# Patient Record
Sex: FEMALE | Race: BLACK OR AFRICAN AMERICAN | NOT HISPANIC OR LATINO | Employment: OTHER | URBAN - METROPOLITAN AREA
[De-identification: names, ages, dates, MRNs, and addresses within clinical notes are randomized per-mention and may not be internally consistent; named-entity substitution may affect disease eponyms.]

---

## 2017-02-14 LAB — POCT GLUCOSE: 254 MG/DL (ref 70–110)

## 2017-02-14 PROCEDURE — 99283 EMERGENCY DEPT VISIT LOW MDM: CPT | Mod: 25

## 2017-02-14 PROCEDURE — 96361 HYDRATE IV INFUSION ADD-ON: CPT

## 2017-02-14 PROCEDURE — 96374 THER/PROPH/DIAG INJ IV PUSH: CPT

## 2017-02-14 PROCEDURE — 82962 GLUCOSE BLOOD TEST: CPT

## 2017-02-15 ENCOUNTER — HOSPITAL ENCOUNTER (EMERGENCY)
Facility: OTHER | Age: 52
Discharge: HOME | End: 2017-02-15
Attending: EMERGENCY MEDICINE
Payer: MEDICARE

## 2017-02-15 VITALS
HEART RATE: 76 BPM | OXYGEN SATURATION: 96 % | BODY MASS INDEX: 24.16 KG/M2 | HEIGHT: 65 IN | TEMPERATURE: 100 F | SYSTOLIC BLOOD PRESSURE: 189 MMHG | RESPIRATION RATE: 18 BRPM | WEIGHT: 145 LBS | DIASTOLIC BLOOD PRESSURE: 76 MMHG

## 2017-02-15 DIAGNOSIS — B34.9 ACUTE VIRAL SYNDROME: ICD-10-CM

## 2017-02-15 DIAGNOSIS — N18.9 CHRONIC KIDNEY DISEASE, UNSPECIFIED STAGE: Primary | ICD-10-CM

## 2017-02-15 DIAGNOSIS — R19.7 NAUSEA VOMITING AND DIARRHEA: ICD-10-CM

## 2017-02-15 DIAGNOSIS — Z86.39 HISTORY OF DIABETES MELLITUS: ICD-10-CM

## 2017-02-15 DIAGNOSIS — R11.2 NAUSEA VOMITING AND DIARRHEA: ICD-10-CM

## 2017-02-15 LAB
ALBUMIN SERPL BCP-MCNC: 2.5 G/DL
ALP SERPL-CCNC: 168 U/L
ALT SERPL W/O P-5'-P-CCNC: 10 U/L
AMORPH CRY URNS QL MICRO: ABNORMAL
ANION GAP SERPL CALC-SCNC: 16 MMOL/L
AST SERPL-CCNC: 26 U/L
B-OH-BUTYR BLD STRIP-SCNC: 0.8 MMOL/L
BACTERIA #/AREA URNS HPF: ABNORMAL /HPF
BASOPHILS # BLD AUTO: 0.01 K/UL
BASOPHILS NFR BLD: 0.1 %
BILIRUB SERPL-MCNC: 0.2 MG/DL
BILIRUB UR QL STRIP: NEGATIVE
BUN SERPL-MCNC: 63 MG/DL
CALCIUM SERPL-MCNC: 9 MG/DL
CHLORIDE SERPL-SCNC: 108 MMOL/L
CLARITY UR: ABNORMAL
CO2 SERPL-SCNC: 16 MMOL/L
COLOR UR: YELLOW
CREAT SERPL-MCNC: 3.9 MG/DL
DIFFERENTIAL METHOD: ABNORMAL
EOSINOPHIL # BLD AUTO: 0 K/UL
EOSINOPHIL NFR BLD: 0.1 %
ERYTHROCYTE [DISTWIDTH] IN BLOOD BY AUTOMATED COUNT: 14.9 %
EST. GFR  (AFRICAN AMERICAN): 15 ML/MIN/1.73 M^2
EST. GFR  (NON AFRICAN AMERICAN): 13 ML/MIN/1.73 M^2
GLUCOSE SERPL-MCNC: 140 MG/DL
GLUCOSE UR QL STRIP: ABNORMAL
GRAN CASTS #/AREA URNS LPF: 22 /LPF
HCT VFR BLD AUTO: 29.3 %
HGB BLD-MCNC: 9.2 G/DL
HGB UR QL STRIP: ABNORMAL
HYALINE CASTS #/AREA URNS LPF: 0 /LPF
KETONES UR QL STRIP: NEGATIVE
LEUKOCYTE ESTERASE UR QL STRIP: NEGATIVE
LIPASE SERPL-CCNC: 8 U/L
LYMPHOCYTES # BLD AUTO: 0.7 K/UL
LYMPHOCYTES NFR BLD: 10.1 %
MCH RBC QN AUTO: 28.8 PG
MCHC RBC AUTO-ENTMCNC: 31.4 %
MCV RBC AUTO: 92 FL
MICROSCOPIC COMMENT: ABNORMAL
MONOCYTES # BLD AUTO: 1 K/UL
MONOCYTES NFR BLD: 13.9 %
NEUTROPHILS # BLD AUTO: 5.5 K/UL
NEUTROPHILS NFR BLD: 75.5 %
NITRITE UR QL STRIP: NEGATIVE
PH UR STRIP: 6 [PH] (ref 5–8)
PLATELET # BLD AUTO: 255 K/UL
PMV BLD AUTO: 9.9 FL
POTASSIUM SERPL-SCNC: 5.4 MMOL/L
PROT SERPL-MCNC: 6.3 G/DL
PROT UR QL STRIP: ABNORMAL
RBC # BLD AUTO: 3.2 M/UL
RBC #/AREA URNS HPF: 5 /HPF (ref 0–4)
SODIUM SERPL-SCNC: 140 MMOL/L
SP GR UR STRIP: 1.02 (ref 1–1.03)
URN SPEC COLLECT METH UR: ABNORMAL
UROBILINOGEN UR STRIP-ACNC: NEGATIVE EU/DL
WBC # BLD AUTO: 7.25 K/UL
WBC #/AREA URNS HPF: 4 /HPF (ref 0–5)
YEAST URNS QL MICRO: ABNORMAL

## 2017-02-15 PROCEDURE — 63600175 PHARM REV CODE 636 W HCPCS: Performed by: EMERGENCY MEDICINE

## 2017-02-15 PROCEDURE — 85025 COMPLETE CBC W/AUTO DIFF WBC: CPT

## 2017-02-15 PROCEDURE — 25000003 PHARM REV CODE 250: Performed by: EMERGENCY MEDICINE

## 2017-02-15 PROCEDURE — 83690 ASSAY OF LIPASE: CPT

## 2017-02-15 PROCEDURE — 81000 URINALYSIS NONAUTO W/SCOPE: CPT

## 2017-02-15 PROCEDURE — 82010 KETONE BODYS QUAN: CPT

## 2017-02-15 PROCEDURE — 80053 COMPREHEN METABOLIC PANEL: CPT

## 2017-02-15 RX ORDER — ATORVASTATIN CALCIUM 40 MG/1
40 TABLET, FILM COATED ORAL DAILY
Status: ON HOLD | COMMUNITY
End: 2017-08-27

## 2017-02-15 RX ORDER — METOPROLOL SUCCINATE 50 MG/1
50 TABLET, EXTENDED RELEASE ORAL DAILY
Status: ON HOLD | COMMUNITY
End: 2017-08-27

## 2017-02-15 RX ORDER — PROMETHAZINE HYDROCHLORIDE 25 MG/1
25 SUPPOSITORY RECTAL EVERY 6 HOURS PRN
Qty: 10 SUPPOSITORY | Refills: 0 | Status: SHIPPED | OUTPATIENT
Start: 2017-02-15 | End: 2017-02-18

## 2017-02-15 RX ORDER — SODIUM CHLORIDE 9 MG/ML
1000 INJECTION, SOLUTION INTRAVENOUS
Status: DISCONTINUED | OUTPATIENT
Start: 2017-02-15 | End: 2017-02-15 | Stop reason: HOSPADM

## 2017-02-15 RX ORDER — ONDANSETRON 2 MG/ML
4 INJECTION INTRAMUSCULAR; INTRAVENOUS
Status: COMPLETED | OUTPATIENT
Start: 2017-02-15 | End: 2017-02-15

## 2017-02-15 RX ORDER — SODIUM CHLORIDE 9 MG/ML
1000 INJECTION, SOLUTION INTRAVENOUS
Status: COMPLETED | OUTPATIENT
Start: 2017-02-15 | End: 2017-02-15

## 2017-02-15 RX ADMIN — ONDANSETRON 4 MG: 2 INJECTION INTRAMUSCULAR; INTRAVENOUS at 01:02

## 2017-02-15 RX ADMIN — SODIUM CHLORIDE 1000 ML: 0.9 INJECTION, SOLUTION INTRAVENOUS at 01:02

## 2017-02-15 NOTE — ED NOTES
"Blood sugar was over 700 at home and took 24 unit humalog PTA. Pt was given a shot at [facility] for a virus and thinks she may have the flu now. She states "I think I'm having an allergic reaction to that shot" Also concerned that she is acidotic because her sugar has been high. She just got discharged from [facility]. Pt states she has been having diarrhea and emesis and hurts all over from the neuropathy and carpel tunnel. She also has   LOC: Pt is awake alert and aware of environment, oriented X3 and speaking appropriately  Appearance: Pt is in no acute distress, Pt is well groomed and clean  Skin: skin is warm and dry with normal turgor, mucus membranes are moist and pink, skin is intact with no bruising or breakdown  Muskuloskeletal: Pt moves all extremities well, there is no obvious swelling or deformities noted, pulses are intact.  Respiratory: Airway is open and patent, respirations are spontaneous and even.  Cardiac: no edema and cap refill is <3sec  Abdomen: soft, non-tender and non-distended  Neuro: Pt follows commands easily and has no obvious deficits  "

## 2017-02-15 NOTE — DISCHARGE INSTRUCTIONS
"  Discharge Instructions for Chronic Kidney Disease (CKD)  Chronic kidney disease can happen because of many things. These include infections, diabetes, high blood pressure, kidney stones, circulation problems, and reactions to medicine. Having kidney disease means making many changes in your life. Learn as much as you can about it so that you can better adjust to these changes. It is important to remember that the main goal of treatment is to stop chronic kidney disease (CKD) from progressing to complete kidney failure. Treatments may vary based on the progression of CKD. Always follow your doctor's instructions on how to manage your condition.  Here are some things you can do to help your condition.  Diet changes  Always discuss your diet with your doctor before making any changes.  Salt (sodium) in your diet  · Based on your condition, you may be told to eat 1,500 mg or less of sodium daily  · Limit processed foods such as:  ¨ Frozen dinners and packaged meals  ¨ Canned fish and meats  ¨ Pickled foods  ¨ Salted snacks  ¨ Lunch meats  ¨ Sauces  ¨ Most cheeses  ¨ Fast foods  · Don't add salt to your food while cooking or before eating at the table.  · Eat unprocessed foods to lower the sodium, such as:  ¨ Fresh turkey and chicken  ¨ Lean beef  ¨ Unsalted tuna  ¨ Fresh fish  ¨ Fresh vegetables and fruits  · Season foods with fresh herbs, garlic, onions, citrus, flavored vinegar, and sodium-free spice blends instead of salt when cooking.  · Don't use salt substitutes that are high in potassium. Ask your doctor or a registered dietitian which salt substitutes to use.  · Avoiding drinking "softened" water, because of the sodium content. Make sure to read the label on bottled water for sodium content.  · Avoid over-the-counter medicines that contain sodium bicarbonate or sodium carbonate. Read labels carefully.  Potassium in your diet   · Based on your condition, you may be told to eat less than 1,500 mg to 2,700 mg of " potassium daily.  · Always drain canned foods such as vegetables, fruits, and meats before serving.  · Avoid whole-grain breads, wheat bran, and granolas.  · Avoid milk, buttermilk, and yogurt.  · Avoid nuts, seeds, peanut butter, dried beans, and peas.  · Avoid fig cookies, chocolate, and molasses.  · Don't use salt substitutes that are high in potassium. Ask your doctor or a registered dietitian which salt substitutes to use.  Protein in your diet  · Based on your condition, your doctor will talk with you about why you should limit protein in your diet.  · Cut back on protein. Eat less meat, milk products, yogurt, eggs, and cheese.  Phosphorus in your diet  · Avoid beer, cocoa, dark jay, thai, chocolate drinks, and canned ice teas.  · Avoid cheese, milk, ice cream, pudding, and yogurt.  · Avoid liver (beef, chicken), organ meats, oysters, crayfish, and sardines.  · Avoid beans (soy, kidney, black, garbanzo, and northern), peas (chick and split), bran cereals, nuts, and caramels.  Eat small meals often that are high in fiber and calories. You may be told to limit how much fluid you drink.  Other home care  · Avoid wearing yourself out or becoming overly fatigued.  · Get plenty of rest and get more sleep at night.  · Move around and bend your legs to avoid getting blood clots when you rest for a long period of time.  · Weigh yourself every day. Do this at the same time of day and in the same kind of clothes. Keep a record of your daily weights.  · Take your medicines exactly as directed.  · Keep all medical appointments.  · Take steps to control high blood pressure or diabetes. Talk with your doctor for advice.  · Talk with your doctor about dialysis. This procedure may help if your chronic kidney disease is progressing to end stage renal disease.  Follow-up care  Make a follow-up appointment as directed by our staff.     When to seek medical care  Call your doctor right away if you have any of the  "following:  · Trouble eating or drinking  · Weight loss of more than 2 pounds in 24 hours or more than 5 pounds in 7 days  · Little or no urine output  · Trouble breathing  · Muscle aches  · Fever of 100.4°F or higher, or chills  · Blood in your urine or stool  · Bloody discharge from your nose, mouth, or ears  · Severe headache or a seizure  · Vomiting  · Swelling of legs or ankles  · Chest pain (call 911)   Date Last Reviewed: 1/6/2015  © 0241-3004 [employer] [address] [place] [address]. All rights reserved. This information is not intended as a substitute for professional medical care. Always follow your healthcare professional's instructions.          Viral Syndrome (Adult)  A viral illness may cause a number of symptoms. The symptoms depend on the part of the body that the virus affects. If it settles in your nose, throat, and lungs, it may cause cough, sore throat, congestion, and sometimes headache. If it settles in your stomach and intestinal tract, it may cause vomiting and diarrhea. Sometimes it causes vague symptoms like "aching all over," feeling tired, loss of appetite, or fever.  A viral illness usually lasts 1 to 2 weeks, but sometimes it lasts longer. In some cases, a more serious infection can look like a viral syndrome in the first few days of the illness. You may need another exam and additional tests to know the difference. Watch for the warning signs listed below.  Home care  Follow these guidelines for taking care of yourself at home:  · If symptoms are severe, rest at home for the first 2 to 3 days.  · Stay away from cigarette smoke - both your smoke and the smoke from others.  · You may use over-the-counter acetaminophen or ibuprofen for fever, muscle aching, and headache, unless another medicine was prescribed for this. If you have chronic liver or kidney disease or ever had a stomach ulcer or GI bleeding, talk with your doctor before using these medicines. No " one who is younger than 18 and ill with a fever should take aspirin. It may cause severe disease or death.  · Your appetite may be poor, so a light diet is fine. Avoid dehydration by drinking 8 to 12 8-ounce glasses of fluids each day. This may include water; orange juice; lemonade; apple, grape, and cranberry juice; clear fruit drinks; electrolyte replacement and sports drinks; and decaffeinated teas and coffee. If you have been diagnosed with a kidney disease, ask your doctor how much and what types of fluids you should drink to prevent dehydration. If you have kidney disease, drinking too much fluid can cause it build up in the your body and be dangerous to your health.  · Over-the-counter remedies won't shorten the length of the illness but may be helpful for cough, sore throat; and nasal and sinus congestion. Don't use decongestants if you have high blood pressure.  Follow-up care  Follow up with your healthcare provider if you do not improve over the next week.  Call 911  Get emergency medical care if any of the following occur:  · Convulsion  · Feeling weak, dizzy, or like you are going to faint  · Chest pain, shortness of breath, wheezing, or difficulty breathing  When to seek medical advice  Call your healthcare provider right away if any of these occur:  · Cough with lots of colored sputum (mucus) or blood in your sputum  · Chest pain, shortness of breath, wheezing, or difficulty breathing  · Severe headache; face, neck, or ear pain  · Severe, constant pain in the lower right side of your belly (abdominal)  · Continued vomiting (cant keep liquids down)  · Frequent diarrhea (more than 5 times a day); blood (red or black color) or mucus in diarrhea  · Feeling weak, dizzy, or like you are going to faint  · Extreme thirst  · Fever of 100.4°F (38°C) or higher, or as directed by your healthcare provider  Date Last Reviewed: 9/25/2015  © 3731-4502 [employer] [address] [place] [address], [facility] [place]  [address]. All rights reserved. This information is not intended as a substitute for professional medical care. Always follow your healthcare professional's instructions.

## 2017-02-15 NOTE — ED PROVIDER NOTES
Encounter Date: 2/14/2017    SCRIBE #1 NOTE: I, Nargis Sandhu , am scribing for, and in the presence of, Dr. Gil.       History     Chief Complaint   Patient presents with    Hyperglycemia     CBG at home 600. took 24 units Humolog 2 hours prior to arrival. also c/o vomiting, and diarrhea x 2 days.      Review of patient's allergies indicates:   Allergen Reactions    Ciprofloxacin (bulk) Itching    Latex Itching and Other (See Comments)     Very low Oxygen    Vancomycin Shortness Of Breath and Itching    Neurontin [gabapentin] Other (See Comments)     Seizure like activity    Niacin Itching and Other (See Comments)     Burning      Bactrim [sulfamethoxazole-trimethoprim] Rash     HPI Comments: Time seen by provider: 12:25 AM    This is a 51 y.o. female, with history of IDDM, who presents with complaint of hyperglycemia. Symptoms began yesterday. CBG was 600 PTA (normally 200). Pt reports fatigue, chills, sore throat, cough with green sputum, leg swelling, nausea, vomiting, diarrhea, myalgias, and non-specific headache that began two days ago, but denies fever, rhinorrhea, sneezing, congestion, abdominal pain, or any urinary symptoms. Symptoms are described as progressively worsening. She took Humalog (24 units) five hours ago (takes 10 units TID). Pt reports long-distance travel, was seen at [facility] yesterday for the same symptoms, and received a negative result for influenza.       The history is provided by the patient.     Past Medical History   Diagnosis Date    Anemia      during pregnancys    Arthritis      neuropathy hands feet and legs//    Blood transfusion     Chronic pain     CKD (chronic kidney disease), stage IV     Diabetes mellitus     Diabetes mellitus type I     Diabetic retinopathy     Hyperlipidemia     Hypertension      patient states that when her blood sugar increases her blood pressure increases    Neuropathy     Seizures      when sugar is high, does not  quite remember    Vitamin D deficiency     Vitamin D deficiency disease      Past Medical History Pertinent Negatives   Diagnosis Date Noted    Amblyopia 2014    Cataract 2014    Glaucoma 2014    Macular degeneration 2014    Retinal detachment 2014    Strabismus 2014    Transfusion reaction 3/1/2014    Uveitis 2014     Past Surgical History   Procedure Laterality Date    Hysterectomy       section, classic       x 2    Eye surgery       Family History   Problem Relation Age of Onset    Diabetes Mother     Heart disease Mother     Blindness Mother      diabetes    Hypertension Mother     Diabetes Father     Asthma Father     Hypertension Father     Thyroid disease Sister     Breast cancer Daughter     Diabetes Sister     Stroke Maternal Uncle     Glaucoma Maternal Grandmother     Blindness Maternal Grandmother     Cataracts Maternal Grandmother     Hypertension Maternal Grandmother     Cancer Cousin     Amblyopia Neg Hx     Macular degeneration Neg Hx     Retinal detachment Neg Hx     Strabismus Neg Hx     Colon cancer Neg Hx     Ovarian cancer Neg Hx      Social History   Substance Use Topics    Smoking status: Current Some Day Smoker     Packs/day: 0.10     Years: 10.00     Types: Cigarettes    Smokeless tobacco: Never Used      Comment: 1 pack last her about 2-3 months    Alcohol use No     Review of Systems   Constitutional: Positive for chills and fatigue. Negative for fever.   HENT: Positive for sore throat. Negative for congestion, rhinorrhea and sneezing.    Eyes: Negative for redness and visual disturbance.   Respiratory: Positive for cough. Negative for shortness of breath.    Cardiovascular: Positive for leg swelling. Negative for palpitations.   Gastrointestinal: Positive for diarrhea, nausea and vomiting. Negative for abdominal pain.   Genitourinary: Negative for decreased urine volume, difficulty urinating, dysuria,  frequency, hematuria and urgency.   Musculoskeletal: Positive for myalgias. Negative for back pain.   Skin: Negative for rash.   Neurological: Positive for headaches. Negative for weakness.   Psychiatric/Behavioral: Negative for confusion.       Physical Exam   Initial Vitals   BP Pulse Resp Temp SpO2   02/14/17 2257 02/14/17 2257 02/14/17 2257 02/14/17 2257 02/14/17 2257   150/73 79 18 99.5 °F (37.5 °C) 100 %     Physical Exam    Nursing note and vitals reviewed.  Constitutional: She appears well-developed and well-nourished. She is not diaphoretic. No distress.   HENT:   Head: Normocephalic and atraumatic.   Right Ear: External ear normal.   Left Ear: External ear normal.   Eyes: Conjunctivae and EOM are normal. Pupils are equal, round, and reactive to light. Right eye exhibits no discharge. Left eye exhibits no discharge. No scleral icterus.   Neck: Normal range of motion. Neck supple.   Cardiovascular: Normal rate, regular rhythm, normal heart sounds and intact distal pulses. Exam reveals no gallop and no friction rub.    No murmur heard.  Pulmonary/Chest: Breath sounds normal. No stridor. No respiratory distress. She has no wheezes. She has no rhonchi. She has no rales.   Abdominal: Soft. She exhibits no distension. There is no tenderness. There is no rebound and no guarding.   Musculoskeletal: Normal range of motion. She exhibits edema. She exhibits no tenderness.   Trace pitting edema to the lower extremities (below the knee) bilaterally.    Neurological: She is alert and oriented to person, place, and time. She has normal strength. No cranial nerve deficit.   Skin: Skin is warm and dry. No rash noted. No erythema. No pallor.   Psychiatric: She has a normal mood and affect. Her behavior is normal. Judgment and thought content normal.         ED Course   Procedures  Labs Reviewed   CBC W/ AUTO DIFFERENTIAL - Abnormal; Notable for the following:        Result Value    RBC 3.20 (*)     Hemoglobin 9.2 (*)      Hematocrit 29.3 (*)     MCHC 31.4 (*)     RDW 14.9 (*)     Lymph # 0.7 (*)     Gran% 75.5 (*)     Lymph% 10.1 (*)     All other components within normal limits   COMPREHENSIVE METABOLIC PANEL - Abnormal; Notable for the following:     Potassium 5.4 (*)     CO2 16 (*)     Glucose 140 (*)     BUN, Bld 63 (*)     Creatinine 3.9 (*)     Albumin 2.5 (*)     Alkaline Phosphatase 168 (*)     eGFR if  15 (*)     eGFR if non  13 (*)     All other components within normal limits   URINALYSIS - Abnormal; Notable for the following:     Appearance, UA Hazy (*)     Protein, UA 3+ (*)     Glucose, UA 3+ (*)     Occult Blood UA 1+ (*)     All other components within normal limits   BETA - HYDROXYBUTYRATE, SERUM - Abnormal; Notable for the following:     Beta-Hydroxybutyrate 0.8 (*)     All other components within normal limits   URINALYSIS MICROSCOPIC - Abnormal; Notable for the following:     RBC, UA 5 (*)     Granular Casts, UA 22 (*)     All other components within normal limits   POCT GLUCOSE - Abnormal; Notable for the following:     POCT Glucose 254 (*)     All other components within normal limits   LIPASE             Medical Decision Making:   History:   Old Medical Records: I decided to obtain old medical records.  Initial Assessment:   51-year-old female with history of insulin-dependent diabetes as well as chronic kidney disease presents complaining of vomiting and diarrhea which started today as well as feeling poorly over the past 2 days.  The patient was concerned that she might be in DKA.  She did admit to being evaluated at outside hospital 2 days ago and told that she wanted okay and that she had a negative flu swab.  At that time she reports being diagnosed with a viral syndrome.  Vital signs within normal limits.  Her exam is unremarkable.  Clinical Tests:   Lab Tests: Ordered and Reviewed  ED Management:  Labs repeated and again without evidence of DKA.  Her creatinine was  elevated but appears to be at her baseline.  I did not repeat a flu swab since she had one approximately 48 hours ago.  Urinalysis did not show urinary tract infection.  Patient received IV fluids and Zofran.  She was able tolerate a by mouth challenge and was discharged home in stable condition with instructions to follow-up with her primary care doctor as soon as she returns home to [place].            Scribe Attestation:   Scribe #1: I performed the above scribed service and the documentation accurately describes the services I performed. I attest to the accuracy of the note.    Attending Attestation:           Physician Attestation for Scribe:  Physician Attestation Statement for Scribe #1: I, Dr. Gil, reviewed documentation, as scribed by Nargis Sandhu  in my presence, and it is both accurate and complete.                 ED Course     Clinical Impression:     1. Chronic kidney disease, unspecified stage    2. History of diabetes mellitus    3. Acute viral syndrome    4. Nausea vomiting and diarrhea                Tonja Gil MD  02/15/17 [id number]

## 2017-08-26 ENCOUNTER — HOSPITAL ENCOUNTER (INPATIENT)
Facility: HOSPITAL | Age: 52
LOS: 2 days | Discharge: HOME | DRG: 637 | End: 2017-08-28
Attending: EMERGENCY MEDICINE | Admitting: HOSPITALIST
Payer: MEDICARE

## 2017-08-26 DIAGNOSIS — E78.00 HYPERCHOLESTEREMIA: ICD-10-CM

## 2017-08-26 DIAGNOSIS — D63.1 ANEMIA IN CHRONIC KIDNEY DISEASE, UNSPECIFIED CKD STAGE: ICD-10-CM

## 2017-08-26 DIAGNOSIS — R60.0 EDEMA EXTREMITIES: ICD-10-CM

## 2017-08-26 DIAGNOSIS — N18.6 ESRD (END STAGE RENAL DISEASE): ICD-10-CM

## 2017-08-26 DIAGNOSIS — N18.5 ANEMIA OF CHRONIC KIDNEY FAILURE, STAGE 5: ICD-10-CM

## 2017-08-26 DIAGNOSIS — N18.9 ANEMIA IN CHRONIC KIDNEY DISEASE, UNSPECIFIED CKD STAGE: ICD-10-CM

## 2017-08-26 DIAGNOSIS — R73.9 HYPERGLYCEMIA: ICD-10-CM

## 2017-08-26 DIAGNOSIS — I10 ESSENTIAL HYPERTENSION: ICD-10-CM

## 2017-08-26 DIAGNOSIS — D63.1 ANEMIA OF CHRONIC KIDNEY FAILURE, STAGE 5: ICD-10-CM

## 2017-08-26 DIAGNOSIS — E10.65 HYPERGLYCEMIA DUE TO TYPE 1 DIABETES MELLITUS: Primary | ICD-10-CM

## 2017-08-26 LAB
ALBUMIN SERPL BCP-MCNC: 2.3 G/DL
ALLENS TEST: ABNORMAL
ALP SERPL-CCNC: 126 U/L
ALT SERPL W/O P-5'-P-CCNC: 10 U/L
ANION GAP SERPL CALC-SCNC: 13 MMOL/L
ANION GAP SERPL CALC-SCNC: 18 MMOL/L
AST SERPL-CCNC: 19 U/L
B-OH-BUTYR BLD STRIP-SCNC: 0.2 MMOL/L
BACTERIA #/AREA URNS AUTO: ABNORMAL /HPF
BASOPHILS # BLD AUTO: 0.02 K/UL
BASOPHILS NFR BLD: 0.3 %
BILIRUB SERPL-MCNC: 0.2 MG/DL
BILIRUB UR QL STRIP: NEGATIVE
BUN SERPL-MCNC: 64 MG/DL
BUN SERPL-MCNC: 67 MG/DL
CALCIUM SERPL-MCNC: 8.3 MG/DL
CALCIUM SERPL-MCNC: 8.4 MG/DL
CHLORIDE SERPL-SCNC: 92 MMOL/L
CHLORIDE SERPL-SCNC: 95 MMOL/L
CLARITY UR REFRACT.AUTO: ABNORMAL
CO2 SERPL-SCNC: 14 MMOL/L
CO2 SERPL-SCNC: 19 MMOL/L
COLOR UR AUTO: YELLOW
CREAT SERPL-MCNC: 5.2 MG/DL
CREAT SERPL-MCNC: 5.4 MG/DL
DIFFERENTIAL METHOD: ABNORMAL
EOSINOPHIL # BLD AUTO: 0 K/UL
EOSINOPHIL NFR BLD: 0.5 %
ERYTHROCYTE [DISTWIDTH] IN BLOOD BY AUTOMATED COUNT: 14.6 %
EST. GFR  (AFRICAN AMERICAN): 10.2 ML/MIN/1.73 M^2
EST. GFR  (AFRICAN AMERICAN): 9.7 ML/MIN/1.73 M^2
EST. GFR  (NON AFRICAN AMERICAN): 8.5 ML/MIN/1.73 M^2
EST. GFR  (NON AFRICAN AMERICAN): 8.8 ML/MIN/1.73 M^2
ESTIMATED AVG GLUCOSE: 260 MG/DL
GLUCOSE SERPL-MCNC: 318 MG/DL
GLUCOSE SERPL-MCNC: 744 MG/DL
GLUCOSE UR QL STRIP: ABNORMAL
HBA1C MFR BLD HPLC: 10.7 %
HCO3 UR-SCNC: 21.2 MMOL/L (ref 24–28)
HCT VFR BLD AUTO: 24.8 %
HGB BLD-MCNC: 7.8 G/DL
HGB UR QL STRIP: ABNORMAL
HYALINE CASTS UR QL AUTO: 0 /LPF
KETONES UR QL STRIP: ABNORMAL
LEUKOCYTE ESTERASE UR QL STRIP: ABNORMAL
LYMPHOCYTES # BLD AUTO: 0.6 K/UL
LYMPHOCYTES NFR BLD: 9.8 %
MCH RBC QN AUTO: 26.5 PG
MCHC RBC AUTO-ENTMCNC: 31.5 G/DL
MCV RBC AUTO: 84 FL
MICROSCOPIC COMMENT: ABNORMAL
MONOCYTES # BLD AUTO: 0.7 K/UL
MONOCYTES NFR BLD: 10.6 %
NEUTROPHILS # BLD AUTO: 5.1 K/UL
NEUTROPHILS NFR BLD: 77.7 %
NITRITE UR QL STRIP: NEGATIVE
PCO2 BLDA: 39.5 MMHG (ref 35–45)
PH SMN: 7.34 [PH] (ref 7.35–7.45)
PH UR STRIP: 5 [PH] (ref 5–8)
PLATELET # BLD AUTO: 271 K/UL
PMV BLD AUTO: 10.7 FL
PO2 BLDA: 43 MMHG (ref 40–60)
POC BE: -5 MMOL/L
POC SATURATED O2: 76 % (ref 95–100)
POC TCO2: 22 MMOL/L (ref 24–29)
POCT GLUCOSE: 143 MG/DL (ref 70–110)
POCT GLUCOSE: 242 MG/DL (ref 70–110)
POCT GLUCOSE: 356 MG/DL (ref 70–110)
POCT GLUCOSE: 414 MG/DL (ref 70–110)
POCT GLUCOSE: 88 MG/DL (ref 70–110)
POTASSIUM SERPL-SCNC: 4.3 MMOL/L
POTASSIUM SERPL-SCNC: 4.6 MMOL/L
PROT SERPL-MCNC: 5.5 G/DL
PROT UR QL STRIP: ABNORMAL
RBC # BLD AUTO: 2.94 M/UL
RBC #/AREA URNS AUTO: 1 /HPF (ref 0–4)
SAMPLE: ABNORMAL
SITE: ABNORMAL
SODIUM SERPL-SCNC: 124 MMOL/L
SODIUM SERPL-SCNC: 127 MMOL/L
SP GR UR STRIP: 1.01 (ref 1–1.03)
SQUAMOUS #/AREA URNS AUTO: 3 /HPF
URN SPEC COLLECT METH UR: ABNORMAL
UROBILINOGEN UR STRIP-ACNC: NEGATIVE EU/DL
WBC # BLD AUTO: 6.54 K/UL
WBC #/AREA URNS AUTO: 9 /HPF (ref 0–5)
YEAST UR QL AUTO: ABNORMAL

## 2017-08-26 PROCEDURE — 99285 EMERGENCY DEPT VISIT HI MDM: CPT | Mod: 25

## 2017-08-26 PROCEDURE — 82010 KETONE BODYS QUAN: CPT

## 2017-08-26 PROCEDURE — 25000003 PHARM REV CODE 250: Performed by: INTERNAL MEDICINE

## 2017-08-26 PROCEDURE — 63600175 PHARM REV CODE 636 W HCPCS: Performed by: SURGERY

## 2017-08-26 PROCEDURE — 80100014 HC HEMODIALYSIS 1:1

## 2017-08-26 PROCEDURE — 63600175 PHARM REV CODE 636 W HCPCS: Performed by: EMERGENCY MEDICINE

## 2017-08-26 PROCEDURE — 99282 EMERGENCY DEPT VISIT SF MDM: CPT | Mod: ,,, | Performed by: EMERGENCY MEDICINE

## 2017-08-26 PROCEDURE — 85025 COMPLETE CBC W/AUTO DIFF WBC: CPT

## 2017-08-26 PROCEDURE — 96365 THER/PROPH/DIAG IV INF INIT: CPT

## 2017-08-26 PROCEDURE — 80048 BASIC METABOLIC PNL TOTAL CA: CPT

## 2017-08-26 PROCEDURE — 80053 COMPREHEN METABOLIC PANEL: CPT

## 2017-08-26 PROCEDURE — 82962 GLUCOSE BLOOD TEST: CPT

## 2017-08-26 PROCEDURE — 99222 1ST HOSP IP/OBS MODERATE 55: CPT | Mod: ,,, | Performed by: INTERNAL MEDICINE

## 2017-08-26 PROCEDURE — 25000003 PHARM REV CODE 250: Performed by: SURGERY

## 2017-08-26 PROCEDURE — 25000003 PHARM REV CODE 250: Performed by: EMERGENCY MEDICINE

## 2017-08-26 PROCEDURE — 20600001 HC STEP DOWN PRIVATE ROOM

## 2017-08-26 PROCEDURE — 83036 HEMOGLOBIN GLYCOSYLATED A1C: CPT

## 2017-08-26 PROCEDURE — 81001 URINALYSIS AUTO W/SCOPE: CPT

## 2017-08-26 PROCEDURE — 82803 BLOOD GASES ANY COMBINATION: CPT

## 2017-08-26 PROCEDURE — 96366 THER/PROPH/DIAG IV INF ADDON: CPT

## 2017-08-26 RX ORDER — IBUPROFEN 200 MG
24 TABLET ORAL
Status: DISCONTINUED | OUTPATIENT
Start: 2017-08-26 | End: 2017-08-28 | Stop reason: HOSPADM

## 2017-08-26 RX ORDER — METOPROLOL SUCCINATE 50 MG/1
50 TABLET, EXTENDED RELEASE ORAL DAILY
Status: DISCONTINUED | OUTPATIENT
Start: 2017-08-27 | End: 2017-08-28 | Stop reason: HOSPADM

## 2017-08-26 RX ORDER — NIFEDIPINE 30 MG/1
60 TABLET, EXTENDED RELEASE ORAL DAILY
Status: DISCONTINUED | OUTPATIENT
Start: 2017-08-27 | End: 2017-08-28 | Stop reason: HOSPADM

## 2017-08-26 RX ORDER — HEPARIN SODIUM 5000 [USP'U]/ML
5000 INJECTION, SOLUTION INTRAVENOUS; SUBCUTANEOUS EVERY 8 HOURS
Status: DISCONTINUED | OUTPATIENT
Start: 2017-08-26 | End: 2017-08-28 | Stop reason: HOSPADM

## 2017-08-26 RX ORDER — ROSUVASTATIN CALCIUM 20 MG/1
20 TABLET, COATED ORAL NIGHTLY
Status: DISCONTINUED | OUTPATIENT
Start: 2017-08-26 | End: 2017-08-28 | Stop reason: HOSPADM

## 2017-08-26 RX ORDER — GLUCAGON 1 MG
1 KIT INJECTION
Status: DISCONTINUED | OUTPATIENT
Start: 2017-08-26 | End: 2017-08-28 | Stop reason: HOSPADM

## 2017-08-26 RX ORDER — IBUPROFEN 200 MG
16 TABLET ORAL
Status: DISCONTINUED | OUTPATIENT
Start: 2017-08-26 | End: 2017-08-28 | Stop reason: HOSPADM

## 2017-08-26 RX ORDER — LOSARTAN POTASSIUM AND HYDROCHLOROTHIAZIDE 12.5; 5 MG/1; MG/1
1 TABLET ORAL DAILY
Status: DISCONTINUED | OUTPATIENT
Start: 2017-08-27 | End: 2017-08-28 | Stop reason: HOSPADM

## 2017-08-26 RX ORDER — SODIUM CHLORIDE 9 MG/ML
INJECTION, SOLUTION INTRAVENOUS
Status: DISCONTINUED | OUTPATIENT
Start: 2017-08-26 | End: 2017-08-28 | Stop reason: HOSPADM

## 2017-08-26 RX ORDER — ONDANSETRON 4 MG/1
4 TABLET, ORALLY DISINTEGRATING ORAL EVERY 6 HOURS PRN
Status: DISCONTINUED | OUTPATIENT
Start: 2017-08-26 | End: 2017-08-28 | Stop reason: HOSPADM

## 2017-08-26 RX ORDER — METOCLOPRAMIDE 10 MG/1
10 TABLET ORAL
Status: DISCONTINUED | OUTPATIENT
Start: 2017-08-27 | End: 2017-08-28 | Stop reason: HOSPADM

## 2017-08-26 RX ORDER — HEPARIN SODIUM 1000 [USP'U]/ML
1000 INJECTION, SOLUTION INTRAVENOUS; SUBCUTANEOUS
Status: DISCONTINUED | OUTPATIENT
Start: 2017-08-26 | End: 2017-08-28 | Stop reason: HOSPADM

## 2017-08-26 RX ADMIN — HEPARIN SODIUM 5000 UNITS: 5000 INJECTION, SOLUTION INTRAVENOUS; SUBCUTANEOUS at 09:08

## 2017-08-26 RX ADMIN — SODIUM CHLORIDE: 0.9 INJECTION, SOLUTION INTRAVENOUS at 10:08

## 2017-08-26 RX ADMIN — SODIUM CHLORIDE 2.5 UNITS/HR: 9 INJECTION, SOLUTION INTRAVENOUS at 07:08

## 2017-08-26 RX ADMIN — SODIUM CHLORIDE 6 UNITS/HR: 9 INJECTION, SOLUTION INTRAVENOUS at 07:08

## 2017-08-26 NOTE — ED PROVIDER NOTES
Encounter Date: 2017       History     Chief Complaint   Patient presents with    Hyperglycemia     missed dialysis today - last dialysis thursday in [place]. reports glucose monitor reading high     51 yo F with h/o DM I, CKD IV (HD ,,Sat), HTN, seizures who was brought in by EMS for hyperglycemia. The pt checked her glucose at home and found >600. She has felt worsening weakness and some confusion so she skipped HD today and presented here. No recent infection. No fevers/chills. No chest pain, no SOB. No abdominal pain. She follows with an outside endocrinologist for DM management. She states her sugars typically run in the 300s, and she has been seen here for hyperglycemia in the past.          Review of patient's allergies indicates:   Allergen Reactions    Ciprofloxacin (bulk) Itching    Latex Itching and Other (See Comments)     Very low Oxygen    Vancomycin Shortness Of Breath and Itching    Neurontin [gabapentin] Other (See Comments)     Seizure like activity    Niacin Itching and Other (See Comments)     Burning      Bactrim [sulfamethoxazole-trimethoprim] Rash     Past Medical History:   Diagnosis Date    Anemia     during pregnancys    Arthritis     neuropathy hands feet and legs//    Blood transfusion     Chronic pain     CKD (chronic kidney disease), stage IV     Diabetes mellitus     Diabetes mellitus type I     Diabetic retinopathy     Hyperlipidemia     Hypertension     patient states that when her blood sugar increases her blood pressure increases    Neuropathy     Seizures     when sugar is high, does not quite remember    Vitamin D deficiency     Vitamin D deficiency disease      Past Surgical History:   Procedure Laterality Date     SECTION, CLASSIC      x 2    EYE SURGERY      HYSTERECTOMY       Family History   Problem Relation Age of Onset    Diabetes Mother     Heart disease Mother     Blindness Mother      diabetes    Hypertension Mother      Diabetes Father     Asthma Father     Hypertension Father     Thyroid disease Sister     Breast cancer Daughter     Diabetes Sister     Stroke Maternal Uncle     Glaucoma Maternal Grandmother     Blindness Maternal Grandmother     Cataracts Maternal Grandmother     Hypertension Maternal Grandmother     Cancer Cousin     Amblyopia Neg Hx     Macular degeneration Neg Hx     Retinal detachment Neg Hx     Strabismus Neg Hx     Colon cancer Neg Hx     Ovarian cancer Neg Hx      Social History   Substance Use Topics    Smoking status: Current Some Day Smoker     Packs/day: 0.10     Years: 10.00     Types: Cigarettes    Smokeless tobacco: Never Used      Comment: 1 pack last her about 2-3 months    Alcohol use No     Review of Systems   Constitutional: Negative for chills and fever.   HENT: Negative for congestion and rhinorrhea.    Respiratory: Negative for chest tightness and shortness of breath.    Cardiovascular: Positive for leg swelling. Negative for chest pain.   Gastrointestinal: Negative for abdominal distention and abdominal pain.   Genitourinary: Negative for difficulty urinating and dysuria.   Skin: Negative for rash and wound.   Psychiatric/Behavioral: Positive for confusion and decreased concentration. Negative for agitation and behavioral problems.       Physical Exam     Initial Vitals [08/26/17 1551]   BP Pulse Resp Temp SpO2   (!) 144/64 85 18 99 °F (37.2 °C) 99 %      MAP       90.67         Physical Exam    Constitutional: She appears well-developed and well-nourished. She is not diaphoretic. No distress.   HENT:   Head: Normocephalic and atraumatic.   Eyes: Conjunctivae and EOM are normal.   Neck: Normal range of motion. Neck supple.   Cardiovascular: Normal rate, regular rhythm, normal heart sounds and intact distal pulses.   Pulmonary/Chest: Breath sounds normal. No respiratory distress. She has no wheezes. She has no rhonchi. She has no rales.   Abdominal: Soft. Bowel sounds are  normal. She exhibits no distension. There is no tenderness.   Musculoskeletal: She exhibits edema (pitting edema, 3+).   Neurological: She is alert and oriented to person, place, and time. No cranial nerve deficit.   Pt is alert and answering questions appropriately, but with decreased concentration. I have to ask some questions again while we are talking.   Skin: Skin is warm and dry.   Psychiatric: Her behavior is normal. Thought content normal.         ED Course   Procedures  Labs Reviewed   CBC W/ AUTO DIFFERENTIAL - Abnormal; Notable for the following:        Result Value    RBC 2.94 (*)     Hemoglobin 7.8 (*)     Hematocrit 24.8 (*)     MCH 26.5 (*)     MCHC 31.5 (*)     RDW 14.6 (*)     Lymph # 0.6 (*)     Gran% 77.7 (*)     Lymph% 9.8 (*)     All other components within normal limits   COMPREHENSIVE METABOLIC PANEL - Abnormal; Notable for the following:     Sodium 124 (*)     Chloride 92 (*)     CO2 14 (*)     Glucose 744 (*)     BUN, Bld 67 (*)     Creatinine 5.4 (*)     Calcium 8.3 (*)     Total Protein 5.5 (*)     Albumin 2.3 (*)     Anion Gap 18 (*)     eGFR if  9.7 (*)     eGFR if non  8.5 (*)     All other components within normal limits   URINALYSIS, REFLEX TO URINE CULTURE - Abnormal; Notable for the following:     Appearance, UA Hazy (*)     Protein, UA 3+ (*)     Glucose, UA 3+ (*)     Ketones, UA Trace (*)     Occult Blood UA 1+ (*)     Leukocytes, UA 1+ (*)     All other components within normal limits    Narrative:     Preferred Collection Type->Urine, Clean Catch   URINALYSIS MICROSCOPIC - Abnormal; Notable for the following:     WBC, UA 9 (*)     All other components within normal limits    Narrative:     Preferred Collection Type->Urine, Clean Catch   ISTAT PROCEDURE - Abnormal; Notable for the following:     POC PH 7.337 (*)     POC HCO3 21.2 (*)     POC SATURATED O2 76 (*)     POC TCO2 22 (*)     All other components within normal limits   POCT GLUCOSE -  Abnormal; Notable for the following:     POCT Glucose 414 (*)     All other components within normal limits   POCT GLUCOSE - Abnormal; Notable for the following:     POCT Glucose 356 (*)     All other components within normal limits   BETA - HYDROXYBUTYRATE, SERUM   HEMOGLOBIN A1C   HEMOGLOBIN A1C   POTASSIUM   POCT GLUCOSE MONITORING CONTINUOUS   ISTAT CHEM8     EKG Readings: (Independently Interpreted)   Sinus, no peaked t waves, no ectopy, no acute st elevation          Medical Decision Making:   Initial Assessment:   51yo F with h/o DM I, CKD IV (due for HD today), HTN who presents with hyperglycemia.  Differential Diagnosis:   Hyperglycemia  HHS  DKA  Electrolyte abnormality    ED Management:  ISTAT shows gluc>700, K of 5.2, Co2 of 20 and Cr 4.9. Beta-hydroxybuterate wnl. Hgb 7.8, on chart review she has been this low in the past; no leukocytosis.    UPDATE:  CMP shows glucose of 744, K of 4.6, Co2 14, Na 124, Cr 5.4. Labs suggesting DKA, started insulin 0.1U/kg drip. She will need HD as she missed today and has lyte abnormalities (partially explained by DKA). Admit to inpt step down unit for further management.    Joo Howe MD  Resident, PGY-1  6:21 PM              Attending Attestation:   Physician Attestation Statement for Resident:  As the supervising MD   Physician Attestation Statement: I have personally seen and examined this patient.   I agree with the above history. -:   As the supervising MD I agree with the above PE.   -: R upper chest HD catheter nontender, no bleeding or cellulitis.     As the supervising MD I agree with the above treatment, course, plan, and disposition.   -: Hyperglycemia, r/o DKA.  Due for hd, slightly volume up, r/o hyperkalemia or other significant electrolyte abnormality.  Not in acute distress.    Hyperglycemic but ketones neg.  Will start insulin gtt, admit for mgt of hyperglycemia as well as HD.    I have reviewed and agree with the residents interpretation of the  following: lab data and EKG.  I have reviewed the following: old records at this facility.                    ED Course     Clinical Impression:   The primary encounter diagnosis was Hyperglycemia. Diagnoses of ESRD (end stage renal disease), Anemia in chronic kidney disease, unspecified CKD stage, and Edema extremities were also pertinent to this visit.                           Neal Hernandez MD  08/27/17 0128       Neal Hernandez MD  08/27/17 0128
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
## 2017-08-26 NOTE — ED NOTES
Pt identifiers Eufemia Haq checked and correct  LOC: The patient is awake, alert, aware of environment with a flat affect. Oriented x4, responses are slow.   APPEARANCE: Pt resting comfortably, in no acute distress, pt is clean and well groomed, clothing properly fastened  SKIN: Skin warm, dry and intact, skin is taunt, moist mucus membranes  RESPIRATORY: Airway is open and patent, respirations are spontaneous, even. Pt c/o mild SOB. Regular RR.  CARDIAC: Normal rate and rhythm, 4+ pitting edema in bilateral LE, edema noted to bilateral upper extremities, capillary refill < 3 seconds, bilateral radial pulses 2+  ABDOMEN: Soft, nontender, and swollen.  NEUROLOGIC:  facial expression is symmetrical, patient moving all extremities spontaneously, normal sensation in all extremities when touched with a finger.  Follows all commands appropriately  MUSCULOSKELETAL: No obvious deformities.

## 2017-08-27 PROBLEM — N18.9 ANEMIA OF CHRONIC KIDNEY FAILURE: Status: ACTIVE | Noted: 2017-08-27

## 2017-08-27 PROBLEM — D63.1 ANEMIA OF CHRONIC KIDNEY FAILURE: Status: ACTIVE | Noted: 2017-08-27

## 2017-08-27 LAB
ALBUMIN SERPL BCP-MCNC: 2.2 G/DL
ALP SERPL-CCNC: 114 U/L
ALT SERPL W/O P-5'-P-CCNC: 9 U/L
ANION GAP SERPL CALC-SCNC: 12 MMOL/L
AST SERPL-CCNC: 19 U/L
BASOPHILS # BLD AUTO: 0.06 K/UL
BASOPHILS NFR BLD: 0.7 %
BILIRUB SERPL-MCNC: 0.2 MG/DL
BUN SERPL-MCNC: 42 MG/DL
CALCIUM SERPL-MCNC: 7.9 MG/DL
CHLORIDE SERPL-SCNC: 97 MMOL/L
CO2 SERPL-SCNC: 26 MMOL/L
CREAT SERPL-MCNC: 3.6 MG/DL
DIFFERENTIAL METHOD: ABNORMAL
EOSINOPHIL # BLD AUTO: 0.3 K/UL
EOSINOPHIL NFR BLD: 4.1 %
ERYTHROCYTE [DISTWIDTH] IN BLOOD BY AUTOMATED COUNT: 14.5 %
EST. GFR  (AFRICAN AMERICAN): 15.9 ML/MIN/1.73 M^2
EST. GFR  (NON AFRICAN AMERICAN): 13.8 ML/MIN/1.73 M^2
GLUCOSE SERPL-MCNC: 62 MG/DL
HCT VFR BLD AUTO: 21.7 %
HGB BLD-MCNC: 7.3 G/DL
LYMPHOCYTES # BLD AUTO: 1.6 K/UL
LYMPHOCYTES NFR BLD: 19 %
MAGNESIUM SERPL-MCNC: 1.7 MG/DL
MCH RBC QN AUTO: 25.8 PG
MCHC RBC AUTO-ENTMCNC: 33.6 G/DL
MCV RBC AUTO: 77 FL
MONOCYTES # BLD AUTO: 1.4 K/UL
MONOCYTES NFR BLD: 16.6 %
NEUTROPHILS # BLD AUTO: 4.8 K/UL
NEUTROPHILS NFR BLD: 58.1 %
PHOSPHATE SERPL-MCNC: 2.4 MG/DL
PLATELET # BLD AUTO: 216 K/UL
PMV BLD AUTO: 11.3 FL
POCT GLUCOSE: 117 MG/DL (ref 70–110)
POCT GLUCOSE: 143 MG/DL (ref 70–110)
POCT GLUCOSE: 154 MG/DL (ref 70–110)
POCT GLUCOSE: 355 MG/DL (ref 70–110)
POCT GLUCOSE: 49 MG/DL (ref 70–110)
POCT GLUCOSE: 56 MG/DL (ref 70–110)
POCT GLUCOSE: 59 MG/DL (ref 70–110)
POCT GLUCOSE: 60 MG/DL (ref 70–110)
POCT GLUCOSE: 75 MG/DL (ref 70–110)
POCT GLUCOSE: 76 MG/DL (ref 70–110)
POCT GLUCOSE: 77 MG/DL (ref 70–110)
POCT GLUCOSE: 85 MG/DL (ref 70–110)
POCT GLUCOSE: 94 MG/DL (ref 70–110)
POTASSIUM SERPL-SCNC: 3.7 MMOL/L
PROT SERPL-MCNC: 5.4 G/DL
RBC # BLD AUTO: 2.83 M/UL
SODIUM SERPL-SCNC: 135 MMOL/L
WBC # BLD AUTO: 8.26 K/UL

## 2017-08-27 PROCEDURE — 83735 ASSAY OF MAGNESIUM: CPT

## 2017-08-27 PROCEDURE — 20600001 HC STEP DOWN PRIVATE ROOM

## 2017-08-27 PROCEDURE — 63600175 PHARM REV CODE 636 W HCPCS: Performed by: INTERNAL MEDICINE

## 2017-08-27 PROCEDURE — 80053 COMPREHEN METABOLIC PANEL: CPT

## 2017-08-27 PROCEDURE — 25000003 PHARM REV CODE 250: Performed by: SURGERY

## 2017-08-27 PROCEDURE — 85025 COMPLETE CBC W/AUTO DIFF WBC: CPT

## 2017-08-27 PROCEDURE — 84100 ASSAY OF PHOSPHORUS: CPT

## 2017-08-27 PROCEDURE — 63600175 PHARM REV CODE 636 W HCPCS: Performed by: SURGERY

## 2017-08-27 PROCEDURE — 99223 1ST HOSP IP/OBS HIGH 75: CPT | Mod: GC,,, | Performed by: HOSPITALIST

## 2017-08-27 RX ORDER — INSULIN ASPART 100 [IU]/ML
5 INJECTION, SOLUTION INTRAVENOUS; SUBCUTANEOUS
Status: DISCONTINUED | OUTPATIENT
Start: 2017-08-27 | End: 2017-08-28

## 2017-08-27 RX ORDER — INSULIN ASPART 100 [IU]/ML
5 INJECTION, SOLUTION INTRAVENOUS; SUBCUTANEOUS
Qty: 4.5 ML | Refills: 11 | Status: SHIPPED | OUTPATIENT
Start: 2017-08-27 | End: 2017-08-27

## 2017-08-27 RX ORDER — BUMETANIDE 1 MG/1
1 TABLET ORAL DAILY
Status: ON HOLD | COMMUNITY
End: 2017-08-28 | Stop reason: HOSPADM

## 2017-08-27 RX ORDER — METOPROLOL TARTRATE 50 MG/1
50 TABLET ORAL 2 TIMES DAILY
Status: ON HOLD | COMMUNITY
End: 2018-06-08 | Stop reason: HOSPADM

## 2017-08-27 RX ORDER — ROSUVASTATIN CALCIUM 20 MG/1
20 TABLET, COATED ORAL DAILY
Status: ON HOLD | COMMUNITY
End: 2018-06-08 | Stop reason: HOSPADM

## 2017-08-27 RX ORDER — INSULIN GLARGINE 300 [IU]/ML
20 INJECTION, SOLUTION SUBCUTANEOUS DAILY
Status: ON HOLD | COMMUNITY
End: 2017-08-28

## 2017-08-27 RX ORDER — INSULIN ASPART 100 [IU]/ML
0-5 INJECTION, SOLUTION INTRAVENOUS; SUBCUTANEOUS
Qty: 6 ML | Refills: 2 | Status: SHIPPED | OUTPATIENT
Start: 2017-08-27 | End: 2017-08-27

## 2017-08-27 RX ORDER — INSULIN ASPART 100 [IU]/ML
5 INJECTION, SOLUTION INTRAVENOUS; SUBCUTANEOUS
Qty: 4.5 ML | Refills: 11 | Status: SHIPPED | OUTPATIENT
Start: 2017-08-27 | End: 2017-08-28 | Stop reason: SDUPTHER

## 2017-08-27 RX ORDER — INSULIN ASPART 100 [IU]/ML
0-5 INJECTION, SOLUTION INTRAVENOUS; SUBCUTANEOUS
Qty: 6 ML | Refills: 2 | Status: SHIPPED | OUTPATIENT
Start: 2017-08-27 | End: 2017-08-28 | Stop reason: SDUPTHER

## 2017-08-27 RX ORDER — IBUPROFEN 200 MG
16 TABLET ORAL
Status: DISCONTINUED | OUTPATIENT
Start: 2017-08-27 | End: 2017-08-28 | Stop reason: HOSPADM

## 2017-08-27 RX ORDER — ACETAMINOPHEN AND CODEINE PHOSPHATE 300; 60 MG/1; MG/1
2 TABLET ORAL DAILY PRN
Status: ON HOLD | COMMUNITY
End: 2018-03-31 | Stop reason: HOSPADM

## 2017-08-27 RX ORDER — INSULIN ASPART 100 [IU]/ML
0-5 INJECTION, SOLUTION INTRAVENOUS; SUBCUTANEOUS
Status: DISCONTINUED | OUTPATIENT
Start: 2017-08-27 | End: 2017-08-28 | Stop reason: HOSPADM

## 2017-08-27 RX ADMIN — METOPROLOL SUCCINATE 50 MG: 50 TABLET, EXTENDED RELEASE ORAL at 08:08

## 2017-08-27 RX ADMIN — METOCLOPRAMIDE 10 MG: 10 TABLET ORAL at 08:08

## 2017-08-27 RX ADMIN — HEPARIN SODIUM 5000 UNITS: 5000 INJECTION, SOLUTION INTRAVENOUS; SUBCUTANEOUS at 05:08

## 2017-08-27 RX ADMIN — ROSUVASTATIN CALCIUM 20 MG: 20 TABLET, FILM COATED ORAL at 09:08

## 2017-08-27 RX ADMIN — METOCLOPRAMIDE 10 MG: 10 TABLET ORAL at 04:08

## 2017-08-27 RX ADMIN — DEXTROSE MONOHYDRATE 12.5 G: 25 INJECTION, SOLUTION INTRAVENOUS at 12:08

## 2017-08-27 RX ADMIN — LOSARTAN POTASSIUM AND HYDROCHLOROTHIAZIDE 1 TABLET: 12.5; 5 TABLET ORAL at 08:08

## 2017-08-27 RX ADMIN — INSULIN ASPART 3 UNITS: 100 INJECTION, SOLUTION INTRAVENOUS; SUBCUTANEOUS at 09:08

## 2017-08-27 RX ADMIN — INSULIN ASPART 5 UNITS: 100 INJECTION, SOLUTION INTRAVENOUS; SUBCUTANEOUS at 04:08

## 2017-08-27 RX ADMIN — HEPARIN SODIUM 1000 UNITS: 1000 INJECTION, SOLUTION INTRAVENOUS; SUBCUTANEOUS at 12:08

## 2017-08-27 RX ADMIN — HEPARIN SODIUM 5000 UNITS: 5000 INJECTION, SOLUTION INTRAVENOUS; SUBCUTANEOUS at 02:08

## 2017-08-27 RX ADMIN — DEXTROSE MONOHYDRATE 12.5 G: 25 INJECTION, SOLUTION INTRAVENOUS at 03:08

## 2017-08-27 RX ADMIN — DEXTROSE MONOHYDRATE 25 G: 25 INJECTION, SOLUTION INTRAVENOUS at 07:08

## 2017-08-27 RX ADMIN — DEXTROSE MONOHYDRATE 12.5 G: 25 INJECTION, SOLUTION INTRAVENOUS at 05:08

## 2017-08-27 RX ADMIN — ROSUVASTATIN CALCIUM 20 MG: 20 TABLET, FILM COATED ORAL at 01:08

## 2017-08-27 RX ADMIN — INSULIN DETEMIR 10 UNITS: 100 INJECTION, SOLUTION SUBCUTANEOUS at 09:08

## 2017-08-27 RX ADMIN — NIFEDIPINE 60 MG: 30 TABLET, FILM COATED, EXTENDED RELEASE ORAL at 08:08

## 2017-08-27 RX ADMIN — METOCLOPRAMIDE 10 MG: 10 TABLET ORAL at 01:08

## 2017-08-27 RX ADMIN — HEPARIN SODIUM 5000 UNITS: 5000 INJECTION, SOLUTION INTRAVENOUS; SUBCUTANEOUS at 09:08

## 2017-08-27 NOTE — PROGRESS NOTES
2hr 45mins dialysis treatment completed with net UF of 1766ml. System clotted with 15 mins remaining time on machine. Rinsed back arterial line. CVC flushed with saline. Both lumen locked with heparin. Capped and taped. No acute events during tx. Patient with stable vitals. Report given to primary RN.

## 2017-08-27 NOTE — SUBJECTIVE & OBJECTIVE
Interval History: Patient became hypoglycemic on insulin gtt so it was held by overnight team.  He anion gap closed on repeat labs. He confusion improved with better control of her blood glucose.  She reports some blurring of her vision.  She denies any other complaints this AM.      Review of Systems   Constitutional: Positive for fatigue. Negative for chills, fever and unexpected weight change.   HENT: Negative for hearing loss and sore throat.    Eyes: Negative for visual disturbance.   Respiratory: Negative for apnea, cough and shortness of breath.         Permacath right IJ   Cardiovascular: Positive for leg swelling. Negative for chest pain and palpitations.   Gastrointestinal: Negative for abdominal pain, diarrhea, nausea and vomiting.   Genitourinary: Negative for flank pain.        ESRD    Musculoskeletal: Negative for arthralgias, back pain and joint swelling.   Skin: Negative for pallor and rash.   Neurological: Negative for dizziness, seizures, facial asymmetry and numbness.   Psychiatric/Behavioral: Negative for agitation, behavioral problems, confusion and decreased concentration.     Objective:     Vital Signs (Most Recent):  Temp: 98.2 °F (36.8 °C) (08/27/17 0801)  Pulse: 79 (08/27/17 0801)  Resp: 18 (08/27/17 0801)  BP: (!) 156/70 (08/27/17 0801)  SpO2: 100 % (08/27/17 0801) Vital Signs (24h Range):  Temp:  [98.1 °F (36.7 °C)-99 °F (37.2 °C)] 98.2 °F (36.8 °C)  Pulse:  [66-85] 79  Resp:  [18-21] 18  SpO2:  [95 %-100 %] 100 %  BP: ()/(52-76) 156/70     Weight: 77.6 kg (171 lb 1.2 oz)  Body mass index is 27.61 kg/m².    Intake/Output Summary (Last 24 hours) at 08/27/17 0921  Last data filed at 08/27/17 0705   Gross per 24 hour   Intake              420 ml   Output             2266 ml   Net            -1846 ml      Physical Exam   Constitutional: She appears well-developed and well-nourished.   HENT:   Head: Normocephalic and atraumatic.   Mouth/Throat: No oropharyngeal exudate.   Eyes: EOM are  normal. Pupils are equal, round, and reactive to light.   Neck: Normal range of motion. No JVD present.   Cardiovascular: Normal rate and regular rhythm.  Exam reveals no gallop and no friction rub.    No murmur heard.  Pulmonary/Chest: Effort normal and breath sounds normal. No stridor. No respiratory distress. She has no wheezes. She has no rales.   Abdominal: Soft. She exhibits no distension and no mass. There is no tenderness. There is no rebound.   Musculoskeletal: She exhibits edema (2+ bilateral lower extremity). She exhibits no tenderness or deformity.   Lymphadenopathy:     She has no cervical adenopathy.   Neurological: No cranial nerve deficit.   Patient is lethargic  oriented to person, place, situation but not time   Skin: Skin is warm and dry. Capillary refill takes less than 2 seconds.   Psychiatric: Cognition and memory are impaired.       Significant Labs: All pertinent labs within the past 24 hours have been reviewed.    Significant Imaging: no new imaging

## 2017-08-27 NOTE — PROGRESS NOTES
Spoke with Dr. Gusman with IM 3 regarding patient's insilin gtt order. MD states to decrease insulin gtt by 50% (to 1.25) if pt CBG under <225, and if CBG remains under 200, to reduce insulin by 50% again. Next CBG to be checked @ 2200. Will continue to monitor.

## 2017-08-27 NOTE — PLAN OF CARE
Problem: Patient Care Overview  Goal: Individualization & Mutuality  Outcome: Ongoing (interventions implemented as appropriate)  Pt verbalizes no complaints. Denies CP, SOB, or other discomforts. Pt had low CBG this AM. Dextrose IV given. Pt blood sugars have been stable since. Insulin drip dc'd.  Pt c/o of blurry vision. VSS.  Pt remains free of fall or injury. Pt verbalizes understanding of plan of care. Will continue to monitor.

## 2017-08-27 NOTE — HOSPITAL COURSE
Eufemiarenata Haq was admitted to IM team 3 with hyperglycemia and AGMA.  She was started on an insulin gtt.  Nephrology was consulted for hemodialysis and she underwent HD overnight where they pulled 2L.    8/28: Patient with labile BG; increased basal to home dose today with tentative discharge.

## 2017-08-27 NOTE — ASSESSMENT & PLAN NOTE
On losartan, hydrochlorothiazide, metoprolol tartrate, nifedipine  -Will continue home antihypertensives

## 2017-08-27 NOTE — PROGRESS NOTES
On arrival to floor and throughout night patient has been lethargic. Patient AAO x3 and answers all orientation questions correctly, but when asked to perform a task (such as take a pill), patient looks confused and does not complete task. Last CBG 77. Patient is neurologically intact, but it is hard to tell if this is her baseline cognitive state, as there is no family at the bedside. Patient is stable and shows no acute distress at this time. Notified Dr. Gusman with IM 3. MD stated to notify him if mentation changes or if patient appears to decline. Will do so and continue to monitor.
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
## 2017-08-27 NOTE — ASSESSMENT & PLAN NOTE
Patient is a poorly controlled diabetic.  Previous Hgb A1c 10.4 back in 12/2016.  Upon presentation elevated blood glucose (700's) with an anion gap with a normal beta- hydroxybutyrate.  Started on a insulin gtt while in the ED. That has since been discontinued for hypoglycemia.  Anion gap closed with correction of blood glucose.  Hemoglobin A1c 10.7 this admission  -Diabetic/Renal diet  -Restart basal insulin at 10u QHS and will titrate as needed  -5 units aspart prandially  -Low dose sliding scale  -Hypoglycemia protocol

## 2017-08-27 NOTE — PROGRESS NOTES
Notified on call MD for IM3 that pt states she has been having blurry vision since coming to ED. No new orders given. Will continue to monitor.

## 2017-08-27 NOTE — SUBJECTIVE & OBJECTIVE
Past Medical History:   Diagnosis Date    Anemia     during pregnancys    Arthritis     neuropathy hands feet and legs//    Blood transfusion     Chronic pain     CKD (chronic kidney disease), stage IV     Diabetes mellitus     Diabetes mellitus type I     Diabetic retinopathy     Hyperlipidemia     Hypertension     patient states that when her blood sugar increases her blood pressure increases    Neuropathy     Seizures     when sugar is high, does not quite remember    Vitamin D deficiency     Vitamin D deficiency disease        Past Surgical History:   Procedure Laterality Date     SECTION, CLASSIC      x 2    EYE SURGERY      HYSTERECTOMY         Review of patient's allergies indicates:   Allergen Reactions    Ciprofloxacin (bulk) Itching    Latex Itching and Other (See Comments)     Very low Oxygen    Vancomycin Shortness Of Breath and Itching    Neurontin [gabapentin] Other (See Comments)     Seizure like activity    Niacin Itching and Other (See Comments)     Burning      Bactrim [sulfamethoxazole-trimethoprim] Rash     Current Facility-Administered Medications   Medication Frequency    dextrose 50% injection 12.5 g PRN    dextrose 50% injection 25 g PRN    glucagon (human recombinant) injection 1 mg PRN    glucose chewable tablet 16 g PRN    glucose chewable tablet 24 g PRN    heparin (porcine) injection 5,000 Units Q8H    insulin regular (Humulin R) 100 Units in sodium chloride 0.9% 100 mL infusion Continuous    [START ON 2017] losartan-hydrochlorothiazide 50-12.5 mg per tablet 1 tablet Daily    [START ON 2017] metoclopramide HCl tablet 10 mg TID AC    [START ON 2017] metoprolol succinate (TOPROL-XL) 24 hr tablet 50 mg Daily    [START ON 2017] nifedipine 24 hr tablet 60 mg Daily    ondansetron disintegrating tablet 4 mg Q6H PRN    rosuvastatin tablet 20 mg QHS     Current Outpatient Prescriptions   Medication    atorvastatin (LIPITOR) 40 MG  tablet    bumetanide (BUMEX) 1 MG tablet    ergocalciferol (ERGOCALCIFEROL) 50,000 unit Cap    losartan-hydrochlorothiazide 50-12.5 mg (HYZAAR) 50-12.5 mg per tablet    metoclopramide HCl (REGLAN) 10 MG tablet    metoprolol succinate (TOPROL-XL) 50 MG 24 hr tablet    nifedipine (ADALAT CC) 60 MG TbSR    oxycodone-acetaminophen (PERCOCET)  mg per tablet    rosuvastatin (CRESTOR) 20 MG tablet    zolpidem (AMBIEN CR) 12.5 MG CR tablet    blood sugar diagnostic Strp    blood-glucose meter (FREESTYLE SYSTEM KIT) kit    diphenoxylate-atropine 2.5-0.025 mg (LOMOTIL) 2.5-0.025 mg per tablet    ondansetron (ZOFRAN-ODT) 4 MG TbDL    TRUETEST TEST STRIPS Strp     Family History     Problem Relation (Age of Onset)    Asthma Father    Blindness Mother, Maternal Grandmother    Breast cancer Daughter    Cancer Cousin    Cataracts Maternal Grandmother    Diabetes Mother, Father, Sister    Glaucoma Maternal Grandmother    Heart disease Mother    Hypertension Mother, Father, Maternal Grandmother    Stroke Maternal Uncle    Thyroid disease Sister        Social History Main Topics    Smoking status: Current Some Day Smoker     Packs/day: 0.10     Years: 10.00     Types: Cigarettes    Smokeless tobacco: Never Used      Comment: 1 pack last her about 2-3 months    Alcohol use No    Drug use: No    Sexual activity: Yes     Partners: Male     Birth control/ protection: None     Review of Systems   Unable to perform ROS: Other     Objective:     Vital Signs (Most Recent):  Temp: 99 °F (37.2 °C) (08/26/17 1551)  Pulse: 69 (08/26/17 1931)  Resp: 18 (08/26/17 1931)  BP: (!) 143/67 (08/26/17 1931)  SpO2: 98 % (08/26/17 1931)  O2 Device (Oxygen Therapy): room air (08/26/17 1551) Vital Signs (24h Range):  Temp:  [99 °F (37.2 °C)] 99 °F (37.2 °C)  Pulse:  [69-85] 69  Resp:  [18-21] 18  SpO2:  [98 %-100 %] 98 %  BP: (143-151)/(64-72) 143/67     Weight: 59 kg (130 lb) (08/26/17 4781)  Body mass index is 20.98 kg/m².  Body  surface area is 1.66 meters squared.    No intake/output data recorded.    Physical Exam   Constitutional: She is oriented to person, place, and time. She appears well-developed and well-nourished. No distress.   HENT:   Head: Normocephalic and atraumatic.   Eyes: EOM are normal. Pupils are equal, round, and reactive to light.   Neck: Normal range of motion. Neck supple.   Cardiovascular: Normal rate and regular rhythm.    Pulmonary/Chest: Effort normal. She has decreased breath sounds.   Abdominal: Soft. She exhibits no distension. There is no tenderness.   Musculoskeletal: She exhibits edema (2+ pitting). She exhibits no tenderness.   Lymphadenopathy:     She has no cervical adenopathy.   Neurological: She is alert and oriented to person, place, and time.   Slowed mentation   Skin: Skin is warm and dry.       Significant Labs:  ABGs:   Recent Labs  Lab 08/26/17  1831   PH 7.337*   PCO2 39.5   HCO3 21.2*   POCSATURATED 76*   BE -5     CBC:   Recent Labs  Lab 08/26/17  1639   WBC 6.54   RBC 2.94*   HGB 7.8*   HCT 24.8*      MCV 84   MCH 26.5*   MCHC 31.5*     CMP:   Recent Labs  Lab 08/26/17  1639   *   CALCIUM 8.3*   ALBUMIN 2.3*   PROT 5.5*   *   K 4.6   CO2 14*   CL 92*   BUN 67*   CREATININE 5.4*   ALKPHOS 126   ALT 10   AST 19   BILITOT 0.2     Coagulation: No results for input(s): INR, APTT in the last 168 hours.    Invalid input(s): PT  All labs within the past 24 hours have been reviewed.    Significant Imaging:  Labs: Reviewed  X-Ray: Reviewed

## 2017-08-27 NOTE — PROGRESS NOTES
Pt arrived to CSU room 326 from ED. Patient AAOx3, but responses are delayed and patient is sleepy. Patient denies any pain or shortness of breath. Telemetry applied. Oriented to surroundings. Bed in lowest position, bed alarm activated. Patient instructed to call for assistance before getting out of bed. Insulin gtt infusing @ 2.5units/hr at  2.5 mL/hr per order. . Will notify MD of pt CBG and carry out orders as needed.
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
## 2017-08-27 NOTE — ASSESSMENT & PLAN NOTE
Hemoglobin 7.3 this AM which appears to be stable.  -Will transfuse for hemoglobin less than 7 or sign of hemorrhage  -Epo with HD

## 2017-08-27 NOTE — ASSESSMENT & PLAN NOTE
- receives iHD TTS via RIJ TDC at a [facility] in [place]. Treatment duration 4 hours. EDW 65kg. +residual renal function  - last HD on Thursday  - due for HD today  - hypervolemic on exam but no respiratory distress; maintaining SpO2 on RA  - no hyperkalemia  - metabolic acidosis but no significant acidemia at this time  - no emergent need for HD at this time  - will plan for a dialysis treatment either tonight or tomorrow  - check phos levels with daily labs  - will discuss with staff

## 2017-08-27 NOTE — ASSESSMENT & PLAN NOTE
Patient is a poorly controlled diabetic.  Previous Hgb A1c 10.4 back in 12/2016.  Upon presentation elevated blood glucose (700's) with an anion gap with a normal beta- hydroxybutyrate.  Started on a insulin gtt while in the ED.  -Will repeat Hgb A1c  -Continue insulin gtt  -Will hold off on IV fluids given her ESRD and current fluid status
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
## 2017-08-27 NOTE — H&P
[facility] Medicine  History & Physical    Patient Name: Eufemia Haq  MRN: [id number]  Admission Date: 8/26/2017  Attending Physician: Aguilar Palafox, *   Primary Care Provider: Alecia Delacruz MD    [facility] Medicine Team: [facility] MED 3 Neal Samaniego MD     Patient information was obtained from patient, spouse/SO and ER records.     Subjective:     Principal Problem:Hyperglycemia    Chief Complaint:   Chief Complaint   Patient presents with    Hyperglycemia     missed dialysis today - last dialysis thursday in [place]. reports glucose monitor reading high        HPI: Eufemia Haq is a 52 y.o. female with poorly controlled type 1 diabetes mellitus, ESRD (HD T-T-S), HTN and seizures who presented to the [facility] emergency department by ambulance for altered mental status and hyperglycemia.  Patient's  reports that they live in the [place] area and are here evacuating from the [place].  During their drive to [place] she had become progressively confused and lethargic. When her blood glucose was checked the meter read >600.  Upon arrival to the [facility] EMS was called.  Of note she reports that her blood glucose has been running in the 300's.  She currently denies fevers, chills, chest pain or shortness of breath.  She does report that she feels confused.      In the ED her blood glucose was in the 700's.  Beta-hydroxybutyrate was WNL.  She was started on a insulin gtt at 6U/Hr.      Past Medical History:   Diagnosis Date    Anemia     during pregnancys    Arthritis     neuropathy hands feet and legs//    Blood transfusion     Chronic pain     CKD (chronic kidney disease), stage IV     Diabetes mellitus     Diabetes mellitus type I     Diabetic retinopathy     Hyperlipidemia     Hypertension     patient states that when her blood sugar increases her blood pressure increases    Neuropathy     Seizures     when sugar is high, does not quite remember     Vitamin D deficiency     Vitamin D deficiency disease        Past Surgical History:   Procedure Laterality Date     SECTION, CLASSIC      x 2    EYE SURGERY      HYSTERECTOMY         Review of patient's allergies indicates:   Allergen Reactions    Ciprofloxacin (bulk) Itching    Latex Itching and Other (See Comments)     Very low Oxygen    Vancomycin Shortness Of Breath and Itching    Neurontin [gabapentin] Other (See Comments)     Seizure like activity    Niacin Itching and Other (See Comments)     Burning      Bactrim [sulfamethoxazole-trimethoprim] Rash       No current facility-administered medications on file prior to encounter.      Current Outpatient Prescriptions on File Prior to Encounter   Medication Sig    atorvastatin (LIPITOR) 40 MG tablet Take 40 mg by mouth once daily.    bumetanide (BUMEX) 1 MG tablet Take 1 mg by mouth once daily.    ergocalciferol (ERGOCALCIFEROL) 50,000 unit Cap Take 1 capsule (50,000 Units total) by mouth every 7 days.    losartan-hydrochlorothiazide 50-12.5 mg (HYZAAR) 50-12.5 mg per tablet Take 1 tablet by mouth once daily.    metoclopramide HCl (REGLAN) 10 MG tablet Take 10 mg by mouth 3 (three) times daily before meals.    metoprolol succinate (TOPROL-XL) 50 MG 24 hr tablet Take 50 mg by mouth once daily.    nifedipine (ADALAT CC) 60 MG TbSR Take 60 mg by mouth 2 (two) times daily.    oxycodone-acetaminophen (PERCOCET)  mg per tablet Take 1 tablet by mouth every 6 (six) hours as needed for Pain.     rosuvastatin (CRESTOR) 20 MG tablet Take 1 tablet (20 mg total) by mouth every evening.    zolpidem (AMBIEN CR) 12.5 MG CR tablet Take 12.5 mg by mouth nightly as needed for Insomnia.    blood sugar diagnostic Strp To use as directed with True results meter and monitor BS 6 times daily - fluctuating BS    blood-glucose meter (FREESTYLE SYSTEM KIT) kit Use as instructed    diphenoxylate-atropine 2.5-0.025 mg (LOMOTIL) 2.5-0.025 mg per tablet  Take 1 tablet by mouth 2 (two) times daily as needed for Diarrhea.     ondansetron (ZOFRAN-ODT) 4 MG TbDL Take 1-2 tablets by mouth every 4 hours as needed for nausea and vomiting    TRUETEST TEST STRIPS Strp TEST 6 TIMES DAILY     Family History     Problem Relation (Age of Onset)    Asthma Father    Blindness Mother, Maternal Grandmother    Breast cancer Daughter    Cancer Cousin    Cataracts Maternal Grandmother    Diabetes Mother, Father, Sister    Glaucoma Maternal Grandmother    Heart disease Mother    Hypertension Mother, Father, Maternal Grandmother    Stroke Maternal Uncle    Thyroid disease Sister        Social History Main Topics    Smoking status: Current Some Day Smoker     Packs/day: 0.10     Years: 10.00     Types: Cigarettes    Smokeless tobacco: Never Used      Comment: 1 pack last her about 2-3 months    Alcohol use No    Drug use: No    Sexual activity: Yes     Partners: Male     Birth control/ protection: None     Review of Systems   Constitutional: Positive for fatigue. Negative for chills, fever and unexpected weight change.   HENT: Negative for hearing loss and sore throat.    Eyes: Negative for visual disturbance.   Respiratory: Negative for apnea, cough and shortness of breath.         Permacath right IJ   Cardiovascular: Positive for leg swelling. Negative for chest pain and palpitations.   Gastrointestinal: Negative for abdominal pain, diarrhea, nausea and vomiting.   Genitourinary: Negative for flank pain.        ESRD that missed HD   Musculoskeletal: Negative for arthralgias, back pain and joint swelling.   Skin: Negative for pallor and rash.   Neurological: Negative for dizziness, seizures, facial asymmetry and numbness.   Psychiatric/Behavioral: Positive for confusion and decreased concentration. Negative for agitation and behavioral problems.     Objective:     Vital Signs (Most Recent):  Temp: 99 °F (37.2 °C) (08/26/17 1551)  Pulse: 79 (08/26/17 1831)  Resp: (!) 21 (08/26/17  1829)  BP: (!) 151/72 (08/26/17 1831)  SpO2: 100 % (08/26/17 1831) Vital Signs (24h Range):  Temp:  [99 °F (37.2 °C)] 99 °F (37.2 °C)  Pulse:  [78-85] 79  Resp:  [18-21] 21  SpO2:  [99 %-100 %] 100 %  BP: (144-151)/(64-72) 151/72     Weight: 59 kg (130 lb)  Body mass index is 20.98 kg/m².    Physical Exam   Constitutional: She appears well-developed and well-nourished.   HENT:   Head: Normocephalic and atraumatic.   Mouth/Throat: No oropharyngeal exudate.   Eyes: EOM are normal. Pupils are equal, round, and reactive to light.   Neck: Normal range of motion. No JVD present.   Cardiovascular: Normal rate and regular rhythm.  Exam reveals no gallop and no friction rub.    No murmur heard.  Pulmonary/Chest: Effort normal and breath sounds normal. No stridor. No respiratory distress. She has no wheezes. She has no rales.   Abdominal: Soft. She exhibits no distension and no mass. There is no tenderness. There is no rebound.   Musculoskeletal: She exhibits edema (2+ bilateral lower extremity). She exhibits no tenderness or deformity.   Lymphadenopathy:     She has no cervical adenopathy.   Neurological: No cranial nerve deficit.   Patient is lethargic  oriented to person, place, situation but not time   Skin: Skin is warm and dry. Capillary refill takes less than 2 seconds.   Psychiatric: Cognition and memory are impaired.        Significant Labs:   Recent Lab Results       08/26/17  1905 08/26/17  1831 08/26/17  1737 08/26/17  1639      Albumin    2.3(L)     Alkaline Phosphatase    126     Allens Test  N/A       ALT    10     Anion Gap    18(H)     Appearance, UA   Hazy(A)      AST    19     Bacteria, UA   None      Baso #    0.02     Basophil%    0.3     Beta-Hydroxybutyrate    0.2     Bilirubin (UA)   Negative      Total Bilirubin    0.2  Comment:  For infants and newborns, interpretation of results should be based  on gestational age, weight and in agreement with clinical  observations.  Premature Infant recommended  reference ranges:  Up to 24 hours.............<8.0 mg/dL  Up to 48 hours............<12.0 mg/dL  3-5 days..................<15.0 mg/dL  6-29 days.................<15.0 mg/dL       Site  Other       BUN, Bld    67(H)     Calcium    8.3(L)     Chloride    92(L)     CO2    14(L)     Color, UA   Yellow      Creatinine    5.4(H)     Differential Method    Automated     eGFR if     9.7(A)     eGFR if non     8.5  Comment:  Calculation used to obtain the estimated glomerular filtration  rate (eGFR) is the CKD-EPI equation. Since race is unknown   in our information system, the eGFR values for   -American and Non--American patients are given   for each creatinine result.  (A)     Eos #    0.0     Eosinophil%    0.5     Glucose    744  Comment:  *Critical value -   Results called to and read back by:Darryl Salas RN  ()     Glucose, UA   3+(A)      Gran #    5.1     Gran%    77.7(H)     Hematocrit    24.8(L)     Hemoglobin    7.8(L)     Hyaline Casts, UA   0      Ketones, UA   Trace(A)      Leukocytes, UA   1+(A)      Lymph #    0.6(L)     Lymph%    9.8(L)     MCH    26.5(L)     MCHC    31.5(L)     MCV    84     Microscopic Comment   SEE COMMENT  Comment:  Other formed elements not mentioned in the report are not   present in the microscopic examination.         Mono #    0.7     Mono%    10.6     MPV    10.7     Nitrite, UA   Negative      Occult Blood UA   1+(A)      pH, UA   5.0      Platelets    271     POC BE  -5       POC HCO3  21.2(L)       POC PCO2  39.5       POC PH  7.337(L)       POC PO2  43       POC SATURATED O2  76(L)       POC TCO2  22(L)       POCT Glucose 414(H)        Potassium    4.6     Total Protein    5.5(L)     Protein, UA   3+  Comment:  Recommend a 24 hour urine protein or a urine   protein/creatinine ratio if globulin induced proteinuria is  clinically suspected.  (A)      RBC    2.94(L)     RBC, UA   1      RDW    14.6(H)     Sample  VENOUS        Sodium    124(L)     Specific [place], UA   1.015      Specimen UA   Urine, Clean Catch      Squam Epithel, UA   3      Urobilinogen, UA   Negative      WBC, UA   9(H)      WBC    6.54     Yeast, UA   None            Significant Imaging: no new imaging obtained    Assessment/Plan:     Poorly controlled type 1 diabetes mellitus     Patient is a poorly controlled diabetic.  Previous Hgb A1c 10.4 back in 12/2016.  Upon presentation elevated blood glucose (700's) with an anion gap with a normal beta- hydroxybutyrate.  Started on a insulin gtt while in the ED.  -Will repeat Hgb A1c  -Continue insulin gtt  -Will hold off on IV fluids given her ESRD and current fluid status            ESRD (end stage renal disease)    Patient started on hemodialysis via right IJ permacath approximately 4 months ago on a Mxmwjqd-Ixemnpsj-Dovmljvd schedule.  He last HD was 8/24/2017.  During that session they did not pull any fluid.  She did not get her normally scheduled HD today since she was evacuating.  -Nephrology consulted   -Possible dialysis tonight versus tomorrow morning  -Holding IV fluid in setting of ESRD with fluid overload.           Essential hypertension    On losartan, hydrochlorothiazide, metoprolol tartrate, nifedipine  -Will continue home antihypertensives          Hypercholesteremia    Continue home dose Crestor          * Hyperglycemia    Blood glucose in 700's upon arrival to ED.    -Plan per poorly controlled type 1 DM            VTE Risk Mitigation         Ordered     heparin (porcine) injection 5,000 Units  Every 8 hours     Route:  Subcutaneous        08/26/17 1843     Medium Risk of VTE  Once      08/26/17 1908     Place sequential compression device  Until discontinued      08/26/17 1908             Neal Samaniego MD  Department of Hospital Medicine   [facility]

## 2017-08-27 NOTE — ASSESSMENT & PLAN NOTE
Patient started on hemodialysis via right IJ permacath approximately 4 months ago on a Qybxiud-Xwlohmkx-Mtzbpuee schedule.  He last HD was 8/24/2017.  During that session they did not pull any fluid.    -Nephrology consulted, appreciate all recommendations   -Underwent HD last night in which 2L was removed.
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
## 2017-08-27 NOTE — PROGRESS NOTES
Arrived in room 326 with machine. DI and HD . Maintenance dialysis initiated via RIJ permcath per nephrology orders. Patient on room air, awake/ alert, and with no signs of distress. Will continue to monitor.

## 2017-08-27 NOTE — PLAN OF CARE
Problem: Patient Care Overview  Goal: Plan of Care Review  Outcome: Ongoing (interventions implemented as appropriate)  Patient verbalizes no complaints overnight. Denies chest pain, SOB, or other pain discomfort. Insulin drip was infusing, but d/c/ due to hypoglycemia. IV dextrose given x1. CBG being monitored Q4H and PRN. Patient remains free of falls or injury, bed alarm on for lethargy and weakness. HD @ bedside this shift, removed 1.56 L.  No significant events. Patient verbalizes complete understanding of plan of care. Will continue to monitor.

## 2017-08-27 NOTE — PROGRESS NOTES
Patient CBG 88, insulin gtt currently infusing @ 1.25units/hr @ 1.3 mL/hr. Notified Dr. Gusman. Telephone orders to stop insulin gtt at this time. MD states he will speak with his attending and call RN back with additional orders. Awaiting call back, will continue to monitor.

## 2017-08-27 NOTE — ASSESSMENT & PLAN NOTE
Patient is a poorly controlled diabetic.  Previous Hgb A1c 10.4 back in 12/2016  -Will repeat Hgb A1c  -Started on insulin gtt

## 2017-08-27 NOTE — PROGRESS NOTES
Spoke with on call MD for IM3 regarding pt blood sugar of 49. Pt given 25 D50 and blood sugar increased to 154. Order per insulin gtt was to turn insulin drip back on once glucose was above 70. Spoke with MD about the fluctuations in pt blood sugar and RN didn't feel safe in regards to restarting. MD stated to restart and recheck blood sugar in 15 min.       0845 - insulin drip dc'd.

## 2017-08-27 NOTE — SUBJECTIVE & OBJECTIVE
Past Medical History:   Diagnosis Date    Anemia     during pregnancys    Arthritis     neuropathy hands feet and legs//    Blood transfusion     Chronic pain     CKD (chronic kidney disease), stage IV     Diabetes mellitus     Diabetes mellitus type I     Diabetic retinopathy     Hyperlipidemia     Hypertension     patient states that when her blood sugar increases her blood pressure increases    Neuropathy     Seizures     when sugar is high, does not quite remember    Vitamin D deficiency     Vitamin D deficiency disease        Past Surgical History:   Procedure Laterality Date     SECTION, CLASSIC      x 2    EYE SURGERY      HYSTERECTOMY         Review of patient's allergies indicates:   Allergen Reactions    Ciprofloxacin (bulk) Itching    Latex Itching and Other (See Comments)     Very low Oxygen    Vancomycin Shortness Of Breath and Itching    Neurontin [gabapentin] Other (See Comments)     Seizure like activity    Niacin Itching and Other (See Comments)     Burning      Bactrim [sulfamethoxazole-trimethoprim] Rash       No current facility-administered medications on file prior to encounter.      Current Outpatient Prescriptions on File Prior to Encounter   Medication Sig    atorvastatin (LIPITOR) 40 MG tablet Take 40 mg by mouth once daily.    bumetanide (BUMEX) 1 MG tablet Take 1 mg by mouth once daily.    ergocalciferol (ERGOCALCIFEROL) 50,000 unit Cap Take 1 capsule (50,000 Units total) by mouth every 7 days.    losartan-hydrochlorothiazide 50-12.5 mg (HYZAAR) 50-12.5 mg per tablet Take 1 tablet by mouth once daily.    metoclopramide HCl (REGLAN) 10 MG tablet Take 10 mg by mouth 3 (three) times daily before meals.    metoprolol succinate (TOPROL-XL) 50 MG 24 hr tablet Take 50 mg by mouth once daily.    nifedipine (ADALAT CC) 60 MG TbSR Take 60 mg by mouth 2 (two) times daily.    oxycodone-acetaminophen (PERCOCET)  mg per tablet Take 1 tablet by mouth every 6  (six) hours as needed for Pain.     rosuvastatin (CRESTOR) 20 MG tablet Take 1 tablet (20 mg total) by mouth every evening.    zolpidem (AMBIEN CR) 12.5 MG CR tablet Take 12.5 mg by mouth nightly as needed for Insomnia.    blood sugar diagnostic Strp To use as directed with True results meter and monitor BS 6 times daily - fluctuating BS    blood-glucose meter (FREESTYLE SYSTEM KIT) kit Use as instructed    diphenoxylate-atropine 2.5-0.025 mg (LOMOTIL) 2.5-0.025 mg per tablet Take 1 tablet by mouth 2 (two) times daily as needed for Diarrhea.     ondansetron (ZOFRAN-ODT) 4 MG TbDL Take 1-2 tablets by mouth every 4 hours as needed for nausea and vomiting    TRUETEST TEST STRIPS Strp TEST 6 TIMES DAILY     Family History     Problem Relation (Age of Onset)    Asthma Father    Blindness Mother, Maternal Grandmother    Breast cancer Daughter    Cancer Cousin    Cataracts Maternal Grandmother    Diabetes Mother, Father, Sister    Glaucoma Maternal Grandmother    Heart disease Mother    Hypertension Mother, Father, Maternal Grandmother    Stroke Maternal Uncle    Thyroid disease Sister        Social History Main Topics    Smoking status: Current Some Day Smoker     Packs/day: 0.10     Years: 10.00     Types: Cigarettes    Smokeless tobacco: Never Used      Comment: 1 pack last her about 2-3 months    Alcohol use No    Drug use: No    Sexual activity: Yes     Partners: Male     Birth control/ protection: None     Review of Systems   Constitutional: Positive for fatigue. Negative for chills, fever and unexpected weight change.   HENT: Negative for hearing loss and sore throat.    Eyes: Negative for visual disturbance.   Respiratory: Negative for apnea, cough and shortness of breath.         Permacath right IJ   Cardiovascular: Positive for leg swelling. Negative for chest pain and palpitations.   Gastrointestinal: Negative for abdominal pain, diarrhea, nausea and vomiting.   Genitourinary: Negative for flank  pain.        ESRD that missed HD   Musculoskeletal: Negative for arthralgias, back pain and joint swelling.   Skin: Negative for pallor and rash.   Neurological: Negative for dizziness, seizures, facial asymmetry and numbness.   Psychiatric/Behavioral: Positive for confusion and decreased concentration. Negative for agitation and behavioral problems.     Objective:     Vital Signs (Most Recent):  Temp: 99 °F (37.2 °C) (08/26/17 1551)  Pulse: 79 (08/26/17 1831)  Resp: (!) 21 (08/26/17 1829)  BP: (!) 151/72 (08/26/17 1831)  SpO2: 100 % (08/26/17 1831) Vital Signs (24h Range):  Temp:  [99 °F (37.2 °C)] 99 °F (37.2 °C)  Pulse:  [78-85] 79  Resp:  [18-21] 21  SpO2:  [99 %-100 %] 100 %  BP: (144-151)/(64-72) 151/72     Weight: 59 kg (130 lb)  Body mass index is 20.98 kg/m².    Physical Exam   Constitutional: She appears well-developed and well-nourished.   HENT:   Head: Normocephalic and atraumatic.   Mouth/Throat: No oropharyngeal exudate.   Eyes: EOM are normal. Pupils are equal, round, and reactive to light.   Neck: Normal range of motion. No JVD present.   Cardiovascular: Normal rate and regular rhythm.  Exam reveals no gallop and no friction rub.    No murmur heard.  Pulmonary/Chest: Effort normal and breath sounds normal. No stridor. No respiratory distress. She has no wheezes. She has no rales.   Abdominal: Soft. She exhibits no distension and no mass. There is no tenderness. There is no rebound.   Musculoskeletal: She exhibits edema (2+ bilateral lower extremity). She exhibits no tenderness or deformity.   Lymphadenopathy:     She has no cervical adenopathy.   Neurological: No cranial nerve deficit.   Patient is lethargic  oriented to person, place, situation but not time   Skin: Skin is warm and dry. Capillary refill takes less than 2 seconds.   Psychiatric: Cognition and memory are impaired.        Significant Labs:   Recent Lab Results       08/26/17  1905 08/26/17  1831 08/26/17  1737 08/26/17  1639       Albumin    2.3(L)     Alkaline Phosphatase    126     Allens Test  N/A       ALT    10     Anion Gap    18(H)     Appearance, UA   Hazy(A)      AST    19     Bacteria, UA   None      Baso #    0.02     Basophil%    0.3     Beta-Hydroxybutyrate    0.2     Bilirubin (UA)   Negative      Total Bilirubin    0.2  Comment:  For infants and newborns, interpretation of results should be based  on gestational age, weight and in agreement with clinical  observations.  Premature Infant recommended reference ranges:  Up to 24 hours.............<8.0 mg/dL  Up to 48 hours............<12.0 mg/dL  3-5 days..................<15.0 mg/dL  6-29 days.................<15.0 mg/dL       Site  Other       BUN, Bld    67(H)     Calcium    8.3(L)     Chloride    92(L)     CO2    14(L)     Color, UA   Yellow      Creatinine    5.4(H)     Differential Method    Automated     eGFR if     9.7(A)     eGFR if non     8.5  Comment:  Calculation used to obtain the estimated glomerular filtration  rate (eGFR) is the CKD-EPI equation. Since race is unknown   in our information system, the eGFR values for   -American and Non--American patients are given   for each creatinine result.  (A)     Eos #    0.0     Eosinophil%    0.5     Glucose    744  Comment:  *Critical value -   Results called to and read back by:Darryl SalasRN  ()     Glucose, UA   3+(A)      Gran #    5.1     Gran%    77.7(H)     Hematocrit    24.8(L)     Hemoglobin    7.8(L)     Hyaline Casts, UA   0      Ketones, UA   Trace(A)      Leukocytes, UA   1+(A)      Lymph #    0.6(L)     Lymph%    9.8(L)     MCH    26.5(L)     MCHC    31.5(L)     MCV    84     Microscopic Comment   SEE COMMENT  Comment:  Other formed elements not mentioned in the report are not   present in the microscopic examination.         Mono #    0.7     Mono%    10.6     MPV    10.7     Nitrite, UA   Negative      Occult Blood UA   1+(A)      pH, UA   5.0       Platelets    271     POC BE  -5       POC HCO3  21.2(L)       POC PCO2  39.5       POC PH  7.337(L)       POC PO2  43       POC SATURATED O2  76(L)       POC TCO2  22(L)       POCT Glucose 414(H)        Potassium    4.6     Total Protein    5.5(L)     Protein, UA   3+  Comment:  Recommend a 24 hour urine protein or a urine   protein/creatinine ratio if globulin induced proteinuria is  clinically suspected.  (A)      RBC    2.94(L)     RBC, UA   1      RDW    14.6(H)     Sample  VENOUS       Sodium    124(L)     Specific [place], UA   1.015      Specimen UA   Urine, Clean Catch      Squam Epithel, UA   3      Urobilinogen, UA   Negative      WBC, UA   9(H)      WBC    6.54     Yeast, UA   None            Significant Imaging: no new imaging obtained

## 2017-08-27 NOTE — PROGRESS NOTES
CBG 60. Insulin gtt off since 2324. Pt too lethargic to take PO glucose tablets, but patient is AAOx3. 12.5 G dextrose 50% given IV. Will re-check CBG in 15 min and continue to monitor.

## 2017-08-28 VITALS
RESPIRATION RATE: 18 BRPM | HEART RATE: 72 BPM | DIASTOLIC BLOOD PRESSURE: 64 MMHG | BODY MASS INDEX: 27.49 KG/M2 | OXYGEN SATURATION: 96 % | TEMPERATURE: 97 F | SYSTOLIC BLOOD PRESSURE: 143 MMHG | HEIGHT: 66 IN | WEIGHT: 171.06 LBS

## 2017-08-28 LAB
ALBUMIN SERPL BCP-MCNC: 2 G/DL
ALP SERPL-CCNC: 120 U/L
ALT SERPL W/O P-5'-P-CCNC: 13 U/L
ANION GAP SERPL CALC-SCNC: 10 MMOL/L
AST SERPL-CCNC: 33 U/L
BASOPHILS # BLD AUTO: 0.03 K/UL
BASOPHILS NFR BLD: 0.6 %
BILIRUB SERPL-MCNC: 0.1 MG/DL
BUN SERPL-MCNC: 69 MG/DL
BUN SERPL-MCNC: 70 MG/DL (ref 6–30)
CALCIUM SERPL-MCNC: 7.7 MG/DL
CHLORIDE SERPL-SCNC: 92 MMOL/L (ref 95–110)
CHLORIDE SERPL-SCNC: 97 MMOL/L
CO2 SERPL-SCNC: 27 MMOL/L
CREAT SERPL-MCNC: 4.9 MG/DL (ref 0.5–1.4)
CREAT SERPL-MCNC: 5.8 MG/DL
DIFFERENTIAL METHOD: ABNORMAL
EOSINOPHIL # BLD AUTO: 0.2 K/UL
EOSINOPHIL NFR BLD: 3.3 %
ERYTHROCYTE [DISTWIDTH] IN BLOOD BY AUTOMATED COUNT: 14.7 %
EST. GFR  (AFRICAN AMERICAN): 8.9 ML/MIN/1.73 M^2
EST. GFR  (NON AFRICAN AMERICAN): 7.8 ML/MIN/1.73 M^2
GLUCOSE SERPL-MCNC: 198 MG/DL
GLUCOSE SERPL-MCNC: >700 MG/DL (ref 70–110)
HCT VFR BLD AUTO: 23 %
HCT VFR BLD CALC: 23 %PCV (ref 36–54)
HGB BLD-MCNC: 7.3 G/DL
LYMPHOCYTES # BLD AUTO: 1.2 K/UL
LYMPHOCYTES NFR BLD: 23.8 %
MAGNESIUM SERPL-MCNC: 1.8 MG/DL
MCH RBC QN AUTO: 26.8 PG
MCHC RBC AUTO-ENTMCNC: 31.7 G/DL
MCV RBC AUTO: 85 FL
MONOCYTES # BLD AUTO: 1 K/UL
MONOCYTES NFR BLD: 20.7 %
NEUTROPHILS # BLD AUTO: 2.4 K/UL
NEUTROPHILS NFR BLD: 50.4 %
PHOSPHATE SERPL-MCNC: 4.1 MG/DL
PLATELET # BLD AUTO: 243 K/UL
PMV BLD AUTO: 10.3 FL
POC IONIZED CALCIUM: 0.98 MMOL/L (ref 1.06–1.42)
POC TCO2 (MEASURED): 20 MMOL/L (ref 23–29)
POCT GLUCOSE: 138 MG/DL (ref 70–110)
POCT GLUCOSE: 147 MG/DL (ref 70–110)
POCT GLUCOSE: 237 MG/DL (ref 70–110)
POCT GLUCOSE: >500 MG/DL (ref 70–110)
POTASSIUM BLD-SCNC: 5.2 MMOL/L (ref 3.5–5.1)
POTASSIUM SERPL-SCNC: 4.5 MMOL/L
PROT SERPL-MCNC: 5 G/DL
RBC # BLD AUTO: 2.72 M/UL
SAMPLE: ABNORMAL
SODIUM BLD-SCNC: 122 MMOL/L (ref 136–145)
SODIUM SERPL-SCNC: 134 MMOL/L
WBC # BLD AUTO: 4.83 K/UL

## 2017-08-28 PROCEDURE — 36415 COLL VENOUS BLD VENIPUNCTURE: CPT

## 2017-08-28 PROCEDURE — 25000003 PHARM REV CODE 250: Performed by: SURGERY

## 2017-08-28 PROCEDURE — 63600175 PHARM REV CODE 636 W HCPCS: Performed by: INTERNAL MEDICINE

## 2017-08-28 PROCEDURE — 25000003 PHARM REV CODE 250: Performed by: STUDENT IN AN ORGANIZED HEALTH CARE EDUCATION/TRAINING PROGRAM

## 2017-08-28 PROCEDURE — 80053 COMPREHEN METABOLIC PANEL: CPT

## 2017-08-28 PROCEDURE — 63600175 PHARM REV CODE 636 W HCPCS: Performed by: SURGERY

## 2017-08-28 PROCEDURE — 99238 HOSP IP/OBS DSCHRG MGMT 30/<: CPT | Mod: GC,,, | Performed by: HOSPITALIST

## 2017-08-28 PROCEDURE — 83735 ASSAY OF MAGNESIUM: CPT

## 2017-08-28 PROCEDURE — 84100 ASSAY OF PHOSPHORUS: CPT

## 2017-08-28 PROCEDURE — 85025 COMPLETE CBC W/AUTO DIFF WBC: CPT

## 2017-08-28 RX ORDER — RAMELTEON 8 MG/1
8 TABLET ORAL ONCE
Status: COMPLETED | OUTPATIENT
Start: 2017-08-28 | End: 2017-08-28

## 2017-08-28 RX ORDER — INSULIN ASPART 100 [IU]/ML
5 INJECTION, SOLUTION INTRAVENOUS; SUBCUTANEOUS
Status: DISCONTINUED | OUTPATIENT
Start: 2017-08-28 | End: 2017-08-28 | Stop reason: HOSPADM

## 2017-08-28 RX ORDER — INSULIN ASPART 100 [IU]/ML
3 INJECTION, SOLUTION INTRAVENOUS; SUBCUTANEOUS
Status: DISCONTINUED | OUTPATIENT
Start: 2017-08-28 | End: 2017-08-28

## 2017-08-28 RX ORDER — INSULIN GLARGINE 300 [IU]/ML
20 INJECTION, SOLUTION SUBCUTANEOUS DAILY
Qty: 3 SYRINGE | Refills: 2 | Status: ON HOLD | OUTPATIENT
Start: 2017-08-28 | End: 2017-09-03 | Stop reason: HOSPADM

## 2017-08-28 RX ORDER — SODIUM CHLORIDE 9 MG/ML
INJECTION, SOLUTION INTRAVENOUS
Status: DISCONTINUED | OUTPATIENT
Start: 2017-08-28 | End: 2017-08-28 | Stop reason: HOSPADM

## 2017-08-28 RX ORDER — SODIUM CHLORIDE 9 MG/ML
INJECTION, SOLUTION INTRAVENOUS ONCE
Status: DISCONTINUED | OUTPATIENT
Start: 2017-08-28 | End: 2017-08-28 | Stop reason: HOSPADM

## 2017-08-28 RX ADMIN — LOSARTAN POTASSIUM AND HYDROCHLOROTHIAZIDE 1 TABLET: 12.5; 5 TABLET ORAL at 11:08

## 2017-08-28 RX ADMIN — RAMELTEON 8 MG: 8 TABLET, FILM COATED ORAL at 02:08

## 2017-08-28 RX ADMIN — METOCLOPRAMIDE 10 MG: 10 TABLET ORAL at 11:08

## 2017-08-28 RX ADMIN — HEPARIN SODIUM 5000 UNITS: 5000 INJECTION, SOLUTION INTRAVENOUS; SUBCUTANEOUS at 02:08

## 2017-08-28 RX ADMIN — INSULIN ASPART 3 UNITS: 100 INJECTION, SOLUTION INTRAVENOUS; SUBCUTANEOUS at 07:08

## 2017-08-28 RX ADMIN — INSULIN ASPART 5 UNITS: 100 INJECTION, SOLUTION INTRAVENOUS; SUBCUTANEOUS at 11:08

## 2017-08-28 RX ADMIN — HEPARIN SODIUM 5000 UNITS: 5000 INJECTION, SOLUTION INTRAVENOUS; SUBCUTANEOUS at 05:08

## 2017-08-28 RX ADMIN — METOPROLOL SUCCINATE 50 MG: 50 TABLET, EXTENDED RELEASE ORAL at 08:08

## 2017-08-28 RX ADMIN — NIFEDIPINE 60 MG: 30 TABLET, FILM COATED, EXTENDED RELEASE ORAL at 08:08

## 2017-08-28 RX ADMIN — INSULIN DETEMIR 10 UNITS: 100 INJECTION, SOLUTION SUBCUTANEOUS at 11:08

## 2017-08-28 NOTE — ASSESSMENT & PLAN NOTE
Patient started on hemodialysis via right IJ permacath approximately 4 months ago on a Fdepslh-Bulcbgmd-Gzrefoho schedule.  He last HD was 8/26/2017.   -Nephrology consulted, appreciate all recommendations

## 2017-08-28 NOTE — ASSESSMENT & PLAN NOTE
Hemoglobin 7.3  which appears to be stable.  -Will transfuse for hemoglobin less than 7 or sign of hemorrhage  -Epo with HD

## 2017-08-28 NOTE — PROGRESS NOTES
Patient restless and inadvertently removed HD catheter dressing. New dressing placed. Patient states she takes ambien 10mg PO nightly. Notified MD on call for IM 3. MD stated he will place an order for PO Rozerem. Will administer and continue to monitor.
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
## 2017-08-28 NOTE — SUBJECTIVE & OBJECTIVE
Interval History: Patient reports no acute events, denies confusion or abdominal pain, symptomatic hypoglycemia or hyperglycemia.     Review of Systems   Constitutional: Positive for fatigue. Negative for chills, fever and unexpected weight change.   HENT: Negative for hearing loss and sore throat.    Eyes: Negative for visual disturbance.   Respiratory: Negative for apnea, cough and shortness of breath.         Permacath right IJ   Cardiovascular: Positive for leg swelling. Negative for chest pain and palpitations.   Gastrointestinal: Negative for abdominal pain, diarrhea, nausea and vomiting.   Genitourinary: Negative for flank pain.        ESRD    Musculoskeletal: Negative for arthralgias, back pain and joint swelling.   Skin: Negative for pallor and rash.   Neurological: Negative for dizziness, seizures, facial asymmetry and numbness.   Psychiatric/Behavioral: Negative for agitation, behavioral problems, confusion and decreased concentration.     Objective:     Vital Signs (Most Recent):  Temp: 97.9 °F (36.6 °C) (08/28/17 1200)  Pulse: 74 (08/28/17 1200)  Resp: 18 (08/28/17 1200)  BP: 133/63 (08/28/17 1200)  SpO2: (!) 94 % (08/28/17 1200) Vital Signs (24h Range):  Temp:  [97.9 °F (36.6 °C)-99.1 °F (37.3 °C)] 97.9 °F (36.6 °C)  Pulse:  [66-85] 74  Resp:  [18-20] 18  SpO2:  [94 %-98 %] 94 %  BP: (117-151)/(56-82) 133/63     Weight: 77.6 kg (171 lb 1.2 oz)  Body mass index is 27.61 kg/m².    Intake/Output Summary (Last 24 hours) at 08/28/17 1316  Last data filed at 08/28/17 0600   Gross per 24 hour   Intake              740 ml   Output              275 ml   Net              465 ml      Physical Exam   Constitutional: She appears well-developed and well-nourished.   HENT:   Head: Normocephalic and atraumatic.   Mouth/Throat: No oropharyngeal exudate.   Eyes: EOM are normal. Pupils are equal, round, and reactive to light.   Neck: Normal range of motion. No JVD present.   Cardiovascular: Normal rate and regular  rhythm.  Exam reveals no gallop and no friction rub.    No murmur heard.  Pulmonary/Chest: Effort normal and breath sounds normal. No stridor. No respiratory distress. She has no wheezes. She has no rales.   Abdominal: Soft. She exhibits no distension and no mass. There is no tenderness. There is no rebound.   Musculoskeletal: She exhibits no edema (2+ bilateral lower extremity), tenderness or deformity.   Lymphadenopathy:     She has no cervical adenopathy.   Neurological: No cranial nerve deficit.   Skin: Skin is warm and dry. Capillary refill takes less than 2 seconds.   Psychiatric: Cognition and memory are impaired.       Significant Labs:   A1C:   Recent Labs  Lab 08/26/17 1639   HGBA1C 10.7*     CBC:   Recent Labs  Lab 08/26/17 1639 08/27/17 0156 08/28/17  0421   WBC 6.54 8.26 4.83   HGB 7.8* 7.3* 7.3*   HCT 24.8* 21.7* 23.0*    216 243     CMP:   Recent Labs  Lab 08/26/17 1639 08/26/17 2019 08/27/17 0156 08/28/17  0421   * 127* 135* 134*   K 4.6 4.3 3.7 4.5   CL 92* 95 97 97   CO2 14* 19* 26 27   * 318* 62* 198*   BUN 67* 64* 42* 69*   CREATININE 5.4* 5.2* 3.6* 5.8*   CALCIUM 8.3* 8.4* 7.9* 7.7*   PROT 5.5*  --  5.4* 5.0*   ALBUMIN 2.3*  --  2.2* 2.0*   BILITOT 0.2  --  0.2 0.1   ALKPHOS 126  --  114 120   AST 19  --  19 33   ALT 10  --  9* 13   ANIONGAP 18* 13 12 10   EGFRNONAA 8.5* 8.8* 13.8* 7.8*     Cardiac Markers: No results for input(s): CKMB, MYOGLOBIN, BNP, TROPISTAT in the last 48 hours.  Lipase: No results for input(s): LIPASE in the last 48 hours.  Lipid Panel: No results for input(s): CHOL, HDL, LDLCALC, TRIG, CHOLHDL in the last 48 hours.  Magnesium:   Recent Labs  Lab 08/27/17  0156 08/28/17  0421   MG 1.7 1.8     Urine Studies:   Recent Labs  Lab 08/26/17  1737   COLORU Yellow   APPEARANCEUA Hazy*   PHUR 5.0   SPECGRAV 1.015   PROTEINUA 3+*   GLUCUA 3+*   KETONESU Trace*   BILIRUBINUA Negative   OCCULTUA 1+*   NITRITE Negative   UROBILINOGEN Negative   LEUKOCYTESUR  1+*   RBCUA 1   WBCUA 9*   BACTERIA None   SQUAMEPITHEL 3   HYALINECASTS 0       Significant Imaging: I have reviewed and interpreted all pertinent imaging results/findings within the past 24 hours.
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
## 2017-08-28 NOTE — PLAN OF CARE
08/28/17 1532   Final Note   Assessment Type Final Discharge Note   Discharge Disposition Home   Hospital Follow Up  Appt(s) scheduled? No  (to be seen by nephrologist in HD)

## 2017-08-28 NOTE — ASSESSMENT & PLAN NOTE
Patient is a poorly controlled diabetic.  Previous Hgb A1c 10.4 back in 12/2016.  Upon presentation elevated blood glucose (700's) with an anion gap with a normal beta- hydroxybutyrate.  Started on a insulin gtt while in the ED. That has since been discontinued for hypoglycemia.  Anion gap closed with correction of blood glucose.  Hemoglobin A1c 10.7 this admission  -Diabetic/Renal diet  -Restart basal insulin at 20u daily, 5u prandial TIDWM  -Low dose sliding scale  -Hypoglycemia protocol

## 2017-08-28 NOTE — PLAN OF CARE
08/28/17 1049   Discharge Assessment   Assessment Type Discharge Planning Assessment   Confirmed/corrected address and phone number on facesheet? Yes  (patient currently staying in [place])   Assessment information obtained from? Patient;Caregiver;Medical Record  (daughter, Kirk, at bedside)   Expected Length of Stay (days) 2   Communicated expected length of stay with patient/caregiver yes   Prior to hospitilization cognitive status: Alert/Oriented   Prior to hospitalization functional status: Independent   Current cognitive status: Alert/Oriented   Current Functional Status: Independent   Lives With child(chas), adult   Able to Return to Prior Arrangements yes   Is patient able to care for self after discharge? Yes   Who are your caregiver(s) and their phone number(s)? self care   Patient's perception of discharge disposition home or selfcare   Readmission Within The Last 30 Days no previous admission in last 30 days   Patient currently being followed by outpatient case management? No   Patient currently receives any other outside agency services? No   Do you have any problems affording any of your prescribed medications? No   Is the patient taking medications as prescribed? yes   Does the patient have transportation home? Yes   Transportation Available family or friend will provide;car   Dialysis Name and Scheduled days [facility] (transfer from [facility]); per patient everything is already arranged   Does the patient receive services at the [facility]? No   Discharge Plan A Home with family   Patient/Family In Agreement With Plan yes

## 2017-08-28 NOTE — PROGRESS NOTES
Patient requesting CBG check, resulted at 237. Patient refusing any sliding scale insulin at this time. Will continue to monitor.

## 2017-08-28 NOTE — PLAN OF CARE
Problem: Patient Care Overview  Goal: Plan of Care Review  Outcome: Ongoing (interventions implemented as appropriate)  Patient verbalizes no complaints overnight. Denies chest pain, SOB, or other pain discomfort. CBG being monitored AC/HS. Patient remains free of falls or injury. No significant events. Patient verbalizes complete understanding of plan of care. Will continue to monitor.

## 2017-08-28 NOTE — PLAN OF CARE
Problem: Patient Care Overview  Goal: Plan of Care Review  Outcome: Ongoing (interventions implemented as appropriate)  Plan of care discussed with patient.  Patient ambulating independently, fall precautions in place.  Patient has no complaints of pain. Discussed medications and care. Patient has no questions at this time. Will continue to monitor.

## 2017-08-30 ENCOUNTER — PATIENT OUTREACH (OUTPATIENT)
Dept: ADMINISTRATIVE | Facility: CLINIC | Age: 52
End: 2017-08-30

## 2017-08-30 ENCOUNTER — HOSPITAL ENCOUNTER (INPATIENT)
Facility: HOSPITAL | Age: 52
LOS: 4 days | Discharge: HOME | DRG: 637 | End: 2017-09-03
Attending: EMERGENCY MEDICINE | Admitting: INTERNAL MEDICINE
Payer: MEDICARE

## 2017-08-30 DIAGNOSIS — E10.10 TYPE 1 DIABETES MELLITUS WITH KETOACIDOSIS WITHOUT COMA: ICD-10-CM

## 2017-08-30 DIAGNOSIS — E10.8 TYPE 1 DIABETES MELLITUS WITH COMPLICATION: Primary | ICD-10-CM

## 2017-08-30 DIAGNOSIS — E10.10 DKA, TYPE 1: ICD-10-CM

## 2017-08-30 DIAGNOSIS — R73.9 HYPERGLYCEMIA: ICD-10-CM

## 2017-08-30 DIAGNOSIS — I10 ESSENTIAL HYPERTENSION: ICD-10-CM

## 2017-08-30 DIAGNOSIS — N18.6 ESRD (END STAGE RENAL DISEASE): ICD-10-CM

## 2017-08-30 LAB
ALBUMIN SERPL BCP-MCNC: 2.1 G/DL
ALBUMIN SERPL BCP-MCNC: 2.2 G/DL
ALLENS TEST: ABNORMAL
ALP SERPL-CCNC: 196 U/L
ALP SERPL-CCNC: 197 U/L
ALP SERPL-CCNC: 221 U/L
ALP SERPL-CCNC: 234 U/L
ALT SERPL W/O P-5'-P-CCNC: 26 U/L
ALT SERPL W/O P-5'-P-CCNC: 27 U/L
ALT SERPL W/O P-5'-P-CCNC: 28 U/L
ALT SERPL W/O P-5'-P-CCNC: 32 U/L
AMORPH CRY UR QL COMP ASSIST: ABNORMAL
AMPHET+METHAMPHET UR QL: NEGATIVE
ANION GAP SERPL CALC-SCNC: 15 MMOL/L
ANION GAP SERPL CALC-SCNC: 22 MMOL/L
ANION GAP SERPL CALC-SCNC: 24 MMOL/L
ANION GAP SERPL CALC-SCNC: 25 MMOL/L
AST SERPL-CCNC: 33 U/L
AST SERPL-CCNC: 47 U/L
AST SERPL-CCNC: 48 U/L
AST SERPL-CCNC: 63 U/L
B-OH-BUTYR BLD STRIP-SCNC: 5.2 MMOL/L
BACTERIA #/AREA URNS AUTO: ABNORMAL /HPF
BARBITURATES UR QL SCN>200 NG/ML: NEGATIVE
BASOPHILS # BLD AUTO: 0.01 K/UL
BASOPHILS NFR BLD: 0.2 %
BENZODIAZ UR QL SCN>200 NG/ML: NEGATIVE
BILIRUB SERPL-MCNC: 0.1 MG/DL
BILIRUB SERPL-MCNC: 0.1 MG/DL
BILIRUB SERPL-MCNC: 0.2 MG/DL
BILIRUB SERPL-MCNC: 0.2 MG/DL
BILIRUB UR QL STRIP: NEGATIVE
BNP SERPL-MCNC: 2236 PG/ML
BUN SERPL-MCNC: 63 MG/DL
BUN SERPL-MCNC: 78 MG/DL
BUN SERPL-MCNC: 81 MG/DL
BUN SERPL-MCNC: 83 MG/DL
BUN SERPL-MCNC: 86 MG/DL (ref 6–30)
BZE UR QL SCN: NEGATIVE
CALCIUM SERPL-MCNC: 7.3 MG/DL
CALCIUM SERPL-MCNC: 7.3 MG/DL
CALCIUM SERPL-MCNC: 7.6 MG/DL
CALCIUM SERPL-MCNC: 7.7 MG/DL
CANNABINOIDS UR QL SCN: NEGATIVE
CHLORIDE SERPL-SCNC: 82 MMOL/L (ref 95–110)
CHLORIDE SERPL-SCNC: 83 MMOL/L
CHLORIDE SERPL-SCNC: 84 MMOL/L
CHLORIDE SERPL-SCNC: 86 MMOL/L
CHLORIDE SERPL-SCNC: 91 MMOL/L
CLARITY UR REFRACT.AUTO: ABNORMAL
CO2 SERPL-SCNC: 12 MMOL/L
CO2 SERPL-SCNC: 14 MMOL/L
CO2 SERPL-SCNC: 25 MMOL/L
CO2 SERPL-SCNC: 8 MMOL/L
COLOR UR AUTO: YELLOW
CREAT SERPL-MCNC: 4.3 MG/DL
CREAT SERPL-MCNC: 5.2 MG/DL (ref 0.5–1.4)
CREAT SERPL-MCNC: 5.3 MG/DL
CREAT SERPL-MCNC: 5.4 MG/DL
CREAT SERPL-MCNC: 5.6 MG/DL
CREAT UR-MCNC: 35 MG/DL
DIFFERENTIAL METHOD: ABNORMAL
EOSINOPHIL # BLD AUTO: 0 K/UL
EOSINOPHIL NFR BLD: 0.2 %
ERYTHROCYTE [DISTWIDTH] IN BLOOD BY AUTOMATED COUNT: 15.7 %
EST. GFR  (AFRICAN AMERICAN): 10 ML/MIN/1.73 M^2
EST. GFR  (AFRICAN AMERICAN): 12.8 ML/MIN/1.73 M^2
EST. GFR  (AFRICAN AMERICAN): 9.3 ML/MIN/1.73 M^2
EST. GFR  (AFRICAN AMERICAN): 9.7 ML/MIN/1.73 M^2
EST. GFR  (NON AFRICAN AMERICAN): 11.1 ML/MIN/1.73 M^2
EST. GFR  (NON AFRICAN AMERICAN): 8.1 ML/MIN/1.73 M^2
EST. GFR  (NON AFRICAN AMERICAN): 8.5 ML/MIN/1.73 M^2
EST. GFR  (NON AFRICAN AMERICAN): 8.6 ML/MIN/1.73 M^2
ETHANOL SERPL-MCNC: <10 MG/DL
GLUCOSE SERPL-MCNC: 1054 MG/DL
GLUCOSE SERPL-MCNC: 1241 MG/DL
GLUCOSE SERPL-MCNC: 382 MG/DL
GLUCOSE SERPL-MCNC: 808 MG/DL
GLUCOSE SERPL-MCNC: >700 MG/DL (ref 70–110)
GLUCOSE UR QL STRIP: ABNORMAL
HCO3 UR-SCNC: 13.7 MMOL/L (ref 24–28)
HCO3 UR-SCNC: 15.4 MMOL/L (ref 24–28)
HCO3 UR-SCNC: 15.8 MMOL/L (ref 24–28)
HCT VFR BLD AUTO: 26.3 %
HCT VFR BLD CALC: 25 %PCV (ref 36–54)
HGB BLD-MCNC: 7.3 G/DL
HGB UR QL STRIP: ABNORMAL
HYALINE CASTS UR QL AUTO: 0 /LPF
KETONES UR QL STRIP: ABNORMAL
LACTATE SERPL-SCNC: 6.9 MMOL/L
LACTATE SERPL-SCNC: 9.3 MMOL/L
LEUKOCYTE ESTERASE UR QL STRIP: NEGATIVE
LIPASE SERPL-CCNC: 46 U/L
LYMPHOCYTES # BLD AUTO: 0.7 K/UL
LYMPHOCYTES NFR BLD: 10.9 %
MAGNESIUM SERPL-MCNC: 1.9 MG/DL
MCH RBC QN AUTO: 26.6 PG
MCHC RBC AUTO-ENTMCNC: 27.8 G/DL
MCV RBC AUTO: 96 FL
METHADONE UR QL SCN>300 NG/ML: NEGATIVE
MICROSCOPIC COMMENT: ABNORMAL
MONOCYTES # BLD AUTO: 0.7 K/UL
MONOCYTES NFR BLD: 9.8 %
NEUTROPHILS # BLD AUTO: 5.2 K/UL
NEUTROPHILS NFR BLD: 77.8 %
NITRITE UR QL STRIP: NEGATIVE
OPIATES UR QL SCN: NORMAL
PCO2 BLDA: 31.8 MMHG (ref 35–45)
PCO2 BLDA: 34.4 MMHG (ref 35–45)
PCO2 BLDA: 36.7 MMHG (ref 35–45)
PCP UR QL SCN>25 NG/ML: NEGATIVE
PH SMN: 7.21 [PH] (ref 7.35–7.45)
PH SMN: 7.23 [PH] (ref 7.35–7.45)
PH SMN: 7.3 [PH] (ref 7.35–7.45)
PH UR STRIP: 5 [PH] (ref 5–8)
PHOSPHATE SERPL-MCNC: 6.4 MG/DL
PLATELET # BLD AUTO: 291 K/UL
PMV BLD AUTO: 11.2 FL
PO2 BLDA: 110 MMHG (ref 40–60)
PO2 BLDA: 42 MMHG (ref 40–60)
PO2 BLDA: 54 MMHG (ref 40–60)
POC BE: -11 MMOL/L
POC BE: -12 MMOL/L
POC BE: -14 MMOL/L
POC IONIZED CALCIUM: 0.97 MMOL/L (ref 1.06–1.42)
POC SATURATED O2: 68 % (ref 95–100)
POC SATURATED O2: 82 % (ref 95–100)
POC SATURATED O2: 98 % (ref 95–100)
POC TCO2 (MEASURED): 14 MMOL/L (ref 23–29)
POC TCO2: 15 MMOL/L (ref 24–29)
POC TCO2: 16 MMOL/L (ref 24–29)
POC TCO2: 17 MMOL/L (ref 24–29)
POCT GLUCOSE: >500 MG/DL (ref 70–110)
POTASSIUM BLD-SCNC: 6.2 MMOL/L (ref 3.5–5.1)
POTASSIUM SERPL-SCNC: 3.4 MMOL/L
POTASSIUM SERPL-SCNC: 4.7 MMOL/L
POTASSIUM SERPL-SCNC: 4.7 MMOL/L
POTASSIUM SERPL-SCNC: 6.3 MMOL/L
PROCALCITONIN SERPL IA-MCNC: 1.18 NG/ML
PROT SERPL-MCNC: 5.4 G/DL
PROT SERPL-MCNC: 5.6 G/DL
PROT SERPL-MCNC: 5.6 G/DL
PROT SERPL-MCNC: 5.7 G/DL
PROT UR QL STRIP: ABNORMAL
RBC # BLD AUTO: 2.74 M/UL
RBC #/AREA URNS AUTO: 17 /HPF (ref 0–4)
SAMPLE: ABNORMAL
SITE: ABNORMAL
SODIUM BLD-SCNC: 115 MMOL/L (ref 136–145)
SODIUM SERPL-SCNC: 116 MMOL/L
SODIUM SERPL-SCNC: 120 MMOL/L
SODIUM SERPL-SCNC: 122 MMOL/L
SODIUM SERPL-SCNC: 131 MMOL/L
SP GR UR STRIP: 1.01 (ref 1–1.03)
SQUAMOUS #/AREA URNS AUTO: 1 /HPF
TOXICOLOGY INFORMATION: NORMAL
TROPONIN I SERPL DL<=0.01 NG/ML-MCNC: 0.14 NG/ML
TROPONIN I SERPL DL<=0.01 NG/ML-MCNC: 0.16 NG/ML
URN SPEC COLLECT METH UR: ABNORMAL
UROBILINOGEN UR STRIP-ACNC: NEGATIVE EU/DL
WBC # BLD AUTO: 6.62 K/UL
WBC #/AREA URNS AUTO: 8 /HPF (ref 0–5)
YEAST UR QL AUTO: ABNORMAL

## 2017-08-30 PROCEDURE — 36410 VNPNXR 3YR/> PHY/QHP DX/THER: CPT | Mod: 59

## 2017-08-30 PROCEDURE — 25000003 PHARM REV CODE 250: Performed by: STUDENT IN AN ORGANIZED HEALTH CARE EDUCATION/TRAINING PROGRAM

## 2017-08-30 PROCEDURE — 25000003 PHARM REV CODE 250: Performed by: EMERGENCY MEDICINE

## 2017-08-30 PROCEDURE — 84100 ASSAY OF PHOSPHORUS: CPT

## 2017-08-30 PROCEDURE — 93005 ELECTROCARDIOGRAM TRACING: CPT

## 2017-08-30 PROCEDURE — 96375 TX/PRO/DX INJ NEW DRUG ADDON: CPT

## 2017-08-30 PROCEDURE — 82803 BLOOD GASES ANY COMBINATION: CPT

## 2017-08-30 PROCEDURE — 99900035 HC TECH TIME PER 15 MIN (STAT)

## 2017-08-30 PROCEDURE — 25000242 PHARM REV CODE 250 ALT 637 W/ HCPCS: Performed by: EMERGENCY MEDICINE

## 2017-08-30 PROCEDURE — 96366 THER/PROPH/DIAG IV INF ADDON: CPT

## 2017-08-30 PROCEDURE — 81001 URINALYSIS AUTO W/SCOPE: CPT

## 2017-08-30 PROCEDURE — 80100014 HC HEMODIALYSIS 1:1

## 2017-08-30 PROCEDURE — 96368 THER/DIAG CONCURRENT INF: CPT

## 2017-08-30 PROCEDURE — 83690 ASSAY OF LIPASE: CPT

## 2017-08-30 PROCEDURE — 20000000 HC ICU ROOM

## 2017-08-30 PROCEDURE — 96365 THER/PROPH/DIAG IV INF INIT: CPT

## 2017-08-30 PROCEDURE — 83605 ASSAY OF LACTIC ACID: CPT

## 2017-08-30 PROCEDURE — 63600175 PHARM REV CODE 636 W HCPCS: Performed by: EMERGENCY MEDICINE

## 2017-08-30 PROCEDURE — 82010 KETONE BODYS QUAN: CPT

## 2017-08-30 PROCEDURE — 99223 1ST HOSP IP/OBS HIGH 75: CPT | Mod: ,,, | Performed by: INTERNAL MEDICINE

## 2017-08-30 PROCEDURE — 25000003 PHARM REV CODE 250: Performed by: NURSE PRACTITIONER

## 2017-08-30 PROCEDURE — 83605 ASSAY OF LACTIC ACID: CPT | Mod: 91

## 2017-08-30 PROCEDURE — 99285 EMERGENCY DEPT VISIT HI MDM: CPT | Mod: 25,,, | Performed by: EMERGENCY MEDICINE

## 2017-08-30 PROCEDURE — 83880 ASSAY OF NATRIURETIC PEPTIDE: CPT

## 2017-08-30 PROCEDURE — 99291 CRITICAL CARE FIRST HOUR: CPT | Mod: 25

## 2017-08-30 PROCEDURE — 83735 ASSAY OF MAGNESIUM: CPT

## 2017-08-30 PROCEDURE — 84145 PROCALCITONIN (PCT): CPT

## 2017-08-30 PROCEDURE — 87040 BLOOD CULTURE FOR BACTERIA: CPT | Mod: 59

## 2017-08-30 PROCEDURE — 80307 DRUG TEST PRSMV CHEM ANLYZR: CPT

## 2017-08-30 PROCEDURE — 85025 COMPLETE CBC W/AUTO DIFF WBC: CPT

## 2017-08-30 PROCEDURE — 80320 DRUG SCREEN QUANTALCOHOLS: CPT

## 2017-08-30 PROCEDURE — 84484 ASSAY OF TROPONIN QUANT: CPT | Mod: 91

## 2017-08-30 PROCEDURE — 84484 ASSAY OF TROPONIN QUANT: CPT

## 2017-08-30 PROCEDURE — 36410 VNPNXR 3YR/> PHY/QHP DX/THER: CPT | Mod: ,,, | Performed by: EMERGENCY MEDICINE

## 2017-08-30 PROCEDURE — 94640 AIRWAY INHALATION TREATMENT: CPT

## 2017-08-30 PROCEDURE — 80053 COMPREHEN METABOLIC PANEL: CPT

## 2017-08-30 PROCEDURE — 63600175 PHARM REV CODE 636 W HCPCS: Performed by: STUDENT IN AN ORGANIZED HEALTH CARE EDUCATION/TRAINING PROGRAM

## 2017-08-30 PROCEDURE — 80053 COMPREHEN METABOLIC PANEL: CPT | Mod: 91

## 2017-08-30 PROCEDURE — 82962 GLUCOSE BLOOD TEST: CPT

## 2017-08-30 PROCEDURE — 93010 ELECTROCARDIOGRAM REPORT: CPT | Mod: ,,, | Performed by: INTERNAL MEDICINE

## 2017-08-30 RX ORDER — LOSARTAN POTASSIUM AND HYDROCHLOROTHIAZIDE 12.5; 5 MG/1; MG/1
1 TABLET ORAL DAILY
Status: DISCONTINUED | OUTPATIENT
Start: 2017-08-31 | End: 2017-09-01

## 2017-08-30 RX ORDER — METOPROLOL TARTRATE 50 MG/1
50 TABLET ORAL 2 TIMES DAILY
Status: DISCONTINUED | OUTPATIENT
Start: 2017-08-30 | End: 2017-09-03 | Stop reason: HOSPADM

## 2017-08-30 RX ORDER — ALBUTEROL SULFATE 0.83 MG/ML
15 SOLUTION RESPIRATORY (INHALATION)
Status: COMPLETED | OUTPATIENT
Start: 2017-08-30 | End: 2017-08-30

## 2017-08-30 RX ORDER — SODIUM CHLORIDE 0.9 % (FLUSH) 0.9 %
3 SYRINGE (ML) INJECTION EVERY 8 HOURS
Status: DISCONTINUED | OUTPATIENT
Start: 2017-08-30 | End: 2017-09-03 | Stop reason: HOSPADM

## 2017-08-30 RX ORDER — INDOMETHACIN 25 MG/1
50 CAPSULE ORAL
Status: COMPLETED | OUTPATIENT
Start: 2017-08-30 | End: 2017-08-30

## 2017-08-30 RX ORDER — ENOXAPARIN SODIUM 100 MG/ML
30 INJECTION SUBCUTANEOUS EVERY 24 HOURS
Status: DISCONTINUED | OUTPATIENT
Start: 2017-08-30 | End: 2017-09-01

## 2017-08-30 RX ORDER — ENOXAPARIN SODIUM 100 MG/ML
40 INJECTION SUBCUTANEOUS EVERY 24 HOURS
Status: DISCONTINUED | OUTPATIENT
Start: 2017-08-30 | End: 2017-08-30

## 2017-08-30 RX ORDER — SODIUM CHLORIDE 450 MG/100ML
INJECTION, SOLUTION INTRAVENOUS CONTINUOUS
Status: DISCONTINUED | OUTPATIENT
Start: 2017-08-30 | End: 2017-08-30

## 2017-08-30 RX ORDER — SODIUM CHLORIDE 9 MG/ML
INJECTION, SOLUTION INTRAVENOUS
Status: DISCONTINUED | OUTPATIENT
Start: 2017-08-30 | End: 2017-08-31

## 2017-08-30 RX ADMIN — ALBUTEROL SULFATE 15 MG: 2.5 SOLUTION RESPIRATORY (INHALATION) at 03:08

## 2017-08-30 RX ADMIN — CALCIUM GLUCONATE 1 G: 94 INJECTION, SOLUTION INTRAVENOUS at 03:08

## 2017-08-30 RX ADMIN — PIPERACILLIN AND TAZOBACTAM 4.5 G: 4; .5 INJECTION, POWDER, LYOPHILIZED, FOR SOLUTION INTRAVENOUS; PARENTERAL at 05:08

## 2017-08-30 RX ADMIN — METOPROLOL TARTRATE 50 MG: 50 TABLET, FILM COATED ORAL at 10:08

## 2017-08-30 RX ADMIN — SODIUM CHLORIDE 500 ML: 0.9 INJECTION, SOLUTION INTRAVENOUS at 02:08

## 2017-08-30 RX ADMIN — SODIUM BICARBONATE 50 MEQ: 84 INJECTION, SOLUTION INTRAVENOUS at 03:08

## 2017-08-30 RX ADMIN — SODIUM CHLORIDE: 0.45 INJECTION, SOLUTION INTRAVENOUS at 05:08

## 2017-08-30 RX ADMIN — SODIUM CHLORIDE 7 UNITS/HR: 9 INJECTION, SOLUTION INTRAVENOUS at 02:08

## 2017-08-30 RX ADMIN — SODIUM CHLORIDE 41 UNITS/HR: 9 INJECTION, SOLUTION INTRAVENOUS at 09:08

## 2017-08-30 NOTE — ASSESSMENT & PLAN NOTE
Poorly controlled based on previous documentation.    A1C 10.7% 8/26/17    Has history of gastroparesis, nephropathy, retinopathy, and neuropathy.    Relocated from [place].  Unknown recent regimen.  Will monitor insulin requirements and adjust based on inpatient needs, or if patient able to provide more history in future, can adjust home regimen.    On ARB for HTN.  Would hold on statin as she has a documentation in a previous clinic note of worsened myalgias with statin therapy.

## 2017-08-30 NOTE — SUBJECTIVE & OBJECTIVE
Past Medical History:   Diagnosis Date    Anemia     during pregnancys    Arthritis     neuropathy hands feet and legs//    Blood transfusion     Chronic pain     CKD (chronic kidney disease), stage IV     Diabetes mellitus     Diabetes mellitus type I     Diabetic retinopathy     Hyperlipidemia     Hypertension     patient states that when her blood sugar increases her blood pressure increases    Neuropathy     Seizures     when sugar is high, does not quite remember    Vitamin D deficiency     Vitamin D deficiency disease        Past Surgical History:   Procedure Laterality Date     SECTION, CLASSIC      x 2    EYE SURGERY      HYSTERECTOMY         Review of patient's allergies indicates:   Allergen Reactions    Ciprofloxacin (bulk) Itching    Latex Itching and Other (See Comments)     Very low Oxygen    Vancomycin Shortness Of Breath and Itching    Neurontin [gabapentin] Other (See Comments)     Seizure like activity    Niacin Itching and Other (See Comments)     Burning      Bactrim [sulfamethoxazole-trimethoprim] Rash       Family History     Problem Relation (Age of Onset)    Asthma Father    Blindness Mother, Maternal Grandmother    Breast cancer Daughter    Cancer Cousin    Cataracts Maternal Grandmother    Diabetes Mother, Father, Sister    Glaucoma Maternal Grandmother    Heart disease Mother    Hypertension Mother, Father, Maternal Grandmother    Stroke Maternal Uncle    Thyroid disease Sister        Social History Main Topics    Smoking status: Current Some Day Smoker     Packs/day: 0.10     Years: 10.00     Types: Cigarettes    Smokeless tobacco: Never Used      Comment: 1 pack last her about 2-3 months    Alcohol use No    Drug use: No    Sexual activity: Yes     Partners: Male     Birth control/ protection: None      Review of Systems   Unable to perform ROS: Mental status change     Objective:     Vital Signs (Most Recent):  Temp: 97.3 °F (36.3 °C) (17  1238)  Pulse: 68 (08/30/17 1442)  Resp: 10 (08/30/17 1442)  BP: (!) 152/65 (08/30/17 1442)  SpO2: 96 % (08/30/17 1442) Vital Signs (24h Range):  Temp:  [97.3 °F (36.3 °C)] 97.3 °F (36.3 °C)  Pulse:  [66-69] 68  Resp:  [10-18] 10  SpO2:  [96 %-98 %] 96 %  BP: (142-152)/(62-65) 152/65   Weight: 72.6 kg (160 lb)  Body mass index is 25.82 kg/m².    No intake or output data in the 24 hours ending 08/30/17 1528    Physical Exam   Constitutional: She appears well-developed and well-nourished. No distress.   Lethargic, confused   HENT:   Head: Normocephalic and atraumatic.   Eyes: EOM are normal.   Neck: Normal range of motion. Neck supple.   Cardiovascular: Regular rhythm, normal heart sounds and intact distal pulses.    No murmur heard.  Tachycardic.  Pitting edema on BLE noted up past bilateral knees   Pulmonary/Chest: Effort normal and breath sounds normal. No respiratory distress. She has no wheezes. She has no rales.   Abdominal: Bowel sounds are normal. She exhibits distension. There is no tenderness. There is no rebound and no guarding.   Abdominal tense to palpation and mildly distended although not rigid   Musculoskeletal: She exhibits no tenderness.   Neurological:   Oriented to person, place (hospital), but not time, situation. Required repeated stimulation to answer questions.    Skin: Skin is warm and dry.   Bruising noted in left AC, vascular port in RUE with no noted tenderness or erythema   Psychiatric:   Confused, lethargic    Vitals reviewed.      Vents:     Lines/Drains/Airways     Central Venous Catheter Line                 Hemodialysis Catheter right subclavian [id number] days          Peripheral Intravenous Line                 Peripheral IV - Single Lumen 08/27/17 2300 Right Forearm 2 days         External Jugular IV 08/30/17 1409 less than 1 day              Significant Labs:    CBC/Anemia Profile:    Recent Labs  Lab 08/30/17  1316 08/30/17  1420   WBC 6.62  --    HGB 7.3*  --    HCT 26.3* 25*   PLT  291  --    MCV 96  --    RDW 15.7*  --         Chemistries:    Recent Labs  Lab 08/30/17  1316   *   K 6.3*   CL 83*   CO2 8*   BUN 78*   CREATININE 5.3*   CALCIUM 7.3*   ALBUMIN 2.2*   PROT 5.7*   BILITOT 0.2   ALKPHOS 234*   ALT 32   AST 63*   MG 1.9   PHOS 6.4*       ABGs:   Recent Labs  Lab 08/30/17  1403   PH 7.208*   PCO2 34.4*   HCO3 13.7*   POCSATURATED 68*   BE -14     Lactic Acid: No results for input(s): LACTATE in the last 48 hours.  POCT Glucose:   Recent Labs  Lab 08/30/17  1241 08/30/17  1259 08/30/17  1412   POCTGLUCOSE >500* >500* >500*     Respiratory Culture: No results for input(s): GSRESP, RESPIRATORYC in the last 48 hours.  Troponin: No results for input(s): TROPONINI in the last 48 hours.    Significant Imaging: I have reviewed and interpreted all pertinent imaging results/findings within the past 24 hours.

## 2017-08-30 NOTE — CONSULTS
[facility]  Critical Care Medicine  Consult Note    Patient Name: Eufemia Haq  MRN: [id number]  Admission Date: 8/30/2017  Hospital Length of Stay: 0 days  Code Status: Full Code  Attending Physician: Alondra Fuentes MD   Primary Care Provider: Alecia Delacruz MD   Principal Problem: <principal problem not specified>    Inpatient consult to Critical Care Medicine  Consult performed by: JOANNA HUDDLESTON  Consult ordered by: NATALIA LUND  Reason for consult: DKA and AMS        Subjective:     HPI:  The patient is a 52 year old female with a history of DM1, seizure, and ESRD on dialysis, who presents with altered mental status and hyperglycemia. The patient was recently discharged from [facility] with an episode of hyperglycemia, in ED at that time her BG was in 700's and beta-hydroxybutyrate was within normal limits. The patient was treated at that time with insulin gtt and underwent HD where 2L was removed.    In ED the patient is minimally responsive but is able to state her name and date of birth. No family is present at this time. Patient's BG is currently 1241, with an anion gap metabolic acidosis, K 6.3 with QRS widening and peaked T waves. ED managed with insulin gtt, fluid replacement, calcium gluconate, and potassium shift. Patient states that she her blood glucose usually runs in the 300-500s. History is limited 2/2 AMS. Per patient's daughter the patient has been increasingly confused since her discharge. She also states that the patient had dialysis yesterday but it was cut short (3 from 4 hours) due to the weather. The patient's daughter also states that as of last night the patient has been having episodes of diarrhea. The patient's daughter states that she makes sure her mother is compliant with her medications and states that since her last discharge the patient has not been experiencing any episodes of vomiting, fever, complaints of chest pain or SOB.          Hospital/ICU  Course:  No notes on file    Past Medical History:   Diagnosis Date    Anemia     during pregnancys    Arthritis     neuropathy hands feet and legs//    Blood transfusion     Chronic pain     CKD (chronic kidney disease), stage IV     Diabetes mellitus     Diabetes mellitus type I     Diabetic retinopathy     Hyperlipidemia     Hypertension     patient states that when her blood sugar increases her blood pressure increases    Neuropathy     Seizures     when sugar is high, does not quite remember    Vitamin D deficiency     Vitamin D deficiency disease        Past Surgical History:   Procedure Laterality Date     SECTION, CLASSIC      x 2    EYE SURGERY      HYSTERECTOMY         Review of patient's allergies indicates:   Allergen Reactions    Ciprofloxacin (bulk) Itching    Latex Itching and Other (See Comments)     Very low Oxygen    Vancomycin Shortness Of Breath and Itching    Neurontin [gabapentin] Other (See Comments)     Seizure like activity    Niacin Itching and Other (See Comments)     Burning      Bactrim [sulfamethoxazole-trimethoprim] Rash       Family History     Problem Relation (Age of Onset)    Asthma Father    Blindness Mother, Maternal Grandmother    Breast cancer Daughter    Cancer Cousin    Cataracts Maternal Grandmother    Diabetes Mother, Father, Sister    Glaucoma Maternal Grandmother    Heart disease Mother    Hypertension Mother, Father, Maternal Grandmother    Stroke Maternal Uncle    Thyroid disease Sister        Social History Main Topics    Smoking status: Current Some Day Smoker     Packs/day: 0.10     Years: 10.00     Types: Cigarettes    Smokeless tobacco: Never Used      Comment: 1 pack last her about 2-3 months    Alcohol use No    Drug use: No    Sexual activity: Yes     Partners: Male     Birth control/ protection: None      Review of Systems   Unable to perform ROS: Mental status change     Objective:     Vital Signs (Most Recent):  Temp: 97.3  °F (36.3 °C) (08/30/17 1238)  Pulse: 68 (08/30/17 1442)  Resp: 10 (08/30/17 1442)  BP: (!) 152/65 (08/30/17 1442)  SpO2: 96 % (08/30/17 1442) Vital Signs (24h Range):  Temp:  [97.3 °F (36.3 °C)] 97.3 °F (36.3 °C)  Pulse:  [66-69] 68  Resp:  [10-18] 10  SpO2:  [96 %-98 %] 96 %  BP: (142-152)/(62-65) 152/65   Weight: 72.6 kg (160 lb)  Body mass index is 25.82 kg/m².    No intake or output data in the 24 hours ending 08/30/17 1528    Physical Exam   Constitutional: She appears well-developed and well-nourished. No distress.   Lethargic, confused   HENT:   Head: Normocephalic and atraumatic.   Eyes: EOM are normal.   Neck: Normal range of motion. Neck supple.   Cardiovascular: Regular rhythm, normal heart sounds and intact distal pulses.    No murmur heard.  Tachycardic.  Pitting edema on BLE noted up past bilateral knees   Pulmonary/Chest: Effort normal and breath sounds normal. No respiratory distress. She has no wheezes. She has no rales.   Abdominal: Bowel sounds are normal. She exhibits distension. There is no tenderness. There is no rebound and no guarding.   Abdominal tense to palpation and mildly distended although not rigid   Musculoskeletal: She exhibits no tenderness.   Neurological:   Oriented to person, place (hospital), but not time, situation. Required repeated stimulation to answer questions.    Skin: Skin is warm and dry.   Bruising noted in left AC, vascular port in RUE with no noted tenderness or erythema   Psychiatric:   Confused, lethargic    Vitals reviewed.      Vents:     Lines/Drains/Airways     Central Venous Catheter Line                 Hemodialysis Catheter right subclavian 25286 days          Peripheral Intravenous Line                 Peripheral IV - Single Lumen 08/27/17 2300 Right Forearm 2 days         External Jugular IV 08/30/17 1409 less than 1 day              Significant Labs:    CBC/Anemia Profile:    Recent Labs  Lab 08/30/17  1316 08/30/17  1420   WBC 6.62  --    HGB 7.3*  --     HCT 26.3* 25*     --    MCV 96  --    RDW 15.7*  --         Chemistries:    Recent Labs  Lab 08/30/17  1316   *   K 6.3*   CL 83*   CO2 8*   BUN 78*   CREATININE 5.3*   CALCIUM 7.3*   ALBUMIN 2.2*   PROT 5.7*   BILITOT 0.2   ALKPHOS 234*   ALT 32   AST 63*   MG 1.9   PHOS 6.4*       ABGs:   Recent Labs  Lab 08/30/17  1403   PH 7.208*   PCO2 34.4*   HCO3 13.7*   POCSATURATED 68*   BE -14     Lactic Acid: No results for input(s): LACTATE in the last 48 hours.  POCT Glucose:   Recent Labs  Lab 08/30/17  1241 08/30/17  1259 08/30/17  1412   POCTGLUCOSE >500* >500* >500*     Respiratory Culture: No results for input(s): GSRESP, RESPIRATORYC in the last 48 hours.  Troponin: No results for input(s): TROPONINI in the last 48 hours.    Significant Imaging: I have reviewed and interpreted all pertinent imaging results/findings within the past 24 hours.    Assessment/Plan:     Neuro   Metabolic encephalopathy    -currently with type 1 diabetes in DKA with AGMA  -infectious workup in progress (please see DKA)  -hyponatremia corrected to 143  -CT head in process to r/o edema        Renal/   ESRD (end stage renal disease)    -currently gets dialysis T, Th, S  -patient had dialysis yesterday; remains fluid overloaded  -nephrology consulted for fluid removal later today        Hyperkalemia    -6.3 on admit with widening QRS   -currently getting albuterol, CaGluconate, shifted with insulin  -will get an updated EKG once labs have populated  -q2h CMP to monitor electrolytes        Endocrine   DKA, type 1    -patient originally from [place], here for hur[place] evacuation.  had previous admission to [facility], discharged 8/28/17   -here now with BG of 1248, Anion Gap of 25  -ICU consulted for type 1 diabetic in DKA with AGMA  -differential diagnosis include: insulin noncompliance vs infection vs MI  -insulin non compliance: Patient is on insulin pump, she states that she has brought all of her insulin supplies with her.  Her  A1c>10 on last assessment  -infection: PNA, Abdo infection  -MI: EKG changes included QRS duration has increased(in setting of known hyperkalemia),  troponins pending  -CXRay pending  -KUB pending  - Lipase pending  -Lactate pending  -Troponins pending  -Urine Culture, Blood Culture pending  -CT head as patient has AMS, to r/o edema  -currently getting 7u/hr of insulin, 250cc/hr  0.45% NaCl (given pts corrected Na is 143)  -will get V BG q2h and monitor electrolytes with CMP q2h  -4.5g of Zosyn, 1 dose.  Will monitor CXR results and dose from this finding  -endocrinology consulted              Critical Care Daily Checklist:    A: Awake: RASS Goal/Actual Goal:    Actual:     B: Spontaneous Breathing Trial Performed?     C: SAT & SBT Coordinated?  NA                      D: Delirium: CAM-ICU     E: Early Mobility Performed? No   F: Feeding Goal:    Status:     Current Diet Order   No orders of the defined types were placed in this encounter.      AS: Analgesia/Sedation NA   T: Thromboembolic Prophylaxis Enoxaparin    H: HOB > 300 Yes   U: Stress Ulcer Prophylaxis (if needed) NA   G: Glucose Control Insulin gtt   B: Bowel Function     I: Indwelling Catheter (Lines & Andrade) Necessity Yes   D: De-escalation of Antimicrobials/Pharmacotherapies Unasyn 4.5G x1    Plan for the day/ETD Manage blood sugar, correct anion gap metabolic acidosis    Code Status:  Family/Goals of Care: Full Code  Correct confusion        Critical secondary to Patient has a condition that poses threat to life and bodily function: DKA with AMS     Critical care was time spent personally by me on the following activities: development of treatment plan with patient or surrogate and bedside caregivers, discussions with consultants, evaluation of patient's response to treatment, examination of patient, ordering and performing treatments and interventions, ordering and review of laboratory studies, ordering and review of radiographic studies, pulse  oximetry, re-evaluation of patient's condition. This critical care time did not overlap with that of any other provider or involve time for any procedures.    Thank you for your consult. I will follow-up with patient. Please contact us if you have any additional questions.     Discussed patient and plan with Fellow.      Tacho Edge MD  Critical Care Medicine  [facility]

## 2017-08-30 NOTE — ASSESSMENT & PLAN NOTE
Lethargic on exam.  Unable to obtain full history.  Continue to monitor as DKA continues to resolve.

## 2017-08-30 NOTE — ED PROVIDER NOTES
Encounter Date: 8/30/2017    SCRIBE #1 NOTE: I, Trice House, am scribing for, and in the presence of,  Dr. Fuentes. I have scribed the following portions of the note - the APC attestation. Other sections scribed: Procedures and Critical Care.       History     Chief Complaint   Patient presents with    Hyperglycemia     The history is provided by the patient and a relative. The history is limited by the condition of the patient.    ,  This is a 52-year-old female with a past medical history significant for poorly controlled type 1 diabetes mellitus, stage IV chronic kidney disease on hemodialysis (Tuesday, Thursday, Saturday), seizure disorder, hypertension, hyperlipidemia and chronic pain disorder who presents the emergency department today for hyperglycemia and altered mental status.  She was discharged home from the hospital on 8/28/17 for the same concerns.  Patient's significant other reports that she began acting sluggish and confused today which is how she typically acts when her blood glucose is high.  Patient was dialyzed yesterday but the session was only 3 hours instead of 4 hours secondary to the weather.  Patient is currently residing in [place].    Review of patient's allergies indicates:   Allergen Reactions    Ciprofloxacin (bulk) Itching    Latex Itching and Other (See Comments)     Very low Oxygen    Vancomycin Shortness Of Breath and Itching    Neurontin [gabapentin] Other (See Comments)     Seizure like activity    Niacin Itching and Other (See Comments)     Burning      Bactrim [sulfamethoxazole-trimethoprim] Rash     Past Medical History:   Diagnosis Date    Anemia     during pregnancys    Arthritis     neuropathy hands feet and legs//    Blood transfusion     Chronic pain     CKD (chronic kidney disease), stage IV     Diabetes mellitus     Diabetes mellitus type I     Diabetic retinopathy     Hyperlipidemia     Hypertension     patient states that when her blood sugar increases  her blood pressure increases    Neuropathy     Seizures     when sugar is high, does not quite remember    Vitamin D deficiency     Vitamin D deficiency disease      Past Surgical History:   Procedure Laterality Date     SECTION, CLASSIC      x 2    EYE SURGERY      HYSTERECTOMY       Family History   Problem Relation Age of Onset    Diabetes Mother     Heart disease Mother     Blindness Mother      diabetes    Hypertension Mother     Diabetes Father     Asthma Father     Hypertension Father     Thyroid disease Sister     Breast cancer Daughter     Diabetes Sister     Stroke Maternal Uncle     Glaucoma Maternal Grandmother     Blindness Maternal Grandmother     Cataracts Maternal Grandmother     Hypertension Maternal Grandmother     Cancer Cousin     Amblyopia Neg Hx     Macular degeneration Neg Hx     Retinal detachment Neg Hx     Strabismus Neg Hx     Colon cancer Neg Hx     Ovarian cancer Neg Hx      Social History   Substance Use Topics    Smoking status: Current Some Day Smoker     Packs/day: 0.10     Years: 10.00     Types: Cigarettes    Smokeless tobacco: Never Used      Comment: 1 pack last her about 2-3 months    Alcohol use No     Limited review of systems obtained secondary to patient condition.    Review of Systems   Unable to perform ROS: Mental status change   Constitutional: Positive for activity change and fatigue. Negative for fever.   Cardiovascular: Positive for leg swelling.   Gastrointestinal: Negative for diarrhea and vomiting.   Endocrine: Positive for polydipsia.   Genitourinary: Negative for dysuria.   Neurological: Positive for dizziness and weakness.   Psychiatric/Behavioral: Positive for confusion.       Physical Exam     Initial Vitals [17 1238]   BP Pulse Resp Temp SpO2   (!) 142/62 66 18 97.3 °F (36.3 °C) 98 %      MAP       88.67           Physical Exam    Nursing note and vitals reviewed.       Nursing note and vitals  reviewed.  Constitutional: Patient appears chronically ill.    HENT:   Head: Normocephalic and atraumatic.   Eyes: Conjunctivae are normal. No scleral icterus.   Neck: Normal range of motion. Neck supple.   Cardiovascular: Normal rate, regular rhythm and normal heart sounds.   Pulmonary/Chest: Breath sounds normal. No respiratory distress.  Abdominal: Abdomen is soft and nondistended.  No tenderness to palpation.    Musculoskeletal: Normal range of motion.   Neurological: patient is lethargic.  She is oriented to person.  Aware that she is in the hospital but unable to state which hospital.  She is able to recite the partial date.  Generalized weakness appreciated.  Limited neurologic exam secondary to lethargy and patient cooperation.  Skin: Skin is warm and dry. No rash noted. No erythema. No pallor.   Psychiatric: See Neurologic.    ED Course   External Jugular IV  Date/Time: 8/30/2017 3:43 PM  Performed by: NIRMAL FRYE  Authorized by: NIRMAL FRYE   Location (Ext Jugular): Left.  Area Prepped With: Chlorohexidine.  Catheter Size: 18 ga.  Catheter Type: Jelco.  Number of attempts: 1  Fixation/Dressing: Tegaderm and Sterile Dressing.  Patient tolerance: Patient tolerated the procedure well with no immediate complications        Labs Reviewed   CBC W/ AUTO DIFFERENTIAL - Abnormal; Notable for the following:        Result Value    RBC 2.74 (*)     Hemoglobin 7.3 (*)     Hematocrit 26.3 (*)     MCH 26.6 (*)     MCHC 27.8 (*)     RDW 15.7 (*)     Lymph # 0.7 (*)     Gran% 77.8 (*)     Lymph% 10.9 (*)     All other components within normal limits   COMPREHENSIVE METABOLIC PANEL - Abnormal; Notable for the following:     Sodium 116 (*)     Potassium 6.3 (*)     Chloride 83 (*)     CO2 8 (*)     Glucose 1,241 (*)     BUN, Bld 78 (*)     Creatinine 5.3 (*)     Calcium 7.3 (*)     Total Protein 5.7 (*)     Albumin 2.2 (*)     Alkaline Phosphatase 234 (*)     AST 63 (*)     Anion Gap 25 (*)     eGFR if   10.0 (*)     eGFR if non  8.6 (*)     All other components within normal limits   URINALYSIS, REFLEX TO URINE CULTURE - Abnormal; Notable for the following:     Appearance, UA Hazy (*)     Protein, UA 2+ (*)     Glucose, UA 3+ (*)     Ketones, UA 1+ (*)     Occult Blood UA 1+ (*)     All other components within normal limits    Narrative:     Preferred Collection Type->Urine, Clean Catch   BETA - HYDROXYBUTYRATE, SERUM - Abnormal; Notable for the following:     Beta-Hydroxybutyrate 5.2 (*)     All other components within normal limits   PHOSPHORUS - Abnormal; Notable for the following:     Phosphorus 6.4 (*)     All other components within normal limits   TROPONIN I - Abnormal; Notable for the following:     Troponin I 0.158 (*)     All other components within normal limits   B-TYPE NATRIURETIC PEPTIDE - Abnormal; Notable for the following:     BNP 2,236 (*)     All other components within normal limits   PROCALCITONIN - Abnormal; Notable for the following:     Procalcitonin 1.18 (*)     All other components within normal limits   URINALYSIS MICROSCOPIC - Abnormal; Notable for the following:     RBC, UA 17 (*)     WBC, UA 8 (*)     All other components within normal limits    Narrative:     Preferred Collection Type->Urine, Clean Catch   COMPREHENSIVE METABOLIC PANEL - Abnormal; Notable for the following:     Sodium 120 (*)     Chloride 84 (*)     CO2 12 (*)     Glucose 1,054 (*)     BUN, Bld 81 (*)     Creatinine 5.4 (*)     Calcium 7.3 (*)     Total Protein 5.6 (*)     Albumin 2.2 (*)     Alkaline Phosphatase 221 (*)     AST 48 (*)     Anion Gap 24 (*)     eGFR if  9.7 (*)     eGFR if non  8.5 (*)     All other components within normal limits   LACTIC ACID, PLASMA - Abnormal; Notable for the following:     Lactate (Lactic Acid) 6.9 (*)     All other components within normal limits   TROPONIN I - Abnormal; Notable for the following:     Troponin I  0.136 (*)     All other components within normal limits   LACTIC ACID, PLASMA - Abnormal; Notable for the following:     Lactate (Lactic Acid) 9.3 (*)     All other components within normal limits   COMPREHENSIVE METABOLIC PANEL - Abnormal; Notable for the following:     Sodium 122 (*)     Chloride 86 (*)     CO2 14 (*)     Glucose 808 (*)     BUN, Bld 83 (*)     Creatinine 5.6 (*)     Calcium 7.6 (*)     Total Protein 5.6 (*)     Albumin 2.1 (*)     Alkaline Phosphatase 197 (*)     AST 47 (*)     Anion Gap 22 (*)     eGFR if  9.3 (*)     eGFR if non  8.1 (*)     All other components within normal limits    Narrative:       LACTIC ACID critical result(s) called and verbal readback obtained   from CHRISTOFER HERRMANN RN, 08/30/2017 21:21   ISTAT PROCEDURE - Abnormal; Notable for the following:     POC PH 7.208 (*)     POC PCO2 34.4 (*)     POC HCO3 13.7 (*)     POC SATURATED O2 68 (*)     POC TCO2 15 (*)     All other components within normal limits   POCT GLUCOSE - Abnormal; Notable for the following:     POCT Glucose >500 (*)     All other components within normal limits   POCT GLUCOSE - Abnormal; Notable for the following:     POCT Glucose >500 (*)     All other components within normal limits   POCT GLUCOSE - Abnormal; Notable for the following:     POCT Glucose >500 (*)     All other components within normal limits   ISTAT PROCEDURE - Abnormal; Notable for the following:     POC Glucose >700 (*)     POC BUN 86 (*)     POC Creatinine 5.2 (*)     POC Sodium 115 (*)     POC Potassium 6.2 (*)     POC Chloride 82 (*)     POC TCO2 (MEASURED) 14 (*)     POC Ionized Calcium 0.97 (*)     POC Hematocrit 25 (*)     All other components within normal limits   POCT GLUCOSE - Abnormal; Notable for the following:     POCT Glucose >500 (*)     All other components within normal limits   ISTAT PROCEDURE - Abnormal; Notable for the following:     POC PH 7.305 (*)     POC PCO2 31.8 (*)     POC PO2 110  (*)     POC HCO3 15.8 (*)     POC TCO2 17 (*)     All other components within normal limits   POCT GLUCOSE - Abnormal; Notable for the following:     POCT Glucose >500 (*)     All other components within normal limits   ISTAT PROCEDURE - Abnormal; Notable for the following:     POC PH 7.230 (*)     POC HCO3 15.4 (*)     POC SATURATED O2 82 (*)     POC TCO2 16 (*)     All other components within normal limits   CULTURE, URINE   MAGNESIUM   PHOSPHORUS   ALCOHOL,MEDICAL (ETHANOL)   DRUG SCREEN PANEL, URINE EMERGENCY    Narrative:     Preferred Collection Type->Urine, Clean Catch   LIPASE     EKG Readings: (Independently Interpreted)   Initial Reading: No STEMI. Previous EKG: Compared with most recent EKG Rhythm: Normal Sinus Rhythm. Heart Rate: 68.   Peaked T waves          Medical Decision Making:   History:   Old Medical Records: I decided to obtain old medical records.  Initial Assessment:   This is a 52-year-old female with poorly controlled type 1 diabetes mellitus who was discharged home from hospital 2 days ago and returns today with altered mental status and hyperglycemia.  Lab findings consistent with DKA.  Critical care medicine was consultative and evaluated the patient in emergency department.  She will be admitted to their service for further evaluation and treatment.  IV insulin was initiated in the ED.  She is a dialysis patient and was last dialyzed yesterday.  Clinical Tests:   Lab Tests: Ordered and Reviewed  Radiological Study: Ordered and Reviewed  Medical Tests: Ordered and Reviewed            Scribe Attestation:   Scribe #1: I performed the above scribed service and the documentation accurately describes the services I performed. I attest to the accuracy of the note.    Attending Attestation:     Physician Attestation Statement for NP/PA:   I have conducted a face to face encounter with this patient in addition to the NP/PA, due to Medical Complexity    Other NP/PA Attestation Additions:     History of Present Illness: 52 y.o. female who presents with diabetes and ESRD, last dialysis treatment was yesterda.  The history is limited from the patient due to decreased responsiveness. The family members are not helpful.  Initial glucose is significantly elevated. High suspicion for DKA in this patient. She was most recently admitted for hyperglycemia and discharged 2 days ago.  Initial labs demonstrate acidosis with a pH of 7.2.  She has severe hyperglycemia with glucose of 1241 and an anion gap of 25. Potassium is elevated at 6.2. I have resuscitated with insulin infusion.  Will shift potassium as well.  Will give bicarbonate, calcium, and Albuterol treatment. The patient will need the ICU for monitoring. The patient is critically ill.       Attending Critical Care:   Critical Care Times:   Direct Patient Care (initial evaluation, reassessments, and time considering the case)................................................................5 minutes.   Additional History from reviewing old medical records or taking additional history from the family, EMS, PCP, etc.......................5 minutes.   Ordering, Reviewing, and Interpreting Diagnostic Studies...............................................................................................................5 minutes.   Documentation..................................................................................................................................................................................5 minutes.   Consultation with other Physicians. .................................................................................................................................................5 minutes.   ==============================================================  · Total Critical Care Time - exclusive of procedural time: 25 minutes.  ==============================================================  Critical care reasons: severe DKA, renal  failure.     Physician Attestation for Scribe:  Physician Attestation Statement for Scribe #1: I, Dr. Fuentes, reviewed documentation, as scribed by Trice House in my presence, and it is both accurate and complete.                 ED Course      Clinical Impression:   Diagnoses of DKA, type 1 and ESRD (end stage renal disease) were pertinent to this visit.    Disposition:   Disposition: Admitted  Condition: Serious                        Faina Lazcano NP  08/31/17 7342       Alondra Fuentes MD  09/01/17 [id number]

## 2017-08-30 NOTE — PROGRESS NOTES
C3 nurse attempted to contact patient. No answer. The following message was left for the patient to return the call:  Good afternoon I am a nurse calling on behalf of [facility] from the [facility].  This is a Transitional Care Call for Eufemia Haq. When you have a moment please contact us at [phone] or [phone] Monday through Friday, between the hours of 8 am to 4 pm. We look forward to speaking with you. On behalf of [facility] have a nice day.    The patient does not have a scheduled HOSFU appointment within 7-14 days post hospital discharge date 08/28/17. Message unable to besent to Physician staff to assist with HOSFU appointment scheduling.

## 2017-08-30 NOTE — SUBJECTIVE & OBJECTIVE
Interval HPI:     Eating:   NPO  Nausea: unable to determine, but she has not received anti-emetic medication  Hypoglycemia and intervention: No  Fever: No  TPN and/or TF: No    PMH, PSH, FH, SH updated and reviewed     ROS:  Unable to obtain due to: altered mental status, extreme lethargy    Review of Systems   Unable to perform ROS: Mental status change       Current Medications and/or Treatments Impacting Glycemic Control  Immunotherapy:    Immunosuppressants     None        Steroids:   Hormones     None        Pressors:    Autonomic Drugs     None        Hyperglycemia/Diabetes Medications:   Antihyperglycemics     Start     Stop Route Frequency Ordered    08/30/17 1500  insulin regular (Humulin R) 100 Units in sodium chloride 0.9% 100 mL infusion      -- IV Continuous 08/30/17 1346               PHYSICAL EXAMINATION:Vitals:    08/30/17 1500   BP: (!) 191/70   Pulse: 78   Resp: 20   Temp:      Body mass index is 25.82 kg/m².    Physical Exam   HENT:   Head: Normocephalic and atraumatic.   Eyes: EOM are normal. Pupils are equal, round, and reactive to light.   Neck: Normal range of motion. Neck supple. No thyromegaly present.   Cardiovascular: Normal rate, regular rhythm and normal heart sounds.    Pulmonary/Chest: Effort normal.   Tunneled IJ catheter access for HD, no s/sx of infection   Abdominal: Soft. Bowel sounds are normal. No hernia.   Neurological:   Unable to assess secondary to mental status   Skin: Skin is warm and dry. She is not diaphoretic.

## 2017-08-30 NOTE — ASSESSMENT & PLAN NOTE
Likely to normalize with insulin and beta agonist.  Continue to follow.  Has already received calcium gluconate.    Nephrology is following patient with plans to dialyze.

## 2017-08-30 NOTE — ASSESSMENT & PLAN NOTE
-currently gets dialysis T, Th, S  -patient had dialysis yesterday; remains fluid overloaded  -nephrology consulted for fluid removal later today

## 2017-08-30 NOTE — CONSULTS
[facility]  Nephrology  Consult Note    Patient Name: Eufemia Haq  MRN: [id number]  Admission Date: 2017  Hospital Length of Stay: 0 days  Attending Provider: Alondra Fuentes MD   Primary Care Physician: Alecia Delacruz MD  Principal Problem:<principal problem not specified>    Inpatient consult to Nephrology  Consult performed by: MAICO BURT  Consult ordered by: EUNICE JALLOH  Reason for consult: ESRD        Subjective:     HPI: Ms. Haq is a 53 yo AAF with HTN, T1DM, and ESRD admitted with DKA. Patient was recently admitted 17-17 for similar complaint. Nephrology consulted for ESRD management. She normally receives iHD TTS via RIJ TDC at a [facility] in [place], [place]. Treatment duration 4 hours; EDW 65kg. She continues to make some urine. She received dialysis inpatient on Saturday night for acidosis. Per report she received iHD at her temporary unit yesterday. Patient was somnolent on exam and unable to provide any history. Labs with glucose 1241, +beta-hydrobutyrate, hyperkalemia, and metabolic acidosis. Corrected Na 134. She is currently on an insulin infusion and being admitted to ICU for further care. Emergency consent was obtained for dialysis.     Past Medical History:   Diagnosis Date    Anemia     during pregnancys    Arthritis     neuropathy hands feet and legs//    Blood transfusion     Chronic pain     CKD (chronic kidney disease), stage IV     Diabetes mellitus     Diabetes mellitus type I     Diabetic retinopathy     Hyperlipidemia     Hypertension     patient states that when her blood sugar increases her blood pressure increases    Neuropathy     Seizures     when sugar is high, does not quite remember    Vitamin D deficiency     Vitamin D deficiency disease        Past Surgical History:   Procedure Laterality Date     SECTION, CLASSIC      x 2    EYE SURGERY      HYSTERECTOMY         Review of patient's allergies  indicates:   Allergen Reactions    Ciprofloxacin (bulk) Itching    Latex Itching and Other (See Comments)     Very low Oxygen    Vancomycin Shortness Of Breath and Itching    Neurontin [gabapentin] Other (See Comments)     Seizure like activity    Niacin Itching and Other (See Comments)     Burning      Bactrim [sulfamethoxazole-trimethoprim] Rash     Current Facility-Administered Medications   Medication Frequency    0.9%  NaCl infusion PRN    dextrose 50% injection 12.5 g PRN    dextrose 50% injection 25 g PRN    enoxaparin injection 30 mg Daily    insulin regular (Humulin R) 100 Units in sodium chloride 0.9% 100 mL infusion Continuous    [START ON 8/31/2017] losartan-hydrochlorothiazide 50-12.5 mg per tablet 1 tablet Daily    metoprolol tartrate (LOPRESSOR) tablet 50 mg BID    piperacillin-tazobactam 4.5 g in dextrose 5 % 100 mL IVPB (ready to mix system) Once    sodium chloride 0.9% flush 3 mL Q8H     Current Outpatient Prescriptions   Medication    insulin glargine, TOUJEO, (TOUJEO SOLOSTAR) 300 unit/mL (1.5 mL) InPn pen    insulin lispro (HUMALOG KWIKPEN) 200 unit/mL (3 mL) InPn    losartan-hydrochlorothiazide 50-12.5 mg (HYZAAR) 50-12.5 mg per tablet    metoprolol tartrate (LOPRESSOR) 50 MG tablet    rosuvastatin (CRESTOR) 20 MG tablet    acetaminophen-codeine 300-60mg (TYLENOL #4) 300-60 mg Tab    blood sugar diagnostic Strp    diphenoxylate-atropine 2.5-0.025 mg (LOMOTIL) 2.5-0.025 mg per tablet    metoclopramide HCl (REGLAN) 10 MG tablet    nifedipine (ADALAT CC) 60 MG TbSR    ondansetron (ZOFRAN-ODT) 4 MG TbDL    TRUETEST TEST STRIPS Strp    zolpidem (AMBIEN) 10 mg Tab     Family History     Problem Relation (Age of Onset)    Asthma Father    Blindness Mother, Maternal Grandmother    Breast cancer Daughter    Cancer Cousin    Cataracts Maternal Grandmother    Diabetes Mother, Father, Sister    Glaucoma Maternal Grandmother    Heart disease Mother    Hypertension Mother, Father,  Maternal Grandmother    Stroke Maternal Uncle    Thyroid disease Sister        Social History Main Topics    Smoking status: Current Some Day Smoker     Packs/day: 0.10     Years: 10.00     Types: Cigarettes    Smokeless tobacco: Never Used      Comment: 1 pack last her about 2-3 months    Alcohol use No    Drug use: No    Sexual activity: Yes     Partners: Male     Birth control/ protection: None     Review of Systems   Unable to perform ROS: Mental status change     Objective:     Vital Signs (Most Recent):  Temp: 97.3 °F (36.3 °C) (08/30/17 1238)  Pulse: 78 (08/30/17 1500)  Resp: 20 (08/30/17 1500)  BP: (!) 191/70 (08/30/17 1500)  SpO2: 100 % (08/30/17 1500)  O2 Device (Oxygen Therapy): room air (08/30/17 1238) Vital Signs (24h Range):  Temp:  [97.3 °F (36.3 °C)] 97.3 °F (36.3 °C)  Pulse:  [66-78] 78  Resp:  [10-20] 20  SpO2:  [96 %-100 %] 100 %  BP: (142-191)/(62-70) 191/70     Weight: 72.6 kg (160 lb) (08/30/17 1238)  Body mass index is 25.82 kg/m².  Body surface area is 1.84 meters squared.    No intake/output data recorded.    Physical Exam   Constitutional: She appears well-developed and well-nourished. She appears lethargic.   HENT:   Head: Normocephalic and atraumatic.   Eyes: Conjunctivae and EOM are normal.   Neck: Neck supple. No thyromegaly present.   Cardiovascular: Normal rate and regular rhythm.    Pulmonary/Chest: Effort normal. She has rales.   Abdominal: Soft. She exhibits no distension.   Musculoskeletal: She exhibits edema (increased edema compared to last admission  ). She exhibits no deformity.   Neurological: She appears lethargic.   Skin: Skin is warm and dry.       Significant Labs:  ABGs:   Recent Labs  Lab 08/30/17  1608   PH 7.305*   PCO2 31.8*   HCO3 15.8*   POCSATURATED 98   BE -11     CBC:   Recent Labs  Lab 08/30/17  1316 08/30/17  1420   WBC 6.62  --    RBC 2.74*  --    HGB 7.3*  --    HCT 26.3* 25*     --    MCV 96  --    MCH 26.6*  --    MCHC 27.8*  --      CMP:    Recent Labs  Lab 08/30/17  1316   GLU 1,241*   CALCIUM 7.3*   ALBUMIN 2.2*   PROT 5.7*   *   K 6.3*   CO2 8*   CL 83*   BUN 78*   CREATININE 5.3*   ALKPHOS 234*   ALT 32   AST 63*   BILITOT 0.2     Coagulation: No results for input(s): INR, APTT in the last 168 hours.    Invalid input(s): PT  All labs within the past 24 hours have been reviewed.    Significant Imaging:  Labs: Reviewed    Assessment/Plan:     ESRD (end stage renal disease)    - receives iHD TTS via RIJ TDC at [facility] in [place], [place]. Tem[facility] unit [facility]. Treatment duration 4 hours; EDW 65kg. Minimal residual renal function.  - last received iHD yesterday  - now with significant electrolyte abnormalities 2/2 DKA  - will perform HD tonight to help improve acidosis. Corrected Na 134. Low K/high bicarb bath.   - crackles on exam and 2+ pitting edema. If patient were not in DKA, would attempt to remove 3-4L fluid to help with hypervolemia. Instead will only order 1-2L as tolerated to prevent worsening respiratory distress.   - will reassess in AM            Ana [place], PGY-5  Nephrology Fellow  [facility]  Pager: [phone]      I have reviewed and concur with the fellow's history, physical, assessment, and plan. I have personally interviewed and examined the patient at bedside.
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
## 2017-08-30 NOTE — ASSESSMENT & PLAN NOTE
- receives iHD TTS via RIJ TDC at [facility] in [place], [place]. Temporary unit is [facility]. Treatment duration 4 hours; EDW 65kg. Minimal residual renal function.  - last received iHD yesterday  - now with significant electrolyte abnormalities 2/2 DKA  - will perform HD tonight to help improve acidosis. Corrected Na 134. Low K/high bicarb bath.   - crackles on exam and 2+ pitting edema. If patient were not in DKA, would attempt to remove 3-4L fluid to help with hypervolemia. Instead will only order 1-2L as tolerated to prevent worsening respiratory distress.   - will reassess in AM

## 2017-08-30 NOTE — ASSESSMENT & PLAN NOTE
Nephrology following, with plans for HD.  Tunneled IJ cath clean without s/sx of infection.  HD can assist with acidosis, elevated K, and other metabolic derangements associated with ESRD.
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
## 2017-08-30 NOTE — ASSESSMENT & PLAN NOTE
-currently with type 1 diabetes in DKA with AGMA  -infectious workup in progress (please see DKA)  -hyponatremia corrected to 143  -CT head in process to r/o edema
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
## 2017-08-30 NOTE — SUBJECTIVE & OBJECTIVE
Past Medical History:   Diagnosis Date    Anemia     during pregnancys    Arthritis     neuropathy hands feet and legs//    Blood transfusion     Chronic pain     CKD (chronic kidney disease), stage IV     Diabetes mellitus     Diabetes mellitus type I     Diabetic retinopathy     Hyperlipidemia     Hypertension     patient states that when her blood sugar increases her blood pressure increases    Neuropathy     Seizures     when sugar is high, does not quite remember    Vitamin D deficiency     Vitamin D deficiency disease        Past Surgical History:   Procedure Laterality Date     SECTION, CLASSIC      x 2    EYE SURGERY      HYSTERECTOMY         Review of patient's allergies indicates:   Allergen Reactions    Ciprofloxacin (bulk) Itching    Latex Itching and Other (See Comments)     Very low Oxygen    Vancomycin Shortness Of Breath and Itching    Neurontin [gabapentin] Other (See Comments)     Seizure like activity    Niacin Itching and Other (See Comments)     Burning      Bactrim [sulfamethoxazole-trimethoprim] Rash     Current Facility-Administered Medications   Medication Frequency    0.9%  NaCl infusion PRN    dextrose 50% injection 12.5 g PRN    dextrose 50% injection 25 g PRN    enoxaparin injection 30 mg Daily    insulin regular (Humulin R) 100 Units in sodium chloride 0.9% 100 mL infusion Continuous    [START ON 2017] losartan-hydrochlorothiazide 50-12.5 mg per tablet 1 tablet Daily    metoprolol tartrate (LOPRESSOR) tablet 50 mg BID    piperacillin-tazobactam 4.5 g in dextrose 5 % 100 mL IVPB (ready to mix system) Once    sodium chloride 0.9% flush 3 mL Q8H     Current Outpatient Prescriptions   Medication    insulin glargine, TOUJEO, (TOUJEO SOLOSTAR) 300 unit/mL (1.5 mL) InPn pen    insulin lispro (HUMALOG KWIKPEN) 200 unit/mL (3 mL) InPn    losartan-hydrochlorothiazide 50-12.5 mg (HYZAAR) 50-12.5 mg per tablet    metoprolol tartrate (LOPRESSOR) 50 MG  tablet    rosuvastatin (CRESTOR) 20 MG tablet    acetaminophen-codeine 300-60mg (TYLENOL #4) 300-60 mg Tab    blood sugar diagnostic Strp    diphenoxylate-atropine 2.5-0.025 mg (LOMOTIL) 2.5-0.025 mg per tablet    metoclopramide HCl (REGLAN) 10 MG tablet    nifedipine (ADALAT CC) 60 MG TbSR    ondansetron (ZOFRAN-ODT) 4 MG TbDL    TRUETEST TEST STRIPS Strp    zolpidem (AMBIEN) 10 mg Tab     Family History     Problem Relation (Age of Onset)    Asthma Father    Blindness Mother, Maternal Grandmother    Breast cancer Daughter    Cancer Cousin    Cataracts Maternal Grandmother    Diabetes Mother, Father, Sister    Glaucoma Maternal Grandmother    Heart disease Mother    Hypertension Mother, Father, Maternal Grandmother    Stroke Maternal Uncle    Thyroid disease Sister        Social History Main Topics    Smoking status: Current Some Day Smoker     Packs/day: 0.10     Years: 10.00     Types: Cigarettes    Smokeless tobacco: Never Used      Comment: 1 pack last her about 2-3 months    Alcohol use No    Drug use: No    Sexual activity: Yes     Partners: Male     Birth control/ protection: None     Review of Systems   Unable to perform ROS: Mental status change     Objective:     Vital Signs (Most Recent):  Temp: 97.3 °F (36.3 °C) (08/30/17 1238)  Pulse: 78 (08/30/17 1500)  Resp: 20 (08/30/17 1500)  BP: (!) 191/70 (08/30/17 1500)  SpO2: 100 % (08/30/17 1500)  O2 Device (Oxygen Therapy): room air (08/30/17 1238) Vital Signs (24h Range):  Temp:  [97.3 °F (36.3 °C)] 97.3 °F (36.3 °C)  Pulse:  [66-78] 78  Resp:  [10-20] 20  SpO2:  [96 %-100 %] 100 %  BP: (142-191)/(62-70) 191/70     Weight: 72.6 kg (160 lb) (08/30/17 1238)  Body mass index is 25.82 kg/m².  Body surface area is 1.84 meters squared.    No intake/output data recorded.    Physical Exam   Constitutional: She appears well-developed and well-nourished. She appears lethargic.   HENT:   Head: Normocephalic and atraumatic.   Eyes: Conjunctivae and EOM are  normal.   Neck: Neck supple. No thyromegaly present.   Cardiovascular: Normal rate and regular rhythm.    Pulmonary/Chest: Effort normal. She has rales.   Abdominal: Soft. She exhibits no distension.   Musculoskeletal: She exhibits edema (increased edema compared to last admission  ). She exhibits no deformity.   Neurological: She appears lethargic.   Skin: Skin is warm and dry.       Significant Labs:  ABGs:   Recent Labs  Lab 08/30/17  1608   PH 7.305*   PCO2 31.8*   HCO3 15.8*   POCSATURATED 98   BE -11     CBC:   Recent Labs  Lab 08/30/17  1316 08/30/17  1420   WBC 6.62  --    RBC 2.74*  --    HGB 7.3*  --    HCT 26.3* 25*     --    MCV 96  --    MCH 26.6*  --    MCHC 27.8*  --      CMP:   Recent Labs  Lab 08/30/17  1316   GLU 1,241*   CALCIUM 7.3*   ALBUMIN 2.2*   PROT 5.7*   *   K 6.3*   CO2 8*   CL 83*   BUN 78*   CREATININE 5.3*   ALKPHOS 234*   ALT 32   AST 63*   BILITOT 0.2     Coagulation: No results for input(s): INR, APTT in the last 168 hours.    Invalid input(s): PT  All labs within the past 24 hours have been reviewed.    Significant Imaging:  Labs: Reviewed

## 2017-08-30 NOTE — ASSESSMENT & PLAN NOTE
-6.3 on admit with widening QRS   -currently getting albuterol, CaGluconate, shifted with insulin  -will get an updated EKG once labs have populated  -q2h CMP to monitor electrolytes

## 2017-08-31 LAB
ABO + RH BLD: NORMAL
ALBUMIN SERPL BCP-MCNC: 1.9 G/DL
ALBUMIN SERPL BCP-MCNC: 2 G/DL
ALBUMIN SERPL BCP-MCNC: 2 G/DL
ALBUMIN SERPL BCP-MCNC: 2.1 G/DL
ALBUMIN SERPL BCP-MCNC: 2.2 G/DL
ALLENS TEST: ABNORMAL
ALP SERPL-CCNC: 167 U/L
ALP SERPL-CCNC: 176 U/L
ALP SERPL-CCNC: 184 U/L
ALP SERPL-CCNC: 187 U/L
ALP SERPL-CCNC: 195 U/L
ALT SERPL W/O P-5'-P-CCNC: 17 U/L
ALT SERPL W/O P-5'-P-CCNC: 21 U/L
ALT SERPL W/O P-5'-P-CCNC: 21 U/L
ALT SERPL W/O P-5'-P-CCNC: 24 U/L
ALT SERPL W/O P-5'-P-CCNC: 26 U/L
ANION GAP SERPL CALC-SCNC: 10 MMOL/L
ANION GAP SERPL CALC-SCNC: 11 MMOL/L
ANION GAP SERPL CALC-SCNC: 12 MMOL/L
ANION GAP SERPL CALC-SCNC: 13 MMOL/L
ANION GAP SERPL CALC-SCNC: 15 MMOL/L
AST SERPL-CCNC: 27 U/L
AST SERPL-CCNC: 30 U/L
AST SERPL-CCNC: 32 U/L
AST SERPL-CCNC: 37 U/L
AST SERPL-CCNC: 38 U/L
BASOPHILS # BLD AUTO: 0.02 K/UL
BASOPHILS # BLD AUTO: 0.02 K/UL
BASOPHILS NFR BLD: 0.3 %
BASOPHILS NFR BLD: 0.3 %
BILIRUB SERPL-MCNC: 0.1 MG/DL
BILIRUB SERPL-MCNC: 0.1 MG/DL
BILIRUB SERPL-MCNC: 0.2 MG/DL
BLD GP AB SCN CELLS X3 SERPL QL: NORMAL
BLD PROD TYP BPU: NORMAL
BLOOD UNIT EXPIRATION DATE: NORMAL
BLOOD UNIT TYPE CODE: 6200
BLOOD UNIT TYPE: NORMAL
BUN SERPL-MCNC: 32 MG/DL
BUN SERPL-MCNC: 54 MG/DL
BUN SERPL-MCNC: 55 MG/DL
BUN SERPL-MCNC: 56 MG/DL
BUN SERPL-MCNC: 57 MG/DL
CALCIUM SERPL-MCNC: 7.5 MG/DL
CALCIUM SERPL-MCNC: 7.6 MG/DL
CALCIUM SERPL-MCNC: 7.7 MG/DL
CHLORIDE SERPL-SCNC: 92 MMOL/L
CHLORIDE SERPL-SCNC: 93 MMOL/L
CHLORIDE SERPL-SCNC: 93 MMOL/L
CHLORIDE SERPL-SCNC: 94 MMOL/L
CHLORIDE SERPL-SCNC: 97 MMOL/L
CO2 SERPL-SCNC: 24 MMOL/L
CO2 SERPL-SCNC: 26 MMOL/L
CO2 SERPL-SCNC: 27 MMOL/L
CO2 SERPL-SCNC: 28 MMOL/L
CO2 SERPL-SCNC: 31 MMOL/L
CODING SYSTEM: NORMAL
CREAT SERPL-MCNC: 3 MG/DL
CREAT SERPL-MCNC: 3.6 MG/DL
CREAT SERPL-MCNC: 3.9 MG/DL
CREAT SERPL-MCNC: 4.1 MG/DL
CREAT SERPL-MCNC: 4.1 MG/DL
DIFFERENTIAL METHOD: ABNORMAL
DIFFERENTIAL METHOD: ABNORMAL
DISPENSE STATUS: NORMAL
EOSINOPHIL # BLD AUTO: 0.1 K/UL
EOSINOPHIL # BLD AUTO: 0.2 K/UL
EOSINOPHIL NFR BLD: 1.7 %
EOSINOPHIL NFR BLD: 2.1 %
ERYTHROCYTE [DISTWIDTH] IN BLOOD BY AUTOMATED COUNT: 14.8 %
ERYTHROCYTE [DISTWIDTH] IN BLOOD BY AUTOMATED COUNT: 15 %
EST. GFR  (AFRICAN AMERICAN): 13.6 ML/MIN/1.73 M^2
EST. GFR  (AFRICAN AMERICAN): 13.6 ML/MIN/1.73 M^2
EST. GFR  (AFRICAN AMERICAN): 14.4 ML/MIN/1.73 M^2
EST. GFR  (AFRICAN AMERICAN): 15.9 ML/MIN/1.73 M^2
EST. GFR  (AFRICAN AMERICAN): 19.8 ML/MIN/1.73 M^2
EST. GFR  (NON AFRICAN AMERICAN): 11.8 ML/MIN/1.73 M^2
EST. GFR  (NON AFRICAN AMERICAN): 11.8 ML/MIN/1.73 M^2
EST. GFR  (NON AFRICAN AMERICAN): 12.5 ML/MIN/1.73 M^2
EST. GFR  (NON AFRICAN AMERICAN): 13.8 ML/MIN/1.73 M^2
EST. GFR  (NON AFRICAN AMERICAN): 17.2 ML/MIN/1.73 M^2
GLUCOSE SERPL-MCNC: 106 MG/DL
GLUCOSE SERPL-MCNC: 222 MG/DL
GLUCOSE SERPL-MCNC: 77 MG/DL
GLUCOSE SERPL-MCNC: 83 MG/DL
GLUCOSE SERPL-MCNC: 89 MG/DL
HCO3 UR-SCNC: 30.9 MMOL/L (ref 24–28)
HCT VFR BLD AUTO: 20 %
HCT VFR BLD AUTO: 23.2 %
HGB BLD-MCNC: 6.8 G/DL
HGB BLD-MCNC: 7.7 G/DL
LACTATE SERPL-SCNC: 1.4 MMOL/L
LYMPHOCYTES # BLD AUTO: 1.1 K/UL
LYMPHOCYTES # BLD AUTO: 1.3 K/UL
LYMPHOCYTES NFR BLD: 14.1 %
LYMPHOCYTES NFR BLD: 17.8 %
MAGNESIUM SERPL-MCNC: 2.1 MG/DL
MCH RBC QN AUTO: 26.7 PG
MCH RBC QN AUTO: 27.1 PG
MCHC RBC AUTO-ENTMCNC: 33.2 G/DL
MCHC RBC AUTO-ENTMCNC: 34 G/DL
MCV RBC AUTO: 80 FL
MCV RBC AUTO: 81 FL
MONOCYTES # BLD AUTO: 1.2 K/UL
MONOCYTES # BLD AUTO: 1.5 K/UL
MONOCYTES NFR BLD: 15.5 %
MONOCYTES NFR BLD: 19.8 %
NEUTROPHILS # BLD AUTO: 4.4 K/UL
NEUTROPHILS # BLD AUTO: 5.1 K/UL
NEUTROPHILS NFR BLD: 58.5 %
NEUTROPHILS NFR BLD: 67.6 %
PCO2 BLDA: 46.3 MMHG (ref 35–45)
PH SMN: 7.43 [PH] (ref 7.35–7.45)
PHOSPHATE SERPL-MCNC: 3.2 MG/DL
PHOSPHATE SERPL-MCNC: 4 MG/DL
PLATELET # BLD AUTO: 214 K/UL
PLATELET # BLD AUTO: 231 K/UL
PMV BLD AUTO: 10.6 FL
PMV BLD AUTO: 9.2 FL
PO2 BLDA: 34 MMHG (ref 40–60)
POC BE: 7 MMOL/L
POC SATURATED O2: 66 % (ref 95–100)
POC TCO2: 32 MMOL/L (ref 24–29)
POCT GLUCOSE: 104 MG/DL (ref 70–110)
POCT GLUCOSE: 106 MG/DL (ref 70–110)
POCT GLUCOSE: 111 MG/DL (ref 70–110)
POCT GLUCOSE: 114 MG/DL (ref 70–110)
POCT GLUCOSE: 115 MG/DL (ref 70–110)
POCT GLUCOSE: 115 MG/DL (ref 70–110)
POCT GLUCOSE: 137 MG/DL (ref 70–110)
POCT GLUCOSE: 137 MG/DL (ref 70–110)
POCT GLUCOSE: 144 MG/DL (ref 70–110)
POCT GLUCOSE: 201 MG/DL (ref 70–110)
POCT GLUCOSE: 239 MG/DL (ref 70–110)
POCT GLUCOSE: 44 MG/DL (ref 70–110)
POCT GLUCOSE: 71 MG/DL (ref 70–110)
POCT GLUCOSE: 80 MG/DL (ref 70–110)
POCT GLUCOSE: 87 MG/DL (ref 70–110)
POCT GLUCOSE: 96 MG/DL (ref 70–110)
POCT GLUCOSE: >500 MG/DL (ref 70–110)
POTASSIUM SERPL-SCNC: 3.2 MMOL/L
POTASSIUM SERPL-SCNC: 3.4 MMOL/L
POTASSIUM SERPL-SCNC: 3.6 MMOL/L
POTASSIUM SERPL-SCNC: 4.3 MMOL/L
POTASSIUM SERPL-SCNC: 5 MMOL/L
PROT SERPL-MCNC: 5.1 G/DL
PROT SERPL-MCNC: 5.2 G/DL
PROT SERPL-MCNC: 5.3 G/DL
PROT SERPL-MCNC: 5.4 G/DL
PROT SERPL-MCNC: 5.5 G/DL
RBC # BLD AUTO: 2.51 M/UL
RBC # BLD AUTO: 2.88 M/UL
SAMPLE: ABNORMAL
SITE: ABNORMAL
SODIUM SERPL-SCNC: 131 MMOL/L
SODIUM SERPL-SCNC: 131 MMOL/L
SODIUM SERPL-SCNC: 132 MMOL/L
SODIUM SERPL-SCNC: 134 MMOL/L
SODIUM SERPL-SCNC: 138 MMOL/L
TRANS ERYTHROCYTES VOL PATIENT: NORMAL ML
WBC # BLD AUTO: 7.47 K/UL
WBC # BLD AUTO: 7.49 K/UL

## 2017-08-31 PROCEDURE — P9021 RED BLOOD CELLS UNIT: HCPCS

## 2017-08-31 PROCEDURE — 63600175 PHARM REV CODE 636 W HCPCS: Performed by: INTERNAL MEDICINE

## 2017-08-31 PROCEDURE — 99233 SBSQ HOSP IP/OBS HIGH 50: CPT | Mod: ,,, | Performed by: INTERNAL MEDICINE

## 2017-08-31 PROCEDURE — 86900 BLOOD TYPING SEROLOGIC ABO: CPT

## 2017-08-31 PROCEDURE — 80053 COMPREHEN METABOLIC PANEL: CPT

## 2017-08-31 PROCEDURE — 86850 RBC ANTIBODY SCREEN: CPT

## 2017-08-31 PROCEDURE — 63600175 PHARM REV CODE 636 W HCPCS: Performed by: STUDENT IN AN ORGANIZED HEALTH CARE EDUCATION/TRAINING PROGRAM

## 2017-08-31 PROCEDURE — 25000003 PHARM REV CODE 250: Performed by: STUDENT IN AN ORGANIZED HEALTH CARE EDUCATION/TRAINING PROGRAM

## 2017-08-31 PROCEDURE — 80100014 HC HEMODIALYSIS 1:1

## 2017-08-31 PROCEDURE — 83735 ASSAY OF MAGNESIUM: CPT

## 2017-08-31 PROCEDURE — A4216 STERILE WATER/SALINE, 10 ML: HCPCS | Performed by: STUDENT IN AN ORGANIZED HEALTH CARE EDUCATION/TRAINING PROGRAM

## 2017-08-31 PROCEDURE — 25000003 PHARM REV CODE 250: Performed by: EMERGENCY MEDICINE

## 2017-08-31 PROCEDURE — 80053 COMPREHEN METABOLIC PANEL: CPT | Mod: 91

## 2017-08-31 PROCEDURE — 20000000 HC ICU ROOM

## 2017-08-31 PROCEDURE — 83605 ASSAY OF LACTIC ACID: CPT

## 2017-08-31 PROCEDURE — 84100 ASSAY OF PHOSPHORUS: CPT | Mod: 91

## 2017-08-31 PROCEDURE — 86920 COMPATIBILITY TEST SPIN: CPT

## 2017-08-31 PROCEDURE — 99222 1ST HOSP IP/OBS MODERATE 55: CPT | Mod: GC,,, | Performed by: INTERNAL MEDICINE

## 2017-08-31 PROCEDURE — 82803 BLOOD GASES ANY COMBINATION: CPT

## 2017-08-31 PROCEDURE — 85025 COMPLETE CBC W/AUTO DIFF WBC: CPT | Mod: 91

## 2017-08-31 RX ORDER — SODIUM CHLORIDE 9 MG/ML
INJECTION, SOLUTION INTRAVENOUS
Status: DISCONTINUED | OUTPATIENT
Start: 2017-08-31 | End: 2017-09-01

## 2017-08-31 RX ORDER — INSULIN ASPART 100 [IU]/ML
5 INJECTION, SOLUTION INTRAVENOUS; SUBCUTANEOUS
Status: DISCONTINUED | OUTPATIENT
Start: 2017-08-31 | End: 2017-09-03 | Stop reason: HOSPADM

## 2017-08-31 RX ORDER — HYDROCODONE BITARTRATE AND ACETAMINOPHEN 500; 5 MG/1; MG/1
TABLET ORAL
Status: DISCONTINUED | OUTPATIENT
Start: 2017-08-31 | End: 2017-08-31 | Stop reason: ALTCHOICE

## 2017-08-31 RX ORDER — POTASSIUM CHLORIDE 20 MEQ/1
20 TABLET, EXTENDED RELEASE ORAL
Status: COMPLETED | OUTPATIENT
Start: 2017-08-31 | End: 2017-08-31

## 2017-08-31 RX ORDER — HEPARIN SODIUM 1000 [USP'U]/ML
2000 INJECTION, SOLUTION INTRAVENOUS; SUBCUTANEOUS
Status: DISCONTINUED | OUTPATIENT
Start: 2017-08-31 | End: 2017-09-03 | Stop reason: HOSPADM

## 2017-08-31 RX ORDER — GLUCAGON 1 MG
1 KIT INJECTION
Status: DISCONTINUED | OUTPATIENT
Start: 2017-08-31 | End: 2017-09-03 | Stop reason: HOSPADM

## 2017-08-31 RX ORDER — HYDROCODONE BITARTRATE AND ACETAMINOPHEN 500; 5 MG/1; MG/1
TABLET ORAL
Status: DISCONTINUED | OUTPATIENT
Start: 2017-08-31 | End: 2017-09-01

## 2017-08-31 RX ORDER — IBUPROFEN 200 MG
16 TABLET ORAL
Status: DISCONTINUED | OUTPATIENT
Start: 2017-08-31 | End: 2017-09-03 | Stop reason: HOSPADM

## 2017-08-31 RX ORDER — PANTOPRAZOLE SODIUM 40 MG/1
40 TABLET, DELAYED RELEASE ORAL ONCE
Status: COMPLETED | OUTPATIENT
Start: 2017-08-31 | End: 2017-09-01

## 2017-08-31 RX ORDER — MAGNESIUM SULFATE HEPTAHYDRATE 40 MG/ML
2 INJECTION, SOLUTION INTRAVENOUS
Status: DISCONTINUED | OUTPATIENT
Start: 2017-08-31 | End: 2017-08-31 | Stop reason: ALTCHOICE

## 2017-08-31 RX ORDER — MAGNESIUM SULFATE HEPTAHYDRATE 40 MG/ML
2 INJECTION, SOLUTION INTRAVENOUS
Status: COMPLETED | OUTPATIENT
Start: 2017-08-31 | End: 2017-08-31

## 2017-08-31 RX ORDER — POTASSIUM CHLORIDE 20 MEQ/1
20 TABLET, EXTENDED RELEASE ORAL ONCE
Status: DISCONTINUED | OUTPATIENT
Start: 2017-08-31 | End: 2017-08-31

## 2017-08-31 RX ORDER — IBUPROFEN 200 MG
24 TABLET ORAL
Status: DISCONTINUED | OUTPATIENT
Start: 2017-08-31 | End: 2017-09-03 | Stop reason: HOSPADM

## 2017-08-31 RX ORDER — ZOLPIDEM TARTRATE 5 MG/1
5 TABLET ORAL ONCE
Status: COMPLETED | OUTPATIENT
Start: 2017-08-31 | End: 2017-09-01

## 2017-08-31 RX ORDER — HEPARIN SODIUM 1000 [USP'U]/ML
2000 INJECTION, SOLUTION INTRAVENOUS; SUBCUTANEOUS
Status: DISCONTINUED | OUTPATIENT
Start: 2017-08-31 | End: 2017-08-31

## 2017-08-31 RX ORDER — INSULIN ASPART 100 [IU]/ML
0-5 INJECTION, SOLUTION INTRAVENOUS; SUBCUTANEOUS
Status: DISCONTINUED | OUTPATIENT
Start: 2017-08-31 | End: 2017-09-03 | Stop reason: HOSPADM

## 2017-08-31 RX ADMIN — Medication 3 ML: at 09:08

## 2017-08-31 RX ADMIN — Medication 10 ML: at 10:08

## 2017-08-31 RX ADMIN — HEPARIN SODIUM 2000 UNITS: 1000 INJECTION, SOLUTION INTRAVENOUS; SUBCUTANEOUS at 01:08

## 2017-08-31 RX ADMIN — ERYTHROPOIETIN 4000 UNITS: 4000 INJECTION, SOLUTION INTRAVENOUS; SUBCUTANEOUS at 03:08

## 2017-08-31 RX ADMIN — Medication 10 ML: at 06:08

## 2017-08-31 RX ADMIN — INSULIN ASPART 5 UNITS: 100 INJECTION, SOLUTION INTRAVENOUS; SUBCUTANEOUS at 06:08

## 2017-08-31 RX ADMIN — METOPROLOL TARTRATE 50 MG: 50 TABLET, FILM COATED ORAL at 09:08

## 2017-08-31 RX ADMIN — LOSARTAN POTASSIUM AND HYDROCHLOROTHIAZIDE 1 TABLET: 12.5; 5 TABLET ORAL at 06:08

## 2017-08-31 RX ADMIN — HEPARIN SODIUM 2000 UNITS: 1000 INJECTION, SOLUTION INTRAVENOUS; SUBCUTANEOUS at 06:08

## 2017-08-31 RX ADMIN — MAGNESIUM SULFATE IN WATER 2 G: 40 INJECTION, SOLUTION INTRAVENOUS at 01:08

## 2017-08-31 RX ADMIN — POTASSIUM CHLORIDE 20 MEQ: 1500 TABLET, EXTENDED RELEASE ORAL at 05:08

## 2017-08-31 RX ADMIN — DEXTROSE MONOHYDRATE 25 G: 25 INJECTION, SOLUTION INTRAVENOUS at 03:08

## 2017-08-31 RX ADMIN — LOSARTAN POTASSIUM AND HYDROCHLOROTHIAZIDE 1 TABLET: 12.5; 5 TABLET ORAL at 01:08

## 2017-08-31 RX ADMIN — POTASSIUM CHLORIDE 20 MEQ: 1500 TABLET, EXTENDED RELEASE ORAL at 02:08

## 2017-08-31 RX ADMIN — METOPROLOL TARTRATE 50 MG: 50 TABLET, FILM COATED ORAL at 10:08

## 2017-08-31 RX ADMIN — INSULIN ASPART 5 UNITS: 100 INJECTION, SOLUTION INTRAVENOUS; SUBCUTANEOUS at 01:08

## 2017-08-31 RX ADMIN — INSULIN DETEMIR 10 UNITS: 100 INJECTION, SOLUTION SUBCUTANEOUS at 07:08

## 2017-08-31 RX ADMIN — INSULIN ASPART 5 UNITS: 100 INJECTION, SOLUTION INTRAVENOUS; SUBCUTANEOUS at 10:08

## 2017-08-31 RX ADMIN — ENOXAPARIN SODIUM 30 MG: 100 INJECTION SUBCUTANEOUS at 06:08

## 2017-08-31 NOTE — RESIDENT HANDOFF
Handoff     [place] Team: [facility] Room Number: 3069/3069 A     Patient Name: Eufemia Haq MRN: [id number]     Date of Birth: [id number] Allergies: Ciprofloxacin (bulk); Latex; Vancomycin; Neurontin [gabapentin]; Niacin; and Bactrim [sulfamethoxazole-trimethoprim]     Age: 52 y.o. Admit Date: 8/30/2017     Sex: female  BMI: Body mass index is 28.47 kg/m².     Code Status: Full Code        Illness Level Watcher - No    Reason for Admission: DKA, type 1    Brief HPI   The patient is a 52 year old female with a history of DM1, seizure, and ESRD on dialysis, who presents with altered mental status and hyperglycemia. The patient was recently discharged from [facility] with an episode of hyperglycemia, in ED at that time her BG was in 700's and beta-hydroxybutyrate was within normal limits. The patient was treated at that time with insulin gtt and underwent HD where 2L was removed.     In ED the patient is minimally responsive but is able to state her name and date of birth. No family is present at this time. Patient's BG is currently 1241, with an anion gap metabolic acidosis, K 6.3 with QRS widening and peaked T waves. ED managed with insulin gtt, fluid replacement, calcium gluconate, and potassium shift. Patient states that she her blood glucose usually runs in the 300-500s. History is limited 2/2 AMS. Per patient's daughter the patient has been increasingly confused since her discharge. She also states that the patient had dialysis Tuesday but it was cut short (3 from 4 hours) due to the weather. The patient's daughter also states that as of last night the patient has been having episodes of diarrhea. The patient's daughter states that she makes sure her mother is compliant with her medications and states that since her last discharge the patient has not been experiencing any episodes of vomiting, fever, complaints of chest pain or SOB.     Procedure Date: No procedures this admission    Hospital Course    Neuro   Metabolic encephalopathy     -DMI in DKA with AGMA  -infectious workup negative  -hyponatremia corrected to 143  -CT head  r/o edema  -likely due to acidosis and/or hyperglycemia (>1200)  -seems to have resolved this AM, children state she is back at baseline       Renal/   ESRD (end stage renal disease)     -currently gets dialysis T, Th, S  -patient had dialysis yesterday; remains fluid overloaded  -nephrology consulted for fluid, will follow patient HD needs       Hyperkalemia     -6.3 on admit with widening QRS   -currently getting albuterol, CaGluconate, shifted with insulin  -K was 5 this AM       Oncology   Anemia     -Hemogblobin was 6.8 this AM  -patient received 1U of pRBC  -will get repeat after blood administration       Endocrine   * DKA, type 1     -patient originally from [place], here for hur[place] evacuation.  had previous admission to [facility], discharged 8/28/17   -here now with BG of 1248, Anion Gap of 25  -ICU consulted for type 1 diabetic in DKA with AGMA  -differential diagnosis include: insulin noncompliance vs infection vs MI  -insulin non compliance: Patient is on insulin pump, she states that she has brought all of her insulin supplies with her.  Her A1c>10 on last assessment  -infection: PNA, Abdo infection  -MI: EKG changes included QRS duration has increased(in setting of known hyperkalemia),  troponins at pts baseline  -CXRay no acute infective process  -KUB showed no obstructive pattern  - Lipase pending  -Lactate elevated at 7.9 on admission, last lactate was 1.4  -Troponins pending  -Urine Culture, Blood Culture ngtd  -CT head as patient has AMS, no edema present, no acute changes noted  -patient received one dose of 4.5g of Zosyn stopped when no infective process was found  -successfully transitioned from insulin drip to sq insulin  -endocrinology consulted          Type 1 diabetes mellitus with complication     -current regimen is undetermined: patient initially states she  is on a pump but now states she only takes humolog 20u daily  -she has been doing well on 10U of long acting with 5U at meals  -will continue this regimen and change insulin dosing based on requirements.      Tasks:   -F/u Urine and Bcx NGTD  -F/u H/H as patient is being transfused 1 unit PRBCs today   -Monitor glucose and adjust accordingly following along with endo recs  -Monitor for any new acute changes to mental status, returned to baseline on assessment this AM    Contingency Plan Hyper/Hypoglycemia protocol, Endo following and patient is becoming better controlled should not be an issue    Estimated Discharge Date: Pending stabilization of patients glucose    Discharge Disposition: Home or Self Care  Discharge Planning per Endoctinology:  Patient to follow up with NP upon discharge, in DM/endocrinology clinic.  Will assess patient ability to restart pump at that time.  Patient to be discharged with MDI, regimen to be determined according to her inpatient requirements.    Mentored By: Maria Esther Joseph MD

## 2017-08-31 NOTE — PROGRESS NOTES
H&H 6.8/20. [facility] MD notified. No new orders at this time. Will continue to monitor.

## 2017-08-31 NOTE — ASSESSMENT & PLAN NOTE
-6.3 on admit with widening QRS   -currently getting albuterol, CaGluconate, shifted with insulin  -K was 5 this AM

## 2017-08-31 NOTE — PROGRESS NOTES
Arrived in room 3069 with equipment. Maintenance dialysis started via RIJ permcath. Tolerated well.

## 2017-08-31 NOTE — SUBJECTIVE & OBJECTIVE
"Interval HPI:   Overnight events:  Completed HD overnight, with net UF 1780mL. Complication of system clot with 20 minutes remaining.     Hg <6.  Will likely be transfused today.    No longer in DKA (normal gap, pH 7.43, CO2 26, glucose 106). Drip was discontinued before levemir administration.      Had an episode of hypoglycemia overnight (44) requiring intervention (D50 infusion).  Lactate normalized from 9.3 to 1.4.    Eating:   NPO  Nausea: No  Hypoglycemia and intervention: Yes  Fever: No  TPN and/or TF: No  Started diabetic diet this morning.    /61   Pulse 75   Temp 97.8 °F (36.6 °C) (Oral)   Resp 10   Ht 5' 6" (1.676 m)   Wt 80 kg (176 lb 5.9 oz)   LMP 02/01/2000 (Approximate)   SpO2 97%   BMI 28.47 kg/m²     Labs Reviewed and Include      Recent Labs  Lab 08/31/17  0624      CALCIUM 7.5*   ALBUMIN 2.0*   PROT 5.3*   *   K 5.0   CO2 26   CL 94*   BUN 57*   CREATININE 4.1*   ALKPHOS 167*   ALT 21   AST 37   BILITOT 0.1     Lab Results   Component Value Date    WBC 7.49 08/31/2017    HGB 6.8 (L) 08/31/2017    HCT 20.0 (L) 08/31/2017    MCV 80 (L) 08/31/2017     08/31/2017     No results for input(s): TSH, FREET4 in the last 168 hours.  Lab Results   Component Value Date    HGBA1C 10.7 (H) 08/26/2017       Nutritional status:   Body mass index is 28.47 kg/m².  Lab Results   Component Value Date    ALBUMIN 2.0 (L) 08/31/2017    ALBUMIN 2.1 (L) 08/31/2017    ALBUMIN 2.0 (L) 08/31/2017     No results found for: PREALBUMIN    Estimated Creatinine Clearance: 17.1 mL/min (based on Cr of 4.1).    Accu-Checks  Recent Labs      08/31/17   0108  08/31/17   0207  08/31/17   0221  08/31/17   0259  08/31/17   0326  08/31/17   0348  08/31/17   0421  08/31/17   0501  08/31/17   0609  08/31/17   0846   POCTGLUCOSE  201*  87  71  80  44*  115*  96  104  114*  115*       Current Medications and/or Treatments Impacting Glycemic Control  Immunotherapy:  Immunosuppressants     None        Steroids: "   Hormones     None        Pressors:    Autonomic Drugs     None        Hyperglycemia/Diabetes Medications: Antihyperglycemics     Start     Stop Route Frequency Ordered    08/31/17 1130  insulin aspart pen 5 Units      -- SubQ 3 times daily with meals 08/31/17 0816    08/31/17 0704  insulin detemir pen 10 Units      -- SubQ Daily 08/31/17 0704

## 2017-08-31 NOTE — ASSESSMENT & PLAN NOTE
DM1, poorly controlled, non compliant with insulin regimen given recent evacuation.  Likely etiology - insulin non compliance, given recent evacuation.  No POCT bHCG on file.     DKA resolved (normal gap, pH 7.43, CO2 26, glucose 106)    Patient should received MDI, and have endocrinology follow up prior to initiating insulin drip.

## 2017-08-31 NOTE — HOSPITAL COURSE
8/31/17-patient returned to [facility].  Her BG was 112 this AM.  She was successfully transitioned from insulin drip to 10U of long acting insulin.  She is getting 5U with meals.  Pt to be stepped down to hospital medicine and will need a diabetes education session.    09/01/2017 Patient mentions she has been blind for the last two days.  CT head reviewed from two days ago and per report no acute changes.  STAT CT head ordered as well as ophtho consult.  Hypertensive to SBP >200 overnight, received IV labetalol with better control.  STAT CT was cancelled after it was discovered her vision loss has actually been progressing since last admission.    9/2: Patient stepped down from ICU; BG has been labile on regimen provided by Endocrine. Endocrine recs a higher tolerance for permissive hyperglycemia; patient is snacking in between meals.

## 2017-08-31 NOTE — ASSESSMENT & PLAN NOTE
-Hemogblobin was 6.8 this AM  -patient received 1U of pRBC  -will get repeat after blood administration

## 2017-08-31 NOTE — ASSESSMENT & PLAN NOTE
-currently gets dialysis T, Th, S  -patient had dialysis yesterday; remains fluid overloaded  -nephrology consulted for fluid, will follow patient HD needs

## 2017-08-31 NOTE — PROGRESS NOTES
[facility]  Critical Care Medicine  Progress Note    Patient Name: Eufemia Haq  MRN: [id number]  Admission Date: 8/30/2017  Hospital Length of Stay: 1 days  Code Status: Full Code  Attending Provider: Maria Esther Joseph MD  Primary Care Provider: Alecia Delacruz MD   Principal Problem: DKA, type 1    Subjective:     HPI:  The patient is a 52 year old female with a history of DM1, seizure, and ESRD on dialysis, who presents with altered mental status and hyperglycemia. The patient was recently discharged from [facility] with an episode of hyperglycemia, in ED at that time her BG was in 700's and beta-hydroxybutyrate was within normal limits. The patient was treated at that time with insulin gtt and underwent HD where 2L was removed.    In ED the patient is minimally responsive but is able to state her name and date of birth. No family is present at this time. Patient's BG is currently 1241, with an anion gap metabolic acidosis, K 6.3 with QRS widening and peaked T waves. ED managed with insulin gtt, fluid replacement, calcium gluconate, and potassium shift. Patient states that she her blood glucose usually runs in the 300-500s. History is limited 2/2 AMS. Per patient's daughter the patient has been increasingly confused since her discharge. She also states that the patient had dialysis yesterday but it was cut short (3 from 4 hours) due to the weather. The patient's daughter also states that as of last night the patient has been having episodes of diarrhea. The patient's daughter states that she makes sure her mother is compliant with her medications and states that since her last discharge the patient has not been experiencing any episodes of vomiting, fever, complaints of chest pain or SOB.          Hospital/ICU Course:  8/31/17-patient returned to euglycmia.  Her BG was 112 this AM.  She was successfully transitioned from insulin drip to 10U of long acting insulin.  She is getting 5U with meals.  Pt to  be stepped down to hospital medicine and will need a diabetes education session.     Interval History/Significant Events: Patient was successfully transitioned from drip to SQ insulin.  She is euglycemic.  She has remained afebrile, with nml WBC.  Her infectious workup has been negative.  Her K has stabilized and she remains asymptomatic.     Review of Systems   Constitutional: Negative for chills, diaphoresis and fever.   HENT: Negative for sinus pressure, sneezing and sore throat.    Eyes: Negative for photophobia, redness and visual disturbance.   Respiratory: Negative for cough, choking, shortness of breath, wheezing and stridor.    Cardiovascular: Positive for leg swelling. Negative for chest pain and palpitations.   Gastrointestinal: Positive for abdominal pain and nausea. Negative for abdominal distention and diarrhea.   Endocrine: Negative for polydipsia, polyphagia and polyuria.   Genitourinary: Negative for dysuria, flank pain and frequency.   Musculoskeletal: Negative for back pain.   Skin: Negative for rash.   Allergic/Immunologic: Negative for food allergies and immunocompromised state.   Neurological: Negative for dizziness, facial asymmetry, weakness and headaches.   Hematological: Negative for adenopathy. Does not bruise/bleed easily.   Psychiatric/Behavioral: Negative for agitation and behavioral problems.     Objective:     Vital Signs (Most Recent):  Temp: 98.2 °F (36.8 °C) (08/31/17 1345)  Pulse: 75 (08/31/17 1345)  Resp: 17 (08/31/17 1345)  BP: (!) 111/56 (08/31/17 1515)  SpO2: 100 % (08/31/17 1345) Vital Signs (24h Range):  Temp:  [97.3 °F (36.3 °C)-98.5 °F (36.9 °C)] 98.2 °F (36.8 °C)  Pulse:  [70-89] 75  Resp:  [9-20] 17  SpO2:  [93 %-100 %] 100 %  BP: (102-165)/(53-72) 111/56   Weight: 80 kg (176 lb 5.9 oz)  Body mass index is 28.47 kg/m².      Intake/Output Summary (Last 24 hours) at 08/31/17 1527  Last data filed at 08/31/17 1400   Gross per 24 hour   Intake          1171.95 ml   Output              2790 ml   Net         -1618.05 ml       Physical Exam   Constitutional: She appears well-developed and well-nourished. No distress.   Lethargic, confused   HENT:   Head: Normocephalic and atraumatic.   Eyes: EOM are normal.   Neck: Normal range of motion. Neck supple.   Cardiovascular: Regular rhythm, normal heart sounds and intact distal pulses.    No murmur heard.  Tachycardic.  Pitting edema on BLE noted up past bilateral knees   Pulmonary/Chest: Effort normal and breath sounds normal. No respiratory distress. She has no wheezes. She has no rales.   Abdominal: Bowel sounds are normal. She exhibits distension. There is no tenderness. There is no rebound and no guarding.   Abdominal tense to palpation and mildly distended although not rigid   Musculoskeletal: She exhibits no tenderness.   Neurological:   Oriented to person, place (hospital), but not time, situation. Required repeated stimulation to answer questions.    Skin: Skin is warm and dry.   Bruising noted in left AC, vascular port in RUE with no noted tenderness or erythema   Psychiatric:   Confused, lethargic    Vitals reviewed.      Vents:     Lines/Drains/Airways     Central Venous Catheter Line                 Hemodialysis Catheter right subclavian 25468 days          Peripheral Intravenous Line                 External Jugular IV 08/30/17 1409 1 day         Peripheral IV - Single Lumen 08/31/17 1500 Left Upper Arm less than 1 day              Significant Labs:    CBC/Anemia Profile:    Recent Labs  Lab 08/30/17  1316 08/30/17  1420 08/31/17  0424   WBC 6.62  --  7.49   HGB 7.3*  --  6.8*   HCT 26.3* 25* 20.0*     --  231   MCV 96  --  80*   RDW 15.7*  --  14.8*        Chemistries:    Recent Labs  Lab 08/30/17  1316  08/31/17  0210 08/31/17  0424 08/31/17  0624   *  < > 134* 132* 131*   K 6.3*  < > 3.4* 4.3 5.0   CL 83*  < > 93* 93* 94*   CO2 8*  < > 28 24 26   BUN 78*  < > 55* 56* 57*   CREATININE 5.3*  < > 3.9* 4.1* 4.1*    CALCIUM 7.3*  < > 7.7* 7.6* 7.5*   ALBUMIN 2.2*  < > 2.0* 2.1* 2.0*   PROT 5.7*  < > 5.1* 5.5* 5.3*   BILITOT 0.2  < > 0.1 0.2 0.1   ALKPHOS 234*  < > 187* 184* 167*   ALT 32  < > 24 21 21   AST 63*  < > 30 38 37   MG 1.9  --   --  2.1  --    PHOS 6.4*  --  3.2 4.0  --    < > = values in this interval not displayed.    A1C: No results for input(s): HGBA1C in the last 48 hours.  BMP:   Recent Labs  Lab 08/31/17  0424 08/31/17  0624   GLU 89 106   * 131*   K 4.3 5.0   CL 93* 94*   CO2 24 26   BUN 56* 57*   CREATININE 4.1* 4.1*   CALCIUM 7.6* 7.5*   MG 2.1  --      CMP:   Recent Labs  Lab 08/31/17  0210 08/31/17  0424 08/31/17  0624   * 132* 131*   K 3.4* 4.3 5.0   CL 93* 93* 94*   CO2 28 24 26   GLU 77 89 106   BUN 55* 56* 57*   CREATININE 3.9* 4.1* 4.1*   CALCIUM 7.7* 7.6* 7.5*   PROT 5.1* 5.5* 5.3*   ALBUMIN 2.0* 2.1* 2.0*   BILITOT 0.1 0.2 0.1   ALKPHOS 187* 184* 167*   AST 30 38 37   ALT 24 21 21   ANIONGAP 13 15 11   EGFRNONAA 12.5* 11.8* 11.8*       Significant Imaging:  I have reviewed all pertinent imaging results/findings within the past 24 hours.    Assessment/Plan:     Neuro   Metabolic encephalopathy    -currently with type 1 diabetes in DKA with AGMA  -infectious workup negative  -hyponatremia corrected to 143  -CT head  r/o edema  -likely due to acidosis and/or hyperglycemia (>1200)  -seems to have resolved this AM, children state she is back at baseline        Renal/   ESRD (end stage renal disease)    -currently gets dialysis T, Th, S  -patient had dialysis yesterday; remains fluid overloaded  -nephrology consulted for fluid, will follow patient HD needs        Hyperkalemia    -6.3 on admit with widening QRS   -currently getting albuterol, CaGluconate, shifted with insulin  -K was 5 this AM        Oncology   Anemia    -Hemogblobin was 6.8 this AM  -patient received 1U of pRBC  -will get repeat after blood administration        Endocrine   * DKA, type 1    -patient originally from  [place], here for [place].  had previous admission to [facility], discharged 8/28/17   -here now with BG of 1248, Anion Gap of 25  -ICU consulted for type 1 diabetic in DKA with AGMA  -differential diagnosis include: insulin noncompliance vs infection vs MI  -insulin non compliance: Patient is on insulin pump, she states that she has brought all of her insulin supplies with her.  Her A1c>10 on last assessment  -infection: PNA, Abdo infection  -MI: EKG changes included QRS duration has increased(in setting of known hyperkalemia),  troponins at pts baseline  -CXRay no acute infective process  -KUB showed no obstructive pattern  - Lipase pending  -Lactate elevated at 7.9 on admission, last lactate was 1.4  -Troponins pending  -Urine Culture, Blood Culture ngtd  -CT head as patient has AMS, no edema present, no acute changes noted  -patient received one dose of 4.5g of Zosyn stopped when no infective process was found  -successfully transitioned from insulin drip to sq insulin  -endocrinology consulted          Type 1 diabetes mellitus with complication    -current regimen is undetermined: patient initially states she is on a pump but now states she only takes humolog 20u daily  -she has been doing well on 10U of long acting with 5U at meals  -will continue this regimen and change insulin dosing based on requirements.            Critical Care Daily Checklist:    A: Awake: RASS Goal/Actual Goal:    Actual: Paredes Agitation Sedation Scale (RASS): Drowsy   B: Spontaneous Breathing Trial Performed?     C: SAT & SBT Coordinated?  Not intubated                      D: Delirium: CAM-ICU Overall CAM-ICU: Positive   E: Early Mobility Performed? No   F: Feeding Goal:    Status:     Current Diet Order   Procedures    Diet Diabetic 1800 Calories      AS: Analgesia/Sedation    T: Thromboembolic Prophylaxis enoxaparin   H: HOB > 300 Yes   U: Stress Ulcer Prophylaxis (if needed)    G: Glucose Control Insulin long acting and  short acting   B: Bowel Function     I: Indwelling Catheter (Lines & Andrade) Necessity PIV, permacath   D: De-escalation of Antimicrobials/Pharmacotherapies D/c zosyn    Plan for the day/ETD Step down    Code Status:  Family/Goals of Care: Full Code         Critical secondary to Patient has a condition that poses threat to life and bodily function: DKA resolved      Critical care was time spent personally by me on the following activities: development of treatment plan with patient or surrogate and bedside caregivers, discussions with consultants, evaluation of patient's response to treatment, examination of patient, ordering and performing treatments and interventions, ordering and review of laboratory studies, ordering and review of radiographic studies, pulse oximetry, re-evaluation of patient's condition. This critical care time did not overlap with that of any other provider or involve time for any procedures.     Roma Prince MD  Critical Care Medicine  [facility]

## 2017-08-31 NOTE — ASSESSMENT & PLAN NOTE
Poorly controlled based on previous documentation.  A1C 10.7% 8/26/17  Has history of gastroparesis, nephropathy, retinopathy, and neuropathy.    Goal 140-150  Currently on levemir 10u daily and novolog 5u AC  Will monitor.  Patient is T1DM, and always requires basal insulin.  Otherwise, can send patient back into DKA.    Monitor glucose and adjust accordingly.    Patient to receive 1u pRBCs today, can affect future A1C values.    On ARB for HTN.  Would hold on statin as she has a documentation in a previous clinic note of worsened myalgias with statin therapy.     DISCHARGE PLANNING:  Patient to follow up with NP upon discharge, in DM/endocrinology clinic.  Will assess patient ability to restart pump at that time.  Patient to be discharged with MDI, regimen to be determined according to her inpatient requirements.

## 2017-08-31 NOTE — SUBJECTIVE & OBJECTIVE
Interval History/Significant Events: Patient was successfully transitioned from drip to SQ insulin.  She is euglycemic.  She has remained afebrile, with nml WBC.  Her infectious workup has been negative.  Her K has stabilized and she remains asymptomatic.     Review of Systems   Constitutional: Negative for chills, diaphoresis and fever.   HENT: Negative for sinus pressure, sneezing and sore throat.    Eyes: Negative for photophobia, redness and visual disturbance.   Respiratory: Negative for cough, choking, shortness of breath, wheezing and stridor.    Cardiovascular: Positive for leg swelling. Negative for chest pain and palpitations.   Gastrointestinal: Positive for abdominal pain and nausea. Negative for abdominal distention and diarrhea.   Endocrine: Negative for polydipsia, polyphagia and polyuria.   Genitourinary: Negative for dysuria, flank pain and frequency.   Musculoskeletal: Negative for back pain.   Skin: Negative for rash.   Allergic/Immunologic: Negative for food allergies and immunocompromised state.   Neurological: Negative for dizziness, facial asymmetry, weakness and headaches.   Hematological: Negative for adenopathy. Does not bruise/bleed easily.   Psychiatric/Behavioral: Negative for agitation and behavioral problems.     Objective:     Vital Signs (Most Recent):  Temp: 98.2 °F (36.8 °C) (08/31/17 1345)  Pulse: 75 (08/31/17 1345)  Resp: 17 (08/31/17 1345)  BP: (!) 111/56 (08/31/17 1515)  SpO2: 100 % (08/31/17 1345) Vital Signs (24h Range):  Temp:  [97.3 °F (36.3 °C)-98.5 °F (36.9 °C)] 98.2 °F (36.8 °C)  Pulse:  [70-89] 75  Resp:  [9-20] 17  SpO2:  [93 %-100 %] 100 %  BP: (102-165)/(53-72) 111/56   Weight: 80 kg (176 lb 5.9 oz)  Body mass index is 28.47 kg/m².      Intake/Output Summary (Last 24 hours) at 08/31/17 1527  Last data filed at 08/31/17 1400   Gross per 24 hour   Intake          1171.95 ml   Output             2790 ml   Net         -1618.05 ml       Physical Exam   Constitutional: She  appears well-developed and well-nourished. No distress.   Lethargic, confused   HENT:   Head: Normocephalic and atraumatic.   Eyes: EOM are normal.   Neck: Normal range of motion. Neck supple.   Cardiovascular: Regular rhythm, normal heart sounds and intact distal pulses.    No murmur heard.  Tachycardic.  Pitting edema on BLE noted up past bilateral knees   Pulmonary/Chest: Effort normal and breath sounds normal. No respiratory distress. She has no wheezes. She has no rales.   Abdominal: Bowel sounds are normal. She exhibits distension. There is no tenderness. There is no rebound and no guarding.   Abdominal tense to palpation and mildly distended although not rigid   Musculoskeletal: She exhibits no tenderness.   Neurological:   Oriented to person, place (hospital), but not time, situation. Required repeated stimulation to answer questions.    Skin: Skin is warm and dry.   Bruising noted in left AC, vascular port in RUE with no noted tenderness or erythema   Psychiatric:   Confused, lethargic    Vitals reviewed.      Vents:     Lines/Drains/Airways     Central Venous Catheter Line                 Hemodialysis Catheter right subclavian [id number] days          Peripheral Intravenous Line                 External Jugular IV 08/30/17 1409 1 day         Peripheral IV - Single Lumen 08/31/17 1500 Left Upper Arm less than 1 day              Significant Labs:    CBC/Anemia Profile:    Recent Labs  Lab 08/30/17  1316 08/30/17  1420 08/31/17  0424   WBC 6.62  --  7.49   HGB 7.3*  --  6.8*   HCT 26.3* 25* 20.0*     --  231   MCV 96  --  80*   RDW 15.7*  --  14.8*        Chemistries:    Recent Labs  Lab 08/30/17  1316  08/31/17  0210 08/31/17  0424 08/31/17  0624   *  < > 134* 132* 131*   K 6.3*  < > 3.4* 4.3 5.0   CL 83*  < > 93* 93* 94*   CO2 8*  < > 28 24 26   BUN 78*  < > 55* 56* 57*   CREATININE 5.3*  < > 3.9* 4.1* 4.1*   CALCIUM 7.3*  < > 7.7* 7.6* 7.5*   ALBUMIN 2.2*  < > 2.0* 2.1* 2.0*   PROT 5.7*  < > 5.1*  5.5* 5.3*   BILITOT 0.2  < > 0.1 0.2 0.1   ALKPHOS 234*  < > 187* 184* 167*   ALT 32  < > 24 21 21   AST 63*  < > 30 38 37   MG 1.9  --   --  2.1  --    PHOS 6.4*  --  3.2 4.0  --    < > = values in this interval not displayed.    A1C: No results for input(s): HGBA1C in the last 48 hours.  BMP:   Recent Labs  Lab 08/31/17  0424 08/31/17 0624   GLU 89 106   * 131*   K 4.3 5.0   CL 93* 94*   CO2 24 26   BUN 56* 57*   CREATININE 4.1* 4.1*   CALCIUM 7.6* 7.5*   MG 2.1  --      CMP:   Recent Labs  Lab 08/31/17  0210 08/31/17 0424 08/31/17 0624   * 132* 131*   K 3.4* 4.3 5.0   CL 93* 93* 94*   CO2 28 24 26   GLU 77 89 106   BUN 55* 56* 57*   CREATININE 3.9* 4.1* 4.1*   CALCIUM 7.7* 7.6* 7.5*   PROT 5.1* 5.5* 5.3*   ALBUMIN 2.0* 2.1* 2.0*   BILITOT 0.1 0.2 0.1   ALKPHOS 187* 184* 167*   AST 30 38 37   ALT 24 21 21   ANIONGAP 13 15 11   EGFRNONAA 12.5* 11.8* 11.8*       Significant Imaging:  I have reviewed all pertinent imaging results/findings within the past 24 hours.

## 2017-08-31 NOTE — H&P
See Consult Note dated 08/30/17 for full H&P    Roma Prince MD  Internal Medicine, PGY2  Pager [phone]

## 2017-08-31 NOTE — PLAN OF CARE
Alecia Delacruz MD  [address] / [place] [address]    [facility] - [place], [place] - [address]  [address]  [place] [address]  Phone: [phone] Fax: [phone]    [facility] - [place], [place] - [address]  [address]  [facility] [address]  Phone: [phone] Fax: [phone]    [facility] - [place], [place] - [address] [facility]  [address]  [facility] [address]  Phone: [phone] Fax: [phone]    [facility] - [place], [place] - [address] [facility] [address]  [address]  [facility] [address]  Phone: [phone] Fax: [phone]    Payor: [employer] [facility] MCARE / Plan: [employer] MEDICARE ADVANTAGE / Product Type: Medicare Advantage /     No future appointments.    Extended Emergency Contact Information  Primary Emergency Contact: Kirk Haq  Address: [address] [place] [address] [id number] [place] [address]  Home Phone: [phone]  Mobile Phone: [phone]  Relation: Daughter     08/31/17 1041   Discharge Assessment   Assessment Type Discharge Planning Assessment   Confirmed/corrected address and phone number on facesheet? Yes   Assessment information obtained from? Medical Record;Caregiver  (dtr)   Expected Length of Stay (days) 4   Communicated expected length of stay with patient/caregiver no   Prior to hospitilization cognitive status: Alert/Oriented   Prior to hospitalization functional status: Assistive Equipment;Needs Assistance   Current cognitive status: Not Oriented to Place;Not Oriented to Time   Current Functional Status: Assistive Equipment;Needs Assistance   Facility Arrived From: home   Lives With child(chas), adult   Able to Return to Prior Arrangements unable to determine at this time (comments)   Is patient able to care for self after discharge? No    Who are your caregiver(s) and their phone number(s)? Kirk Haq (Daughter) [phone]      Patient's perception of discharge disposition other (comments)  (MENG)   Readmission Within The Last 30 Days previous discharge plan unsuccessful   Patient currently being followed by outpatient case management? No   Patient currently receives any other outside agency services? No   Equipment Currently Used at Home glucometer   Do you have any problems affording any of your prescribed medications? No   Is the patient taking medications as prescribed? yes   Does the patient have transportation home? Yes   Transportation Available family or friend will provide;car   Dialysis Name and Scheduled days [facility]   Does the patient receive services at the Coumadin Clinic? No   Discharge Plan A Skilled Nursing Facility   Discharge Plan B Home Health;Home with family   Patient/Family In Agreement With Plan yes   Readmission Questionnaire   At the time of your discharge, did someone talk to you about what your health problems were? Yes   At the time of discharge, did someone talk to you about what to watch out for regarding worsening of your health problem? Yes   At the time of discharge, did someone talk to you about what to do if you experienced worsening of your health problem? Yes   At the time of discharge, did someone talk to you about which medication to take when you left the hospital and which ones to stop taking? Yes   At the time of discharge, did someone talk to you about when and where to follow up with a doctor after you left the hospital? Yes   How often do you need to have someone help you when you read instructions, pamphlets, or other written material from your doctor or pharmacy? Sometimes   Do you have problems taking your medications as prescribed? No   Do you have any problems affording any of  your prescribed medications? No   Do you have problems obtaining/receiving your medications? No   Does the  patient have transportation to healthcare appointments? Yes   Living Arrangements house   Does the patient have family/friends to help with healtcare needs after discharge? yes   Does your caregiver provide all the help you need? Yes   Aletha Almeida RN, BSN  Case Management  [facility]  Ext. [phone]

## 2017-08-31 NOTE — ASSESSMENT & PLAN NOTE
Nephrology following, provided HD overnight.  Tunneled IJ cath clean without s/sx of infection.
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
## 2017-08-31 NOTE — SUBJECTIVE & OBJECTIVE
Interval History:   Received HD last night for ~3 hours; UF 1.7L. Doing well this morning. Off insulin. Eating breakfast. Upset because she can't see. Tells me [place]. Feels better today; no SOB. Family upset that no one is doing anything about patient's vision loss; upon further questioning, discovered this is a chronic issue and has already been fully evaluated by a physician in [place] who told her a part of her brain was injured and causing vision changes.   Family also upset about the amount of edema the patient has accumulated.     Review of patient's allergies indicates:   Allergen Reactions    Ciprofloxacin (bulk) Itching    Latex Itching and Other (See Comments)     Very low Oxygen    Vancomycin Shortness Of Breath and Itching    Neurontin [gabapentin] Other (See Comments)     Seizure like activity    Niacin Itching and Other (See Comments)     Burning      Bactrim [sulfamethoxazole-trimethoprim] Rash     Current Facility-Administered Medications   Medication Frequency    0.9%  NaCl infusion (for blood administration) Q24H PRN    0.9%  NaCl infusion PRN    dextrose 50% injection 12.5 g PRN    dextrose 50% injection 25 g PRN    enoxaparin injection 30 mg Daily    glucagon (human recombinant) injection 1 mg PRN    glucose chewable tablet 16 g PRN    glucose chewable tablet 24 g PRN    heparin (porcine) injection 2,000 Units PRN    insulin aspart pen 0-5 Units QID (AC + HS) PRN    insulin aspart pen 5 Units TIDWM    insulin detemir pen 10 Units Daily    losartan-hydrochlorothiazide 50-12.5 mg per tablet 1 tablet Daily    metoprolol tartrate (LOPRESSOR) tablet 50 mg BID    sodium chloride 0.9% flush 3 mL Q8H       Objective:     Vital Signs (Most Recent):  Temp: 98.5 °F (36.9 °C) (08/31/17 1100)  Pulse: 82 (08/31/17 1200)  Resp: 14 (08/31/17 1200)  BP: 129/61 (08/31/17 1200)  SpO2: 99 % (08/31/17 1200)  O2 Device (Oxygen Therapy): room air (08/31/17 1200) Vital  Signs (24h Range):  Temp:  [97.3 °F (36.3 °C)-98.5 °F (36.9 °C)] 98.5 °F (36.9 °C)  Pulse:  [68-89] 82  Resp:  [9-20] 14  SpO2:  [93 %-100 %] 99 %  BP: (102-191)/(53-72) 129/61     Weight: 80 kg (176 lb 5.9 oz) (08/31/17 0800)  Body mass index is 28.47 kg/m².  Body surface area is 1.93 meters squared.    I/O last 3 completed shifts:  In: 812 [I.V.:312; Other:500]  Out: 2280 [Other:2280]    Physical Exam   Constitutional: She appears well-developed and well-nourished. No distress.   HENT:   Head: Normocephalic and atraumatic.   Cardiovascular: Normal rate and regular rhythm.    Pulmonary/Chest: Effort normal and breath sounds normal.   Abdominal: Soft. She exhibits no distension.   Musculoskeletal: She exhibits edema. She exhibits no deformity.   Skin: Skin is warm and dry.       Significant Labs:  CBC:   Recent Labs  Lab 08/31/17  0424   WBC 7.49   RBC 2.51*   HGB 6.8*   HCT 20.0*      MCV 80*   MCH 27.1   MCHC 34.0     CMP:   Recent Labs  Lab 08/31/17  0624      CALCIUM 7.5*   ALBUMIN 2.0*   PROT 5.3*   *   K 5.0   CO2 26   CL 94*   BUN 57*   CREATININE 4.1*   ALKPHOS 167*   ALT 21   AST 37   BILITOT 0.1       Recent Labs  Lab 08/30/17  1534   COLORU Yellow   SPECGRAV 1.015   PHUR 5.0   PROTEINUA 2+*   BACTERIA Rare   NITRITE Negative   LEUKOCYTESUR Negative   UROBILINOGEN Negative   HYALINECASTS 0     All labs within the past 24 hours have been reviewed.     Significant Imaging:  Labs: Reviewed
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
## 2017-08-31 NOTE — PROGRESS NOTES
2hr 40mins dialysis treatment completed with net UF of 1780ml. System clotted with 20mins remaining on machine. Able to return blood. CVC flushed with saline. Both lumen filled with heparin and secured with cap. Patient lethargic during treatment. No acute events. Vital signs stable. Report given to primary RN.

## 2017-08-31 NOTE — PROGRESS NOTES
3 hour bedside HD completed. 3.5 L fluid removed. 1000:1 heparin instilled in each port of Right sub clav tunneled CVC as stated on cath. Patient tolerated tx well.

## 2017-08-31 NOTE — ASSESSMENT & PLAN NOTE
-current regimen is undetermined: patient initially states she is on a pump but now states she only takes humolog 20u daily  -she has been doing well on 10U of long acting with 5U at meals  -will continue this regimen and change insulin dosing based on requirements.

## 2017-08-31 NOTE — ASSESSMENT & PLAN NOTE
- receives iHD TTS via RIJ TDC at [facility] in [place], [place]. Temporary unit is [facility]. Treatment duration 4 hours; EDW 65kg. Minimal residual renal function.  - performed iHD last night to help correct electrolyte abnormalities related to DKA  - DKA now resolved  - family upset about worsening peripheral edema  - will perform HD again today. UF goal 3-4L as tolerated. This will also help patient to get back on her home dialysis schedule  - next HD planned for Saturday if patient remains inpatient

## 2017-08-31 NOTE — ASSESSMENT & PLAN NOTE
-currently with type 1 diabetes in DKA with AGMA  -infectious workup negative  -hyponatremia corrected to 143  -CT head  r/o edema  -likely due to acidosis and/or hyperglycemia (>1200)  -seems to have resolved this AM, children state she is back at baseline

## 2017-09-01 LAB
ALBUMIN SERPL BCP-MCNC: 2.1 G/DL
ALBUMIN SERPL BCP-MCNC: 2.2 G/DL
ALP SERPL-CCNC: 170 U/L
ALP SERPL-CCNC: 177 U/L
ALT SERPL W/O P-5'-P-CCNC: 18 U/L
ALT SERPL W/O P-5'-P-CCNC: 19 U/L
ANION GAP SERPL CALC-SCNC: 10 MMOL/L
ANION GAP SERPL CALC-SCNC: 9 MMOL/L
AST SERPL-CCNC: 19 U/L
AST SERPL-CCNC: 22 U/L
BASOPHILS # BLD AUTO: 0.01 K/UL
BASOPHILS NFR BLD: 0.2 %
BILIRUB SERPL-MCNC: 0.2 MG/DL
BILIRUB SERPL-MCNC: 0.2 MG/DL
BUN SERPL-MCNC: 40 MG/DL
BUN SERPL-MCNC: 45 MG/DL
CALCIUM SERPL-MCNC: 7.8 MG/DL
CALCIUM SERPL-MCNC: 8.2 MG/DL
CHLORIDE SERPL-SCNC: 97 MMOL/L
CHLORIDE SERPL-SCNC: 99 MMOL/L
CO2 SERPL-SCNC: 27 MMOL/L
CO2 SERPL-SCNC: 30 MMOL/L
CREAT SERPL-MCNC: 3.6 MG/DL
CREAT SERPL-MCNC: 4.2 MG/DL
DIFFERENTIAL METHOD: ABNORMAL
EOSINOPHIL # BLD AUTO: 0.1 K/UL
EOSINOPHIL NFR BLD: 2 %
ERYTHROCYTE [DISTWIDTH] IN BLOOD BY AUTOMATED COUNT: 15.2 %
EST. GFR  (AFRICAN AMERICAN): 13.2 ML/MIN/1.73 M^2
EST. GFR  (AFRICAN AMERICAN): 15.9 ML/MIN/1.73 M^2
EST. GFR  (NON AFRICAN AMERICAN): 11.5 ML/MIN/1.73 M^2
EST. GFR  (NON AFRICAN AMERICAN): 13.8 ML/MIN/1.73 M^2
GLUCOSE SERPL-MCNC: 257 MG/DL
GLUCOSE SERPL-MCNC: 434 MG/DL
HCT VFR BLD AUTO: 23.1 %
HGB BLD-MCNC: 7.4 G/DL
LYMPHOCYTES # BLD AUTO: 1.3 K/UL
LYMPHOCYTES NFR BLD: 21.9 %
MAGNESIUM SERPL-MCNC: 1.7 MG/DL
MCH RBC QN AUTO: 26.4 PG
MCHC RBC AUTO-ENTMCNC: 32 G/DL
MCV RBC AUTO: 83 FL
MONOCYTES # BLD AUTO: 1 K/UL
MONOCYTES NFR BLD: 16 %
NEUTROPHILS # BLD AUTO: 3.6 K/UL
NEUTROPHILS NFR BLD: 59.2 %
PHOSPHATE SERPL-MCNC: 3.6 MG/DL
PLATELET # BLD AUTO: 166 K/UL
PMV BLD AUTO: 10 FL
POCT GLUCOSE: 229 MG/DL (ref 70–110)
POCT GLUCOSE: 234 MG/DL (ref 70–110)
POCT GLUCOSE: 238 MG/DL (ref 70–110)
POCT GLUCOSE: 394 MG/DL (ref 70–110)
POCT GLUCOSE: 400 MG/DL (ref 70–110)
POCT GLUCOSE: 436 MG/DL (ref 70–110)
POCT GLUCOSE: >500 MG/DL (ref 70–110)
POTASSIUM SERPL-SCNC: 4.4 MMOL/L
POTASSIUM SERPL-SCNC: 4.8 MMOL/L
PROT SERPL-MCNC: 5.4 G/DL
PROT SERPL-MCNC: 5.8 G/DL
RBC # BLD AUTO: 2.8 M/UL
SODIUM SERPL-SCNC: 134 MMOL/L
SODIUM SERPL-SCNC: 138 MMOL/L
WBC # BLD AUTO: 6.11 K/UL

## 2017-09-01 PROCEDURE — 85025 COMPLETE CBC W/AUTO DIFF WBC: CPT

## 2017-09-01 PROCEDURE — 99232 SBSQ HOSP IP/OBS MODERATE 35: CPT | Mod: GC,,, | Performed by: INTERNAL MEDICINE

## 2017-09-01 PROCEDURE — 99233 SBSQ HOSP IP/OBS HIGH 50: CPT | Mod: GC,,, | Performed by: INTERNAL MEDICINE

## 2017-09-01 PROCEDURE — 63600175 PHARM REV CODE 636 W HCPCS: Performed by: INTERNAL MEDICINE

## 2017-09-01 PROCEDURE — 94761 N-INVAS EAR/PLS OXIMETRY MLT: CPT

## 2017-09-01 PROCEDURE — 36415 COLL VENOUS BLD VENIPUNCTURE: CPT

## 2017-09-01 PROCEDURE — 63600175 PHARM REV CODE 636 W HCPCS: Performed by: STUDENT IN AN ORGANIZED HEALTH CARE EDUCATION/TRAINING PROGRAM

## 2017-09-01 PROCEDURE — 84100 ASSAY OF PHOSPHORUS: CPT

## 2017-09-01 PROCEDURE — 80053 COMPREHEN METABOLIC PANEL: CPT

## 2017-09-01 PROCEDURE — 99233 SBSQ HOSP IP/OBS HIGH 50: CPT | Mod: ,,, | Performed by: INTERNAL MEDICINE

## 2017-09-01 PROCEDURE — 83735 ASSAY OF MAGNESIUM: CPT

## 2017-09-01 PROCEDURE — A4216 STERILE WATER/SALINE, 10 ML: HCPCS | Performed by: STUDENT IN AN ORGANIZED HEALTH CARE EDUCATION/TRAINING PROGRAM

## 2017-09-01 PROCEDURE — 20600001 HC STEP DOWN PRIVATE ROOM

## 2017-09-01 PROCEDURE — 25000003 PHARM REV CODE 250: Performed by: STUDENT IN AN ORGANIZED HEALTH CARE EDUCATION/TRAINING PROGRAM

## 2017-09-01 RX ORDER — NIFEDIPINE 60 MG/1
60 TABLET, EXTENDED RELEASE ORAL DAILY
Status: DISCONTINUED | OUTPATIENT
Start: 2017-09-01 | End: 2017-09-03 | Stop reason: HOSPADM

## 2017-09-01 RX ORDER — ONDANSETRON 2 MG/ML
4 INJECTION INTRAMUSCULAR; INTRAVENOUS EVERY 6 HOURS PRN
Status: DISCONTINUED | OUTPATIENT
Start: 2017-09-01 | End: 2017-09-03 | Stop reason: HOSPADM

## 2017-09-01 RX ORDER — SODIUM CHLORIDE 9 MG/ML
INJECTION, SOLUTION INTRAVENOUS
Status: DISCONTINUED | OUTPATIENT
Start: 2017-09-02 | End: 2017-09-03 | Stop reason: HOSPADM

## 2017-09-01 RX ORDER — LOSARTAN POTASSIUM AND HYDROCHLOROTHIAZIDE 12.5; 5 MG/1; MG/1
1 TABLET ORAL DAILY
Status: DISCONTINUED | OUTPATIENT
Start: 2017-09-01 | End: 2017-09-03 | Stop reason: HOSPADM

## 2017-09-01 RX ORDER — HEPARIN SODIUM 5000 [USP'U]/ML
5000 INJECTION, SOLUTION INTRAVENOUS; SUBCUTANEOUS EVERY 8 HOURS
Status: DISCONTINUED | OUTPATIENT
Start: 2017-09-01 | End: 2017-09-03 | Stop reason: HOSPADM

## 2017-09-01 RX ORDER — ZOLPIDEM TARTRATE 5 MG/1
5 TABLET ORAL ONCE
Status: COMPLETED | OUTPATIENT
Start: 2017-09-01 | End: 2017-09-01

## 2017-09-01 RX ORDER — LABETALOL HYDROCHLORIDE 5 MG/ML
10 INJECTION, SOLUTION INTRAVENOUS ONCE
Status: COMPLETED | OUTPATIENT
Start: 2017-09-01 | End: 2017-09-01

## 2017-09-01 RX ADMIN — INSULIN ASPART 5 UNITS: 100 INJECTION, SOLUTION INTRAVENOUS; SUBCUTANEOUS at 09:09

## 2017-09-01 RX ADMIN — Medication 3 ML: at 07:09

## 2017-09-01 RX ADMIN — PANTOPRAZOLE SODIUM 40 MG: 40 TABLET, DELAYED RELEASE ORAL at 12:09

## 2017-09-01 RX ADMIN — METOPROLOL TARTRATE 50 MG: 50 TABLET, FILM COATED ORAL at 09:09

## 2017-09-01 RX ADMIN — INSULIN ASPART 3 UNITS: 100 INJECTION, SOLUTION INTRAVENOUS; SUBCUTANEOUS at 11:09

## 2017-09-01 RX ADMIN — INSULIN ASPART 3 UNITS: 100 INJECTION, SOLUTION INTRAVENOUS; SUBCUTANEOUS at 09:09

## 2017-09-01 RX ADMIN — LABETALOL HYDROCHLORIDE 10 MG: 5 INJECTION, SOLUTION INTRAVENOUS at 06:09

## 2017-09-01 RX ADMIN — HEPARIN SODIUM 5000 UNITS: 5000 INJECTION, SOLUTION INTRAVENOUS; SUBCUTANEOUS at 09:09

## 2017-09-01 RX ADMIN — LOSARTAN POTASSIUM AND HYDROCHLOROTHIAZIDE 1 TABLET: 12.5; 5 TABLET ORAL at 06:09

## 2017-09-01 RX ADMIN — INSULIN DETEMIR 10 UNITS: 100 INJECTION, SOLUTION SUBCUTANEOUS at 09:09

## 2017-09-01 RX ADMIN — METOPROLOL TARTRATE 50 MG: 50 TABLET, FILM COATED ORAL at 08:09

## 2017-09-01 RX ADMIN — HEPARIN SODIUM 5000 UNITS: 5000 INJECTION, SOLUTION INTRAVENOUS; SUBCUTANEOUS at 01:09

## 2017-09-01 RX ADMIN — ZOLPIDEM TARTRATE 5 MG: 5 TABLET, FILM COATED ORAL at 12:09

## 2017-09-01 RX ADMIN — Medication 3 ML: at 10:09

## 2017-09-01 RX ADMIN — NIFEDIPINE 60 MG: 30 TABLET, FILM COATED, EXTENDED RELEASE ORAL at 11:09

## 2017-09-01 RX ADMIN — ONDANSETRON 4 MG: 2 INJECTION INTRAMUSCULAR; INTRAVENOUS at 06:09

## 2017-09-01 RX ADMIN — ZOLPIDEM TARTRATE 5 MG: 5 TABLET, FILM COATED ORAL at 10:09
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
## 2017-09-01 NOTE — SUBJECTIVE & OBJECTIVE
Interval History:   Received HD yesterday afternoon. UF 3.5L. No events overnight. Being stepped down to floor today. Reports to be feeling much better today. No SOB. Undergoing ophtho eval.     Review of patient's allergies indicates:   Allergen Reactions    Ciprofloxacin (bulk) Itching    Latex Itching and Other (See Comments)     Very low Oxygen    Vancomycin Shortness Of Breath and Itching    Neurontin [gabapentin] Other (See Comments)     Seizure like activity    Niacin Itching and Other (See Comments)     Burning      Bactrim [sulfamethoxazole-trimethoprim] Rash     Current Facility-Administered Medications   Medication Frequency    0.9%  NaCl infusion PRN    dextrose 50% injection 12.5 g PRN    dextrose 50% injection 25 g PRN    epoetin jacques injection 4,000 Units Every Tues, Thurs, Sat    glucagon (human recombinant) injection 1 mg PRN    glucose chewable tablet 16 g PRN    glucose chewable tablet 24 g PRN    heparin (porcine) injection 2,000 Units PRN    heparin (porcine) injection 5,000 Units Q8H    insulin aspart pen 0-5 Units QID (AC + HS) PRN    insulin aspart pen 5 Units TIDWM    insulin detemir pen 10 Units BID    losartan-hydrochlorothiazide 50-12.5 mg per tablet 1 tablet Daily    metoprolol tartrate (LOPRESSOR) tablet 50 mg BID    nifedipine 24 hr tablet 60 mg Daily    ondansetron injection 4 mg Q6H PRN    sodium chloride 0.9% flush 3 mL Q8H       Objective:     Vital Signs (Most Recent):  Temp: 99.4 °F (37.4 °C) (09/01/17 1302)  Pulse: 73 (09/01/17 1302)  Resp: 20 (09/01/17 1302)  BP: (!) 172/66 (09/01/17 1302)  SpO2: 98 % (09/01/17 1302)  O2 Device (Oxygen Therapy): room air (09/01/17 0812) Vital Signs (24h Range):  Temp:  [97.9 °F (36.6 °C)-99.4 °F (37.4 °C)] 99.4 °F (37.4 °C)  Pulse:  [68-84] 73  Resp:  [11-20] 20  SpO2:  [87 %-99 %] 98 %  BP: (111-223)/(56-98) 172/66     Weight: 80 kg (176 lb 5.9 oz) (08/31/17 0800)  Body mass index is 28.47 kg/m².  Body surface area is  1.93 meters squared.    I/O last 3 completed shifts:  In: 2347 [P.O.:735; I.V.:312; Blood:300; Other:1000]  Out: 7090 [Urine:810; Other:6280]    Physical Exam   Constitutional: She appears well-developed and well-nourished. No distress.   HENT:   Head: Normocephalic and atraumatic.   Eyes: Conjunctivae and EOM are normal.   Cardiovascular: Normal rate and regular rhythm.    Pulmonary/Chest: Effort normal and breath sounds normal.   Abdominal: Soft. She exhibits no distension.   Skin: Skin is warm and dry.       Significant Labs:  CBC:   Recent Labs  Lab 09/01/17  0345   WBC 6.11   RBC 2.80*   HGB 7.4*   HCT 23.1*      MCV 83   MCH 26.4*   MCHC 32.0     CMP:   Recent Labs  Lab 09/01/17  0822   *   CALCIUM 7.8*   ALBUMIN 2.1*   PROT 5.4*   *   K 4.4   CO2 27   CL 97   BUN 40*   CREATININE 3.6*   ALKPHOS 170*   ALT 19   AST 22   BILITOT 0.2     All labs within the past 24 hours have been reviewed.     Significant Imaging:  Labs: Reviewed
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
## 2017-09-01 NOTE — ASSESSMENT & PLAN NOTE
Poorly controlled based on previous documentation.  Non compliant with meals and insulin regimen.  A1C 10.7% 8/26/17  Has history of gastroparesis, nephropathy, retinopathy, and neuropathy.    Goal 140-150  Not at goal as patient is eating outside food in addition to her hospital food trays.  Currently on levemir 10u daily and novolog 5u AC; low dose correction  Will monitor.  Patient is T1DM, and always requires basal insulin.  Otherwise, can send patient back into DKA.    Monitor glucose and adjust accordingly.    Patient to receive 1u pRBCs 8/31/17, can affect future A1C values.    On ARB for HTN.  Would hold on statin as she has a documentation in a previous clinic note of worsened myalgias with statin therapy.     DISCHARGE PLANNING:  Patient to follow up with NP upon discharge, in DM/endocrinology clinic.  Will assess patient ability to restart pump at that time.  Patient to be discharged with MDI, regimen to be determined according to her inpatient requirements.

## 2017-09-01 NOTE — ASSESSMENT & PLAN NOTE
- receives iHD TTS via [facility] at [facility] in [place], [place]. Temporary unit is [facility]. Treatment duration 4 hours; EDW 65kg. Minimal residual renal function.  - iHD yesterday with 3.5L removed  - no need for RRT today  - will plan for HD x 4 hours tomorrow; UF goal 3-4L
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
## 2017-09-01 NOTE — PLAN OF CARE
Problem: Patient Care Overview  Goal: Plan of Care Review  Patient transferred to 949 via bed,family at bedside resettled without difficulty,
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
## 2017-09-01 NOTE — SUBJECTIVE & OBJECTIVE
Interval History/Significant Events: Blood glucoses higher because endocrine wanted to hold insulin until she ate part of her breakfast.  Improved after basal and mealtime insulin.  Mentioned blindness x 2 days  , stat CT head ordered.    Review of Systems   Constitutional: Negative for chills, diaphoresis and fever.   HENT: Negative for sinus pressure, sneezing and sore throat.    Eyes: Positive for visual disturbance. Negative for photophobia and redness.   Respiratory: Negative for cough, choking, shortness of breath, wheezing and stridor.    Cardiovascular: Positive for leg swelling. Negative for chest pain and palpitations.   Gastrointestinal: Positive for nausea. Negative for abdominal distention, abdominal pain and diarrhea.   Endocrine: Negative for polydipsia, polyphagia and polyuria.   Genitourinary: Negative for dysuria, flank pain and frequency.   Musculoskeletal: Negative for back pain.   Skin: Negative for rash.   Allergic/Immunologic: Negative for food allergies and immunocompromised state.   Neurological: Negative for dizziness, facial asymmetry, weakness and headaches.   Hematological: Negative for adenopathy. Does not bruise/bleed easily.   Psychiatric/Behavioral: Negative for agitation and behavioral problems.     Objective:     Vital Signs (Most Recent):  Temp: 97.9 °F (36.6 °C) (09/01/17 0800)  Pulse: 72 (09/01/17 1000)  Resp: 17 (09/01/17 1000)  BP: (!) 179/72 (09/01/17 1000)  SpO2: 98 % (09/01/17 0812) Vital Signs (24h Range):  Temp:  [97.9 °F (36.6 °C)-98.6 °F (37 °C)] 97.9 °F (36.6 °C)  Pulse:  [68-84] 72  Resp:  [11-20] 17  SpO2:  [87 %-100 %] 98 %  BP: (111-223)/(56-98) 179/72   Weight: 80 kg (176 lb 5.9 oz)  Body mass index is 28.47 kg/m².      Intake/Output Summary (Last 24 hours) at 09/01/17 1141  Last data filed at 09/01/17 0900   Gross per 24 hour   Intake             1495 ml   Output             4960 ml   Net            -3465 ml       Physical Exam   Constitutional: She appears  well-developed and well-nourished. No distress.   Lethargic, alert   HENT:   Head: Normocephalic and atraumatic.   Eyes: EOM are normal.   Pupils non reactive.  Unable to detect fingers in her peripheral vision.  Does not blink to threat.   Neck: Normal range of motion. Neck supple.   Cardiovascular: Regular rhythm, normal heart sounds and intact distal pulses.    No murmur heard.  Tachycardic.  Pitting edema on BLE noted up past bilateral knees   Pulmonary/Chest: Effort normal and breath sounds normal. No respiratory distress. She has no wheezes. She has no rales.   Abdominal: Bowel sounds are normal. She exhibits distension. There is no tenderness. There is no rebound and no guarding.   Musculoskeletal: She exhibits no tenderness.   Skin: Skin is warm and dry.   Bruising noted in left AC, vascular port in RUE with no noted tenderness or erythema   Psychiatric:   lethargic    Vitals reviewed.      Vents:     Lines/Drains/Airways     Central Venous Catheter Line                 Hemodialysis Catheter right subclavian -- days          Peripheral Intravenous Line                 Peripheral IV - Single Lumen 08/31/17 1500 Left Upper Arm less than 1 day              Significant Labs:    CBC/Anemia Profile:    Recent Labs  Lab 08/31/17  0424 08/31/17  1603 09/01/17  0345   WBC 7.49 7.47 6.11   HGB 6.8* 7.7* 7.4*   HCT 20.0* 23.2* 23.1*    214 166   MCV 80* 81* 83   RDW 14.8* 15.0* 15.2*        Chemistries:    Recent Labs  Lab 08/30/17  1316  08/31/17  0210 08/31/17  0424 08/31/17  0624 08/31/17 2008 09/01/17  0345 09/01/17  0822   *  < > 134* 132* 131* 138  --  134*   K 6.3*  < > 3.4* 4.3 5.0 3.6  --  4.4   CL 83*  < > 93* 93* 94* 97  --  97   CO2 8*  < > 28 24 26 31*  --  27   BUN 78*  < > 55* 56* 57* 32*  --  40*   CREATININE 5.3*  < > 3.9* 4.1* 4.1* 3.0*  --  3.6*   CALCIUM 7.3*  < > 7.7* 7.6* 7.5* 7.7*  --  7.8*   ALBUMIN 2.2*  < > 2.0* 2.1* 2.0* 1.9*  --  2.1*   PROT 5.7*  < > 5.1* 5.5* 5.3* 5.2*  --   5.4*   BILITOT 0.2  < > 0.1 0.2 0.1 0.2  --  0.2   ALKPHOS 234*  < > 187* 184* 167* 176*  --  170*   ALT 32  < > 24 21 21 17  --  19   AST 63*  < > 30 38 37 27  --  22   MG 1.9  --   --  2.1  --   --  1.7  --    PHOS 6.4*  --  3.2 4.0  --   --  3.6  --    < > = values in this interval not displayed.    A1C: No results for input(s): HGBA1C in the last 48 hours.  BMP:     Recent Labs  Lab 09/01/17 0345 09/01/17  0822   GLU  --  434*   NA  --  134*   K  --  4.4   CL  --  97   CO2  --  27   BUN  --  40*   CREATININE  --  3.6*   CALCIUM  --  7.8*   MG 1.7  --      CMP:     Recent Labs  Lab 08/31/17  0624 08/31/17 2008 09/01/17  0822   * 138 134*   K 5.0 3.6 4.4   CL 94* 97 97   CO2 26 31* 27    83 434*   BUN 57* 32* 40*   CREATININE 4.1* 3.0* 3.6*   CALCIUM 7.5* 7.7* 7.8*   PROT 5.3* 5.2* 5.4*   ALBUMIN 2.0* 1.9* 2.1*   BILITOT 0.1 0.2 0.2   ALKPHOS 167* 176* 170*   AST 37 27 22   ALT 21 17 19   ANIONGAP 11 10 10   EGFRNONAA 11.8* 17.2* 13.8*       Significant Imaging:  I have reviewed all pertinent imaging results/findings within the past 24 hours.

## 2017-09-01 NOTE — PLAN OF CARE
"Problem: Patient Care Overview  Goal: Plan of Care Review  Outcome: Ongoing (interventions implemented as appropriate)  Pt received 5mg Ambien for restlessness last night. Pt also received 40mg Protonix PO for heartburn. Pt appeared asleep throughout the night,  Pt woke up disoriented to time and place at 0327 and 0456. Pt was reoriented to time and place, pt stated  "I forgot I was in the hospital." Pt voided clear, yellow urine x1. Pt blood glucose reading 144 at bedtime. Pt educated about her nightly snacks , pt verbalized an understanding but needs more reinforcement because she was seen eating snacks through the night. Pt in no noted distress will continue to monitor pt for changes in status.       "

## 2017-09-01 NOTE — ASSESSMENT & PLAN NOTE
-presented with type 1 diabetes in DKA with AGMA  -infectious workup negative  -hyponatremia corrected  -CT head on admission negative  -likely due to acidosis and/or hyperglycemia (>1200)  -seems to have resolved 8/31, children state she is back at baseline
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
## 2017-09-01 NOTE — ASSESSMENT & PLAN NOTE
Nephrology following, provided HD yesterday.  Tunneled IJ cath clean without s/sx of infection.

## 2017-09-01 NOTE — CONSULTS
"History and Physical Exam  Ophthalmology Consult Service    CC: Bilateral vision loss     HPI: Eufemia Haq is a 52 y.o. female with PMH of type I diabetes (for past 28 years -- uncontrolled), PDR, ESRD, and HTN with high a1cs (between 10-12 over past 4 years) who presented two days prior to the ED feeling weak with elevated blood sugars and bilateral vision loss, found to be in DKA. She states that two days ago she noticed bilateral visual changes. She states that in her L eye she sees black and white floaters and increasing blurriness and in her R eye she sees a black shadow in the superior part of her visual field. She denies any eye pain.       POH: Laser treatment and intravitreal injections of the R eye and 2 "laser treatments" of the L eye for PDR.   POSHx: None.  FOHx: None.    Gtts: None.      Past Medical History:   Diagnosis Date    Anemia     during pregnancys    Arthritis     neuropathy hands feet and legs//    Blood transfusion     Chronic pain     CKD (chronic kidney disease), stage IV     Diabetes mellitus     Diabetes mellitus type I     Diabetic retinopathy     Hyperlipidemia     Hypertension     patient states that when her blood sugar increases her blood pressure increases    Neuropathy     Seizures     when sugar is high, does not quite remember    Vitamin D deficiency     Vitamin D deficiency disease          Family History   Problem Relation Age of Onset    Diabetes Mother     Heart disease Mother     Blindness Mother      diabetes    Hypertension Mother     Diabetes Father     Asthma Father     Hypertension Father     Thyroid disease Sister     Breast cancer Daughter     Diabetes Sister     Stroke Maternal Uncle     Glaucoma Maternal Grandmother     Blindness Maternal Grandmother     Cataracts Maternal Grandmother     Hypertension Maternal Grandmother     Cancer Cousin     Amblyopia Neg Hx     Macular degeneration Neg Hx     Retinal detachment Neg Hx  "    Strabismus Neg Hx     Colon cancer Neg Hx     Ovarian cancer Neg Hx            Current Facility-Administered Medications:     0.9%  NaCl infusion, , Intravenous, PRN, Ana Ramirez MD    dextrose 50% injection 12.5 g, 12.5 g, Intravenous, PRN, Roma Prince MD    dextrose 50% injection 25 g, 25 g, Intravenous, PRN, Roma Prince MD    epoetin jacques injection 4,000 Units, 50 Units/kg, Intravenous, Every Tues, Thurs, Sat, Ana Ramirez MD, 4,000 Units at 08/31/17 1554    glucagon (human recombinant) injection 1 mg, 1 mg, Intramuscular, PRN, Roma Prince MD    glucose chewable tablet 16 g, 16 g, Oral, PRN, Roma Prince MD    glucose chewable tablet 24 g, 24 g, Oral, PRN, Roma Prince MD    heparin (porcine) injection 2,000 Units, 2,000 Units, Intra-Catheter, PRN, Irena Horowitz MD, 2,000 Units at 08/31/17 1803    heparin (porcine) injection 5,000 Units, 5,000 Units, Subcutaneous, Q8H, Tacho Edge MD, 5,000 Units at 09/01/17 1341    insulin aspart pen 0-5 Units, 0-5 Units, Subcutaneous, QID (AC + HS) PRN, Johnathan Cheung MD, 5 Units at 09/01/17 0907    insulin aspart pen 5 Units, 5 Units, Subcutaneous, TIDWM, Roma Prince MD, 5 Units at 09/01/17 0908    insulin detemir pen 10 Units, 10 Units, Subcutaneous, BID, Tacho Edge MD    losartan-hydrochlorothiazide 50-12.5 mg per tablet 1 tablet, 1 tablet, Oral, Daily, Delma Dodson MD, 1 tablet at 09/01/17 0631    metoprolol tartrate (LOPRESSOR) tablet 50 mg, 50 mg, Oral, BID, Roma Prince MD, 50 mg at 09/01/17 0808    nifedipine 24 hr tablet 60 mg, 60 mg, Oral, Daily, Tacho Edge MD, 60 mg at 09/01/17 1156    ondansetron injection 4 mg, 4 mg, Intravenous, Q6H PRN, Delma Dodson MD, 4 mg at 09/01/17 0632    sodium chloride 0.9% flush 3 mL, 3 mL, Intravenous, Q8H, Roma Prince MD, 3 mL at 09/01/17 0701      Review of patient's allergies indicates:   Allergen Reactions     Ciprofloxacin (bulk) Itching    Latex Itching and Other (See Comments)     Very low Oxygen    Vancomycin Shortness Of Breath and Itching    Neurontin [gabapentin] Other (See Comments)     Seizure like activity    Niacin Itching and Other (See Comments)     Burning      Bactrim [sulfamethoxazole-trimethoprim] Rash         Social History   Substance Use Topics    Smoking status: Current Some Day Smoker     Packs/day: 0.10     Years: 10.00     Types: Cigarettes    Smokeless tobacco: Never Used      Comment: 1 pack last her about 2-3 months    Alcohol use No         Base Eye Exam     Visual Acuity (Snellen - Linear)       Right Left    Dist sc HM @ 3 ft CF @ 4 ft          Tonometry (Tonopen, 9:54 AM)       Right Left    Pressure 14 13          Pupils       Dark Light Shape React APD    Right 2 2 Round none None    Left 2 2 Round Brisk None          Extraocular Movement       Right Left     Full Full          Neuro/Psych     Oriented x3:  Yes    Mood/Affect:  Normal          Dilation     Both eyes:  1.0% Mydriacyl, 0.5% Mydriacyl @ 9:55 AM            Slit Lamp and Fundus Exam     External Exam       Right Left    External Normal Normal          Slit Lamp Exam       Right Left    Lids/Lashes Normal Normal    Conjunctiva/Sclera White and quiet White and quiet    Cornea Clear Clear    Anterior Chamber Deep and quiet Deep and quiet    Iris Round and non-reactive Round and non-reactive    Lens 1+ NSC 1+ NSC          Fundus Exam       Right Left    Disc PVR covering disk and temporal side extending into macula Some PVR over disk head extending into macula    C/D Ratio unable to determine     Macula DBH, MAs  PVR    Vessels MAs, attenuation  attenuation    Periphery Inferior pre-retinal hemorrhage / vitreal hemorrhage  peripheral laser scars 360                  Plan   A/P: Eufemia Haq is a 52 y.o. 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     Advanced PDR  - Not seen by retina specialist in the last few years. Used to see a retina specialist in [facility] but has not been there in the past several years because she moved to [place], [place].   - VA compromised most likely by new onset vitreous hemorrhage and pre-retinal hemorrhage OD. 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   - Needs retina follow up outpatient once discharged from the hospital.   - Educated about proper blood sugar and blood pressure control and the possibility of blindness if she fails to control these risk factors.   - No acute intervention needed at this time. Will refer to retina in the upcoming week.      ----------------------------------------------------------------------------------------------------------  Thank you for the consult. Feel free to contact me if you have any additional questions.    TERESA Ritchie M.D.  PGY-2 - [facility] Ophthalmology  Pager: [phone]
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
## 2017-09-01 NOTE — SUBJECTIVE & OBJECTIVE
"Interval HPI:   Overnight events:  Patient noted to be restless overnight, received one dose of ambien.  Vitals significant for SBPs in 200s.    BGs yesterday at goal; however, this morning's AM BG in 430s.  Patient admitted to eating hotdogs after dinner tray.  Did not notify nursing staff. Did not receive prandial insulin with that extra meal.    Vision still dysfunctional.  Patient would like to see opthalmology.  I informed nurse to notify primary team.     HD yesterday.  Removed 3.5L.      Eatin%, and outside food  Nausea: No, but does note indigestion.  Hypoglycemia and intervention: No  Fever: No  TPN and/or TF: No  Diabetic diet.    BP (!) 223/85   Pulse 77   Temp 97.9 °F (36.6 °C) (Axillary)   Resp 14   Ht 5' 6" (1.676 m)   Wt 80 kg (176 lb 5.9 oz)   LMP 2000 (Approximate)   SpO2 98%   BMI 28.47 kg/m²     Labs Reviewed and Include      Recent Labs  Lab 17  0822   *   CALCIUM 7.8*   ALBUMIN 2.1*   PROT 5.4*   *   K 4.4   CO2 27   CL 97   BUN 40*   CREATININE 3.6*   ALKPHOS 170*   ALT 19   AST 22   BILITOT 0.2     Lab Results   Component Value Date    WBC 6.11 2017    HGB 7.4 (L) 2017    HCT 23.1 (L) 2017    MCV 83 2017     2017     No results for input(s): TSH, FREET4 in the last 168 hours.  Lab Results   Component Value Date    HGBA1C 10.7 (H) 2017       Nutritional status:   Body mass index is 28.47 kg/m².  Lab Results   Component Value Date    ALBUMIN 2.1 (L) 2017    ALBUMIN 1.9 (L) 2017    ALBUMIN 2.0 (L) 2017     No results found for: PREALBUMIN    Estimated Creatinine Clearance: 19.5 mL/min (based on SCr of 3.6 mg/dL (H)).    Accu-Checks  Recent Labs      17   0609  17   0846  17   1158  17   1312  17   1638  17   1814  17   2244  17   0804  17   0806  17   0821   POCTGLUCOSE  114*  115*  137*  137*  106  111*  144*  436*  400*  394* "       Current Medications and/or Treatments Impacting Glycemic Control  Immunotherapy:  Immunosuppressants     None        Steroids:   Hormones     None        Pressors:    Autonomic Drugs     None        Hyperglycemia/Diabetes Medications: Antihyperglycemics     Start     Stop Route Frequency Ordered    08/31/17 1225  insulin aspart pen 0-5 Units      -- SubQ Before meals & nightly PRN 08/31/17 1126    08/31/17 1130  insulin aspart pen 5 Units      -- SubQ 3 times daily with meals 08/31/17 0816    08/31/17 0704  insulin detemir pen 10 Units      -- SubQ Daily 08/31/17 0704

## 2017-09-01 NOTE — PROGRESS NOTES
[facility]  Nephrology  Progress Note    Patient Name: Eufemia Haq  MRN: [id number]  Admission Date: 8/30/2017  Hospital Length of Stay: 2 days  Attending Provider: Maria Esther Joseph MD   Primary Care Physician: Alecia Delacruz MD  Principal Problem:DKA, type 1    Subjective:     HPI: Ms. Haq is a 51 yo AAF with HTN, T1DM, and ESRD admitted with DKA. Patient was recently admitted 8/26/17-8/28/17 for similar complaint. Nephrology consulted for ESRD management. She normally receives iHD TTS via RIJ TDC at a [facility] in [place], [place]. Treatment duration 4 hours; EDW 65kg. She continues to make some urine. She received dialysis inpatient on Saturday night for acidosis. Per report she received iHD at her temporary unit yesterday. Patient was somnolent on exam and unable to provide any history. Labs with glucose 1241, +beta-hydrobutyrate, hyperkalemia, and metabolic acidosis. Corrected Na 134. She is currently on an insulin infusion and being admitted to ICU for further care. Emergency consent was obtained for dialysis.     Interval History:   Received HD yesterday afternoon. UF 3.5L. No events overnight. Being stepped down to floor today. Reports to be feeling much better today. No SOB. Undergoing ophtho eval.     Review of patient's allergies indicates:   Allergen Reactions    Ciprofloxacin (bulk) Itching    Latex Itching and Other (See Comments)     Very low Oxygen    Vancomycin Shortness Of Breath and Itching    Neurontin [gabapentin] Other (See Comments)     Seizure like activity    Niacin Itching and Other (See Comments)     Burning      Bactrim [sulfamethoxazole-trimethoprim] Rash     Current Facility-Administered Medications   Medication Frequency    0.9%  NaCl infusion PRN    dextrose 50% injection 12.5 g PRN    dextrose 50% injection 25 g PRN    epoetin jacques injection 4,000 Units Every Tues, Thurs, Sat    glucagon (human recombinant) injection 1 mg PRN    glucose chewable tablet 16  g PRN    glucose chewable tablet 24 g PRN    heparin (porcine) injection 2,000 Units PRN    heparin (porcine) injection 5,000 Units Q8H    insulin aspart pen 0-5 Units QID (AC + HS) PRN    insulin aspart pen 5 Units TIDWM    insulin detemir pen 10 Units BID    losartan-hydrochlorothiazide 50-12.5 mg per tablet 1 tablet Daily    metoprolol tartrate (LOPRESSOR) tablet 50 mg BID    nifedipine 24 hr tablet 60 mg Daily    ondansetron injection 4 mg Q6H PRN    sodium chloride 0.9% flush 3 mL Q8H       Objective:     Vital Signs (Most Recent):  Temp: 99.4 °F (37.4 °C) (09/01/17 1302)  Pulse: 73 (09/01/17 1302)  Resp: 20 (09/01/17 1302)  BP: (!) 172/66 (09/01/17 1302)  SpO2: 98 % (09/01/17 1302)  O2 Device (Oxygen Therapy): room air (09/01/17 0812) Vital Signs (24h Range):  Temp:  [97.9 °F (36.6 °C)-99.4 °F (37.4 °C)] 99.4 °F (37.4 °C)  Pulse:  [68-84] 73  Resp:  [11-20] 20  SpO2:  [87 %-99 %] 98 %  BP: (111-223)/(56-98) 172/66     Weight: 80 kg (176 lb 5.9 oz) (08/31/17 0800)  Body mass index is 28.47 kg/m².  Body surface area is 1.93 meters squared.    I/O last 3 completed shifts:  In: 2347 [P.O.:735; I.V.:312; Blood:300; Other:1000]  Out: 7090 [Urine:810; Other:6280]    Physical Exam   Constitutional: She appears well-developed and well-nourished. No distress.   HENT:   Head: Normocephalic and atraumatic.   Eyes: Conjunctivae and EOM are normal.   Cardiovascular: Normal rate and regular rhythm.    Pulmonary/Chest: Effort normal and breath sounds normal.   Abdominal: Soft. She exhibits no distension.   Skin: Skin is warm and dry.       Significant Labs:  CBC:   Recent Labs  Lab 09/01/17  0345   WBC 6.11   RBC 2.80*   HGB 7.4*   HCT 23.1*      MCV 83   MCH 26.4*   MCHC 32.0     CMP:   Recent Labs  Lab 09/01/17  0822   *   CALCIUM 7.8*   ALBUMIN 2.1*   PROT 5.4*   *   K 4.4   CO2 27   CL 97   BUN 40*   CREATININE 3.6*   ALKPHOS 170*   ALT 19   AST 22   BILITOT 0.2     All labs within the past  24 hours have been reviewed.     Significant Imaging:  Labs: Reviewed    Assessment/Plan:     ESRD (end stage renal disease)    - receives iHD TTS via RI TDC at [facility] in [place], [place]. [facility]. Treatment duration 4 hours; EDW 65kg. Minimal residual renal function.  - iHD yesterday with 3.5L removed  - no need for RRT today  - will plan for HD x 4 hours tomorrow; UF goal 3-4L        Anemia    - hgb below goal  - blood transfusion per primary team  - will dose ANGELICA with HD treatments            Ana Egan, PGY-5  Nephrology Fellow  [facility]  Pager: [phone]

## 2017-09-01 NOTE — NURSING
Contacted Dr Dodson to inform her that the pt's blood pressure was elevated with SBP  >200 and MAP >120. Pt c/o nausea denies c/o headache. Obtained EKG, administered Labetalol 10mg IVP, administered 4mg Zofran IVP, and administered pt's Losartan/HCTZ medication at earlier time, per MD request and order. Orders followed and continued to monitor pt's blood pressure closely. Pt in no noted distress at this time. Will continue to monitor pt for changes in status.

## 2017-09-02 PROBLEM — H43.10 VITREOUS HEMORRHAGE: Status: ACTIVE | Noted: 2017-09-02

## 2017-09-02 LAB
ALBUMIN SERPL BCP-MCNC: 1.9 G/DL
ALBUMIN SERPL BCP-MCNC: 2 G/DL
ALBUMIN SERPL BCP-MCNC: 2 G/DL
ALP SERPL-CCNC: 158 U/L
ALP SERPL-CCNC: 159 U/L
ALP SERPL-CCNC: 171 U/L
ALT SERPL W/O P-5'-P-CCNC: 12 U/L
ALT SERPL W/O P-5'-P-CCNC: 12 U/L
ALT SERPL W/O P-5'-P-CCNC: 13 U/L
ANION GAP SERPL CALC-SCNC: 11 MMOL/L
AST SERPL-CCNC: 15 U/L
BASOPHILS # BLD AUTO: 0.01 K/UL
BASOPHILS # BLD AUTO: 0.02 K/UL
BASOPHILS NFR BLD: 0.2 %
BASOPHILS NFR BLD: 0.3 %
BILIRUB SERPL-MCNC: 0.1 MG/DL
BILIRUB SERPL-MCNC: 0.2 MG/DL
BILIRUB SERPL-MCNC: 0.2 MG/DL
BUN SERPL-MCNC: 37 MG/DL
BUN SERPL-MCNC: 52 MG/DL
BUN SERPL-MCNC: 52 MG/DL
CALCIUM SERPL-MCNC: 7.3 MG/DL
CALCIUM SERPL-MCNC: 7.6 MG/DL
CALCIUM SERPL-MCNC: 7.6 MG/DL
CHLORIDE SERPL-SCNC: 100 MMOL/L
CHLORIDE SERPL-SCNC: 101 MMOL/L
CHLORIDE SERPL-SCNC: 104 MMOL/L
CO2 SERPL-SCNC: 23 MMOL/L
CO2 SERPL-SCNC: 25 MMOL/L
CO2 SERPL-SCNC: 25 MMOL/L
CREAT SERPL-MCNC: 3.1 MG/DL
CREAT SERPL-MCNC: 4.2 MG/DL
CREAT SERPL-MCNC: 4.3 MG/DL
DIFFERENTIAL METHOD: ABNORMAL
DIFFERENTIAL METHOD: ABNORMAL
EOSINOPHIL # BLD AUTO: 0.2 K/UL
EOSINOPHIL # BLD AUTO: 0.2 K/UL
EOSINOPHIL NFR BLD: 2.6 %
EOSINOPHIL NFR BLD: 2.7 %
ERYTHROCYTE [DISTWIDTH] IN BLOOD BY AUTOMATED COUNT: 15.9 %
ERYTHROCYTE [DISTWIDTH] IN BLOOD BY AUTOMATED COUNT: 15.9 %
EST. GFR  (AFRICAN AMERICAN): 12.8 ML/MIN/1.73 M^2
EST. GFR  (AFRICAN AMERICAN): 13.2 ML/MIN/1.73 M^2
EST. GFR  (AFRICAN AMERICAN): 19.1 ML/MIN/1.73 M^2
EST. GFR  (NON AFRICAN AMERICAN): 11.1 ML/MIN/1.73 M^2
EST. GFR  (NON AFRICAN AMERICAN): 11.5 ML/MIN/1.73 M^2
EST. GFR  (NON AFRICAN AMERICAN): 16.5 ML/MIN/1.73 M^2
GLUCOSE SERPL-MCNC: 256 MG/DL
GLUCOSE SERPL-MCNC: 311 MG/DL
GLUCOSE SERPL-MCNC: 367 MG/DL
HCT VFR BLD AUTO: 21.2 %
HCT VFR BLD AUTO: 22.8 %
HGB BLD-MCNC: 6.8 G/DL
HGB BLD-MCNC: 7.3 G/DL
LYMPHOCYTES # BLD AUTO: 1 K/UL
LYMPHOCYTES # BLD AUTO: 1.5 K/UL
LYMPHOCYTES NFR BLD: 13.8 %
LYMPHOCYTES NFR BLD: 23.8 %
MAGNESIUM SERPL-MCNC: 2 MG/DL
MCH RBC QN AUTO: 26.4 PG
MCH RBC QN AUTO: 26.8 PG
MCHC RBC AUTO-ENTMCNC: 32 G/DL
MCHC RBC AUTO-ENTMCNC: 32.1 G/DL
MCV RBC AUTO: 82 FL
MCV RBC AUTO: 84 FL
MONOCYTES # BLD AUTO: 0.7 K/UL
MONOCYTES # BLD AUTO: 0.9 K/UL
MONOCYTES NFR BLD: 10.8 %
MONOCYTES NFR BLD: 13.4 %
NEUTROPHILS # BLD AUTO: 3.9 K/UL
NEUTROPHILS # BLD AUTO: 4.8 K/UL
NEUTROPHILS NFR BLD: 61.7 %
NEUTROPHILS NFR BLD: 69.3 %
PHOSPHATE SERPL-MCNC: 4.7 MG/DL
PLATELET # BLD AUTO: 169 K/UL
PLATELET # BLD AUTO: 171 K/UL
PMV BLD AUTO: 10.2 FL
PMV BLD AUTO: 9.8 FL
POCT GLUCOSE: 188 MG/DL (ref 70–110)
POCT GLUCOSE: 277 MG/DL (ref 70–110)
POCT GLUCOSE: 297 MG/DL (ref 70–110)
POCT GLUCOSE: 311 MG/DL (ref 70–110)
POCT GLUCOSE: 366 MG/DL (ref 70–110)
POCT GLUCOSE: 426 MG/DL (ref 70–110)
POTASSIUM SERPL-SCNC: 4.6 MMOL/L
POTASSIUM SERPL-SCNC: 4.9 MMOL/L
POTASSIUM SERPL-SCNC: 4.9 MMOL/L
PROT SERPL-MCNC: 5.1 G/DL
PROT SERPL-MCNC: 5.2 G/DL
PROT SERPL-MCNC: 5.2 G/DL
RBC # BLD AUTO: 2.58 M/UL
RBC # BLD AUTO: 2.72 M/UL
SODIUM SERPL-SCNC: 136 MMOL/L
SODIUM SERPL-SCNC: 137 MMOL/L
SODIUM SERPL-SCNC: 138 MMOL/L
WBC # BLD AUTO: 6.27 K/UL
WBC # BLD AUTO: 6.87 K/UL

## 2017-09-02 PROCEDURE — 86703 HIV-1/HIV-2 1 RESULT ANTBDY: CPT

## 2017-09-02 PROCEDURE — 85025 COMPLETE CBC W/AUTO DIFF WBC: CPT

## 2017-09-02 PROCEDURE — 80074 ACUTE HEPATITIS PANEL: CPT

## 2017-09-02 PROCEDURE — 83735 ASSAY OF MAGNESIUM: CPT

## 2017-09-02 PROCEDURE — 25000003 PHARM REV CODE 250: Performed by: STUDENT IN AN ORGANIZED HEALTH CARE EDUCATION/TRAINING PROGRAM

## 2017-09-02 PROCEDURE — 63600175 PHARM REV CODE 636 W HCPCS: Performed by: INTERNAL MEDICINE

## 2017-09-02 PROCEDURE — 99231 SBSQ HOSP IP/OBS SF/LOW 25: CPT | Mod: GC,,, | Performed by: HOSPITALIST

## 2017-09-02 PROCEDURE — 90935 HEMODIALYSIS ONE EVALUATION: CPT | Mod: ,,, | Performed by: INTERNAL MEDICINE

## 2017-09-02 PROCEDURE — 80100016 HC MAINTENANCE HEMODIALYSIS

## 2017-09-02 PROCEDURE — 84100 ASSAY OF PHOSPHORUS: CPT

## 2017-09-02 PROCEDURE — 63600175 PHARM REV CODE 636 W HCPCS: Performed by: STUDENT IN AN ORGANIZED HEALTH CARE EDUCATION/TRAINING PROGRAM

## 2017-09-02 PROCEDURE — 36415 COLL VENOUS BLD VENIPUNCTURE: CPT

## 2017-09-02 PROCEDURE — 96372 THER/PROPH/DIAG INJ SC/IM: CPT

## 2017-09-02 PROCEDURE — 20600001 HC STEP DOWN PRIVATE ROOM

## 2017-09-02 PROCEDURE — 86580 TB INTRADERMAL TEST: CPT | Performed by: INTERNAL MEDICINE

## 2017-09-02 PROCEDURE — 80053 COMPREHEN METABOLIC PANEL: CPT | Mod: 91

## 2017-09-02 PROCEDURE — 80053 COMPREHEN METABOLIC PANEL: CPT

## 2017-09-02 PROCEDURE — 25000003 PHARM REV CODE 250: Performed by: INTERNAL MEDICINE

## 2017-09-02 RX ORDER — ZOLPIDEM TARTRATE 5 MG/1
5 TABLET ORAL ONCE
Status: COMPLETED | OUTPATIENT
Start: 2017-09-02 | End: 2017-09-02

## 2017-09-02 RX ADMIN — LOSARTAN POTASSIUM AND HYDROCHLOROTHIAZIDE 1 TABLET: 12.5; 5 TABLET ORAL at 01:09

## 2017-09-02 RX ADMIN — INSULIN ASPART 2 UNITS: 100 INJECTION, SOLUTION INTRAVENOUS; SUBCUTANEOUS at 12:09

## 2017-09-02 RX ADMIN — ERYTHROPOIETIN 4000 UNITS: 4000 INJECTION, SOLUTION INTRAVENOUS; SUBCUTANEOUS at 12:09

## 2017-09-02 RX ADMIN — INSULIN ASPART 5 UNITS: 100 INJECTION, SOLUTION INTRAVENOUS; SUBCUTANEOUS at 08:09

## 2017-09-02 RX ADMIN — INSULIN ASPART 3 UNITS: 100 INJECTION, SOLUTION INTRAVENOUS; SUBCUTANEOUS at 08:09

## 2017-09-02 RX ADMIN — HEPARIN SODIUM 5000 UNITS: 5000 INJECTION, SOLUTION INTRAVENOUS; SUBCUTANEOUS at 09:09

## 2017-09-02 RX ADMIN — SODIUM CHLORIDE 300 ML: 0.9 INJECTION, SOLUTION INTRAVENOUS at 09:09

## 2017-09-02 RX ADMIN — ZOLPIDEM TARTRATE 5 MG: 5 TABLET, FILM COATED ORAL at 09:09

## 2017-09-02 RX ADMIN — HEPARIN SODIUM 5000 UNITS: 5000 INJECTION, SOLUTION INTRAVENOUS; SUBCUTANEOUS at 01:09

## 2017-09-02 RX ADMIN — INSULIN ASPART 5 UNITS: 100 INJECTION, SOLUTION INTRAVENOUS; SUBCUTANEOUS at 06:09

## 2017-09-02 RX ADMIN — NIFEDIPINE 60 MG: 30 TABLET, FILM COATED, EXTENDED RELEASE ORAL at 01:09

## 2017-09-02 RX ADMIN — Medication 5 UNITS: at 04:09

## 2017-09-02 RX ADMIN — HEPARIN SODIUM 2000 UNITS: 1000 INJECTION, SOLUTION INTRAVENOUS; SUBCUTANEOUS at 12:09

## 2017-09-02 RX ADMIN — METOPROLOL TARTRATE 50 MG: 50 TABLET, FILM COATED ORAL at 09:09

## 2017-09-02 RX ADMIN — INSULIN ASPART 5 UNITS: 100 INJECTION, SOLUTION INTRAVENOUS; SUBCUTANEOUS at 12:09

## 2017-09-02 RX ADMIN — INSULIN ASPART 5 UNITS: 100 INJECTION, SOLUTION INTRAVENOUS; SUBCUTANEOUS at 09:09

## 2017-09-02 RX ADMIN — INSULIN ASPART 4 UNITS: 100 INJECTION, SOLUTION INTRAVENOUS; SUBCUTANEOUS at 04:09

## 2017-09-02 RX ADMIN — METOPROLOL TARTRATE 50 MG: 50 TABLET, FILM COATED ORAL at 01:09

## 2017-09-02 RX ADMIN — HEPARIN SODIUM 5000 UNITS: 5000 INJECTION, SOLUTION INTRAVENOUS; SUBCUTANEOUS at 05:09

## 2017-09-02 RX ADMIN — INSULIN ASPART 1 UNITS: 100 INJECTION, SOLUTION INTRAVENOUS; SUBCUTANEOUS at 12:09

## 2017-09-02 NOTE — ASSESSMENT & PLAN NOTE
-currently gets dialysis T, Th, S  -patient had dialysis 9/2; remains fluid overloaded  -nephrology consulted for fluid, will follow patient HD needs     -follow up hep panel, HIV, PPD for HD chair placement

## 2017-09-02 NOTE — PLAN OF CARE
Problem: Patient Care Overview  Goal: Plan of Care Review  Outcome: Ongoing (interventions implemented as appropriate)  HD 3 hours, 3 Liters UF removed and tolerated well. Right IJ CVc flushed with NS and locked with Heparin 1000 units/ml to fill lumens. Procrit given with Tx.

## 2017-09-02 NOTE — PROGRESS NOTES
[facility] NEPHROLOGY HEMODIALYSIS NOTE     Patient currently on hemodialysis for removal of uremic toxins .     Patient seen and evaluated on hemodialysis, tolerating treatment, see HD flowsheet for vitals and assessments.      No Hypotension, chest pain, shortness of breath, cramping, nausea or vomiting.

## 2017-09-02 NOTE — HOSPITAL COURSE
ICU: She returned to [facility].  Her BG was 112 this AM.  She was successfully transitioned from insulin drip to 10U of long acting insulin.  She is getting 5U with meals.  09/01/2017 Patient mentioned she has been blind for the last two days.  CT head reviewed from two days ago and per report no acute changes.   Ophtho consulted.  Hypertensive to SBP >200 , received IV labetalol with better control.  BP improved during the day.  Patient has been an unreliable historian (such as when she says she is not an insulin pump, when her vision loss started, etc)    9/2: Titrating insulin regimen for discharge (anticipated 9/3) with HD chair arrangements being made.   9/3: Patient stable for discharge-discussed outpatient treatment plan with Endocrine. Patient repeatedly dines on foods outside of scheduled DM diet, causing fluctuations in BG. Noncompliance is a major concern.

## 2017-09-02 NOTE — PHARMACY MED REC
"Admission Medication Reconciliation - Pharmacy Consult Note    The home medication history was taken by Bri Bruce, Pharmacy Tech.  Based on information gathered and subsequent review by the clinical pharmacist, the items below may need attention.     You may go to "Admission" then "Reconcile Home Medications" tabs to review and/or act upon these items. Based on information gathered and subsequent review by the clinical pharmacist, the items below may need attention.    Potentially problematic discrepancies with current MAR  o Patient IS taking the following which was not ordered upon admit  Rosuvastatin (CRESTOR) 20 MG tablet Take one tablet PO daily      Please address this information as you see fit.  Feel free to contact us if you have any questions or require assistance.    Precious Post, PharmD  EXT [id number]        Patient's prior to admission medication regimen was as follows:  Medication Sig    acetaminophen-codeine 300-60mg (TYLENOL #4) 300-60 mg Tab Take 2 tablets by mouth daily as needed (pain).    blood sugar diagnostic Strp To use as directed with True results meter and monitor BS 6 times daily - fluctuating BS    diphenoxylate-atropine 2.5-0.025 mg (LOMOTIL) 2.5-0.025 mg per tablet Take 1 tablet by mouth 2 (two) times daily as needed for Diarrhea.     insulin glargine, TOUJEO, (TOUJEO SOLOSTAR) 300 unit/mL (1.5 mL) InPn pen Inject 20 Units into the skin once daily.    insulin lispro (HUMALOG KWIKPEN) 200 unit/mL (3 mL) InPn Inject 5 Units into the skin 3 (three) times daily.    losartan-hydrochlorothiazide 50-12.5 mg (HYZAAR) 50-12.5 mg per tablet Take 1 tablet by mouth once daily.    metoclopramide HCl (REGLAN) 10 MG tablet Take 10 mg by mouth 3 (three) times daily before meals.    metoprolol tartrate (LOPRESSOR) 50 MG tablet Take 50 mg by mouth 2 (two) times daily.    nifedipine (ADALAT CC) 60 MG TbSR Take 60 mg by mouth 2 (two) times daily.    ondansetron (ZOFRAN-ODT) 4 MG TbDL Take " 1-2 tablets by mouth every 4 hours as needed for nausea and vomiting    rosuvastatin (CRESTOR) 20 MG tablet Take 20 mg by mouth once daily.    zolpidem (AMBIEN) 10 mg Tab Take 10 mg by mouth nightly as needed for Insomnia.          Please add appropriate    SmartPhrase below:

## 2017-09-02 NOTE — ASSESSMENT & PLAN NOTE
-resolved in ICU; stepped down to floor with Endocrine providing basal-bolus insulin regimen. Titrated up on basal today given elevated BG  -discussed patient with Endocrine- she becomes symptomatic at BG near normal, controlled range and will likely benefit from permissive hyperglycemia  -patient is an unreliable  and has been snacking on non-hospital food with labile sugars  -will provide home B-B regimen again for discharge and educated to ensure compliance.

## 2017-09-02 NOTE — NURSING
Maintenance HD treatment initiated via Right tunneled cath which aspirated and flushed easily to arterial lumen, venous flushed easily, lines secured, orders verified, POC reviewed with patient.

## 2017-09-02 NOTE — PT/OT/SLP PROGRESS
Physical Therapy      Eufemia MAT Severino  MRN: [id number]    Patient not seen today secondary to  (pt transferred between floors during 1st attempt and during 2nd attempt RN present reporting that IV had just been dislodged). Will follow-up as appropriate.    Marine Thompson, PT   9/1/2017

## 2017-09-02 NOTE — NURSING
MD notified patient did not received Levemir 10/units today patient was in dialysis .MD notified levemir 10/units has been  discontinued and the new order for levemir 15 units start 9/3/17.

## 2017-09-02 NOTE — SUBJECTIVE & OBJECTIVE
Interval History: Patient reports no acute events; reports compliance with insulin outside prior to admission but remains intermittently hyperglycemia and has been snacking on outside foods per reports from Endocrine.     Review of Systems   Constitutional: Negative for chills, diaphoresis and fever.   HENT: Negative for sinus pressure, sneezing and sore throat.    Eyes: Positive for visual disturbance. Negative for photophobia and redness.   Respiratory: Negative for cough, choking, shortness of breath, wheezing and stridor.    Cardiovascular: Positive for leg swelling. Negative for chest pain and palpitations.   Gastrointestinal: Positive for nausea. Negative for abdominal distention, abdominal pain and diarrhea.   Endocrine: Negative for polydipsia, polyphagia and polyuria.   Genitourinary: Negative for dysuria, flank pain and frequency.   Musculoskeletal: Negative for back pain.   Skin: Negative for rash.   Allergic/Immunologic: Negative for food allergies and immunocompromised state.   Neurological: Negative for dizziness, facial asymmetry, weakness and headaches.   Hematological: Negative for adenopathy. Does not bruise/bleed easily.   Psychiatric/Behavioral: Negative for agitation and behavioral problems.     Objective:     Vital Signs (Most Recent):  Temp: 100 °F (37.8 °C) (09/02/17 1702)  Pulse: 76 (09/02/17 1702)  Resp: 20 (09/02/17 1702)  BP: (!) 180/77 (09/02/17 1702)  SpO2: (!) 9 % (09/02/17 1702) Vital Signs (24h Range):  Temp:  [98 °F (36.7 °C)-100 °F (37.8 °C)] 100 °F (37.8 °C)  Pulse:  [68-90] 76  Resp:  [18-20] 20  SpO2:  [9 %-99 %] 9 %  BP: (121-180)/(58-77) 180/77     Weight: 80 kg (176 lb 5.9 oz)  Body mass index is 28.47 kg/m².    Intake/Output Summary (Last 24 hours) at 09/02/17 1802  Last data filed at 09/02/17 1200   Gross per 24 hour   Intake              550 ml   Output             3450 ml   Net            -2900 ml      Physical Exam   Constitutional: She appears well-developed and  well-nourished. No distress.   HENT:   Head: Normocephalic and atraumatic.   Eyes: EOM are normal.   Neck: Normal range of motion. Neck supple.   Cardiovascular: Regular rhythm, normal heart sounds and intact distal pulses.    No murmur heard.  Pulmonary/Chest: Effort normal and breath sounds normal. No respiratory distress. She has no wheezes. She has no rales.   Abdominal: Bowel sounds are normal. She exhibits no distension. There is no tenderness. There is no rebound and no guarding.   Musculoskeletal: She exhibits edema. She exhibits no tenderness.   Skin: Skin is warm and dry.   Bruising noted in left AC, vascular port in RUE with no noted tenderness or erythema   Psychiatric:   lethargic    Vitals reviewed.      Significant Labs:   A1C:   Recent Labs  Lab 08/26/17  1639   HGBA1C 10.7*     CBC:   Recent Labs  Lab 09/01/17 0345 09/02/17 0355 09/02/17  0721   WBC 6.11 6.27 6.87   HGB 7.4* 6.8* 7.3*   HCT 23.1* 21.2* 22.8*    169 171     CMP:   Recent Labs  Lab 09/01/17 2005 09/02/17  0721 09/02/17  0758    137 136   K 4.8 4.9 4.9   CL 99 101 100   CO2 30* 25 25   * 311* 367*   BUN 45* 52* 52*   CREATININE 4.2* 4.2* 4.3*   CALCIUM 8.2* 7.6* 7.6*   PROT 5.8* 5.2* 5.1*   ALBUMIN 2.2* 2.0* 2.0*   BILITOT 0.2 0.2 0.2   ALKPHOS 177* 159* 158*   AST 19 15 15   ALT 18 13 12   ANIONGAP 9 11 11   EGFRNONAA 11.5* 11.5* 11.1*     Cardiac Markers: No results for input(s): CKMB, MYOGLOBIN, BNP, TROPISTAT in the last 48 hours.  Lipase: No results for input(s): LIPASE in the last 48 hours.  Magnesium:   Recent Labs  Lab 09/01/17 0345 09/02/17  0355   MG 1.7 2.0     All pertinent labs within the past 24 hours have been reviewed.    Significant Imaging: I have reviewed and interpreted all pertinent imaging results/findings within the past 24 hours.

## 2017-09-02 NOTE — CONSULTS
NIAS at bedside to place PIV earlier but patient went to dialysis, currently we are experiencing high PICC consults and we are unable to place PIV today, RN notified.

## 2017-09-02 NOTE — PT/OT/SLP PROGRESS
Physical Therapy      Eufemia MAT Severino  MRN: [id number]    PT eval and treat orders received. Patient not seen today secondary to pt away at dialysis for most of the day. Will follow-up at next scheduled visit for PT evaluation of mobility and safety.    Bri Juarez, PT   09/02/2017

## 2017-09-03 ENCOUNTER — TELEPHONE (OUTPATIENT)
Dept: ENDOCRINOLOGY | Facility: HOSPITAL | Age: 52
End: 2017-09-03

## 2017-09-03 VITALS
BODY MASS INDEX: 28.34 KG/M2 | OXYGEN SATURATION: 100 % | SYSTOLIC BLOOD PRESSURE: 175 MMHG | TEMPERATURE: 99 F | HEART RATE: 70 BPM | WEIGHT: 176.38 LBS | HEIGHT: 66 IN | RESPIRATION RATE: 18 BRPM | DIASTOLIC BLOOD PRESSURE: 79 MMHG

## 2017-09-03 LAB
ALBUMIN SERPL BCP-MCNC: 2.2 G/DL
ALP SERPL-CCNC: 163 U/L
ALT SERPL W/O P-5'-P-CCNC: 14 U/L
ANION GAP SERPL CALC-SCNC: 8 MMOL/L
AST SERPL-CCNC: 20 U/L
BASOPHILS # BLD AUTO: 0.02 K/UL
BASOPHILS NFR BLD: 0.2 %
BILIRUB SERPL-MCNC: 0.2 MG/DL
BUN SERPL-MCNC: 54 MG/DL
CALCIUM SERPL-MCNC: 7.8 MG/DL
CHLORIDE SERPL-SCNC: 103 MMOL/L
CO2 SERPL-SCNC: 26 MMOL/L
CREAT SERPL-MCNC: 3.6 MG/DL
DIFFERENTIAL METHOD: ABNORMAL
EOSINOPHIL # BLD AUTO: 0.3 K/UL
EOSINOPHIL NFR BLD: 2.9 %
ERYTHROCYTE [DISTWIDTH] IN BLOOD BY AUTOMATED COUNT: 16.1 %
EST. GFR  (AFRICAN AMERICAN): 15.9 ML/MIN/1.73 M^2
EST. GFR  (NON AFRICAN AMERICAN): 13.8 ML/MIN/1.73 M^2
GLUCOSE SERPL-MCNC: 329 MG/DL
HCT VFR BLD AUTO: 23.5 %
HGB BLD-MCNC: 7.4 G/DL
LYMPHOCYTES # BLD AUTO: 1.2 K/UL
LYMPHOCYTES NFR BLD: 13.8 %
MAGNESIUM SERPL-MCNC: 2.1 MG/DL
MCH RBC QN AUTO: 26.9 PG
MCHC RBC AUTO-ENTMCNC: 31.5 G/DL
MCV RBC AUTO: 86 FL
MONOCYTES # BLD AUTO: 0.9 K/UL
MONOCYTES NFR BLD: 9.8 %
NEUTROPHILS # BLD AUTO: 6.3 K/UL
NEUTROPHILS NFR BLD: 72 %
PHOSPHATE SERPL-MCNC: 3.7 MG/DL
PLATELET # BLD AUTO: 168 K/UL
PMV BLD AUTO: 10.3 FL
POCT GLUCOSE: 146 MG/DL (ref 70–110)
POCT GLUCOSE: 214 MG/DL (ref 70–110)
POCT GLUCOSE: 299 MG/DL (ref 70–110)
POCT GLUCOSE: 392 MG/DL (ref 70–110)
POTASSIUM SERPL-SCNC: 5.2 MMOL/L
PROT SERPL-MCNC: 5.7 G/DL
RBC # BLD AUTO: 2.75 M/UL
SODIUM SERPL-SCNC: 137 MMOL/L
WBC # BLD AUTO: 8.75 K/UL

## 2017-09-03 PROCEDURE — 25000003 PHARM REV CODE 250: Performed by: STUDENT IN AN ORGANIZED HEALTH CARE EDUCATION/TRAINING PROGRAM

## 2017-09-03 PROCEDURE — G8979 MOBILITY GOAL STATUS: HCPCS | Mod: CJ

## 2017-09-03 PROCEDURE — 63600175 PHARM REV CODE 636 W HCPCS: Performed by: STUDENT IN AN ORGANIZED HEALTH CARE EDUCATION/TRAINING PROGRAM

## 2017-09-03 PROCEDURE — 80053 COMPREHEN METABOLIC PANEL: CPT

## 2017-09-03 PROCEDURE — 83735 ASSAY OF MAGNESIUM: CPT

## 2017-09-03 PROCEDURE — 99232 SBSQ HOSP IP/OBS MODERATE 35: CPT | Mod: GC,,, | Performed by: INTERNAL MEDICINE

## 2017-09-03 PROCEDURE — 97161 PT EVAL LOW COMPLEX 20 MIN: CPT

## 2017-09-03 PROCEDURE — G8978 MOBILITY CURRENT STATUS: HCPCS | Mod: CJ

## 2017-09-03 PROCEDURE — 85025 COMPLETE CBC W/AUTO DIFF WBC: CPT

## 2017-09-03 PROCEDURE — 36415 COLL VENOUS BLD VENIPUNCTURE: CPT

## 2017-09-03 PROCEDURE — 84100 ASSAY OF PHOSPHORUS: CPT

## 2017-09-03 PROCEDURE — G8980 MOBILITY D/C STATUS: HCPCS | Mod: CJ

## 2017-09-03 PROCEDURE — 99239 HOSP IP/OBS DSCHRG MGMT >30: CPT | Mod: GC,,, | Performed by: HOSPITALIST

## 2017-09-03 RX ORDER — INSULIN ASPART 100 [IU]/ML
5 INJECTION, SOLUTION INTRAVENOUS; SUBCUTANEOUS
Qty: 1 BOX | Refills: 2 | Status: ON HOLD | OUTPATIENT
Start: 2017-09-03 | End: 2018-03-21

## 2017-09-03 RX ADMIN — HEPARIN SODIUM 5000 UNITS: 5000 INJECTION, SOLUTION INTRAVENOUS; SUBCUTANEOUS at 05:09

## 2017-09-03 RX ADMIN — INSULIN ASPART 5 UNITS: 100 INJECTION, SOLUTION INTRAVENOUS; SUBCUTANEOUS at 11:09

## 2017-09-03 RX ADMIN — NIFEDIPINE 60 MG: 30 TABLET, FILM COATED, EXTENDED RELEASE ORAL at 08:09

## 2017-09-03 RX ADMIN — INSULIN ASPART 2 UNITS: 100 INJECTION, SOLUTION INTRAVENOUS; SUBCUTANEOUS at 11:09

## 2017-09-03 RX ADMIN — INSULIN ASPART 5 UNITS: 100 INJECTION, SOLUTION INTRAVENOUS; SUBCUTANEOUS at 08:09

## 2017-09-03 RX ADMIN — LOSARTAN POTASSIUM AND HYDROCHLOROTHIAZIDE 1 TABLET: 12.5; 5 TABLET ORAL at 08:09

## 2017-09-03 RX ADMIN — METOPROLOL TARTRATE 50 MG: 50 TABLET, FILM COATED ORAL at 08:09

## 2017-09-03 NOTE — ASSESSMENT & PLAN NOTE
-currently gets dialysis T, Th, S  -patient had dialysis 9/2; remains fluid overloaded  -nephrology consulted for fluid, will follow patient HD needs     -follow up hep panel, HIV, PPD for HD chair placement- will get [facility] at facility while in [place] as outpt

## 2017-09-03 NOTE — SUBJECTIVE & OBJECTIVE
Interval History: Patient was tearful on bedside exam today, citing life stressors. She does admit to some insulin noncompliance and dietary indiscretions with DM diet. She reports wanting to have close follow up with Endocrine and HD sessions as an outpatient.     Review of Systems   Eyes: Positive for visual disturbance.   Gastrointestinal: Negative for abdominal pain.   Musculoskeletal: Negative for gait problem.   Skin: Negative for color change.     Objective:     Vital Signs (Most Recent):  Temp: 98.7 °F (37.1 °C) (09/03/17 1118)  Pulse: 70 (09/03/17 1118)  Resp: 18 (09/03/17 1118)  BP: (!) 175/79 (09/03/17 1118)  SpO2: 100 % (09/03/17 1118) Vital Signs (24h Range):  Temp:  [98 °F (36.7 °C)-100 °F (37.8 °C)] 98.7 °F (37.1 °C)  Pulse:  [68-88] 70  Resp:  [17-20] 18  SpO2:  [9 %-100 %] 100 %  BP: (128-180)/(60-79) 175/79     Weight: 80 kg (176 lb 5.9 oz)  Body mass index is 28.47 kg/m².  No intake or output data in the 24 hours ending 09/03/17 1244   Physical Exam   Constitutional: She appears well-developed and well-nourished. No distress.   HENT:   Head: Normocephalic and atraumatic.   Eyes: EOM are normal.   Neck: Normal range of motion. Neck supple.   Cardiovascular: Regular rhythm, normal heart sounds and intact distal pulses.    No murmur heard.  Pulmonary/Chest: Effort normal and breath sounds normal. No respiratory distress. She has no wheezes. She has no rales.   Abdominal: Bowel sounds are normal. She exhibits no distension. There is no tenderness. There is no rebound and no guarding.   Musculoskeletal: She exhibits edema. She exhibits no tenderness.   Skin: Skin is warm and dry.   Bruising noted in left AC, vascular port in RUE with no noted tenderness or erythema   Vitals reviewed.      Significant Labs:   CBC:   Recent Labs  Lab 09/02/17  0355 09/02/17  0721 09/03/17  0610   WBC 6.27 6.87 8.75   HGB 6.8* 7.3* 7.4*   HCT 21.2* 22.8* 23.5*    171 168     CMP:   Recent Labs  Lab 09/02/17  0758  09/02/17  1941 09/03/17  0752    138 137   K 4.9 4.6 5.2*    104 103   CO2 25 23 26   * 256* 329*   BUN 52* 37* 54*   CREATININE 4.3* 3.1* 3.6*   CALCIUM 7.6* 7.3* 7.8*   PROT 5.1* 5.2* 5.7*   ALBUMIN 2.0* 1.9* 2.2*   BILITOT 0.2 0.1 0.2   ALKPHOS 158* 171* 163*   AST 15 15 20   ALT 12 12 14   ANIONGAP 11 11 8   EGFRNONAA 11.1* 16.5* 13.8*     Coagulation: No results for input(s): INR, APTT in the last 48 hours.    Invalid input(s): PT  Lipid Panel: No results for input(s): CHOL, HDL, LDLCALC, TRIG, CHOLHDL in the last 48 hours.  Magnesium:   Recent Labs  Lab 09/02/17  0355 09/03/17  0610   MG 2.0 2.1       Significant Imaging: I have reviewed and interpreted all pertinent imaging results/findings within the past 24 hours.

## 2017-09-03 NOTE — SUBJECTIVE & OBJECTIVE
"Interval HPI:   Overnight events:  Patient still elevated BGs in AM.  Patient convinced that 300-400s are "normal" and "good for me".  Currently on levemir 15u daily and aspart 5u AC.  Likely non compliant with diet.     Eatin% and snacking  Nausea: No  Hypoglycemia and intervention: No  Fever: No  TPN and/or TF: No      /62 (BP Location: Left arm, Patient Position: Lying)   Pulse 68   Temp 98.6 °F (37 °C) (Oral)   Resp 20   Ht 5' 6" (1.676 m)   Wt 80 kg (176 lb 5.9 oz)   LMP 2000 (Approximate)   SpO2 (!) 94%   BMI 28.47 kg/m²     Labs Reviewed and Include      Recent Labs  Lab 17   *   CALCIUM 7.3*   ALBUMIN 1.9*   PROT 5.2*      K 4.6   CO2 23      BUN 37*   CREATININE 3.1*   ALKPHOS 171*   ALT 12   AST 15   BILITOT 0.1     Lab Results   Component Value Date    WBC 8.75 2017    HGB 7.4 (L) 2017    HCT 23.5 (L) 2017    MCV 86 2017     2017     No results for input(s): TSH, FREET4 in the last 168 hours.  Lab Results   Component Value Date    HGBA1C 10.7 (H) 2017       Nutritional status:   Body mass index is 28.47 kg/m².  Lab Results   Component Value Date    ALBUMIN 1.9 (L) 2017    ALBUMIN 2.0 (L) 2017    ALBUMIN 2.0 (L) 2017     No results found for: PREALBUMIN    Estimated Creatinine Clearance: 22.7 mL/min (based on SCr of 3.1 mg/dL (H)).    Accu-Checks  Recent Labs      17   1718  17   2114  17   2306  17   0011  17   0530  17   0702  17   1640  17   2144  17   0211  17   0658   POCTGLUCOSE  234*  426*  366*  277*  188*  297*  311*  392*  146*  299*       Current Medications and/or Treatments Impacting Glycemic Control  Immunotherapy:  Immunosuppressants     None        Steroids:   Hormones     None        Pressors:    Autonomic Drugs     None        Hyperglycemia/Diabetes Medications: Antihyperglycemics     Start     Stop Route " Frequency Ordered    09/02/17 1415  insulin detemir pen 15 Units      -- SubQ Daily 09/02/17 1402    08/31/17 1225  insulin aspart pen 0-5 Units      -- SubQ Before meals & nightly PRN 08/31/17 1126    08/31/17 1130  insulin aspart pen 5 Units      -- SubQ 3 times daily with meals 08/31/17 0816

## 2017-09-03 NOTE — PT/OT/SLP EVAL
Physical Therapy  Evaluation/Discharge Summary    Eufemia Haq   MRN: [id number]   Admitting Diagnosis: DKA, type 1    PT Received On: 17  PT Start Time: 847     PT Stop Time: 917    PT Total Time (min): 30 min       Billable Minutes:  Evaluation 20     Personal factors/comorbidities: anemia; arthritis; CKD; DM1; HLD; HTN; neuropathy; seizures. The listed co-morbidities and personal factors impact pt's current level and progress with functional mobility and independence.   Body systems elements affected: lower extremities, upper extremities, musculoskeletal system, neuromuscular system, cardiovascular, pulmonary system, visual system  Impairments: see assessment below  Clinical Presentation: stable  Functional Outcome Tools: AMPAC, MMT, ROM  Evaluation Complexity Level: low      Diagnosis: DKA, type 1      Past Medical History:   Diagnosis Date    Anemia     during pregnancys    Arthritis     neuropathy hands feet and legs//    Blood transfusion     Chronic pain     CKD (chronic kidney disease), stage IV     Diabetes mellitus     Diabetes mellitus type I     Diabetic retinopathy     Hyperlipidemia     Hypertension     patient states that when her blood sugar increases her blood pressure increases    Neuropathy     Seizures     when sugar is high, does not quite remember    Vitamin D deficiency     Vitamin D deficiency disease       Past Surgical History:   Procedure Laterality Date     SECTION, CLASSIC      x 2    EYE SURGERY      HYSTERECTOMY         Referring physician: Isra Baeza PA-C  Date referred to PT: 17       General Precautions: Standard, fall, diabetic (activity as tolerated; up with assist)  Orthopedic Precautions: N/A   Braces: N/A       Do you have any cultural, spiritual, Muslim conflicts, given your current situation?: none stated    Patient History:  Lives with: daughter  Lives in: 1st floor apartment; no CROW (in [place])  Pt evacuated from [place] to  [place]. Pt reports she will be staying with family at her father's house ([facility], no CROW).   PLOF/Level of assist: Pt reports not using any AD for mobility, relied on daughter for assistance. (I) with all ADLs.   Bath: tub/shower  DME: owns no DME    Roles/responsibilities: mother, cousin, friend, sister  Hobbies/Interests: spending time with family    Assist available upon d/c: family able to assist as needed      Subjective:  Communicated with RN prior to session.  Pt agreeable to PT evaluation.       Chief Complaint: none stated  Patient goals: To go home and return to PLOF           Objective:   Patient found with: telemetry, peripheral IV     Cognitive Exam:  Oriented to: Person, Place, Time and Situation    Follows Commands/attention: Follows multistep  commands  Communication: clear/fluent  Safety awareness/insight to disability: impaired due to decrease vision/blindness   Pt reports yesterday she could see shapes/movement, but today it's total darkness. Pt initially able to track PT around bed; and able to make eye contact with physicians when they enter the room. Required verbal cues for directions and maneuvering around objects when ambulating.     Physical Exam:  Postural examination/scapula alignment: Rounded shoulder, Head forward and Posterior pelvic tilt    Skin integrity: Visible skin intact  Edema: Pitting B LE    Sensation:   Intact    Upper Extremity Range of Motion:  Right Upper Extremity: WFL  Left Upper Extremity: WFL    Upper Extremity Strength:  Right Upper Extremity: grossly 4+/5 throughout  Left Upper Extremity: grossly 4+/5 throughout    Lower Extremity Range of Motion:  Right Lower Extremity: WFL  Left Lower Extremity: WFL    Lower Extremity Strength:  Right Lower Extremity: grossly 4+/5 throughout  Left Lower Extremity: grossly 4+/5 throughout     Fine motor coordination:  Intact    Gross motor coordination: WFL    Functional Mobility:  Bed Mobility:  Supine to Sit:  Supervision or  Set-up Assistance HOB elevated  Sit to supine: Stand-by Assistance HOB flat  Scooting: Supervision or Set-up Assistance to EOB    Transfers:   Sit to stand:Stand-by Assistance with Rolling Walker from EOB v/c for safe technique       Gait:   Patient ambulated ~40 feet with Rolling Walker with Contact Guard Assistance.  Pt demonstrated step-through gait with appropriate step length (passing stance leg bilaterally), weight-shift, and foot clearance. No LOB or buckling noted. V/c for directions and safety with RW (to keep on floor when turning).        Balance:  Static Sitting: Independent no UE support required  Dynamic Sitting: Independent no sway noted with MMT and dynamic reaching  Static Standing: Stand-by Assistance B UE support on RW  Dynamic Standing: Contact Guard Assistance B UE utilized on RW; no instability noted (RW utilized due to visual deficits)        AM-PAC 6 CLICK MOBILITY  How much help from another person does this patient currently need?   1 = Unable, Total/Dependent Assistance  2 = A lot, Maximum/Moderate Assistance  3 = A little, Minimum/Contact Guard/Supervision  4 = None, Modified [place]/Independent    Turning over in bed (including adjusting bedclothes, sheets and blankets)?: 4  Sitting down on and standing up from a chair with arms (e.g., wheelchair, bedside commode, etc.): 4  Moving from lying on back to sitting on the side of the bed?: 4  Moving to and from a bed to a chair (including a wheelchair)?: 3  Need to walk in hospital room?: 3  Climbing 3-5 steps with a railing?: 3  Total Score: 21     AM-PAC Raw Score CMS G-Code Modifier Level of Impairment Assistance   6 % Total / Unable   7 - 9 CM 80 - 100% Maximal Assist   10 - 14 CL 60 - 80% Moderate Assist   15 - 19 CK 40 - 60% Moderate Assist   20 - 22 CJ 20 - 40% Minimal Assist   23 CI 1-20% SBA / CGA   24 CH 0% Independent/ Mod I     Patient left supine with all lines intact and call button in reach.    Assessment:    Eufemia Haq is a 52 y.o. female with a medical diagnosis of DKA, type 1 and presents with generalized weakness and decrease endurance with overall mobility.  Pt tolerated session well with no c/o pain or discomfort. Pt demonstrated baseline mobility with no new balance or instability deficits noted. Pt demonstrates good safety awareness, and reports having numerous family members to assist upon d/c home. Pt does not demonstrates a need for acute PT services. At this time, pt will be d/c from acute PT. Please re-consult should pt's functional status change. Recommend d/c to home with family assist. Pt denied need for RW use upon d/c reporting she utilizes daughter's assist when ambulating. May recommend RW to aide with visual deficits should family feel concerned.       Education:  Education provided to pt regarding: PT role/POC; safety with mobility. Verbalized understanding.     Whiteboard updated with correct mobility information. RN/PCT notified.  Appropriate to transfer and walk within room with 1 person assist. Requires RW or Hand held assist for ambulation due to visual deficits. Please encourage pt to sit on EOB with supervision or in bedside chair throughout the day to promote of OOB mobility.     Rehab identified problem list/impairments: Rehab identified problem list/impairments: weakness, impaired endurance, impaired balance, visual deficits, decreased safety awareness    Rehab potential is good.    Activity tolerance: Good    Discharge recommendations: Discharge Facility/Level Of Care Needs: home     Barriers to discharge: Barriers to Discharge: None (pt reports she has family to assist)    Equipment recommendations: Equipment Needed After Discharge: none     GOALS:    Physical Therapy Goals     Not on file          Multidisciplinary Problems (Resolved)        Problem: Physical Therapy Goal    Goal Priority Disciplines Outcome Goal Variances Interventions   Physical Therapy Goal   (Resolved)      PT/OT, PT Outcome(s) achieved     Description:      No acute goals demonstrated at this time. Pt is supervision/mod (I) with all mobility.                     PLAN:  D/c acute PT services at this time. Pt is supervision/mod (I) with all mobility.   Plan of Care reviewed with: patient    Functional Assessment Tool Used: ampac  Score: 21  Functional Limitation: Mobility: Walking and moving around  Mobility: Walking and Moving Around Current Status (): SHABNAM  Mobility: Walking and Moving Around Goal Status (): SHABNAM  Mobility: Walking and Moving Around Discharge Status (): SHABNAM Juarez, PT  09/03/2017
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
## 2017-09-03 NOTE — PLAN OF CARE
"Problem: Diabetes, Type 1 (Adult)  Goal: Signs and Symptoms of Listed Potential Problems Will be Absent, Minimized or Managed (Diabetes, Type 1)  Signs and symptoms of listed potential problems will be absent, minimized or managed by discharge/transition of care (reference Diabetes, Type 1 (Adult) CPG).   Outcome: Ongoing (interventions implemented as appropriate)  Pt insisted on a  [place],  I asked pt if she had ate all the portions of her daily meals and pt insisted that didn't matter "if I'm hungry, Im hungry and yall should feed me." I told pt that white bread was full of carbs and that vegetables or meat would be a better nite snack. Pt became disgruntled and raised her voice. Pt stated "Yall don't have snacks for me to eat so you need to give me something!" I told pt that her diet had been reduced to 1600 cedric in hopes of keeping her BS in check. Pt stated "why cant you just cover me with the insulin like I do at home" I informed pt that her body was in the process of healing unlike when she was healthy at home and that for proper glycemic control she would have diet. Pt still insisted having sandwich.      "

## 2017-09-03 NOTE — ASSESSMENT & PLAN NOTE
-resolved in ICU; stepped down to floor with Endocrine providing basal-bolus insulin regimen. Titrated up on basal today given elevated BG  -discussed patient with Endocrine- she becomes symptomatic at BG near normal, controlled range and will likely benefit from permissive hyperglycemia  -patient is an unreliable  and has been snacking on non-hospital food with labile sugars  -will provide home B-B regimen again for discharge and educated to ensure compliance. 15u basal and 5u TIDWM.

## 2017-09-03 NOTE — PLAN OF CARE
Problem: Physical Therapy Goal  Goal: Physical Therapy Goal      No acute goals demonstrated at this time. Pt is supervision/mod (I) with all mobility.   Outcome: Outcome(s) achieved Date Met: 09/03/17        PT evaluation completed. Pt is supervision/mod (I) with all mobility and transfers. Pt does not demonstrate a need for skilled acute PT services. Eval/DC note to follow.     Bri Juarez, PT, DPT  09/03/2017

## 2017-09-03 NOTE — ASSESSMENT & PLAN NOTE
"Poorly controlled based on previous documentation.  Non compliant with meals and insulin regimen.  A1C 10.7% 8/26/17  Has history of gastroparesis, nephropathy, retinopathy, and neuropathy.    Goal 140-150  Not at goal as patient is eating outside food in addition to her hospital food trays.  Currently on levemir 15u daily and novolog 5u AC; low dose correction  Will monitor.  Patient is T1DM, and always requires basal insulin.  Otherwise, can send patient back into DKA.    Provided extensive counseling on what is a "normal" BG level.  Patient and family informed that persistent BG elevations in the 300s, 400s, and even 500s can lead to further complications associated with DM.  Likely associated with ophtho complications discovered during this admission.  Patient verbalized understanding, unfortunately still eats/snacks to maintain BG levels above 300.     Monitor glucose and adjust accordingly.    Patient received 1u pRBCs 8/31/17, can affect future A1C values.    On ARB for HTN.  Would hold on statin as she has a documentation in a previous clinic note of worsened myalgias with statin therapy.     DISCHARGE PLANNING:  Patient to follow up with myself/NP upon discharge, in DM/endocrinology clinic.   Appointment to be scheduled.  Patient will be called with day and time.   Will assess patient ability to restart pump at that time.  Patient to be discharged with MDI, regimen to be determined according to her inpatient requirements.  "

## 2017-09-03 NOTE — PROGRESS NOTES
"Pt insisted on a  [place],  I asked pt if she had ate all the portions of her daily meals and pt insisted that didn't matter "if I'm hungry, Im hungry and yall should feed me." I told pt that white bread was full of carbs and that vegetables or meat would be a better nite snack. Pt became disgruntled and raised her voice. Pt stated "Yall don't have snacks for me to eat so you need to give me something!" I told pt that her diet had been reduced to 1600 cedric in hopes of keeping her BS in check. Pt stated "why cant you just cover me with the insulin like I do at home" I informed pt that her body was in the process of healing unlike when she was healthy at home and that for proper glycemic control she would have diet. Pt still insisted having sandwich.  "
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
## 2017-09-04 LAB
BACTERIA BLD CULT: NORMAL
BACTERIA BLD CULT: NORMAL
HAV IGM SERPL QL IA: NEGATIVE
HBV CORE IGM SERPL QL IA: NEGATIVE
HBV SURFACE AG SERPL QL IA: NEGATIVE
HCV AB SERPL QL IA: NEGATIVE
HIV 1+2 AB+HIV1 P24 AG SERPL QL IA: NEGATIVE

## 2017-09-05 LAB — POCT GLUCOSE: 225 MG/DL (ref 70–110)

## 2017-09-05 NOTE — DISCHARGE SUMMARY
Discharge Summary  Hospital Medicine    Patient Name: Eufemia Haq    YOB: 1965    Admit Date: 8/30/2017    Discharge Date and Time: 09/03/2017    Discharge Attending Physician: No att. providers found     Discharge Resident Team: Networked reference to record PCT     Chief Complaint/Reason for Admission: DKA    Primary Diagnosis: DKA, type 1    Secondary Diagnosis:  Patient Active Problem List   Diagnosis    Hypercholesteremia    CRPS- pain contract signed    Depression    Rectal bleeding    Vitamin D deficiency disease    Hyperglycemia    Pyelonephritis    Transaminitis    GERD (gastroesophageal reflux disease)    Anemia    HTN (hypertension), benign    Diabetic macular edema, both eyes    Proliferative diabetic retinopathy, both eyes    Hypertensive retinopathy of both eyes    Insulin pump fitting or adjustment    Hypertension    Diabetic hyperosmolar non-ketotic state    Hyperglycemia due to type 1 diabetes mellitus    UTI (urinary tract infection)    H/O insertion of insulin pump    Edema extremities    Yeast dermatitis    Medication side effects - Mobic 3/9/15    Chronic kidney disease, stage III (moderate)    Hyperkalemia    Diabetes mellitus with renal manifestations, uncontrolled    Polyneuropathy in diabetes(357.2)    Renal insufficiency    Accelerated hypertension    Syncope and collapse, onset 1992    LVH (left ventricular hypertrophy) due to hypertensive disease    Peripheral edema, onset 11/2014    Hypoalbuminemia    Elevated troponin I level, persistent    Iron deficiency anemia    Midline low back pain without sciatica    Cardiomegaly    Gastroesophageal reflux disease without esophagitis    Anemia in chronic renal disease    Closed head injury    DM gastroparesis    Diabetic ketoacidosis without coma associated with type 1 diabetes mellitus    Altered mental status    Essential hypertension    Sepsis    Proteinuria with type 1  diabetes mellitus    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 The patient was recently discharged from [facility] with an episode of hyperglycemia, in ED at that time her BG was in 700's and beta-hydroxybutyrate was within normal limits. The patient was treated at that time with insulin gtt and underwent HD where 2L was removed.     In ED the patient is minimally responsive but is able to state her name and date of birth. No family is present at this time. Patient's BG is currently 1241, with an anion gap metabolic acidosis, K 6.3 with QRS widening and peaked T waves. ED managed with insulin gtt, fluid replacement, calcium gluconate, and potassium shift. Patient states that she her blood glucose usually runs in the 300-500s. History is limited 2/2 AMS. Per patient's daughter the patient has been increasingly confused since her discharge. She also states that the patient had dialysis yesterday but it was cut short (3 from 4 hours) due to the weather. The patient's daughter also states that as of last night the patient has been having episodes of diarrhea. The patient's daughter states that she makes sure her mother is compliant with her medications and states that since her last discharge the patient has not been experiencing any episodes of vomiting, fever, complaints of chest pain or SOB.      Hospital Course:   Patient was admitted to Hospital Medicine.   ICU: She returned to [facility].  Her BG was 112 this AM.  She was successfully  transitioned from insulin drip to 10U of long acting insulin.  She is getting 5U with meals.  09/01/2017 Patient mentioned she has been blind for the last two days.  CT head reviewed from two days ago and per report no acute changes.   Ophtho consulted.  Hypertensive to SBP >200 , received IV labetalol with better control.  BP improved during the day.  Patient has been an unreliable historian (such as when she says she is not an insulin pump, when her vision loss started, etc)     9/2: Titrating insulin regimen for discharge (anticipated 9/3) with HD chair arrangements being made.   9/3: Patient stable for discharge-discussed outpatient treatment plan with Endocrine. Patient repeatedly dines on foods outside of scheduled DM diet, causing fluctuations in BG. Noncompliance is a major concern.   * DKA, type 1     -resolved in ICU; stepped down to floor with Endocrine providing basal-bolus insulin regimen. Titrated up on basal today given elevated BG  -discussed patient with Endocrine- she becomes symptomatic at BG near normal, controlled range and will likely benefit from permissive hyperglycemia  -patient is an unreliable  and has been snacking on non-hospital food with labile sugars  -will provide home B-B regimen again for discharge and educate to ensure compliance.          Vitreous hemorrhage     -will follow with Optho as outpt.           Type 1 diabetes mellitus with complication     -see DKA note          ESRD (end stage renal disease)          -currently gets dialysis T, Th, S  -patient had dialysis 9/2; remains fluid overloaded  -nephrology consulted for fluid, will follow patient HD needs      -follow up hep panel, HIV, PPD for HD chair placement       Anemia in chronic renal disease     -monitor and transfuse PRN for Hb less than 7          Hypertension     -continue losartan-HCTZ, lopressor, nifedipine                VTE Risk Mitigation          Ordered       heparin (porcine) injection 5,000 Units   Every 8 hours     Route:  Subcutaneous               Prior to discharge, the primary team reviewed the discharge plan and med list with the patient, who communicated his understanding and agreement with the plan.       Significant Investigations: CT head, CXR, KUB, septic workup    Surgical / Diagnostic Procedures: as above    Studies Pending: none    Discharged Condition: stable and good condition    Discharge Disposition: home    Follow-up Plan: Optho, PCP, [facility] outpt.    Future Appointments  Date Time Provider Department [place]   9/28/2017 1:30 PM Luiz Lozano MD [facility]ine Camejo       Patient Instructions / Medications:   Discharge Medication List as of 9/3/2017  2:08 PM      START taking these medications    Details   insulin aspart (NOVOLOG) 100 unit/mL InPn pen Inject 5 Units into the skin 3 (three) times daily with meals., Starting Sun 9/3/2017, Until Mon 9/3/2018, Normal      insulin detemir (LEVEMIR FLEXTOUCH) 100 unit/mL (3 mL) SubQ InPn pen Inject 15 Units into the skin once daily., Starting Mon 9/4/2017, Until Tue 9/4/2018, Normal         CONTINUE these medications which have NOT CHANGED    Details   acetaminophen-codeine 300-60mg (TYLENOL #4) 300-60 mg Tab Take 2 tablets by mouth daily as needed (pain)., Historical Med      blood sugar diagnostic Strp To use as directed with True results meter and monitor BS 6 times daily - fluctuating BS, Normal      diphenoxylate-atropine 2.5-0.025 mg (LOMOTIL) 2.5-0.025 mg per tablet Take 1 tablet by mouth 2 (two) times daily as needed for Diarrhea. , Until Discontinued, Historical Med      losartan-hydrochlorothiazide 50-12.5 mg (HYZAAR) 50-12.5 mg per tablet Take 1 tablet by mouth once daily., Until Discontinued, Historical Med      metoclopramide HCl (REGLAN) 10 MG tablet Take 10 mg by mouth 3 (three) times daily before meals., Until Discontinued, Historical Med      metoprolol tartrate (LOPRESSOR) 50 MG tablet Take 50 mg by mouth  2 (two) times daily., Historical Med      nifedipine (ADALAT CC) 60 MG TbSR Take 60 mg by mouth 2 (two) times daily., Until Discontinued, Historical Med      ondansetron (ZOFRAN-ODT) 4 MG TbDL Take 1-2 tablets by mouth every 4 hours as needed for nausea and vomiting, Until Discontinued, Historical Med      rosuvastatin (CRESTOR) 20 MG tablet Take 20 mg by mouth once daily., Historical Med      zolpidem (AMBIEN) 10 mg Tab Take 10 mg by mouth nightly as needed for Insomnia. , Historical Med         STOP taking these medications       insulin glargine, TOUJEO, (TOUJEO SOLOSTAR) 300 unit/mL (1.5 mL) InPn pen Comments:   Reason for Stopping:         insulin lispro (HUMALOG KWIKPEN) 200 unit/mL (3 mL) InPn Comments:   Reason for Stopping:                 Discharge Procedure Orders  Diet general   Order Specific Question Answer Comments   Additional restrictions: Diabetic 1800      Activity as tolerated     Call MD for:  persistent nausea and vomiting or diarrhea     Call MD for:  severe uncontrolled pain     Call MD for:  persistent dizziness, light-headedness, or visual disturbances     Call MD for:  increased confusion or weakness

## 2017-09-13 ENCOUNTER — HOSPITAL ENCOUNTER (OUTPATIENT)
Facility: HOSPITAL | Age: 52
Discharge: HOME | End: 2017-09-14
Attending: EMERGENCY MEDICINE | Admitting: EMERGENCY MEDICINE
Payer: MEDICARE

## 2017-09-13 DIAGNOSIS — N18.6 ESRD (END STAGE RENAL DISEASE): ICD-10-CM

## 2017-09-13 DIAGNOSIS — E87.70 HYPERVOLEMIA, UNSPECIFIED HYPERVOLEMIA TYPE: Primary | ICD-10-CM

## 2017-09-13 DIAGNOSIS — N18.6 ESRD ON DIALYSIS: ICD-10-CM

## 2017-09-13 DIAGNOSIS — Z99.2 ESRD ON DIALYSIS: ICD-10-CM

## 2017-09-13 LAB
ALBUMIN SERPL BCP-MCNC: 2.4 G/DL
ALP SERPL-CCNC: 228 U/L
ALT SERPL W/O P-5'-P-CCNC: 7 U/L
ANION GAP SERPL CALC-SCNC: 13 MMOL/L
AST SERPL-CCNC: 9 U/L
B-OH-BUTYR BLD STRIP-SCNC: 0 MMOL/L
BASOPHILS # BLD AUTO: 0.03 K/UL
BASOPHILS NFR BLD: 0.6 %
BILIRUB SERPL-MCNC: 0.2 MG/DL
BUN SERPL-MCNC: 62 MG/DL
CALCIUM SERPL-MCNC: 8.1 MG/DL
CHLORIDE SERPL-SCNC: 106 MMOL/L
CO2 SERPL-SCNC: 21 MMOL/L
CREAT SERPL-MCNC: 4.6 MG/DL
DIFFERENTIAL METHOD: ABNORMAL
EOSINOPHIL # BLD AUTO: 0.2 K/UL
EOSINOPHIL NFR BLD: 3.2 %
ERYTHROCYTE [DISTWIDTH] IN BLOOD BY AUTOMATED COUNT: 15.8 %
EST. GFR  (AFRICAN AMERICAN): 11.8 ML/MIN/1.73 M^2
EST. GFR  (NON AFRICAN AMERICAN): 10.3 ML/MIN/1.73 M^2
GLUCOSE SERPL-MCNC: 461 MG/DL
HCT VFR BLD AUTO: 22.9 %
HGB BLD-MCNC: 7.2 G/DL
LYMPHOCYTES # BLD AUTO: 1 K/UL
LYMPHOCYTES NFR BLD: 18.3 %
MCH RBC QN AUTO: 26.6 PG
MCHC RBC AUTO-ENTMCNC: 31.4 G/DL
MCV RBC AUTO: 85 FL
MONOCYTES # BLD AUTO: 0.4 K/UL
MONOCYTES NFR BLD: 6.5 %
NEUTROPHILS # BLD AUTO: 3.8 K/UL
NEUTROPHILS NFR BLD: 71.2 %
PLATELET # BLD AUTO: 256 K/UL
PMV BLD AUTO: 9.7 FL
POCT GLUCOSE: 216 MG/DL (ref 70–110)
POCT GLUCOSE: 293 MG/DL (ref 70–110)
POCT GLUCOSE: 363 MG/DL (ref 70–110)
POTASSIUM SERPL-SCNC: 4.4 MMOL/L
PROT SERPL-MCNC: 6.3 G/DL
RBC # BLD AUTO: 2.71 M/UL
SODIUM SERPL-SCNC: 140 MMOL/L
WBC # BLD AUTO: 5.36 K/UL

## 2017-09-13 PROCEDURE — 96372 THER/PROPH/DIAG INJ SC/IM: CPT

## 2017-09-13 PROCEDURE — 96375 TX/PRO/DX INJ NEW DRUG ADDON: CPT

## 2017-09-13 PROCEDURE — 25000003 PHARM REV CODE 250: Performed by: PHYSICIAN ASSISTANT

## 2017-09-13 PROCEDURE — 99285 EMERGENCY DEPT VISIT HI MDM: CPT | Mod: ,,, | Performed by: PHYSICIAN ASSISTANT

## 2017-09-13 PROCEDURE — 99214 OFFICE O/P EST MOD 30 MIN: CPT | Mod: ,,, | Performed by: INTERNAL MEDICINE

## 2017-09-13 PROCEDURE — 63600175 PHARM REV CODE 636 W HCPCS: Performed by: HOSPITALIST

## 2017-09-13 PROCEDURE — 63600175 PHARM REV CODE 636 W HCPCS: Performed by: PHYSICIAN ASSISTANT

## 2017-09-13 PROCEDURE — 99220 PR INITIAL OBSERVATION CARE,LEVL III: CPT | Mod: ,,, | Performed by: PHYSICIAN ASSISTANT

## 2017-09-13 PROCEDURE — 82010 KETONE BODYS QUAN: CPT

## 2017-09-13 PROCEDURE — 25000003 PHARM REV CODE 250: Performed by: HOSPITALIST

## 2017-09-13 PROCEDURE — 96361 HYDRATE IV INFUSION ADD-ON: CPT

## 2017-09-13 PROCEDURE — 80053 COMPREHEN METABOLIC PANEL: CPT

## 2017-09-13 PROCEDURE — G0257 UNSCHED DIALYSIS ESRD PT HOS: HCPCS

## 2017-09-13 PROCEDURE — G0378 HOSPITAL OBSERVATION PER HR: HCPCS

## 2017-09-13 PROCEDURE — 99285 EMERGENCY DEPT VISIT HI MDM: CPT | Mod: 25

## 2017-09-13 PROCEDURE — 85025 COMPLETE CBC W/AUTO DIFF WBC: CPT

## 2017-09-13 PROCEDURE — 96374 THER/PROPH/DIAG INJ IV PUSH: CPT

## 2017-09-13 RX ORDER — LOSARTAN POTASSIUM AND HYDROCHLOROTHIAZIDE 12.5; 5 MG/1; MG/1
1 TABLET ORAL DAILY
Status: DISCONTINUED | OUTPATIENT
Start: 2017-09-14 | End: 2017-09-14 | Stop reason: HOSPADM

## 2017-09-13 RX ORDER — IBUPROFEN 200 MG
24 TABLET ORAL
Status: DISCONTINUED | OUTPATIENT
Start: 2017-09-13 | End: 2017-09-14 | Stop reason: HOSPADM

## 2017-09-13 RX ORDER — NIFEDIPINE 30 MG/1
60 TABLET, EXTENDED RELEASE ORAL DAILY
Status: DISCONTINUED | OUTPATIENT
Start: 2017-09-14 | End: 2017-09-14 | Stop reason: HOSPADM

## 2017-09-13 RX ORDER — FUROSEMIDE 10 MG/ML
100 INJECTION INTRAMUSCULAR; INTRAVENOUS
Status: COMPLETED | OUTPATIENT
Start: 2017-09-13 | End: 2017-09-13

## 2017-09-13 RX ORDER — LOSARTAN POTASSIUM AND HYDROCHLOROTHIAZIDE 12.5; 5 MG/1; MG/1
1 TABLET ORAL DAILY
Status: DISCONTINUED | OUTPATIENT
Start: 2017-09-13 | End: 2017-09-13

## 2017-09-13 RX ORDER — ONDANSETRON 4 MG/1
4 TABLET, ORALLY DISINTEGRATING ORAL EVERY 8 HOURS PRN
Status: DISCONTINUED | OUTPATIENT
Start: 2017-09-13 | End: 2017-09-14 | Stop reason: HOSPADM

## 2017-09-13 RX ORDER — GLUCAGON 1 MG
1 KIT INJECTION
Status: DISCONTINUED | OUTPATIENT
Start: 2017-09-13 | End: 2017-09-14 | Stop reason: HOSPADM

## 2017-09-13 RX ORDER — HEPARIN SODIUM 1000 [USP'U]/ML
1000 INJECTION, SOLUTION INTRAVENOUS; SUBCUTANEOUS
Status: DISCONTINUED | OUTPATIENT
Start: 2017-09-13 | End: 2017-09-14 | Stop reason: HOSPADM

## 2017-09-13 RX ORDER — INSULIN ASPART 100 [IU]/ML
3 INJECTION, SOLUTION INTRAVENOUS; SUBCUTANEOUS
Status: DISCONTINUED | OUTPATIENT
Start: 2017-09-13 | End: 2017-09-14

## 2017-09-13 RX ORDER — LOSARTAN POTASSIUM AND HYDROCHLOROTHIAZIDE 12.5; 5 MG/1; MG/1
1 TABLET ORAL
Status: COMPLETED | OUTPATIENT
Start: 2017-09-13 | End: 2017-09-13

## 2017-09-13 RX ORDER — METOPROLOL TARTRATE 50 MG/1
50 TABLET ORAL
Status: COMPLETED | OUTPATIENT
Start: 2017-09-13 | End: 2017-09-13

## 2017-09-13 RX ORDER — POLYETHYLENE GLYCOL 3350 17 G/17G
17 POWDER, FOR SOLUTION ORAL EVERY 12 HOURS PRN
Status: DISCONTINUED | OUTPATIENT
Start: 2017-09-13 | End: 2017-09-14 | Stop reason: HOSPADM

## 2017-09-13 RX ORDER — NIFEDIPINE 30 MG/1
60 TABLET, EXTENDED RELEASE ORAL DAILY
Status: DISCONTINUED | OUTPATIENT
Start: 2017-09-13 | End: 2017-09-13

## 2017-09-13 RX ORDER — ACETAMINOPHEN 325 MG/1
650 TABLET ORAL EVERY 6 HOURS PRN
Status: DISCONTINUED | OUTPATIENT
Start: 2017-09-13 | End: 2017-09-14 | Stop reason: HOSPADM

## 2017-09-13 RX ORDER — IBUPROFEN 200 MG
16 TABLET ORAL
Status: DISCONTINUED | OUTPATIENT
Start: 2017-09-13 | End: 2017-09-14 | Stop reason: HOSPADM

## 2017-09-13 RX ORDER — ROSUVASTATIN CALCIUM 20 MG/1
20 TABLET, COATED ORAL DAILY
Status: DISCONTINUED | OUTPATIENT
Start: 2017-09-13 | End: 2017-09-14 | Stop reason: HOSPADM

## 2017-09-13 RX ORDER — RAMELTEON 8 MG/1
8 TABLET ORAL NIGHTLY PRN
Status: DISCONTINUED | OUTPATIENT
Start: 2017-09-13 | End: 2017-09-13

## 2017-09-13 RX ORDER — ZOLPIDEM TARTRATE 5 MG/1
10 TABLET ORAL NIGHTLY PRN
Status: DISCONTINUED | OUTPATIENT
Start: 2017-09-13 | End: 2017-09-14 | Stop reason: HOSPADM

## 2017-09-13 RX ORDER — METOPROLOL TARTRATE 25 MG/1
50 TABLET, FILM COATED ORAL 2 TIMES DAILY
Status: DISCONTINUED | OUTPATIENT
Start: 2017-09-13 | End: 2017-09-14 | Stop reason: HOSPADM

## 2017-09-13 RX ORDER — SODIUM CHLORIDE 9 MG/ML
INJECTION, SOLUTION INTRAVENOUS
Status: DISCONTINUED | OUTPATIENT
Start: 2017-09-13 | End: 2017-09-14 | Stop reason: HOSPADM

## 2017-09-13 RX ORDER — INSULIN ASPART 100 [IU]/ML
0-5 INJECTION, SOLUTION INTRAVENOUS; SUBCUTANEOUS
Status: DISCONTINUED | OUTPATIENT
Start: 2017-09-13 | End: 2017-09-14 | Stop reason: HOSPADM

## 2017-09-13 RX ORDER — ACETAMINOPHEN AND CODEINE PHOSPHATE 300; 30 MG/1; MG/1
2 TABLET ORAL ONCE
Status: COMPLETED | OUTPATIENT
Start: 2017-09-13 | End: 2017-09-13

## 2017-09-13 RX ORDER — METOCLOPRAMIDE 10 MG/1
10 TABLET ORAL
Status: DISCONTINUED | OUTPATIENT
Start: 2017-09-13 | End: 2017-09-14 | Stop reason: HOSPADM

## 2017-09-13 RX ORDER — NIFEDIPINE 30 MG/1
60 TABLET, EXTENDED RELEASE ORAL
Status: COMPLETED | OUTPATIENT
Start: 2017-09-13 | End: 2017-09-13

## 2017-09-13 RX ORDER — FUROSEMIDE 10 MG/ML
100 INJECTION INTRAMUSCULAR; INTRAVENOUS
Status: DISCONTINUED | OUTPATIENT
Start: 2017-09-13 | End: 2017-09-13

## 2017-09-13 RX ORDER — SODIUM CHLORIDE 9 MG/ML
INJECTION, SOLUTION INTRAVENOUS ONCE
Status: COMPLETED | OUTPATIENT
Start: 2017-09-13 | End: 2017-09-13

## 2017-09-13 RX ORDER — DIPHENOXYLATE HYDROCHLORIDE AND ATROPINE SULFATE 2.5; .025 MG/1; MG/1
1 TABLET ORAL 2 TIMES DAILY PRN
Status: DISCONTINUED | OUTPATIENT
Start: 2017-09-13 | End: 2017-09-14 | Stop reason: HOSPADM

## 2017-09-13 RX ADMIN — ALTEPLASE 4 MG: 2.2 INJECTION, POWDER, LYOPHILIZED, FOR SOLUTION INTRAVENOUS at 06:09

## 2017-09-13 RX ADMIN — INSULIN ASPART 3 UNITS: 100 INJECTION, SOLUTION INTRAVENOUS; SUBCUTANEOUS at 05:09

## 2017-09-13 RX ADMIN — METOPROLOL TARTRATE 50 MG: 50 TABLET, FILM COATED ORAL at 01:09

## 2017-09-13 RX ADMIN — INSULIN HUMAN 6 UNITS: 100 INJECTION, SOLUTION PARENTERAL at 02:09

## 2017-09-13 RX ADMIN — ACETAMINOPHEN AND CODEINE PHOSPHATE 2 TABLET: 300; 30 TABLET ORAL at 11:09

## 2017-09-13 RX ADMIN — NIFEDIPINE 60 MG: 30 TABLET, FILM COATED, EXTENDED RELEASE ORAL at 02:09

## 2017-09-13 RX ADMIN — LOSARTAN POTASSIUM AND HYDROCHLOROTHIAZIDE 1 TABLET: 12.5; 5 TABLET ORAL at 02:09

## 2017-09-13 RX ADMIN — ZOLPIDEM TARTRATE 10 MG: 5 TABLET ORAL at 10:09

## 2017-09-13 RX ADMIN — SODIUM CHLORIDE: 900 INJECTION, SOLUTION INTRAVENOUS at 05:09

## 2017-09-13 RX ADMIN — FUROSEMIDE 100 MG: 10 INJECTION, SOLUTION INTRAVENOUS at 02:09

## 2017-09-13 RX ADMIN — INSULIN DETEMIR 12 UNITS: 100 INJECTION, SOLUTION SUBCUTANEOUS at 09:09

## 2017-09-13 RX ADMIN — METOPROLOL TARTRATE 50 MG: 25 TABLET ORAL at 09:09

## 2017-09-13 RX ADMIN — INSULIN ASPART 1 UNITS: 100 INJECTION, SOLUTION INTRAVENOUS; SUBCUTANEOUS at 05:09

## 2017-09-13 NOTE — ED NOTES
Marysol kaufman (sister)- #[phone]  Anna (niece)- #[phone]    Please call sister or niece to update on patient's status.

## 2017-09-13 NOTE — ED NOTES
Report called to MAT Mays RN in acute dialysis  Pt transported on telemetry monitor to acute dialysis via stretcher with PHILIP Charles RN  Pt belongings are with pt  Pt condition stable on transport

## 2017-09-13 NOTE — ED PROVIDER NOTES
"Encounter Date: 9/13/2017    SCRIBE #1 NOTE: I, Bharti Hutchison, am scribing for, and in the presence of,  Dr. Sommers. I have scribed the following portions of the note - the APC attestation.       History     Chief Complaint   Patient presents with    Leg Swelling     legs "hard and swollen". last dialysis was Thursday. normal days are tues, thurs, and sat. hypertensive. needs dialysis?     52-year-old -American female with past medical history of diabetes, hypertension, hyperlipidemia,  seizures, ESRD on HD TTS presents to the ED complaining of shortness of breath and lower extremity edema.  She is from [place] and evacuated to [place].  She was supposed to catch a flight back home this morning that her flight was canceled.  She has not had dialysis since Thursday while she was admitted here at [facility].  She has missed 2 dialysis sessions since then and was scheduled to have dialysis this afternoon at [facility] in [place].  She endorses upper abdominal tightness, diarrhea, lightheadedness, dizziness.  She does make urine but denies any dysuria or urinary frequency.  She has not taken any of her morning medications because she was at the airport very early this morning.  She denies fever, chills, chest pain, abdominal pain, nausea, headache.  She smokes cigarettes and denies alcohol or drug use.        The history is provided by the patient.     Review of patient's allergies indicates:   Allergen Reactions    Ciprofloxacin (bulk) Itching    Latex Itching and Other (See Comments)     Very low Oxygen    Vancomycin Shortness Of Breath and Itching    Neurontin [gabapentin] Other (See Comments)     Seizure like activity    Niacin Itching and Other (See Comments)     Burning      Bactrim [sulfamethoxazole-trimethoprim] Rash     Past Medical History:   Diagnosis Date    Anemia     during pregnancys    Arthritis     neuropathy hands feet and legs//    Blood transfusion     Chronic pain     CKD (chronic " kidney disease), stage IV     Diabetes mellitus     Diabetes mellitus type I     Diabetic retinopathy     Hyperlipidemia     Hypertension     patient states that when her blood sugar increases her blood pressure increases    Neuropathy     Seizures     when sugar is high, does not quite remember    Vitamin D deficiency     Vitamin D deficiency disease      Past Surgical History:   Procedure Laterality Date     SECTION, CLASSIC      x 2    EYE SURGERY      HYSTERECTOMY       Family History   Problem Relation Age of Onset    Diabetes Mother     Heart disease Mother     Blindness Mother      diabetes    Hypertension Mother     Diabetes Father     Asthma Father     Hypertension Father     Thyroid disease Sister     Breast cancer Daughter     Diabetes Sister     Stroke Maternal Uncle     Glaucoma Maternal Grandmother     Blindness Maternal Grandmother     Cataracts Maternal Grandmother     Hypertension Maternal Grandmother     Cancer Cousin     Amblyopia Neg Hx     Macular degeneration Neg Hx     Retinal detachment Neg Hx     Strabismus Neg Hx     Colon cancer Neg Hx     Ovarian cancer Neg Hx      Social History   Substance Use Topics    Smoking status: Current Some Day Smoker     Packs/day: 0.10     Years: 10.00     Types: Cigarettes    Smokeless tobacco: Never Used      Comment: 1 pack last her about 2-3 months    Alcohol use No     Review of Systems   Constitutional: Negative for chills and fever.   HENT: Negative for congestion, rhinorrhea and sore throat.    Respiratory: Positive for shortness of breath. Negative for cough.    Cardiovascular: Positive for leg swelling. Negative for chest pain.   Gastrointestinal: Positive for abdominal pain and diarrhea. Negative for constipation, nausea and vomiting.   Genitourinary: Negative for dysuria and frequency.   Musculoskeletal: Negative for neck pain and neck stiffness.   Skin: Negative for rash and wound.   Neurological:  Positive for dizziness and light-headedness. Negative for syncope, weakness, numbness and headaches.   Psychiatric/Behavioral: Negative for confusion.       Physical Exam     Initial Vitals [09/13/17 1120]   BP Pulse Resp Temp SpO2   (!) 229/105 84 18 98.8 °F (37.1 °C) 95 %      MAP       146.33         Physical Exam    Nursing note and vitals reviewed.  Constitutional: She appears well-developed and well-nourished. She is not diaphoretic. No distress.   HENT:   Head: Normocephalic and atraumatic.   Neck: Normal range of motion. Neck supple.   Cardiovascular: Normal rate, regular rhythm, normal heart sounds and intact distal pulses. Exam reveals no gallop and no friction rub.    No murmur heard.  3+ bilateral LE pitting edema   Pulmonary/Chest: Breath sounds normal. No respiratory distress. She has no wheezes. She has no rhonchi. She has no rales.   Dialysis catheter noted to R chest wall. No tenderness over catheter insertion site.   Abdominal: Soft. Bowel sounds are normal. There is no tenderness. There is no rebound and no guarding.   Musculoskeletal: Normal range of motion.   Neurological: She is alert and oriented to person, place, and time.   Skin: Skin is warm and dry. No rash noted. No erythema.   Psychiatric: She has a normal mood and affect.         ED Course   Procedures  Labs Reviewed   CBC W/ AUTO DIFFERENTIAL - Abnormal; Notable for the following:        Result Value    RBC 2.71 (*)     Hemoglobin 7.2 (*)     Hematocrit 22.9 (*)     MCH 26.6 (*)     MCHC 31.4 (*)     RDW 15.8 (*)     All other components within normal limits   COMPREHENSIVE METABOLIC PANEL - Abnormal; Notable for the following:     CO2 21 (*)     Glucose 461 (*)     BUN, Bld 62 (*)     Creatinine 4.6 (*)     Calcium 8.1 (*)     Albumin 2.4 (*)     Alkaline Phosphatase 228 (*)     AST 9 (*)     ALT 7 (*)     eGFR if  11.8 (*)     eGFR if non  10.3 (*)     All other components within normal limits   POCT  GLUCOSE - Abnormal; Notable for the following:     POCT Glucose 293 (*)     All other components within normal limits   BETA - HYDROXYBUTYRATE, SERUM   POCT GLUCOSE MONITORING CONTINUOUS   POCT GLUCOSE MONITORING CONTINUOUS        Imaging Results          X-Ray Chest PA And Lateral (Final result)  Result time 09/13/17 13:02:23    Final result by Neal Couch MD (09/13/17 13:02:23)                 Impression:     No acute findings      Electronically signed by: Neal Couch MD  Date:     09/13/17  Time:    13:02              Narrative:    Cardiac size is normal.  Vascular catheter is seen its tip in superior vena cava.  Lungs are clear with no acute abnormalities.                                 Medical Decision Making:   History:   Old Medical Records: I decided to obtain old medical records.  Clinical Tests:   Lab Tests: Ordered and Reviewed  Radiological Study: Ordered and Reviewed  Other:   I have discussed this case with another health care provider.       APC / Resident Notes:   52-year-old -American female past medical history of diabetes, hypertension, hyperlipidemia,  seizures, ESRD on HD TTS presents to the ED complaining of shortness of breath and lower extremity edema.  Hypertensive, patient has not taken any of her morning medications.  Regular rhythm.  Lungs are clear.  Abdomen is soft and nontender.  Dialysis catheter noted in the right chest wall without any tenderness around the insertion site.  3+ bilateral lower extremity pitting edema noted.  Differential diagnosis includes but is not limited to pneumonia, DKA, fluid overload, need for dialysis.  Will obtain labs and chest x-ray likely admit for dialysis.    CBC with no leukocytosis. Stable anemia. Potassium WNL. Glucose 461, normal anion gap. Beta hydroxybutyrate 0.0.    CXR with no acute abnormalities.    Will place in observation for dialysis.    Spoke with nephrology, recommend 100 Lasix IV. Order placed.       Scribe  Attestation:   Scribe #1: I performed the above scribed service and the documentation accurately describes the services I performed. I attest to the accuracy of the note.    Attending Attestation:     Physician Attestation Statement for NP/PA:   I discussed this assessment and plan of this patient with the NP/PA, but I did not personally examine the patient. The face to face encounter was performed by the NP/PA.        Physician Attestation for Scribe:  Physician Attestation Statement for Scribe #1: I, Dr. Sommers, reviewed documentation, as scribed by Bharti Hutchison in my presence, and it is both accurate and complete.                 ED Course      Clinical Impression:   The primary encounter diagnosis was Hypervolemia, unspecified hypervolemia type. Diagnoses of ESRD on dialysis and ESRD (end stage renal disease) were also pertinent to this visit.    Disposition:   Disposition: Placed in Observation  Condition: Octaviano Long PA-C  09/13/17 0276

## 2017-09-13 NOTE — ED NOTES
Pt identifiers Eufemia MAT Severino were checked and are correct  LOC: The patient is awake, alert, aware of environment with an appropriate affect. Oriented x3, speaking appropriately  APPEARANCE: Pt resting comfortably, in no acute distress, pt is clean and well groomed, clothing properly fastened  SKIN: Skin warm, dry and intact, normal skin turgor, moist mucus membranes  RESPIRATORY: Airway is open and patent, respirations are spontaneous, even and unlabored, normal effort and rate Crackles auscultated to all lung fields   CARDIAC: Normal rate and rhythm, 2 +  peripheral edema noted, capillary refill < 3 seconds, bilateral radial pulses 2+  ABDOMEN: Soft, nontender, nondistended. Bowel sounds present to all four quad of abd on auscultation  NEUROLOGIC: facial expression is symmetrical, patient moving all extremities spontaneously, normal sensation in all extremities when touched with a finger.  Follows all commands appropriately  MUSCULOSKELETAL: No obvious deformities.

## 2017-09-13 NOTE — ASSESSMENT & PLAN NOTE
- /112 in ED. Pt had not taken any medications today. Given regular home htn meds  - Continue losartan-hctz 50-12.5 mg daily, lopressor 50 mg bid, nifedipine 60 mg daily

## 2017-09-13 NOTE — SUBJECTIVE & OBJECTIVE
Past Medical History:   Diagnosis Date    Anemia     during pregnancys    Arthritis     neuropathy hands feet and legs//    Blood transfusion     Chronic pain     CKD (chronic kidney disease), stage IV     Diabetes mellitus     Diabetes mellitus type I     Diabetic retinopathy     Hyperlipidemia     Hypertension     patient states that when her blood sugar increases her blood pressure increases    Neuropathy     Seizures     when sugar is high, does not quite remember    Vitamin D deficiency     Vitamin D deficiency disease        Past Surgical History:   Procedure Laterality Date     SECTION, CLASSIC      x 2    EYE SURGERY      HYSTERECTOMY         Review of patient's allergies indicates:   Allergen Reactions    Ciprofloxacin (bulk) Itching    Latex Itching and Other (See Comments)     Very low Oxygen    Vancomycin Shortness Of Breath and Itching    Neurontin [gabapentin] Other (See Comments)     Seizure like activity    Niacin Itching and Other (See Comments)     Burning      Bactrim [sulfamethoxazole-trimethoprim] Rash     Current Facility-Administered Medications   Medication Frequency    0.9%  NaCl infusion PRN    0.9%  NaCl infusion Once    acetaminophen tablet 650 mg Q6H PRN    dextrose 50% injection 12.5 g PRN    dextrose 50% injection 25 g PRN    diphenoxylate-atropine 2.5-0.025 mg per tablet 1 tablet BID PRN    glucagon (human recombinant) injection 1 mg PRN    glucose chewable tablet 16 g PRN    glucose chewable tablet 24 g PRN    heparin (porcine) injection 1,000 Units PRN    insulin aspart pen 0-5 Units QID (AC + HS) PRN    insulin aspart pen 3 Units TIDWM    insulin detemir pen 12 Units QHS    [START ON 2017] losartan-hydrochlorothiazide 50-12.5 mg per tablet 1 tablet Daily    metoclopramide HCl tablet 10 mg TID AC    metoprolol tartrate (LOPRESSOR) tablet 50 mg BID    [START ON 2017] nifedipine 24 hr tablet 60 mg Daily    ondansetron  disintegrating tablet 4 mg Q8H PRN    polyethylene glycol packet 17 g Q12H PRN    rosuvastatin tablet 20 mg Daily    zolpidem tablet 10 mg Nightly PRN     Current Outpatient Prescriptions   Medication    acetaminophen-codeine 300-60mg (TYLENOL #4) 300-60 mg Tab    blood sugar diagnostic Strp    diphenoxylate-atropine 2.5-0.025 mg (LOMOTIL) 2.5-0.025 mg per tablet    insulin aspart (NOVOLOG) 100 unit/mL InPn pen    insulin detemir (LEVEMIR FLEXTOUCH) 100 unit/mL (3 mL) SubQ InPn pen    metoclopramide HCl (REGLAN) 10 MG tablet    metoprolol tartrate (LOPRESSOR) 50 MG tablet    nifedipine (ADALAT CC) 60 MG TbSR    rosuvastatin (CRESTOR) 20 MG tablet    zolpidem (AMBIEN) 10 mg Tab    losartan-hydrochlorothiazide 50-12.5 mg (HYZAAR) 50-12.5 mg per tablet    ondansetron (ZOFRAN-ODT) 4 MG TbDL     Family History     Problem Relation (Age of Onset)    Asthma Father    Blindness Mother, Maternal Grandmother    Breast cancer Daughter    Cancer Cousin    Cataracts Maternal Grandmother    Diabetes Mother, Father, Sister    Glaucoma Maternal Grandmother    Heart disease Mother    Hypertension Mother, Father, Maternal Grandmother    Stroke Maternal Uncle    Thyroid disease Sister        Social History Main Topics    Smoking status: Current Some Day Smoker     Packs/day: 0.10     Years: 10.00     Types: Cigarettes    Smokeless tobacco: Never Used      Comment: 1 pack last her about 2-3 months    Alcohol use No    Drug use: No    Sexual activity: Yes     Partners: Male     Birth control/ protection: None     Review of Systems   Constitutional: Positive for appetite change, fatigue and unexpected weight change. Negative for fever.   HENT: Negative for facial swelling, hearing loss and tinnitus.    Eyes: Negative for visual disturbance.   Respiratory: Negative for chest tightness and shortness of breath.    Cardiovascular: Negative for chest pain and leg swelling.   Gastrointestinal: Negative for abdominal pain  and nausea.   Genitourinary: Negative for difficulty urinating, dysuria and flank pain.   Musculoskeletal: Negative for arthralgias and myalgias.   Skin: Negative for color change.   Neurological: Positive for dizziness, light-headedness and headaches. Negative for syncope and weakness.   Psychiatric/Behavioral: Negative for behavioral problems and confusion.     Objective:     Vital Signs (Most Recent):  Temp: 98.8 °F (37.1 °C) (09/13/17 1120)  Pulse: 68 (09/13/17 1404)  Resp: 20 (09/13/17 1404)  BP: (!) 241/112 (09/13/17 1404)  SpO2: 98 % (room air) (09/13/17 1404)  O2 Device (Oxygen Therapy): room air (09/13/17 1120) Vital Signs (24h Range):  Temp:  [98.8 °F (37.1 °C)] 98.8 °F (37.1 °C)  Pulse:  [68-84] 68  Resp:  [18-20] 20  SpO2:  [95 %-98 %] 98 %  BP: (229-241)/(105-112) 241/112     Weight: 61.2 kg (135 lb) (09/13/17 1120)  Body mass index is 21.14 kg/m².  Body surface area is 1.7 meters squared.    No intake/output data recorded.    Physical Exam    Significant Labs:  BMP:   Recent Labs  Lab 09/13/17  1243   *      CO2 21*   BUN 62*   CREATININE 4.6*   CALCIUM 8.1*     Cardiac Markers: No results for input(s): CKMB, TROPONINT, MYOGLOBIN in the last 168 hours.  CBC:   Recent Labs  Lab 09/13/17  1243   WBC 5.36   RBC 2.71*   HGB 7.2*   HCT 22.9*      MCV 85   MCH 26.6*   MCHC 31.4*     CMP:   Recent Labs  Lab 09/13/17  1243   *   CALCIUM 8.1*   ALBUMIN 2.4*   PROT 6.3      K 4.4   CO2 21*      BUN 62*   CREATININE 4.6*   ALKPHOS 228*   ALT 7*   AST 9*   BILITOT 0.2     All labs within the past 24 hours have been reviewed.    Significant Imaging:  Labs: Reviewed  ECG: Reviewed  X-Ray: Reviewed
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
## 2017-09-13 NOTE — ASSESSMENT & PLAN NOTE
- Pt was admitted here to ICU for DKA last week. Presents today with hyperglycemia without DKA (anion gap WNL, beta hydroxy WNL).  on arrival. 6U Novolog given in ED  - Home regimen: 15U Levemir and 5U tid with meals plus corrections scale (stopped using pump ~2 yrs ago)  - Hospital regimen on admission: 12 units detemir nightly and 3 units levemir tid with meals. Will adjust as needed today due to receiving 6 units in ED  - Hypo/hyperglycemia protocol in place and low dose SSI  - HbA1c 10.7 on 8/26/17

## 2017-09-13 NOTE — ASSESSMENT & PLAN NOTE
ESRD on IHD TTS   Out patient [facility]  On HD for: several; ears  Duration of outpatient dialysis session - 4 hrs  EDW -   Residual Renal Function - yes  - Will provide dialysis for metabolic clearance and volume management x 3 hrs  - Seen and examined today during hemodialysis; tolerating treatment well without issues. Denied headaches, chest pain, abdominal pain, or muscle cramps   -  ml/min  - Target ultrafiltration 3*4 lts as tolerated keep MAP > 65  - Dialysate adjusted to current labs   Access: [facility]

## 2017-09-13 NOTE — ASSESSMENT & PLAN NOTE
Advanced diabetic retinopathy OU with new vitreal hemorrhages, pre-retinal hemorrhages, possible superior tractional RD OD with relatively net onset vision loss OU.   Pt seen by ophtho most recent admission with follow up appt 9/28/17.  Pt denies any new visual changes or worsening of vision since last admission.
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
## 2017-09-13 NOTE — HOSPITAL COURSE
Pt admitted to observation for hypervolemia secondary to missed HD x 2.  On admission pt with HTN urgency and hyperglycemia without DKA (anion gap WNL, beta hydroxy WNL).  Nephrology consulted, HD initiated and 2.5L removed.   on arrival and pt given 6U Novolog in the ED.  Hyper/hypoglycemia protocols in place.  Day 2 HD with 4L removed.  1U PRBC given for hgb 6.7.  Nephrology recommend epo on HD days.  No anion gap on BMP.  Pt to resume HD in [place] on Saturday as scheduled.  Pt will keep follow up appointments in [place] with endocrinology and ophtho as scheduled.  Pt to continue levemir 15U qhs and novolog 5U tid with meals plus correction scale.  Diet restrictions to include 1500 ml fluid restriction (renal diabetic diet).

## 2017-09-13 NOTE — ASSESSMENT & PLAN NOTE
Hypervolemia secondary to missed HD x 2 sessions  - On admission: K 4.4, Cr 4.6, BUN 62  - Consulted nephrology for HD  - Resume regular dialysis schedule once able to return home to [place]

## 2017-09-13 NOTE — SUBJECTIVE & OBJECTIVE
Past Medical History:   Diagnosis Date    Anemia     during pregnancys    Arthritis     neuropathy hands feet and legs//    Blood transfusion     Chronic pain     CKD (chronic kidney disease), stage IV     Diabetes mellitus     Diabetes mellitus type I     Diabetic retinopathy     Hyperlipidemia     Hypertension     patient states that when her blood sugar increases her blood pressure increases    Neuropathy     Seizures     when sugar is high, does not quite remember    Vitamin D deficiency     Vitamin D deficiency disease        Past Surgical History:   Procedure Laterality Date     SECTION, CLASSIC      x 2    EYE SURGERY      HYSTERECTOMY         Review of patient's allergies indicates:   Allergen Reactions    Ciprofloxacin (bulk) Itching    Latex Itching and Other (See Comments)     Very low Oxygen    Vancomycin Shortness Of Breath and Itching    Neurontin [gabapentin] Other (See Comments)     Seizure like activity    Niacin Itching and Other (See Comments)     Burning      Bactrim [sulfamethoxazole-trimethoprim] Rash       No current facility-administered medications on file prior to encounter.      Current Outpatient Prescriptions on File Prior to Encounter   Medication Sig    acetaminophen-codeine 300-60mg (TYLENOL #4) 300-60 mg Tab Take 2 tablets by mouth daily as needed (pain).    blood sugar diagnostic Strp To use as directed with True results meter and monitor BS 6 times daily - fluctuating BS    diphenoxylate-atropine 2.5-0.025 mg (LOMOTIL) 2.5-0.025 mg per tablet Take 1 tablet by mouth 2 (two) times daily as needed for Diarrhea.     insulin aspart (NOVOLOG) 100 unit/mL InPn pen Inject 5 Units into the skin 3 (three) times daily with meals.    insulin detemir (LEVEMIR FLEXTOUCH) 100 unit/mL (3 mL) SubQ InPn pen Inject 15 Units into the skin once daily.    metoclopramide HCl (REGLAN) 10 MG tablet Take 10 mg by mouth 3 (three) times daily before meals.    metoprolol  tartrate (LOPRESSOR) 50 MG tablet Take 50 mg by mouth 2 (two) times daily.    nifedipine (ADALAT CC) 60 MG TbSR Take 60 mg by mouth 2 (two) times daily.    rosuvastatin (CRESTOR) 20 MG tablet Take 20 mg by mouth once daily.    zolpidem (AMBIEN) 10 mg Tab Take 10 mg by mouth nightly as needed for Insomnia.     losartan-hydrochlorothiazide 50-12.5 mg (HYZAAR) 50-12.5 mg per tablet Take 1 tablet by mouth once daily.    ondansetron (ZOFRAN-ODT) 4 MG TbDL Take 1-2 tablets by mouth every 4 hours as needed for nausea and vomiting     Family History     Problem Relation (Age of Onset)    Asthma Father    Blindness Mother, Maternal Grandmother    Breast cancer Daughter    Cancer Cousin    Cataracts Maternal Grandmother    Diabetes Mother, Father, Sister    Glaucoma Maternal Grandmother    Heart disease Mother    Hypertension Mother, Father, Maternal Grandmother    Stroke Maternal Uncle    Thyroid disease Sister        Social History Main Topics    Smoking status: Current Some Day Smoker     Packs/day: 0.10     Years: 10.00     Types: Cigarettes    Smokeless tobacco: Never Used      Comment: 1 pack last her about 2-3 months    Alcohol use No    Drug use: No    Sexual activity: Yes     Partners: Male     Birth control/ protection: None     Review of Systems   Constitutional: Negative for activity change, chills, fatigue and fever.   HENT: Negative for congestion, rhinorrhea, sore throat and tinnitus.    Eyes: Positive for visual disturbance (vision loss). Negative for photophobia, pain and redness.   Respiratory: Positive for shortness of breath. Negative for cough, chest tightness and wheezing.    Cardiovascular: Positive for leg swelling. Negative for chest pain and palpitations.   Gastrointestinal: Positive for abdominal distention. Negative for abdominal pain, constipation, diarrhea, nausea and vomiting.   Endocrine: Negative for cold intolerance, heat intolerance, polydipsia and polyuria.   Genitourinary:  Negative for decreased urine volume, dysuria, frequency and pelvic pain.   Musculoskeletal: Negative for arthralgias, back pain, myalgias and neck pain.   Skin: Negative for color change, pallor, rash and wound.   Neurological: Negative for dizziness, weakness, light-headedness and headaches.        Chronic neuropathy   Hematological: Negative for adenopathy. Does not bruise/bleed easily.   Psychiatric/Behavioral: Negative for behavioral problems, confusion and dysphoric mood. The patient is not nervous/anxious.      Objective:     Vital Signs (Most Recent):  Temp: 98.8 °F (37.1 °C) (09/13/17 1120)  Pulse: 68 (09/13/17 1404)  Resp: 20 (09/13/17 1404)  BP: (!) 241/112 (09/13/17 1404)  SpO2: 98 % (room air) (09/13/17 1404) Vital Signs (24h Range):  Temp:  [98.8 °F (37.1 °C)] 98.8 °F (37.1 °C)  Pulse:  [68-84] 68  Resp:  [18-20] 20  SpO2:  [95 %-98 %] 98 %  BP: (229-241)/(105-112) 241/112     Weight: 61.2 kg (135 lb)  Body mass index is 21.14 kg/m².    Physical Exam   Constitutional: She is oriented to person, place, and time. She appears well-developed and well-nourished. No distress.   HENT:   Head: Normocephalic and atraumatic.   Mouth/Throat: Oropharynx is clear and moist.   Eyes: Conjunctivae are normal.   Neck: Normal range of motion. Neck supple.   Cardiovascular: Normal rate, regular rhythm, normal heart sounds and intact distal pulses.    No murmur heard.  Pulmonary/Chest: Effort normal and breath sounds normal. She has no wheezes. She has no rhonchi. She has no rales.   Abdominal: Soft. Bowel sounds are normal. She exhibits no distension. There is no tenderness.   Musculoskeletal: She exhibits edema (3+ BLE). She exhibits no tenderness or deformity.   Neurological: She is alert and oriented to person, place, and time.   Skin: Skin is warm. She is not diaphoretic. No erythema.   Psychiatric: She has a normal mood and affect. Her behavior is normal. Thought content normal.        Significant Labs: All pertinent  labs within the past 24 hours have been reviewed.    Significant Imaging: I have reviewed all pertinent imaging results/findings within the past 24 hours.
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
## 2017-09-13 NOTE — ASSESSMENT & PLAN NOTE
ESRD on IHD TTS   Out patient HD Center - [place]  On HD for: several; ears  Duration of outpatient dialysis session - 4 hrs  EDW -   Residual Renal Function - yes  - Will provide dialysis for metabolic clearance and volume management x 3 hrs  - Seen and examined today during hemodialysis; tolerating treatment well without issues. Denied headaches, chest pain, abdominal pain, or muscle cramps   -  ml/min  - Target ultrafiltration 3*4 lts as tolerated keep MAP > 65  - Dialysate adjusted to current labs   Access: RIJ-CVC
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
## 2017-09-14 VITALS
SYSTOLIC BLOOD PRESSURE: 184 MMHG | HEIGHT: 67 IN | DIASTOLIC BLOOD PRESSURE: 81 MMHG | TEMPERATURE: 98 F | BODY MASS INDEX: 21.19 KG/M2 | HEART RATE: 83 BPM | RESPIRATION RATE: 18 BRPM | OXYGEN SATURATION: 98 % | WEIGHT: 135 LBS

## 2017-09-14 PROBLEM — E87.70 HYPERVOLEMIA: Status: RESOLVED | Noted: 2017-09-13 | Resolved: 2017-09-14

## 2017-09-14 LAB
ABO + RH BLD: NORMAL
ALBUMIN SERPL BCP-MCNC: 2.1 G/DL
ANION GAP SERPL CALC-SCNC: 12 MMOL/L
BASOPHILS # BLD AUTO: 0.01 K/UL
BASOPHILS NFR BLD: 0.3 %
BLD GP AB SCN CELLS X3 SERPL QL: NORMAL
BLD PROD TYP BPU: NORMAL
BLOOD UNIT EXPIRATION DATE: NORMAL
BLOOD UNIT TYPE CODE: 6200
BLOOD UNIT TYPE: NORMAL
BUN SERPL-MCNC: 50 MG/DL
CALCIUM SERPL-MCNC: 7.9 MG/DL
CHLORIDE SERPL-SCNC: 105 MMOL/L
CO2 SERPL-SCNC: 20 MMOL/L
CODING SYSTEM: NORMAL
CREAT SERPL-MCNC: 4.1 MG/DL
DIFFERENTIAL METHOD: ABNORMAL
DISPENSE STATUS: NORMAL
EOSINOPHIL # BLD AUTO: 0.2 K/UL
EOSINOPHIL NFR BLD: 4 %
ERYTHROCYTE [DISTWIDTH] IN BLOOD BY AUTOMATED COUNT: 15.9 %
EST. GFR  (AFRICAN AMERICAN): 13.6 ML/MIN/1.73 M^2
EST. GFR  (NON AFRICAN AMERICAN): 11.8 ML/MIN/1.73 M^2
GLUCOSE SERPL-MCNC: 423 MG/DL
HCT VFR BLD AUTO: 21.2 %
HGB BLD-MCNC: 6.7 G/DL
LYMPHOCYTES # BLD AUTO: 1.2 K/UL
LYMPHOCYTES NFR BLD: 33.1 %
MAGNESIUM SERPL-MCNC: 1.9 MG/DL
MCH RBC QN AUTO: 26 PG
MCHC RBC AUTO-ENTMCNC: 31.6 G/DL
MCV RBC AUTO: 82 FL
MONOCYTES # BLD AUTO: 0.3 K/UL
MONOCYTES NFR BLD: 6.7 %
NEUTROPHILS # BLD AUTO: 2.1 K/UL
NEUTROPHILS NFR BLD: 55.4 %
PHOSPHATE SERPL-MCNC: 4.8 MG/DL
PLATELET # BLD AUTO: 180 K/UL
PMV BLD AUTO: 9.9 FL
POCT GLUCOSE: 328 MG/DL (ref 70–110)
POCT GLUCOSE: 395 MG/DL (ref 70–110)
POCT GLUCOSE: 399 MG/DL (ref 70–110)
POCT GLUCOSE: 428 MG/DL (ref 70–110)
POCT GLUCOSE: 467 MG/DL (ref 70–110)
POCT GLUCOSE: 489 MG/DL (ref 70–110)
POTASSIUM SERPL-SCNC: 4.6 MMOL/L
RBC # BLD AUTO: 2.58 M/UL
SODIUM SERPL-SCNC: 137 MMOL/L
TRANS ERYTHROCYTES VOL PATIENT: NORMAL ML
WBC # BLD AUTO: 3.72 K/UL

## 2017-09-14 PROCEDURE — 96372 THER/PROPH/DIAG INJ SC/IM: CPT

## 2017-09-14 PROCEDURE — G0378 HOSPITAL OBSERVATION PER HR: HCPCS

## 2017-09-14 PROCEDURE — 85025 COMPLETE CBC W/AUTO DIFF WBC: CPT

## 2017-09-14 PROCEDURE — 99217 PR OBSERVATION CARE DISCHARGE: CPT | Mod: ,,, | Performed by: PHYSICIAN ASSISTANT

## 2017-09-14 PROCEDURE — 86920 COMPATIBILITY TEST SPIN: CPT

## 2017-09-14 PROCEDURE — 86901 BLOOD TYPING SEROLOGIC RH(D): CPT

## 2017-09-14 PROCEDURE — 63600175 PHARM REV CODE 636 W HCPCS: Performed by: HOSPITALIST

## 2017-09-14 PROCEDURE — P9021 RED BLOOD CELLS UNIT: HCPCS

## 2017-09-14 PROCEDURE — 80069 RENAL FUNCTION PANEL: CPT

## 2017-09-14 PROCEDURE — 36415 COLL VENOUS BLD VENIPUNCTURE: CPT

## 2017-09-14 PROCEDURE — 25000003 PHARM REV CODE 250: Performed by: HOSPITALIST

## 2017-09-14 PROCEDURE — 86900 BLOOD TYPING SEROLOGIC ABO: CPT

## 2017-09-14 PROCEDURE — 83735 ASSAY OF MAGNESIUM: CPT

## 2017-09-14 PROCEDURE — 63600175 PHARM REV CODE 636 W HCPCS: Performed by: PHYSICIAN ASSISTANT

## 2017-09-14 PROCEDURE — 80100016 HC MAINTENANCE HEMODIALYSIS

## 2017-09-14 PROCEDURE — 90935 HEMODIALYSIS ONE EVALUATION: CPT | Mod: ,,, | Performed by: INTERNAL MEDICINE

## 2017-09-14 PROCEDURE — G0257 UNSCHED DIALYSIS ESRD PT HOS: HCPCS

## 2017-09-14 PROCEDURE — 25000003 PHARM REV CODE 250: Performed by: PHYSICIAN ASSISTANT

## 2017-09-14 RX ORDER — SODIUM CHLORIDE 9 MG/ML
INJECTION, SOLUTION INTRAVENOUS ONCE
Status: COMPLETED | OUTPATIENT
Start: 2017-09-14 | End: 2017-09-14

## 2017-09-14 RX ORDER — SODIUM CHLORIDE 9 MG/ML
INJECTION, SOLUTION INTRAVENOUS
Status: DISCONTINUED | OUTPATIENT
Start: 2017-09-14 | End: 2017-09-14

## 2017-09-14 RX ORDER — INSULIN ASPART 100 [IU]/ML
4 INJECTION, SOLUTION INTRAVENOUS; SUBCUTANEOUS
Status: DISCONTINUED | OUTPATIENT
Start: 2017-09-14 | End: 2017-09-14 | Stop reason: HOSPADM

## 2017-09-14 RX ORDER — HYDROCODONE BITARTRATE AND ACETAMINOPHEN 500; 5 MG/1; MG/1
TABLET ORAL
Status: DISCONTINUED | OUTPATIENT
Start: 2017-09-14 | End: 2017-09-14 | Stop reason: HOSPADM

## 2017-09-14 RX ADMIN — METOCLOPRAMIDE 10 MG: 10 TABLET ORAL at 06:09

## 2017-09-14 RX ADMIN — HEPARIN SODIUM 1000 UNITS: 1000 INJECTION, SOLUTION INTRAVENOUS; SUBCUTANEOUS at 11:09

## 2017-09-14 RX ADMIN — ERYTHROPOIETIN 20000 UNITS: 20000 INJECTION, SOLUTION INTRAVENOUS; SUBCUTANEOUS at 11:09

## 2017-09-14 RX ADMIN — INSULIN ASPART 5 UNITS: 100 INJECTION, SOLUTION INTRAVENOUS; SUBCUTANEOUS at 01:09

## 2017-09-14 RX ADMIN — SODIUM CHLORIDE: 0.9 INJECTION, SOLUTION INTRAVENOUS at 09:09

## 2017-09-14 RX ADMIN — INSULIN ASPART 4 UNITS: 100 INJECTION, SOLUTION INTRAVENOUS; SUBCUTANEOUS at 03:09

## 2017-09-14 NOTE — NURSING
Discharge instructions given to patient and patients sister. Patient and patients sister verbalized understanding. Patients IV removed free of complications. Patient escorted off floor in WC free of SS of distress.

## 2017-09-14 NOTE — SUBJECTIVE & OBJECTIVE
Interval History:   Eufemia Haq was seen and examined today during hemodialysis; tolerating treatment well without issues. Denied headaches, chest pain, abdominal pain, or muscle cramps     Review of patient's allergies indicates:   Allergen Reactions    Ciprofloxacin (bulk) Itching    Latex Itching and Other (See Comments)     Very low Oxygen    Vancomycin Shortness Of Breath and Itching    Neurontin [gabapentin] Other (See Comments)     Seizure like activity    Niacin Itching and Other (See Comments)     Burning      Bactrim [sulfamethoxazole-trimethoprim] Rash     Current Facility-Administered Medications   Medication Frequency    0.9%  NaCl infusion (for blood administration) Q24H PRN    0.9%  NaCl infusion PRN    0.9%  NaCl infusion Once    acetaminophen tablet 650 mg Q6H PRN    dextrose 50% injection 12.5 g PRN    dextrose 50% injection 25 g PRN    diphenoxylate-atropine 2.5-0.025 mg per tablet 1 tablet BID PRN    epoetin jacques injection 20,000 Units Every Tues, Thurs, Sat    glucagon (human recombinant) injection 1 mg PRN    glucose chewable tablet 16 g PRN    glucose chewable tablet 24 g PRN    heparin (porcine) injection 1,000 Units PRN    insulin aspart pen 0-5 Units QID (AC + HS) PRN    insulin aspart pen 4 Units TIDWM    insulin detemir pen 15 Units QHS    losartan-hydrochlorothiazide 50-12.5 mg per tablet 1 tablet Daily    metoclopramide HCl tablet 10 mg TID AC    metoprolol tartrate (LOPRESSOR) tablet 50 mg BID    nifedipine 24 hr tablet 60 mg Daily    ondansetron disintegrating tablet 4 mg Q8H PRN    polyethylene glycol packet 17 g Q12H PRN    rosuvastatin tablet 20 mg Daily    zolpidem tablet 10 mg Nightly PRN       Objective:     Vital Signs (Most Recent):  Temp: 98.2 °F (36.8 °C) (09/14/17 0450)  Pulse: 75 (09/14/17 0751)  Resp: 18 (09/14/17 0450)  BP: (!) 165/70 (09/14/17 0450)  SpO2: 95 % (09/14/17 0450)  O2 Device (Oxygen Therapy): room air (09/13/17 2000)  Vital Signs (24h Range):  Temp:  [98.2 °F (36.8 °C)-98.8 °F (37.1 °C)] 98.2 °F (36.8 °C)  Pulse:  [62-84] 75  Resp:  [18-20] 18  SpO2:  [95 %-98 %] 95 %  BP: (136-241)/() 165/70     Weight: 61.2 kg (135 lb) (09/13/17 2316)  Body mass index is 21.14 kg/m².  Body surface area is 1.7 meters squared.    I/O last 3 completed shifts:  In: 700 [Other:700]  Out: 3800 [Other:3800]    Physical Exam   Constitutional: She is oriented to person, place, and time. She appears well-developed and well-nourished.   HENT:   Head: Normocephalic and atraumatic.   Eyes: Pupils are equal, round, and reactive to light.   Neck: Normal range of motion. Neck supple. No JVD present.   Cardiovascular: Normal rate, regular rhythm, normal heart sounds and intact distal pulses.  Exam reveals no gallop and no friction rub.    No murmur heard.  Pulmonary/Chest: Effort normal and breath sounds normal.   Abdominal: Soft. Bowel sounds are normal.   Musculoskeletal: Normal range of motion.   Neurological: She is alert and oriented to person, place, and time.   Skin: Skin is warm and dry. Capillary refill takes 2 to 3 seconds.   Psychiatric: She has a normal mood and affect. Her behavior is normal.       Significant Labs:  BMP:   Recent Labs  Lab 09/14/17  0359   *      CO2 20*   BUN 50*   CREATININE 4.1*   CALCIUM 7.9*   MG 1.9     CBC:   Recent Labs  Lab 09/14/17  0359   WBC 3.72*   RBC 2.58*   HGB 6.7*   HCT 21.2*      MCV 82   MCH 26.0*   MCHC 31.6*     All labs within the past 24 hours have been reviewed.     Significant Imaging:  Labs: Reviewed  X-Ray: Reviewed
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
## 2017-09-14 NOTE — PROGRESS NOTES
Maintenance hemodialysis treatment started to pt right IJ permcath without difficulty. Pt tolerated well.

## 2017-09-14 NOTE — PROGRESS NOTES
Late entry. Pt completed four hour hemodialysis treatment. Net UF 4 liters. Pt received one unit packed red blood cells during treatment. Blood returned with normal saline to right ij permcath,lumens filled with saline, capped and taped. Pt transported via wheelchair from dialysis to room 1156B by transport.

## 2017-09-14 NOTE — PLAN OF CARE
CM informed the patient of hospital follow up appointment scheduled with her PCP, Dr. More Parmar ([address] [facility] [address], [place], [address], (p) [phone], (f) [phone]), on 9/19/17 at 1045. Patient verbalized understanding. Will continue to follow.

## 2017-09-14 NOTE — ASSESSMENT & PLAN NOTE
ESRD on IHD TTS   Out patient HD Center - [place]  On HD for: 5 months  Duration of outpatient dialysis session - 4 hrs  EDW - 65 kg  Residual Renal Function - yes  - Will provide dialysis for metabolic clearance and volume management x 4 hrs  - Seen and examined today during hemodialysis; tolerating treatment well without issues. Denied headaches, chest pain, abdominal pain, or muscle cramps   -  ml/min  - Target ultrafiltration 4 lts as tolerated keep MAP > 65  - Dialysate adjusted to current labs   Access: RIJ-CVC had malfunction yesterday s/p TPa overnight improved today.

## 2017-09-14 NOTE — ASSESSMENT & PLAN NOTE
- Pt was admitted here to ICU for DKA last week. Presents today with hyperglycemia without DKA (anion gap WNL, beta hydroxy WNL).  on arrival. 6U Novolog given in ED  - Home regimen: 15U Levemir and 5U tid with meals plus corrections scale (stopped using pump ~2 yrs ago)  - Hospital regimen on admission: 12 units detemir nightly and 3 units levemir tid with meals. Will adjust as needed today due to receiving 6 units in ED  - Hypo/hyperglycemia protocol in place and low dose SSI  - HbA1c 10.7 on 8/26/17  - Resume home insulin at discharge (levemir 15U qhs and novolog 5U tid with meals plus correction and follow up with endocrine as scheduled)

## 2017-09-14 NOTE — PROGRESS NOTES
HD treatment started. No complications with access to right chest wall catheter. Lines secured and telemetry in place. No complaints of discomfort at this time.

## 2017-09-14 NOTE — PLAN OF CARE
Problem: Patient Care Overview  Goal: Plan of Care Review  Outcome: Ongoing (interventions implemented as appropriate)  HD treatment ended due to poor flowing catheter. Treatment duration 2.5 hours and 3 L removed. Treatment was tolerated well, however, arterial chamber continued to suck down. Catheter was flushed several time, but did not help. Catheter was flushed with NS and locked with TPA to dwell overnight.
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
## 2017-09-14 NOTE — ASSESSMENT & PLAN NOTE
- Hct 22.9 and Hgb 7.2 on arrival (at baseline)  - Day 2 Hgb 6.7, discussed with nephrology and 1U PRBC given and epo added for HD days

## 2017-09-14 NOTE — ASSESSMENT & PLAN NOTE
- Hg 6.8  - Transfuse 1 unit PRBC's  - EPO with HD 75227 us.  - resume Fe supplements at her chronic unit

## 2017-09-14 NOTE — NURSING
Patients /80. IM F notified. MD is ok with blood pressure at this time. Will continue to monitor.

## 2017-09-14 NOTE — ASSESSMENT & PLAN NOTE
Hypervolemia secondary to missed HD x 2 sessions  - On admission: K 4.4, Cr 4.6, BUN 62  - Consulted nephrology for HD: day 1 2.5L; day 2 4L removed  - Resume regular dialysis schedule once able to return home to [place] (TTat)

## 2017-09-15 NOTE — PLAN OF CARE
09/15/17 1536   Final Note   Assessment Type Final Discharge Note     Patient discharged home with no needs 9/14/17. Discharge summary faxed to Dr. More Parmar (PCP) f [phone].

## 2017-09-28 ENCOUNTER — OFFICE VISIT (OUTPATIENT)
Dept: OPHTHALMOLOGY | Facility: CLINIC | Age: 52
End: 2017-09-28
Payer: MEDICARE

## 2017-09-28 VITALS — SYSTOLIC BLOOD PRESSURE: 142 MMHG | HEART RATE: 69 BPM | DIASTOLIC BLOOD PRESSURE: 69 MMHG

## 2017-09-28 DIAGNOSIS — E10.3592 TYPE 1 DIABETES MELLITUS WITH PROLIFERATIVE DIABETIC RETINOPATHY WITHOUT MACULAR EDEMA, LEFT EYE: ICD-10-CM

## 2017-09-28 DIAGNOSIS — E10.3521 TYPE 1 DIABETES MELLITUS WITH PROLIFERATIVE DIABETIC RETINOPATHY WITH TRACTION RETINAL DETACHMENT INVOLVING THE MACULA, RIGHT EYE: Primary | ICD-10-CM

## 2017-09-28 PROCEDURE — 99999 PR PBB SHADOW E&M-EST. PATIENT-LVL III: CPT | Mod: PBBFAC,,, | Performed by: OPHTHALMOLOGY

## 2017-09-28 PROCEDURE — 92250 FUNDUS PHOTOGRAPHY W/I&R: CPT | Mod: PBBFAC | Performed by: OPHTHALMOLOGY

## 2017-09-28 PROCEDURE — 99213 OFFICE O/P EST LOW 20 MIN: CPT | Mod: PBBFAC,25 | Performed by: OPHTHALMOLOGY

## 2017-09-28 PROCEDURE — 92014 COMPRE OPH EXAM EST PT 1/>: CPT | Mod: S$PBB,,, | Performed by: OPHTHALMOLOGY

## 2017-09-28 PROCEDURE — 92134 CPTRZ OPH DX IMG PST SGM RTA: CPT | Mod: PBBFAC | Performed by: OPHTHALMOLOGY

## 2017-09-28 PROCEDURE — 92235 FLUORESCEIN ANGRPH MLTIFRAME: CPT | Mod: 50,PBBFAC | Performed by: OPHTHALMOLOGY

## 2017-09-28 RX ORDER — NIFEDIPINE 30 MG/1
TABLET, FILM COATED, EXTENDED RELEASE ORAL
Refills: 1 | Status: ON HOLD | COMMUNITY
Start: 2017-09-20 | End: 2018-03-21 | Stop reason: HOSPADM

## 2017-09-28 RX ORDER — LEVETIRACETAM 250 MG/1
TABLET ORAL
Refills: 0 | COMMUNITY
Start: 2017-09-20 | End: 2018-07-18

## 2017-09-28 RX ORDER — METOPROLOL TARTRATE 25 MG/1
TABLET, FILM COATED ORAL
Refills: 0 | Status: ON HOLD | COMMUNITY
Start: 2017-09-20 | End: 2018-03-21 | Stop reason: HOSPADM

## 2017-09-28 RX ORDER — HYDRALAZINE HYDROCHLORIDE 25 MG/1
TABLET, FILM COATED ORAL
Refills: 0 | Status: ON HOLD | COMMUNITY
Start: 2017-09-20 | End: 2018-03-21 | Stop reason: HOSPADM

## 2017-09-28 RX ORDER — INSULIN LISPRO 100 [IU]/ML
INJECTION, SOLUTION INTRAVENOUS; SUBCUTANEOUS
Refills: 2 | Status: ON HOLD | COMMUNITY
Start: 2017-08-28 | End: 2018-03-21 | Stop reason: HOSPADM

## 2017-09-28 RX ORDER — NIFEDIPINE 60 MG/1
TABLET, EXTENDED RELEASE ORAL
Refills: 0 | COMMUNITY
Start: 2017-09-20 | End: 2018-07-18

## 2017-09-28 RX ORDER — BUMETANIDE 1 MG/1
TABLET ORAL
Refills: 0 | Status: ON HOLD | COMMUNITY
Start: 2017-09-20 | End: 2018-03-21 | Stop reason: HOSPADM

## 2017-09-28 RX ORDER — CLONIDINE HYDROCHLORIDE 0.1 MG/1
TABLET ORAL
Refills: 0 | Status: ON HOLD | COMMUNITY
Start: 2017-09-20 | End: 2018-03-31 | Stop reason: HOSPADM

## 2017-09-28 RX ORDER — INSULIN DETEMIR 100 [IU]/ML
INJECTION, SOLUTION SUBCUTANEOUS
Refills: 0 | Status: ON HOLD | COMMUNITY
Start: 2017-09-20 | End: 2018-03-21 | Stop reason: HOSPADM

## 2017-09-28 RX ORDER — ZOLPIDEM TARTRATE 5 MG/1
TABLET ORAL
Refills: 0 | Status: ON HOLD | COMMUNITY
Start: 2017-09-20 | End: 2018-03-21 | Stop reason: HOSPADM

## 2017-09-28 RX ORDER — FLUORESCEIN 500 MG/ML
5 INJECTION INTRAVENOUS ONCE
Status: DISCONTINUED | OUTPATIENT
Start: 2017-09-28 | End: 2017-09-28

## 2017-09-28 NOTE — PROGRESS NOTES
HPI     DLS 9/1/17 In hospital consult           4/30/14 Dr. Franco  Pt states while in the hospital for acidosis in late August-September, she   had sudden onset of painless bilateral vision loss.  No f/f.  Vision has   been fluctuating since then (states that a few days ago saw a Bedolla's   sign) but overall doesn't see much at all, only light.  Occasionally sees   shapes (sounds somewhat Tj Bonnet-like)      Admission was for ketoacidosis, hypervolemia, and altered mental status.     Admitted through ED 8/30.  On 9/1 reported that her vision decreased two   days prior.  Seen by resident and Dr. Holder for inpatient consult and   given retina f/u. She was told she had some bleeding in both eyes.  Had   several recent ED visits.    +IDDM x 28 yrs  'baldo yesterday  BS is not stable-  + HTN x 6 mos  On dialysis 6 mos      Last edited by Luiz Lozano MD on 9/28/2017  5:07 PM. (History)      Fundus photos:  OD: media clear, fibrosis and membrane over ONH and superior macula, attenuated vessels  OS: media clear, fibrosis over ONH, attenuated vessels    FA:  OD: arteries full by 16 sec, veins full by 22 sec, NVD and NVE into superior macula, peripheral ischemia  OS: ONH fluorescent leakage/NVD, staining of peripheral laser scars, no NVE    Dairy SDOCT:   OD: good quality, SRF, macula detached, IR cystic changes  OS: good quality, retina attached, photoreceptors intact        Assessment /Plan     For exam results, see Encounter Report.    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  -     Cancel: Posterior Segment OCT Retina-Both eyes  -     Cancel: Flourescein Angiography - OU - Both Eyes; Future    Profound and sudden vision loss OU 1 month ago at time of hospital admission for DKA, hypervolemia, and altered mental status.    Vision loss appears out of proportion to ocular findings.    Would not address TRD OD until and unless can rule out other causes of vision loss (optic nerve and central).  Could potentially benefit from PRP to stabilize process OU    Recommend MRI-pt says this is scheduled by endocrinologist    D/W patient that will need extensive f/u for eye care and pt lives in [place].   Patient states will f/u with retina specialist in [place].  Pt will call for records when has followup arranged.    RTC with me PRN

## 2017-09-28 NOTE — PATIENT INSTRUCTIONS
Fluorescein Angiogram procedure explained to pt. FA handout attached to AVS below.    Fluorescein Angiography  Fluorescein angiography is an eye test. It is done to look at the back of your eye, including:  · The blood vessels in your eye  · The layer of tissue at the back of your eye (the retina)  · The center of your retina (the macula)  · The optic nerve  This test can diagnose diseases found in these areas. It can also diagnose other conditions that affect these areas. To do this test, a dye called fluorescein is shot (injected) into your arm. The dye goes into your bloodstream and up into the blood vessels in your eyes. A special camera is then used to take images (angiograms) of your eyes.     A fluorescein angiogram of the retina   Getting ready for your test  Tell your healthcare provider if you:  · Are pregnant or think you may be pregnant  · Are breastfeeding  · Have a history of severe allergic reactions, including to X-ray dye or other medicines  · Have kidney problems  Tell your provider about any medicines you are taking. You may need to stop taking all or some of these before the test. This includes:  · All prescription medicines  · Over-the-counter medicines such as aspirin or ibuprofen  · Street drugs  · Herbs, vitamins, and other supplements  You should arrange for an adult family member or friend to drive you home after your test. Your vision will be blurry for up to 12 hours.  Follow any other instructions from your healthcare provider.  During your test  · You are given eye drops to enlarge (dilate) your pupils.  · You then sit in front of a special camera. You place your chin on the chin rest and look into the camera.  · Images are taken of your eyes, one eye at a time.  · Fluorescein dye is then injected into your arm. The lights in the room are turned off. You may have mild nausea. You may have a warm feeling in your arm or upper body. Tell your provider if your skin feels itchy or if you  are having trouble breathing. If so, you could be having an allergic reaction to the dye.  · More pictures of your eyes are taken over 15 to 30 minutes. The camera shines a bright light into your eyes. Try to keep your head still and your eyes open.  · When enough images have been taken, the test is over.  After your test  Your vision will be blurry for up to 4 to 12 hours. This is because of your dilated pupils. Your eye will be more sensitive to light for up to 12 hours. You may want to wear sunglasses during this time. Do not drive if your vision is very blurry. You may also find it uncomfortable to read. Your skin may look yellow for a few hours. This is from the dye. Your urine will be bright yellow or orange for 24 to 48 hours after the test.     Risks and possible complications  All procedures have some risks. Possible risks of fluorescein angiography include:  · Upset stomach (nausea) and vomiting  · Leaking dye around the injection site that causes pain and swelling  · Metallic taste in your mouth  · Infection at injection site  · Allergic reaction to the dye  · Dry mouth or too much saliva  · Faster heart rate  · Sweating  · Lower back pain

## 2017-11-21 NOTE — PROGRESS NOTES
[facility] Hospital Medicine  Progress Note    Patient Name: Eufemia Haq  MRN: [id number]  Patient Class: IP- Inpatient   Admission Date: 8/30/2017  Length of Stay: 3 days  Attending Physician: Aguilar Palafox, *  Primary Care Provider: Alecia Delacruz MD    [facility] Medicine Team: Networked reference to record PCT  Sal Joseph MD    Subjective:     Principal Problem:DKA, type 1    HPI:  The patient is a 52 year old female with a history of DM1, seizure, and ESRD on dialysis, who presents with altered mental status and hyperglycemia. The patient was recently discharged from [facility] with an episode of hyperglycemia, in ED at that time her BG was in 700's and beta-hydroxybutyrate was within normal limits. The patient was treated at that time with insulin gtt and underwent HD where 2L was removed.     In ED the patient is minimally responsive but is able to state her name and date of birth. No family is present at this time. Patient's BG is currently 1241, with an anion gap metabolic acidosis, K 6.3 with QRS widening and peaked T waves. ED managed with insulin gtt, fluid replacement, calcium gluconate, and potassium shift. Patient states that she her blood glucose usually runs in the 300-500s. History is limited 2/2 AMS. Per patient's daughter the patient has been increasingly confused since her discharge. She also states that the patient had dialysis yesterday but it was cut short (3 from 4 hours) due to the weather. The patient's daughter also states that as of last night the patient has been having episodes of diarrhea. The patient's daughter states that she makes sure her mother is compliant with her medications and states that since her last discharge the patient has not been experiencing any episodes of vomiting, fever, complaints of chest pain or SOB.         Hospital Course:  ICU: She returned to euglycmia.  Her BG was 112 this AM.  She was successfully transitioned from insulin  Neuro drip to 10U of long acting insulin.  She is getting 5U with meals.  09/01/2017 Patient mentioned she has been blind for the last two days.  CT head reviewed from two days ago and per report no acute changes.   Ophtho consulted.  Hypertensive to SBP >200 , received IV labetalol with better control.  BP improved during the day.  Patient has been an unreliable historian (such as when she says she is not an insulin pump, when her vision loss started, etc)    9/2: Titrating insulin regimen for discharge (anticipated 9/3) with HD chair arrangements being made.     Interval History: Patient reports no acute events; reports compliance with insulin outside prior to admission but remains intermittently hyperglycemia and has been snacking on outside foods per reports from Endocrine.     Review of Systems   Constitutional: Negative for chills, diaphoresis and fever.   HENT: Negative for sinus pressure, sneezing and sore throat.    Eyes: Positive for visual disturbance. Negative for photophobia and redness.   Respiratory: Negative for cough, choking, shortness of breath, wheezing and stridor.    Cardiovascular: Positive for leg swelling. Negative for chest pain and palpitations.   Gastrointestinal: Positive for nausea. Negative for abdominal distention, abdominal pain and diarrhea.   Endocrine: Negative for polydipsia, polyphagia and polyuria.   Genitourinary: Negative for dysuria, flank pain and frequency.   Musculoskeletal: Negative for back pain.   Skin: Negative for rash.   Allergic/Immunologic: Negative for food allergies and immunocompromised state.   Neurological: Negative for dizziness, facial asymmetry, weakness and headaches.   Hematological: Negative for adenopathy. Does not bruise/bleed easily.   Psychiatric/Behavioral: Negative for agitation and behavioral problems.     Objective:     Vital Signs (Most Recent):  Temp: 100 °F (37.8 °C) (09/02/17 1702)  Pulse: 76 (09/02/17 1702)  Resp: 20 (09/02/17 1702)  BP: (!)  180/77 (09/02/17 1702)  SpO2: (!) 9 % (09/02/17 1702) Vital Signs (24h Range):  Temp:  [98 °F (36.7 °C)-100 °F (37.8 °C)] 100 °F (37.8 °C)  Pulse:  [68-90] 76  Resp:  [18-20] 20  SpO2:  [9 %-99 %] 9 %  BP: (121-180)/(58-77) 180/77     Weight: 80 kg (176 lb 5.9 oz)  Body mass index is 28.47 kg/m².    Intake/Output Summary (Last 24 hours) at 09/02/17 1802  Last data filed at 09/02/17 1200   Gross per 24 hour   Intake              550 ml   Output             3450 ml   Net            -2900 ml      Physical Exam   Constitutional: She appears well-developed and well-nourished. No distress.   HENT:   Head: Normocephalic and atraumatic.   Eyes: EOM are normal.   Neck: Normal range of motion. Neck supple.   Cardiovascular: Regular rhythm, normal heart sounds and intact distal pulses.    No murmur heard.  Pulmonary/Chest: Effort normal and breath sounds normal. No respiratory distress. She has no wheezes. She has no rales.   Abdominal: Bowel sounds are normal. She exhibits no distension. There is no tenderness. There is no rebound and no guarding.   Musculoskeletal: She exhibits edema. She exhibits no tenderness.   Skin: Skin is warm and dry.   Bruising noted in left AC, vascular port in RUE with no noted tenderness or erythema   Psychiatric:   lethargic    Vitals reviewed.      Significant Labs:   A1C:   Recent Labs  Lab 08/26/17  1639   HGBA1C 10.7*     CBC:   Recent Labs  Lab 09/01/17  0345 09/02/17  0355 09/02/17  0721   WBC 6.11 6.27 6.87   HGB 7.4* 6.8* 7.3*   HCT 23.1* 21.2* 22.8*    169 171     CMP:   Recent Labs  Lab 09/01/17 2005 09/02/17  0721 09/02/17  0758    137 136   K 4.8 4.9 4.9   CL 99 101 100   CO2 30* 25 25   * 311* 367*   BUN 45* 52* 52*   CREATININE 4.2* 4.2* 4.3*   CALCIUM 8.2* 7.6* 7.6*   PROT 5.8* 5.2* 5.1*   ALBUMIN 2.2* 2.0* 2.0*   BILITOT 0.2 0.2 0.2   ALKPHOS 177* 159* 158*   AST 19 15 15   ALT 18 13 12   ANIONGAP 9 11 11   EGFRNONAA 11.5* 11.5* 11.1*     Cardiac Markers: No  results for input(s): CKMB, MYOGLOBIN, BNP, TROPISTAT in the last 48 hours.  Lipase: No results for input(s): LIPASE in the last 48 hours.  Magnesium:   Recent Labs  Lab 09/01/17  0345 09/02/17  0355   MG 1.7 2.0     All pertinent labs within the past 24 hours have been reviewed.    Significant Imaging: I have reviewed and interpreted all pertinent imaging results/findings within the past 24 hours.    Assessment/Plan:      * DKA, type 1    -resolved in ICU; stepped down to floor with Endocrine providing basal-bolus insulin regimen. Titrated up on basal today given elevated BG  -discussed patient with Endocrine- she becomes symptomatic at BG near normal, controlled range and will likely benefit from permissive hyperglycemia  -patient is an unreliable  and has been snacking on non-hospital food with labile sugars  -will provide home B-B regimen again for discharge and educate to ensure compliance.          Vitreous hemorrhage    -will follow with Optho as outpt.           Type 1 diabetes mellitus with complication    -see DKA note          ESRD (end stage renal disease)       -currently gets dialysis T, Th, S  -patient had dialysis 9/2; remains fluid overloaded  -nephrology consulted for fluid, will follow patient HD needs     -follow up hep panel, HIV, PPD for HD chair placement        Anemia in chronic renal disease    -monitor and transfuse PRN for Hb less than 7          Hypertension    -continue losartan-HCTZ, lopressor, nifedipine          VTE Risk Mitigation         Ordered     heparin (porcine) injection 5,000 Units  Every 8 hours     Route:  Subcutaneous        09/01/17 1106     Medium Risk of VTE  Once      08/30/17 1534              Sal Joseph MD  Department of Hospital Medicine   [facility]

## 2018-03-11 ENCOUNTER — HOSPITAL ENCOUNTER (INPATIENT)
Facility: HOSPITAL | Age: 53
LOS: 8 days | Discharge: NURSING, REHAB OR LONG-TERM CARE | DRG: 689 | End: 2018-03-22
Attending: EMERGENCY MEDICINE | Admitting: HOSPITALIST
Payer: MEDICARE

## 2018-03-11 DIAGNOSIS — E11.43 DM GASTROPARESIS: ICD-10-CM

## 2018-03-11 DIAGNOSIS — K31.84 DM GASTROPARESIS: ICD-10-CM

## 2018-03-11 DIAGNOSIS — E10.65 TYPE 1 DIABETES MELLITUS WITH HYPERGLYCEMIA: ICD-10-CM

## 2018-03-11 DIAGNOSIS — N39.0 UTI (URINARY TRACT INFECTION): ICD-10-CM

## 2018-03-11 DIAGNOSIS — G93.40 ENCEPHALOPATHY: ICD-10-CM

## 2018-03-11 DIAGNOSIS — D63.1 ANEMIA IN CHRONIC KIDNEY DISEASE, ON CHRONIC DIALYSIS: ICD-10-CM

## 2018-03-11 DIAGNOSIS — G93.41 ACUTE METABOLIC ENCEPHALOPATHY: ICD-10-CM

## 2018-03-11 DIAGNOSIS — R41.82 ALTERED MENTAL STATUS, UNSPECIFIED ALTERED MENTAL STATUS TYPE: ICD-10-CM

## 2018-03-11 DIAGNOSIS — E10.65 HYPERGLYCEMIA DUE TO TYPE 1 DIABETES MELLITUS: ICD-10-CM

## 2018-03-11 DIAGNOSIS — N18.6 ANEMIA IN CHRONIC KIDNEY DISEASE, ON CHRONIC DIALYSIS: ICD-10-CM

## 2018-03-11 DIAGNOSIS — I10 ESSENTIAL HYPERTENSION: ICD-10-CM

## 2018-03-11 DIAGNOSIS — E78.00 HYPERCHOLESTEREMIA: ICD-10-CM

## 2018-03-11 DIAGNOSIS — G40.909 SEIZURE DISORDER: Primary | ICD-10-CM

## 2018-03-11 DIAGNOSIS — R41.82 ALTERED MENTAL STATUS: ICD-10-CM

## 2018-03-11 DIAGNOSIS — R73.9 HYPERGLYCEMIA: ICD-10-CM

## 2018-03-11 DIAGNOSIS — N18.6 ESRD (END STAGE RENAL DISEASE): ICD-10-CM

## 2018-03-11 DIAGNOSIS — Z99.2 ANEMIA IN CHRONIC KIDNEY DISEASE, ON CHRONIC DIALYSIS: ICD-10-CM

## 2018-03-11 LAB
ALBUMIN SERPL BCP-MCNC: 3 G/DL
ALLENS TEST: ABNORMAL
ALP SERPL-CCNC: 125 U/L
ALT SERPL W/O P-5'-P-CCNC: 9 U/L
AMMONIA PLAS-SCNC: 28 UMOL/L
AMPHET+METHAMPHET UR QL: NEGATIVE
ANION GAP SERPL CALC-SCNC: 15 MMOL/L
AST SERPL-CCNC: 12 U/L
B-OH-BUTYR BLD STRIP-SCNC: 0 MMOL/L
BACTERIA #/AREA URNS AUTO: ABNORMAL /HPF
BARBITURATES UR QL SCN>200 NG/ML: NEGATIVE
BASOPHILS # BLD AUTO: 0.02 K/UL
BASOPHILS NFR BLD: 0.2 %
BENZODIAZ UR QL SCN>200 NG/ML: NEGATIVE
BILIRUB SERPL-MCNC: 0.4 MG/DL
BILIRUB UR QL STRIP: NEGATIVE
BNP SERPL-MCNC: 279 PG/ML
BUN SERPL-MCNC: 77 MG/DL
BUN SERPL-MCNC: 79 MG/DL (ref 6–30)
BZE UR QL SCN: NEGATIVE
CALCIUM SERPL-MCNC: 9.2 MG/DL
CANNABINOIDS UR QL SCN: NEGATIVE
CHLORIDE SERPL-SCNC: 101 MMOL/L
CHLORIDE SERPL-SCNC: 102 MMOL/L (ref 95–110)
CLARITY UR REFRACT.AUTO: ABNORMAL
CO2 SERPL-SCNC: 22 MMOL/L
COLOR UR AUTO: YELLOW
CREAT SERPL-MCNC: 6.6 MG/DL
CREAT SERPL-MCNC: 7.1 MG/DL (ref 0.5–1.4)
CREAT UR-MCNC: 44 MG/DL
DIFFERENTIAL METHOD: ABNORMAL
EOSINOPHIL # BLD AUTO: 0.1 K/UL
EOSINOPHIL NFR BLD: 0.9 %
ERYTHROCYTE [DISTWIDTH] IN BLOOD BY AUTOMATED COUNT: 14.6 %
EST. GFR  (AFRICAN AMERICAN): 7.6 ML/MIN/1.73 M^2
EST. GFR  (NON AFRICAN AMERICAN): 6.6 ML/MIN/1.73 M^2
GLUCOSE SERPL-MCNC: 450 MG/DL (ref 70–110)
GLUCOSE SERPL-MCNC: 460 MG/DL
GLUCOSE UR QL STRIP: ABNORMAL
HCO3 UR-SCNC: 28.5 MMOL/L (ref 24–28)
HCT VFR BLD AUTO: 25.8 %
HCT VFR BLD CALC: 25 %PCV (ref 36–54)
HGB BLD-MCNC: 8.2 G/DL
HGB UR QL STRIP: ABNORMAL
HYALINE CASTS UR QL AUTO: 0 /LPF
IMM GRANULOCYTES # BLD AUTO: 0.18 K/UL
IMM GRANULOCYTES NFR BLD AUTO: 2 %
KETONES UR QL STRIP: NEGATIVE
LACTATE SERPL-SCNC: 2.2 MMOL/L
LEUKOCYTE ESTERASE UR QL STRIP: ABNORMAL
LYMPHOCYTES # BLD AUTO: 1 K/UL
LYMPHOCYTES NFR BLD: 11.2 %
MCH RBC QN AUTO: 27.8 PG
MCHC RBC AUTO-ENTMCNC: 31.8 G/DL
MCV RBC AUTO: 88 FL
METHADONE UR QL SCN>300 NG/ML: NEGATIVE
MICROSCOPIC COMMENT: ABNORMAL
MONOCYTES # BLD AUTO: 0.8 K/UL
MONOCYTES NFR BLD: 8.9 %
NEUTROPHILS # BLD AUTO: 7.1 K/UL
NEUTROPHILS NFR BLD: 76.8 %
NITRITE UR QL STRIP: NEGATIVE
NON-SQ EPI CELLS #/AREA URNS AUTO: 2 /HPF
NRBC BLD-RTO: 0 /100 WBC
OPIATES UR QL SCN: NEGATIVE
PCO2 BLDA: 48.9 MMHG (ref 35–45)
PCP UR QL SCN>25 NG/ML: NEGATIVE
PH SMN: 7.37 [PH] (ref 7.35–7.45)
PH UR STRIP: 6 [PH] (ref 5–8)
PLATELET # BLD AUTO: 256 K/UL
PMV BLD AUTO: 9.9 FL
PO2 BLDA: 58 MMHG (ref 40–60)
POC BE: 3 MMOL/L
POC IONIZED CALCIUM: 1.16 MMOL/L (ref 1.06–1.42)
POC SATURATED O2: 89 % (ref 95–100)
POC TCO2 (MEASURED): 26 MMOL/L (ref 23–29)
POC TCO2: 30 MMOL/L (ref 24–29)
POCT GLUCOSE: 211 MG/DL (ref 70–110)
POCT GLUCOSE: 290 MG/DL (ref 70–110)
POCT GLUCOSE: 459 MG/DL (ref 70–110)
POTASSIUM BLD-SCNC: 5 MMOL/L (ref 3.5–5.1)
POTASSIUM SERPL-SCNC: 5.2 MMOL/L
PROT SERPL-MCNC: 7 G/DL
PROT UR QL STRIP: ABNORMAL
RBC # BLD AUTO: 2.95 M/UL
RBC #/AREA URNS AUTO: 19 /HPF (ref 0–4)
SAMPLE: ABNORMAL
SAMPLE: ABNORMAL
SITE: ABNORMAL
SODIUM BLD-SCNC: 136 MMOL/L (ref 136–145)
SODIUM SERPL-SCNC: 138 MMOL/L
SP GR UR STRIP: 1.02 (ref 1–1.03)
SQUAMOUS #/AREA URNS AUTO: 5 /HPF
TOXICOLOGY INFORMATION: NORMAL
TROPONIN I SERPL DL<=0.01 NG/ML-MCNC: 0.02 NG/ML
URN SPEC COLLECT METH UR: ABNORMAL
UROBILINOGEN UR STRIP-ACNC: NEGATIVE EU/DL
WBC # BLD AUTO: 9.18 K/UL
WBC #/AREA URNS AUTO: 24 /HPF (ref 0–5)
YEAST UR QL AUTO: ABNORMAL

## 2018-03-11 PROCEDURE — 96365 THER/PROPH/DIAG IV INF INIT: CPT

## 2018-03-11 PROCEDURE — 94761 N-INVAS EAR/PLS OXIMETRY MLT: CPT

## 2018-03-11 PROCEDURE — 80307 DRUG TEST PRSMV CHEM ANLYZR: CPT

## 2018-03-11 PROCEDURE — 25000003 PHARM REV CODE 250: Performed by: NURSE PRACTITIONER

## 2018-03-11 PROCEDURE — 96372 THER/PROPH/DIAG INJ SC/IM: CPT

## 2018-03-11 PROCEDURE — 82140 ASSAY OF AMMONIA: CPT

## 2018-03-11 PROCEDURE — 87040 BLOOD CULTURE FOR BACTERIA: CPT | Mod: 59

## 2018-03-11 PROCEDURE — S0077 INJECTION, CLINDAMYCIN PHOSP: HCPCS | Performed by: PHYSICIAN ASSISTANT

## 2018-03-11 PROCEDURE — 87077 CULTURE AEROBIC IDENTIFY: CPT

## 2018-03-11 PROCEDURE — 99285 EMERGENCY DEPT VISIT HI MDM: CPT | Mod: ,,, | Performed by: EMERGENCY MEDICINE

## 2018-03-11 PROCEDURE — 63600175 PHARM REV CODE 636 W HCPCS: Performed by: PHYSICIAN ASSISTANT

## 2018-03-11 PROCEDURE — 93010 ELECTROCARDIOGRAM REPORT: CPT | Mod: ,,, | Performed by: INTERNAL MEDICINE

## 2018-03-11 PROCEDURE — P9612 CATHETERIZE FOR URINE SPEC: HCPCS

## 2018-03-11 PROCEDURE — G0378 HOSPITAL OBSERVATION PER HR: HCPCS

## 2018-03-11 PROCEDURE — 84484 ASSAY OF TROPONIN QUANT: CPT

## 2018-03-11 PROCEDURE — 82010 KETONE BODYS QUAN: CPT

## 2018-03-11 PROCEDURE — 83605 ASSAY OF LACTIC ACID: CPT

## 2018-03-11 PROCEDURE — 80177 DRUG SCRN QUAN LEVETIRACETAM: CPT

## 2018-03-11 PROCEDURE — 87186 SC STD MICRODIL/AGAR DIL: CPT

## 2018-03-11 PROCEDURE — 25000003 PHARM REV CODE 250: Performed by: PHYSICIAN ASSISTANT

## 2018-03-11 PROCEDURE — 82803 BLOOD GASES ANY COMBINATION: CPT

## 2018-03-11 PROCEDURE — 80053 COMPREHEN METABOLIC PANEL: CPT

## 2018-03-11 PROCEDURE — 82962 GLUCOSE BLOOD TEST: CPT

## 2018-03-11 PROCEDURE — 96375 TX/PRO/DX INJ NEW DRUG ADDON: CPT

## 2018-03-11 PROCEDURE — 99220 PR INITIAL OBSERVATION CARE,LEVL III: CPT | Mod: ,,, | Performed by: NURSE PRACTITIONER

## 2018-03-11 PROCEDURE — 81001 URINALYSIS AUTO W/SCOPE: CPT

## 2018-03-11 PROCEDURE — 83880 ASSAY OF NATRIURETIC PEPTIDE: CPT

## 2018-03-11 PROCEDURE — 87086 URINE CULTURE/COLONY COUNT: CPT

## 2018-03-11 PROCEDURE — 20600001 HC STEP DOWN PRIVATE ROOM

## 2018-03-11 PROCEDURE — 99900035 HC TECH TIME PER 15 MIN (STAT)

## 2018-03-11 PROCEDURE — 63600175 PHARM REV CODE 636 W HCPCS: Performed by: NURSE PRACTITIONER

## 2018-03-11 PROCEDURE — 87088 URINE BACTERIA CULTURE: CPT

## 2018-03-11 PROCEDURE — 99285 EMERGENCY DEPT VISIT HI MDM: CPT | Mod: 25

## 2018-03-11 PROCEDURE — 85025 COMPLETE CBC W/AUTO DIFF WBC: CPT

## 2018-03-11 RX ORDER — CLINDAMYCIN PHOSPHATE 600 MG/50ML
600 INJECTION, SOLUTION INTRAVENOUS
Status: COMPLETED | OUTPATIENT
Start: 2018-03-11 | End: 2018-03-11

## 2018-03-11 RX ORDER — CEFTRIAXONE 1 G/1
1 INJECTION, POWDER, FOR SOLUTION INTRAMUSCULAR; INTRAVENOUS
Status: COMPLETED | OUTPATIENT
Start: 2018-03-11 | End: 2018-03-11

## 2018-03-11 RX ORDER — LEVETIRACETAM 250 MG/1
250 TABLET ORAL 2 TIMES DAILY
Status: DISCONTINUED | OUTPATIENT
Start: 2018-03-11 | End: 2018-03-22 | Stop reason: HOSPADM

## 2018-03-11 RX ORDER — SODIUM CHLORIDE 0.9 % (FLUSH) 0.9 %
5 SYRINGE (ML) INJECTION
Status: DISCONTINUED | OUTPATIENT
Start: 2018-03-11 | End: 2018-03-22 | Stop reason: HOSPADM

## 2018-03-11 RX ORDER — IBUPROFEN 200 MG
24 TABLET ORAL
Status: DISCONTINUED | OUTPATIENT
Start: 2018-03-11 | End: 2018-03-19

## 2018-03-11 RX ORDER — IPRATROPIUM BROMIDE AND ALBUTEROL SULFATE 2.5; .5 MG/3ML; MG/3ML
3 SOLUTION RESPIRATORY (INHALATION) EVERY 4 HOURS PRN
Status: DISCONTINUED | OUTPATIENT
Start: 2018-03-11 | End: 2018-03-22 | Stop reason: HOSPADM

## 2018-03-11 RX ORDER — ROSUVASTATIN CALCIUM 20 MG/1
20 TABLET, COATED ORAL DAILY
Status: DISCONTINUED | OUTPATIENT
Start: 2018-03-12 | End: 2018-03-22 | Stop reason: HOSPADM

## 2018-03-11 RX ORDER — DEXTROSE MONOHYDRATE 25 G/50ML
25 INJECTION, SOLUTION INTRAVENOUS
Status: DISCONTINUED | OUTPATIENT
Start: 2018-03-11 | End: 2018-03-14

## 2018-03-11 RX ORDER — INSULIN ASPART 100 [IU]/ML
0-5 INJECTION, SOLUTION INTRAVENOUS; SUBCUTANEOUS
Status: DISCONTINUED | OUTPATIENT
Start: 2018-03-11 | End: 2018-03-14

## 2018-03-11 RX ORDER — ACETAMINOPHEN 325 MG/1
650 TABLET ORAL EVERY 6 HOURS PRN
Status: DISCONTINUED | OUTPATIENT
Start: 2018-03-11 | End: 2018-03-22 | Stop reason: HOSPADM

## 2018-03-11 RX ORDER — IBUPROFEN 200 MG
16 TABLET ORAL
Status: DISCONTINUED | OUTPATIENT
Start: 2018-03-11 | End: 2018-03-19

## 2018-03-11 RX ORDER — HEPARIN SODIUM 5000 [USP'U]/ML
5000 INJECTION, SOLUTION INTRAVENOUS; SUBCUTANEOUS EVERY 8 HOURS
Status: DISCONTINUED | OUTPATIENT
Start: 2018-03-12 | End: 2018-03-22 | Stop reason: HOSPADM

## 2018-03-11 RX ORDER — NIFEDIPINE 60 MG/1
60 TABLET, EXTENDED RELEASE ORAL EVERY 12 HOURS
Status: DISCONTINUED | OUTPATIENT
Start: 2018-03-11 | End: 2018-03-22 | Stop reason: HOSPADM

## 2018-03-11 RX ORDER — METOCLOPRAMIDE 10 MG/1
10 TABLET ORAL
Status: DISCONTINUED | OUTPATIENT
Start: 2018-03-12 | End: 2018-03-22 | Stop reason: HOSPADM

## 2018-03-11 RX ORDER — AMOXICILLIN 250 MG
1 CAPSULE ORAL 2 TIMES DAILY PRN
Status: DISCONTINUED | OUTPATIENT
Start: 2018-03-11 | End: 2018-03-22 | Stop reason: HOSPADM

## 2018-03-11 RX ORDER — CLONIDINE HYDROCHLORIDE 0.1 MG/1
0.1 TABLET ORAL 2 TIMES DAILY
Status: DISCONTINUED | OUTPATIENT
Start: 2018-03-11 | End: 2018-03-22 | Stop reason: HOSPADM

## 2018-03-11 RX ORDER — METOPROLOL TARTRATE 25 MG/1
50 TABLET, FILM COATED ORAL 2 TIMES DAILY
Status: DISCONTINUED | OUTPATIENT
Start: 2018-03-11 | End: 2018-03-22 | Stop reason: HOSPADM

## 2018-03-11 RX ORDER — LOSARTAN POTASSIUM 50 MG/1
50 TABLET ORAL DAILY
Status: DISCONTINUED | OUTPATIENT
Start: 2018-03-12 | End: 2018-03-21

## 2018-03-11 RX ORDER — ONDANSETRON 8 MG/1
8 TABLET, ORALLY DISINTEGRATING ORAL EVERY 8 HOURS PRN
Status: DISCONTINUED | OUTPATIENT
Start: 2018-03-11 | End: 2018-03-22 | Stop reason: HOSPADM

## 2018-03-11 RX ORDER — ONDANSETRON 2 MG/ML
4 INJECTION INTRAMUSCULAR; INTRAVENOUS EVERY 8 HOURS PRN
Status: DISCONTINUED | OUTPATIENT
Start: 2018-03-11 | End: 2018-03-22 | Stop reason: HOSPADM

## 2018-03-11 RX ORDER — DEXTROSE MONOHYDRATE 25 G/50ML
12.5 INJECTION, SOLUTION INTRAVENOUS
Status: DISCONTINUED | OUTPATIENT
Start: 2018-03-11 | End: 2018-03-14

## 2018-03-11 RX ORDER — GLUCAGON 1 MG
1 KIT INJECTION
Status: DISCONTINUED | OUTPATIENT
Start: 2018-03-11 | End: 2018-03-19

## 2018-03-11 RX ADMIN — SODIUM CHLORIDE 1000 ML: 0.9 INJECTION, SOLUTION INTRAVENOUS at 07:03

## 2018-03-11 RX ADMIN — CEFTRIAXONE SODIUM 1 G: 1 INJECTION, POWDER, FOR SOLUTION INTRAMUSCULAR; INTRAVENOUS at 09:03

## 2018-03-11 RX ADMIN — CLONIDINE HYDROCHLORIDE 0.1 MG: 0.1 TABLET ORAL at 11:03

## 2018-03-11 RX ADMIN — NIFEDIPINE 60 MG: 60 TABLET, FILM COATED, EXTENDED RELEASE ORAL at 11:03

## 2018-03-11 RX ADMIN — INSULIN HUMAN 5 UNITS: 100 INJECTION, SOLUTION PARENTERAL at 09:03

## 2018-03-11 RX ADMIN — CLINDAMYCIN IN 5 PERCENT DEXTROSE 600 MG: 12 INJECTION, SOLUTION INTRAVENOUS at 10:03

## 2018-03-11 RX ADMIN — INSULIN DETEMIR 15 UNITS: 100 INJECTION, SOLUTION SUBCUTANEOUS at 11:03

## 2018-03-11 RX ADMIN — METOPROLOL TARTRATE 50 MG: 50 TABLET ORAL at 11:03

## 2018-03-11 NOTE — ED TRIAGE NOTES
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

## 2018-03-12 PROBLEM — G93.40 ENCEPHALOPATHY: Status: ACTIVE | Noted: 2018-03-12

## 2018-03-12 PROBLEM — G40.909 SEIZURE DISORDER: Status: ACTIVE | Noted: 2018-03-12

## 2018-03-12 PROBLEM — E46 PROTEIN CALORIE MALNUTRITION: Status: ACTIVE | Noted: 2018-03-12

## 2018-03-12 LAB
25(OH)D3+25(OH)D2 SERPL-MCNC: 6 NG/ML
ALBUMIN SERPL BCP-MCNC: 2.7 G/DL
ALP SERPL-CCNC: 113 U/L
ALT SERPL W/O P-5'-P-CCNC: 7 U/L
ANION GAP SERPL CALC-SCNC: 17 MMOL/L
AST SERPL-CCNC: 13 U/L
BASOPHILS # BLD AUTO: 0.05 K/UL
BASOPHILS NFR BLD: 0.6 %
BILIRUB SERPL-MCNC: 0.4 MG/DL
BUN SERPL-MCNC: 80 MG/DL
CALCIUM SERPL-MCNC: 9 MG/DL
CHLORIDE SERPL-SCNC: 104 MMOL/L
CO2 SERPL-SCNC: 21 MMOL/L
CREAT SERPL-MCNC: 6.6 MG/DL
DIFFERENTIAL METHOD: ABNORMAL
EOSINOPHIL # BLD AUTO: 0.3 K/UL
EOSINOPHIL NFR BLD: 3.2 %
ERYTHROCYTE [DISTWIDTH] IN BLOOD BY AUTOMATED COUNT: 14.7 %
EST. GFR  (AFRICAN AMERICAN): 7.6 ML/MIN/1.73 M^2
EST. GFR  (NON AFRICAN AMERICAN): 6.6 ML/MIN/1.73 M^2
ESTIMATED AVG GLUCOSE: 214 MG/DL
FERRITIN SERPL-MCNC: 1103 NG/ML
FOLATE SERPL-MCNC: 8.6 NG/ML
GLUCOSE SERPL-MCNC: 67 MG/DL
HBA1C MFR BLD HPLC: 9.1 %
HCT VFR BLD AUTO: 26.1 %
HGB BLD-MCNC: 8 G/DL
IMM GRANULOCYTES # BLD AUTO: 0.15 K/UL
IMM GRANULOCYTES NFR BLD AUTO: 1.9 %
IRON SERPL-MCNC: 131 UG/DL
LYMPHOCYTES # BLD AUTO: 1.5 K/UL
LYMPHOCYTES NFR BLD: 19.1 %
MAGNESIUM SERPL-MCNC: 2.2 MG/DL
MCH RBC QN AUTO: 27.4 PG
MCHC RBC AUTO-ENTMCNC: 30.7 G/DL
MCV RBC AUTO: 89 FL
MONOCYTES # BLD AUTO: 0.8 K/UL
MONOCYTES NFR BLD: 9.9 %
NEUTROPHILS # BLD AUTO: 5.1 K/UL
NEUTROPHILS NFR BLD: 65.3 %
NRBC BLD-RTO: 0 /100 WBC
PHOSPHATE SERPL-MCNC: 4.8 MG/DL
PLATELET # BLD AUTO: 257 K/UL
PMV BLD AUTO: 10 FL
POCT GLUCOSE: 171 MG/DL (ref 70–110)
POCT GLUCOSE: 194 MG/DL (ref 70–110)
POCT GLUCOSE: 262 MG/DL (ref 70–110)
POCT GLUCOSE: 354 MG/DL (ref 70–110)
POTASSIUM SERPL-SCNC: 4.4 MMOL/L
PROT SERPL-MCNC: 6.4 G/DL
PTH-INTACT SERPL-MCNC: 226 PG/ML
RBC # BLD AUTO: 2.92 M/UL
SATURATED IRON: 42 %
SODIUM SERPL-SCNC: 142 MMOL/L
TOTAL IRON BINDING CAPACITY: 311 UG/DL
TRANSFERRIN SERPL-MCNC: 210 MG/DL
TRANSFERRIN SERPL-MCNC: 210 MG/DL
TSH SERPL DL<=0.005 MIU/L-ACNC: 1.71 UIU/ML
VIT B12 SERPL-MCNC: 421 PG/ML
WBC # BLD AUTO: 7.86 K/UL

## 2018-03-12 PROCEDURE — 97166 OT EVAL MOD COMPLEX 45 MIN: CPT

## 2018-03-12 PROCEDURE — 97535 SELF CARE MNGMENT TRAINING: CPT

## 2018-03-12 PROCEDURE — 97162 PT EVAL MOD COMPLEX 30 MIN: CPT

## 2018-03-12 PROCEDURE — G8979 MOBILITY GOAL STATUS: HCPCS | Mod: CJ

## 2018-03-12 PROCEDURE — 83970 ASSAY OF PARATHORMONE: CPT

## 2018-03-12 PROCEDURE — 25000003 PHARM REV CODE 250: Performed by: NURSE PRACTITIONER

## 2018-03-12 PROCEDURE — 99225 PR SUBSEQUENT OBSERVATION CARE,LEVEL II: CPT | Mod: ,,, | Performed by: PHYSICIAN ASSISTANT

## 2018-03-12 PROCEDURE — 84100 ASSAY OF PHOSPHORUS: CPT

## 2018-03-12 PROCEDURE — 82746 ASSAY OF FOLIC ACID SERUM: CPT

## 2018-03-12 PROCEDURE — 83735 ASSAY OF MAGNESIUM: CPT

## 2018-03-12 PROCEDURE — 82306 VITAMIN D 25 HYDROXY: CPT

## 2018-03-12 PROCEDURE — 82728 ASSAY OF FERRITIN: CPT

## 2018-03-12 PROCEDURE — G0378 HOSPITAL OBSERVATION PER HR: HCPCS

## 2018-03-12 PROCEDURE — 63600175 PHARM REV CODE 636 W HCPCS: Mod: JG | Performed by: NURSE PRACTITIONER

## 2018-03-12 PROCEDURE — 85025 COMPLETE CBC W/AUTO DIFF WBC: CPT

## 2018-03-12 PROCEDURE — 84443 ASSAY THYROID STIM HORMONE: CPT

## 2018-03-12 PROCEDURE — 90935 HEMODIALYSIS ONE EVALUATION: CPT

## 2018-03-12 PROCEDURE — 80053 COMPREHEN METABOLIC PANEL: CPT

## 2018-03-12 PROCEDURE — 82607 VITAMIN B-12: CPT

## 2018-03-12 PROCEDURE — G8988 SELF CARE GOAL STATUS: HCPCS | Mod: CJ

## 2018-03-12 PROCEDURE — G8987 SELF CARE CURRENT STATUS: HCPCS | Mod: CK

## 2018-03-12 PROCEDURE — 99214 OFFICE O/P EST MOD 30 MIN: CPT | Mod: ,,, | Performed by: INTERNAL MEDICINE

## 2018-03-12 PROCEDURE — 83540 ASSAY OF IRON: CPT

## 2018-03-12 PROCEDURE — 63600175 PHARM REV CODE 636 W HCPCS: Performed by: NURSE PRACTITIONER

## 2018-03-12 PROCEDURE — G8978 MOBILITY CURRENT STATUS: HCPCS | Mod: CL

## 2018-03-12 PROCEDURE — 83036 HEMOGLOBIN GLYCOSYLATED A1C: CPT

## 2018-03-12 PROCEDURE — 36415 COLL VENOUS BLD VENIPUNCTURE: CPT

## 2018-03-12 PROCEDURE — 63600175 PHARM REV CODE 636 W HCPCS: Performed by: PHYSICIAN ASSISTANT

## 2018-03-12 PROCEDURE — 20600001 HC STEP DOWN PRIVATE ROOM

## 2018-03-12 RX ORDER — INSULIN ASPART 100 [IU]/ML
4 INJECTION, SOLUTION INTRAVENOUS; SUBCUTANEOUS
Status: DISCONTINUED | OUTPATIENT
Start: 2018-03-12 | End: 2018-03-14

## 2018-03-12 RX ORDER — SODIUM CHLORIDE 9 MG/ML
INJECTION, SOLUTION INTRAVENOUS
Status: DISCONTINUED | OUTPATIENT
Start: 2018-03-12 | End: 2018-03-22 | Stop reason: HOSPADM

## 2018-03-12 RX ORDER — SEVELAMER CARBONATE 800 MG/1
800 TABLET, FILM COATED ORAL
Status: DISCONTINUED | OUTPATIENT
Start: 2018-03-12 | End: 2018-03-18

## 2018-03-12 RX ORDER — CEFTRIAXONE 1 G/1
1 INJECTION, POWDER, FOR SOLUTION INTRAMUSCULAR; INTRAVENOUS
Status: DISCONTINUED | OUTPATIENT
Start: 2018-03-12 | End: 2018-03-16

## 2018-03-12 RX ORDER — SODIUM CHLORIDE 9 MG/ML
INJECTION, SOLUTION INTRAVENOUS ONCE
Status: COMPLETED | OUTPATIENT
Start: 2018-03-12 | End: 2018-03-12

## 2018-03-12 RX ORDER — HYDRALAZINE HYDROCHLORIDE 25 MG/1
25 TABLET, FILM COATED ORAL EVERY 8 HOURS PRN
Status: DISCONTINUED | OUTPATIENT
Start: 2018-03-12 | End: 2018-03-22 | Stop reason: HOSPADM

## 2018-03-12 RX ADMIN — HEPARIN SODIUM 5000 UNITS: 5000 INJECTION, SOLUTION INTRAVENOUS; SUBCUTANEOUS at 01:03

## 2018-03-12 RX ADMIN — INSULIN ASPART 4 UNITS: 100 INJECTION, SOLUTION INTRAVENOUS; SUBCUTANEOUS at 05:03

## 2018-03-12 RX ADMIN — SODIUM CHLORIDE: 0.9 INJECTION, SOLUTION INTRAVENOUS at 02:03

## 2018-03-12 RX ADMIN — ROSUVASTATIN CALCIUM 20 MG: 20 TABLET, FILM COATED ORAL at 09:03

## 2018-03-12 RX ADMIN — ERYTHROPOIETIN 6000 UNITS: 20000 INJECTION, SOLUTION INTRAVENOUS; SUBCUTANEOUS at 06:03

## 2018-03-12 RX ADMIN — METOPROLOL TARTRATE 50 MG: 50 TABLET ORAL at 09:03

## 2018-03-12 RX ADMIN — INSULIN ASPART 3 UNITS: 100 INJECTION, SOLUTION INTRAVENOUS; SUBCUTANEOUS at 10:03

## 2018-03-12 RX ADMIN — INSULIN ASPART 5 UNITS: 100 INJECTION, SOLUTION INTRAVENOUS; SUBCUTANEOUS at 12:03

## 2018-03-12 RX ADMIN — INSULIN DETEMIR 15 UNITS: 100 INJECTION, SOLUTION SUBCUTANEOUS at 09:03

## 2018-03-12 RX ADMIN — HEPARIN SODIUM 5000 UNITS: 5000 INJECTION, SOLUTION INTRAVENOUS; SUBCUTANEOUS at 05:03

## 2018-03-12 RX ADMIN — METOCLOPRAMIDE 10 MG: 10 TABLET ORAL at 05:03

## 2018-03-12 RX ADMIN — NIFEDIPINE 60 MG: 60 TABLET, FILM COATED, EXTENDED RELEASE ORAL at 09:03

## 2018-03-12 RX ADMIN — HEPARIN SODIUM 5000 UNITS: 5000 INJECTION, SOLUTION INTRAVENOUS; SUBCUTANEOUS at 09:03

## 2018-03-12 RX ADMIN — LEVETIRACETAM 250 MG: 250 TABLET, FILM COATED ORAL at 09:03

## 2018-03-12 RX ADMIN — LEVETIRACETAM 250 MG: 250 TABLET, FILM COATED ORAL at 12:03

## 2018-03-12 RX ADMIN — CEFTRIAXONE SODIUM 1 G: 1 INJECTION, POWDER, FOR SOLUTION INTRAMUSCULAR; INTRAVENOUS at 09:03

## 2018-03-12 RX ADMIN — CLONIDINE HYDROCHLORIDE 0.1 MG: 0.1 TABLET ORAL at 09:03

## 2018-03-12 RX ADMIN — LOSARTAN POTASSIUM 50 MG: 50 TABLET, FILM COATED ORAL at 09:03

## 2018-03-12 RX ADMIN — METOCLOPRAMIDE 10 MG: 10 TABLET ORAL at 10:03

## 2018-03-12 NOTE — ASSESSMENT & PLAN NOTE
- TTS HD patient with LUE HD access  - reports last HD treatment 03/06/18  - nephrology consulted  - trend electrolytes daily   - strict I/O's  - daily Wt's

## 2018-03-12 NOTE — ASSESSMENT & PLAN NOTE
- Hgb 8.2 on arrival (baseline 6.7-7.4)  - trend h/h daily and transfuse if indicated  - continue TTS epoetin

## 2018-03-12 NOTE — ASSESSMENT & PLAN NOTE
Hyperglycemia  - glucose appears poorly controlled  - hyperglycemic on arrival with glucose in the 460 now down to 290  - beta-hydroxybutyrate 0.0  - resume home long acting insulin dosing  - HgA1C pending   - ISS AC/HS  - ADA diet  - nutrition consult

## 2018-03-12 NOTE — PLAN OF CARE
Problem: Physical Therapy Goal  Goal: Physical Therapy Goal  Goals to be met by: 3/22/18     Patient will increase functional independence with mobility by performin. Sit to stand transfer with Minimal Assistance with LRD  2. Bed to chair transfer with Contact Guard Assistance using LRD.  3. Gait  x 20 feet with Minimal Assistance using LRD.   4. Stand for 10 minutes with Contact Guard Assistance using LRD.  5. Lower extremity exercise program x20 reps per handout, with assistance as needed    Outcome: Ongoing (interventions implemented as appropriate)  Patient evaluated today. All goals established are appropriate for patient progression at this time.   Papo Barillas, PT  3/12/2018

## 2018-03-12 NOTE — ASSESSMENT & PLAN NOTE
Recent CVA 2 weeks ago; ?new baseline  - Presents with somnolence x 1 day  - Afebrile, no leukocytosis, AST/ALT/ammonia WNL, lactic 2.2  - CT head with no acute intracranial abnormality noted   - Possibly uremic - missed her last two HD treatments (plan for HD 3/12)  - Blood cultures NGTD  - Urine cx pending  - PT/OT consulted  - High risk of fall / injury - utilize all safety measures and fall precautions   - Aspiration precautions  - SW/CM assisting with discharge planning

## 2018-03-12 NOTE — ASSESSMENT & PLAN NOTE
Hyperglycemia  - Glucose appears poorly controlled  - Hyperglycemic on arrival with glucose in the 460 now down to 290  - Beta-hydroxybutyrate 0.0  - Resume home long acting insulin dosing  3/12: Aspart 4U   - HgA1C 9.1  - ISS AC/HS  - ADA diet  - Will monitor BGs and adjust insulin as needed

## 2018-03-12 NOTE — PLAN OF CARE
03/12/18 1010   Discharge Assessment   Assessment Type Discharge Planning Assessment   Confirmed/corrected address and phone number on facesheet? Yes   Assessment information obtained from? Patient;Caregiver  (sister, Willian Najera ([phone]))   Expected Length of Stay (days) 4   Communicated expected length of stay with patient/caregiver yes   Prior to hospitilization cognitive status: Alert/Oriented   Prior to hospitalization functional status: Assistive Equipment   Current cognitive status: Alert/Oriented   Current Functional Status: Needs Assistance   Lives With parent(s)  (father, Mo Najera ([phone]))   Able to Return to Prior Arrangements unable to determine at this time (comments)   Is patient able to care for self after discharge? No   Patient's perception of discharge disposition home health   Readmission Within The Last 30 Days current reason for admission unrelated to previous admission   If yes, most recent facility name: CVA   Patient currently being followed by outpatient case management? No   Patient currently receives any other outside agency services? No   Equipment Currently Used at Home glucometer;walker, rolling   Do you have any problems affording any of your prescribed medications? No   Is the patient taking medications as prescribed? yes   Does the patient have transportation home? Yes   Transportation Available family or friend will provide  (sister, Willian Najera ([phone]))   Dialysis Name and Scheduled days [facility] in [place] (TTS) via LUE graft   Does the patient receive services at the [facility]? No   Discharge Plan A Home Health   Discharge Plan B Skilled Nursing Facility   Readmission Questionnaire   At the time of your discharge, did someone talk to you about what your health problems were? Yes   At the time of discharge, did someone talk to you about what to watch out for regarding worsening of your health problem? Yes   At the time of discharge, did someone  talk to you about what to do if you experienced worsening of your health problem? Yes   At the time of discharge, did someone talk to you about which medication to take when you left the hospital and which ones to stop taking? Yes   At the time of discharge, did someone talk to you about when and where to follow up with a doctor after you left the hospital? Yes   What do you believe caused you to be sick enough to be re-admitted? encephalopathy   How often do you need to have someone help you when you read instructions, pamphlets, or other written material from your doctor or pharmacy? Always  (pt blind)   Do you have problems taking your medications as prescribed? No   Do you have any problems affording any of  your prescribed medications? No   Do you have problems obtaining/receiving your medications? No   Does the patient have transportation to healthcare appointments? Yes   Living Arrangements house   Does the patient have family/friends to help with healtcare needs after discharge? yes   Does your caregiver provide all the help you need? Yes     Patient awake & alert sitting in recliner when CM rounded. CM was informed by the patient's sister, Willian Najera, that she [facility] the [facility] [place] [facility] she was [facility] on 3/10/18. Patient had a CVA 3 weeks ago & is blind. Patient [place] [facility] [place] with altered mental status & BS >500. Patient was receiving HD treatment at [facility] via LUE graft but is not established with an HD center in [place]. CM informed [facility] ([address]12) of need for an HD chair. Patient has a RW to assist with ambulation. Willian stated that the patient will discharge to [facility] with primary care being provided by the patient's sisters Willian Najera ([phone]) & Sheila Najera ([phone]) and her daughter Sandy Haq ([phone]). Plan to  discharge patient home with support or home with home health when medically stable. Awaiting PT/OT recommendations for discharge needs. Willian stated that she will provide transportation at time of discharge. Willian requested assistance getting the patient established with a PCP in [place]. Will continue to follow.

## 2018-03-12 NOTE — ASSESSMENT & PLAN NOTE
- BP controlled with resumed home antihypertensive regimen  - Hydralazine PRN  - Held bumetanide and HCTZ on admit  - Will continue to monitor

## 2018-03-12 NOTE — PLAN OF CARE
Problem: Patient Care Overview  Goal: Plan of Care Review  Outcome: Ongoing (interventions implemented as appropriate)  Patient AAOx2. Patient's vitals remain stable. Patient remains free from falls/injury throughout shift. Continuous cardiac monitoring in place per MD orders; NSR noted. Neuro checks done q4hrs per MD orders. No complaints of pain this shift. Will continue to monitor.

## 2018-03-12 NOTE — SUBJECTIVE & OBJECTIVE
Interval History: No acute events overnight. This AM, pt sitting upright in recliner with RN at bedside; no family at bedside. Pt alert to self, place, and president; unsure of year. Pt reports care is provided by father and daughter.    Review of Systems   Constitutional: Positive for fatigue. Negative for chills, diaphoresis and fever.   Eyes: Positive for visual disturbance (blind). Negative for discharge.   Respiratory: Negative for cough, chest tightness and shortness of breath.    Cardiovascular: Negative for chest pain, palpitations and leg swelling.   Gastrointestinal: Negative for abdominal distention, abdominal pain, diarrhea, nausea and vomiting.   Genitourinary: Positive for decreased urine volume (ESRD). Negative for dysuria.   Neurological: Positive for seizures and weakness. Negative for syncope.     Objective:     Vital Signs (Most Recent):  Temp: 96.7 °F (35.9 °C) (03/12/18 0947)  Pulse: 68 (03/12/18 0949)  Resp: 18 (03/12/18 0947)  BP: 134/62 (03/12/18 0948)  SpO2: 100 % (03/12/18 0345) Vital Signs (24h Range):  Temp:  [96.7 °F (35.9 °C)-97.9 °F (36.6 °C)] 96.7 °F (35.9 °C)  Pulse:  [62-88] 68  Resp:  [12-19] 18  SpO2:  [95 %-100 %] 100 %  BP: ()/(49-90) 134/62     Weight: 59 kg (130 lb)  Body mass index is 20.36 kg/m².    Intake/Output Summary (Last 24 hours) at 03/12/18 1023  Last data filed at 03/12/18 0600   Gross per 24 hour   Intake              410 ml   Output                0 ml   Net              410 ml      Physical Exam   Constitutional: She appears well-developed. No distress.   Eyes:   Blind R eye, minimal vision in L eye   Cardiovascular: Normal rate, regular rhythm, normal heart sounds and intact distal pulses.    No murmur heard.  Pulmonary/Chest: Effort normal and breath sounds normal. No respiratory distress. She has no wheezes. She has no rales.   Abdominal: Soft. Bowel sounds are normal. She exhibits no distension. There is no tenderness.   Musculoskeletal: Normal range of  motion. She exhibits no edema.   Neurological: She is alert. She displays atrophy.   Oriented to self, place, and president; follows commands, speech is slow; generalized weakness appreciated R>L   Skin: Skin is warm and dry. She is not diaphoretic.   Psychiatric: Her speech is delayed and slurred. She is slowed and withdrawn.   Nursing note and vitals reviewed.      Significant Labs: All pertinent labs within the past 24 hours have been reviewed.    Significant Imaging: I have reviewed all pertinent imaging results/findings within the past 24 hours.

## 2018-03-12 NOTE — SUBJECTIVE & OBJECTIVE
Past Medical History:   Diagnosis Date    Anemia     during pregnancys    Arthritis     neuropathy hands feet and legs//    Blood transfusion     Chronic pain     CKD (chronic kidney disease), stage IV     Diabetes mellitus     Diabetes mellitus type I     Diabetic retinopathy     Hyperlipidemia     Hypertension     patient states that when her blood sugar increases her blood pressure increases    Neuropathy     Pneumonia     Seizures     when sugar is high, does not quite remember    Stroke     2018    Vitamin D deficiency     Vitamin D deficiency disease        Past Surgical History:   Procedure Laterality Date     SECTION, CLASSIC      x 2    EYE SURGERY      HYSTERECTOMY         Review of patient's allergies indicates:   Allergen Reactions    Ciprofloxacin (bulk) Itching    Latex Itching and Other (See Comments)     Very low Oxygen    Vancomycin Shortness Of Breath and Itching    Neurontin [gabapentin] Other (See Comments)     Seizure like activity    Niacin Itching and Other (See Comments)     Burning      Bactrim [sulfamethoxazole-trimethoprim] Rash       No current facility-administered medications on file prior to encounter.      Current Outpatient Prescriptions on File Prior to Encounter   Medication Sig    insulin aspart (NOVOLOG) 100 unit/mL InPn pen Inject 5 Units into the skin 3 (three) times daily with meals.    insulin detemir (LEVEMIR FLEXTOUCH) 100 unit/mL (3 mL) SubQ InPn pen Inject 15 Units into the skin once daily.    acetaminophen-codeine 300-60mg (TYLENOL #4) 300-60 mg Tab Take 2 tablets by mouth daily as needed (pain).    blood sugar diagnostic Strp To use as directed with True results meter and monitor BS 6 times daily - fluctuating BS    bumetanide (BUMEX) 1 MG tablet TK 1 T PO BID    cloNIDine (CATAPRES) 0.1 MG tablet TK 1 T PO Q 12 H    diphenoxylate-atropine 2.5-0.025 mg (LOMOTIL) 2.5-0.025 mg per tablet Take 1 tablet by mouth 2 (two) times  daily as needed for Diarrhea.     epoetin jacques (PROCRIT) 20,000 unit/mL injection Inject 1 mL (20,000 Units total) into the skin every Tues, Thurs, Sat.    HUMALOG KWIKPEN 100 unit/mL InPn pen INJ 5 UNITS SC TID    hydrALAZINE (APRESOLINE) 25 MG tablet TK 2 TS PO Q 8 H PRF SBP GREATER THAN 160    LEVEMIR 100 unit/mL injection INJ 13 UNITS SC D    levetiracetam (KEPPRA) 250 MG Tab TK 1 T PO BID    losartan-hydrochlorothiazide 50-12.5 mg (HYZAAR) 50-12.5 mg per tablet Take 1 tablet by mouth once daily.    metoclopramide HCl (REGLAN) 10 MG tablet Take 10 mg by mouth 3 (three) times daily before meals.    metoprolol tartrate (LOPRESSOR) 25 MG tablet TK 1 T PO BID    metoprolol tartrate (LOPRESSOR) 50 MG tablet Take 50 mg by mouth 2 (two) times daily.    nifedipine (ADALAT CC) 30 MG TbSR TK 1 T PO BID    nifedipine (ADALAT CC) 60 MG TbSR Take 60 mg by mouth 2 (two) times daily.    nifedipine (PROCARDIA-XL) 60 MG (OSM) 24 hr tablet TK 1 T PO Q 12 H    ondansetron (ZOFRAN-ODT) 4 MG TbDL Take 1-2 tablets by mouth every 4 hours as needed for nausea and vomiting    ONETOUCH DELICA LANCETS 33 gauge Misc TEST BLOOD SUGAR TID    rosuvastatin (CRESTOR) 20 MG tablet Take 20 mg by mouth once daily.    zolpidem (AMBIEN) 10 mg Tab Take 10 mg by mouth nightly as needed for Insomnia.     zolpidem (AMBIEN) 5 MG Tab TK 1 T PO HS     Family History     Problem Relation (Age of Onset)    Asthma Father    Blindness Mother, Maternal Grandmother    Breast cancer Daughter    Cancer Cousin    Cataracts Maternal Grandmother    Diabetes Mother, Father, Sister    Glaucoma Maternal Grandmother    Heart disease Mother    Hypertension Mother, Father, Maternal Grandmother    Stroke Maternal Uncle    Thyroid disease Sister        Social History Main Topics    Smoking status: Current Some Day Smoker     Packs/day: 0.10     Years: 10.00     Types: Cigarettes    Smokeless tobacco: Never Used      Comment: 1 pack last her about 2-3  months    Alcohol use No    Drug use: No    Sexual activity: Yes     Partners: Male     Birth control/ protection: None     Review of Systems   Constitutional: Positive for fatigue. Negative for chills and fever.   HENT: Negative for congestion and sore throat.    Eyes: Positive for visual disturbance. Negative for discharge.   Respiratory: Negative for cough, chest tightness and shortness of breath.    Cardiovascular: Negative for chest pain, palpitations and leg swelling.   Gastrointestinal: Negative for abdominal distention, abdominal pain, diarrhea, nausea and vomiting.   Genitourinary: Positive for decreased urine volume. Negative for dysuria.   Musculoskeletal: Negative for arthralgias and myalgias.   Skin: Negative.  Negative for rash.   Neurological: Positive for seizures and weakness. Negative for syncope.     Objective:     Vital Signs (Most Recent):  Temp: 97.4 °F (36.3 °C) (03/12/18 0009)  Pulse: 72 (03/12/18 0009)  Resp: 18 (03/12/18 0009)  BP: 133/60 (03/12/18 0009)  SpO2: 95 % (03/12/18 0009) Vital Signs (24h Range):  Temp:  [97.4 °F (36.3 °C)] 97.4 °F (36.3 °C)  Pulse:  [72-88] 72  Resp:  [12-18] 18  SpO2:  [95 %-99 %] 95 %  BP: (133-190)/(60-90) 133/60     Weight: 59 kg (130 lb)  Body mass index is 20.36 kg/m².    Physical Exam   Constitutional: She appears well-developed. She appears lethargic. No distress.   HENT:   Head: Normocephalic and atraumatic.   Eyes:   Blind R eye, minimal vision in L eye   Neck: Normal range of motion. Neck supple.   Cardiovascular: Normal rate, regular rhythm, normal heart sounds and intact distal pulses.    No murmur heard.  Pulmonary/Chest: Effort normal and breath sounds normal. No respiratory distress. She has no wheezes. She has no rales.   Abdominal: Soft. Bowel sounds are normal. She exhibits no distension. There is no tenderness.   Musculoskeletal: Normal range of motion. She exhibits no edema.   Neurological: She appears lethargic. She displays atrophy.    Minimally interactive, follows simple commands, speech is slow and somewhat slurred, generalized weakness appreciated R>L   Skin: Skin is warm and dry. She is not diaphoretic.   Psychiatric: Her speech is delayed and slurred. She is slowed and withdrawn.   Nursing note and vitals reviewed.          Significant Labs:   CBC:   Recent Labs  Lab 03/11/18  1830 03/11/18 1926   WBC  --  9.18   HGB  --  8.2*   HCT 25* 25.8*   PLT  --  256     CMP:   Recent Labs  Lab 03/11/18 1926      K 5.2*      CO2 22*   *   BUN 77*   CREATININE 6.6*   CALCIUM 9.2   PROT 7.0   ALBUMIN 3.0*   BILITOT 0.4   ALKPHOS 125   AST 12   ALT 9*   ANIONGAP 15   EGFRNONAA 6.6*     Cardiac Markers:   Recent Labs  Lab 03/11/18 1926   *     Lactic Acid:   Recent Labs  Lab 03/11/18 1942   LACTATE 2.2     Troponin:   Recent Labs  Lab 03/11/18 1926   TROPONINI 0.024     Urine Studies:   Recent Labs  Lab 03/11/18 1956   COLORU Yellow   APPEARANCEUA Hazy*   PHUR 6.0   SPECGRAV 1.020   PROTEINUA 3+*   GLUCUA 3+*   KETONESU Negative   BILIRUBINUA Negative   OCCULTUA 1+*   NITRITE Negative   UROBILINOGEN Negative   LEUKOCYTESUR 1+*   RBCUA 19*   WBCUA 24*   BACTERIA Occasional   SQUAMEPITHEL 5   HYALINECASTS 0       Significant Imaging: I have reviewed all pertinent imaging results/findings within the past 24 hours.

## 2018-03-12 NOTE — ED TRIAGE NOTES
Eufemia Haq, a 52 y.o. female presents to the ED bed 2      Chief Complaint   Patient presents with    Hyperglycemia     Pt arrives EMS from home for evaluation of high glucose with decrease in mental status today - recent CVA to left side (3 weeks) and consequent pneumonia - dialysis patient T-T-Sat vis left upper arm shunt - EMS Glucose >500     Pt is awake, alert and oriented x 2. Disoriented to time. Pt received regular doses of meds and insulin today.       Review of patient's allergies indicates:   Allergen Reactions    Ciprofloxacin (bulk) Itching    Latex Itching and Other (See Comments)     Very low Oxygen    Vancomycin Shortness Of Breath and Itching    Neurontin [gabapentin] Other (See Comments)     Seizure like activity    Niacin Itching and Other (See Comments)     Burning      Bactrim [sulfamethoxazole-trimethoprim] Rash     Past Medical History:   Diagnosis Date    Anemia     during pregnancys    Arthritis     neuropathy hands feet and legs//    Blood transfusion     Chronic pain     CKD (chronic kidney disease), stage IV     Diabetes mellitus     Diabetes mellitus type I     Diabetic retinopathy     Hyperlipidemia     Hypertension     patient states that when her blood sugar increases her blood pressure increases    Neuropathy     Pneumonia     Seizures     when sugar is high, does not quite remember    Stroke     Feb 2018    Vitamin D deficiency     Vitamin D deficiency disease

## 2018-03-12 NOTE — ASSESSMENT & PLAN NOTE
- SBP ranging between 133-190  - resume home antihypertensive regimen  - add hydralazine PRN for SBP > 180, DBP >110  - hold bumetanide and HCTZ for now

## 2018-03-12 NOTE — ASSESSMENT & PLAN NOTE
- Hx of ESRD currently on HD TTS with decreased urine production   - Afebrile, no leukocytosis, UA with 4 squam  - Will continue CTX while UC pending

## 2018-03-12 NOTE — PLAN OF CARE
SW was informed by the PA that the pt will need SNF.  SW was informed by the CM that the pt is not set up with HD locally. SW called [facility] ([phone], [phone]).  SW was informed that the pt is currently set up with [facility] ([phone]) on [address].  SW called them and was informed that the pt never showed up and they never got records.  ALYSIA spoke with the pt's dtr (Willian [phone]) about SNF and HD.  ALYSIA informed her that there are 3 in network with the pt's insurance [facility], [facility], and  [facility] according to their website.  She would also like the SW to send referrals to [facility].  ALYSIA sent a referral request to the [facility] for these facilities.  Willian stated that pt went to that [facility] a few times as a visitor.  ALYSIA sent a request for [facility] on [address].  ALYSIA attempted to call the locet in but they have to call the SW back.    Shaye Boone, [facility] x [phone]

## 2018-03-12 NOTE — HPI
51 yo female with significant history hypertension, DMT1, recent CVA with residual behavior problems (3 months ago at [facility] in [place]), recent discharge from [facility] on Sat 3/10/18 in [place] and ESRD on HD (less than one year) who is admitted for lethargy. CT head no acute intracranial process. Patient not able to provide history. History obtain from Sister Willian via phone [phone]. Patient is in the process of moving from [place] to [place] to stay with family. Unsure of last HD [facility]. Nephrology consult for hemodialysis. It appears patient have had a few treatment as a visitor at [facility] in [place]. Patient have been on dialysis less than a year via [facility]. Unknown EDW or residual function.

## 2018-03-12 NOTE — CONSULTS
[facility]  Nephrology  Consult Note    Patient Name: Eufemia Haq  MRN: [id number]  Admission Date: 3/11/2018  Hospital Length of Stay: 0 days  Attending Provider: Hilda Duran MD   Primary Care Physician: Primary Doctor No  Principal Problem:Encephalopathy    Inpatient consult to Nephrology  Consult performed by: BAKARI JOHN  Consult ordered by: LUIS DUNHAM  Reason for consult: esrd        Subjective:     HPI: 53 yo female with significant history hypertension, DMT1, recent [facility] with residual behavior problems [facility] [place] in [place] and ESRD on HD (less than one year) who is admitted for lethargy. CT head no acute intracranial process. Patient not able to provide history. History obtain from Sister Willian via phone [phone]. Patient is in the process of moving from [place] to [place] to stay with family. Unsure of [facility] Nephrology consult for hemodialysis. It appears patient have had a few treatment as a visitor [facility] [place]. Patient have been on dialysis less than a year via [facility]. Unknown EDW or residual function.     Past Medical History:   Diagnosis Date    Anemia     during pregnancys    Arthritis     neuropathy hands feet and legs//    Blood transfusion     Chronic pain     CKD (chronic kidney disease), stage IV     Diabetes mellitus     Diabetes mellitus type I     Diabetic retinopathy     Hyperlipidemia     Hypertension     patient states that when her blood sugar increases her blood pressure increases    Neuropathy     Pneumonia     Seizures     when sugar is high, does not quite remember    Stroke     2018    Vitamin D deficiency     Vitamin D deficiency disease        Past Surgical History:   Procedure Laterality Date     SECTION, CLASSIC      x 2    EYE SURGERY      HYSTERECTOMY          Review of patient's allergies indicates:   Allergen Reactions    Ciprofloxacin (bulk) Itching    Latex Itching and Other (See Comments)     Very low Oxygen    Vancomycin Shortness Of Breath and Itching    Neurontin [gabapentin] Other (See Comments)     Seizure like activity    Niacin Itching and Other (See Comments)     Burning      Bactrim [sulfamethoxazole-trimethoprim] Rash     Current Facility-Administered Medications   Medication Frequency    0.9%  NaCl infusion PRN    0.9%  NaCl infusion Once    acetaminophen tablet 650 mg Q6H PRN    albuterol-ipratropium 2.5mg-0.5mg/3mL nebulizer solution 3 mL Q4H PRN    cefTRIAXone injection 1 g Q24H    cloNIDine tablet 0.1 mg BID    dextrose 50% (D50W) solution 12.5 g PRN    dextrose 50% (D50W) solution 25 g PRN    [START ON 3/13/2018] epoetin jacques injection 20,000 Units Every Tues, Thurs, Sat    glucagon (human recombinant) injection 1 mg PRN    glucose chewable tablet 16 g PRN    glucose chewable tablet 24 g PRN    heparin (porcine) injection 5,000 Units Q8H    hydrALAZINE tablet 25 mg Q8H PRN    insulin aspart U-100 pen 0-5 Units QID (AC + HS) PRN    insulin detemir U-100 pen 15 Units QHS    levETIRAcetam tablet 250 mg BID    losartan tablet 50 mg Daily    metoclopramide HCl tablet 10 mg TID AC    metoprolol tartrate (LOPRESSOR) tablet 50 mg BID    NIFEdipine 24 hr tablet 60 mg Q12H    ondansetron disintegrating tablet 8 mg Q8H PRN    ondansetron injection 4 mg Q8H PRN    rosuvastatin tablet 20 mg Daily    senna-docusate 8.6-50 mg per tablet 1 tablet BID PRN    sodium chloride 0.9% flush 5 mL PRN     Family History     Problem Relation (Age of Onset)    Asthma Father    Blindness Mother, Maternal Grandmother    Breast cancer Daughter    Cancer Cousin    Cataracts Maternal Grandmother    Diabetes Mother, Father, Sister    Glaucoma Maternal Grandmother    Heart disease Mother    Hypertension Mother, Father, Maternal Grandmother     Stroke Maternal Uncle    Thyroid disease Sister        Social History Main Topics    Smoking status: Current Some Day Smoker     Packs/day: 0.10     Years: 10.00     Types: Cigarettes    Smokeless tobacco: Never Used      Comment: 1 pack last her about 2-3 months    Alcohol use No    Drug use: No    Sexual activity: Yes     Partners: Male     Birth control/ protection: None     Review of Systems   Unable to perform ROS: Other     Objective:     Vital Signs (Most Recent):  Temp: 96.7 °F (35.9 °C) (03/12/18 0947)  Pulse: 68 (03/12/18 1108)  Resp: 18 (03/12/18 0947)  BP: 134/62 (03/12/18 0948)  SpO2: 100 % (03/12/18 0345)  O2 Device (Oxygen Therapy): room air (03/12/18 0345) Vital Signs (24h Range):  Temp:  [96.7 °F (35.9 °C)-97.9 °F (36.6 °C)] 96.7 °F (35.9 °C)  Pulse:  [62-88] 68  Resp:  [12-19] 18  SpO2:  [95 %-100 %] 100 %  BP: ()/(49-90) 134/62     Weight: 59 kg (130 lb) (03/11/18 1855)  Body mass index is 20.36 kg/m².  Body surface area is 1.67 meters squared.    I/O last 3 completed shifts:  In: 410 [P.O.:360; IV Piggyback:50]  Out: -     Physical Exam   Constitutional: She appears well-developed and well-nourished.   HENT:   Head: Normocephalic and atraumatic.   Eyes: EOM are normal.   Neck: Normal range of motion. Neck supple.   Cardiovascular: Normal rate and regular rhythm.    Pulmonary/Chest: Effort normal and breath sounds normal. No respiratory distress. She has no wheezes.   Abdominal: Soft. Bowel sounds are normal. She exhibits no distension.   Musculoskeletal: Normal range of motion. She exhibits no edema or deformity.   Neurological: She is alert.   Flat affect. Oriented x 2.    Skin: Skin is warm and dry.   LUQ AVG good bruit.        Significant Labs:  All labs within the past 24 hours have been reviewed.    Significant Imaging:  Labs: Reviewed    Assessment/Plan:     ESRD (end stage renal disease)    ESRD on HD MWF 4 hrs duration via [facility]. Unknown EDW or residual function. Per RN  Jett at outpatient dialysis unit, patient have not been accepted NO [facility] [phone]. Patient need recent medical records from hospital.  -Plan for HD today for metabolic clearance 4 hrs duration and volume removal.1-2 L . Dialysate adjusted to current labs.   -Please consult SW to assist with outpatient HD chair.     Anemia of CKD  -Unclear baseline.   -Obtain iron studies and start ANGELICA accordingly    BMD  Lab Results   Component Value Date    .2 (H) 10/18/2015    CALCIUM 9.0 03/12/2018    PHOS 4.8 (H) 03/12/2018   -Renal diet.Avoid any Ca based supplements or Ca containing binders. Switch phoslo to Renvela.   -Hold clacitriol for now. Obtain PTH, vit D.                   Thank you for your consult. I will follow-up with patient. Please contact us if you have any additional questions.    Viktoria Nguyen NP  Nephrology  [facility]      I have reviewed and concur with the fellow's history, physical, assessment, and plan. I have personally interviewed and examined the patient at bedside.

## 2018-03-12 NOTE — HPI
53 y/o female, who presents to the ED with c/o lethargy and hyperglycemia.  She has a PMH of recent CVA, DMI, ESRD (TTS HD), HTN, blindness of the R eye, and seizures.  Majority of history is provided by patients daughter at bedside.  She states her mother was just discharge a [facility] 2 days ago and has been lethargic since returning home.  She states that her mother suffered a stroke about 3 weeks ago.  The patient is lethargic and minimally interactive.  She appears in NAD and will follow simple commands when prompted, but drifts off when not simulated.  Additionally, she states that she was recently diagnosed with pneumonia.  She denies fever, chills, CP, SOB, N/V/D, or  symptoms since discharge.  She endorsed poorly controlled blood sugars.  Her sugar was 460 on arrival.  They report adherence to medication regimen.  However, report that her last HD treatment was Tuesday 03/06/18.

## 2018-03-12 NOTE — PROGRESS NOTES
Food & Nutrition                      Education    Diet Education:  Diabetes  Time Spent: 15 min  Learners: Pt    Nutrition Education provided with handouts:Carbohydrate Counting for People with Diabetes    Comments: Per glycemic index committee protocol, diabetes diet education provided for HbA1c of 9.1 Educated patient on carbohydrate counting, portioning, and menu planning. Pt has cognitive impairment and blind. Pt did not voice understanding; more appropriate to re-educate when family is present.     All questions and concerns answered. Dietitian's contact information provided.    Follow-up:1x/weekly    Please re-consult as needed.    Thanks!    Jagjit Del Toro  Dietetic Intern

## 2018-03-12 NOTE — PROGRESS NOTES
[facility] Hospital Medicine  Progress Note    Patient Name: Eufemia Haq  MRN: [id number]  Patient Class: OP- Observation   Admission Date: 3/11/2018  Length of Stay: 0 days  Attending Physician: Hilda Duran MD  Primary Care Provider: Primary Doctor [facility] Medicine Team: [facility]OSP MED F Bri Cortes PA-C    Subjective:     Principal Problem:Encephalopathy    HPI:  51 y/o female, who presents to the ED with c/o lethargy and hyperglycemia.  She has a PMH of recent CVA, DMI, ESRD (TTS HD), HTN, blindness of the R eye, and seizures.  Majority of history is provided by patients daughter at bedside.  She states her mother was just discharge a [facility] 2 days ago and has been lethargic since returning home.  She states that her mother suffered a stroke about 3 weeks ago.  The patient is lethargic and minimally interactive.  She appears in NAD and will follow simple commands when prompted, but drifts off when not simulated.  Additionally, she states that she was recently diagnosed with pneumonia.  She denies fever, chills, CP, SOB, N/V/D, or  symptoms since discharge.  She endorsed poorly controlled blood sugars.  Her sugar was 460 on arrival.  They report adherence to medication regimen.  However, report that her last HD treatment was Tuesday 03/06/18.    Hospital Course:  Pt admitted to observation for encephalopathy in setting of recent CVA (2 weeks ago); ?new baseline. CTH without acute abnormality. CXR without acute process. UA suboptimal; will continue CTX while urine cx pending. Blood cx NGTD. Nephrology consulted for maintenance HD (missed last 2 HD). Informed by nephrology that patient is not set up for outpatient HD at [facility]; CM assisting. PT/OT consulted.    Interval History: No acute events overnight. This AM, pt sitting upright in recliner with RN at bedside; no family at bedside. Pt alert to self, place, and president; unsure of  year. Pt reports care is provided by father and daughter.    Review of Systems   Constitutional: Positive for fatigue. Negative for chills, diaphoresis and fever.   Eyes: Positive for visual disturbance (blind). Negative for discharge.   Respiratory: Negative for cough, chest tightness and shortness of breath.    Cardiovascular: Negative for chest pain, palpitations and leg swelling.   Gastrointestinal: Negative for abdominal distention, abdominal pain, diarrhea, nausea and vomiting.   Genitourinary: Positive for decreased urine volume (ESRD). Negative for dysuria.   Neurological: Positive for seizures and weakness. Negative for syncope.     Objective:     Vital Signs (Most Recent):  Temp: 96.7 °F (35.9 °C) (03/12/18 0947)  Pulse: 68 (03/12/18 0949)  Resp: 18 (03/12/18 0947)  BP: 134/62 (03/12/18 0948)  SpO2: 100 % (03/12/18 0345) Vital Signs (24h Range):  Temp:  [96.7 °F (35.9 °C)-97.9 °F (36.6 °C)] 96.7 °F (35.9 °C)  Pulse:  [62-88] 68  Resp:  [12-19] 18  SpO2:  [95 %-100 %] 100 %  BP: ()/(49-90) 134/62     Weight: 59 kg (130 lb)  Body mass index is 20.36 kg/m².    Intake/Output Summary (Last 24 hours) at 03/12/18 1023  Last data filed at 03/12/18 0600   Gross per 24 hour   Intake              410 ml   Output                0 ml   Net              410 ml      Physical Exam   Constitutional: She appears well-developed. No distress.   Eyes:   Blind R eye, minimal vision in L eye   Cardiovascular: Normal rate, regular rhythm, normal heart sounds and intact distal pulses.    No murmur heard.  Pulmonary/Chest: Effort normal and breath sounds normal. No respiratory distress. She has no wheezes. She has no rales.   Abdominal: Soft. Bowel sounds are normal. She exhibits no distension. There is no tenderness.   Musculoskeletal: Normal range of motion. She exhibits no edema.   Neurological: She is alert. She displays atrophy.   Oriented to self, place, and president; follows commands, speech is slow; generalized  weakness appreciated R>L   Skin: Skin is warm and dry. She is not diaphoretic.   Psychiatric: Her speech is delayed and slurred. She is slowed and withdrawn.   Nursing note and vitals reviewed.      Significant Labs: All pertinent labs within the past 24 hours have been reviewed.    Significant Imaging: I have reviewed all pertinent imaging results/findings within the past 24 hours.    Assessment/Plan:      * Encephalopathy    Recent CVA 2 weeks ago; ?new baseline  - Presents with somnolence x 1 day  - Afebrile, no leukocytosis, AST/ALT/ammonia WNL, lactic 2.2  - CT head with no acute intracranial abnormality noted   - Possibly uremic - missed her last two HD treatments (plan for HD 3/12)  - Blood cultures NGTD  - Urine cx pending  - PT/OT consulted  - High risk of fall / injury - utilize all safety measures and fall precautions   - Aspiration precautions  - SW/CM assisting with discharge planning         Type 1 diabetes mellitus with complication    Hyperglycemia  - Glucose appears poorly controlled  - Hyperglycemic on arrival with glucose in the 460 now down to 290  - Beta-hydroxybutyrate 0.0  - Resume home long acting insulin dosing  3/12: Aspart 4U WM  - HgA1C 9.1  - ISS AC/HS  - ADA diet  - Will monitor BGs and adjust insulin as needed        ESRD (end stage renal disease)    - TTS HD patient with LUE HD access  - Reports last HD treatment 03/06/18  - Nephrology consulted and plan for HD 3/12  - Trend electrolytes daily   - Strict I/O's  - Daily Wt's  3/12: Informed by nephrology that patient is not set up for outpatient HD at [facility]; CM assisting.        UTI (urinary tract infection)    - Hx of ESRD currently on HD TTS with decreased urine production   - Afebrile, no leukocytosis, UA with 4 squam  - Will continue CTX while UC pending        Essential hypertension    - BP controlled with resumed home antihypertensive regimen  - Hydralazine PRN  - Held bumetanide and HCTZ on admit  - Will  continue to monitor        Anemia in chronic renal disease    - Hgb 8.2 on arrival (baseline 6.7-7.4)  - Trend daily  - Continue TTS epoetin         Seizure disorder    - Resume home levetiracetam dosing  - Seizure precautions  - Levetiracetam level pending        Protein calorie malnutrition    - Albumin 3.0 on arrival  - Encourage PO intake        DM gastroparesis    - Continue home dosing of metoclopramide  - Antiemetics PRN          VTE Risk Mitigation         Ordered     heparin (porcine) injection 5,000 Units  Every 8 hours     Route:  Subcutaneous        03/11/18 2241     Medium Risk of VTE  Once      03/11/18 2241     Place CARLOS hose  Until discontinued      03/11/18 2241     Place sequential compression device  Until discontinued      03/11/18 2241              Bri Cortes PA-C  Department of Hospital Medicine   [facility]  Discussed with staff: Dr. Duran

## 2018-03-12 NOTE — ASSESSMENT & PLAN NOTE
- hx of ESRD currently on HD TTS with decreased urine production   - afebrile, no leukocytosis, UA possibly contaminated   - UC pending  - started on ceftriaxone and clindamycin while in ED  - hold ABX for now and monitor closely

## 2018-03-12 NOTE — PROGRESS NOTES
HD treatment complete. Duration of treatment 4 hours and 2 L removed. Treatment was tolerated well and no complications with access to left upper arm. Needles removed and hemostasis achieved. Dressing intact and no drainage noted. Thrill and bruit present.

## 2018-03-12 NOTE — PT/OT/SLP EVAL
"Physical Therapy Evaluation    Patient Name:  Eufemia Haq   MRN:  [id number]    Recommendations:     Discharge Recommendations:  nursing facility, skilled   Discharge Equipment Recommendations: bath bench   Barriers to discharge: Inaccessible home and Decreased caregiver support    Assessment:     Eufemia Haq is a 52 y.o. female admitted with a medical diagnosis of Encephalopathy.  Pt has a past h/x of CVA ( 3 weeks ) with L sided involement and is now also blind in both eyes. She presents with the following impairments/functional limitations:  weakness, impaired endurance, impaired self care skills, gait instability, impaired functional mobilty, impaired balance, visual deficits, impaired cognition, decreased upper extremity function, decreased lower extremity function, decreased coordination, decreased safety awareness .  Pt with hesitancy for any movement 2/2 blindness and poor proprioceptive spatial awareness. Pt very fearful of falling and notes significant pain in L shoulder. Pt able to t/f from bed to chair with mod A for LLE extensor tone during sit to stand and for v/c for foot placement and direction. Pt limited most at this time by visual deficits, cognitive deficits, poor balance and LE weakness B.  Patient would benefit from acute skilled PT services to address these deficits and reach maximum level of function.      Rehab Prognosis:  good;     Recent Surgery: * No surgery found *      Plan:     During this hospitalization, patient to be seen 4 x/week to address the above listed problems via gait training, therapeutic exercises, neuromuscular re-education, therapeutic activities  · Plan of Care Expires:  04/12/18   Plan of Care Reviewed with: patient    Subjective     Communicated with RN prior to session.  Patient found supine in bed upon PT entry to room, agreeable to evaluation.      Chief Complaint: " I cant  with my right  Hand"  Patient comments/goals: " My daughter does " "everything for me"  Pain/Comfort:  · Pain Rating 1: 8/10  · Location - Side 1: Left  · Location 1: shoulder  · Pain Addressed 1: Reposition, Distraction  · Pain Rating Post-Intervention 1:  (not assessed)    Patients cultural, spiritual, Oriental orthodox conflicts given the current situation:      Living Environment:  Pt lives in an apartment with her daughter, no CROW.   Prior to admission, patients level of function was dependnent on daughter for all care. Daughter works from 5-230 during the day and patient states that she stays in bed until daughter comes home. Pt states that daughter was taking her out to the community and using a RW while ambulating.  Patient has the following equipment: wheelchair, walker, rolling.  DME owned (not currently used): none.  Upon discharge, patient will have assistance from daughter (part time ).    Objective:     Patient found with: telemetry     General Precautions: Standard, blind, fall, seizure   Orthopedic Precautions:N/A   Braces: N/A     Cognition:  Patient alert and orientated to  Time, Place and Person ( hospital only, did not know which one or where)  Patient Easily distracted and Follows one-step commands 75 % of the time.   Pt able to answer all questions with increased time given and frequent redirection to question being asked    Coordination  abnromal during gait  Other noted coordination deficits: none    Sensation  Patient's sensation was Intact to light touch bilateral    Skin integrity    -       Skin integrity: Visible skin intact      Strength and ROM  LLE ROM RLE ROM   WFL except decreased ankle DF with noted clonus WFL   LLE Strength RLE Strength   Deficits: 3+/5 globally, unable to assess ankle DF 2/2 decreased ROM WFL       Functional Mobilty  Bed Mobility:     Rolling Left:  contact guard assistance with v/c for sequencing and limb placement  Supine to Sit: contact guard assistancwith v/c for sequencing and limb placemente   Transfers:     Sit to Stand:  " moderate assistance with HHA v/c for foot placment  Bed to Chair: moderate assistance with  HHA  using  Stand Pivot v/c for foot placment      Sitting Balance Static  Dynamic     FAIR-: Maintains without assist but inconsistent  FAIR+: Maintains balance through MINIMAL excursions of active trunk motion   Standing Balance POOR+: Needs MINIMAL assist to maintain POOR: N/A     Gait   Patient ambulated 2-3 steps   Moderate Assistance with PT assistance  Gait deviations noted: decreased carlos, decreased step length, increased stride width and decreased weight-shifting ability  bilateral lower extremities. LLE extensor tone during standing limiting WB to affected side.   ·     AM-PAC 6 CLICK MOBILITY  Total Score:13       Therapeutic Activities and Exercises:   Patient education  · Patient educated on the role of PT and POC  · Whiteboard updated in patients room to current assistance level  · All of patients questions were answered within the scope of PT  · Pt educated on transfer safety      Patient left up in chair with all lines intact and call button in reach.    GOALS:    Physical Therapy Goals        Problem: Physical Therapy Goal    Goal Priority Disciplines Outcome Goal Variances Interventions   Physical Therapy Goal     PT/OT, PT Ongoing (interventions implemented as appropriate)     Description:  Goals to be met by: 3/22/18     Patient will increase functional independence with mobility by performin. Sit to stand transfer with Minimal Assistance with LRD  2. Bed to chair transfer with Contact Guard Assistance using LRD.  3. Gait  x 20 feet with Minimal Assistance using LRD.   4. Stand for 10 minutes with Contact Guard Assistance using LRD.  5. Lower extremity exercise program x20 reps per handout, with assistance as needed                      History:     Past Medical History:   Diagnosis Date    Anemia     during pregnancys    Arthritis     neuropathy hands feet and legs//    Blood transfusion      Chronic pain     CKD (chronic kidney disease), stage IV     Diabetes mellitus     Diabetes mellitus type I     Diabetic retinopathy     Hyperlipidemia     Hypertension     patient states that when her blood sugar increases her blood pressure increases    Neuropathy     Pneumonia     Seizures     when sugar is high, does not quite remember    Stroke     2018    Vitamin D deficiency     Vitamin D deficiency disease        Past Surgical History:   Procedure Laterality Date     SECTION, CLASSIC      x 2    EYE SURGERY      HYSTERECTOMY         Clinical Decision Making:     History  Co-morbidities and personal factors that may impact the plan of care Examination  Body Structures and Functions, activity limitations and participation restrictions that may impact the plan of care Clinical Presentation   Decision Making/ Complexity Score   Co-morbidities:   [] Time since onset of injury / illness / exacerbation  [x] Status of current condition  [x]Patient's cognitive status and safety concerns    [] Multiple Medical Problems (see med hx)  Personal Factors:   [] Patient's age  [x] Prior Level of function   [] Patient's home situation (environment and family support)  [] Patient's level of motivation  [] Expected progression of patient      HISTORY:(criteria)    [x] 59002 - no personal factors/history    [] 00153 - has 1-2 personal factor/comorbidity     [] 72209 - has >3 personal factor/comorbidity     Body Regions:  [] Objective examination findings  [] Head     []  Neck  [] Trunk   [] Upper Extremity  [x] Lower Extremity    Body Systems:  [x] For communication ability, affect, cognition, language, and learning style: the assessment of the ability to make needs known, consciousness, orientation (person, place, and time), expected emotional /behavioral responses, and learning preferences (eg, learning barriers, education  needs)  [x] For the neuromuscular system: a general assessment of gross  coordinated movement (eg, balance, gait, locomotion, transfers, and transitions) and motor function  (motor control and motor learning)  [x] For the musculoskeletal system: the assessment of gross symmetry, gross range of motion, gross strength, height, and weight  [] For the integumentary system: the assessment of pliability(texture), presence of scar formation, skin color, and skin integrity  [] For cardiovascular/pulmonary system: the assessment of heart rate, respiratory rate, blood pressure, and edema     Activity limitations:    [] Patient's cognitive status and saf ety concerns          [] Status of current condition      [] Weight bearing restriction  [] Cardiopulmunary Restriction    Participation Restrictions:   [] Goals and goal agreement with the patient     [] Rehab potential (prognosis) and probable outcome      Examination of Body System: (criteria)    [] 10733 - addressing 1-2 elements    [] 04322 - addressing a total of 3 or more elements     [] 77969 -  Addressing a total of 4 or more elements         Clinical Presentation: (criteria)  Evolving - 85456     On examination of body system using standardized tests and measures patient presents with (CHOOSE ONE) elements from any of the following: body structures and functions, activity limitations, and/or participation restrictions.  Leading to a clinical presentation that is considered evolving with changing characteristics                              Clinical Decision Making  (Eval Complexity):  Moderate - 36438     Time Tracking:     PT Received On: 03/12/18  PT Start Time: 0838     PT Stop Time: 0916  PT Total Time (min): 38 min     Billable Minutes: Evaluation 30      Papo Barillas PT  03/12/2018

## 2018-03-12 NOTE — SUBJECTIVE & OBJECTIVE
Past Medical History:   Diagnosis Date    Anemia     during pregnancys    Arthritis     neuropathy hands feet and legs//    Blood transfusion     Chronic pain     CKD (chronic kidney disease), stage IV     Diabetes mellitus     Diabetes mellitus type I     Diabetic retinopathy     Hyperlipidemia     Hypertension     patient states that when her blood sugar increases her blood pressure increases    Neuropathy     Pneumonia     Seizures     when sugar is high, does not quite remember    Stroke     2018    Vitamin D deficiency     Vitamin D deficiency disease        Past Surgical History:   Procedure Laterality Date     SECTION, CLASSIC      x 2    EYE SURGERY      HYSTERECTOMY         Review of patient's allergies indicates:   Allergen Reactions    Ciprofloxacin (bulk) Itching    Latex Itching and Other (See Comments)     Very low Oxygen    Vancomycin Shortness Of Breath and Itching    Neurontin [gabapentin] Other (See Comments)     Seizure like activity    Niacin Itching and Other (See Comments)     Burning      Bactrim [sulfamethoxazole-trimethoprim] Rash     Current Facility-Administered Medications   Medication Frequency    0.9%  NaCl infusion PRN    0.9%  NaCl infusion Once    acetaminophen tablet 650 mg Q6H PRN    albuterol-ipratropium 2.5mg-0.5mg/3mL nebulizer solution 3 mL Q4H PRN    cefTRIAXone injection 1 g Q24H    cloNIDine tablet 0.1 mg BID    dextrose 50% (D50W) solution 12.5 g PRN    dextrose 50% (D50W) solution 25 g PRN    [START ON 3/13/2018] epoetin jacques injection 20,000 Units Every Tues, Thurs, Sat    glucagon (human recombinant) injection 1 mg PRN    glucose chewable tablet 16 g PRN    glucose chewable tablet 24 g PRN    heparin (porcine) injection 5,000 Units Q8H    hydrALAZINE tablet 25 mg Q8H PRN    insulin aspart U-100 pen 0-5 Units QID (AC + HS) PRN    insulin detemir U-100 pen 15 Units QHS    levETIRAcetam tablet 250 mg BID    losartan  tablet 50 mg Daily    metoclopramide HCl tablet 10 mg TID AC    metoprolol tartrate (LOPRESSOR) tablet 50 mg BID    NIFEdipine 24 hr tablet 60 mg Q12H    ondansetron disintegrating tablet 8 mg Q8H PRN    ondansetron injection 4 mg Q8H PRN    rosuvastatin tablet 20 mg Daily    senna-docusate 8.6-50 mg per tablet 1 tablet BID PRN    sodium chloride 0.9% flush 5 mL PRN     Family History     Problem Relation (Age of Onset)    Asthma Father    Blindness Mother, Maternal Grandmother    Breast cancer Daughter    Cancer Cousin    Cataracts Maternal Grandmother    Diabetes Mother, Father, Sister    Glaucoma Maternal Grandmother    Heart disease Mother    Hypertension Mother, Father, Maternal Grandmother    Stroke Maternal Uncle    Thyroid disease Sister        Social History Main Topics    Smoking status: Current Some Day Smoker     Packs/day: 0.10     Years: 10.00     Types: Cigarettes    Smokeless tobacco: Never Used      Comment: 1 pack last her about 2-3 months    Alcohol use No    Drug use: No    Sexual activity: Yes     Partners: Male     Birth control/ protection: None     Review of Systems   Unable to perform ROS: Other     Objective:     Vital Signs (Most Recent):  Temp: 96.7 °F (35.9 °C) (03/12/18 0947)  Pulse: 68 (03/12/18 1108)  Resp: 18 (03/12/18 0947)  BP: 134/62 (03/12/18 0948)  SpO2: 100 % (03/12/18 0345)  O2 Device (Oxygen Therapy): room air (03/12/18 0345) Vital Signs (24h Range):  Temp:  [96.7 °F (35.9 °C)-97.9 °F (36.6 °C)] 96.7 °F (35.9 °C)  Pulse:  [62-88] 68  Resp:  [12-19] 18  SpO2:  [95 %-100 %] 100 %  BP: ()/(49-90) 134/62     Weight: 59 kg (130 lb) (03/11/18 1855)  Body mass index is 20.36 kg/m².  Body surface area is 1.67 meters squared.    I/O last 3 completed shifts:  In: 410 [P.O.:360; IV Piggyback:50]  Out: -     Physical Exam   Constitutional: She appears well-developed and well-nourished.   HENT:   Head: Normocephalic and atraumatic.   Eyes: EOM are normal.   Neck: Normal  range of motion. Neck supple.   Cardiovascular: Normal rate and regular rhythm.    Pulmonary/Chest: Effort normal and breath sounds normal. No respiratory distress. She has no wheezes.   Abdominal: Soft. Bowel sounds are normal. She exhibits no distension.   Musculoskeletal: Normal range of motion. She exhibits no edema or deformity.   Neurological: She is alert.   Flat affect. Oriented x 2.    Skin: Skin is warm and dry.   LUQ AVG good bruit.        Significant Labs:  All labs within the past 24 hours have been reviewed.    Significant Imaging:  Labs: Reviewed

## 2018-03-12 NOTE — ASSESSMENT & PLAN NOTE
- TTS HD patient with LUE HD access  - Reports last HD treatment 03/06/18  - Nephrology consulted and plan for HD 3/12  - Trend electrolytes daily   - Strict I/O's  - Daily Wt's  3/12: Informed by nephrology that patient is not set up for outpatient HD at [facility]; CM assisting.

## 2018-03-12 NOTE — H&P
[facility] Hospital Medicine  History & Physical    Patient Name: Eufemia Haq  MRN: [id number]  Admission Date: 3/11/2018  Attending Physician: Hilda Duran MD   Primary Care Provider: Primary Doctor [facility] Medicine Team: [facility] [place] Joo Ratliff NP     Patient information was obtained from relative(s) and ER records.     Subjective:     Principal Problem:Encephalopathy    Chief Complaint:   Chief Complaint   Patient presents with    Hyperglycemia     Pt arrives EMS from home for evaluation of high glucose with decrease in mental status today - recent CVA to left side (3 weeks) and consequent pneumonia - dialysis patient T-T-Sat vis left upper arm shunt - EMS Glucose >500        HPI: 53 y/o female, who presents to the ED with c/o lethargy and hyperglycemia.  She has a PMH of recent CVA, DMI, ESRD (TTS HD), HTN, blindness of the R eye, and seizures.  Majority of history is provided by patients daughter at bedside.  She states her mother was just discharge a [facility] 2 days ago and has been lethargic since returning home.  She states that her mother suffered a stroke about 3 weeks ago.  The patient is lethargic and minimally interactive.  She appears in NAD and will follow simple commands when prompted, but drifts off when not simulated.  Additionally, she states that she was recently diagnosed with pneumonia.  She denies fever, chills, CP, SOB, N/V/D, or  symptoms since discharge.  She endorsed poorly controlled blood sugars.  Her sugar was 460 on arrival.  They report adherence to medication regimen.  However, report that her last HD treatment was Tuesday 03/06/18.    Past Medical History:   Diagnosis Date    Anemia     during pregnancys    Arthritis     neuropathy hands feet and legs//    Blood transfusion     Chronic pain     CKD (chronic kidney disease), stage IV     Diabetes mellitus     Diabetes mellitus type I     Diabetic retinopathy      Hyperlipidemia     Hypertension     patient states that when her blood sugar increases her blood pressure increases    Neuropathy     Pneumonia     Seizures     when sugar is high, does not quite remember    Stroke     2018    Vitamin D deficiency     Vitamin D deficiency disease        Past Surgical History:   Procedure Laterality Date     SECTION, CLASSIC      x 2    EYE SURGERY      HYSTERECTOMY         Review of patient's allergies indicates:   Allergen Reactions    Ciprofloxacin (bulk) Itching    Latex Itching and Other (See Comments)     Very low Oxygen    Vancomycin Shortness Of Breath and Itching    Neurontin [gabapentin] Other (See Comments)     Seizure like activity    Niacin Itching and Other (See Comments)     Burning      Bactrim [sulfamethoxazole-trimethoprim] Rash       No current facility-administered medications on file prior to encounter.      Current Outpatient Prescriptions on File Prior to Encounter   Medication Sig    insulin aspart (NOVOLOG) 100 unit/mL InPn pen Inject 5 Units into the skin 3 (three) times daily with meals.    insulin detemir (LEVEMIR FLEXTOUCH) 100 unit/mL (3 mL) SubQ InPn pen Inject 15 Units into the skin once daily.    acetaminophen-codeine 300-60mg (TYLENOL #4) 300-60 mg Tab Take 2 tablets by mouth daily as needed (pain).    blood sugar diagnostic Strp To use as directed with True results meter and monitor BS 6 times daily - fluctuating BS    bumetanide (BUMEX) 1 MG tablet TK 1 T PO BID    cloNIDine (CATAPRES) 0.1 MG tablet TK 1 T PO Q 12 H    diphenoxylate-atropine 2.5-0.025 mg (LOMOTIL) 2.5-0.025 mg per tablet Take 1 tablet by mouth 2 (two) times daily as needed for Diarrhea.     epoetin jacques (PROCRIT) 20,000 unit/mL injection Inject 1 mL (20,000 Units total) into the skin every es, Th, Sat.    HUMALOG KWIKPEN 100 unit/mL InPn pen INJ 5 UNITS SC TID    hydrALAZINE (APRESOLINE) 25 MG tablet TK 2 TS PO Q 8 H PRF SBP GREATER THAN  160    LEVEMIR 100 unit/mL injection INJ 13 UNITS SC D    levetiracetam (KEPPRA) 250 MG Tab TK 1 T PO BID    losartan-hydrochlorothiazide 50-12.5 mg (HYZAAR) 50-12.5 mg per tablet Take 1 tablet by mouth once daily.    metoclopramide HCl (REGLAN) 10 MG tablet Take 10 mg by mouth 3 (three) times daily before meals.    metoprolol tartrate (LOPRESSOR) 25 MG tablet TK 1 T PO BID    metoprolol tartrate (LOPRESSOR) 50 MG tablet Take 50 mg by mouth 2 (two) times daily.    nifedipine (ADALAT CC) 30 MG TbSR TK 1 T PO BID    nifedipine (ADALAT CC) 60 MG TbSR Take 60 mg by mouth 2 (two) times daily.    nifedipine (PROCARDIA-XL) 60 MG (OSM) 24 hr tablet TK 1 T PO Q 12 H    ondansetron (ZOFRAN-ODT) 4 MG TbDL Take 1-2 tablets by mouth every 4 hours as needed for nausea and vomiting    ONETOUCH DELICA LANCETS 33 gauge Misc TEST BLOOD SUGAR TID    rosuvastatin (CRESTOR) 20 MG tablet Take 20 mg by mouth once daily.    zolpidem (AMBIEN) 10 mg Tab Take 10 mg by mouth nightly as needed for Insomnia.     zolpidem (AMBIEN) 5 MG Tab TK 1 T PO HS     Family History     Problem Relation (Age of Onset)    Asthma Father    Blindness Mother, Maternal Grandmother    Breast cancer Daughter    Cancer Cousin    Cataracts Maternal Grandmother    Diabetes Mother, Father, Sister    Glaucoma Maternal Grandmother    Heart disease Mother    Hypertension Mother, Father, Maternal Grandmother    Stroke Maternal Uncle    Thyroid disease Sister        Social History Main Topics    Smoking status: Current Some Day Smoker     Packs/day: 0.10     Years: 10.00     Types: Cigarettes    Smokeless tobacco: Never Used      Comment: 1 pack last her about 2-3 months    Alcohol use No    Drug use: No    Sexual activity: Yes     Partners: Male     Birth control/ protection: None     Review of Systems   Constitutional: Positive for fatigue. Negative for chills and fever.   HENT: Negative for congestion and sore throat.    Eyes: Positive for visual  disturbance. Negative for discharge.   Respiratory: Negative for cough, chest tightness and shortness of breath.    Cardiovascular: Negative for chest pain, palpitations and leg swelling.   Gastrointestinal: Negative for abdominal distention, abdominal pain, diarrhea, nausea and vomiting.   Genitourinary: Positive for decreased urine volume. Negative for dysuria.   Musculoskeletal: Negative for arthralgias and myalgias.   Skin: Negative.  Negative for rash.   Neurological: Positive for seizures and weakness. Negative for syncope.     Objective:     Vital Signs (Most Recent):  Temp: 97.4 °F (36.3 °C) (03/12/18 0009)  Pulse: 72 (03/12/18 0009)  Resp: 18 (03/12/18 0009)  BP: 133/60 (03/12/18 0009)  SpO2: 95 % (03/12/18 0009) Vital Signs (24h Range):  Temp:  [97.4 °F (36.3 °C)] 97.4 °F (36.3 °C)  Pulse:  [72-88] 72  Resp:  [12-18] 18  SpO2:  [95 %-99 %] 95 %  BP: (133-190)/(60-90) 133/60     Weight: 59 kg (130 lb)  Body mass index is 20.36 kg/m².    Physical Exam   Constitutional: She appears well-developed. She appears lethargic. No distress.   HENT:   Head: Normocephalic and atraumatic.   Eyes:   Blind R eye, minimal vision in L eye   Neck: Normal range of motion. Neck supple.   Cardiovascular: Normal rate, regular rhythm, normal heart sounds and intact distal pulses.    No murmur heard.  Pulmonary/Chest: Effort normal and breath sounds normal. No respiratory distress. She has no wheezes. She has no rales.   Abdominal: Soft. Bowel sounds are normal. She exhibits no distension. There is no tenderness.   Musculoskeletal: Normal range of motion. She exhibits no edema.   Neurological: She appears lethargic. She displays atrophy.   Minimally interactive, follows simple commands, speech is slow and somewhat slurred, generalized weakness appreciated R>L   Skin: Skin is warm and dry. She is not diaphoretic.   Psychiatric: Her speech is delayed and slurred. She is slowed and withdrawn.   Nursing note and vitals reviewed.           Significant Labs:   CBC:   Recent Labs  Lab 03/11/18  1830 03/11/18 1926   WBC  --  9.18   HGB  --  8.2*   HCT 25* 25.8*   PLT  --  256     CMP:   Recent Labs  Lab 03/11/18 1926      K 5.2*      CO2 22*   *   BUN 77*   CREATININE 6.6*   CALCIUM 9.2   PROT 7.0   ALBUMIN 3.0*   BILITOT 0.4   ALKPHOS 125   AST 12   ALT 9*   ANIONGAP 15   EGFRNONAA 6.6*     Cardiac Markers:   Recent Labs  Lab 03/11/18 1926   *     Lactic Acid:   Recent Labs  Lab 03/11/18  1942   LACTATE 2.2     Troponin:   Recent Labs  Lab 03/11/18 1926   TROPONINI 0.024     Urine Studies:   Recent Labs  Lab 03/11/18 1956   COLORU Yellow   APPEARANCEUA Hazy*   PHUR 6.0   SPECGRAV 1.020   PROTEINUA 3+*   GLUCUA 3+*   KETONESU Negative   BILIRUBINUA Negative   OCCULTUA 1+*   NITRITE Negative   UROBILINOGEN Negative   LEUKOCYTESUR 1+*   RBCUA 19*   WBCUA 24*   BACTERIA Occasional   SQUAMEPITHEL 5   HYALINECASTS 0       Significant Imaging: I have reviewed all pertinent imaging results/findings within the past 24 hours.    Assessment/Plan:     * Encephalopathy    -presents with somnolence x 1 day with hx of recent CVA 2 weeks ago  - afebrile, no leukocytosis, AST/ALT/ammonia WNL, lactic 2.2  - CT head with no acute intracranial abnormality noted   - possibly uremic - missed her last two HD treatments  - blood and urine cultures pending  - PT/OT consulted  - high risk of fall / injury - utilize all safety measures and fall precautions   - aspiration precautions        Type 1 diabetes mellitus with complication    Hyperglycemia  - glucose appears poorly controlled  - hyperglycemic on arrival with glucose in the 460 now down to 290  - beta-hydroxybutyrate 0.0  - resume home long acting insulin dosing  - HgA1C pending   - ISS AC/HS  - ADA diet  - nutrition consult         ESRD (end stage renal disease)    - TTS HD patient with LUE HD access  - reports last HD treatment 03/06/18  - nephrology consulted  - trend electrolytes  daily   - strict I/O's  - daily Wt's        UTI (urinary tract infection)    - hx of ESRD currently on HD TTS with decreased urine production   - afebrile, no leukocytosis, UA possibly contaminated   - UC pending  - started on ceftriaxone and clindamycin while in ED  - hold ABX for now and monitor closely         Essential hypertension    - SBP ranging between 133-190  - resume home antihypertensive regimen  - add hydralazine PRN for SBP > 180, DBP >110  - hold bumetanide and HCTZ for now        Anemia in chronic renal disease    - Hgb 8.2 on arrival (baseline 6.7-7.4)  - trend h/h daily and transfuse if indicated  - continue TTS epoetin         Protein calorie malnutrition    - albumin 3.0 on arrival  - prealbumin pending  - nutrition consulted to help optimize caloric intake         Seizure disorder    - resume home levetiracetam dosing  - seizure precautions  - levetiracetam level pending        DM gastroparesis    - continue home dosing of metoclopramide  - antiemetics PRN          VTE Risk Mitigation         Ordered     heparin (porcine) injection 5,000 Units  Every 8 hours     Route:  Subcutaneous        03/11/18 2241     Medium Risk of VTE  Once      03/11/18 2241     Place CARLOS hose  Until discontinued      03/11/18 2241     Place sequential compression device  Until discontinued      03/11/18 2241             Joo Ratliff NP  Department of Hospital Medicine   [facility]

## 2018-03-12 NOTE — HOSPITAL COURSE
Pt admitted to observation for encephalopathy in setting of recent CVA (2 weeks ago); suspect new baseline. CTH without acute abnormality. CXR without acute process. Urine cx revealed Klebsiella ESBL; completed course of gentamicin. Blood cx NGTD. Informed by nephrology that patient is not set up for outpatient HD at [facility] in [facility]. 3/14: Overnight pt started on insulin drip due to BG>600 (Charge RN notified that pt's roommate was giving pt additional food throughout the night.) 3/15: insulin drip d/c'd due to hypoglycemia and pt transitioned to levemir 15U qhs and novolog 5U tid with meals plus low dose correction however insulin gtt resume evening 3/19. 3/20: BG>500 after missing mealtime insulin. 3/21-3/22: BGs stable with increased 5U TIDWM. Discharged to [facility] and set up for HD at [facility]. Follow up with Neurology Epilepsy as outpatient.

## 2018-03-12 NOTE — PT/OT/SLP EVAL
Occupational Therapy   Evaluation    Name: Eufemia Haq  MRN: [id number]  Admitting Diagnosis:  Encephalopathy      Recommendations:     Discharge Recommendations: nursing facility, skilled  Discharge Equipment Recommendations:  bath bench, bedside commode  Barriers to discharge:  Decreased caregiver support    History:     Occupational Profile:  Living Environment: Pt lives in an apt (not sure if it's 1st or 2nd fl) with her daughter (MD states she lives with her father and daughter). Tub/shower in place.  Previous level of function: Per pt, she stays in bed all day when daughter is at work and daughter assists with all ADLs. States she walks with a RW with SBA.  Equipment Owned:  walker, rolling, wheelchair  Assistance upon Discharge: Family    Past Medical History:   Diagnosis Date    Anemia     during pregnancys    Arthritis     neuropathy hands feet and legs//    Blood transfusion     Chronic pain     CKD (chronic kidney disease), stage IV     Diabetes mellitus     Diabetes mellitus type I     Diabetic retinopathy     Hyperlipidemia     Hypertension     patient states that when her blood sugar increases her blood pressure increases    Neuropathy     Pneumonia     Seizures     when sugar is high, does not quite remember    Stroke     2018    Vitamin D deficiency     Vitamin D deficiency disease        Past Surgical History:   Procedure Laterality Date     SECTION, CLASSIC      x 2    EYE SURGERY      HYSTERECTOMY         Subjective     Pain/Comfort:  · Pain Rating 1: 8/10  · Location - Side 1: Left  · Location 1: shoulder    Patients cultural, spiritual, Jainism conflicts given the current situation:      Objective:     Patient found with: telemetry    General Precautions: Standard, fall, blind   Orthopedic Precautions:    Braces:       Occupational Performance:    Bed Mobility:    · Patient completed Rolling/Turning to Left with  stand by assistance  · Patient completed  Scooting/Bridging with stand by assistance  · Patient completed Supine to Sit with stand by assistance    Functional Mobility/Transfers:  · Patient completed Sit <> Stand Transfer with moderate assistance  with  no assistive device   · Patient completed Bed <> Chair Transfer using Stand Pivot technique with moderate assistance with no assistive device  · Functional Mobility: S/P to chair.    Activities of Daily Living:  · Feeding:  minimum assistance with difficulty grasping fruit chunks with R hand.  · UB Dressing: maximal assistance 2/2 poor effort.  · LB Dressing: contact guard assistance to bring feet across knees to simulate socks.    Cognitive/Visual Perceptual:  Cognitive/Psychosocial Skills:     -       Oriented to: Person and Situation   -       Follows Commands/attention:Follows two-step commands  -       Safety awareness/insight to disability: impaired     Physical Exam:  Upper Extremity Range of Motion:     -       Right Upper Extremity: WNL  -       Left Upper Extremity: WFL  Upper Extremity Strength:    -       Right Upper Extremity: WNL  -       Left Upper Extremity: WFL except deferred shld testing due to pain.  Fine Motor Coordination:    -       Impaired  Right hand, manipulation of objects trouble picking up fruit chunks.    Patient left up in chair with all lines intact and call button in reach    AMPA 6 Click:  AMPAC Total Score: 16    Treatment & Education:  UE ROM/MMT  Bed mobility training / assessment  Functional mobility assessment  Sit/standing balance assessment  Discussed OT POC / Post-acute plan  Education:    Assessment:     Eufemia Staleyaux is a 52 y.o. female with a medical diagnosis of Encephalopathy.  She presents with new onset (B) blindness (6 months ago) and recent CVA (3 weeks ago).  Pt demonstrates flat affect and increased time to answer questions or to initiate a task after receiving a command.  Unknown how reliable a historian pt is but will require long-term 24/7  "supervision/assistance due to blindness. Continue OT to improve functional abilities and mobility. Performance deficits affecting function are weakness, gait instability, impaired balance, impaired endurance, impaired self care skills, decreased safety awareness, impaired functional mobilty, decreased coordination, other (comment), decreased lower extremity function, visual deficits.      Rehab Prognosis:  Fair; patient would benefit from acute skilled OT services to address these deficits and reach maximum level of function.         Clinical Decision Makin.  OT Mod:  "Pt evaluation falls under moderate complexity for evaluation coding due to identification of 3-5 performance deficits noted as stated above. Eval required Min/Mod assistance to complete on this date and detailed assessment(s) were utilized. Moreover, an expanded review of history and occupational profile obtained with additional review of cognitive, physical and psychosocial hx."     Plan:     Patient to be seen 3 x/week to address the above listed problems via self-care/home management, therapeutic activities, therapeutic exercises  · Plan of Care Expires: 18  · Plan of Care Reviewed with: patient    This Plan of care has been discussed with the patient who was involved in its development and understands and is in agreement with the identified goals and treatment plan    GOALS:    Occupational Therapy Goals        Problem: Occupational Therapy Goal    Goal Priority Disciplines Outcome Interventions   Occupational Therapy Goal     OT, PT/OT Ongoing (interventions implemented as appropriate)    Description:  Goals to be met by: 3/19/18    Patient will increase functional independence with ADLs by performing:    UE Dressing with Minimal Assistance.  LE Dressing with Stand-by Assistance.  Grooming while seated with Set-up Assistance.  Toileting from bedside commode with Minimal Assistance for hygiene and clothing management.   Supine to sit " with Supervision.  Stand pivot transfers with Minimal Assistance.  Toilet transfer to bedside commode with Minimal Assistance.                      Time Tracking:     OT Date of Treatment: 03/12/18  OT Start Time: 0838  OT Stop Time: 0910  OT Total Time (min): 32 min    Billable Minutes:Evaluation 20  Self Care/Home Management 12    GIOVANI Lange  3/12/2018

## 2018-03-12 NOTE — ASSESSMENT & PLAN NOTE
-presents with somnolence x 1 day with hx of recent CVA 2 weeks ago  - afebrile, no leukocytosis, AST/ALT/ammonia WNL, lactic 2.2  - CT head with no acute intracranial abnormality noted   - possibly uremic - missed her last two HD treatments  - blood and urine cultures pending  - PT/OT consulted  - high risk of fall / injury - utilize all safety measures and fall precautions   - aspiration precautions

## 2018-03-12 NOTE — ASSESSMENT & PLAN NOTE
ESRD on HD MWF 4 hrs duration via [facility] AVG. Unknown EDW or residual function. Per NAA Frausto at outpatient dialysis unit, patient have not been accepted [facility] [phone]. Patient need recent medical records from hospital.  -Plan for HD today for metabolic clearance 4 hrs duration and volume removal.1-2 L . Dialysate adjusted to current labs.   -Please consult SW to assist with outpatient HD chair.     Anemia of CKD  -Unclear baseline.   -Obtain iron studies and start ANGELICA accordingly    BMD  Lab Results   Component Value Date    .2 (H) 10/18/2015    CALCIUM 9.0 03/12/2018    PHOS 4.8 (H) 03/12/2018   -Renal diet.Avoid any Ca based supplements or Ca containing binders. Switch phoslo to Renvela.   -Hold clacitriol for now. Obtain PTH, vit D.

## 2018-03-12 NOTE — PLAN OF CARE
Problem: Occupational Therapy Goal  Goal: Occupational Therapy Goal  Goals to be met by: 3/19/18    Patient will increase functional independence with ADLs by performing:    UE Dressing with Minimal Assistance.  LE Dressing with Stand-by Assistance.  Grooming while seated with Set-up Assistance.  Toileting from bedside commode with Minimal Assistance for hygiene and clothing management.   Supine to sit with Supervision.  Stand pivot transfers with Minimal Assistance.  Toilet transfer to bedside commode with Minimal Assistance.    Outcome: Ongoing (interventions implemented as appropriate)  Evaluation completed and POC established.    GIOVANI Tam

## 2018-03-12 NOTE — ASSESSMENT & PLAN NOTE
- albumin 3.0 on arrival  - prealbumin pending  - nutrition consulted to help optimize caloric intake

## 2018-03-12 NOTE — ED PROVIDER NOTES
Encounter Date: 3/11/2018    SCRIBE #1 NOTE: I, Arelis Mcgregor, am scribing for, and in the presence of,  Dr. Rendon. I have scribed the following portions of the note - the APC attestation.       History     Chief Complaint   Patient presents with    Hyperglycemia     Pt arrives EMS from home for evaluation of high glucose with decrease in mental status today - recent CVA to left side (3 weeks) and consequent pneumonia - dialysis patient T-T-Sat vis left upper arm shunt - EMS Glucose >500     Patient is a 52 year old with a history of diabetes on insulin, end-stage renal disease on dialysis TThSat, hypertension, seizure, recent CVA, recent diagnosis of pneumonia is presenting to the ER for evaluation of hyperglycemia.  Patient's daughter states that when she checked patient's sugar this morning it was reading as high.  Patient has also been found to have a decreased mental status today.  Daughter states that she did give patient 2 separate doses of Humalog, 20 units followed by15 unit. Patient currently at baseline does have confusion secondary to the CVA 3 weeks ago.  Patient was also diagnosed with pneumonia and is currently on unknown antibiotics for the last 4 days.  No recorded fevers at home.  No vomiting or diarrhea noted at home.  Last dialysis was on Tuesday.  Patient recently moving from [place] to [place].      The history is provided by a relative.     Review of patient's allergies indicates:   Allergen Reactions    Ciprofloxacin (bulk) Itching    Latex Itching and Other (See Comments)     Very low Oxygen    Vancomycin Shortness Of Breath and Itching    Neurontin [gabapentin] Other (See Comments)     Seizure like activity    Niacin Itching and Other (See Comments)     Burning      Bactrim [sulfamethoxazole-trimethoprim] Rash     Past Medical History:   Diagnosis Date    Anemia     during pregnancys    Arthritis     neuropathy hands feet and legs//    Blood transfusion     Chronic pain      CKD (chronic kidney disease), stage IV     Diabetes mellitus     Diabetes mellitus type I     Diabetic retinopathy     Hyperlipidemia     Hypertension     patient states that when her blood sugar increases her blood pressure increases    Neuropathy     Pneumonia     Seizures     when sugar is high, does not quite remember    Stroke     2018    Vitamin D deficiency     Vitamin D deficiency disease      Past Surgical History:   Procedure Laterality Date     SECTION, CLASSIC      x 2    EYE SURGERY      HYSTERECTOMY       Family History   Problem Relation Age of Onset    Diabetes Mother     Heart disease Mother     Blindness Mother      diabetes    Hypertension Mother     Diabetes Father     Asthma Father     Hypertension Father     Thyroid disease Sister     Breast cancer Daughter     Diabetes Sister     Stroke Maternal Uncle     Glaucoma Maternal Grandmother     Blindness Maternal Grandmother     Cataracts Maternal Grandmother     Hypertension Maternal Grandmother     Cancer Cousin     Amblyopia Neg Hx     Macular degeneration Neg Hx     Retinal detachment Neg Hx     Strabismus Neg Hx     Colon cancer Neg Hx     Ovarian cancer Neg Hx      Social History   Substance Use Topics    Smoking status: Current Some Day Smoker     Packs/day: 0.10     Years: 10.00     Types: Cigarettes    Smokeless tobacco: Never Used      Comment: 1 pack last her about 2-3 months    Alcohol use No     Review of Systems   Unable to perform ROS: Mental status change       Physical Exam     Initial Vitals [18 1855]   BP Pulse Resp Temp SpO2   (!) 190/90 88 12 97.4 °F (36.3 °C) 99 %      MAP       123.33         Physical Exam    Constitutional: She appears lethargic. She is not diaphoretic. She is easily aroused. She appears ill. No distress.   HENT:   Head: Normocephalic and atraumatic.   Mouth/Throat: Uvula is midline and oropharynx is clear and moist. Mucous membranes are dry.    Eyes: Conjunctivae, EOM and lids are normal. Right pupil not reactive: sluggish  Left pupil not reactive: sluggish. Pupils are unequal.   Neck: No neck rigidity.   Cardiovascular: Normal rate, regular rhythm, normal heart sounds, intact distal pulses and normal pulses.   Pulmonary/Chest: Effort normal and breath sounds normal.   Abdominal: Soft. She exhibits no distension. There is no tenderness.   Musculoskeletal:   Left arm fistula, thrill palpated.   Neurological: She is easily aroused. She appears lethargic.   Oriented to person and place.    Skin: Skin is warm and dry.         ED Course   Procedures  Labs Reviewed   CBC W/ AUTO DIFFERENTIAL - Abnormal; Notable for the following:        Result Value    RBC 2.95 (*)     Hemoglobin 8.2 (*)     Hematocrit 25.8 (*)     MCHC 31.8 (*)     RDW 14.6 (*)     Immature Granulocytes 2.0 (*)     Immature Grans (Abs) 0.18 (*)     Gran% 76.8 (*)     Lymph% 11.2 (*)     All other components within normal limits   COMPREHENSIVE METABOLIC PANEL - Abnormal; Notable for the following:     Potassium 5.2 (*)     CO2 22 (*)     Glucose 460 (*)     BUN, Bld 77 (*)     Creatinine 6.6 (*)     Albumin 3.0 (*)     ALT 9 (*)     eGFR if  7.6 (*)     eGFR if non  6.6 (*)     All other components within normal limits   B-TYPE NATRIURETIC PEPTIDE - Abnormal; Notable for the following:      (*)     All other components within normal limits   URINALYSIS, REFLEX TO URINE CULTURE - Abnormal; Notable for the following:     Appearance, UA Hazy (*)     Protein, UA 3+ (*)     Glucose, UA 3+ (*)     Occult Blood UA 1+ (*)     Leukocytes, UA 1+ (*)     All other components within normal limits    Narrative:     Preferred Collection Type->Urine, Clean Catch  ONE TUBE OF URINE   URINALYSIS MICROSCOPIC - Abnormal; Notable for the following:     RBC, UA 19 (*)     WBC, UA 24 (*)     Non-Squam Epith 2 (*)     All other components within normal limits    Narrative:      Preferred Collection Type->Urine, Clean Catch  ONE TUBE OF URINE   POCT GLUCOSE - Abnormal; Notable for the following:     POCT Glucose 459 (*)     All other components within normal limits   ISTAT PROCEDURE - Abnormal; Notable for the following:     POC PCO2 48.9 (*)     POC HCO3 28.5 (*)     POC SATURATED O2 89 (*)     POC TCO2 30 (*)     All other components within normal limits   ISTAT PROCEDURE - Abnormal; Notable for the following:     POC Glucose 450 (*)     POC BUN 79 (*)     POC Creatinine 7.1 (*)     POC Hematocrit 25 (*)     All other components within normal limits   POCT GLUCOSE - Abnormal; Notable for the following:     POCT Glucose 290 (*)     All other components within normal limits   POCT GLUCOSE - Abnormal; Notable for the following:     POCT Glucose 211 (*)     All other components within normal limits   CULTURE, BLOOD   CULTURE, BLOOD   CULTURE, URINE   BETA - HYDROXYBUTYRATE, SERUM   TROPONIN I   AMMONIA   DRUG SCREEN PANEL, URINE EMERGENCY    Narrative:     Preferred Collection Type->Urine, Clean Catch  ONE TUBE OF URINE   LACTIC ACID, PLASMA   LEVETIRACETAM  (KEPPRA) LEVEL   LEVETIRACETAM  (KEPPRA) LEVEL   POCT GLUCOSE MONITORING CONTINUOUS         CT Head Without Contrast   Final Result       No evidence of acute intracranial process.      Subtle hypodensity in the left occipital corresponds to prominent sulci on coronal view, similar to 8/2017.         ______________________________________       Electronically signed by resident: RAMYA MIN MD   Date:     03/11/18   Time:    21:14                  As the supervising and teaching physician, I personally reviewed the images and resident's interpretation and I agree with the findings.               Electronically signed by: BRITT AMAYA MD   Date:     03/11/18   Time:    21:40       X-Ray Chest AP Portable   Final Result      No acute cardiopulmonary process.            Electronically signed by: MAGALY RAMIREZ MD   Date:     03/11/18    Time:    19:59                Medical Decision Making:   History:   Old Medical Records: I decided to obtain old medical records.  Clinical Tests:   Lab Tests: Ordered and Reviewed  Radiological Study: Ordered and Reviewed  Medical Tests: Ordered and Reviewed       APC / Resident Notes:   Patient was seen in the ER promptly upon arrival.  She is afebrile.  Initial assessment patient appears to the somnolent, easily arousable.  She is oriented ×2.  Lung auscultation clear.  Left arm fistula noted, thrill palpated.  Abdomen soft, nondistended. Irregular right pupil.    IV access established labs ordered. Fluids held. Xray of chest obtained, no acute findings. Chemistries show glucose of 460. Hemoglobin stable 8.2, similar to baseline. Patient on procrit. WBC normal limits. Creatnine 6.6 BUN 77. No evidence of DKA.  PH 7.37.  Beta hydroxybutyrate unremarkable    Patient on cefuroxime for right lung pneumonia that was diagnosed 4 days ago    CT head negative for acute pathology. Urinalysis shows leukocytosis with IV fluid.  Will cover with Rocephin and clindamycin.  Blood cultures obtained, pending results.  Lactic acid normal.    Upon reassessment patient's blood sugar has improved 290.    Discussed with hospital medicine for observation.     The care of this patient was overseen by attending physician who agrees with treatment, plan, and disposition.         Scribe Attestation:   Scribe #1: I performed the above scribed service and the documentation accurately describes the services I performed. I attest to the accuracy of the note.    Attending Attestation:     Physician Attestation Statement for NP/PA:   I have conducted a face to face encounter with this patient in addition to the NP/PA, due to Medical Complexity    Other NP/PA Attestation Additions:    History of Present Illness: Pt presents with confusion over the last several weeks. History of stroke and recent PNA.    Physical Exam: Neck is supple. Heart,  lungs, abdomen are normal.left sided weakness which is old. Responds to commands.    Medical Decision Making: Did extensive workup. Will admit to medicine. Likely cause of delirium is infection. Doubt acute stroke.                     Clinical Impression:   The primary encounter diagnosis was UTI (urinary tract infection). Diagnoses of Hyperglycemia and Altered mental status, unspecified altered mental status type were also pertinent to this visit.    Disposition:   Disposition: Admitted  Condition: Stable                        Ivonne Yanez PA-C  03/12/18 0116

## 2018-03-12 NOTE — ED NOTES
The , Olivia, is calling [facility] in [place] to release medical records from them for the past week she stayed there.

## 2018-03-13 LAB
ALBUMIN SERPL BCP-MCNC: 2.8 G/DL
ALP SERPL-CCNC: 116 U/L
ALT SERPL W/O P-5'-P-CCNC: 8 U/L
ANION GAP SERPL CALC-SCNC: 17 MMOL/L
AST SERPL-CCNC: 18 U/L
BASOPHILS # BLD AUTO: 0.11 K/UL
BASOPHILS NFR BLD: 1.2 %
BILIRUB SERPL-MCNC: 0.2 MG/DL
BUN SERPL-MCNC: 38 MG/DL
CALCIUM SERPL-MCNC: 8.6 MG/DL
CHLORIDE SERPL-SCNC: 106 MMOL/L
CO2 SERPL-SCNC: 18 MMOL/L
CREAT SERPL-MCNC: 3.8 MG/DL
DIFFERENTIAL METHOD: ABNORMAL
EOSINOPHIL # BLD AUTO: 0.2 K/UL
EOSINOPHIL NFR BLD: 2.2 %
ERYTHROCYTE [DISTWIDTH] IN BLOOD BY AUTOMATED COUNT: 15 %
EST. GFR  (AFRICAN AMERICAN): 14.9 ML/MIN/1.73 M^2
EST. GFR  (NON AFRICAN AMERICAN): 12.9 ML/MIN/1.73 M^2
GLUCOSE SERPL-MCNC: 135 MG/DL
HCT VFR BLD AUTO: 26.7 %
HGB BLD-MCNC: 8.8 G/DL
IMM GRANULOCYTES # BLD AUTO: 0.38 K/UL
IMM GRANULOCYTES NFR BLD AUTO: 4.2 %
LEVETIRACETAM SERPL-MCNC: 7.7 UG/ML (ref 3–60)
LYMPHOCYTES # BLD AUTO: 1.6 K/UL
LYMPHOCYTES NFR BLD: 17.3 %
MAGNESIUM SERPL-MCNC: 1.8 MG/DL
MCH RBC QN AUTO: 27.4 PG
MCHC RBC AUTO-ENTMCNC: 33 G/DL
MCV RBC AUTO: 83 FL
MONOCYTES # BLD AUTO: 0.6 K/UL
MONOCYTES NFR BLD: 7 %
NEUTROPHILS # BLD AUTO: 6.2 K/UL
NEUTROPHILS NFR BLD: 68.1 %
NRBC BLD-RTO: 1 /100 WBC
PHOSPHATE SERPL-MCNC: 4.2 MG/DL
PLATELET # BLD AUTO: 248 K/UL
PMV BLD AUTO: 9.9 FL
POCT GLUCOSE: 133 MG/DL (ref 70–110)
POCT GLUCOSE: 180 MG/DL (ref 70–110)
POCT GLUCOSE: 342 MG/DL (ref 70–110)
POCT GLUCOSE: 92 MG/DL (ref 70–110)
POTASSIUM SERPL-SCNC: 5.5 MMOL/L
PROT SERPL-MCNC: 6.5 G/DL
RBC # BLD AUTO: 3.21 M/UL
SODIUM SERPL-SCNC: 141 MMOL/L
WBC # BLD AUTO: 9.14 K/UL

## 2018-03-13 PROCEDURE — G0378 HOSPITAL OBSERVATION PER HR: HCPCS

## 2018-03-13 PROCEDURE — 63600175 PHARM REV CODE 636 W HCPCS: Performed by: NURSE PRACTITIONER

## 2018-03-13 PROCEDURE — 90935 HEMODIALYSIS ONE EVALUATION: CPT

## 2018-03-13 PROCEDURE — 20600001 HC STEP DOWN PRIVATE ROOM

## 2018-03-13 PROCEDURE — 85025 COMPLETE CBC W/AUTO DIFF WBC: CPT

## 2018-03-13 PROCEDURE — 80053 COMPREHEN METABOLIC PANEL: CPT

## 2018-03-13 PROCEDURE — 84100 ASSAY OF PHOSPHORUS: CPT

## 2018-03-13 PROCEDURE — 99233 SBSQ HOSP IP/OBS HIGH 50: CPT | Mod: ,,, | Performed by: PHYSICIAN ASSISTANT

## 2018-03-13 PROCEDURE — 25000003 PHARM REV CODE 250: Performed by: NURSE PRACTITIONER

## 2018-03-13 PROCEDURE — 63600175 PHARM REV CODE 636 W HCPCS: Performed by: PHYSICIAN ASSISTANT

## 2018-03-13 PROCEDURE — 86580 TB INTRADERMAL TEST: CPT | Performed by: INTERNAL MEDICINE

## 2018-03-13 PROCEDURE — 36415 COLL VENOUS BLD VENIPUNCTURE: CPT

## 2018-03-13 PROCEDURE — 63600175 PHARM REV CODE 636 W HCPCS: Performed by: INTERNAL MEDICINE

## 2018-03-13 PROCEDURE — 90935 HEMODIALYSIS ONE EVALUATION: CPT | Mod: ,,, | Performed by: NURSE PRACTITIONER

## 2018-03-13 PROCEDURE — 83735 ASSAY OF MAGNESIUM: CPT

## 2018-03-13 PROCEDURE — 25000003 PHARM REV CODE 250: Performed by: PHYSICIAN ASSISTANT

## 2018-03-13 RX ORDER — SODIUM CHLORIDE 9 MG/ML
INJECTION, SOLUTION INTRAVENOUS
Status: DISCONTINUED | OUTPATIENT
Start: 2018-03-13 | End: 2018-03-22 | Stop reason: HOSPADM

## 2018-03-13 RX ORDER — SODIUM CHLORIDE 9 MG/ML
INJECTION, SOLUTION INTRAVENOUS ONCE
Status: COMPLETED | OUTPATIENT
Start: 2018-03-13 | End: 2018-03-13

## 2018-03-13 RX ADMIN — NIFEDIPINE 60 MG: 60 TABLET, FILM COATED, EXTENDED RELEASE ORAL at 09:03

## 2018-03-13 RX ADMIN — SEVELAMER CARBONATE 800 MG: 800 TABLET, FILM COATED ORAL at 12:03

## 2018-03-13 RX ADMIN — NIFEDIPINE 60 MG: 60 TABLET, FILM COATED, EXTENDED RELEASE ORAL at 10:03

## 2018-03-13 RX ADMIN — ONDANSETRON 8 MG: 8 TABLET, ORALLY DISINTEGRATING ORAL at 09:03

## 2018-03-13 RX ADMIN — CLONIDINE HYDROCHLORIDE 0.1 MG: 0.1 TABLET ORAL at 10:03

## 2018-03-13 RX ADMIN — CEFTRIAXONE SODIUM 1 G: 1 INJECTION, POWDER, FOR SOLUTION INTRAMUSCULAR; INTRAVENOUS at 10:03

## 2018-03-13 RX ADMIN — SEVELAMER CARBONATE 800 MG: 800 TABLET, FILM COATED ORAL at 09:03

## 2018-03-13 RX ADMIN — INSULIN ASPART 3 UNITS: 100 INJECTION, SOLUTION INTRAVENOUS; SUBCUTANEOUS at 10:03

## 2018-03-13 RX ADMIN — HEPARIN SODIUM 5000 UNITS: 5000 INJECTION, SOLUTION INTRAVENOUS; SUBCUTANEOUS at 10:03

## 2018-03-13 RX ADMIN — METOCLOPRAMIDE 10 MG: 10 TABLET ORAL at 12:03

## 2018-03-13 RX ADMIN — SODIUM CHLORIDE 300 ML: 0.9 INJECTION, SOLUTION INTRAVENOUS at 03:03

## 2018-03-13 RX ADMIN — LOSARTAN POTASSIUM 50 MG: 50 TABLET, FILM COATED ORAL at 09:03

## 2018-03-13 RX ADMIN — INSULIN ASPART 4 UNITS: 100 INJECTION, SOLUTION INTRAVENOUS; SUBCUTANEOUS at 12:03

## 2018-03-13 RX ADMIN — LEVETIRACETAM 250 MG: 250 TABLET, FILM COATED ORAL at 10:03

## 2018-03-13 RX ADMIN — METOCLOPRAMIDE 10 MG: 10 TABLET ORAL at 06:03

## 2018-03-13 RX ADMIN — ROSUVASTATIN CALCIUM 20 MG: 20 TABLET, FILM COATED ORAL at 09:03

## 2018-03-13 RX ADMIN — INSULIN DETEMIR 15 UNITS: 100 INJECTION, SOLUTION SUBCUTANEOUS at 10:03

## 2018-03-13 RX ADMIN — SEVELAMER CARBONATE 800 MG: 800 TABLET, FILM COATED ORAL at 06:03

## 2018-03-13 RX ADMIN — LEVETIRACETAM 250 MG: 250 TABLET, FILM COATED ORAL at 09:03

## 2018-03-13 RX ADMIN — METOPROLOL TARTRATE 50 MG: 50 TABLET ORAL at 09:03

## 2018-03-13 RX ADMIN — HEPARIN SODIUM 5000 UNITS: 5000 INJECTION, SOLUTION INTRAVENOUS; SUBCUTANEOUS at 05:03

## 2018-03-13 RX ADMIN — INSULIN ASPART 4 UNITS: 100 INJECTION, SOLUTION INTRAVENOUS; SUBCUTANEOUS at 06:03

## 2018-03-13 RX ADMIN — CLONIDINE HYDROCHLORIDE 0.1 MG: 0.1 TABLET ORAL at 09:03

## 2018-03-13 RX ADMIN — TUBERCULIN PURIFIED PROTEIN DERIVATIVE 5 UNITS: 5 INJECTION, SOLUTION INTRADERMAL at 06:03

## 2018-03-13 RX ADMIN — METOPROLOL TARTRATE 50 MG: 50 TABLET ORAL at 10:03

## 2018-03-13 NOTE — ASSESSMENT & PLAN NOTE
Recent CVA 2 weeks ago; ?new baseline  - Presents with somnolence x 1 day  - Afebrile, no leukocytosis, AST/ALT/ammonia WNL, lactic 2.2  - CT head with no acute intracranial abnormality noted   - Possibly uremic - missed her last two HD treatments (plan for HD 3/12)  - Blood cultures NGTD  - Urine cx gram neg vernon lactose   - PT/OT consulted: SNF and need to establish HD chair  - High risk of fall / injury - utilize all safety measures and fall precautions   - Aspiration precautions  - SW/CM assisting with discharge planning

## 2018-03-13 NOTE — ASSESSMENT & PLAN NOTE
Hyperglycemia  - Glucose appears poorly controlled  - Hyperglycemic on arrival with glucose in the 460 now down to 290  - Beta-hydroxybutyrate 0.0  - Resume home long acting insulin dosing  3/12: Aspart 4U WM  - HgA1C 9.1  3/13: Continue detemir 15U qhs and increase aspart to 5U WM plus low dose correction  - ISS AC/HS  - ADA diet  - Will monitor BGs and adjust insulin as needed

## 2018-03-13 NOTE — ASSESSMENT & PLAN NOTE
- Hx of ESRD currently on HD TTS with decreased urine production   - Afebrile, no leukocytosis, UA with 4 squam. UC gram neg vernon.  - Will continue CTX (day 3)

## 2018-03-13 NOTE — PROGRESS NOTES
[facility] H[facility]ODIALYSIS NOTE     Patient currently on hemodialysis for removal of uremic toxins .     Patient seen and evaluated on hemodialysis, tolerating treatment, see HD flowsheet for vitals and assessments.      No Hypotension, chest pain, shortness of breath, cramping, nausea or vomiting.     Plan HD TTS while inpatient. Start epo.     ADEN AVG no issues .     Viktoria Nguyen NP  Nephrology

## 2018-03-13 NOTE — ASSESSMENT & PLAN NOTE
- TTS HD patient with LUE HD access  - Reports last HD treatment 03/06/18  - Nephrology consulted and plan for HD 3/12  - Trend electrolytes daily   - Strict I/O's  - Daily Wt's  3/12: Informed by nephrology that patient is not set up for outpatient HD at [facility] in [facility]; CM assisting.

## 2018-03-13 NOTE — SUBJECTIVE & OBJECTIVE
Interval History:  no acute events overnight, no new complaints    Review of Systems   Constitutional: Positive for fatigue. Negative for chills, diaphoresis and fever.   Eyes: Positive for visual disturbance (blind). Negative for discharge.   Respiratory: Negative for cough, chest tightness and shortness of breath.    Cardiovascular: Negative for chest pain, palpitations and leg swelling.   Gastrointestinal: Negative for abdominal distention, abdominal pain, diarrhea, nausea and vomiting.   Genitourinary: Positive for decreased urine volume (ESRD). Negative for dysuria.   Neurological: Positive for seizures and weakness. Negative for syncope.     Objective:     Vital Signs (Most Recent):  Temp: 97.4 °F (36.3 °C) (03/13/18 0800)  Pulse: 77 (03/13/18 0907)  Resp: 18 (03/13/18 0800)  BP: (!) 189/83 (03/13/18 0800)  SpO2: 98 % (03/13/18 0800) Vital Signs (24h Range):  Temp:  [97 °F (36.1 °C)-98 °F (36.7 °C)] 97.4 °F (36.3 °C)  Pulse:  [63-83] 77  Resp:  [18-20] 18  SpO2:  [97 %-100 %] 98 %  BP: (116-189)/(55-86) 189/83     Weight: 59 kg (130 lb 1.1 oz)  Body mass index is 20.37 kg/m².    Intake/Output Summary (Last 24 hours) at 03/13/18 1054  Last data filed at 03/12/18 1830   Gross per 24 hour   Intake              600 ml   Output             2600 ml   Net            -2000 ml      Physical Exam   Constitutional: She appears well-developed. No distress.   Eyes:   Blind R eye, minimal vision in L eye   Cardiovascular: Normal rate, regular rhythm, normal heart sounds and intact distal pulses.    No murmur heard.  Pulmonary/Chest: Effort normal and breath sounds normal. No respiratory distress. She has no wheezes. She has no rales.   Abdominal: Soft. Bowel sounds are normal. She exhibits no distension. There is no tenderness.   Musculoskeletal: Normal range of motion. She exhibits no edema.   Neurological: She is alert. She displays atrophy.   Oriented to self, place, and president; follows commands, speech is slow;  generalized weakness appreciated R>L   Skin: Skin is warm and dry. She is not diaphoretic.   Psychiatric: Her speech is delayed and slurred. She is slowed and withdrawn.   Nursing note and vitals reviewed.      Significant Labs: All pertinent labs within the past 24 hours have been reviewed.    Significant Imaging: I have reviewed all pertinent imaging results/findings within the past 24 hours.

## 2018-03-13 NOTE — PLAN OF CARE
Referrals sent to     [facility] [phone]  [facility]  [facility]  [place]   [facility]    Lana is f/u.

## 2018-03-13 NOTE — PROGRESS NOTES
3hr HD treatment completed 1L of fluid removed pt tolerated well. Both needles of a ADEN graft removed and pressure held until hemostasis achieved dressing applied. Report given to NAA Bishop

## 2018-03-13 NOTE — PROGRESS NOTES
[facility] Hospital Medicine  Progress Note    Patient Name: Eufemia Haq  MRN: [id number]  Patient Class: OP- Observation   Admission Date: 3/11/2018  Length of Stay: 0 days  Attending Physician: DEBBIE Hollingsworth MD  Primary Care Provider: Primary Doctor [facility] Medicine Team: [facility] F Rosa Maria Parsons PA-C    Subjective:     Principal Problem:Encephalopathy    HPI:  53 y/o female, who presents to the ED with c/o lethargy and hyperglycemia.  She has a PMH of recent CVA, DMI, ESRD (TTS HD), HTN, blindness of the R eye, and seizures.  Majority of history is provided by patients daughter at bedside.  She states her mother was just discharge a [facility] 2 days ago and has been lethargic since returning home.  She states that her mother suffered a stroke about 3 weeks ago.  The patient is lethargic and minimally interactive.  She appears in NAD and will follow simple commands when prompted, but drifts off when not simulated.  Additionally, she states that she was recently diagnosed with pneumonia.  She denies fever, chills, CP, SOB, N/V/D, or  symptoms since discharge.  She endorsed poorly controlled blood sugars.  Her sugar was 460 on arrival.  They report adherence to medication regimen.  However, report that her last HD treatment was Tuesday 03/06/18.    Hospital Course:  Pt admitted to observation for encephalopathy in setting of recent CVA (2 weeks ago); ?new baseline. CTH without acute abnormality. CXR without acute process. UA suboptimal; will continue CTX while urine cx pending (gram neg vernon, lactose ). Blood cx NGTD. Nephrology consulted for maintenance HD (missed last 2 HD). Informed by nephrology that patient is not set up for outpatient HD at [facility] in [facility]; CM assisting. PT/OT consulted.    Interval History:  no acute events overnight, no new complaints    Review of Systems   Constitutional: Positive for fatigue. Negative for chills,  diaphoresis and fever.   Eyes: Positive for visual disturbance (blind). Negative for discharge.   Respiratory: Negative for cough, chest tightness and shortness of breath.    Cardiovascular: Negative for chest pain, palpitations and leg swelling.   Gastrointestinal: Negative for abdominal distention, abdominal pain, diarrhea, nausea and vomiting.   Genitourinary: Positive for decreased urine volume (ESRD). Negative for dysuria.   Neurological: Positive for seizures and weakness. Negative for syncope.     Objective:     Vital Signs (Most Recent):  Temp: 97.4 °F (36.3 °C) (03/13/18 0800)  Pulse: 77 (03/13/18 0907)  Resp: 18 (03/13/18 0800)  BP: (!) 189/83 (03/13/18 0800)  SpO2: 98 % (03/13/18 0800) Vital Signs (24h Range):  Temp:  [97 °F (36.1 °C)-98 °F (36.7 °C)] 97.4 °F (36.3 °C)  Pulse:  [63-83] 77  Resp:  [18-20] 18  SpO2:  [97 %-100 %] 98 %  BP: (116-189)/(55-86) 189/83     Weight: 59 kg (130 lb 1.1 oz)  Body mass index is 20.37 kg/m².    Intake/Output Summary (Last 24 hours) at 03/13/18 1054  Last data filed at 03/12/18 1830   Gross per 24 hour   Intake              600 ml   Output             2600 ml   Net            -2000 ml      Physical Exam   Constitutional: She appears well-developed. No distress.   Eyes:   Blind R eye, minimal vision in L eye   Cardiovascular: Normal rate, regular rhythm, normal heart sounds and intact distal pulses.    No murmur heard.  Pulmonary/Chest: Effort normal and breath sounds normal. No respiratory distress. She has no wheezes. She has no rales.   Abdominal: Soft. Bowel sounds are normal. She exhibits no distension. There is no tenderness.   Musculoskeletal: Normal range of motion. She exhibits no edema.   Neurological: She is alert. She displays atrophy.   Oriented to self, place, and president; follows commands, speech is slow; generalized weakness appreciated R>L   Skin: Skin is warm and dry. She is not diaphoretic.   Psychiatric: Her speech is delayed and slurred. She is  slowed and withdrawn.   Nursing note and vitals reviewed.      Significant Labs: All pertinent labs within the past 24 hours have been reviewed.    Significant Imaging: I have reviewed all pertinent imaging results/findings within the past 24 hours.    Assessment/Plan:      * Encephalopathy    Recent CVA 2 weeks ago; ?new baseline  - Presents with somnolence x 1 day  - Afebrile, no leukocytosis, AST/ALT/ammonia WNL, lactic 2.2  - CT head with no acute intracranial abnormality noted   - Possibly uremic - missed her last two HD treatments (plan for HD 3/12)  - Blood cultures NGTD  - Urine cx gram neg vernon lactose   - PT/OT consulted: SNF and need to establish HD chair  - High risk of fall / injury - utilize all safety measures and fall precautions   - Aspiration precautions  - SW/CM assisting with discharge planning         Type 1 diabetes mellitus with complication    Hyperglycemia  - Glucose appears poorly controlled  - Hyperglycemic on arrival with glucose in the 460 now down to 290  - Beta-hydroxybutyrate 0.0  - Resume home long acting insulin dosing  3/12: Aspart 4U WM  - HgA1C 9.1  3/13: Continue detemir 15U qhs and increase aspart to 5U WM plus low dose correction  - ISS AC/HS  - ADA diet  - Will monitor BGs and adjust insulin as needed        ESRD (end stage renal disease)    - TTS HD patient with LUE HD access  - Reports last HD treatment 03/06/18  - Nephrology consulted and plan for HD 3/12  - Trend electrolytes daily   - Strict I/O's  - Daily Wt's  3/12: Informed by nephrology that patient is not set up for outpatient HD at [facility]; CM assisting.        UTI (urinary tract infection)    - Hx of ESRD currently on HD TTS with decreased urine production   - Afebrile, no leukocytosis, UA with 4 squam. UC gram neg vernon.  - Will continue CTX (day 3)        Anemia in chronic renal disease    - Hgb 8.2 on arrival (baseline 6.7-7.4)  - Trend daily  - Continue TTS epoetin         Essential  hypertension    - BP controlled with resumed home antihypertensive regimen  - Hydralazine PRN  - Held bumetanide and HCTZ on admit  - Will continue to monitor        Seizure disorder    - Resume home levetiracetam dosing  - Seizure precautions  - Levetiracetam level pending        Protein calorie malnutrition    - Albumin 3.0 on arrival with ESRD  Continue to monitor        DM gastroparesis    - Continue home dosing of metoclopramide  - Antiemetics PRN          VTE Risk Mitigation         Ordered     heparin (porcine) injection 5,000 Units  Every 8 hours     Route:  Subcutaneous        03/11/18 2241     Medium Risk of VTE  Once      03/11/18 2241     Place CARLOS hose  Until discontinued      03/11/18 2241     Place sequential compression device  Until discontinued      03/11/18 2241              Rosa Maria Parsons PA-C  Department of Hospital Medicine   [facility]

## 2018-03-13 NOTE — PROGRESS NOTES
Report received from NAA Bishop. Pt arrived from floor AAOx4. Maintenance dialysis initiated via ADEN graft without difficulty.

## 2018-03-13 NOTE — PLAN OF CARE
Problem: Patient Care Overview  Goal: Plan of Care Review  Outcome: Ongoing (interventions implemented as appropriate)  Pt free of falls/injuries throughout the shift. Bed locked, in lowest position, callbell within reach. Pt afebrile, pain assessed & denied. During 0600 rounding pt complained of what she felt as hypoglycemia. Pt's bs was checked and it was 92. Informed pt of this info, pt asked why I wouldn't give her a glucose tablet advised pt that a bs of 92 didn't require a tablet as of yet. Offered pt a snack/juice in which pt declined. Pt in no distress, will continue to monitor.

## 2018-03-14 PROBLEM — E10.65 TYPE 1 DIABETES MELLITUS WITH HYPERGLYCEMIA: Status: ACTIVE | Noted: 2017-08-30

## 2018-03-14 LAB
ALBUMIN SERPL BCP-MCNC: 2.7 G/DL
ANION GAP SERPL CALC-SCNC: 11 MMOL/L
B-OH-BUTYR BLD STRIP-SCNC: 0 MMOL/L
BASOPHILS # BLD AUTO: 0.03 K/UL
BASOPHILS NFR BLD: 0.4 %
BUN SERPL-MCNC: 36 MG/DL
CALCIUM SERPL-MCNC: 8.1 MG/DL
CHLORIDE SERPL-SCNC: 98 MMOL/L
CO2 SERPL-SCNC: 25 MMOL/L
CREAT SERPL-MCNC: 3.5 MG/DL
DIFFERENTIAL METHOD: ABNORMAL
EOSINOPHIL # BLD AUTO: 0.1 K/UL
EOSINOPHIL NFR BLD: 1.3 %
ERYTHROCYTE [DISTWIDTH] IN BLOOD BY AUTOMATED COUNT: 14.4 %
EST. GFR  (AFRICAN AMERICAN): 16.5 ML/MIN/1.73 M^2
EST. GFR  (NON AFRICAN AMERICAN): 14.3 ML/MIN/1.73 M^2
GLUCOSE SERPL-MCNC: 664 MG/DL
HCT VFR BLD AUTO: 24.3 %
HGB BLD-MCNC: 7.6 G/DL
IMM GRANULOCYTES # BLD AUTO: 0.08 K/UL
IMM GRANULOCYTES NFR BLD AUTO: 1.2 %
LYMPHOCYTES # BLD AUTO: 1 K/UL
LYMPHOCYTES NFR BLD: 15.3 %
MAGNESIUM SERPL-MCNC: 1.8 MG/DL
MCH RBC QN AUTO: 27.3 PG
MCHC RBC AUTO-ENTMCNC: 31.3 G/DL
MCV RBC AUTO: 87 FL
MONOCYTES # BLD AUTO: 0.6 K/UL
MONOCYTES NFR BLD: 8.8 %
NEUTROPHILS # BLD AUTO: 5 K/UL
NEUTROPHILS NFR BLD: 73 %
NRBC BLD-RTO: 0 /100 WBC
PHOSPHATE SERPL-MCNC: 3.7 MG/DL
PLATELET # BLD AUTO: 201 K/UL
PMV BLD AUTO: 10.2 FL
POCT GLUCOSE: 192 MG/DL (ref 70–110)
POCT GLUCOSE: 215 MG/DL (ref 70–110)
POCT GLUCOSE: 246 MG/DL (ref 70–110)
POCT GLUCOSE: 276 MG/DL (ref 70–110)
POCT GLUCOSE: 278 MG/DL (ref 70–110)
POCT GLUCOSE: 313 MG/DL (ref 70–110)
POCT GLUCOSE: 37 MG/DL (ref 70–110)
POCT GLUCOSE: 371 MG/DL (ref 70–110)
POCT GLUCOSE: 392 MG/DL (ref 70–110)
POCT GLUCOSE: 426 MG/DL (ref 70–110)
POCT GLUCOSE: 43 MG/DL (ref 70–110)
POCT GLUCOSE: 49 MG/DL (ref 70–110)
POCT GLUCOSE: 62 MG/DL (ref 70–110)
POCT GLUCOSE: 99 MG/DL (ref 70–110)
POCT GLUCOSE: >500 MG/DL (ref 70–110)
POTASSIUM SERPL-SCNC: 5 MMOL/L
RBC # BLD AUTO: 2.78 M/UL
SODIUM SERPL-SCNC: 134 MMOL/L
WBC # BLD AUTO: 6.8 K/UL

## 2018-03-14 PROCEDURE — 97530 THERAPEUTIC ACTIVITIES: CPT

## 2018-03-14 PROCEDURE — 97110 THERAPEUTIC EXERCISES: CPT

## 2018-03-14 PROCEDURE — 93990 DOPPLER FLOW TESTING: CPT | Performed by: SURGERY

## 2018-03-14 PROCEDURE — 99222 1ST HOSP IP/OBS MODERATE 55: CPT | Mod: GC,,, | Performed by: INTERNAL MEDICINE

## 2018-03-14 PROCEDURE — 87340 HEPATITIS B SURFACE AG IA: CPT

## 2018-03-14 PROCEDURE — 63600175 PHARM REV CODE 636 W HCPCS: Performed by: INTERNAL MEDICINE

## 2018-03-14 PROCEDURE — 86709 HEPATITIS A IGM ANTIBODY: CPT

## 2018-03-14 PROCEDURE — 97535 SELF CARE MNGMENT TRAINING: CPT

## 2018-03-14 PROCEDURE — 80069 RENAL FUNCTION PANEL: CPT

## 2018-03-14 PROCEDURE — 25000003 PHARM REV CODE 250: Performed by: PHYSICIAN ASSISTANT

## 2018-03-14 PROCEDURE — 25000003 PHARM REV CODE 250: Performed by: NURSE PRACTITIONER

## 2018-03-14 PROCEDURE — 36415 COLL VENOUS BLD VENIPUNCTURE: CPT

## 2018-03-14 PROCEDURE — 63600175 PHARM REV CODE 636 W HCPCS: Performed by: PHYSICIAN ASSISTANT

## 2018-03-14 PROCEDURE — 83735 ASSAY OF MAGNESIUM: CPT

## 2018-03-14 PROCEDURE — 86704 HEP B CORE ANTIBODY TOTAL: CPT

## 2018-03-14 PROCEDURE — 25000003 PHARM REV CODE 250: Performed by: INTERNAL MEDICINE

## 2018-03-14 PROCEDURE — 86706 HEP B SURFACE ANTIBODY: CPT

## 2018-03-14 PROCEDURE — 20600001 HC STEP DOWN PRIVATE ROOM

## 2018-03-14 PROCEDURE — 85025 COMPLETE CBC W/AUTO DIFF WBC: CPT

## 2018-03-14 PROCEDURE — 99233 SBSQ HOSP IP/OBS HIGH 50: CPT | Mod: ,,, | Performed by: PHYSICIAN ASSISTANT

## 2018-03-14 PROCEDURE — 63600175 PHARM REV CODE 636 W HCPCS: Performed by: NURSE PRACTITIONER

## 2018-03-14 PROCEDURE — 82010 KETONE BODYS QUAN: CPT

## 2018-03-14 RX ORDER — INSULIN ASPART 100 [IU]/ML
0-4 INJECTION, SOLUTION INTRAVENOUS; SUBCUTANEOUS
Status: DISCONTINUED | OUTPATIENT
Start: 2018-03-14 | End: 2018-03-14

## 2018-03-14 RX ORDER — INSULIN ASPART 100 [IU]/ML
0-5 INJECTION, SOLUTION INTRAVENOUS; SUBCUTANEOUS
Status: DISCONTINUED | OUTPATIENT
Start: 2018-03-14 | End: 2018-03-19

## 2018-03-14 RX ORDER — SODIUM CHLORIDE 9 MG/ML
INJECTION, SOLUTION INTRAVENOUS
Status: DISCONTINUED | OUTPATIENT
Start: 2018-03-14 | End: 2018-03-22 | Stop reason: HOSPADM

## 2018-03-14 RX ORDER — INSULIN ASPART 100 [IU]/ML
5 INJECTION, SOLUTION INTRAVENOUS; SUBCUTANEOUS
Status: DISCONTINUED | OUTPATIENT
Start: 2018-03-15 | End: 2018-03-16

## 2018-03-14 RX ORDER — SODIUM CHLORIDE 9 MG/ML
INJECTION, SOLUTION INTRAVENOUS ONCE
Status: DISCONTINUED | OUTPATIENT
Start: 2018-03-14 | End: 2018-03-22 | Stop reason: HOSPADM

## 2018-03-14 RX ORDER — INSULIN ASPART 100 [IU]/ML
5 INJECTION, SOLUTION INTRAVENOUS; SUBCUTANEOUS
Status: DISCONTINUED | OUTPATIENT
Start: 2018-03-14 | End: 2018-03-14

## 2018-03-14 RX ADMIN — METOPROLOL TARTRATE 50 MG: 50 TABLET ORAL at 09:03

## 2018-03-14 RX ADMIN — ROSUVASTATIN CALCIUM 20 MG: 20 TABLET, FILM COATED ORAL at 09:03

## 2018-03-14 RX ADMIN — HEPARIN SODIUM 5000 UNITS: 5000 INJECTION, SOLUTION INTRAVENOUS; SUBCUTANEOUS at 05:03

## 2018-03-14 RX ADMIN — INSULIN ASPART 3 UNITS: 100 INJECTION, SOLUTION INTRAVENOUS; SUBCUTANEOUS at 09:03

## 2018-03-14 RX ADMIN — ACETAMINOPHEN 650 MG: 325 TABLET, FILM COATED ORAL at 05:03

## 2018-03-14 RX ADMIN — CEFTRIAXONE SODIUM 1 G: 1 INJECTION, POWDER, FOR SOLUTION INTRAMUSCULAR; INTRAVENOUS at 09:03

## 2018-03-14 RX ADMIN — NIFEDIPINE 60 MG: 60 TABLET, FILM COATED, EXTENDED RELEASE ORAL at 09:03

## 2018-03-14 RX ADMIN — HEPARIN SODIUM 5000 UNITS: 5000 INJECTION, SOLUTION INTRAVENOUS; SUBCUTANEOUS at 01:03

## 2018-03-14 RX ADMIN — CLONIDINE HYDROCHLORIDE 0.1 MG: 0.1 TABLET ORAL at 09:03

## 2018-03-14 RX ADMIN — SEVELAMER CARBONATE 800 MG: 800 TABLET, FILM COATED ORAL at 09:03

## 2018-03-14 RX ADMIN — LOSARTAN POTASSIUM 50 MG: 50 TABLET, FILM COATED ORAL at 09:03

## 2018-03-14 RX ADMIN — HEPARIN SODIUM 5000 UNITS: 5000 INJECTION, SOLUTION INTRAVENOUS; SUBCUTANEOUS at 09:03

## 2018-03-14 RX ADMIN — METOCLOPRAMIDE 10 MG: 10 TABLET ORAL at 10:03

## 2018-03-14 RX ADMIN — SEVELAMER CARBONATE 800 MG: 800 TABLET, FILM COATED ORAL at 05:03

## 2018-03-14 RX ADMIN — METOCLOPRAMIDE 10 MG: 10 TABLET ORAL at 05:03

## 2018-03-14 RX ADMIN — SEVELAMER CARBONATE 800 MG: 800 TABLET, FILM COATED ORAL at 01:03

## 2018-03-14 RX ADMIN — INSULIN ASPART 5 UNITS: 100 INJECTION, SOLUTION INTRAVENOUS; SUBCUTANEOUS at 01:03

## 2018-03-14 RX ADMIN — LEVETIRACETAM 250 MG: 250 TABLET, FILM COATED ORAL at 09:03

## 2018-03-14 RX ADMIN — INSULIN DETEMIR 15 UNITS: 100 INJECTION, SOLUTION SUBCUTANEOUS at 09:03

## 2018-03-14 RX ADMIN — SODIUM CHLORIDE 2.5 UNITS/HR: 9 INJECTION, SOLUTION INTRAVENOUS at 02:03

## 2018-03-14 RX ADMIN — SODIUM CHLORIDE 2.5 UNITS/HR: 9 INJECTION, SOLUTION INTRAVENOUS at 05:03

## 2018-03-14 RX ADMIN — METOCLOPRAMIDE 10 MG: 10 TABLET ORAL at 04:03

## 2018-03-14 RX ADMIN — Medication 24 G: at 05:03

## 2018-03-14 NOTE — HPI
Consult Requested by: DEBBIE Hollingsworth MD   Reason for admit: Encephalopathy     HISTORY OF PRESENT ILLNESS:  Reason for Consult: Management of T1DM, Hyperglycemia      Diabetes diagnosis year: age 26     Home Diabetes Medications:  levemir 15 units daily, novolog 5 ac with sliding scale up to 15 units with meals     How often checking glucose at home? 8-10x per day  BG readings on regimen: 's  Hypoglycemia on the regimen?  Yes  Missed doses on regimen?  yes     Diabetes Complications include:     Hyperglycemia, Diabetic chronic kidney disease     , Diabetic retinopathy , Diabetic peripheral neuropathy  and Diabetic gastroparesis      Complicating diabetes co morbidities:   History of CVA, Residual deficits from CVA  and ESRD        HPI:   Patient is a 52 y.o. female with a diagnosis of dm type 1, esrd, recent cva admitted for worsening encephalopathy. Found to have severe hyperglycemia on admission with bg in 400's. Had missed two HD sessions prior to admission. Endocrine consulted for dm type 1 management.

## 2018-03-14 NOTE — NURSING
Pt went to dialysis today.  1 liter of fluid removed.  Pt has good appetite and ate 100% of all of her meals.  VSS.  Will continue to monitor.

## 2018-03-14 NOTE — PLAN OF CARE
Pt was denied by [facility].  Mya is still reviewing.  [facility] has accepted the pt and [facility] has clinically accepted the pt but wants to meet with family to go over paperwork.  Neither facility will transport the pt to [facility] on [address].  ALYSIA spoke with Willian ([phone]).  She stated that either place is fine.  SHe understood that they would need to change HD units and can change back when she leaves.  ALYSIA sent flowsheets to [facility].  Aliyah at [facility] did not want the pts HD to be changed yet.  They will meet with Willian at 2pm tomorrow.  [facility] uses [facility] in [place].  [facility] uses [facility].  ALYSIA/JUAN will f/u tomorrow.  ALYSIA called in the pasrr and uploaded the 142 and pasrr in to [facility].    Shaye Boone, LCSW x [phone]

## 2018-03-14 NOTE — NURSING
Hourly accu-checks stopped.  Spoke to endocrine MD and clarified insulin drip orders and algorithm.  As of now pt will be accu-check AC and HS as well as 2 am.  The insulin drip rate is not to exceed 2.5 ml/hour.  Nurse is to give scheduled insulin with meals as well as sliding scale insulin with meals,  Insulin drip rate will be decreased when blood sugar results are less than 140, following the instructions on insulin drip orders.

## 2018-03-14 NOTE — CONSULTS
[facility]  Endocrinology  Diabetes Consult Note    Consult Requested by: DEBBIE Hollingsworth MD   Reason for admit: Encephalopathy    HISTORY OF PRESENT ILLNESS:  Reason for Consult: Management of T1DM, Hyperglycemia     Diabetes diagnosis year: age 26    Home Diabetes Medications:  levemir 15 units daily, novolog 5 ac with sliding scale up to 15 units with meals    How often checking glucose at home? 8-10x per day  BG readings on regimen: 's  Hypoglycemia on the regimen?  Yes  Missed doses on regimen?  yes    Diabetes Complications include:     Hyperglycemia, Diabetic chronic kidney disease     , Diabetic retinopathy , Diabetic peripheral neuropathy  and Diabetic gastroparesis     Complicating diabetes co morbidities:   History of CVA, Residual deficits from CVA  and ESRD      HPI:   Patient is a 52 y.o. female with a diagnosis of dm type 1, esrd, recent cva admitted for worsening encephalopathy. Found to have severe hyperglycemia on admission with bg in 400's. Had missed two HD sessions prior to admission. Endocrine consulted for dm type 1 management.        Interval HPI:   Overnight events: hyperglycemia improved  Eatin%  Nausea: No  Hypoglycemia and intervention: No  Fever: No  TPN and/or TF: No  If yes, type of TF/TPN and rate: n/a    PMH, PSH, FH, SH updated and reviewed       Review of Systems   Constitutional: + fatigue, weakness; Negative for weight changes.  Eyes: Negative for visual disturbance.  Respiratory: Negative for cough.   Cardiovascular: Negative for chest pain.  Gastrointestinal: Negative for nausea.  Endocrine: Negative for polyuria, polydipsia.  Musculoskeletal: Negative for back pain.  Skin: Negative for rash.  Neurological: Negative for syncope.  Psychiatric/Behavioral: Negative for depression.    PHYSICAL EXAMINATION:  Vitals:    18 1145   BP: (!) 152/75   Pulse: 76   Resp: 18   Temp: 98.5 °F (36.9 °C)     Body mass index is 20.37 kg/m².    Physical Exam    Constitutional:  Well developed, well nourished, NAD.  ENT: External ears no masses with nose patent; normal hearing.   Neck:  Supple; trachea midline; no thyromegaly.   Cardiovascular: Normal heart sounds, no LE edema.     Lungs:  Normal effort; lungs anterior bilaterally clear to auscultation.  Abdomen:  Soft, no masses,  no hernias.  MS: No clubbing or cyanosis of nails noted; normal gait or unable to assess gait.  Skin: No rashes, lesions, or ulcers; no nodules.  Psychiatric: Good judgement and insight; normal mood and affect.  Neurological: generalized weakness R>L. decreased vibration sense in the bilateral lower extremities.      Labs Reviewed and Include     Recent Labs  Lab 03/14/18  0338   *   CALCIUM 8.1*   ALBUMIN 2.7*   *   K 5.0   CO2 25   CL 98   BUN 36*   CREATININE 3.5*     Lab Results   Component Value Date    WBC 6.80 03/14/2018    HGB 7.6 (L) 03/14/2018    HCT 24.3 (L) 03/14/2018    MCV 87 03/14/2018     03/14/2018       Recent Labs  Lab 03/12/18  0339   TSH 1.713     Lab Results   Component Value Date    HGBA1C 9.1 (H) 03/12/2018       Nutritional status:   Body mass index is 20.37 kg/m².  Lab Results   Component Value Date    ALBUMIN 2.7 (L) 03/14/2018    ALBUMIN 2.8 (L) 03/13/2018    ALBUMIN 2.7 (L) 03/12/2018     No results found for: PREALBUMIN    Estimated Creatinine Clearance: 17.5 mL/min (A) (based on SCr of 3.5 mg/dL (H)).    Accu-Checks  Recent Labs      03/14/18   0219  03/14/18   0258  03/14/18   0522  03/14/18   0623  03/14/18   0726  03/14/18   0830  03/14/18   0936  03/14/18   1037  03/14/18   1140  03/14/18   1231   POCTGLUCOSE  >500*  >500*  >500*  >500*  392*  371*  313*  276*  246*  215*        ASSESSMENT and PLAN    Type 1 diabetes mellitus with hyperglycemia    bg goal 140-180    Change intensive insulin gtt to transition insulin gtt at 2.5u/hr  novolog 5 units ac  Low dose insulin correction  bg monitoring ac/hs/2a    Anticipate resolution of high  insulin requirements prior to discharge    Discharge rec: tbd        ESRD (end stage renal disease)    Avoid insulin stacking        DM gastroparesis    Optimize glycemic control  Recommend small frequent meals        UTI (urinary tract infection)    May adversely impact insulin requirements                  Devon Subramanian MD  Endocrinology  [facility]

## 2018-03-14 NOTE — PT/OT/SLP PROGRESS
Physical Therapy Treatment    Patient Name:  Eufemia Haq   MRN:  [id number]    Recommendations:     Discharge Recommendations:  nursing facility, skilled   Discharge Equipment Recommendations: bath bench, bedside commode   Barriers to discharge: Inaccessible home and Decreased caregiver support    Assessment:     Eufemia Haq is a 52 y.o. female admitted with a medical diagnosis of Encephalopathy.  She presents with the following impairments/functional limitations:  weakness, impaired balance, impaired endurance, impaired self care skills, decreased safety awareness, impaired functional mobilty, decreased coordination, decreased lower extremity function, visual deficits. Pt tolerated treatment well, and will continue to benefit from PT services at this time. Continue with PT POC as indicated.     Rehab Prognosis:  good; patient would benefit from acute skilled PT services to address these deficits and reach maximum level of function.      Recent Surgery: * No surgery found *      Plan:     During this hospitalization, patient to be seen 4 x/week to address the above listed problems via gait training, therapeutic activities, therapeutic exercises, neuromuscular re-education  · Plan of Care Expires:  04/12/18   Plan of Care Reviewed with: patient    Subjective     Communicated with nursing prior to session.  Patient found supine upon PT entry to room, agreeable to treatment.      Chief Complaint: none stated  Patient comments/goals: none stated  Pain/Comfort:  · Pain Rating 1: 0/10  · Pain Rating Post-Intervention 1: 0/10    Patients cultural, spiritual, Scientology conflicts given the current situation: na    Objective:     Patient found with:       General Precautions: Standard, fall, blind   Orthopedic Precautions:N/A   Braces: N/A     Functional Mobility:  · Bed Mobility:  Scooting: contact guard assistance  · Supine to Sit: contact guard assistance  · Sit to Supine: contact guard  assistance  · Transfers:  Sit to Stand:  minimum assistance with rolling walker  · Gait: Pt took 4 side steps to HOB with RW and Mod A       AM-PAC 6 CLICK MOBILITY  Turning over in bed (including adjusting bedclothes, sheets and blankets)?: 3  Sitting down on and standing up from a chair with arms (e.g., wheelchair, bedside commode, etc.): 2  Moving from lying on back to sitting on the side of the bed?: 3  Moving to and from a bed to a chair (including a wheelchair)?: 2  Need to walk in hospital room?: 2  Climbing 3-5 steps with a railing?: 1  Total Score: 13       Therapeutic Activities and Exercises:   -B LE therex x10 reps: AP, LAQ, and Hip Fleixon    -Pt stood x2 trials with RW and CGA for ~2 min each trial    Patient left supine with all lines intact, call button in reach and bed alarm on..    GOALS:    Physical Therapy Goals        Problem: Physical Therapy Goal    Goal Priority Disciplines Outcome Goal Variances Interventions   Physical Therapy Goal     PT/OT, PT Ongoing (interventions implemented as appropriate)     Description:  Goals to be met by: 3/22/18     Patient will increase functional independence with mobility by performin. Sit to stand transfer with Minimal Assistance with LRD  2. Bed to chair transfer with Contact Guard Assistance using LRD.  3. Gait  x 20 feet with Minimal Assistance using LRD.   4. Stand for 10 minutes with Contact Guard Assistance using LRD.  5. Lower extremity exercise program x20 reps per handout, with assistance as needed                      Time Tracking:     PT Received On: 18  PT Start Time: 953     PT Stop Time: 1019  PT Total Time (min): 26 min     Billable Minutes: Therapeutic Activity 18 and Therapeutic Exercise 8    Treatment Type: Treatment  PT/PTA: PTA     PTA Visit Number: 1     Suze Stokes, TIMOTHY  2018

## 2018-03-14 NOTE — PROGRESS NOTES
[facility] Hospital Medicine  Progress Note    Patient Name: Eufemia Haq  MRN: [id number]  Patient Class: OP- Observation   Admission Date: 3/11/2018  Length of Stay: 0 days  Attending Physician: DEBBIE Hollingsworth MD  Primary Care Provider: Primary Doctor [facility] Medicine Team: [facility] Rosa Maria Parsons PA-C    Subjective:     Principal Problem:Encephalopathy    HPI:  53 y/o female, who presents to the ED with c/o lethargy and hyperglycemia.  She has a PMH of recent CVA, DMI, ESRD (TTS HD), HTN, blindness of the R eye, and seizures.  Majority of history is provided by patients daughter at bedside.  She states her mother was just discharge a [facility] 2 days ago and has been lethargic since returning home.  She states that her mother suffered a stroke about 3 weeks ago.  The patient is lethargic and minimally interactive.  She appears in NAD and will follow simple commands when prompted, but drifts off when not simulated.  Additionally, she states that she was recently diagnosed with pneumonia.  She denies fever, chills, CP, SOB, N/V/D, or  symptoms since discharge.  She endorsed poorly controlled blood sugars.  Her sugar was 460 on arrival.  They report adherence to medication regimen.  However, report that her last HD treatment was Tuesday 03/06/18.    Hospital Course:  Pt admitted to observation for encephalopathy in setting of recent CVA (2 weeks ago); ?new baseline. CTH without acute abnormality. CXR without acute process. UA suboptimal; will continue CTX while urine cx pending (gram neg vernon, lactose ). Blood cx NGTD. Nephrology consulted for maintenance HD (missed last 2 HD). Informed by nephrology that patient is not set up for outpatient HD at [facility]; CM assisting. PT/OT consulted and recommended SNF.  3/14 overnight pt started on insulin drip due to BG>600.  Charge RN notified that pt's roommate was giving pt additional food  throughout the night.  Endocrinology consulted.    Interval History: overnight BG>600, pt stared on insulin drip.  Pt states episode of diarrhea in HD yesterday however no episodes this morning.    Review of Systems   Constitutional: Positive for fatigue. Negative for chills, diaphoresis and fever.   Eyes: Positive for visual disturbance (blind). Negative for discharge.   Respiratory: Negative for cough, chest tightness and shortness of breath.    Cardiovascular: Negative for chest pain, palpitations and leg swelling.   Gastrointestinal: Positive for diarrhea (x1). Negative for abdominal distention, abdominal pain, nausea and vomiting.   Genitourinary: Positive for decreased urine volume (ESRD). Negative for dysuria.   Neurological: Positive for seizures and weakness. Negative for syncope.     Objective:     Vital Signs (Most Recent):  Temp: 98.6 °F (37 °C) (03/14/18 0800)  Pulse: 76 (03/14/18 1114)  Resp: 16 (03/14/18 0800)  BP: 129/62 (03/14/18 0800)  SpO2: 98 % (03/14/18 0800) Vital Signs (24h Range):  Temp:  [96.6 °F (35.9 °C)-99.6 °F (37.6 °C)] 98.6 °F (37 °C)  Pulse:  [] 76  Resp:  [16-20] 16  SpO2:  [93 %-100 %] 98 %  BP: (119-198)/(58-84) 129/62     Weight: 59 kg (130 lb 1.1 oz)  Body mass index is 20.37 kg/m².    Intake/Output Summary (Last 24 hours) at 03/14/18 1124  Last data filed at 03/13/18 1640   Gross per 24 hour   Intake              600 ml   Output             1600 ml   Net            -1000 ml      Physical Exam   Constitutional: She appears well-developed. No distress.   Eyes:   Blind R eye, minimal vision in L eye   Cardiovascular: Normal rate, regular rhythm, normal heart sounds and intact distal pulses.    No murmur heard.  Pulmonary/Chest: Effort normal and breath sounds normal. No respiratory distress. She has no wheezes. She has no rales.   Abdominal: Soft. Bowel sounds are normal. She exhibits no distension. There is no tenderness.   Musculoskeletal: Normal range of motion. She  exhibits no edema.   Neurological: She is alert. She displays atrophy.   Oriented to self, place, and president; follows commands, speech is slow; generalized weakness appreciated R>L   Skin: Skin is warm and dry. She is not diaphoretic.   Psychiatric: Her speech is not delayed and not slurred. She is slowed and withdrawn.   Nursing note and vitals reviewed.      Significant Labs: All pertinent labs within the past 24 hours have been reviewed.    Significant Imaging: I have reviewed all pertinent imaging results/findings within the past 24 hours.    Assessment/Plan:      * Encephalopathy    Recent CVA 2 weeks ago; ?new baseline  - Presents with somnolence x 1 day  - Afebrile, no leukocytosis, AST/ALT/ammonia WNL, lactic 2.2  - CT head with no acute intracranial abnormality noted   - Possibly uremic - missed her last two HD treatments (plan for HD 3/12)  - Blood cultures NGTD  - Urine cx gram neg vernon lactose   - PT/OT consulted: SNF and need to establish HD chair  - High risk of fall / injury - utilize all safety measures and fall precautions   - Aspiration precautions  Stable  - SW/CM assisting with discharge planning         Type 1 diabetes mellitus with hyperglycemia    - HgA1C 9.1  - Hyperglycemic on arrival with glucose in the 460 now down to 290  - Beta-hydroxybutyrate 0.0  - Resumed home long acting insulin dosing on admission  3/12: Aspart 4U WM  3/13: Continue detemir 15U qhs and increase aspart to 5U WM plus low dose correction  3/14: overnight insulin drip initiated due to .  Beta-hydroxy negative.  Endocrinology consulted.  Bed request placed to transfer to stepdown.  Charge RN discussed with patient and patient's roommate importance of not sharing food.  BG significantly improved 371->313->276.        ESRD (end stage renal disease)    - TTS HD patient with LUE HD access  - Reports last HD treatment 03/06/18  - Nephrology consulted and plan for HD 3/12  - Trend electrolytes daily   - Strict  I/O's  - Daily Wt's  3/12: Informed by nephrology that patient is not set up for outpatient HD at [facility]; CM assisting.  3/13 HD complete        DM gastroparesis    - Continue home dosing of metoclopramide  - Antiemetics PRN  Stable        UTI (urinary tract infection)    - Hx of ESRD currently on HD TTS with decreased urine production   - Afebrile, no leukocytosis, UA with 4 squam. UC gram neg vernon.  - Will continue CTX (day 4)        Anemia in chronic renal disease    - Hgb 8.2 on arrival (baseline 6.7-7.4)  - Trend daily  - Continue TTS epoetin   Stable        Essential hypertension    - BP controlled with resumed home antihypertensive regimen  - Hydralazine PRN  - Held bumetanide and HCTZ on admit  - Will continue to monitor        Seizure disorder    - Resume home levetiracetam dosing  - Seizure precautions  - Levetiracetam level 7.7 WNL        Protein calorie malnutrition    - Albumin 3.0 on arrival with ESRD  Continue to monitor          VTE Risk Mitigation         Ordered     heparin (porcine) injection 5,000 Units  Every 8 hours     Route:  Subcutaneous        03/11/18 2241     Medium Risk of VTE  Once      03/11/18 2241     Place CARLOS hose  Until discontinued      03/11/18 2241     Place sequential compression device  Until discontinued      03/11/18 2241              Rosa Maria Parsons PA-C  Department of Hospital Medicine   [facility]

## 2018-03-14 NOTE — CARE UPDATE
RN Proactive Rounding Note  Time of Visit: 930    Admit Date: 3/11/2018  LOS: 0  Code Status: Full Code   Date of Visit: 2018  : 1965  Age: 52 y.o.  Sex: female  Bed: [address] [place] [address] B:   MRN: [id number]  Was the patient discharged from an ICU this admission? No   Was the patient discharged from a PACU within last 24 hours? No  Did the patient receive conscious sedation/general anesthesia in last 24 hours? No  Was the patient in the ED within the past 24 hours? No  Was the patient started on NIPPV within the past 24 hours? No  Attending Physician: DEBBIE Hollingsworth MD  Primary Service: [facility] F      ASSESSMENT:     Abnormal Vital Signs: Yes  Clinical Issues: Endocrine     INTERVENTIONS/ RECOMMENDATIONS:     Endocrine visit and follow up      Discussed plan of care with NAA Bishop, [id number]    PHYSICIAN ESCALATION:     Yes/No Yes     Orders received and case discussed with Dr Subramanian. [facility]       Disposition: [id number]     FOLLOW-UP/CONTINGENCY:       Call back the Rapid Response Nurse at x [phone]  for additional questions or concerns

## 2018-03-14 NOTE — NURSING
Pt is enquiring about her hospital stay and when can she be discharged. Pt says she has a surgery in [place] next week.  I told her I would inform admitting MD to make sure that there is no interruption in her care.  Pt then says she spoke to her daughter who was able to reschedule the surgery. Pt will be having surgery on her eyes in [place] on April 6th.  Spoke to  Estelle and notified her of pts rescheduled surgery date.  Pt may be going to skilled nursing/rehab when stable. Will continue to monitor.

## 2018-03-14 NOTE — CARE UPDATE
Called by nurse to inform me that patient BG >500 around 2200.  Advised nurse to give levemir 15U QHS and aspart 5 U and recheck in an hour.  Machine unable to load BG measurements and therefore we discussed checking it again with a new acchucheck device.  Called nurse to recheck BG after no call back and elevated >500.  Ordered insulin drip and ordered transfer to another unit.  Informed charge nurse on floor about patient.  Will obtain beta-hydroxy.  Endocrine consult placed    On exam, patient in NAD.  She reports dry mouth, polydipsia, diaphoresis similar to previous episodes of hyperglycemia.  Also c/o mild SOB.  Discussed plan with patient who is agreeable.

## 2018-03-14 NOTE — ASSESSMENT & PLAN NOTE
- Hx of ESRD currently on HD TTS with decreased urine production   - Afebrile, no leukocytosis, UA with 4 squam. UC gram neg vernon.  - Will continue CTX (day 4)

## 2018-03-14 NOTE — PLAN OF CARE
Problem: Physical Therapy Goal  Goal: Physical Therapy Goal  Goals to be met by: 3/22/18     Patient will increase functional independence with mobility by performin. Sit to stand transfer with Minimal Assistance with LRD  2. Bed to chair transfer with Contact Guard Assistance using LRD.  3. Gait  x 20 feet with Minimal Assistance using LRD.   4. Stand for 10 minutes with Contact Guard Assistance using LRD.  5. Lower extremity exercise program x20 reps per handout, with assistance as needed     Goals remain appropriate at time. Continue with PT POC as indicated.

## 2018-03-14 NOTE — NURSING
Checked pts blood sugar at 457 pm, result 43, checked another finger and it was 37. Turned off insulin drip. Gave pt 6 glucose tabs as ordered and rechecked blood sugar at 5:24, result was 62.  Paged IM 5 and endocrine.  Endocrine MD says to hold insulin drip now, check blood sugar Q2H and notify MD of blood sugar >180 to possible restart insulin drip.  5:33 blood sugar 99.  Spoke with CORNELL Neal Rn.  Gave pt turkey sandwich until dinner tray arrives.  Pt is very scared and does not want to be alone as her roommate is now discharged.  Will stay with pt until dinner comes and assist with feeding as she is blind.

## 2018-03-14 NOTE — NURSING
"Pt called complaining that she was "bottoming out" took pt's BS and it was greater than 500, pt was given a PRN dose of insulin at 2213. Advised pt of what she was already given.   "

## 2018-03-14 NOTE — NURSING
Discussed pts orders for insulin drip with rapid response nurses and addressed indications and use of algorithm for insulin drip rate change.  Also discussed concerns with unit director regarding pt being on this unit with every hour accuchecks and insulin drip.  House supervisor also notified to possibly transfer pt to a step down unit for closer monitoring.

## 2018-03-14 NOTE — NURSING
BS checked again, still reading >500, Insulin drip started, pt in no distress,will continue to monitor.

## 2018-03-14 NOTE — ASSESSMENT & PLAN NOTE
bg goal 140-180    Change intensive insulin gtt to transition insulin gtt at 2.5u/hr  novolog 5 units ac  Low dose insulin correction  bg monitoring ac/hs/2a    Anticipate resolution of high insulin requirements prior to discharge    Discharge rec: tbd

## 2018-03-14 NOTE — ASSESSMENT & PLAN NOTE
- TTS HD patient with LUE HD access  - Reports last HD treatment 03/06/18  - Nephrology consulted and plan for HD 3/12  - Trend electrolytes daily   - Strict I/O's  - Daily Wt's  3/12: Informed by nephrology that patient is not set up for outpatient HD at [facility]; CM assisting.  3/13 HD complete

## 2018-03-14 NOTE — ASSESSMENT & PLAN NOTE
- HgA1C 9.1  - Hyperglycemic on arrival with glucose in the 460 now down to 290  - Beta-hydroxybutyrate 0.0  - Resumed home long acting insulin dosing on admission  3/12: Aspart 4U WM  3/13: Continue detemir 15U qhs and increase aspart to 5U WM plus low dose correction  3/14: overnight insulin drip initiated due to .  Beta-hydroxy negative.  Endocrinology consulted.  Bed request placed to transfer to [facility].  Charge RN discussed with patient and patient's roommate importance of not sharing food.  BG significantly improved 371->313->276.

## 2018-03-15 LAB
ALBUMIN SERPL BCP-MCNC: 2.7 G/DL
ANION GAP SERPL CALC-SCNC: 15 MMOL/L
BACTERIA UR CULT: NORMAL
BASOPHILS # BLD AUTO: 0.04 K/UL
BASOPHILS NFR BLD: 0.4 %
BUN SERPL-MCNC: 56 MG/DL
CALCIUM SERPL-MCNC: 8.6 MG/DL
CHLORIDE SERPL-SCNC: 99 MMOL/L
CO2 SERPL-SCNC: 22 MMOL/L
CREAT SERPL-MCNC: 5 MG/DL
DIFFERENTIAL METHOD: ABNORMAL
EOSINOPHIL # BLD AUTO: 0.2 K/UL
EOSINOPHIL NFR BLD: 1.7 %
ERYTHROCYTE [DISTWIDTH] IN BLOOD BY AUTOMATED COUNT: 14.4 %
EST. GFR  (AFRICAN AMERICAN): 10.7 ML/MIN/1.73 M^2
EST. GFR  (NON AFRICAN AMERICAN): 9.3 ML/MIN/1.73 M^2
GLUCOSE SERPL-MCNC: 213 MG/DL
HAV IGM SERPL QL IA: NEGATIVE
HBV CORE AB SERPL QL IA: NEGATIVE
HBV SURFACE AB SER-ACNC: NEGATIVE M[IU]/ML
HBV SURFACE AG SERPL QL IA: NEGATIVE
HCT VFR BLD AUTO: 24.4 %
HGB BLD-MCNC: 7.7 G/DL
IMM GRANULOCYTES # BLD AUTO: 0.08 K/UL
IMM GRANULOCYTES NFR BLD AUTO: 0.9 %
LYMPHOCYTES # BLD AUTO: 1.8 K/UL
LYMPHOCYTES NFR BLD: 20.2 %
MAGNESIUM SERPL-MCNC: 1.9 MG/DL
MCH RBC QN AUTO: 27.4 PG
MCHC RBC AUTO-ENTMCNC: 31.6 G/DL
MCV RBC AUTO: 87 FL
MONOCYTES # BLD AUTO: 0.7 K/UL
MONOCYTES NFR BLD: 7.7 %
NEUTROPHILS # BLD AUTO: 6.2 K/UL
NEUTROPHILS NFR BLD: 69.1 %
NRBC BLD-RTO: 0 /100 WBC
PHOSPHATE SERPL-MCNC: 5.2 MG/DL
PLATELET # BLD AUTO: 197 K/UL
PMV BLD AUTO: 9.7 FL
POCT GLUCOSE: 111 MG/DL (ref 70–110)
POCT GLUCOSE: 119 MG/DL (ref 70–110)
POCT GLUCOSE: 130 MG/DL (ref 70–110)
POCT GLUCOSE: 144 MG/DL (ref 70–110)
POCT GLUCOSE: 170 MG/DL (ref 70–110)
POCT GLUCOSE: 379 MG/DL (ref 70–110)
POCT GLUCOSE: 65 MG/DL (ref 70–110)
POCT GLUCOSE: 69 MG/DL (ref 70–110)
POTASSIUM SERPL-SCNC: 4.6 MMOL/L
RBC # BLD AUTO: 2.81 M/UL
SODIUM SERPL-SCNC: 136 MMOL/L
WBC # BLD AUTO: 9.04 K/UL

## 2018-03-15 PROCEDURE — 99233 SBSQ HOSP IP/OBS HIGH 50: CPT | Mod: ,,, | Performed by: PHYSICIAN ASSISTANT

## 2018-03-15 PROCEDURE — 90935 HEMODIALYSIS ONE EVALUATION: CPT | Mod: ,,, | Performed by: INTERNAL MEDICINE

## 2018-03-15 PROCEDURE — 90935 HEMODIALYSIS ONE EVALUATION: CPT

## 2018-03-15 PROCEDURE — 36415 COLL VENOUS BLD VENIPUNCTURE: CPT

## 2018-03-15 PROCEDURE — 96372 THER/PROPH/DIAG INJ SC/IM: CPT

## 2018-03-15 PROCEDURE — 63600175 PHARM REV CODE 636 W HCPCS: Performed by: INTERNAL MEDICINE

## 2018-03-15 PROCEDURE — 99232 SBSQ HOSP IP/OBS MODERATE 35: CPT | Mod: GC,,, | Performed by: INTERNAL MEDICINE

## 2018-03-15 PROCEDURE — 25000003 PHARM REV CODE 250: Performed by: PHYSICIAN ASSISTANT

## 2018-03-15 PROCEDURE — 20600001 HC STEP DOWN PRIVATE ROOM

## 2018-03-15 PROCEDURE — 83735 ASSAY OF MAGNESIUM: CPT

## 2018-03-15 PROCEDURE — 25000003 PHARM REV CODE 250: Performed by: NURSE PRACTITIONER

## 2018-03-15 PROCEDURE — 85025 COMPLETE CBC W/AUTO DIFF WBC: CPT

## 2018-03-15 PROCEDURE — 63600175 PHARM REV CODE 636 W HCPCS: Performed by: NURSE PRACTITIONER

## 2018-03-15 PROCEDURE — 63600175 PHARM REV CODE 636 W HCPCS: Performed by: PHYSICIAN ASSISTANT

## 2018-03-15 PROCEDURE — 80069 RENAL FUNCTION PANEL: CPT

## 2018-03-15 RX ADMIN — INSULIN ASPART 5 UNITS: 100 INJECTION, SOLUTION INTRAVENOUS; SUBCUTANEOUS at 05:03

## 2018-03-15 RX ADMIN — HEPARIN SODIUM 5000 UNITS: 5000 INJECTION, SOLUTION INTRAVENOUS; SUBCUTANEOUS at 05:03

## 2018-03-15 RX ADMIN — SEVELAMER CARBONATE 800 MG: 800 TABLET, FILM COATED ORAL at 01:03

## 2018-03-15 RX ADMIN — INSULIN DETEMIR 15 UNITS: 100 INJECTION, SOLUTION SUBCUTANEOUS at 01:03

## 2018-03-15 RX ADMIN — LEVETIRACETAM 250 MG: 250 TABLET, FILM COATED ORAL at 09:03

## 2018-03-15 RX ADMIN — NIFEDIPINE 60 MG: 60 TABLET, FILM COATED, EXTENDED RELEASE ORAL at 09:03

## 2018-03-15 RX ADMIN — METOPROLOL TARTRATE 50 MG: 50 TABLET ORAL at 07:03

## 2018-03-15 RX ADMIN — METOCLOPRAMIDE 10 MG: 10 TABLET ORAL at 03:03

## 2018-03-15 RX ADMIN — CLONIDINE HYDROCHLORIDE 0.1 MG: 0.1 TABLET ORAL at 09:03

## 2018-03-15 RX ADMIN — SODIUM CHLORIDE 1000 ML: 0.9 INJECTION, SOLUTION INTRAVENOUS at 08:03

## 2018-03-15 RX ADMIN — LEVETIRACETAM 250 MG: 250 TABLET, FILM COATED ORAL at 01:03

## 2018-03-15 RX ADMIN — METOCLOPRAMIDE 10 MG: 10 TABLET ORAL at 05:03

## 2018-03-15 RX ADMIN — NIFEDIPINE 60 MG: 60 TABLET, FILM COATED, EXTENDED RELEASE ORAL at 07:03

## 2018-03-15 RX ADMIN — METOPROLOL TARTRATE 50 MG: 50 TABLET ORAL at 09:03

## 2018-03-15 RX ADMIN — SEVELAMER CARBONATE 800 MG: 800 TABLET, FILM COATED ORAL at 09:03

## 2018-03-15 RX ADMIN — INSULIN ASPART 5 UNITS: 100 INJECTION, SOLUTION INTRAVENOUS; SUBCUTANEOUS at 01:03

## 2018-03-15 RX ADMIN — HEPARIN SODIUM 5000 UNITS: 5000 INJECTION, SOLUTION INTRAVENOUS; SUBCUTANEOUS at 09:03

## 2018-03-15 RX ADMIN — ERYTHROPOIETIN 6000 UNITS: 20000 INJECTION, SOLUTION INTRAVENOUS; SUBCUTANEOUS at 11:03

## 2018-03-15 RX ADMIN — LOSARTAN POTASSIUM 50 MG: 50 TABLET, FILM COATED ORAL at 07:03

## 2018-03-15 RX ADMIN — ROSUVASTATIN CALCIUM 20 MG: 20 TABLET, FILM COATED ORAL at 01:03

## 2018-03-15 RX ADMIN — INSULIN ASPART 5 UNITS: 100 INJECTION, SOLUTION INTRAVENOUS; SUBCUTANEOUS at 09:03

## 2018-03-15 RX ADMIN — CEFTRIAXONE SODIUM 1 G: 1 INJECTION, POWDER, FOR SOLUTION INTRAMUSCULAR; INTRAVENOUS at 09:03

## 2018-03-15 RX ADMIN — HEPARIN SODIUM 5000 UNITS: 5000 INJECTION, SOLUTION INTRAVENOUS; SUBCUTANEOUS at 03:03

## 2018-03-15 RX ADMIN — Medication 16 G: at 09:03

## 2018-03-15 RX ADMIN — CLONIDINE HYDROCHLORIDE 0.1 MG: 0.1 TABLET ORAL at 07:03

## 2018-03-15 NOTE — PROGRESS NOTES
"[facility]  Endocrinology  Progress Note    Admit Date: 3/11/2018     Consult Requested by: DEBBIE Hollingsworth MD   Reason for admit: Encephalopathy     HISTORY OF PRESENT ILLNESS:  Reason for Consult: Management of T1DM, Hyperglycemia      Diabetes diagnosis year: age 26     Home Diabetes Medications:  levemir 15 units daily, novolog 5 ac with sliding scale up to 15 units with meals     How often checking glucose at home? 8-10x per day  BG readings on regimen: 's  Hypoglycemia on the regimen?  Yes  Missed doses on regimen?  yes     Diabetes Complications include:     Hyperglycemia, Diabetic chronic kidney disease     , Diabetic retinopathy , Diabetic peripheral neuropathy  and Diabetic gastroparesis      Complicating diabetes co morbidities:   History of CVA, Residual deficits from CVA  and ESRD        HPI:   Patient is a 52 y.o. female with a diagnosis of dm type 1, esrd, recent cva admitted for worsening encephalopathy. Found to have severe hyperglycemia on admission with bg in 400's. Had missed two HD sessions prior to admission. Endocrine consulted for dm type 1 management.            Interval HPI:   Overnight events: severe hypoglycemia yesterday afternoon while on insulin gtt  Eatin%  Nausea: No  Hypoglycemia and intervention: Yes  Fever: No  TPN and/or TF: No  If yes, type of TF/TPN and rate: n/a    BP (!) 167/80   Pulse 69   Temp 98 °F (36.7 °C) (Oral)   Resp 16   Ht 5' 7" (1.702 m)   Wt 59 kg (130 lb 1.1 oz)   LMP 2000 (Approximate)   SpO2 96%   Breastfeeding? No   BMI 20.37 kg/m²       Labs Reviewed and Include      Recent Labs  Lab 03/15/18  0449   *   CALCIUM 8.6*   ALBUMIN 2.7*      K 4.6   CO2 22*   CL 99   BUN 56*   CREATININE 5.0*     Lab Results   Component Value Date    WBC 9.04 03/15/2018    HGB 7.7 (L) 03/15/2018    HCT 24.4 (L) 03/15/2018    MCV 87 03/15/2018     03/15/2018       Recent Labs  Lab 18  0339   TSH 1.713 "     Lab Results   Component Value Date    HGBA1C 9.1 (H) 03/12/2018       Nutritional status:   Body mass index is 20.37 kg/m².  Lab Results   Component Value Date    ALBUMIN 2.7 (L) 03/15/2018    ALBUMIN 2.7 (L) 03/14/2018    ALBUMIN 2.8 (L) 03/13/2018     No results found for: PREALBUMIN    Estimated Creatinine Clearance: 12.3 mL/min (A) (based on SCr of 5 mg/dL (H)).    Accu-Checks  Recent Labs      03/14/18   1346  03/14/18   1657  03/14/18   1659  03/14/18   1712  03/14/18   1721  03/14/18   1731  03/14/18   1831  03/14/18   2050  03/14/18   2319  03/15/18   0911   POCTGLUCOSE  192*  43*  37*  49*  62*  99  278*  426*  379*  111*       Current Medications and/or Treatments Impacting Glycemic Control  Immunotherapy:  Immunosuppressants     None        Steroids:   Hormones     None        Pressors:    Autonomic Drugs     None        Hyperglycemia/Diabetes Medications: Antihyperglycemics     Start     Stop Route Frequency Ordered    03/15/18 0715  insulin aspart U-100 pen 5 Units      -- SubQ 3 times daily with meals 03/14/18 2103 03/14/18 2202  insulin aspart U-100 pen 0-5 Units      -- SubQ Before meals & nightly PRN 03/14/18 2103 03/14/18 2115  insulin detemir U-100 pen 15 Units      -- SubQ Daily 03/14/18 2103          ASSESSMENT and PLAN    Type 1 diabetes mellitus with hyperglycemia    bg goal 140-180    Continue detemir 15 units nightly  novolog 5 units ac  Low dose insulin correction  bg monitoring ac/hs    Discharge rec: recommend levemir 15 units daily and novolog 5 units with meals with low dose correction (patient had been administering up to 10 units of additional novolog with meals). Some minimal adjustment may be required during hospitalization        ESRD (end stage renal disease)    Avoid insulin stacking        DM gastroparesis    Optimize glycemic control  Recommend small frequent meals        UTI (urinary tract infection)    May adversely impact insulin requirements            Will sign  off, please call with any questions.    Devon Subramanian MD  Endocrinology  [facility]

## 2018-03-15 NOTE — TREATMENT PLAN
Severe hypoglycemia this afternoon following abrupt lessening of insulin requirements. Will d/c insulin gtt and resume subcutaneous insulin detemir 15 units nightly and novolog 5 units ac with low dose correction.    Devon Subramanian MD

## 2018-03-15 NOTE — PLAN OF CARE
Problem: Patient Care Overview  Goal: Plan of Care Review  Outcome: Ongoing (interventions implemented as appropriate)  Patient is AAOx4, AFVSS. Blood sugars have been WNL, 120-170s. Pt positive for UTI, Kleibsiella - contact isolation precautions initiated. Pt claims that she is blind, but seems to have partial vision fields and can feed self and ambulate with assistance. Pt went to diaylsis this morning tolerated well. POC discussed with pt, pt is anxious to discharge to rehab. Will follow up with Case Management regarding next phase of care. No acute events during care. Will continue to monitor.

## 2018-03-15 NOTE — PLAN OF CARE
Covering sw left message for Aliyah with [facility]  [phone] to f/u on referral/meeting with pts sister at 2pm today.    SW spoke with pts sister, Willian. She said she will have to reschedule her appt today with Aliyah because the facility is requesting documents that Willian is not able to provide. SW agreed to ask Aliyah to call Willian to discuss further.    Traci Marquez, EBONI [phone]

## 2018-03-15 NOTE — SUBJECTIVE & OBJECTIVE
Interval History: overnight pt with hypoglycemia and drip d/c'd    Review of Systems   Constitutional: Positive for fatigue. Negative for chills, diaphoresis and fever.   Eyes: Positive for visual disturbance (blind). Negative for discharge.   Respiratory: Negative for cough, chest tightness and shortness of breath.    Cardiovascular: Negative for chest pain, palpitations and leg swelling.   Gastrointestinal: Positive for diarrhea (x1). Negative for abdominal distention, abdominal pain, nausea and vomiting.   Genitourinary: Positive for decreased urine volume (ESRD). Negative for dysuria.   Neurological: Positive for seizures and weakness. Negative for syncope.     Objective:     Vital Signs (Most Recent):  Temp: 98 °F (36.7 °C) (03/15/18 0758)  Pulse: 68 (03/15/18 1015)  Resp: 16 (03/15/18 0758)  BP: (!) 173/78 (03/15/18 1015)  SpO2: 96 % (03/15/18 0746) Vital Signs (24h Range):  Temp:  [98 °F (36.7 °C)-99.1 °F (37.3 °C)] 98 °F (36.7 °C)  Pulse:  [67-87] 68  Resp:  [16-20] 16  SpO2:  [93 %-98 %] 96 %  BP: (143-196)/(63-86) 173/78     Weight: 59 kg (130 lb 1.1 oz)  Body mass index is 20.37 kg/m².    Intake/Output Summary (Last 24 hours) at 03/15/18 1032  Last data filed at 03/15/18 0936   Gross per 24 hour   Intake              360 ml   Output                0 ml   Net              360 ml      Physical Exam   Constitutional: She appears well-developed. No distress.   Eyes:   Blind R eye, minimal vision in L eye   Cardiovascular: Normal rate, regular rhythm, normal heart sounds and intact distal pulses.    No murmur heard.  Pulmonary/Chest: Effort normal and breath sounds normal. No respiratory distress. She has no wheezes. She has no rales.   Abdominal: Soft. Bowel sounds are normal. She exhibits no distension. There is no tenderness.   Musculoskeletal: Normal range of motion. She exhibits no edema.   Neurological: She is alert. She displays atrophy.   Oriented to self, place, and president; follows commands, speech  is slow; generalized weakness appreciated R>L   Skin: Skin is warm and dry. She is not diaphoretic.   Psychiatric: Her speech is not delayed and not slurred. She is slowed and withdrawn.   Nursing note and vitals reviewed.      Significant Labs: All pertinent labs within the past 24 hours have been reviewed.    Significant Imaging: I have reviewed all pertinent imaging results/findings within the past 24 hours.

## 2018-03-15 NOTE — PROGRESS NOTES
[facility] Hospital Medicine  Progress Note    Patient Name: Eufemia Haq  MRN: [id number]  Patient Class: IP- Inpatient   Admission Date: 3/11/2018  Length of Stay: 1 days  Attending Physician: DEBBIE Hollingsworth MD  Primary Care Provider: Primary Doctor [facility] Medicine Team: [facility] F Rosa Maria Parsons PA-C    Subjective:     Principal Problem:Encephalopathy    HPI:  51 y/o female, who presents to the ED with c/o lethargy and hyperglycemia.  She has a PMH of recent CVA, DMI, ESRD (TTS HD), HTN, blindness of the R eye, and seizures.  Majority of history is provided by patients daughter at bedside.  She states her mother was just discharge a [facility] 2 days ago and has been lethargic since returning home.  She states that her mother suffered a stroke about 3 weeks ago.  The patient is lethargic and minimally interactive.  She appears in NAD and will follow simple commands when prompted, but drifts off when not simulated.  Additionally, she states that she was recently diagnosed with pneumonia.  She denies fever, chills, CP, SOB, N/V/D, or  symptoms since discharge.  She endorsed poorly controlled blood sugars.  Her sugar was 460 on arrival.  They report adherence to medication regimen.  However, report that her last HD treatment was Tuesday 03/06/18.    Hospital Course:  Pt admitted to observation for encephalopathy in setting of recent CVA (2 weeks ago); ?new baseline. CTH without acute abnormality. CXR without acute process. UA suboptimal; will continue CTX while urine cx pending (gram neg vernon, lactose ). Blood cx NGTD. Nephrology consulted for maintenance HD (missed last 2 HD). Informed by nephrology that patient is not set up for outpatient HD at [facility]; CM assisting. PT/OT consulted and recommended SNF.  3/14 overnight pt started on insulin drip due to BG>600.  Charge RN notified that pt's roommate was giving pt additional food  throughout the night.  Endocrinology consulted.  3/15 insulin drip d/c'd due to hypoglycemia and pt transitioned to levemir 15U qhs and novolog 5U tid with meals plus low dose correction.    Interval History: overnight pt with hypoglycemia and drip d/c'd    Review of Systems   Constitutional: Positive for fatigue. Negative for chills, diaphoresis and fever.   Eyes: Positive for visual disturbance (blind). Negative for discharge.   Respiratory: Negative for cough, chest tightness and shortness of breath.    Cardiovascular: Negative for chest pain, palpitations and leg swelling.   Gastrointestinal: Positive for diarrhea (x1). Negative for abdominal distention, abdominal pain, nausea and vomiting.   Genitourinary: Positive for decreased urine volume (ESRD). Negative for dysuria.   Neurological: Positive for seizures and weakness. Negative for syncope.     Objective:     Vital Signs (Most Recent):  Temp: 98 °F (36.7 °C) (03/15/18 0758)  Pulse: 68 (03/15/18 1015)  Resp: 16 (03/15/18 0758)  BP: (!) 173/78 (03/15/18 1015)  SpO2: 96 % (03/15/18 0746) Vital Signs (24h Range):  Temp:  [98 °F (36.7 °C)-99.1 °F (37.3 °C)] 98 °F (36.7 °C)  Pulse:  [67-87] 68  Resp:  [16-20] 16  SpO2:  [93 %-98 %] 96 %  BP: (143-196)/(63-86) 173/78     Weight: 59 kg (130 lb 1.1 oz)  Body mass index is 20.37 kg/m².    Intake/Output Summary (Last 24 hours) at 03/15/18 1032  Last data filed at 03/15/18 0936   Gross per 24 hour   Intake              360 ml   Output                0 ml   Net              360 ml      Physical Exam   Constitutional: She appears well-developed. No distress.   Eyes:   Blind R eye, minimal vision in L eye   Cardiovascular: Normal rate, regular rhythm, normal heart sounds and intact distal pulses.    No murmur heard.  Pulmonary/Chest: Effort normal and breath sounds normal. No respiratory distress. She has no wheezes. She has no rales.   Abdominal: Soft. Bowel sounds are normal. She exhibits no distension. There is no  tenderness.   Musculoskeletal: Normal range of motion. She exhibits no edema.   Neurological: She is alert. She displays atrophy.   Oriented to self, place, and president; follows commands, speech is slow; generalized weakness appreciated R>L   Skin: Skin is warm and dry. She is not diaphoretic.   Psychiatric: Her speech is not delayed and not slurred. She is slowed and withdrawn.   Nursing note and vitals reviewed.      Significant Labs: All pertinent labs within the past 24 hours have been reviewed.    Significant Imaging: I have reviewed all pertinent imaging results/findings within the past 24 hours.    Assessment/Plan:      * Encephalopathy    Recent CVA 2 weeks ago; ?new baseline  - Presents with somnolence x 1 day  - Afebrile, no leukocytosis, AST/ALT/ammonia WNL, lactic 2.2  - CT head with no acute intracranial abnormality noted   - Possibly uremic - missed her last two HD treatments (plan for HD 3/12)  - Blood cultures NGTD  - Urine cx gram neg vernon lactose   - PT/OT consulted: SNF and need to establish HD chair  - High risk of fall / injury - utilize all safety measures and fall precautions   - Aspiration precautions  Stable  - SW/CM assisting with discharge planning         Type 1 diabetes mellitus with hyperglycemia    - HgA1C 9.1  - Hyperglycemic on arrival with glucose in the 460 now down to 290  - Beta-hydroxybutyrate 0.0  - Resumed home long acting insulin dosing on admission  3/12: Aspart 4U WM  3/13: Continue detemir 15U qhs and increase aspart to 5U WM plus low dose correction  3/14: overnight insulin drip initiated due to .  Beta-hydroxy negative.  Endocrinology consulted.  Bed request placed to transfer to [facility].  Charge RN discussed with patient and patient's roommate importance of not sharing food.  BG significantly improved 371->313->276.  3/15: Discharge rec: recommend levemir 15 units daily and novolog 5 units with meals with low dose correction (patient had been  administering up to 10 units of additional novolog with meals). Some minimal adjustment may be required during hospitalization        ESRD (end stage renal disease)    - TTS HD patient with LUE HD access  - Reports last HD treatment 03/06/18  - Nephrology consulted and plan for HD 3/12  - Trend electrolytes daily   - Strict I/O's  - Daily Wt's  3/12: Informed by nephrology that patient is not set up for outpatient HD at [facility]; CM assisting.  3/13 HD complete; 3/15 HD in process        DM gastroparesis    - Continue home dosing of metoclopramide  - Antiemetics PRN  Stable        UTI (urinary tract infection)    - Hx of ESRD currently on HD TTS with decreased urine production   - Afebrile, no leukocytosis, UA with 4 squam. UC gram neg vernon.  - Will continue CTX (day 5)        Anemia in chronic renal disease    - Hgb 8.2 on arrival (baseline 6.7-7.4)  - Trend daily  - Continue TTS epoetin   Stable        Essential hypertension    - BP controlled with resumed home antihypertensive regimen  - Hydralazine PRN  - Held bumetanide and HCTZ on admit  - Will continue to monitor        Seizure disorder    - Resume home levetiracetam dosing  - Seizure precautions  - Levetiracetam level 7.7 WNL        Protein calorie malnutrition    - Albumin 3.0 on arrival with ESRD  Continue to monitor          VTE Risk Mitigation         Ordered     heparin (porcine) injection 5,000 Units  Every 8 hours     Route:  Subcutaneous        03/11/18 2241     Medium Risk of VTE  Once      03/11/18 2241     Place CARLOS hose  Until discontinued      03/11/18 2241     Place sequential compression device  Until discontinued      03/11/18 2241              Rosa Maria Parsons PA-C  Department of Hospital Medicine   [facility]

## 2018-03-15 NOTE — ASSESSMENT & PLAN NOTE
- TTS HD patient with LUE HD access  - Reports last HD treatment 03/06/18  - Nephrology consulted and plan for HD 3/12  - Trend electrolytes daily   - Strict I/O's  - Daily Wt's  3/12: Informed by nephrology that patient is not set up for outpatient HD at [facility]; CM assisting.  3/13 HD complete; 3/15 HD in process

## 2018-03-15 NOTE — ASSESSMENT & PLAN NOTE
- Hx of ESRD currently on HD TTS with decreased urine production   - Afebrile, no leukocytosis, UA with 4 squam. UC gram neg vernon.  - Will continue CTX (day 5)

## 2018-03-15 NOTE — ASSESSMENT & PLAN NOTE
bg goal 140-180    Continue detemir 15 units nightly  novolog 5 units ac  Low dose insulin correction  bg monitoring ac/hs    Discharge rec: recommend levemir 15 units daily and novolog 5 units with meals with low dose correction (patient had been administering up to 10 units of additional novolog with meals). Some minimal adjustment may be required during hospitalization

## 2018-03-15 NOTE — CARE UPDATE
Rapid Response Follow-up Note    Followed up with patient for proactive rounding.  No acute issues at this time. Vital signs within normal limits.  Reviewed plan of care with primary RN. Insulin drip is off at this time for hypoglycemia.  See flow sheets and previous notes for details.  Pt pending transfer to [facility].  Will continue to monitor pt.  Please call Rapid Response RN with any questions or concerns at  [phone]

## 2018-03-15 NOTE — SUBJECTIVE & OBJECTIVE
"Interval HPI:   Overnight events: severe hypoglycemia yesterday afternoon while on insulin gtt  Eatin%  Nausea: No  Hypoglycemia and intervention: Yes  Fever: No  TPN and/or TF: No  If yes, type of TF/TPN and rate: n/a    BP (!) 167/80   Pulse 69   Temp 98 °F (36.7 °C) (Oral)   Resp 16   Ht 5' 7" (1.702 m)   Wt 59 kg (130 lb 1.1 oz)   LMP 2000 (Approximate)   SpO2 96%   Breastfeeding? No   BMI 20.37 kg/m²     Labs Reviewed and Include      Recent Labs  Lab 03/15/18  0449   *   CALCIUM 8.6*   ALBUMIN 2.7*      K 4.6   CO2 22*   CL 99   BUN 56*   CREATININE 5.0*     Lab Results   Component Value Date    WBC 9.04 03/15/2018    HGB 7.7 (L) 03/15/2018    HCT 24.4 (L) 03/15/2018    MCV 87 03/15/2018     03/15/2018       Recent Labs  Lab 18  0339   TSH 1.713     Lab Results   Component Value Date    HGBA1C 9.1 (H) 2018       Nutritional status:   Body mass index is 20.37 kg/m².  Lab Results   Component Value Date    ALBUMIN 2.7 (L) 03/15/2018    ALBUMIN 2.7 (L) 2018    ALBUMIN 2.8 (L) 2018     No results found for: PREALBUMIN    Estimated Creatinine Clearance: 12.3 mL/min (A) (based on SCr of 5 mg/dL (H)).    Accu-Checks  Recent Labs      18   1346  18   1657  18   1659  18   1712  18   1721  18   1731  18   1831  18   2050  18   2319  03/15/18   0911   POCTGLUCOSE  192*  43*  37*  49*  62*  99  278*  426*  379*  111*       Current Medications and/or Treatments Impacting Glycemic Control  Immunotherapy:  Immunosuppressants     None        Steroids:   Hormones     None        Pressors:    Autonomic Drugs     None        Hyperglycemia/Diabetes Medications: Antihyperglycemics     Start     Stop Route Frequency Ordered    03/15/18 0715  insulin aspart U-100 pen 5 Units      -- SubQ 3 times daily with meals 18  insulin aspart U-100 pen 0-5 Units      -- SubQ Before meals & nightly " PRN 03/14/18 2103 03/14/18 2115  insulin detemir U-100 pen 15 Units      -- SubQ Daily 03/14/18 2103

## 2018-03-15 NOTE — ASSESSMENT & PLAN NOTE
- HgA1C 9.1  - Hyperglycemic on arrival with glucose in the 460 now down to 290  - Beta-hydroxybutyrate 0.0  - Resumed home long acting insulin dosing on admission  3/12: Aspart 4U WM  3/13: Continue detemir 15U qhs and increase aspart to 5U WM plus low dose correction  3/14: overnight insulin drip initiated due to .  Beta-hydroxy negative.  Endocrinology consulted.  Bed request placed to transfer to [facility].  Charge RN discussed with patient and patient's roommate importance of not sharing food.  BG significantly improved 371->313->276.  3/15: Discharge rec: recommend levemir 15 units daily and novolog 5 units with meals with low dose correction (patient had been administering up to 10 units of additional novolog with meals). Some minimal adjustment may be required during hospitalization

## 2018-03-15 NOTE — ASSESSMENT & PLAN NOTE
Recent CVA 2 weeks ago; ?new baseline  - Presents with somnolence x 1 day  - Afebrile, no leukocytosis, AST/ALT/ammonia WNL, lactic 2.2  - CT head with no acute intracranial abnormality noted   - Possibly uremic - missed her last two HD treatments (plan for HD 3/12)  - Blood cultures NGTD  - Urine cx gram neg vernon lactose   - PT/OT consulted: SNF and need to establish HD chair  - High risk of fall / injury - utilize all safety measures and fall precautions   - Aspiration precautions  Stable  - SW/CM assisting with discharge planning

## 2018-03-15 NOTE — NURSING
Pt transported to dialysis. /82, AM BP meds given and BS checked at 130.  No insulin given at this time because pt does not have breakfast tray. Report given to dialysis RN to administer subq insulin with meal.

## 2018-03-15 NOTE — PT/OT/SLP PROGRESS
Occupational Therapy      Patient Name:  Eufemia Haq   MRN:  [id number]    Patient not seen today secondary to patient being off unit for dialysis  . Will follow-up next treatment session.    GIOVANI Atkins  3/15/2018

## 2018-03-15 NOTE — PLAN OF CARE
Problem: Patient Care Overview  Goal: Plan of Care Review  Outcome: Ongoing (interventions implemented as appropriate)  Pt Aox4. Pts BP slightly elevated. Pt has 4 BMs and 4 occurrences of urine. Pt orders changed to AC/HS glucose monitoring. Pts BG was 379 @ 2319. Education conducted on proper diet for pt. Safety precautions maintained, bed alarm set and WCTM.

## 2018-03-15 NOTE — PLAN OF CARE
Notified by the Micro Lab the 3/11/2018 Urine culture is positive for and ESBL/- Klebsiella.  Follow strict CONTACT ISOLATION, utilize appropriate PPE for all patient encounters.  Sanitize hands after PPE removal.  Contact Dr. K. Baumgarten for isolation discontinuation criteria.

## 2018-03-15 NOTE — PT/OT/SLP PROGRESS
"Occupational Therapy   Treatment    Name: Eufemia Haq  MRN: [id number]  Admitting Diagnosis:  Encephalopathy       Recommendations:     Discharge Recommendations: nursing facility, skilled  Discharge Equipment Recommendations:  bath bench, bedside commode  Barriers to discharge:  Decreased caregiver support    Subjective     Communicated with: RN prior to session.  Per RN, blood glucose was very low and requested not move pt much OOB. Pt also declined sitting in chair or EOB for session but desired to work with therapist.  Pt stated "I can not wait to work with y'all and get stronger."  Pain/Comfort:  · Pain Rating 1: 8/10 ("headache")  · Pain Addressed 1: Reposition, Distraction, Nurse notified, Pre-medicate for activity  · Pain Rating Post-Intervention 1: 8/10 (pt did report feeling more comfortable after session)    Patients cultural, spiritual, Scientology conflicts given the current situation: no    Objective:     Patient found with: telemetry    General Precautions: Standard, blind, fall   Orthopedic Precautions:    Braces:       Occupational Performance:    Bed Mobility:   Rolling: NA   Supine<>Sit: performed by bed elevated/lowered   Scooting/Bridging: Max A to HOB    Functional Mobility/ Transfers:   Sit>Stand: NA   Stand>Sit: Na   Toilet: Na      Activities of Daily Living:  Feeding: Moderate Assistance Max A to acquire items on utensil and bring to mouth, Min A to place cup in L hand, bring to mouth,  and drink  UE Dressing: Minimal Assistance doff/don gown while seated in bed with HOB elevated.  LE Dressing: declined  Grooming: Stand-by Assistance to wash face and hands with cloth while supine in bed with HOB elevated.    Balance:  Static Sitting:           fair+  Dynamic Sitting:      Na  Static Standing:       Na  Dynamic Standing:  Na      Patient left supine with HOB elevated to sit with all lines intact, call button in reach and RN present    [facility] 6 Click:  [facility] Total Score: 16    Treatment & " Education:  Pt educated on role of OT and POC. Pt educated on safety with daily tasks E/OOB, and importance of participating in daily ax. Pt whiteboard updated.    Education:    Assessment:     Eufemia Haq is a 52 y.o. female with a medical diagnosis of Encephalopathy.  Pt tolerated session well and put forth good effort to participate despite limitation to in bed ax. Pt required increased A for feeding and bed mobility adjustments 2* IV site in dominant hand restricting movement. Pt presented with decreased (I), endurance, stability and safety for ADLs, self-care and functional mobility. Pt will benefit from further OT in order to maximize (I) and safety for functional tasks.    Performance deficits affecting function are weakness, impaired self care skills, impaired endurance, impaired functional mobilty, impaired balance, visual deficits, pain, decreased safety awareness.      Rehab Prognosis:  good; patient would benefit from acute skilled OT services to address these deficits and reach maximum level of function.       Plan:     Patient to be seen 3 x/week to address the above listed problems via self-care/home management, community/work re-entry, therapeutic activities, therapeutic exercises  · Plan of Care Expires: 04/11/18  · Plan of Care Reviewed with: patient    This Plan of care has been discussed with the patient who was involved in its development and understands and is in agreement with the identified goals and treatment plan    GOALS:    Occupational Therapy Goals        Problem: Occupational Therapy Goal    Goal Priority Disciplines Outcome Interventions   Occupational Therapy Goal     OT, PT/OT Ongoing (interventions implemented as appropriate)    Description:  Goals to be met by: 3/19/18    Patient will increase functional independence with ADLs by performing:    UE Dressing with Minimal Assistance.MET 3/14  UPDATED: UE Dressing with Supervision.  LE Dressing with Stand-by  Assistance.  Grooming while seated with Set-up Assistance.  Toileting from bedside commode with Minimal Assistance for hygiene and clothing management.   Supine to sit with Supervision.  Stand pivot transfers with Minimal Assistance.  Toilet transfer to bedside commode with Minimal Assistance.                       Time Tracking:     OT Date of Treatment: 03/14/18  OT Start Time: 1748  OT Stop Time: 1802  OT Total Time (min): 14 min    Billable Minutes:Self Care/Home Management 14    GIOVANI Larson  3/14/2018

## 2018-03-15 NOTE — PLAN OF CARE
Problem: Occupational Therapy Goal  Goal: Occupational Therapy Goal  Goals to be met by: 3/19/18    Patient will increase functional independence with ADLs by performing:    UE Dressing with Minimal Assistance.MET 3/14  UPDATED: UE Dressing with Supervision.  LE Dressing with Stand-by Assistance.  Grooming while seated with Set-up Assistance.  Toileting from bedside commode with Minimal Assistance for hygiene and clothing management.   Supine to sit with Supervision.  Stand pivot transfers with Minimal Assistance.  Toilet transfer to bedside commode with Minimal Assistance.     1 goal met and updated. Other goals remain appropriate.  GIOVANI Larson, CKTP  3/14/2018

## 2018-03-15 NOTE — PLAN OF CARE
Problem: Patient Care Overview  Goal: Plan of Care Review  Outcome: Ongoing (interventions implemented as appropriate)  3.5 hour dialysis complete.  Blood rinsed back.  Needles pulled from ADEN graft.  Pressure held x 5 minutes.  Hemostasis achieved.  Covered with gauze and paper tape.  +thrill +bruit.  Net UF 2.5L.  Tolerated well.

## 2018-03-16 LAB
ALBUMIN SERPL BCP-MCNC: 2.9 G/DL
ANION GAP SERPL CALC-SCNC: 9 MMOL/L
BACTERIA BLD CULT: NORMAL
BACTERIA BLD CULT: NORMAL
BASOPHILS # BLD AUTO: 0.06 K/UL
BASOPHILS NFR BLD: 0.8 %
BUN SERPL-MCNC: 26 MG/DL
CALCIUM SERPL-MCNC: 8.9 MG/DL
CHLORIDE SERPL-SCNC: 102 MMOL/L
CO2 SERPL-SCNC: 24 MMOL/L
CREAT SERPL-MCNC: 3.3 MG/DL
DIFFERENTIAL METHOD: ABNORMAL
EOSINOPHIL # BLD AUTO: 0.1 K/UL
EOSINOPHIL NFR BLD: 1.6 %
ERYTHROCYTE [DISTWIDTH] IN BLOOD BY AUTOMATED COUNT: 14.5 %
EST. GFR  (AFRICAN AMERICAN): 17.7 ML/MIN/1.73 M^2
EST. GFR  (NON AFRICAN AMERICAN): 15.3 ML/MIN/1.73 M^2
GLUCOSE SERPL-MCNC: 164 MG/DL
HCT VFR BLD AUTO: 26 %
HGB BLD-MCNC: 7.9 G/DL
IMM GRANULOCYTES # BLD AUTO: 0.11 K/UL
IMM GRANULOCYTES NFR BLD AUTO: 1.4 %
LYMPHOCYTES # BLD AUTO: 1.6 K/UL
LYMPHOCYTES NFR BLD: 19.6 %
MAGNESIUM SERPL-MCNC: 2.1 MG/DL
MCH RBC QN AUTO: 27.1 PG
MCHC RBC AUTO-ENTMCNC: 30.4 G/DL
MCV RBC AUTO: 89 FL
MONOCYTES # BLD AUTO: 0.6 K/UL
MONOCYTES NFR BLD: 7.3 %
NEUTROPHILS # BLD AUTO: 5.5 K/UL
NEUTROPHILS NFR BLD: 69.3 %
NRBC BLD-RTO: 0 /100 WBC
PHOSPHATE SERPL-MCNC: 4.1 MG/DL
PLATELET # BLD AUTO: 208 K/UL
PMV BLD AUTO: 10.1 FL
POCT GLUCOSE: 126 MG/DL (ref 70–110)
POCT GLUCOSE: 201 MG/DL (ref 70–110)
POCT GLUCOSE: 205 MG/DL (ref 70–110)
POCT GLUCOSE: 258 MG/DL (ref 70–110)
POTASSIUM SERPL-SCNC: 4.1 MMOL/L
RBC # BLD AUTO: 2.91 M/UL
SODIUM SERPL-SCNC: 135 MMOL/L
TB INDURATION 48 - 72 HR READ: 0 MM
WBC # BLD AUTO: 7.9 K/UL

## 2018-03-16 PROCEDURE — 63600175 PHARM REV CODE 636 W HCPCS: Performed by: NURSE PRACTITIONER

## 2018-03-16 PROCEDURE — 99233 SBSQ HOSP IP/OBS HIGH 50: CPT | Mod: ,,, | Performed by: PHYSICIAN ASSISTANT

## 2018-03-16 PROCEDURE — 85025 COMPLETE CBC W/AUTO DIFF WBC: CPT

## 2018-03-16 PROCEDURE — 97530 THERAPEUTIC ACTIVITIES: CPT

## 2018-03-16 PROCEDURE — 83735 ASSAY OF MAGNESIUM: CPT

## 2018-03-16 PROCEDURE — 25000003 PHARM REV CODE 250: Performed by: PHYSICIAN ASSISTANT

## 2018-03-16 PROCEDURE — 80069 RENAL FUNCTION PANEL: CPT

## 2018-03-16 PROCEDURE — 97110 THERAPEUTIC EXERCISES: CPT

## 2018-03-16 PROCEDURE — 99232 SBSQ HOSP IP/OBS MODERATE 35: CPT | Mod: GC,,, | Performed by: INTERNAL MEDICINE

## 2018-03-16 PROCEDURE — 80177 DRUG SCRN QUAN LEVETIRACETAM: CPT

## 2018-03-16 PROCEDURE — 63600175 PHARM REV CODE 636 W HCPCS: Performed by: PHYSICIAN ASSISTANT

## 2018-03-16 PROCEDURE — 36415 COLL VENOUS BLD VENIPUNCTURE: CPT

## 2018-03-16 PROCEDURE — 20600001 HC STEP DOWN PRIVATE ROOM

## 2018-03-16 PROCEDURE — 25000003 PHARM REV CODE 250: Performed by: NURSE PRACTITIONER

## 2018-03-16 RX ORDER — INSULIN ASPART 100 [IU]/ML
4 INJECTION, SOLUTION INTRAVENOUS; SUBCUTANEOUS
Status: DISCONTINUED | OUTPATIENT
Start: 2018-03-16 | End: 2018-03-17

## 2018-03-16 RX ADMIN — METOPROLOL TARTRATE 50 MG: 50 TABLET ORAL at 09:03

## 2018-03-16 RX ADMIN — INSULIN ASPART 5 UNITS: 100 INJECTION, SOLUTION INTRAVENOUS; SUBCUTANEOUS at 09:03

## 2018-03-16 RX ADMIN — INSULIN DETEMIR 15 UNITS: 100 INJECTION, SOLUTION SUBCUTANEOUS at 09:03

## 2018-03-16 RX ADMIN — LOSARTAN POTASSIUM 50 MG: 50 TABLET, FILM COATED ORAL at 09:03

## 2018-03-16 RX ADMIN — CLONIDINE HYDROCHLORIDE 0.1 MG: 0.1 TABLET ORAL at 09:03

## 2018-03-16 RX ADMIN — GENTAMICIN SULFATE 123.6 MG: 40 INJECTION, SOLUTION INTRAMUSCULAR; INTRAVENOUS at 09:03

## 2018-03-16 RX ADMIN — HEPARIN SODIUM 5000 UNITS: 5000 INJECTION, SOLUTION INTRAVENOUS; SUBCUTANEOUS at 09:03

## 2018-03-16 RX ADMIN — NIFEDIPINE 60 MG: 60 TABLET, FILM COATED, EXTENDED RELEASE ORAL at 09:03

## 2018-03-16 RX ADMIN — METOCLOPRAMIDE 10 MG: 10 TABLET ORAL at 05:03

## 2018-03-16 RX ADMIN — SEVELAMER CARBONATE 800 MG: 800 TABLET, FILM COATED ORAL at 05:03

## 2018-03-16 RX ADMIN — SEVELAMER CARBONATE 800 MG: 800 TABLET, FILM COATED ORAL at 12:03

## 2018-03-16 RX ADMIN — METOCLOPRAMIDE 10 MG: 10 TABLET ORAL at 12:03

## 2018-03-16 RX ADMIN — LEVETIRACETAM 250 MG: 250 TABLET, FILM COATED ORAL at 09:03

## 2018-03-16 RX ADMIN — INSULIN ASPART 3 UNITS: 100 INJECTION, SOLUTION INTRAVENOUS; SUBCUTANEOUS at 09:03

## 2018-03-16 RX ADMIN — INSULIN ASPART 4 UNITS: 100 INJECTION, SOLUTION INTRAVENOUS; SUBCUTANEOUS at 12:03

## 2018-03-16 RX ADMIN — HEPARIN SODIUM 5000 UNITS: 5000 INJECTION, SOLUTION INTRAVENOUS; SUBCUTANEOUS at 01:03

## 2018-03-16 RX ADMIN — SEVELAMER CARBONATE 800 MG: 800 TABLET, FILM COATED ORAL at 09:03

## 2018-03-16 RX ADMIN — HEPARIN SODIUM 5000 UNITS: 5000 INJECTION, SOLUTION INTRAVENOUS; SUBCUTANEOUS at 05:03

## 2018-03-16 RX ADMIN — ROSUVASTATIN CALCIUM 20 MG: 20 TABLET, FILM COATED ORAL at 09:03

## 2018-03-16 RX ADMIN — INSULIN ASPART 1 UNITS: 100 INJECTION, SOLUTION INTRAVENOUS; SUBCUTANEOUS at 09:03

## 2018-03-16 NOTE — ASSESSMENT & PLAN NOTE
Recent CVA 2 weeks ago; ?new baseline  - Presents with somnolence x 1 day on admission  - Afebrile, no leukocytosis, AST/ALT/ammonia WNL, lactic 2.2  - CT head with no acute intracranial abnormality noted   - Possibly uremic - missed her last two HD treatments (plan for HD 3/12)  - Blood cultures NGTD  - UC Klebsiella ESBL  - PT/OT consulted: SNF (accepted) and need to establish HD chair  - High risk of fall / injury - utilize all safety measures and fall precautions   - Aspiration precautions  Stable  - SW/CM assisting with discharge planning

## 2018-03-16 NOTE — PLAN OF CARE
ALYSIA spoke with Aliyah at [facility].  SHe stated that Willian did not come to sign paperwork.  She stated that Willian state that she does not have the financial information that she is asking for.  ALYSIA spoke with admissions for [facility].  They have accepted the pt and submitted for auth.  ALYSIA spoke with Willian.  SHe stated that she is agreeable to either place for the pt to go to short term.  She stated that she would prefer other facilities but these are the only ones in network that can possibly accept the pt.  ALYSIA sent the Hep panel to [facility].  ALYSIA called Zaida ([phone]) but Aman was not available.  ALYSIA left a message for him to call back.  ALYSIA is trying to change the HD to [facility] on [address] as that is the one [facility] will transport to.  [place] may not need the same paperwork that [place] is requesting.  Pt's family wants her to do a short stay at a SNF.  Willian also told the SW that she is in the process of transferring the pt's [place] medicaid to [place].  ALYSIA sent updated notes to [facility] and will f/u on Monday.    Shaye Boone, EBONI x [phone]

## 2018-03-16 NOTE — ASSESSMENT & PLAN NOTE
- HgA1C 9.1  - Hyperglycemic on arrival with glucose in the 460 now down to 290  - Beta-hydroxybutyrate 0.0  - Resumed home long acting insulin dosing on admission  3/12: Aspart 4U WM  3/13: Continue detemir 15U qhs and increase aspart to 5U WM plus low dose correction  3/14: overnight insulin drip initiated due to .  Beta-hydroxy negative.  Endocrinology consulted.  Bed request placed to transfer to [facility].  Charge RN discussed with patient and patient's roommate importance of not sharing food.  BG significantly improved 371->313->276.  3/15: Discharge rec: recommend levemir 15 units daily and novolog 5 units with meals with low dose correction (patient had been administering up to 10 units of additional novolog with meals). Some minimal adjustment may be required during hospitalization  3/16 fasting at goal, continue to monitor

## 2018-03-16 NOTE — PROGRESS NOTES
"[facility]  Endocrinology  Progress Note    Admit Date: 3/11/2018     Consult Requested by: DEBBIE Hollingsworth MD   Reason for admit: Encephalopathy     HISTORY OF PRESENT ILLNESS:  Reason for Consult: Management of T1DM, Hyperglycemia      Diabetes diagnosis year: age 26     Home Diabetes Medications:  levemir 15 units daily, novolog 5 ac with sliding scale up to 15 units with meals     How often checking glucose at home? 8-10x per day  BG readings on regimen: 's  Hypoglycemia on the regimen?  Yes  Missed doses on regimen?  yes     Diabetes Complications include:     Hyperglycemia, Diabetic chronic kidney disease     , Diabetic retinopathy , Diabetic peripheral neuropathy  and Diabetic gastroparesis      Complicating diabetes co morbidities:   History of CVA, Residual deficits from CVA  and ESRD        HPI:   Patient is a 52 y.o. female with a diagnosis of dm type 1, esrd, recent cva admitted for worsening encephalopathy. Found to have severe hyperglycemia on admission with bg in 400's. Had missed two HD sessions prior to admission. Endocrine consulted for dm type 1 management.            Interval HPI:   Overnight events: mild hypoglycemia yesterday evening. However patient did receive detemir 15 units at 1pm yesterday  Eatin%  Nausea: No  Hypoglycemia and intervention: No  Fever: No  TPN and/or TF: No  If yes, type of TF/TPN and rate: n/a    BP (!) 180/78 (Patient Position: Lying)   Pulse 78   Temp 98.5 °F (36.9 °C) (Oral)   Resp 16   Ht 5' 7" (1.702 m)   Wt 62.2 kg (137 lb 2 oz)   LMP 2000 (Approximate)   SpO2 97%   Breastfeeding? No   BMI 21.48 kg/m²       Labs Reviewed and Include      Recent Labs  Lab 18  0450   *   CALCIUM 8.9   ALBUMIN 2.9*   *   K 4.1   CO2 24      BUN 26*   CREATININE 3.3*     Lab Results   Component Value Date    WBC 7.90 2018    HGB 7.9 (L) 2018    HCT 26.0 (L) 2018    MCV 89 2018     " 03/16/2018       Recent Labs  Lab 03/12/18  0339   TSH 1.713     Lab Results   Component Value Date    HGBA1C 9.1 (H) 03/12/2018       Nutritional status:   Body mass index is 21.48 kg/m².  Lab Results   Component Value Date    ALBUMIN 2.9 (L) 03/16/2018    ALBUMIN 2.7 (L) 03/15/2018    ALBUMIN 2.7 (L) 03/14/2018     No results found for: PREALBUMIN    Estimated Creatinine Clearance: 19.4 mL/min (A) (based on SCr of 3.3 mg/dL (H)).    Accu-Checks  Recent Labs      03/14/18   2050  03/14/18   2319  03/15/18   0736  03/15/18   0911  03/15/18   1312  03/15/18   1724  03/15/18   2145  03/15/18   2158  03/15/18   2231  03/16/18   0814   POCTGLUCOSE  426*  379*  130*  111*  170*  144*  69*  65*  119*  258*       Current Medications and/or Treatments Impacting Glycemic Control  Immunotherapy:  Immunosuppressants     None        Steroids:   Hormones     None        Pressors:    Autonomic Drugs     None        Hyperglycemia/Diabetes Medications: Antihyperglycemics     Start     Stop Route Frequency Ordered    03/16/18 1130  insulin aspart U-100 pen 4 Units      -- SubQ 3 times daily with meals 03/16/18 0954    03/14/18 2202  insulin aspart U-100 pen 0-5 Units      -- SubQ Before meals & nightly PRN 03/14/18 2103    03/14/18 2115  insulin detemir U-100 pen 15 Units      -- SubQ Daily 03/14/18 2103          ASSESSMENT and PLAN    Type 1 diabetes mellitus with hyperglycemia    bg goal 140-180    Continue detemir 15 units nightly  novolog 5 units ac  Low dose insulin correction  bg monitoring ac/hs    Discharge rec: recommend levemir 15 units daily and novolog 5 units with meals with low dose correction (patient had been administering up to 10 units of additional novolog with meals). Some minimal adjustment may be required during hospitalization        ESRD (end stage renal disease)    Avoid insulin stacking        DM gastroparesis    Optimize glycemic control  Recommend small frequent meals        UTI (urinary tract infection)     May adversely impact insulin requirements            Will sign off, please call with any questions.  Devon Subramanian MD  Endocrinology  [facility]

## 2018-03-16 NOTE — PLAN OF CARE
Problem: Occupational Therapy Goal  Goal: Occupational Therapy Goal  Goals to be met by: 3/19/18    Patient will increase functional independence with ADLs by performing:    UE Dressing with Minimal Assistance.MET 3/14  UPDATED: UE Dressing with Supervision.  LE Dressing with Stand-by Assistance.  Grooming while seated with Set-up Assistance.  Toileting from bedside commode with Minimal Assistance for hygiene and clothing management.   Supine to sit with Supervision.  Stand pivot transfers with Minimal Assistance.  Toilet transfer to bedside commode with Minimal Assistance.      Outcome: Ongoing (interventions implemented as appropriate)  Con't POC.    GIOVANI Lange

## 2018-03-16 NOTE — PLAN OF CARE
"   03/16/18 1305   Discharge Reassessment   Assessment Type Discharge Planning Reassessment   Do you have any problems affording any of your prescribed medications? No   Discharge Plan A Skilled Nursing Facility   Discharge Plan B Home Health   Can the patient answer the patient profile reliably? Yes, cognitively intact   How does the patient rate their overall health at the present time? Fair   Describe the patient's ability to walk at the present time. Major restrictions/daily assistance from another person   How often would a person be available to care for the patient? Often   Number of comorbid conditions (as recorded on the chart) Five or more   During the past month, has the patient often been bothered by feeling down, depressed or hopeless? Yes   During the past month, has the patient often been bothered by little interest or pleasure in doing things? Yes     Awaiting acceptance to SNF & HD chair to be established. Per ID note, "3/11/2018 Urine culture is positive for and ESBL/- Klebsiella". Will continue to follow.   "

## 2018-03-16 NOTE — PT/OT/SLP PROGRESS
Physical Therapy Treatment    Patient Name:  Eufemia Haq   MRN:  [id number]    Recommendations:     Discharge Recommendations:  nursing facility, skilled   Discharge Equipment Recommendations: bedside commode, bath bench   Barriers to discharge: Inaccessible home and Decreased caregiver support    Assessment:     Eufemia Haq is a 52 y.o. female admitted with a medical diagnosis of Encephalopathy.  She presents with the following impairments/functional limitations:  weakness, impaired self care skills, impaired endurance, impaired functional mobilty, impaired balance, visual deficits, pain, decreased safety awareness. Pt tolerated B LE Therex well, and will continue to benefit from PT services at this time. Continue with PT POC as indicated.     Rehab Prognosis:  good; patient would benefit from acute skilled PT services to address these deficits and reach maximum level of function.      Recent Surgery: * No surgery found *      Plan:     During this hospitalization, patient to be seen 4 x/week to address the above listed problems via gait training, therapeutic activities, therapeutic exercises, neuromuscular re-education  · Plan of Care Expires:  04/12/18   Plan of Care Reviewed with: patient    Subjective     Communicated with nursing prior to session.  Patient found supine all lines intact upon PT entry to room, agreeable to treatment.      Chief Complaint: none stated  Patient comments/goals: none stated  Pain/Comfort:  · Pain Rating 1: 0/10  · Pain Rating Post-Intervention 1: 0/10    Patients cultural, spiritual, Baptism conflicts given the current situation: no    Objective:     Patient found with:  (all lines intact)     General Precautions: Standard, blind, fall   Orthopedic Precautions:N/A   Braces: N/A     Functional Mobility:  · Not performed on this date due to pt safety.       AM-PAC 6 CLICK MOBILITY  Turning over in bed (including adjusting bedclothes, sheets and blankets)?: 3  Sitting  down on and standing up from a chair with arms (e.g., wheelchair, bedside commode, etc.): 2  Moving from lying on back to sitting on the side of the bed?: 3  Moving to and from a bed to a chair (including a wheelchair)?: 2  Need to walk in hospital room?: 2  Climbing 3-5 steps with a railing?: 1  Total Score: 13       Therapeutic Activities and Exercises:   Supine B LE Therex 2x10 reps: AP, HS, Hip Abd/Add, and GS.    Patient left supine with all lines intact and call button in reach..    GOALS:    Physical Therapy Goals        Problem: Physical Therapy Goal    Goal Priority Disciplines Outcome Goal Variances Interventions   Physical Therapy Goal     PT/OT, PT Ongoing (interventions implemented as appropriate)     Description:  Goals to be met by: 3/22/18     Patient will increase functional independence with mobility by performin. Sit to stand transfer with Minimal Assistance with LRD  2. Bed to chair transfer with Contact Guard Assistance using LRD.  3. Gait  x 20 feet with Minimal Assistance using LRD.   4. Stand for 10 minutes with Contact Guard Assistance using LRD.  5. Lower extremity exercise program x20 reps per handout, with assistance as needed                      Time Tracking:     PT Received On: 18  PT Start Time: 1111     PT Stop Time: 1128  PT Total Time (min): 17 min     Billable Minutes: Therapeutic Exercise 17    Treatment Type: Treatment  PT/PTA: PTA     PTA Visit Number: 2     Suze Stokes PTA  2018

## 2018-03-16 NOTE — NURSING
Pt's nighttime accucheck was 69, recheck was 65. Administered 4 glucose tabs as ordered. Now pt's blood glucose is 119. Encouraged pt to eat a sandwich or some crackers and drink juice, but she refused.

## 2018-03-16 NOTE — SUBJECTIVE & OBJECTIVE
Interval History: no acute events overnight.  Pt frustrated this morning that she is still in the hospital.  Pt has been accepted to SNF facility however working on HD chair.    Review of Systems   Constitutional: Negative for chills, diaphoresis, fatigue and fever.   Eyes: Positive for visual disturbance (blind). Negative for discharge.   Respiratory: Negative for cough, chest tightness and shortness of breath.    Cardiovascular: Negative for chest pain, palpitations and leg swelling.   Gastrointestinal: Negative for abdominal distention, abdominal pain, diarrhea, nausea and vomiting.   Genitourinary: Positive for decreased urine volume (ESRD). Negative for dysuria.   Neurological: Positive for seizures and weakness. Negative for syncope.     Objective:     Vital Signs (Most Recent):  Temp: 98.5 °F (36.9 °C) (03/16/18 0747)  Pulse: 78 (03/16/18 0747)  Resp: 16 (03/16/18 0747)  BP: (!) 180/78 (03/16/18 0747)  SpO2: 97 % (03/16/18 0747) Vital Signs (24h Range):  Temp:  [98.4 °F (36.9 °C)-98.7 °F (37.1 °C)] 98.5 °F (36.9 °C)  Pulse:  [66-80] 78  Resp:  [16-20] 16  SpO2:  [96 %-100 %] 97 %  BP: (144-180)/(71-78) 180/78     Weight: 62.2 kg (137 lb 2 oz)  Body mass index is 21.48 kg/m².    Intake/Output Summary (Last 24 hours) at 03/16/18 1105  Last data filed at 03/15/18 2100   Gross per 24 hour   Intake              770 ml   Output             3150 ml   Net            -2380 ml      Physical Exam   Constitutional: She appears well-developed. No distress.   Eyes:   Blind R eye, minimal vision in L eye   Cardiovascular: Normal rate, regular rhythm, normal heart sounds and intact distal pulses.    No murmur heard.  Pulmonary/Chest: Effort normal and breath sounds normal. No respiratory distress. She has no wheezes. She has no rales.   Abdominal: Soft. Bowel sounds are normal. She exhibits no distension. There is no tenderness.   Musculoskeletal: Normal range of motion. She exhibits no edema.   Neurological: She is alert.  She displays atrophy.   Oriented to self, place, and president; follows commands, speech is slow; generalized weakness appreciated R>L   Skin: Skin is warm and dry. She is not diaphoretic.   Psychiatric: Her speech is not delayed and not slurred. She is slowed. She is not withdrawn.   Nursing note and vitals reviewed.      Significant Labs: All pertinent labs within the past 24 hours have been reviewed.    Significant Imaging: I have reviewed all pertinent imaging results/findings within the past 24 hours.

## 2018-03-16 NOTE — PROGRESS NOTES
[facility] Hospital Medicine  Progress Note    Patient Name: Eufemia Haq  MRN: [id number]  Patient Class: IP- Inpatient   Admission Date: 3/11/2018  Length of Stay: 2 days  Attending Physician: DEBBIE Hollingsworth MD  Primary Care Provider: Primary Doctor [facility] Medicine Team: [facility] Rosa Maria Parsons PA-C    Subjective:     Principal Problem:Encephalopathy    HPI:  53 y/o female, who presents to the ED with c/o lethargy and hyperglycemia.  She has a PMH of recent CVA, DMI, ESRD (TTS HD), HTN, blindness of the R eye, and seizures.  Majority of history is provided by patients daughter at bedside.  She states her mother was just discharge a [facility] 2 days ago and has been lethargic since returning home.  She states that her mother suffered a stroke about 3 weeks ago.  The patient is lethargic and minimally interactive.  She appears in NAD and will follow simple commands when prompted, but drifts off when not simulated.  Additionally, she states that she was recently diagnosed with pneumonia.  She denies fever, chills, CP, SOB, N/V/D, or  symptoms since discharge.  She endorsed poorly controlled blood sugars.  Her sugar was 460 on arrival.  They report adherence to medication regimen.  However, report that her last HD treatment was Tuesday 03/06/18.    Hospital Course:  Pt admitted to observation for encephalopathy in setting of recent CVA (2 weeks ago); ?new baseline. CTH without acute abnormality. CXR without acute process. UA suboptimal; will continue CTX while urine cx pending (gram neg vernon, lactose ). Blood cx NGTD. Nephrology consulted for maintenance HD (missed last 2 HD). Informed by nephrology that patient is not set up for outpatient HD at [facility]; CM assisting. PT/OT consulted and recommended SNF.  3/14 overnight pt started on insulin drip due to BG>600.  Charge RN notified that pt's roommate was giving pt additional food  throughout the night.  Endocrinology consulted.  3/15 insulin drip d/c'd due to hypoglycemia and pt transitioned to levemir 15U qhs and novolog 5U tid with meals plus low dose correction.  3/16 UC revealed Klebsiella ESBL and loading dose of gentamycin given at 2 mg/kg.    Interval History: no acute events overnight.  Pt frustrated this morning that she is still in the hospital.  Pt has been accepted to SNF facility however working on HD chair.    Review of Systems   Constitutional: Negative for chills, diaphoresis, fatigue and fever.   Eyes: Positive for visual disturbance (blind). Negative for discharge.   Respiratory: Negative for cough, chest tightness and shortness of breath.    Cardiovascular: Negative for chest pain, palpitations and leg swelling.   Gastrointestinal: Negative for abdominal distention, abdominal pain, diarrhea, nausea and vomiting.   Genitourinary: Positive for decreased urine volume (ESRD). Negative for dysuria.   Neurological: Positive for seizures and weakness. Negative for syncope.     Objective:     Vital Signs (Most Recent):  Temp: 98.5 °F (36.9 °C) (03/16/18 0747)  Pulse: 78 (03/16/18 0747)  Resp: 16 (03/16/18 0747)  BP: (!) 180/78 (03/16/18 0747)  SpO2: 97 % (03/16/18 0747) Vital Signs (24h Range):  Temp:  [98.4 °F (36.9 °C)-98.7 °F (37.1 °C)] 98.5 °F (36.9 °C)  Pulse:  [66-80] 78  Resp:  [16-20] 16  SpO2:  [96 %-100 %] 97 %  BP: (144-180)/(71-78) 180/78     Weight: 62.2 kg (137 lb 2 oz)  Body mass index is 21.48 kg/m².    Intake/Output Summary (Last 24 hours) at 03/16/18 1105  Last data filed at 03/15/18 2100   Gross per 24 hour   Intake              770 ml   Output             3150 ml   Net            -2380 ml      Physical Exam   Constitutional: She appears well-developed. No distress.   Eyes:   Blind R eye, minimal vision in L eye   Cardiovascular: Normal rate, regular rhythm, normal heart sounds and intact distal pulses.    No murmur heard.  Pulmonary/Chest: Effort normal and  breath sounds normal. No respiratory distress. She has no wheezes. She has no rales.   Abdominal: Soft. Bowel sounds are normal. She exhibits no distension. There is no tenderness.   Musculoskeletal: Normal range of motion. She exhibits no edema.   Neurological: She is alert. She displays atrophy.   Oriented to self, place, and president; follows commands, speech is slow; generalized weakness appreciated R>L   Skin: Skin is warm and dry. She is not diaphoretic.   Psychiatric: Her speech is not delayed and not slurred. She is slowed. She is not withdrawn.   Nursing note and vitals reviewed.      Significant Labs: All pertinent labs within the past 24 hours have been reviewed.    Significant Imaging: I have reviewed all pertinent imaging results/findings within the past 24 hours.    Assessment/Plan:      * Encephalopathy    Recent CVA 2 weeks ago; ?new baseline  - Presents with somnolence x 1 day on admission  - Afebrile, no leukocytosis, AST/ALT/ammonia WNL, lactic 2.2  - CT head with no acute intracranial abnormality noted   - Possibly uremic - missed her last two HD treatments (plan for HD 3/12)  - Blood cultures NGTD  -  Klebsiella ESBL  - PT/OT consulted: SNF (accepted) and need to establish HD chair  - High risk of fall / injury - utilize all safety measures and fall precautions   - Aspiration precautions  Stable  - SW/CM assisting with discharge planning         Type 1 diabetes mellitus with hyperglycemia    - HgA1C 9.1  - Hyperglycemic on arrival with glucose in the 460 now down to 290  - Beta-hydroxybutyrate 0.0  - Resumed home long acting insulin dosing on admission  3/12: Aspart 4U WM  3/13: Continue detemir 15U qhs and increase aspart to 5U WM plus low dose correction  3/14: overnight insulin drip initiated due to .  Beta-hydroxy negative.  Endocrinology consulted.  [facility]  Charge RN discussed with patient and patient's roommate importance of not sharing  food.  BG significantly improved 371->313->276.  3/15: Discharge rec: recommend levemir 15 units daily and novolog 5 units with meals with low dose correction (patient had been administering up to 10 units of additional novolog with meals). Some minimal adjustment may be required during hospitalization  3/16 fasting at goal, continue to monitor        ESRD (end stage renal disease)    - TTS HD patient with LUE HD access  - Reports last HD treatment 03/06/18  - Nephrology consulted and plan for HD 3/12  - Trend electrolytes daily   - Strict I/O's  - Daily Wt's  3/12: Informed by nephrology that patient is not set up for outpatient HD at [facility]; CM assisting.  3/13 HD complete; 3/15 HD complete        DM gastroparesis    - Continue home dosing of metoclopramide  - Antiemetics PRN  Stable        UTI (urinary tract infection)    - Hx of ESRD currently on HD TTS with decreased urine production   - Afebrile, no leukocytosis, UA with 4 squam. UC gram neg vernon.  - Will continue CTX (day 5)  3/16 UC Klebsiella ESBL.  D/C CTX.  Pt given 2 mg/kg gentamycin x 1.  HD 3/17 and will give gent 1 mg/kg after HD.        Anemia in chronic renal disease    - Hgb 8.2 on arrival (baseline 6.7-7.4)  - Trend daily  - Continue TTS epoetin   Stable        Essential hypertension    - BP controlled with resumed home antihypertensive regimen  - Hydralazine PRN  - Held bumetanide and HCTZ on admit  - Will continue to monitor        Seizure disorder    - Resume home levetiracetam dosing  - Seizure precautions  - Levetiracetam level 7.7 WNL  3/16 will repeat levetiracetam level        Protein calorie malnutrition    - Albumin 3.0 on arrival with ESRD  Continue to monitor          VTE Risk Mitigation         Ordered     heparin (porcine) injection 5,000 Units  Every 8 hours     Route:  Subcutaneous        03/11/18 2241     Medium Risk of VTE  Once      03/11/18 2241     Place CARLOS hose  Until discontinued      03/11/18 2241      Place sequential compression device  Until discontinued      03/11/18 3150              Rosa Maria Parsons PA-C  Department of Hospital Medicine   [facility]

## 2018-03-16 NOTE — PLAN OF CARE
ALYSIA spoke with Aman at [facility].  ALYSIA explained that the pt will need to go to a SNF but the pt has not been fully accepted with paperwork signed or auth given to any facility yet.  ALYSIA offered to change the [facility] location today.  Aman requested that the SW contact him on Monday with the SNF and [facility] location that the pt will definitely be going to and he will switch the facility.  SW will f/u on Monday.    Shaye Boone, [facility] x [phone]

## 2018-03-16 NOTE — SUBJECTIVE & OBJECTIVE
"Interval HPI:   Overnight events: mild hypoglycemia yesterday evening. However patient did receive detemir 15 units at 1pm yesterday  Eatin%  Nausea: No  Hypoglycemia and intervention: No  Fever: No  TPN and/or TF: No  If yes, type of TF/TPN and rate: n/a    BP (!) 180/78 (Patient Position: Lying)   Pulse 78   Temp 98.5 °F (36.9 °C) (Oral)   Resp 16   Ht 5' 7" (1.702 m)   Wt 62.2 kg (137 lb 2 oz)   LMP 2000 (Approximate)   SpO2 97%   Breastfeeding? No   BMI 21.48 kg/m²     Labs Reviewed and Include      Recent Labs  Lab 18  0450   *   CALCIUM 8.9   ALBUMIN 2.9*   *   K 4.1   CO2 24      BUN 26*   CREATININE 3.3*     Lab Results   Component Value Date    WBC 7.90 2018    HGB 7.9 (L) 2018    HCT 26.0 (L) 2018    MCV 89 2018     2018       Recent Labs  Lab 18  0339   TSH 1.713     Lab Results   Component Value Date    HGBA1C 9.1 (H) 2018       Nutritional status:   Body mass index is 21.48 kg/m².  Lab Results   Component Value Date    ALBUMIN 2.9 (L) 2018    ALBUMIN 2.7 (L) 03/15/2018    ALBUMIN 2.7 (L) 2018     No results found for: PREALBUMIN    Estimated Creatinine Clearance: 19.4 mL/min (A) (based on SCr of 3.3 mg/dL (H)).    Accu-Checks  Recent Labs      18   2050  18   2319  03/15/18   0736  03/15/18   0911  03/15/18   1312  03/15/18   1724  03/15/18   2145  03/15/18   2158  03/15/18   2231  18   0814   POCTGLUCOSE  426*  379*  130*  111*  170*  144*  69*  65*  119*  258*       Current Medications and/or Treatments Impacting Glycemic Control  Immunotherapy:  Immunosuppressants     None        Steroids:   Hormones     None        Pressors:    Autonomic Drugs     None        Hyperglycemia/Diabetes Medications: Antihyperglycemics     Start     Stop Route Frequency Ordered    18 1130  insulin aspart U-100 pen 4 Units      -- SubQ 3 times daily with meals 18 0954    18 2207  " insulin aspart U-100 pen 0-5 Units      -- SubQ Before meals & nightly PRN 03/14/18 2103 03/14/18 2115  insulin detemir U-100 pen 15 Units      -- SubQ Daily 03/14/18 2103

## 2018-03-16 NOTE — ASSESSMENT & PLAN NOTE
- Resume home levetiracetam dosing  - Seizure precautions  - Levetiracetam level 7.7 WNL  3/16 will repeat levetiracetam level

## 2018-03-16 NOTE — PT/OT/SLP PROGRESS
"Occupational Therapy   Treatment    Name: Eufemia Haq  MRN: [id number]  Admitting Diagnosis:  Encephalopathy       Recommendations:     Discharge Recommendations: nursing facility, skilled  Discharge Equipment Recommendations:  bath bench, bedside commode  Barriers to discharge:  Decreased caregiver support    Subjective   "I feel so weak - I haven't really been out of bed in a few days."    Communicated with: RN prior to session.  Pain/Comfort:  · Pain Rating 1: 0/10    Patients cultural, spiritual, [employer]ian conflicts given the current situation: no    Objective:     Patient found with: telemetry    General Precautions: Standard, fall, blind   Orthopedic Precautions:    Braces:       Occupational Performance:    Bed Mobility:    · Patient completed Rolling/Turning to Right with supervision  · Patient completed Scooting/Bridging with stand by assistance  · Patient completed Supine to Sit with stand by assistance  · Patient completed Sit to Supine with stand by assistance     Functional Mobility/Transfers:  · Patient completed Sit <> Stand Transfer with contact guard assistance  with  rolling walker   · Patient completed Toilet Transfer Step Transfer technique with minimum assistance to sit and moderate assistance to stand with  grab bars and rolling walker  · Functional Mobility: Pt walked from bed<>bathroom (~8' x 2) with CGA using a RW and requiring only minimal verbal guidance for direction.    Patient left supine with all lines intact, call button in reach and bed alarm on    AMPAC 6 Click:  AMPAC Total Score: 18    Treatment & Education:  Practiced toilet t/fs x 2 trials with pt plopping down on both attempts. Educated on importance of reaching back and slowly lowering herself down for safety reasons. Pt stated that it is difficult due to weakness.  From EOB, completed (B) shld flexion/extension x15, elbow flexion/extension x15, knee flexion/extension x15 and 1/2 squats in standing x10. Pt stated squats " were very hard and she was afraid to do them due to fear of falling.  Education:    Assessment:     Eufemia Haq is a 52 y.o. female with a medical diagnosis of Encephalopathy.  She presents with generalized weakness and poor endurance requiring ongoing skilled OT in order to regain her functional (I). Demonstrates some visual tracking and able to walk to the bathroom w/o much guidance. Toilet t/fs are unsafe due to LE weakness.  Performance deficits affecting function are weakness, visual deficits, decreased safety awareness, impaired functional mobilty, impaired self care skills, impaired balance, impaired endurance, gait instability, decreased lower extremity function.      Rehab Prognosis:  Good; patient would benefit from acute skilled OT services to address these deficits and reach maximum level of function.       Plan:     Patient to be seen 3 x/week to address the above listed problems via self-care/home management, therapeutic activities, therapeutic exercises  · Plan of Care Expires: 04/11/18  · Plan of Care Reviewed with: patient    This Plan of care has been discussed with the patient who was involved in its development and understands and is in agreement with the identified goals and treatment plan    GOALS:    Occupational Therapy Goals        Problem: Occupational Therapy Goal    Goal Priority Disciplines Outcome Interventions   Occupational Therapy Goal     OT, PT/OT Ongoing (interventions implemented as appropriate)    Description:  Goals to be met by: 3/19/18    Patient will increase functional independence with ADLs by performing:    UE Dressing with Minimal Assistance.MET 3/14  UPDATED: UE Dressing with Supervision.  LE Dressing with Stand-by Assistance.  Grooming while seated with Set-up Assistance.  Toileting from bedside commode with Minimal Assistance for hygiene and clothing management.   Supine to sit with Supervision.  Stand pivot transfers with Minimal Assistance.  Toilet transfer  to bedside commode with Minimal Assistance.                       Time Tracking:     OT Date of Treatment: 03/16/18  OT Start Time: 1321  OT Stop Time: 1355  OT Total Time (min): 34 min    Billable Minutes:Therapeutic Activity 17  Therapeutic Exercise 17    GIOVANI Lange  3/16/2018

## 2018-03-16 NOTE — ASSESSMENT & PLAN NOTE
- TTS HD patient with LUE HD access  - Reports last HD treatment 03/06/18  - Nephrology consulted and plan for HD 3/12  - Trend electrolytes daily   - Strict I/O's  - Daily Wt's  3/12: Informed by nephrology that patient is not set up for outpatient HD at [facility]; CM assisting.  3/13 HD complete; 3/15 HD complete

## 2018-03-16 NOTE — PLAN OF CARE
Problem: Patient Care Overview  Goal: Plan of Care Review  Outcome: Ongoing (interventions implemented as appropriate)  Overnight, pt denied pain. Blood glucose monitoring performed. Hypoglycemic (65), glucose tabs given. IV abx given.  No falls or injury during shift. Contact precaution maintained. Call light in reach. WCTM

## 2018-03-16 NOTE — ASSESSMENT & PLAN NOTE
- Hx of ESRD currently on HD TTS with decreased urine production   - Afebrile, no leukocytosis, UA with 4 squam. UC gram neg vernon.  - Will continue CTX (day 5)  3/16 UC Klebsiella ESBL.  D/C CTX.  Pt given 2 mg/kg gentamycin x 1.  HD 3/17 and will give gent 1 mg/kg after HD.

## 2018-03-17 LAB
ALBUMIN SERPL BCP-MCNC: 2.9 G/DL
ANION GAP SERPL CALC-SCNC: 14 MMOL/L
BASOPHILS # BLD AUTO: 0.04 K/UL
BASOPHILS NFR BLD: 0.6 %
BUN SERPL-MCNC: 46 MG/DL
CALCIUM SERPL-MCNC: 8.7 MG/DL
CHLORIDE SERPL-SCNC: 103 MMOL/L
CO2 SERPL-SCNC: 18 MMOL/L
CREAT SERPL-MCNC: 5.4 MG/DL
DIFFERENTIAL METHOD: ABNORMAL
EOSINOPHIL # BLD AUTO: 0.2 K/UL
EOSINOPHIL NFR BLD: 2.4 %
ERYTHROCYTE [DISTWIDTH] IN BLOOD BY AUTOMATED COUNT: 14.7 %
EST. GFR  (AFRICAN AMERICAN): 9.7 ML/MIN/1.73 M^2
EST. GFR  (NON AFRICAN AMERICAN): 8.5 ML/MIN/1.73 M^2
GLUCOSE SERPL-MCNC: 262 MG/DL
HCT VFR BLD AUTO: 25.9 %
HGB BLD-MCNC: 8.7 G/DL
IMM GRANULOCYTES # BLD AUTO: 0.12 K/UL
IMM GRANULOCYTES NFR BLD AUTO: 1.9 %
LEVETIRACETAM SERPL-MCNC: 12 UG/ML (ref 3–60)
LYMPHOCYTES # BLD AUTO: 1.1 K/UL
LYMPHOCYTES NFR BLD: 17.6 %
MAGNESIUM SERPL-MCNC: 2.4 MG/DL
MCH RBC QN AUTO: 27.7 PG
MCHC RBC AUTO-ENTMCNC: 33.6 G/DL
MCV RBC AUTO: 83 FL
MONOCYTES # BLD AUTO: 0.6 K/UL
MONOCYTES NFR BLD: 9.9 %
NEUTROPHILS # BLD AUTO: 4.3 K/UL
NEUTROPHILS NFR BLD: 67.6 %
NRBC BLD-RTO: 0 /100 WBC
PHOSPHATE SERPL-MCNC: 6 MG/DL
PLATELET # BLD AUTO: 212 K/UL
PMV BLD AUTO: 10.8 FL
POCT GLUCOSE: 135 MG/DL (ref 70–110)
POCT GLUCOSE: 141 MG/DL (ref 70–110)
POCT GLUCOSE: 175 MG/DL (ref 70–110)
POCT GLUCOSE: 268 MG/DL (ref 70–110)
POTASSIUM SERPL-SCNC: 4.8 MMOL/L
RBC # BLD AUTO: 3.14 M/UL
SODIUM SERPL-SCNC: 135 MMOL/L
WBC # BLD AUTO: 6.38 K/UL

## 2018-03-17 PROCEDURE — 25000003 PHARM REV CODE 250: Performed by: PHYSICIAN ASSISTANT

## 2018-03-17 PROCEDURE — 90935 HEMODIALYSIS ONE EVALUATION: CPT | Mod: ,,, | Performed by: INTERNAL MEDICINE

## 2018-03-17 PROCEDURE — 27000207 HC ISOLATION

## 2018-03-17 PROCEDURE — 80069 RENAL FUNCTION PANEL: CPT

## 2018-03-17 PROCEDURE — 36415 COLL VENOUS BLD VENIPUNCTURE: CPT

## 2018-03-17 PROCEDURE — 96372 THER/PROPH/DIAG INJ SC/IM: CPT

## 2018-03-17 PROCEDURE — 90935 HEMODIALYSIS ONE EVALUATION: CPT

## 2018-03-17 PROCEDURE — 99233 SBSQ HOSP IP/OBS HIGH 50: CPT | Mod: ,,, | Performed by: PHYSICIAN ASSISTANT

## 2018-03-17 PROCEDURE — 20600001 HC STEP DOWN PRIVATE ROOM

## 2018-03-17 PROCEDURE — 63600175 PHARM REV CODE 636 W HCPCS: Performed by: NURSE PRACTITIONER

## 2018-03-17 PROCEDURE — 63600175 PHARM REV CODE 636 W HCPCS: Performed by: PHYSICIAN ASSISTANT

## 2018-03-17 PROCEDURE — 85025 COMPLETE CBC W/AUTO DIFF WBC: CPT

## 2018-03-17 PROCEDURE — 83735 ASSAY OF MAGNESIUM: CPT

## 2018-03-17 PROCEDURE — 25000003 PHARM REV CODE 250: Performed by: NURSE PRACTITIONER

## 2018-03-17 RX ORDER — SODIUM CHLORIDE 9 MG/ML
INJECTION, SOLUTION INTRAVENOUS
Status: DISCONTINUED | OUTPATIENT
Start: 2018-03-17 | End: 2018-03-22 | Stop reason: HOSPADM

## 2018-03-17 RX ORDER — SODIUM CHLORIDE 9 MG/ML
INJECTION, SOLUTION INTRAVENOUS ONCE
Status: COMPLETED | OUTPATIENT
Start: 2018-03-17 | End: 2018-03-17

## 2018-03-17 RX ORDER — INSULIN ASPART 100 [IU]/ML
5 INJECTION, SOLUTION INTRAVENOUS; SUBCUTANEOUS
Status: DISCONTINUED | OUTPATIENT
Start: 2018-03-17 | End: 2018-03-18

## 2018-03-17 RX ADMIN — CLONIDINE HYDROCHLORIDE 0.1 MG: 0.1 TABLET ORAL at 09:03

## 2018-03-17 RX ADMIN — METOCLOPRAMIDE 10 MG: 10 TABLET ORAL at 05:03

## 2018-03-17 RX ADMIN — METOPROLOL TARTRATE 50 MG: 50 TABLET ORAL at 03:03

## 2018-03-17 RX ADMIN — LEVETIRACETAM 250 MG: 250 TABLET, FILM COATED ORAL at 09:03

## 2018-03-17 RX ADMIN — NIFEDIPINE 60 MG: 60 TABLET, FILM COATED, EXTENDED RELEASE ORAL at 09:03

## 2018-03-17 RX ADMIN — SEVELAMER CARBONATE 800 MG: 800 TABLET, FILM COATED ORAL at 07:03

## 2018-03-17 RX ADMIN — LOSARTAN POTASSIUM 50 MG: 50 TABLET, FILM COATED ORAL at 03:03

## 2018-03-17 RX ADMIN — NIFEDIPINE 60 MG: 60 TABLET, FILM COATED, EXTENDED RELEASE ORAL at 03:03

## 2018-03-17 RX ADMIN — HEPARIN SODIUM 5000 UNITS: 5000 INJECTION, SOLUTION INTRAVENOUS; SUBCUTANEOUS at 09:03

## 2018-03-17 RX ADMIN — ERYTHROPOIETIN 6000 UNITS: 20000 INJECTION, SOLUTION INTRAVENOUS; SUBCUTANEOUS at 11:03

## 2018-03-17 RX ADMIN — METOPROLOL TARTRATE 50 MG: 50 TABLET ORAL at 09:03

## 2018-03-17 RX ADMIN — GENTAMICIN SULFATE 61.6 MG: 40 INJECTION, SOLUTION INTRAMUSCULAR; INTRAVENOUS at 04:03

## 2018-03-17 RX ADMIN — INSULIN DETEMIR 15 UNITS: 100 INJECTION, SOLUTION SUBCUTANEOUS at 08:03

## 2018-03-17 RX ADMIN — HEPARIN SODIUM 5000 UNITS: 5000 INJECTION, SOLUTION INTRAVENOUS; SUBCUTANEOUS at 05:03

## 2018-03-17 RX ADMIN — INSULIN ASPART 4 UNITS: 100 INJECTION, SOLUTION INTRAVENOUS; SUBCUTANEOUS at 08:03

## 2018-03-17 RX ADMIN — SODIUM CHLORIDE 350 ML: 0.9 INJECTION, SOLUTION INTRAVENOUS at 11:03

## 2018-03-17 RX ADMIN — INSULIN ASPART 5 UNITS: 100 INJECTION, SOLUTION INTRAVENOUS; SUBCUTANEOUS at 04:03

## 2018-03-17 RX ADMIN — HEPARIN SODIUM 5000 UNITS: 5000 INJECTION, SOLUTION INTRAVENOUS; SUBCUTANEOUS at 02:03

## 2018-03-17 RX ADMIN — ACETAMINOPHEN 650 MG: 325 TABLET, FILM COATED ORAL at 02:03

## 2018-03-17 RX ADMIN — SEVELAMER CARBONATE 800 MG: 800 TABLET, FILM COATED ORAL at 02:03

## 2018-03-17 NOTE — SUBJECTIVE & OBJECTIVE
Interval History:  no acute events overnight, no new complaints.  Pt at HD today.    Review of Systems   Constitutional: Negative for chills, diaphoresis, fatigue and fever.   Eyes: Positive for visual disturbance (blind). Negative for discharge.   Respiratory: Negative for cough, chest tightness and shortness of breath.    Cardiovascular: Negative for chest pain, palpitations and leg swelling.   Gastrointestinal: Negative for abdominal distention, abdominal pain, diarrhea, nausea and vomiting.   Genitourinary: Positive for decreased urine volume (ESRD). Negative for dysuria.   Neurological: Positive for seizures and weakness. Negative for syncope.     Objective:     Vital Signs (Most Recent):  Temp: 98.1 °F (36.7 °C) (03/17/18 0825)  Pulse: 71 (03/17/18 1130)  Resp: 18 (03/17/18 0825)  BP: (!) 159/62 (03/17/18 1130)  SpO2: (!) 94 % (03/17/18 0730) Vital Signs (24h Range):  Temp:  [97.9 °F (36.6 °C)-98.2 °F (36.8 °C)] 98.1 °F (36.7 °C)  Pulse:  [63-74] 71  Resp:  [16-18] 18  SpO2:  [94 %-98 %] 94 %  BP: (113-177)/(57-75) 159/62     Weight: 56.8 kg (125 lb 3.5 oz)  Body mass index is 19.61 kg/m².    Intake/Output Summary (Last 24 hours) at 03/17/18 1252  Last data filed at 03/17/18 0600   Gross per 24 hour   Intake              240 ml   Output                0 ml   Net              240 ml      Physical Exam   Constitutional: She appears well-developed. No distress.   Eyes:   Blind R eye, minimal vision in L eye   Cardiovascular: Normal rate, regular rhythm, normal heart sounds and intact distal pulses.    No murmur heard.  Pulmonary/Chest: Effort normal and breath sounds normal. No respiratory distress. She has no wheezes. She has no rales.   Abdominal: Soft. Bowel sounds are normal. She exhibits no distension. There is no tenderness.   Musculoskeletal: Normal range of motion. She exhibits no edema.   Neurological: She is alert. She displays atrophy.   Oriented to self, place, and president; follows commands, speech  is slow; generalized weakness appreciated R>L   Skin: Skin is warm and dry. She is not diaphoretic.   Psychiatric: Her speech is not delayed and not slurred. She is slowed. She is not withdrawn.   Nursing note and vitals reviewed.      Significant Labs: All pertinent labs within the past 24 hours have been reviewed.    Significant Imaging: I have reviewed all pertinent imaging results/findings within the past 24 hours.

## 2018-03-17 NOTE — PROGRESS NOTES
[facility] Hospital Medicine  Progress Note    Patient Name: Eufemia Haq  MRN: [id number]  Patient Class: IP- Inpatient   Admission Date: 3/11/2018  Length of Stay: 3 days  Attending Physician: DEBBIE Hollingsworth MD  Primary Care Provider: Primary Doctor [facility] Medicine Team: [facility]P MED F Rosa Maria Parsons PA-C    Subjective:     Principal Problem:Encephalopathy    HPI:  53 y/o female, who presents to the ED with c/o lethargy and hyperglycemia.  She has a PMH of recent CVA, DMI, ESRD (TTS HD), HTN, blindness of the R eye, and seizures.  Majority of history is provided by patients daughter at bedside.  She states her mother was just discharge a [facility] 2 days ago and has been lethargic since returning home.  She states that her mother suffered a stroke about 3 weeks ago.  The patient is lethargic and minimally interactive.  She appears in NAD and will follow simple commands when prompted, but drifts off when not simulated.  Additionally, she states that she was recently diagnosed with pneumonia.  She denies fever, chills, CP, SOB, N/V/D, or  symptoms since discharge.  She endorsed poorly controlled blood sugars.  Her sugar was 460 on arrival.  They report adherence to medication regimen.  However, report that her last HD treatment was Tuesday 03/06/18.    Hospital Course:  Pt admitted to observation for encephalopathy in setting of recent CVA (2 weeks ago); ?new baseline. CTH without acute abnormality. CXR without acute process. UA suboptimal; will continue CTX while urine cx pending (gram neg vernon, lactose ). Blood cx NGTD. Nephrology consulted for maintenance HD (missed last 2 HD). Informed by nephrology that patient is not set up for outpatient HD at [facility]; CM assisting. PT/OT consulted and recommended SNF.  3/14 overnight pt started on insulin drip due to BG>600.  Charge RN notified that pt's roommate was giving pt additional food  throughout the night.  Endocrinology consulted.  3/15 insulin drip d/c'd due to hypoglycemia and pt transitioned to levemir 15U qhs and novolog 5U tid with meals plus low dose correction.  3/16 UC revealed Klebsiella ESBL and loading dose of gentamycin given at 2 mg/kg.  3/17 gentamycin 1 mg/kg after HD.    Follow up with Neurology Epilepsy at discharge as keppra level on low end of normal.  Pt may benefit from increased dose.    Interval History:  no acute events overnight, no new complaints.  Pt at HD today.    Review of Systems   Constitutional: Negative for chills, diaphoresis, fatigue and fever.   Eyes: Positive for visual disturbance (blind). Negative for discharge.   Respiratory: Negative for cough, chest tightness and shortness of breath.    Cardiovascular: Negative for chest pain, palpitations and leg swelling.   Gastrointestinal: Negative for abdominal distention, abdominal pain, diarrhea, nausea and vomiting.   Genitourinary: Positive for decreased urine volume (ESRD). Negative for dysuria.   Neurological: Positive for seizures and weakness. Negative for syncope.     Objective:     Vital Signs (Most Recent):  Temp: 98.1 °F (36.7 °C) (03/17/18 0825)  Pulse: 71 (03/17/18 1130)  Resp: 18 (03/17/18 0825)  BP: (!) 159/62 (03/17/18 1130)  SpO2: (!) 94 % (03/17/18 0730) Vital Signs (24h Range):  Temp:  [97.9 °F (36.6 °C)-98.2 °F (36.8 °C)] 98.1 °F (36.7 °C)  Pulse:  [63-74] 71  Resp:  [16-18] 18  SpO2:  [94 %-98 %] 94 %  BP: (113-177)/(57-75) 159/62     Weight: 56.8 kg (125 lb 3.5 oz)  Body mass index is 19.61 kg/m².    Intake/Output Summary (Last 24 hours) at 03/17/18 1252  Last data filed at 03/17/18 0600   Gross per 24 hour   Intake              240 ml   Output                0 ml   Net              240 ml      Physical Exam   Constitutional: She appears well-developed. No distress.   Eyes:   Blind R eye, minimal vision in L eye   Cardiovascular: Normal rate, regular rhythm, normal heart sounds and intact  distal pulses.    No murmur heard.  Pulmonary/Chest: Effort normal and breath sounds normal. No respiratory distress. She has no wheezes. She has no rales.   Abdominal: Soft. Bowel sounds are normal. She exhibits no distension. There is no tenderness.   Musculoskeletal: Normal range of motion. She exhibits no edema.   Neurological: She is alert. She displays atrophy.   Oriented to self, place, and president; follows commands, speech is slow; generalized weakness appreciated R>L   Skin: Skin is warm and dry. She is not diaphoretic.   Psychiatric: Her speech is not delayed and not slurred. She is slowed. She is not withdrawn.   Nursing note and vitals reviewed.      Significant Labs: All pertinent labs within the past 24 hours have been reviewed.    Significant Imaging: I have reviewed all pertinent imaging results/findings within the past 24 hours.    Assessment/Plan:      * Encephalopathy    Recent CVA 2 weeks ago; ?new baseline  - Presents with somnolence x 1 day on admission  - Afebrile, no leukocytosis, AST/ALT/ammonia WNL, lactic 2.2  - CT head with no acute intracranial abnormality noted   - Possibly uremic - missed her last two HD treatments (plan for HD 3/12)  - Blood cultures NGTD  - UC Klebsiella ESBL treating with gentamycin  - PT/OT consulted: SNF (accepted) and need to establish HD chair  - High risk of fall / injury - utilize all safety measures and fall precautions   - Aspiration precautions  Stable  - SW/CM assisting with discharge planning         Type 1 diabetes mellitus with hyperglycemia    - HgA1C 9.1  - Hyperglycemic on arrival with glucose in the 460 now down to 290  - Beta-hydroxybutyrate 0.0  - Resumed home long acting insulin dosing on admission  3/12: Aspart 4U WM  3/13: Continue detemir 15U qhs and increase aspart to 5U WM plus low dose correction  3/14: overnight insulin drip initiated due to .  Beta-hydroxy negative.  Endocrinology consulted.  Bed request placed to transfer to  stepdown.  Charge RN discussed with patient and patient's roommate importance of not sharing food.  BG significantly improved 371->313->276.  3/15: Discharge rec: recommend levemir 15 units daily and novolog 5 units with meals with low dose correction (patient had been administering up to 10 units of additional novolog with meals). Some minimal adjustment may be required during hospitalization  3/16 fasting at goal, continue to monitor  3/17 will increase basal and mealtime dosing by 10%        ESRD (end stage renal disease)    - TTS HD patient with LUE HD access  - Reports last HD treatment 03/06/18  - Nephrology consulted and plan for HD 3/12  - Trend electrolytes daily   - Strict I/O's  - Daily Wt's  3/12: Informed by nephrology that patient is not set up for outpatient HD at [facility]; CM assisting.  3/13 HD complete; 3/15 HD complete; 3/17 HD complete        DM gastroparesis    - Continue home dosing of metoclopramide  - Antiemetics PRN  Stable        UTI (urinary tract infection)    - Hx of ESRD currently on HD TTS with decreased urine production   - Afebrile, no leukocytosis, UA with 4 squam. UC gram neg vernon.  - Will continue CTX (day 5)  3/16 UC Klebsiella ESBL.  D/C CTX.  Pt given 2 mg/kg gentamycin x 1.  HD 3/17 and will give gent 1 mg/kg after HD.        Anemia in chronic renal disease    - Hgb 8.2 on arrival (baseline 6.7-7.4)  - Trend daily  - Continue TTS epoetin   Stable        Essential hypertension    - BP controlled with resumed home antihypertensive regimen  - Hydralazine PRN  - Held bumetanide and HCTZ on admit  - Will continue to monitor        Seizure disorder    - Resume home levetiracetam 250 mg bid dosing  - Seizure precautions  - Levetiracetam level 7.7 WNL  3/16 will repeat levetiracetam level  Referral back to neuro epilepsy as discharge as levetiracetam level low end of normal        Protein calorie malnutrition    - Albumin 3.0 on arrival with ESRD  Continue to  monitor          VTE Risk Mitigation         Ordered     heparin (porcine) injection 5,000 Units  Every 8 hours     Route:  Subcutaneous        03/11/18 2241     Medium Risk of VTE  Once      03/11/18 2241     Place CARLOS hose  Until discontinued      03/11/18 2241     Place sequential compression device  Until discontinued      03/11/18 2241              Rosa Maria Parsons PA-C  Department of Hospital Medicine   [facility]

## 2018-03-17 NOTE — PROGRESS NOTES
Report received from NAA Epstein. Pt arrived from floor AAOx4. Maintenance dialysis initiated via ADEN graft without difficulty.

## 2018-03-17 NOTE — ASSESSMENT & PLAN NOTE
- TTS HD patient with LUE HD access  - Reports last HD treatment 03/06/18  - Nephrology consulted and plan for HD 3/12  - Trend electrolytes daily   - Strict I/O's  - Daily Wt's  3/12: Informed by nephrology that patient is not set up for outpatient HD at [facility]; CM assisting.  3/13 HD complete; 3/15 HD complete; 3/17 HD complete

## 2018-03-17 NOTE — NURSING
Pt had BP spike after coming back to room after dialysis @ 1533, see flow sheet for details. Re-took pts BP manually and was 172/70. Gave all morning  BP medications, rechecked pt a few minutes later and it came back down to 149/71. Notified Rosa Maria on med team F of this. No new orders. WCTM.

## 2018-03-17 NOTE — NURSING
Report given to Roxy WEISS, pt transferred to dialysis. All BP meds held at this time, insulin was given as ordered. VSS for transport. WCTM.

## 2018-03-17 NOTE — ASSESSMENT & PLAN NOTE
Recent CVA 2 weeks ago; ?new baseline  - Presents with somnolence x 1 day on admission  - Afebrile, no leukocytosis, AST/ALT/ammonia WNL, lactic 2.2  - CT head with no acute intracranial abnormality noted   - Possibly uremic - missed her last two HD treatments (plan for HD 3/12)  - Blood cultures NGTD  - UC Klebsiella ESBL treating with gentamycin  - PT/OT consulted: SNF (accepted) and need to establish HD chair  - High risk of fall / injury - utilize all safety measures and fall precautions   - Aspiration precautions  Stable  - SW/CM assisting with discharge planning

## 2018-03-17 NOTE — PLAN OF CARE
Problem: Patient Care Overview  Goal: Plan of Care Review  Outcome: Ongoing (interventions implemented as appropriate)  No acute changes overnight. Blood glucose monitoring performed. SSI given. Pt did complain of pain. Tylenol given with relief noted. Working on SNF placement. Dialysis in morning. Call light in reach. WCTM.

## 2018-03-17 NOTE — ASSESSMENT & PLAN NOTE
- Resume home levetiracetam 250 mg bid dosing  - Seizure precautions  - Levetiracetam level 7.7 WNL  3/16 will repeat levetiracetam level  Referral back to neuro epilepsy as discharge as levetiracetam level low end of normal

## 2018-03-17 NOTE — PROGRESS NOTES
3.5hr HD treatment completed 2L of fluid removed pt tolerated well. Epoetin given as ordered. Both needles of a ADEN graft removed and pressure held until hemostasis achieved dressing applied. Report given to NAA Epstein.

## 2018-03-17 NOTE — ASSESSMENT & PLAN NOTE
- HgA1C 9.1  - Hyperglycemic on arrival with glucose in the 460 now down to 290  - Beta-hydroxybutyrate 0.0  - Resumed home long acting insulin dosing on admission  3/12: Aspart 4U WM  3/13: Continue detemir 15U qhs and increase aspart to 5U WM plus low dose correction  3/14: overnight insulin drip initiated due to .  Beta-hydroxy negative.  Endocrinology consulted.  Bed request placed to transfer to [facility].  Charge RN discussed with patient and patient's roommate importance of not sharing food.  BG significantly improved 371->313->276.  3/15: Discharge rec: recommend levemir 15 units daily and novolog 5 units with meals with low dose correction (patient had been administering up to 10 units of additional novolog with meals). Some minimal adjustment may be required during hospitalization  3/16 fasting at goal, continue to monitor  3/17 will increase basal and mealtime dosing by 10%

## 2018-03-18 PROBLEM — G93.41 ACUTE METABOLIC ENCEPHALOPATHY: Status: ACTIVE | Noted: 2018-03-12

## 2018-03-18 LAB
ALBUMIN SERPL BCP-MCNC: 3.1 G/DL
ANION GAP SERPL CALC-SCNC: 12 MMOL/L
B-OH-BUTYR BLD STRIP-SCNC: 2.5 MMOL/L
BASOPHILS # BLD AUTO: 0.04 K/UL
BASOPHILS NFR BLD: 0.6 %
BUN SERPL-MCNC: 30 MG/DL
CALCIUM SERPL-MCNC: 8.2 MG/DL
CHLORIDE SERPL-SCNC: 98 MMOL/L
CO2 SERPL-SCNC: 25 MMOL/L
CREAT SERPL-MCNC: 4 MG/DL
DIFFERENTIAL METHOD: ABNORMAL
EOSINOPHIL # BLD AUTO: 0.1 K/UL
EOSINOPHIL NFR BLD: 1.3 %
ERYTHROCYTE [DISTWIDTH] IN BLOOD BY AUTOMATED COUNT: 14.6 %
EST. GFR  (AFRICAN AMERICAN): 14 ML/MIN/1.73 M^2
EST. GFR  (NON AFRICAN AMERICAN): 12.1 ML/MIN/1.73 M^2
GLUCOSE SERPL-MCNC: 387 MG/DL
HCT VFR BLD AUTO: 23.6 %
HGB BLD-MCNC: 7.6 G/DL
IMM GRANULOCYTES # BLD AUTO: 0.11 K/UL
IMM GRANULOCYTES NFR BLD AUTO: 1.5 %
LYMPHOCYTES # BLD AUTO: 1.1 K/UL
LYMPHOCYTES NFR BLD: 15.7 %
MAGNESIUM SERPL-MCNC: 2.2 MG/DL
MCH RBC QN AUTO: 27.5 PG
MCHC RBC AUTO-ENTMCNC: 32.2 G/DL
MCV RBC AUTO: 86 FL
MONOCYTES # BLD AUTO: 0.9 K/UL
MONOCYTES NFR BLD: 11.8 %
NEUTROPHILS # BLD AUTO: 5 K/UL
NEUTROPHILS NFR BLD: 69.1 %
NRBC BLD-RTO: 0 /100 WBC
PHOSPHATE SERPL-MCNC: 4.7 MG/DL
PLATELET # BLD AUTO: 203 K/UL
PMV BLD AUTO: 10.5 FL
POCT GLUCOSE: 387 MG/DL (ref 70–110)
POCT GLUCOSE: 445 MG/DL (ref 70–110)
POCT GLUCOSE: 457 MG/DL (ref 70–110)
POTASSIUM SERPL-SCNC: 3.9 MMOL/L
RBC # BLD AUTO: 2.76 M/UL
SODIUM SERPL-SCNC: 135 MMOL/L
WBC # BLD AUTO: 7.19 K/UL

## 2018-03-18 PROCEDURE — 83735 ASSAY OF MAGNESIUM: CPT

## 2018-03-18 PROCEDURE — 99233 SBSQ HOSP IP/OBS HIGH 50: CPT | Mod: ,,, | Performed by: PHYSICIAN ASSISTANT

## 2018-03-18 PROCEDURE — 85025 COMPLETE CBC W/AUTO DIFF WBC: CPT

## 2018-03-18 PROCEDURE — 25000003 PHARM REV CODE 250: Performed by: NURSE PRACTITIONER

## 2018-03-18 PROCEDURE — 63600175 PHARM REV CODE 636 W HCPCS: Performed by: PHYSICIAN ASSISTANT

## 2018-03-18 PROCEDURE — 80069 RENAL FUNCTION PANEL: CPT

## 2018-03-18 PROCEDURE — 25000003 PHARM REV CODE 250: Performed by: PHYSICIAN ASSISTANT

## 2018-03-18 PROCEDURE — 63600175 PHARM REV CODE 636 W HCPCS: Performed by: NURSE PRACTITIONER

## 2018-03-18 PROCEDURE — 36415 COLL VENOUS BLD VENIPUNCTURE: CPT

## 2018-03-18 PROCEDURE — 20600001 HC STEP DOWN PRIVATE ROOM

## 2018-03-18 PROCEDURE — 80048 BASIC METABOLIC PNL TOTAL CA: CPT

## 2018-03-18 PROCEDURE — 82010 KETONE BODYS QUAN: CPT

## 2018-03-18 RX ORDER — SEVELAMER CARBONATE 800 MG/1
1600 TABLET, FILM COATED ORAL
Status: DISCONTINUED | OUTPATIENT
Start: 2018-03-18 | End: 2018-03-22 | Stop reason: HOSPADM

## 2018-03-18 RX ORDER — INSULIN ASPART 100 [IU]/ML
6 INJECTION, SOLUTION INTRAVENOUS; SUBCUTANEOUS
Status: DISCONTINUED | OUTPATIENT
Start: 2018-03-19 | End: 2018-03-18

## 2018-03-18 RX ORDER — INSULIN ASPART 100 [IU]/ML
4 INJECTION, SOLUTION INTRAVENOUS; SUBCUTANEOUS ONCE
Status: COMPLETED | OUTPATIENT
Start: 2018-03-18 | End: 2018-03-18

## 2018-03-18 RX ADMIN — INSULIN ASPART 5 UNITS: 100 INJECTION, SOLUTION INTRAVENOUS; SUBCUTANEOUS at 04:03

## 2018-03-18 RX ADMIN — SEVELAMER CARBONATE 1600 MG: 800 TABLET, FILM COATED ORAL at 12:03

## 2018-03-18 RX ADMIN — ROSUVASTATIN CALCIUM 20 MG: 20 TABLET, FILM COATED ORAL at 09:03

## 2018-03-18 RX ADMIN — METOCLOPRAMIDE 10 MG: 10 TABLET ORAL at 05:03

## 2018-03-18 RX ADMIN — CLONIDINE HYDROCHLORIDE 0.1 MG: 0.1 TABLET ORAL at 09:03

## 2018-03-18 RX ADMIN — HEPARIN SODIUM 5000 UNITS: 5000 INJECTION, SOLUTION INTRAVENOUS; SUBCUTANEOUS at 05:03

## 2018-03-18 RX ADMIN — LEVETIRACETAM 250 MG: 250 TABLET, FILM COATED ORAL at 09:03

## 2018-03-18 RX ADMIN — ACETAMINOPHEN 650 MG: 325 TABLET, FILM COATED ORAL at 08:03

## 2018-03-18 RX ADMIN — INSULIN ASPART 6 UNITS: 100 INJECTION, SOLUTION INTRAVENOUS; SUBCUTANEOUS at 10:03

## 2018-03-18 RX ADMIN — INSULIN ASPART 5 UNITS: 100 INJECTION, SOLUTION INTRAVENOUS; SUBCUTANEOUS at 09:03

## 2018-03-18 RX ADMIN — METOPROLOL TARTRATE 50 MG: 50 TABLET ORAL at 09:03

## 2018-03-18 RX ADMIN — METOCLOPRAMIDE 10 MG: 10 TABLET ORAL at 11:03

## 2018-03-18 RX ADMIN — INSULIN ASPART 4 UNITS: 100 INJECTION, SOLUTION INTRAVENOUS; SUBCUTANEOUS at 10:03

## 2018-03-18 RX ADMIN — SEVELAMER CARBONATE 1600 MG: 800 TABLET, FILM COATED ORAL at 04:03

## 2018-03-18 RX ADMIN — LOSARTAN POTASSIUM 50 MG: 50 TABLET, FILM COATED ORAL at 09:03

## 2018-03-18 RX ADMIN — HEPARIN SODIUM 5000 UNITS: 5000 INJECTION, SOLUTION INTRAVENOUS; SUBCUTANEOUS at 09:03

## 2018-03-18 RX ADMIN — INSULIN ASPART 5 UNITS: 100 INJECTION, SOLUTION INTRAVENOUS; SUBCUTANEOUS at 12:03

## 2018-03-18 RX ADMIN — HEPARIN SODIUM 5000 UNITS: 5000 INJECTION, SOLUTION INTRAVENOUS; SUBCUTANEOUS at 02:03

## 2018-03-18 RX ADMIN — NIFEDIPINE 60 MG: 60 TABLET, FILM COATED, EXTENDED RELEASE ORAL at 09:03

## 2018-03-18 RX ADMIN — METOCLOPRAMIDE 10 MG: 10 TABLET ORAL at 04:03

## 2018-03-18 RX ADMIN — SEVELAMER CARBONATE 800 MG: 800 TABLET, FILM COATED ORAL at 09:03

## 2018-03-18 NOTE — ASSESSMENT & PLAN NOTE
- HgA1C 9.1  - Hyperglycemic on arrival with glucose in the 460 now down to 290  - Beta-hydroxybutyrate 0.0  - Resumed home long acting insulin dosing on admission  3/12: Aspart 4U WM  3/13: Continue detemir 15U qhs and increase aspart to 5U WM plus low dose correction  3/14: overnight insulin drip initiated due to .  Beta-hydroxy negative.  Endocrinology consulted.  Bed request placed to transfer to [facility].  Charge RN discussed with patient and patient's roommate importance of not sharing food.  BG significantly improved 371->313->276.  3/15: Discharge rec: recommend levemir 15 units daily and novolog 5 units with meals with low dose correction (patient had been administering up to 10 units of additional novolog with meals). Some minimal adjustment may be required during hospitalization  3/16 fasting at goal, continue to monitor  3/17 will increase basal and mealtime dosing by 10%

## 2018-03-18 NOTE — PROGRESS NOTES
RN called BG upper 300s to now 456.  Increased Novolog from 5U to 6U and morning levemir from 16U to 18U.

## 2018-03-18 NOTE — NURSING
Team paged to be notified of Elevated blood sugars (445 and 457 recheck). New orders to be entered.

## 2018-03-18 NOTE — ASSESSMENT & PLAN NOTE
- Resume home levetiracetam 250 mg bid dosing  - Seizure precautions  - Levetiracetam level 7.7 WNL  3/16 will repeat levetiracetam level, more appropriate at 12.0  Referral back to neuro epilepsy as discharge as levetiracetam level low end of normal

## 2018-03-18 NOTE — PLAN OF CARE
Problem: Patient Care Overview  Goal: Plan of Care Review  Outcome: Ongoing (interventions implemented as appropriate)  Patient awake alert and oriented X4 throughout this shift. Blood sugars >400 at dinner time. Med team notified, insulin orders adjusted. Patient remains on isolation, camera in room for patient safety. Patient removed her IV to PONCE rowan, refuses to have another one started.

## 2018-03-18 NOTE — ASSESSMENT & PLAN NOTE
- Hx of ESRD currently on HD TTS with decreased urine production   - Afebrile, no leukocytosis, UA with 4 squam. UC gram neg vernon.  - Will continue CTX (day 5)  3/16 UC Klebsiella ESBL.  D/C CTX.  Pt given 2 mg/kg gentamycin x 1.  HD 3/17 and will give gent 1 mg/kg after HD.  Next dose 3/19.

## 2018-03-18 NOTE — SUBJECTIVE & OBJECTIVE
Interval History:  no acute events overnight, no new complaints.  Pt more alert and talkative this morning.    Review of Systems   Constitutional: Negative for chills, diaphoresis, fatigue and fever.   Eyes: Positive for visual disturbance (blind). Negative for discharge.   Respiratory: Negative for cough, chest tightness and shortness of breath.    Cardiovascular: Negative for chest pain, palpitations and leg swelling.   Gastrointestinal: Negative for abdominal distention, abdominal pain, diarrhea, nausea and vomiting.   Genitourinary: Positive for decreased urine volume (ESRD). Negative for dysuria.   Neurological: Positive for seizures and weakness. Negative for syncope.     Objective:     Vital Signs (Most Recent):  Temp: 98.7 °F (37.1 °C) (03/18/18 0335)  Pulse: 66 (03/18/18 1100)  Resp: 18 (03/18/18 0335)  BP: (!) 153/69 (03/18/18 0335)  SpO2: (!) 93 % (03/18/18 0335) Vital Signs (24h Range):  Temp:  [97.3 °F (36.3 °C)-98.8 °F (37.1 °C)] 98.7 °F (37.1 °C)  Pulse:  [66-87] 66  Resp:  [14-18] 18  SpO2:  [92 %-96 %] 93 %  BP: (149-237)/() 153/69     Weight: 56.8 kg (125 lb 3.5 oz)  Body mass index is 19.61 kg/m².    Intake/Output Summary (Last 24 hours) at 03/18/18 1121  Last data filed at 03/18/18 0500   Gross per 24 hour   Intake             1130 ml   Output             2650 ml   Net            -1520 ml      Physical Exam   Constitutional: She appears well-developed. No distress.   Eyes:   Blind R eye, minimal vision in L eye   Cardiovascular: Normal rate, regular rhythm, normal heart sounds and intact distal pulses.    No murmur heard.  Pulmonary/Chest: Effort normal and breath sounds normal. No respiratory distress. She has no wheezes. She has no rales.   Abdominal: Soft. Bowel sounds are normal. She exhibits no distension. There is no tenderness.   Musculoskeletal: Normal range of motion. She exhibits no edema.   Neurological: She is alert. She displays atrophy.   Oriented to self, place, and  president; follows commands, generalized weakness appreciated R>L   Skin: Skin is warm and dry. She is not diaphoretic.   Psychiatric: Her speech is not delayed and not slurred. She is slowed. She is not withdrawn.   Clarity and rate of speech improving   Nursing note and vitals reviewed.      Significant Labs: All pertinent labs within the past 24 hours have been reviewed.    Significant Imaging: I have reviewed all pertinent imaging results/findings within the past 24 hours.

## 2018-03-18 NOTE — ASSESSMENT & PLAN NOTE
Recent CVA 2 weeks ago; ?new baseline  - Presents with somnolence x 1 day on admission  - Afebrile, no leukocytosis, AST/ALT/ammonia WNL, lactic 2.2  - CT head with no acute intracranial abnormality noted   - Possibly uremic - missed her last two HD treatments (plan for HD 3/12)  - Blood cultures NGTD  - UC Klebsiella ESBL treating with gentamycin  - PT/OT consulted: SNF (accepted) and need to establish HD chair  - High risk of fall / injury - utilize all safety measures and fall precautions   - Aspiration precautions  Stable  - SW/CM assisting with discharge planning   3/18 noted improvement after second gentamycin dose for UTI

## 2018-03-18 NOTE — PLAN OF CARE
Problem: Patient Care Overview  Goal: Plan of Care Review  Outcome: Ongoing (interventions implemented as appropriate)  POC reviewed with pt. Questions and concerns addressed. No acute events overnight. Pt remains free of falls/ injuries at this time; VSS. Tele monitored. Pt attempts to get out of bed without calling. Camera placed @ bedside for pt safety. Up with 1 assist to bathroom. Contact precautions maintained. Continuing to monitor.

## 2018-03-18 NOTE — PROGRESS NOTES
[facility] NOTE    Eufemia Haq is a 52 y.o. female was seen on hemodialysis for removal of uremic toxins and volume.    Pt was seen during hemodialysis today, dialysis flowsheet, labs, vitals were reviewed and d/w staff.      Labs:        Recent Labs  Lab 03/15/18  0449 03/16/18  0450 03/17/18  0717    135* 135*   K 4.6 4.1 4.8   CL 99 102 103   CO2 22* 24 18*   BUN 56* 26* 46*   CREATININE 5.0* 3.3* 5.4*   CALCIUM 8.6* 8.9 8.7   PHOS 5.2* 4.1 6.0*         Recent Labs  Lab 03/15/18  0449 03/16/18  0450 03/17/18  0717   WBC 9.04 7.90 6.38   HGB 7.7* 7.9* 8.7*   HCT 24.4* 26.0* 25.9*    208 212       Assessment/Plan:    ESRD  Hyperphosphatemia  Anemia of ESRD    HD given today for removal of uremic toxins and volume.  Epogen with HD  Continue phos binder to be given with meal, continue low phos, low K diet

## 2018-03-18 NOTE — ASSESSMENT & PLAN NOTE
- TTS HD patient with LUE HD access  - Reports last HD treatment 03/06/18  - Nephrology consulted and plan for HD 3/12  - Trend electrolytes daily   - Strict I/O's  - Daily Wt's  3/12: Informed by nephrology that patient is not set up for outpatient HD at [facility]; CM assisting.  3/13 HD complete; 3/15 HD complete; 3/17 HD complete; 3/18 increased phosphate binder from 800 to 1600

## 2018-03-18 NOTE — PROGRESS NOTES
[facility] Hospital Medicine  Progress Note    Patient Name: Eufemia Haq  MRN: [id number]  Patient Class: IP- Inpatient   Admission Date: 3/11/2018  Length of Stay: 4 days  Attending Physician: DEBBIE Hollingsworth MD  Primary Care Provider: Primary Doctor [facility] Medicine Team: [facility] Rosa Maria Parsons PA-C    Subjective:     Principal Problem:Acute metabolic encephalopathy    HPI:  53 y/o female, who presents to the ED with c/o lethargy and hyperglycemia.  She has a PMH of recent CVA, DMI, ESRD (TTS HD), HTN, blindness of the R eye, and seizures.  Majority of history is provided by patients daughter at bedside.  She states her mother was just discharge a [facility] 2 days ago and has been lethargic since returning home.  She states that her mother suffered a stroke about 3 weeks ago.  The patient is lethargic and minimally interactive.  She appears in NAD and will follow simple commands when prompted, but drifts off when not simulated.  Additionally, she states that she was recently diagnosed with pneumonia.  She denies fever, chills, CP, SOB, N/V/D, or  symptoms since discharge.  She endorsed poorly controlled blood sugars.  Her sugar was 460 on arrival.  They report adherence to medication regimen.  However, report that her last HD treatment was Tuesday 03/06/18.    Hospital Course:  Pt admitted to observation for encephalopathy in setting of recent CVA (2 weeks ago); ?new baseline. CTH without acute abnormality. CXR without acute process. UA suboptimal; will continue CTX while urine cx pending (gram neg vernon, lactose ). Blood cx NGTD. Nephrology consulted for maintenance HD (missed last 2 HD). Informed by nephrology that patient is not set up for outpatient HD at [facility]; CM assisting. PT/OT consulted and recommended SNF.  3/14 overnight pt started on insulin drip due to BG>600.  Charge RN notified that pt's roommate was giving pt  additional food throughout the night.  Endocrinology consulted.  3/15 insulin drip d/c'd due to hypoglycemia and pt transitioned to levemir 15U qhs and novolog 5U tid with meals plus low dose correction.  3/16 UC revealed Klebsiella ESBL and loading dose of gentamycin given at 2 mg/kg.  3/17 gentamycin 1 mg/kg after HD.      Follow up with Neurology Epilepsy at discharge as keppra level on low end of normal on admission however repeat more appropriate at 12.0.  Pt may benefit from increased dose.    Interval History:  no acute events overnight, no new complaints.  Pt more alert and talkative this morning.    Review of Systems   Constitutional: Negative for chills, diaphoresis, fatigue and fever.   Eyes: Positive for visual disturbance (blind). Negative for discharge.   Respiratory: Negative for cough, chest tightness and shortness of breath.    Cardiovascular: Negative for chest pain, palpitations and leg swelling.   Gastrointestinal: Negative for abdominal distention, abdominal pain, diarrhea, nausea and vomiting.   Genitourinary: Positive for decreased urine volume (ESRD). Negative for dysuria.   Neurological: Positive for seizures and weakness. Negative for syncope.     Objective:     Vital Signs (Most Recent):  Temp: 98.7 °F (37.1 °C) (03/18/18 0335)  Pulse: 66 (03/18/18 1100)  Resp: 18 (03/18/18 0335)  BP: (!) 153/69 (03/18/18 0335)  SpO2: (!) 93 % (03/18/18 0335) Vital Signs (24h Range):  Temp:  [97.3 °F (36.3 °C)-98.8 °F (37.1 °C)] 98.7 °F (37.1 °C)  Pulse:  [66-87] 66  Resp:  [14-18] 18  SpO2:  [92 %-96 %] 93 %  BP: (149-237)/() 153/69     Weight: 56.8 kg (125 lb 3.5 oz)  Body mass index is 19.61 kg/m².    Intake/Output Summary (Last 24 hours) at 03/18/18 1121  Last data filed at 03/18/18 0500   Gross per 24 hour   Intake             1130 ml   Output             2650 ml   Net            -1520 ml      Physical Exam   Constitutional: She appears well-developed. No distress.   Eyes:   Blind R eye, minimal  vision in L eye   Cardiovascular: Normal rate, regular rhythm, normal heart sounds and intact distal pulses.    No murmur heard.  Pulmonary/Chest: Effort normal and breath sounds normal. No respiratory distress. She has no wheezes. She has no rales.   Abdominal: Soft. Bowel sounds are normal. She exhibits no distension. There is no tenderness.   Musculoskeletal: Normal range of motion. She exhibits no edema.   Neurological: She is alert. She displays atrophy.   Oriented to self, place, and president; follows commands, generalized weakness appreciated R>L   Skin: Skin is warm and dry. She is not diaphoretic.   Psychiatric: Her speech is not delayed and not slurred. She is slowed. She is not withdrawn.   Clarity and rate of speech improving   Nursing note and vitals reviewed.      Significant Labs: All pertinent labs within the past 24 hours have been reviewed.    Significant Imaging: I have reviewed all pertinent imaging results/findings within the past 24 hours.    Assessment/Plan:      * Acute metabolic encephalopathy    Recent CVA 2 weeks ago; ?new baseline  - Presents with somnolence x 1 day on admission  - Afebrile, no leukocytosis, AST/ALT/ammonia WNL, lactic 2.2  - CT head with no acute intracranial abnormality noted   - Possibly uremic - missed her last two HD treatments (plan for HD 3/12)  - Blood cultures NGTD  - UC Klebsiella ESBL treating with gentamycin  - PT/OT consulted: SNF (accepted) and need to establish HD chair  - High risk of fall / injury - utilize all safety measures and fall precautions   - Aspiration precautions  Stable  - SW/CM assisting with discharge planning   3/18 noted improvement after second gentamycin dose for UTI        Type 1 diabetes mellitus with hyperglycemia    - HgA1C 9.1  - Hyperglycemic on arrival with glucose in the 460 now down to 290  - Beta-hydroxybutyrate 0.0  - Resumed home long acting insulin dosing on admission  3/12: Aspart 4U WM  3/13: Continue detemir 15U qhs and  increase aspart to 5U WM plus low dose correction  3/14: overnight insulin drip initiated due to .  Beta-hydroxy negative.  Endocrinology consulted.  Bed request placed to transfer [facility].  Charge RN discussed with patient and patient's roommate importance of not sharing food.  BG significantly improved 371->313->276.  3/15: Discharge rec: recommend levemir 15 units daily and novolog 5 units with meals with low dose correction (patient had been administering up to 10 units of additional novolog with meals). Some minimal adjustment may be required during hospitalization  3/16 fasting at goal, continue to monitor  3/17 will increase basal and mealtime dosing by 10%        ESRD (end stage renal disease)    - TTS HD patient with LUE HD access  - Reports last HD treatment 03/06/18  - Nephrology consulted and plan for HD 3/12  - Trend electrolytes daily   - Strict I/O's  - Daily Wt's  3/12: Informed by nephrology that patient is not set up for outpatient HD at [facility]; CM assisting.  3/13 HD complete; 3/15 HD complete; 3/17 HD complete; 3/18 increased phosphate binder from 800 to 1600        DM gastroparesis    - Continue home dosing of metoclopramide  - Antiemetics PRN  Stable        UTI (urinary tract infection)    - Hx of ESRD currently on HD TTS with decreased urine production   - Afebrile, no leukocytosis, UA with 4 squam. UC gram neg vernon.  - Will continue CTX (day 5)  3/16 UC Klebsiella ESBL.  D/C CTX.  Pt given 2 mg/kg gentamycin x 1.  HD 3/17 and will give gent 1 mg/kg after HD.  Next dose 3/19.        Anemia in chronic renal disease    - Hgb 8.2 on arrival (baseline 6.7-7.4)  - Trend daily  - Continue TTS epoetin   Stable        Essential hypertension    - BP controlled with resumed home antihypertensive regimen  - Hydralazine PRN  - Held bumetanide and HCTZ on admit  - Will continue to monitor        Seizure disorder    - Resume home levetiracetam 250 mg bid dosing  - Seizure  precautions  - Levetiracetam level 7.7 WNL  3/16 will repeat levetiracetam level, more appropriate at 12.0  Referral back to neuro epilepsy as discharge as levetiracetam level low end of normal        Protein calorie malnutrition    - Albumin 3.0 on arrival with ESRD  Continue to monitor          VTE Risk Mitigation         Ordered     heparin (porcine) injection 5,000 Units  Every 8 hours     Route:  Subcutaneous        03/11/18 2241     Medium Risk of VTE  Once      03/11/18 2241     Place CARLOS hose  Until discontinued      03/11/18 2241     Place sequential compression device  Until discontinued      03/11/18 2241              Rosa Maria Parsons PA-C  Department of Hospital Medicine   [facility]

## 2018-03-19 LAB
ALBUMIN SERPL BCP-MCNC: 3.1 G/DL
ANION GAP SERPL CALC-SCNC: 16 MMOL/L
ANION GAP SERPL CALC-SCNC: 16 MMOL/L
B-OH-BUTYR BLD STRIP-SCNC: 0.8 MMOL/L
BASOPHILS # BLD AUTO: 0.06 K/UL
BASOPHILS NFR BLD: 0.9 %
BUN SERPL-MCNC: 50 MG/DL
BUN SERPL-MCNC: 52 MG/DL
CALCIUM SERPL-MCNC: 8.4 MG/DL
CALCIUM SERPL-MCNC: 8.4 MG/DL
CHLORIDE SERPL-SCNC: 93 MMOL/L
CHLORIDE SERPL-SCNC: 95 MMOL/L
CO2 SERPL-SCNC: 19 MMOL/L
CO2 SERPL-SCNC: 20 MMOL/L
CREAT SERPL-MCNC: 5.5 MG/DL
CREAT SERPL-MCNC: 5.7 MG/DL
DIFFERENTIAL METHOD: ABNORMAL
EOSINOPHIL # BLD AUTO: 0.1 K/UL
EOSINOPHIL NFR BLD: 1.7 %
ERYTHROCYTE [DISTWIDTH] IN BLOOD BY AUTOMATED COUNT: 14.7 %
EST. GFR  (AFRICAN AMERICAN): 9.1 ML/MIN/1.73 M^2
EST. GFR  (AFRICAN AMERICAN): 9.5 ML/MIN/1.73 M^2
EST. GFR  (NON AFRICAN AMERICAN): 7.9 ML/MIN/1.73 M^2
EST. GFR  (NON AFRICAN AMERICAN): 8.3 ML/MIN/1.73 M^2
GENTAMICIN SERPL-MCNC: <0.5 UG/ML
GLUCOSE SERPL-MCNC: 300 MG/DL
GLUCOSE SERPL-MCNC: 676 MG/DL
HCT VFR BLD AUTO: 24.6 %
HGB BLD-MCNC: 7.7 G/DL
IMM GRANULOCYTES # BLD AUTO: 0.08 K/UL
IMM GRANULOCYTES NFR BLD AUTO: 1.2 %
LYMPHOCYTES # BLD AUTO: 1.4 K/UL
LYMPHOCYTES NFR BLD: 22.3 %
MAGNESIUM SERPL-MCNC: 2.2 MG/DL
MCH RBC QN AUTO: 27.5 PG
MCHC RBC AUTO-ENTMCNC: 31.3 G/DL
MCV RBC AUTO: 88 FL
MONOCYTES # BLD AUTO: 0.7 K/UL
MONOCYTES NFR BLD: 11.5 %
NEUTROPHILS # BLD AUTO: 4 K/UL
NEUTROPHILS NFR BLD: 62.4 %
NRBC BLD-RTO: 0 /100 WBC
PHOSPHATE SERPL-MCNC: 6.1 MG/DL
PLATELET # BLD AUTO: 212 K/UL
PMV BLD AUTO: 10.5 FL
POCT GLUCOSE: 182 MG/DL (ref 70–110)
POCT GLUCOSE: 188 MG/DL (ref 70–110)
POCT GLUCOSE: 201 MG/DL (ref 70–110)
POCT GLUCOSE: 214 MG/DL (ref 70–110)
POCT GLUCOSE: 217 MG/DL (ref 70–110)
POCT GLUCOSE: 250 MG/DL (ref 70–110)
POCT GLUCOSE: 285 MG/DL (ref 70–110)
POCT GLUCOSE: 286 MG/DL (ref 70–110)
POCT GLUCOSE: 309 MG/DL (ref 70–110)
POCT GLUCOSE: 322 MG/DL (ref 70–110)
POCT GLUCOSE: 368 MG/DL (ref 70–110)
POCT GLUCOSE: 440 MG/DL (ref 70–110)
POCT GLUCOSE: >500 MG/DL (ref 70–110)
POTASSIUM SERPL-SCNC: 3.9 MMOL/L
POTASSIUM SERPL-SCNC: 4.4 MMOL/L
RBC # BLD AUTO: 2.8 M/UL
SODIUM SERPL-SCNC: 128 MMOL/L
SODIUM SERPL-SCNC: 131 MMOL/L
WBC # BLD AUTO: 6.42 K/UL

## 2018-03-19 PROCEDURE — 97110 THERAPEUTIC EXERCISES: CPT

## 2018-03-19 PROCEDURE — 27000207 HC ISOLATION

## 2018-03-19 PROCEDURE — 63600175 PHARM REV CODE 636 W HCPCS: Performed by: PHYSICIAN ASSISTANT

## 2018-03-19 PROCEDURE — 25000003 PHARM REV CODE 250: Performed by: PHYSICIAN ASSISTANT

## 2018-03-19 PROCEDURE — 20600001 HC STEP DOWN PRIVATE ROOM

## 2018-03-19 PROCEDURE — 82010 KETONE BODYS QUAN: CPT

## 2018-03-19 PROCEDURE — 80170 ASSAY OF GENTAMICIN: CPT

## 2018-03-19 PROCEDURE — 97530 THERAPEUTIC ACTIVITIES: CPT

## 2018-03-19 PROCEDURE — 99233 SBSQ HOSP IP/OBS HIGH 50: CPT | Mod: ,,, | Performed by: PHYSICIAN ASSISTANT

## 2018-03-19 PROCEDURE — 83735 ASSAY OF MAGNESIUM: CPT

## 2018-03-19 PROCEDURE — 63600175 PHARM REV CODE 636 W HCPCS: Performed by: NURSE PRACTITIONER

## 2018-03-19 PROCEDURE — 63600175 PHARM REV CODE 636 W HCPCS: Performed by: INTERNAL MEDICINE

## 2018-03-19 PROCEDURE — 80069 RENAL FUNCTION PANEL: CPT

## 2018-03-19 PROCEDURE — 99232 SBSQ HOSP IP/OBS MODERATE 35: CPT | Mod: GC,,, | Performed by: INTERNAL MEDICINE

## 2018-03-19 PROCEDURE — 97116 GAIT TRAINING THERAPY: CPT

## 2018-03-19 PROCEDURE — 25000003 PHARM REV CODE 250: Performed by: NURSE PRACTITIONER

## 2018-03-19 PROCEDURE — 85025 COMPLETE CBC W/AUTO DIFF WBC: CPT

## 2018-03-19 RX ORDER — INSULIN ASPART 100 [IU]/ML
0-5 INJECTION, SOLUTION INTRAVENOUS; SUBCUTANEOUS
Status: DISCONTINUED | OUTPATIENT
Start: 2018-03-19 | End: 2018-03-22 | Stop reason: HOSPADM

## 2018-03-19 RX ORDER — HYDROCHLOROTHIAZIDE 12.5 MG/1
12.5 TABLET ORAL DAILY
Status: DISCONTINUED | OUTPATIENT
Start: 2018-03-19 | End: 2018-03-22 | Stop reason: HOSPADM

## 2018-03-19 RX ORDER — IBUPROFEN 200 MG
24 TABLET ORAL
Status: DISCONTINUED | OUTPATIENT
Start: 2018-03-19 | End: 2018-03-22 | Stop reason: HOSPADM

## 2018-03-19 RX ORDER — SODIUM CHLORIDE 9 MG/ML
INJECTION, SOLUTION INTRAVENOUS CONTINUOUS
Status: DISCONTINUED | OUTPATIENT
Start: 2018-03-19 | End: 2018-03-19

## 2018-03-19 RX ORDER — SODIUM CHLORIDE 9 MG/ML
INJECTION, SOLUTION INTRAVENOUS
Status: DISCONTINUED | OUTPATIENT
Start: 2018-03-19 | End: 2018-03-22 | Stop reason: HOSPADM

## 2018-03-19 RX ORDER — GLUCAGON 1 MG
1 KIT INJECTION
Status: DISCONTINUED | OUTPATIENT
Start: 2018-03-19 | End: 2018-03-22 | Stop reason: HOSPADM

## 2018-03-19 RX ORDER — SODIUM CHLORIDE 9 MG/ML
INJECTION, SOLUTION INTRAVENOUS ONCE
Status: COMPLETED | OUTPATIENT
Start: 2018-03-19 | End: 2018-03-20

## 2018-03-19 RX ORDER — IBUPROFEN 200 MG
16 TABLET ORAL
Status: DISCONTINUED | OUTPATIENT
Start: 2018-03-19 | End: 2018-03-22 | Stop reason: HOSPADM

## 2018-03-19 RX ORDER — INSULIN ASPART 100 [IU]/ML
4 INJECTION, SOLUTION INTRAVENOUS; SUBCUTANEOUS
Status: DISCONTINUED | OUTPATIENT
Start: 2018-03-19 | End: 2018-03-21

## 2018-03-19 RX ADMIN — HEPARIN SODIUM 5000 UNITS: 5000 INJECTION, SOLUTION INTRAVENOUS; SUBCUTANEOUS at 09:03

## 2018-03-19 RX ADMIN — METOCLOPRAMIDE 10 MG: 10 TABLET ORAL at 12:03

## 2018-03-19 RX ADMIN — SODIUM CHLORIDE: 0.9 INJECTION, SOLUTION INTRAVENOUS at 12:03

## 2018-03-19 RX ADMIN — METOCLOPRAMIDE 10 MG: 10 TABLET ORAL at 06:03

## 2018-03-19 RX ADMIN — CLONIDINE HYDROCHLORIDE 0.1 MG: 0.1 TABLET ORAL at 08:03

## 2018-03-19 RX ADMIN — NIFEDIPINE 60 MG: 60 TABLET, FILM COATED, EXTENDED RELEASE ORAL at 08:03

## 2018-03-19 RX ADMIN — NIFEDIPINE 60 MG: 60 TABLET, FILM COATED, EXTENDED RELEASE ORAL at 09:03

## 2018-03-19 RX ADMIN — HYDRALAZINE HYDROCHLORIDE 25 MG: 25 TABLET, FILM COATED ORAL at 04:03

## 2018-03-19 RX ADMIN — METOPROLOL TARTRATE 50 MG: 50 TABLET ORAL at 09:03

## 2018-03-19 RX ADMIN — METOCLOPRAMIDE 10 MG: 10 TABLET ORAL at 04:03

## 2018-03-19 RX ADMIN — INSULIN ASPART 4 UNITS: 100 INJECTION, SOLUTION INTRAVENOUS; SUBCUTANEOUS at 05:03

## 2018-03-19 RX ADMIN — SODIUM CHLORIDE 2.5 UNITS/HR: 9 INJECTION, SOLUTION INTRAVENOUS at 12:03

## 2018-03-19 RX ADMIN — INSULIN DETEMIR 7 UNITS: 100 INJECTION, SOLUTION SUBCUTANEOUS at 02:03

## 2018-03-19 RX ADMIN — SODIUM CHLORIDE 1.4 UNITS/HR: 9 INJECTION, SOLUTION INTRAVENOUS at 10:03

## 2018-03-19 RX ADMIN — SEVELAMER CARBONATE 1600 MG: 800 TABLET, FILM COATED ORAL at 12:03

## 2018-03-19 RX ADMIN — ROSUVASTATIN CALCIUM 20 MG: 20 TABLET, FILM COATED ORAL at 08:03

## 2018-03-19 RX ADMIN — METOPROLOL TARTRATE 50 MG: 50 TABLET ORAL at 08:03

## 2018-03-19 RX ADMIN — HEPARIN SODIUM 5000 UNITS: 5000 INJECTION, SOLUTION INTRAVENOUS; SUBCUTANEOUS at 01:03

## 2018-03-19 RX ADMIN — INSULIN DETEMIR 7 UNITS: 100 INJECTION, SOLUTION SUBCUTANEOUS at 09:03

## 2018-03-19 RX ADMIN — CLONIDINE HYDROCHLORIDE 0.1 MG: 0.1 TABLET ORAL at 09:03

## 2018-03-19 RX ADMIN — SEVELAMER CARBONATE 1600 MG: 800 TABLET, FILM COATED ORAL at 04:03

## 2018-03-19 RX ADMIN — LOSARTAN POTASSIUM 50 MG: 50 TABLET, FILM COATED ORAL at 08:03

## 2018-03-19 RX ADMIN — LEVETIRACETAM 250 MG: 250 TABLET, FILM COATED ORAL at 08:03

## 2018-03-19 RX ADMIN — HYDROCHLOROTHIAZIDE 12.5 MG: 12.5 TABLET ORAL at 12:03

## 2018-03-19 RX ADMIN — LEVETIRACETAM 250 MG: 250 TABLET, FILM COATED ORAL at 09:03

## 2018-03-19 RX ADMIN — SODIUM CHLORIDE 1.2 UNITS/HR: 9 INJECTION, SOLUTION INTRAVENOUS at 08:03

## 2018-03-19 RX ADMIN — SEVELAMER CARBONATE 1600 MG: 800 TABLET, FILM COATED ORAL at 08:03

## 2018-03-19 RX ADMIN — HEPARIN SODIUM 5000 UNITS: 5000 INJECTION, SOLUTION INTRAVENOUS; SUBCUTANEOUS at 06:03

## 2018-03-19 NOTE — PLAN OF CARE
Problem: Patient Care Overview  Goal: Plan of Care Review  Outcome: Ongoing (interventions implemented as appropriate)  Patient awake alert and oriented X4 throughout this shift. VSS. Pt on cont. insulin drip and Q1HR blood sugar check. Pt is also on cont. 0.9% Normal saline going at 75ml/hr. Patient remains on isolation, camera in room for patient safety. WCTM.

## 2018-03-19 NOTE — ASSESSMENT & PLAN NOTE
- HgA1C 9.1  - Resumed home long acting insulin dosing on admission  3/12: Aspart 4U WM  3/13: Continue detemir 15U qhs and increase aspart to 5U WM plus low dose correction  3/14: overnight insulin drip initiated due to .  Beta-hydroxy negative.  Endocrinology consulted.  Bed request placed to transfer to [facility].  Charge RN discussed with patient and patient's roommate importance of not sharing food.  BG significantly improved 371->313->276.  3/15: Discharge rec: recommend levemir 15 units daily and novolog 5 units with meals with low dose correction (patient had been administering up to 10 units of additional novolog with meals). Some minimal adjustment may be required during hospitalization  3/16 fasting at goal, continue to monitor  3/17 will increase basal and mealtime dosing by 10%  3/18-3/19 insulin drip restarted; endocrine consulted again.  Unclear reason for significant hyperglycemia.  Treating UTI appropriately, no new signs of infection. Pt denies receiving food from outside or from family.

## 2018-03-19 NOTE — NURSING
"Pt is AAOx4, Pt lost IV access at the end of day shift; pt pulled out Iv stating "it was uncomfortable and I cannot stand it anymore!" New Iv was placed during night shift after midnight on pt's right wrist. pt's blood sugar was checked at bedtime via fingerstick and blood sugar was greater than 500 from both hands. Notified ROLANDA Leary and was told to give the pt 6 units of short acting insulin and recheck blood sugar in 30 minutes. Another 4 units of short acting insulin was ordered 10 minutes later; the pt got a total of 10 units of insulin and blood sugar was checked again on both hands and pt's blood sugar was still greater than 500. ROLANDA Leary then ordered a cont. Insulin drip and to check pt's sugar Q1HR. Pending lab results on K. Will start the insulin drip when K level results. WCTM.  "

## 2018-03-19 NOTE — PLAN OF CARE
ALYSIA was informed that the pt will not dc until likely Wednesday as she was put back on an insulin drip.  ALYSIA informed Indira at [facility] ([phone]).  ALYSIA also informed Indira that the pt is on isolation.  SHe stated that she thinks that they will have a private room for her.  ALYSIA asked that she meet with Willian today or tomorrow to sign whatever paperwork is needed.  She will look into the auth that was requested.  ALYSIA asked her to contact her back once they have confirmation that they can accept her so that the SW can transfer the HD to [facility].    Shaye Boone, [facility] x [phone]

## 2018-03-19 NOTE — ASSESSMENT & PLAN NOTE
- BP controlled with resumed home antihypertensive regimen  - Hydralazine PRN  - Held bumetanide and HCTZ on admit. Resumed HCTZ.  - Labile, Will continue to monitor

## 2018-03-19 NOTE — PROGRESS NOTES
"[facility]  Endocrinology  Progress Note    Admit Date: 3/11/2018     Consult Requested by: DEBBIE Hollingsworth MD   Reason for admit: Encephalopathy     HISTORY OF PRESENT ILLNESS:  Reason for Consult: Management of T1DM, Hyperglycemia      Diabetes diagnosis year: age 26     Home Diabetes Medications:  levemir 15 units daily, novolog 5 ac with sliding scale up to 15 units with meals     How often checking glucose at home? 8-10x per day  BG readings on regimen: 's  Hypoglycemia on the regimen?  Yes  Missed doses on regimen?  yes     Diabetes Complications include:     Hyperglycemia, Diabetic chronic kidney disease     , Diabetic retinopathy , Diabetic peripheral neuropathy  and Diabetic gastroparesis      Complicating diabetes co morbidities:   History of CVA, Residual deficits from CVA  and ESRD        HPI:   Patient is a 52 y.o. female with a diagnosis of dm type 1, esrd, recent cva admitted for worsening encephalopathy. Found to have severe hyperglycemia on admission with bg in 400's. Had missed two HD sessions prior to admission. Endocrine consulted for dm type 1 management.            Interval HPI:     Restarted on intensive insulin gtt for hyperglycemia >600.  BHOB elevated yesterday and resolving. No complaints today. Eating well.         BP (!) 166/70 (BP Location: Right arm, Patient Position: Sitting)   Pulse 66   Temp 97.6 °F (36.4 °C) (Oral)   Resp 18   Ht 5' 7" (1.702 m)   Wt 62.4 kg (137 lb 9.1 oz)   LMP 02/01/2000 (Approximate)   SpO2 (!) 93%   Breastfeeding? No   BMI 21.55 kg/m²       Labs Reviewed and Include      Recent Labs  Lab 03/19/18  0500   *   CALCIUM 8.4*   ALBUMIN 3.1*   *   K 3.9   CO2 20*   CL 95   BUN 52*   CREATININE 5.7*     Lab Results   Component Value Date    WBC 6.42 03/19/2018    HGB 7.7 (L) 03/19/2018    HCT 24.6 (L) 03/19/2018    MCV 88 03/19/2018     03/19/2018     No results for input(s): TSH, FREET4 in the last 168 " hours.  Lab Results   Component Value Date    HGBA1C 9.1 (H) 03/12/2018       Nutritional status:   Body mass index is 21.55 kg/m².  Lab Results   Component Value Date    ALBUMIN 3.1 (L) 03/19/2018    ALBUMIN 3.1 (L) 03/18/2018    ALBUMIN 2.9 (L) 03/17/2018     No results found for: PREALBUMIN    Estimated Creatinine Clearance: 11.2 mL/min (A) (based on SCr of 5.7 mg/dL (H)).    Accu-Checks  Recent Labs      03/19/18   0147  03/19/18   0249  03/19/18   0344  03/19/18   0451  03/19/18   0601  03/19/18   0644  03/19/18   0746  03/19/18   0855  03/19/18   1004  03/19/18   1208   POCTGLUCOSE  440*  368*  286*  285*  322*  250*  201*  217*  214*  182*       Current Medications and/or Treatments Impacting Glycemic Control  Immunotherapy:  Immunosuppressants     None        Steroids:   Hormones     None        Pressors:    Autonomic Drugs     None        Hyperglycemia/Diabetes Medications: Antihyperglycemics     Start     Stop Route Frequency Ordered    03/19/18 1645  insulin aspart U-100 pen 4 Units      -- SubQ 3 times daily with meals 03/19/18 1424    03/19/18 1523  insulin aspart U-100 pen 0-5 Units      -- SubQ Before meals & nightly PRN 03/19/18 1424    03/19/18 1430  insulin detemir U-100 pen 7 Units      -- SubQ 2 times daily 03/19/18 1424          ASSESSMENT and PLAN    ESRD (end stage renal disease)    Avoid insulin stacking        DM gastroparesis    Optimize glycemic control  Recommend small frequent meals        UTI (urinary tract infection)    May adversely impact insulin requirements          Hyperglycemia due to type 1 diabetes mellitus    BG goal 140-180    Significant fasting excursion noted, differential include gamal phenomenon and less likely rebound hyperglycemia.   She denies dietary non-adherence or snacking overnight.   Of note, ESBL infection may increase insulin needs.   Suspect that levemir was wearing off with once daily dosing.     Recommend stopping insulin gtt.   Split levemir to 7 units bid  (may need more).   Continue novolog 4 units with meals, low correction, ac/hs and add 0200 check.                   Tiffanie Herman MD  Endocrinology  [facility]

## 2018-03-19 NOTE — PLAN OF CARE
Problem: Occupational Therapy Goal  Goal: Occupational Therapy Goal  Goals to be met by: 3/27/18    Patient will increase functional independence with ADLs by performing:    UE Dressing with Supervision  LE Dressing with Supervision.  Grooming while seated with Set-up Assistance.  Toileting from bedside commode with Minimal Assistance for hygiene and clothing management.   Supine to sit with Supervision.  Stand pivot transfers with Supervision.  Toilet transfer to bedside commode with Supervision.      Outcome: Ongoing (interventions implemented as appropriate)  Goals updated due to several goals met.

## 2018-03-19 NOTE — ASSESSMENT & PLAN NOTE
- TTS HD patient with LUE HD access  - Reports last HD treatment 03/06/18  - Nephrology consulted and plan for HD 3/12  - Trend electrolytes daily   - Strict I/O's  - Daily Wt's  3/12: Informed by nephrology that patient is not set up for outpatient HD [place]; CM assisting.  3/13 HD complete; 3/15 HD complete; 3/17 HD complete; 3/18 increased phosphate binder from 800 to 1600 mg tid

## 2018-03-19 NOTE — SUBJECTIVE & OBJECTIVE
"Interval HPI:     Restarted on intensive insulin gtt for hyperglycemia >600.  BHOB elevated yesterday and resolving. No complaints today. Eating well.         BP (!) 166/70 (BP Location: Right arm, Patient Position: Sitting)   Pulse 66   Temp 97.6 °F (36.4 °C) (Oral)   Resp 18   Ht 5' 7" (1.702 m)   Wt 62.4 kg (137 lb 9.1 oz)   LMP 02/01/2000 (Approximate)   SpO2 (!) 93%   Breastfeeding? No   BMI 21.55 kg/m²     Labs Reviewed and Include      Recent Labs  Lab 03/19/18  0500   *   CALCIUM 8.4*   ALBUMIN 3.1*   *   K 3.9   CO2 20*   CL 95   BUN 52*   CREATININE 5.7*     Lab Results   Component Value Date    WBC 6.42 03/19/2018    HGB 7.7 (L) 03/19/2018    HCT 24.6 (L) 03/19/2018    MCV 88 03/19/2018     03/19/2018     No results for input(s): TSH, FREET4 in the last 168 hours.  Lab Results   Component Value Date    HGBA1C 9.1 (H) 03/12/2018       Nutritional status:   Body mass index is 21.55 kg/m².  Lab Results   Component Value Date    ALBUMIN 3.1 (L) 03/19/2018    ALBUMIN 3.1 (L) 03/18/2018    ALBUMIN 2.9 (L) 03/17/2018     No results found for: PREALBUMIN    Estimated Creatinine Clearance: 11.2 mL/min (A) (based on SCr of 5.7 mg/dL (H)).    Accu-Checks  Recent Labs      03/19/18   0147  03/19/18   0249  03/19/18   0344  03/19/18   0451  03/19/18   0601  03/19/18   0644  03/19/18   0746  03/19/18   0855  03/19/18   1004  03/19/18   1208   POCTGLUCOSE  440*  368*  286*  285*  322*  250*  201*  217*  214*  182*       Current Medications and/or Treatments Impacting Glycemic Control  Immunotherapy:  Immunosuppressants     None        Steroids:   Hormones     None        Pressors:    Autonomic Drugs     None        Hyperglycemia/Diabetes Medications: Antihyperglycemics     Start     Stop Route Frequency Ordered    03/19/18 1645  insulin aspart U-100 pen 4 Units      -- SubQ 3 times daily with meals 03/19/18 1424    03/19/18 1523  insulin aspart U-100 pen 0-5 Units      -- SubQ Before meals & " nightly PRN 03/19/18 1424    03/19/18 1430  insulin detemir U-100 pen 7 Units      -- SubQ 2 times daily 03/19/18 142

## 2018-03-19 NOTE — ASSESSMENT & PLAN NOTE
Recent CVA 2 weeks ago; ?new baseline  - Presents with somnolence x 1 day on admission  - Afebrile, no leukocytosis, AST/ALT/ammonia WNL, lactic 2.2  - CT head with no acute intracranial abnormality noted   - Possibly uremic - missed her last two HD treatments (plan for HD 3/12)  - Blood cultures NGTD  - UC Klebsiella ESBL treating with gentamycin  - PT/OT consulted: SNF (accepted) and need to establish HD chair  - High risk of fall / injury - utilize all safety measures and fall precautions   - Aspiration precautions  Stable  - SW/CM assisting with discharge planning   3/18-3/19 noted improvement after second gentamycin dose for UTI

## 2018-03-19 NOTE — PT/OT/SLP PROGRESS
Occupational Therapy   Treatment    Name: Eufemia Haq  MRN: [id number]  Admitting Diagnosis:  Acute metabolic encephalopathy       Recommendations:     Discharge Recommendations: nursing facility, skilled      Subjective   Pt reported that she had just lindsay down to take a nap.  Communicated with: RN prior to session.  Pain/Comfort:  · Pain Rating 1: 0/10  · Location 1:  (left shoulder & back)  · Pain Addressed 1: Reposition, Distraction, Cessation of Activity  · Pain Rating Post-Intervention 1:  (pt did not rate pain level, pain only with mobility)    Patients cultural, spiritual, Muslim conflicts given the current situation: no    Objective:     Patient found with: peripheral IV    General Precautions: Standard, blind, aspiration, fall, seizure (cardiac)       Occupational Performance:    Bed Mobility:    · Patient completed Supine to Sit with stand by assistance  · Patient completed Sit to Supine with stand by assistance     Functional Mobility/Transfers:  · Patient completed Sit <> Stand Transfer with contact guard assistance  with  no assistive device   · Functional Mobility: Pt took 2 steps to the right with CGA.    Activities of Daily Living:  · LB Dressing: stand by assistance donning socks seated EOB    Patient left supine with all lines intact, call button in reach, RN notified and white board updated.    [facility] 6 Click:  [facility] Total Score: 18    Treatment & Education:  Pt performed AROM with BUE in 4 planes x 15 reps each while seated EOB.  Pt had no further questions & when asked whether there were any concerns pt reported none.    Education:    Assessment:     Eufemia Haq is a 52 y.o. female with a medical diagnosis of Acute metabolic encephalopathy.  She presents with fair participation and motivation.  Performance deficits affecting function are weakness, impaired endurance, impaired self care skills, impaired functional mobilty, impaired balance, decreased upper extremity function,  decreased lower extremity function.      Rehab Prognosis:  fair; patient would benefit from acute skilled OT services to address these deficits and reach maximum level of function.       Plan:     Patient to be seen 3 x/week to address the above listed problems via self-care/home management, therapeutic activities, therapeutic exercises  · Plan of Care Expires: 04/11/18  · Plan of Care Reviewed with: patient    This Plan of care has been discussed with the patient who was involved in its development and understands and is in agreement with the identified goals and treatment plan    GOALS:    Occupational Therapy Goals        Problem: Occupational Therapy Goal    Goal Priority Disciplines Outcome Interventions   Occupational Therapy Goal     OT, PT/OT Ongoing (interventions implemented as appropriate)    Description:  Goals to be met by: 3/27/18    Patient will increase functional independence with ADLs by performing:    UE Dressing with Supervision  LE Dressing with Supervision.  Grooming while seated with Set-up Assistance.  Toileting from bedside commode with Minimal Assistance for hygiene and clothing management.   Supine to sit with Supervision.  Stand pivot transfers with Supervision.  Toilet transfer to bedside commode with Supervision.                        Time Tracking:     OT Date of Treatment: 03/19/18  OT Start Time: 1357  OT Stop Time: 1411  OT Total Time (min): 14 min    Billable Minutes:Therapeutic Exercise 14    GIOVANI Jones  3/19/2018

## 2018-03-19 NOTE — ASSESSMENT & PLAN NOTE
- Hx of ESRD currently on HD TTS with decreased urine production   - Afebrile, no leukocytosis, UA with 4 squam. UC gram neg vernon.  - Will continue CTX (day 5)  3/16 UC Klebsiella ESBL.  D/C CTX.  Pt given 2 mg/kg gentamycin x 1.  HD 3/17 and will give gent 1 mg/kg after HD.  Next dose 3/19 1 mg/kg after HD.

## 2018-03-19 NOTE — PLAN OF CARE
Problem: Physical Therapy Goal  Goal: Physical Therapy Goal  Goals to be met by: 3/22/18     Patient will increase functional independence with mobility by performin. Sit to stand transfer with Minimal Assistance with LRD ( MET )\  Revised goal: Sit to stand transfer with supervision.  2. Bed to chair transfer with Contact Guard Assistance using LRD. ( MET )  Revised goal: Bed to chair transfer with supervision  3. Gait  x 20 feet with Minimal Assistance using LRD.  ( MET )  Revised goal: Gait x 150 feet with CGA using LRD.  4. Stand for 10 minutes with Contact Guard Assistance using LRD. (MET )  5. Lower extremity exercise program x20 reps per handout, with assistance as needed     Outcome: Ongoing (interventions implemented as appropriate)  4/5 goals met at this time and all goals revised as appropriate to pt progression.     Papo Barillas, PT  3/19/2018

## 2018-03-19 NOTE — PROGRESS NOTES
[facility] Hospital Medicine  Progress Note    Patient Name: Eufemia Haq  MRN: [id number]  Patient Class: IP- Inpatient   Admission Date: 3/11/2018  Length of Stay: 5 days  Attending Physician: DEBBIE Hollingsworth MD  Primary Care Provider: Primary Doctor [facility] Medicine Team: [facility] F Rosa Maria Parsons PA-C    Subjective:     Principal Problem:Acute metabolic encephalopathy    HPI:  53 y/o female, who presents to the ED with c/o lethargy and hyperglycemia.  She has a PMH of recent CVA, DMI, ESRD (TTS HD), HTN, blindness of the R eye, and seizures.  Majority of history is provided by patients daughter at bedside.  She states her mother was just discharge a [facility] 2 days ago and has been lethargic since returning home.  She states that her mother suffered a stroke about 3 weeks ago.  The patient is lethargic and minimally interactive.  She appears in NAD and will follow simple commands when prompted, but drifts off when not simulated.  Additionally, she states that she was recently diagnosed with pneumonia.  She denies fever, chills, CP, SOB, N/V/D, or  symptoms since discharge.  She endorsed poorly controlled blood sugars.  Her sugar was 460 on arrival.  They report adherence to medication regimen.  However, report that her last HD treatment was Tuesday 03/06/18.    Hospital Course:  Pt admitted to observation for encephalopathy in setting of recent CVA (2 weeks ago); ?new baseline. CTH without acute abnormality. CXR without acute process. UA suboptimal; will continue CTX while urine cx pending (gram neg vernon, lactose ). Blood cx NGTD. Nephrology consulted for maintenance HD (missed last 2 HD). Informed by nephrology that patient is not set up for outpatient HD at [facility]; CM assisting. PT/OT consulted and recommended SNF.  3/14 overnight pt started on insulin drip due to BG>600.  Charge RN notified that pt's roommate was giving pt  additional food throughout the night.  Endocrinology consulted.  3/15 insulin drip d/c'd due to hypoglycemia and pt transitioned to levemir 15U qhs and novolog 5U tid with meals plus low dose correction however insulin gtt resume evening 3/19.  3/16 UC revealed Klebsiella ESBL and loading dose of gentamycin given at 2 mg/kg.  3/17 gentamycin 1 mg/kg after HD.  Plan for final gentamycin dose 1 mg/kg on 3/20 after HD.    Follow up with Neurology Epilepsy at discharge as keppra level on low end of normal on admission however repeat more appropriate at 12.0.  Pt may benefit from increased dose.    Interval History: overnight insulin drip restarted due to .  Pt denies receiving outside food (this was a problem when she had a roommate in the hospital).    Review of Systems   Constitutional: Negative for chills, diaphoresis, fatigue and fever.   Eyes: Positive for visual disturbance (blind). Negative for discharge.   Respiratory: Negative for cough, chest tightness and shortness of breath.    Cardiovascular: Negative for chest pain, palpitations and leg swelling.   Gastrointestinal: Negative for abdominal distention, abdominal pain, diarrhea, nausea and vomiting.   Genitourinary: Positive for decreased urine volume (ESRD). Negative for dysuria.   Neurological: Positive for seizures and weakness. Negative for syncope.     Objective:     Vital Signs (Most Recent):  Temp: 96.8 °F (36 °C) (03/19/18 0745)  Pulse: 65 (03/19/18 0745)  Resp: 14 (03/19/18 0745)  BP: (!) 158/70 (03/19/18 0745)  SpO2: 95 % (03/19/18 0745) Vital Signs (24h Range):  Temp:  [96.8 °F (36 °C)-98.7 °F (37.1 °C)] 96.8 °F (36 °C)  Pulse:  [63-70] 65  Resp:  [14-18] 14  SpO2:  [94 %-98 %] 95 %  BP: (137-187)/(63-77) 158/70     Weight: 62.4 kg (137 lb 9.1 oz)  Body mass index is 21.55 kg/m².    Intake/Output Summary (Last 24 hours) at 03/19/18 1126  Last data filed at 03/19/18 0037   Gross per 24 hour   Intake             1040 ml   Output                 0 ml   Net             1040 ml      Physical Exam   Constitutional: She appears well-developed. No distress.   Eyes:   Blind R eye, minimal vision in L eye   Cardiovascular: Normal rate, regular rhythm, normal heart sounds and intact distal pulses.    Pulmonary/Chest: Effort normal and breath sounds normal.   Abdominal: Soft. Bowel sounds are normal.   Musculoskeletal: Normal range of motion. She exhibits no edema.   Neurological: She is alert. She displays atrophy.   Oriented to self, place, and president; follows commands, generalized weakness appreciated R>L   Skin: Skin is warm and dry. She is not diaphoretic.   Psychiatric: Her speech is not delayed and not slurred. She is slowed. She is not withdrawn.   Clarity and rate of speech improving   Nursing note and vitals reviewed.      Significant Labs: All pertinent labs within the past 24 hours have been reviewed.    Significant Imaging: I have reviewed all pertinent imaging results/findings within the past 24 hours.    Assessment/Plan:      * Acute metabolic encephalopathy    Recent CVA 2 weeks ago; ?new baseline  - Presents with somnolence x 1 day on admission  - Afebrile, no leukocytosis, AST/ALT/ammonia WNL, lactic 2.2  - CT head with no acute intracranial abnormality noted   - Possibly uremic - missed her last two HD treatments (plan for HD 3/12)  - Blood cultures NGTD  - UC Klebsiella ESBL treating with gentamycin  - PT/OT consulted: SNF (accepted) and need to establish HD chair  - High risk of fall / injury - utilize all safety measures and fall precautions   - Aspiration precautions  Stable  - SW/CM assisting with discharge planning   3/18-3/19 noted improvement after second gentamycin dose for UTI        Type 1 diabetes mellitus with hyperglycemia    - HgA1C 9.1  - Resumed home long acting insulin dosing on admission  3/12: Aspart 4U WM  3/13: Continue detemir 15U qhs and increase aspart to 5U WM plus low dose correction  3/14: overnight insulin drip  initiated due to .  Beta-hydroxy negative.  Endocrinology consulted.  Bed request placed to transfer to [facility].  Charge RN discussed with patient and patient's roommate importance of not sharing food.  BG significantly improved 371->313->276.  3/15: Discharge rec: recommend levemir 15 units daily and novolog 5 units with meals with low dose correction (patient had been administering up to 10 units of additional novolog with meals). Some minimal adjustment may be required during hospitalization  3/16 fasting at goal, continue to monitor  3/17 will increase basal and mealtime dosing by 10%  3/18-3/19 insulin drip restarted; endocrine consulted again.  Unclear reason for significant hyperglycemia.  Treating UTI appropriately, no new signs of infection. Pt denies receiving food from outside or from family.        ESRD (end stage renal disease)    - TTS HD patient with LUE HD access  - Reports last HD treatment 03/06/18  - Nephrology consulted and plan for HD 3/12  - Trend electrolytes daily   - Strict I/O's  - Daily Wt's  3/12: Informed by nephrology that patient is not set up for outpatient HD at [facility]; CM assisting.  3/13 HD complete; 3/15 HD complete; 3/17 HD complete; 3/18 increased phosphate binder from 800 to 1600 mg tid        DM gastroparesis    - Continue home dosing of metoclopramide  - Antiemetics PRN  Stable        UTI (urinary tract infection)    - Hx of ESRD currently on HD TTS with decreased urine production   - Afebrile, no leukocytosis, UA with 4 squam. UC gram neg vernon.  - Will continue CTX (day 5)  3/16 UC Klebsiella ESBL.  D/C CTX.  Pt given 2 mg/kg gentamycin x 1.  HD 3/17 and will give gent 1 mg/kg after HD.  Next dose 3/19 1 mg/kg after HD.        Anemia in chronic renal disease    - Hgb 8.2 on arrival (baseline 6.7-7.4)  - Trend daily  - Continue TTS epoetin   Fairly stable        Essential hypertension    - BP controlled with resumed home antihypertensive regimen  -  Hydralazine PRN  - Held bumetanide and HCTZ on admit. Resumed HCTZ.  - Labile, Will continue to monitor        Seizure disorder    - Resume home levetiracetam 250 mg bid dosing  - Seizure precautions  - Levetiracetam level 7.7 WNL  3/16 will repeat levetiracetam level, more appropriate at 12.0  Referral back to neuro epilepsy as discharge as levetiracetam level low end of normal        Protein calorie malnutrition    - Albumin 3.0 on arrival with ESRD  Continue to monitor          VTE Risk Mitigation         Ordered     heparin (porcine) injection 5,000 Units  Every 8 hours     Route:  Subcutaneous        03/11/18 2241     Medium Risk of VTE  Once      03/11/18 2241     Place CARLOS hose  Until discontinued      03/11/18 2241     Place sequential compression device  Until discontinued      03/11/18 2241              Rosa Maria Parsons PA-C  Department of Hospital Medicine   [facility]

## 2018-03-19 NOTE — CARE UPDATE
Called by nursing staff at 2200 to inform me that BG elevated above 500 (taken twice) and that patient removed IV access. Instructed nurse to give 10 U insulin and recheck in 30 minutes.    BG >500, given 10 U aspart.  Repeat BG >500.  IV access was re-established as 22 g in R wrist.  Ordered insulin gtt, bmp, beta hydroxy.  Beta hydroxy 2.5 Started on 75 cc/hr x10 hours. AG 16, Bicarb 19, K 4.4.  Corrected serum Na 139.  HDS, 94% on RA.    On interview, patient appears pale, lethargic, but in no acute respiratory distress and AAOx3.  Patient appears euvolemic, but c/o thirst with increased PO intake today and 125 ml urine output during day shift.  Patient denies any SOB.  RR normal and effort normal.  Patient reports a frontal HA that she usually gets with episodes of hyperglycemia.  Patient states she usually takes 15 U aspart at home when her BG is elevated like this.  Patient denies eating more food or snacks than normal.

## 2018-03-19 NOTE — ASSESSMENT & PLAN NOTE
BG goal 140-180    Significant fasting excursion noted, differential include gamal phenomenon and less likely rebound hyperglycemia.   She denies dietary non-adherence or snacking overnight.   Of note, ESBL infection may increase insulin needs.   Suspect that levemir was wearing off with once daily dosing.     Recommend stopping insulin gtt.   Split levemir to 7 units bid (may need more).   Continue novolog 4 units with meals, low correction, ac/hs and add 0200 check.

## 2018-03-19 NOTE — PT/OT/SLP PROGRESS
"Physical Therapy Treatment    Patient Name:  Eufemia Haq   MRN:  [id number]    Recommendations:     Discharge Recommendations:  nursing facility, skilled   Discharge Equipment Recommendations: bath bench, bedside commode   Barriers to discharge: Decreased caregiver support    Assessment:     Eufemia Haq is a 52 y.o. female admitted with a medical diagnosis of Acute metabolic encephalopathy.  She presents with the following impairments/functional limitations:  weakness, gait instability, impaired endurance, impaired functional mobilty, visual deficits, impaired self care skills . Pt tolerated all treatment interventions well today. Pt ambulated 60 feet with CGA with verbal cues for walker direction and foot placement. Pt able to accept min/mod perturbations during static standing with no increased sway or LOB. Pt appears to have increased strength in BLE. Pt able to navigate room with decreased verbal cues despite stating that she is blind. Pt fearful of falling during standing without AD but had no major LOB or sway during balance activities. Pt continues to be limited by visual deficits resulting in fear of falling and balance during gait.      Rehab Prognosis:  good; patient would benefit from acute skilled PT services to address these deficits and reach maximum level of function.      Recent Surgery: * No surgery found *      Plan:     During this hospitalization, patient to be seen 4 x/week to address the above listed problems via gait training, therapeutic activities, therapeutic exercises, neuromuscular re-education  · Plan of Care Expires:  04/12/18   Plan of Care Reviewed with: patient    Subjective     Communicated with RN prior to session.  Patient found supine upon PT entry to room, agreeable to treatment.      Chief Complaint: " I want to go back to rehab"  Patient comments/goals: pt stated that she would like as much PT as possible.   Pain/Comfort:  · Pain Rating 1: 0/10    Patients " cultural, spiritual, Nondenominational conflicts given the current situation: no    Objective:     Patient found with: peripheral IV     General Precautions: Standard, fall, blind   Orthopedic Precautions:N/A   Braces: N/A     Functional Mobility:  · Bed Mobility:     · Supine to Sit: contact guard assistance  · Transfers:     · Sit to Stand:  contact guard assistance with rolling walker  · Gait: 60 feet with v/c for RW placement, pt able to turn to room with min verbal cues for direction, no noted increased tone, knee buckling or hyperextension noted during gait on LLE  · Balance: good in static standing: able to accept min/mod perturbaitons without increased sway.       AM-PAC 6 CLICK MOBILITY  Turning over in bed (including adjusting bedclothes, sheets and blankets)?: 3  Sitting down on and standing up from a chair with arms (e.g., wheelchair, bedside commode, etc.): 3  Moving from lying on back to sitting on the side of the bed?: 4  Moving to and from a bed to a chair (including a wheelchair)?: 3  Need to walk in hospital room?: 3  Climbing 3-5 steps with a railing?: 2  Total Score: 18       Therapeutic Activities and Exercises:   Patient education  · Patient educated on the role of PT and POC  · Whiteboard updated in patients room to current assistance level  · All of patients questions were answered within the scope of PT  · Pt educated on expectations of rehab/SNF and placement details     Lower extremity exercise:    · Hip flexion 20x  · Long arc quads 20x  · Squats  20x mini at EOB    Balance Activities  · static standing- accepting perturbations in all directions    Patient left up in chair with all lines intact and call button in reach..    GOALS:    Physical Therapy Goals        Problem: Physical Therapy Goal    Goal Priority Disciplines Outcome Goal Variances Interventions   Physical Therapy Goal     PT/OT, PT Ongoing (interventions implemented as appropriate)     Description:  Goals to be met by: 3/22/18      Patient will increase functional independence with mobility by performin. Sit to stand transfer with Minimal Assistance with LRD ( MET )\  Revised goal: Sit to stand transfer with supervision.  2. Bed to chair transfer with Contact Guard Assistance using LRD. ( MET )  Revised goal: Bed to chair transfer with supervision  3. Gait  x 20 feet with Minimal Assistance using LRD.  ( MET )  Revised goal: Gait x 150 feet with CGA using LRD.  4. Stand for 10 minutes with Contact Guard Assistance using LRD. (MET )  5. Lower extremity exercise program x20 reps per handout, with assistance as needed                       Time Tracking:     PT Received On: 18  PT Start Time: 1113     PT Stop Time: 1152  PT Total Time (min): 39 min     Billable Minutes: Gait Training 10, Therapeutic Activity 11 and Therapeutic Exercise 11    Treatment Type: Treatment  PT/PTA: PT     PTA Visit Number: 0     Papo Barillas, PT  2018

## 2018-03-19 NOTE — SUBJECTIVE & OBJECTIVE
Interval History: overnight insulin drip restarted due to .  Pt denies receiving outside food (this was a problem when she had a roommate in the hospital).    Review of Systems   Constitutional: Negative for chills, diaphoresis, fatigue and fever.   Eyes: Positive for visual disturbance (blind). Negative for discharge.   Respiratory: Negative for cough, chest tightness and shortness of breath.    Cardiovascular: Negative for chest pain, palpitations and leg swelling.   Gastrointestinal: Negative for abdominal distention, abdominal pain, diarrhea, nausea and vomiting.   Genitourinary: Positive for decreased urine volume (ESRD). Negative for dysuria.   Neurological: Positive for seizures and weakness. Negative for syncope.     Objective:     Vital Signs (Most Recent):  Temp: 96.8 °F (36 °C) (03/19/18 0745)  Pulse: 65 (03/19/18 0745)  Resp: 14 (03/19/18 0745)  BP: (!) 158/70 (03/19/18 0745)  SpO2: 95 % (03/19/18 0745) Vital Signs (24h Range):  Temp:  [96.8 °F (36 °C)-98.7 °F (37.1 °C)] 96.8 °F (36 °C)  Pulse:  [63-70] 65  Resp:  [14-18] 14  SpO2:  [94 %-98 %] 95 %  BP: (137-187)/(63-77) 158/70     Weight: 62.4 kg (137 lb 9.1 oz)  Body mass index is 21.55 kg/m².    Intake/Output Summary (Last 24 hours) at 03/19/18 1126  Last data filed at 03/19/18 0037   Gross per 24 hour   Intake             1040 ml   Output                0 ml   Net             1040 ml      Physical Exam   Constitutional: She appears well-developed. No distress.   Eyes:   Blind R eye, minimal vision in L eye   Cardiovascular: Normal rate, regular rhythm, normal heart sounds and intact distal pulses.    Pulmonary/Chest: Effort normal and breath sounds normal.   Abdominal: Soft. Bowel sounds are normal.   Musculoskeletal: Normal range of motion. She exhibits no edema.   Neurological: She is alert. She displays atrophy.   Oriented to self, place, and president; follows commands, generalized weakness appreciated R>L   Skin: Skin is warm and dry. She  is not diaphoretic.   Psychiatric: Her speech is not delayed and not slurred. She is slowed. She is not withdrawn.   Clarity and rate of speech improving   Nursing note and vitals reviewed.      Significant Labs: All pertinent labs within the past 24 hours have been reviewed.    Significant Imaging: I have reviewed all pertinent imaging results/findings within the past 24 hours.

## 2018-03-19 NOTE — PLAN OF CARE
03/19/18 1321   Discharge Reassessment   Assessment Type Discharge Planning Reassessment   Provided patient/caregiver education on the expected discharge date and the discharge plan Yes   Do you have any problems affording any of your prescribed medications? No   Discharge Plan A Skilled Nursing Facility   Discharge Plan B Home Health   Can the patient answer the patient profile reliably? Yes, cognitively intact   How does the patient rate their overall health at the present time? Fair   Describe the patient's ability to walk at the present time. Major restrictions/daily assistance from another person   How often would a person be available to care for the patient? Often   Number of comorbid conditions (as recorded on the chart) Five or more   During the past month, has the patient often been bothered by feeling down, depressed or hopeless? Yes   During the past month, has the patient often been bothered by little interest or pleasure in doing things? Yes     Blood sugar 676 overnight. Continuous insulin gtt in progress. Plan to discharge patient to [facility] once HD chair is established. Will continue to follow.

## 2018-03-20 PROBLEM — E44.1 MILD PROTEIN-CALORIE MALNUTRITION: Status: ACTIVE | Noted: 2018-03-12

## 2018-03-20 LAB
ALBUMIN SERPL BCP-MCNC: 2.9 G/DL
ANION GAP SERPL CALC-SCNC: 15 MMOL/L
ANION GAP SERPL CALC-SCNC: 16 MMOL/L
BASOPHILS # BLD AUTO: 0.02 K/UL
BASOPHILS NFR BLD: 0.3 %
BUN SERPL-MCNC: 30 MG/DL
BUN SERPL-MCNC: 67 MG/DL
CALCIUM SERPL-MCNC: 8.1 MG/DL
CALCIUM SERPL-MCNC: 8.2 MG/DL
CHLORIDE SERPL-SCNC: 94 MMOL/L
CHLORIDE SERPL-SCNC: 97 MMOL/L
CO2 SERPL-SCNC: 20 MMOL/L
CO2 SERPL-SCNC: 22 MMOL/L
CREAT SERPL-MCNC: 4.5 MG/DL
CREAT SERPL-MCNC: 6.9 MG/DL
DIFFERENTIAL METHOD: ABNORMAL
EOSINOPHIL # BLD AUTO: 0.1 K/UL
EOSINOPHIL NFR BLD: 2.2 %
ERYTHROCYTE [DISTWIDTH] IN BLOOD BY AUTOMATED COUNT: 15.4 %
EST. GFR  (AFRICAN AMERICAN): 12.1 ML/MIN/1.73 M^2
EST. GFR  (AFRICAN AMERICAN): 7.2 ML/MIN/1.73 M^2
EST. GFR  (NON AFRICAN AMERICAN): 10.5 ML/MIN/1.73 M^2
EST. GFR  (NON AFRICAN AMERICAN): 6.3 ML/MIN/1.73 M^2
GLUCOSE SERPL-MCNC: 143 MG/DL
GLUCOSE SERPL-MCNC: 501 MG/DL
HCT VFR BLD AUTO: 22.9 %
HGB BLD-MCNC: 7.3 G/DL
IMM GRANULOCYTES # BLD AUTO: 0.05 K/UL
IMM GRANULOCYTES NFR BLD AUTO: 0.8 %
LYMPHOCYTES # BLD AUTO: 1.3 K/UL
LYMPHOCYTES NFR BLD: 20.4 %
MAGNESIUM SERPL-MCNC: 2.4 MG/DL
MCH RBC QN AUTO: 27.4 PG
MCHC RBC AUTO-ENTMCNC: 31.9 G/DL
MCV RBC AUTO: 86 FL
MONOCYTES # BLD AUTO: 0.6 K/UL
MONOCYTES NFR BLD: 9.6 %
NEUTROPHILS # BLD AUTO: 4.3 K/UL
NEUTROPHILS NFR BLD: 66.7 %
NRBC BLD-RTO: 0 /100 WBC
PHOSPHATE SERPL-MCNC: 7.3 MG/DL
PLATELET # BLD AUTO: 237 K/UL
PMV BLD AUTO: 10.2 FL
POCT GLUCOSE: 158 MG/DL (ref 70–110)
POCT GLUCOSE: 160 MG/DL (ref 70–110)
POCT GLUCOSE: 238 MG/DL (ref 70–110)
POCT GLUCOSE: 383 MG/DL (ref 70–110)
POCT GLUCOSE: 394 MG/DL (ref 70–110)
POCT GLUCOSE: >500 MG/DL (ref 70–110)
POCT GLUCOSE: >500 MG/DL (ref 70–110)
POTASSIUM SERPL-SCNC: 4 MMOL/L
POTASSIUM SERPL-SCNC: 4.3 MMOL/L
RBC # BLD AUTO: 2.66 M/UL
SODIUM SERPL-SCNC: 131 MMOL/L
SODIUM SERPL-SCNC: 133 MMOL/L
WBC # BLD AUTO: 6.43 K/UL

## 2018-03-20 PROCEDURE — 90935 HEMODIALYSIS ONE EVALUATION: CPT | Mod: ,,, | Performed by: INTERNAL MEDICINE

## 2018-03-20 PROCEDURE — 25000003 PHARM REV CODE 250: Performed by: NURSE PRACTITIONER

## 2018-03-20 PROCEDURE — 80069 RENAL FUNCTION PANEL: CPT

## 2018-03-20 PROCEDURE — 27000207 HC ISOLATION

## 2018-03-20 PROCEDURE — 63600175 PHARM REV CODE 636 W HCPCS: Performed by: PHYSICIAN ASSISTANT

## 2018-03-20 PROCEDURE — 97535 SELF CARE MNGMENT TRAINING: CPT

## 2018-03-20 PROCEDURE — 96372 THER/PROPH/DIAG INJ SC/IM: CPT

## 2018-03-20 PROCEDURE — 99232 SBSQ HOSP IP/OBS MODERATE 35: CPT | Mod: ,,, | Performed by: PHYSICIAN ASSISTANT

## 2018-03-20 PROCEDURE — 80048 BASIC METABOLIC PNL TOTAL CA: CPT

## 2018-03-20 PROCEDURE — 25000003 PHARM REV CODE 250: Performed by: PHYSICIAN ASSISTANT

## 2018-03-20 PROCEDURE — 20600001 HC STEP DOWN PRIVATE ROOM

## 2018-03-20 PROCEDURE — 36415 COLL VENOUS BLD VENIPUNCTURE: CPT

## 2018-03-20 PROCEDURE — 63600175 PHARM REV CODE 636 W HCPCS: Performed by: NURSE PRACTITIONER

## 2018-03-20 PROCEDURE — 90935 HEMODIALYSIS ONE EVALUATION: CPT

## 2018-03-20 PROCEDURE — 85025 COMPLETE CBC W/AUTO DIFF WBC: CPT

## 2018-03-20 PROCEDURE — 83735 ASSAY OF MAGNESIUM: CPT

## 2018-03-20 PROCEDURE — 63600175 PHARM REV CODE 636 W HCPCS: Mod: JG | Performed by: NURSE PRACTITIONER

## 2018-03-20 RX ADMIN — LOSARTAN POTASSIUM 50 MG: 50 TABLET, FILM COATED ORAL at 01:03

## 2018-03-20 RX ADMIN — METOPROLOL TARTRATE 50 MG: 50 TABLET ORAL at 01:03

## 2018-03-20 RX ADMIN — INSULIN DETEMIR 7 UNITS: 100 INJECTION, SOLUTION SUBCUTANEOUS at 01:03

## 2018-03-20 RX ADMIN — CLONIDINE HYDROCHLORIDE 0.1 MG: 0.1 TABLET ORAL at 08:03

## 2018-03-20 RX ADMIN — HYDROCHLOROTHIAZIDE 12.5 MG: 12.5 TABLET ORAL at 01:03

## 2018-03-20 RX ADMIN — INSULIN HUMAN 8 UNITS: 100 INJECTION, SOLUTION PARENTERAL at 05:03

## 2018-03-20 RX ADMIN — LEVETIRACETAM 250 MG: 250 TABLET, FILM COATED ORAL at 01:03

## 2018-03-20 RX ADMIN — NIFEDIPINE 60 MG: 60 TABLET, FILM COATED, EXTENDED RELEASE ORAL at 09:03

## 2018-03-20 RX ADMIN — SODIUM CHLORIDE 350 ML: 0.9 INJECTION, SOLUTION INTRAVENOUS at 10:03

## 2018-03-20 RX ADMIN — LEVETIRACETAM 250 MG: 250 TABLET, FILM COATED ORAL at 08:03

## 2018-03-20 RX ADMIN — INSULIN ASPART 4 UNITS: 100 INJECTION, SOLUTION INTRAVENOUS; SUBCUTANEOUS at 05:03

## 2018-03-20 RX ADMIN — GENTAMICIN SULFATE 61.6 MG: 40 INJECTION, SOLUTION INTRAMUSCULAR; INTRAVENOUS at 01:03

## 2018-03-20 RX ADMIN — CLONIDINE HYDROCHLORIDE 0.1 MG: 0.1 TABLET ORAL at 01:03

## 2018-03-20 RX ADMIN — INSULIN ASPART 5 UNITS: 100 INJECTION, SOLUTION INTRAVENOUS; SUBCUTANEOUS at 05:03

## 2018-03-20 RX ADMIN — METOCLOPRAMIDE 10 MG: 10 TABLET ORAL at 06:03

## 2018-03-20 RX ADMIN — ROSUVASTATIN CALCIUM 20 MG: 20 TABLET, FILM COATED ORAL at 01:03

## 2018-03-20 RX ADMIN — HEPARIN SODIUM 5000 UNITS: 5000 INJECTION, SOLUTION INTRAVENOUS; SUBCUTANEOUS at 02:03

## 2018-03-20 RX ADMIN — ERYTHROPOIETIN 6000 UNITS: 20000 INJECTION, SOLUTION INTRAVENOUS; SUBCUTANEOUS at 10:03

## 2018-03-20 RX ADMIN — METOPROLOL TARTRATE 50 MG: 50 TABLET ORAL at 08:03

## 2018-03-20 RX ADMIN — HEPARIN SODIUM 5000 UNITS: 5000 INJECTION, SOLUTION INTRAVENOUS; SUBCUTANEOUS at 10:03

## 2018-03-20 RX ADMIN — SEVELAMER CARBONATE 1600 MG: 800 TABLET, FILM COATED ORAL at 01:03

## 2018-03-20 RX ADMIN — HEPARIN SODIUM 5000 UNITS: 5000 INJECTION, SOLUTION INTRAVENOUS; SUBCUTANEOUS at 06:03

## 2018-03-20 RX ADMIN — METOCLOPRAMIDE 10 MG: 10 TABLET ORAL at 05:03

## 2018-03-20 RX ADMIN — SEVELAMER CARBONATE 1600 MG: 800 TABLET, FILM COATED ORAL at 05:03

## 2018-03-20 RX ADMIN — METOCLOPRAMIDE 10 MG: 10 TABLET ORAL at 01:03

## 2018-03-20 NOTE — PROGRESS NOTES
3.5hr HD treatment stopped 30 minutes early due to system clotting MIRIMA Nguyen NP notified ok to stop treatment. 2.5L of fluid removed pt tolerated well. Epoetin given as ordered. Both needles of a ADEN graft removed and pressure held until hemostasis achieved dressing applied. Report given to NAA Obrien.

## 2018-03-20 NOTE — ASSESSMENT & PLAN NOTE
- Resume home levetiracetam 250 mg bid dosing  - Seizure precautions  - Levetiracetam level 7.7 WNL  3/16: Repeat levetiracetam level, more appropriate at 12.0  - Referral back to neuro epilepsy as discharge as levetiracetam level low end of normal

## 2018-03-20 NOTE — PLAN OF CARE
ALYSIA spoke with Indira again.  Willian will be coming around 5pm to sign paperwork.  ALYSIA will f/u tomorrow.    Shaye Boone, [facility] x [phone]

## 2018-03-20 NOTE — PROGRESS NOTES
Report received from NAA Obrien. Pt arrived from floor AAOx4. Maintenance dialysis initiated via ADEN graft without difficulty.

## 2018-03-20 NOTE — PHYSICIAN QUERY
PT Name: Eufemia Haq  MR #: [id number]     Physician Query Form - Documentation Clarification      CDS: Dannielle Moran RN CCDS                  Contact Information: [email or web address]    This form is a permanent document in the medical record.     Query Date: March 20, 2018    By submitting this query, we are merely seeking further clarification of documentation. Please utilize your independent clinical judgment when addressing the question(s) below.    The Medical record reflects the following:    Supporting Clinical Findings Location in Medical Record   Encephalopathy    presents with somnolence x 1 day with hx of recent CVA 2 weeks ago    presents to the ED with c/o lethargy and hyperglycemia    Type 1 diabetes mellitus with complication hyperglycemia    - glucose appears poorly controlled  Hyperglycemia on arrival with glucose in the 460 now down to 290    K = 5.2   H&P          H&P      H&P              LAB 3/11                                                                                      Doctor, Please specify diagnosis or diagnoses associated with above clinical findings.    Please specify the etiology of metabolic encephalopathy if you are able to determine:    Provider Use Only        Unable to determine

## 2018-03-20 NOTE — PROGRESS NOTES
[facility] Hospital Medicine  Progress Note    Patient Name: Eufemia Haq  MRN: [id number]  Patient Class: IP- Inpatient   Admission Date: 3/11/2018  Length of Stay: 6 days  Attending Physician: Hilda Duran MD  Primary Care Provider: Primary Doctor [facility] Medicine Team: [facility] F Bri Cortes PA-C    Subjective:     Principal Problem:Acute metabolic encephalopathy    HPI:  53 y/o female, who presents to the ED with c/o lethargy and hyperglycemia.  She has a PMH of recent CVA, DMI, ESRD (TTS HD), HTN, blindness of the R eye, and seizures.  Majority of history is provided by patients daughter at bedside.  She states her mother was just discharge a [facility] 2 days ago and has been lethargic since returning home.  She states that her mother suffered a stroke about 3 weeks ago.  The patient is lethargic and minimally interactive.  She appears in NAD and will follow simple commands when prompted, but drifts off when not simulated.  Additionally, she states that she was recently diagnosed with pneumonia.  She denies fever, chills, CP, SOB, N/V/D, or  symptoms since discharge.  She endorsed poorly controlled blood sugars.  Her sugar was 460 on arrival.  They report adherence to medication regimen.  However, report that her last HD treatment was Tuesday 03/06/18.    Hospital Course:  Pt admitted to observation for encephalopathy in setting of recent CVA (2 weeks ago); ?new baseline. CTH without acute abnormality. CXR without acute process. UA suboptimal; will continue CTX while urine cx pending (gram neg vernon, lactose ). Blood cx NGTD. Informed by nephrology that patient is not set up for outpatient HD at [facility]; CM assisting. PT/OT consulted and recommended SNF. 3/14: overnight pt started on insulin drip due to BG>600 (Charge RN notified that pt's roommate was giving pt additional food throughout the night.) 3/15: insulin drip d/c'd  due to hypoglycemia and pt transitioned to levemir 15U qhs and novolog 5U tid with meals plus low dose correction however insulin gtt resume evening 3/19. 3/16 UC revealed Klebsiella ESBL and loading dose of gentamycin given at 2 mg/kg.  3/17 gentamycin 1 mg/kg after HD. Plan for final gentamycin dose 1 mg/kg on 3/20 after HD. Discharge pending SNF placement and HD chair set up. Follow up with Neurology Epilepsy as outpatient.    Interval History: No acute events overnight. This AM, pt seen during HD. Resting comfortably; no complaints at this time.    Review of Systems   Constitutional: Negative for chills, diaphoresis, fatigue and fever.   Eyes: Positive for visual disturbance (blind). Negative for discharge.   Respiratory: Negative for cough, chest tightness and shortness of breath.    Cardiovascular: Negative for chest pain, palpitations and leg swelling.   Gastrointestinal: Negative for abdominal distention, abdominal pain, diarrhea, nausea and vomiting.   Genitourinary: Positive for decreased urine volume (ESRD). Negative for dysuria.   Neurological: Positive for seizures and weakness. Negative for syncope.     Objective:     Vital Signs (Most Recent):  Temp: 98.3 °F (36.8 °C) (03/20/18 0755)  Pulse: 66 (03/20/18 1117)  Resp: 18 (03/20/18 0755)  BP: (!) 186/73 (03/20/18 1045)  SpO2: 98 % (03/20/18 0741) Vital Signs (24h Range):  Temp:  [97.6 °F (36.4 °C)-98.8 °F (37.1 °C)] 98.3 °F (36.8 °C)  Pulse:  [60-72] 66  Resp:  [18] 18  SpO2:  [93 %-98 %] 98 %  BP: (143-186)/(63-80) 186/73     Weight: 62.4 kg (137 lb 9.1 oz)  Body mass index is 21.55 kg/m².  No intake or output data in the 24 hours ending 03/20/18 1121   Physical Exam   Constitutional: She appears well-developed. No distress.   Eyes:   Blind R eye, minimal vision in L eye   Cardiovascular: Normal rate, regular rhythm, normal heart sounds and intact distal pulses.    Pulmonary/Chest: Effort normal and breath sounds normal.   Abdominal: Soft. Bowel sounds  are normal.   Musculoskeletal: Normal range of motion. She exhibits no edema.   Neurological: She is alert. She displays atrophy.   Oriented to self, place, and president; follows commands, generalized weakness appreciated R>L   Skin: Skin is warm and dry. She is not diaphoretic.   Psychiatric: Her speech is not delayed and not slurred. She is slowed. She is not withdrawn.   Speech appears at baseline   Nursing note and vitals reviewed.      Significant Labs: All pertinent labs within the past 24 hours have been reviewed.    Significant Imaging: I have reviewed all pertinent imaging results/findings within the past 24 hours.    Assessment/Plan:      * Acute metabolic encephalopathy    Recent CVA 2 weeks ago; ?new baseline  - Presents with somnolence x 1 day on admission  - Afebrile, no leukocytosis, AST/ALT/ammonia WNL, lactic 2.2  - CT head with no acute intracranial abnormality noted   - Possibly uremic - missed her last two HD treatments (plan for HD 3/12)  - Blood cultures NGTD  - UC Klebsiella ESBL treating with gentamycin  - PT/OT consulted: SNF (accepted) and need to establish HD chair  - High risk of fall / injury - utilize all safety measures and fall precautions   - Aspiration precautions  Stable  - SW/CM assisting with discharge planning   3/18-3/19: Noted improvement after second gentamycin dose for UTI  3/20: Appears at baseline mentation        Type 1 diabetes mellitus with hyperglycemia    - HgA1C 9.1  - Resumed home long acting insulin dosing on admission  3/12: Aspart 4U WM  3/13: Continue detemir 15U qhs and increase aspart to 5U WM plus low dose correction  3/14: Overnight insulin drip initiated due to .  Beta-hydroxy negative. Endocrinology consulted.  [facility] Charge RN discussed with patient and patient's roommate importance of not sharing food. BG significantly improved 371->313->276.  3/15: Discharge rec: recommend levemir 15 units daily and novolog 5  units with meals with low dose correction (patient had been administering up to 10 units of additional novolog with meals). Some minimal adjustment may be required during hospitalization  3/18-3/19: insulin drip restarted; endocrine consulted again. Unclear reason for significant hyperglycemia. Treating UTI appropriately, no new signs of infection. Pt denies receiving food from outside or from family.  3/20: BGs stable. Endo rec to continue levemir 7 units bid and novolog 4 with meals, low correction. Also would continue 0200 BS checks for one more day. Goal -180. Upon discharge, she may need a little higher, ie levemir 8 units bid and novolog 5 with meals due to different diet habits.         ESRD (end stage renal disease)    - TTS HD patient with LUE HD access  - Reports last HD treatment 03/06/18  - Nephrology consulted and plan for HD 3/12  - Trend electrolytes daily   - Strict I/O's  - Daily Wt's  3/12: Informed by nephrology that patient is not set up for outpatient HD at [facility]; CM assisting.  3/18: Increased phosphate binder from 800 to 1600 mg tid        UTI (urinary tract infection)    - Hx of ESRD currently on HD TTS with decreased urine production   - Afebrile, no leukocytosis, UA with 4 squam. UC gram neg vernon.  - Will continue CTX (day 5)  3/16: UC Klebsiella ESBL. D/C CTX.  Pt given 2 mg/kg gentamycin x 1.  HD 3/17 and will give gent 1 mg/kg after HD.    - Last dose 3/20 1 mg/kg after HD        Essential hypertension    - Resumed home antihypertensive regimen  - Held bumetanide and HCTZ on admit. Resumed HCTZ.  - BP labile  - Will continue to monitor        Anemia in chronic renal disease    - Hgb 8.2 on arrival (baseline 6.7-7.4)  - Continue TTS epoetin   - Stable  - Trend daily        Seizure disorder    - Resume home levetiracetam 250 mg bid dosing  - Seizure precautions  - Levetiracetam level 7.7 WNL  3/16: Repeat levetiracetam level, more appropriate at 12.0  - Referral  back to neuro epilepsy as discharge as levetiracetam level low end of normal        Mild protein-calorie malnutrition    - Albumin 3.0 on arrival with ESRD  - Encourage PO intake  - Continue to monitor        DM gastroparesis    - Continue home dosing of metoclopramide  - Antiemetics PRN  - Stable          VTE Risk Mitigation         Ordered     heparin (porcine) injection 5,000 Units  Every 8 hours     Route:  Subcutaneous        03/11/18 2241     Medium Risk of VTE  Once      03/11/18 2241     Place CARLOS hose  Until discontinued      03/11/18 2241     Place sequential compression device  Until discontinued      03/11/18 2241              Bri Cortes PA-C  Department of Hospital Medicine   [facility]  Discussed with staff: Dr. Duran

## 2018-03-20 NOTE — ASSESSMENT & PLAN NOTE
- HgA1C 9.1  - Resumed home long acting insulin dosing on admission  3/12: Aspart 4U WM  3/13: Continue detemir 15U qhs and increase aspart to 5U WM plus low dose correction  3/14: Overnight insulin drip initiated due to .  Beta-hydroxy negative. Endocrinology consulted.  Bed request placed to transfer to [facility]. Charge RN discussed with patient and patient's roommate importance of not sharing food. BG significantly improved 371->313->276.  3/15: Discharge rec: recommend levemir 15 units daily and novolog 5 units with meals with low dose correction (patient had been administering up to 10 units of additional novolog with meals). Some minimal adjustment may be required during hospitalization  3/18-3/19: insulin drip restarted; endocrine consulted again. Unclear reason for significant hyperglycemia. Treating UTI appropriately, no new signs of infection. Pt denies receiving food from outside or from family.  3/20: BGs stable. Endo rec to continue levemir 7 units bid and novolog 4 with meals, low correction. Also would continue 0200 BS checks for one more day. Goal -180. Upon discharge, she may need a little higher, ie levemir 8 units bid and novolog 5 with meals due to different diet habits.

## 2018-03-20 NOTE — PT/OT/SLP PROGRESS
Occupational Therapy   Treatment    Name: Eufemia Haq  MRN: [id number]  Admitting Diagnosis:  Acute metabolic encephalopathy       Recommendations:     Discharge Recommendations: nursing facility, skilled  Discharge Equipment Recommendations:  bath bench, bedside commode  Barriers to discharge:  Decreased caregiver support    Subjective     Communicated with: patient prior to session.  Pain/Comfort:  · Pain Rating 1: 0/10  · Pain Rating Post-Intervention 1: 0/10    Patients cultural, spiritual, Faith conflicts given the current situation: no    Objective:     Patient found with: peripheral IV    General Precautions: Standard, blind, aspiration, fall, seizure (cardiac)   Orthopedic Precautions:N/A   Braces: N/A     Occupational Performance:    Bed Mobility:    · Patient completed Supine to Sit with supervision (/c HOB elevated)  · Patient completed Sit to Supine with supervision (/c HOB elevated)    Functional Mobility/Transfers:  · Patient completed Sit <> Stand Transfer with contact guard assistance  with  no assistive device   · Patient completed Toilet Transfer bed<>toilet /c functional ambulation technique with contact guard assistance with  no AD    Activities of Daily Living:  · Grooming: supervision washing face only  · UB Dressing: supervision Donning back gown  · LB Dressing: supervision [place] and donning socks    Patient left HOB elevated with call button in reach and all needs met.     AMPA 6 Click:  AMPAC Total Score: 18  Education:    Assessment:     Eufemia Haq is a 52 y.o. female with a medical diagnosis of Acute metabolic encephalopathy.  Performance deficits affecting function are weakness, impaired endurance, impaired self care skills, impaired functional mobilty, gait instability, impaired balance, decreased upper extremity function, decreased lower extremity function.      Rehab Prognosis:  good; patient would benefit from acute skilled OT services to address these deficits  and reach maximum level of function.       Plan:     Patient to be seen 3 x/week to address the above listed problems via self-care/home management, therapeutic activities, therapeutic exercises  · Plan of Care Expires: 04/11/18  · Plan of Care Reviewed with: patient    This Plan of care has been discussed with the patient who was involved in its development and understands and is in agreement with the identified goals and treatment plan    GOALS:    Occupational Therapy Goals        Problem: Occupational Therapy Goal    Goal Priority Disciplines Outcome Interventions   Occupational Therapy Goal     OT, PT/OT Ongoing (interventions implemented as appropriate)    Description:  Goals to be met by: 3/27/18    Patient will increase functional independence with ADLs by performing:    UE Dressing with Supervision Met  LE Dressing with Supervision. Met  Grooming while seated with Set-up Assistance.  Toileting from bedside commode with Minimal Assistance for hygiene and clothing management.   Supine to sit with Supervision.   Stand pivot transfers with Supervision.  Toilet transfer to bedside commode with Supervision.                         Time Tracking:     OT Date of Treatment: 03/20/18  OT Start Time: 1330  OT Stop Time: 1340  OT Total Time (min): 10 min    Billable Minutes:Self Care/Home Management 10    GIOVANI Atkins  3/20/2018

## 2018-03-20 NOTE — PLAN OF CARE
Problem: Occupational Therapy Goal  Goal: Occupational Therapy Goal  Goals to be met by: 3/27/18    Patient will increase functional independence with ADLs by performing:    UE Dressing with Supervision Met  LE Dressing with Supervision. Met  Grooming while seated with Set-up Assistance.  Toileting from bedside commode with Minimal Assistance for hygiene and clothing management.   Supine to sit with Supervision.   Stand pivot transfers with Supervision.  Toilet transfer to bedside commode with Supervision.       Outcome: Ongoing (interventions implemented as appropriate)  Patient's goals are appropriate.   GIOVANI Atkins  3/20/2018

## 2018-03-20 NOTE — ASSESSMENT & PLAN NOTE
- Resumed home antihypertensive regimen  - Held bumetanide and HCTZ on admit. Resumed HCTZ.  - BP labile  - Will continue to monitor

## 2018-03-20 NOTE — TREATMENT PLAN
BS reviewed    Would continue levemir 7 units bid and novolog 4 with meals, low correction. Also would continue 0200 BS checks for one more day.   Please continue to monitor and titrate as needed for goal -180.     Upon discharge, she may need a little higher, ie levemir 8 units bid and novolog 5 with meals due to different diet habits.     Please call if any questions. Thank you for the consult

## 2018-03-20 NOTE — ASSESSMENT & PLAN NOTE
- TTS HD patient with LUE HD access  - Reports last HD treatment 03/06/18  - Nephrology consulted and plan for HD 3/12  - Trend electrolytes daily   - Strict I/O's  - Daily Wt's  3/12: Informed by nephrology that patient is not set up for outpatient HD at [facility]; JUAN assisting.  3/18: Increased phosphate binder from 800 to 1600 mg tid

## 2018-03-20 NOTE — SUBJECTIVE & OBJECTIVE
Interval History: No acute events overnight. This AM, pt seen during HD. Resting comfortably; no complaints at this time.    Review of Systems   Constitutional: Negative for chills, diaphoresis, fatigue and fever.   Eyes: Positive for visual disturbance (blind). Negative for discharge.   Respiratory: Negative for cough, chest tightness and shortness of breath.    Cardiovascular: Negative for chest pain, palpitations and leg swelling.   Gastrointestinal: Negative for abdominal distention, abdominal pain, diarrhea, nausea and vomiting.   Genitourinary: Positive for decreased urine volume (ESRD). Negative for dysuria.   Neurological: Positive for seizures and weakness. Negative for syncope.     Objective:     Vital Signs (Most Recent):  Temp: 98.3 °F (36.8 °C) (03/20/18 0755)  Pulse: 66 (03/20/18 1117)  Resp: 18 (03/20/18 0755)  BP: (!) 186/73 (03/20/18 1045)  SpO2: 98 % (03/20/18 0741) Vital Signs (24h Range):  Temp:  [97.6 °F (36.4 °C)-98.8 °F (37.1 °C)] 98.3 °F (36.8 °C)  Pulse:  [60-72] 66  Resp:  [18] 18  SpO2:  [93 %-98 %] 98 %  BP: (143-186)/(63-80) 186/73     Weight: 62.4 kg (137 lb 9.1 oz)  Body mass index is 21.55 kg/m².  No intake or output data in the 24 hours ending 03/20/18 1121   Physical Exam   Constitutional: She appears well-developed. No distress.   Eyes:   Blind R eye, minimal vision in L eye   Cardiovascular: Normal rate, regular rhythm, normal heart sounds and intact distal pulses.    Pulmonary/Chest: Effort normal and breath sounds normal.   Abdominal: Soft. Bowel sounds are normal.   Musculoskeletal: Normal range of motion. She exhibits no edema.   Neurological: She is alert. She displays atrophy.   Oriented to self, place, and president; follows commands, generalized weakness appreciated R>L   Skin: Skin is warm and dry. She is not diaphoretic.   Psychiatric: Her speech is not delayed and not slurred. She is slowed. She is not withdrawn.   Speech appears at baseline   Nursing note and vitals  reviewed.      Significant Labs: All pertinent labs within the past 24 hours have been reviewed.    Significant Imaging: I have reviewed all pertinent imaging results/findings within the past 24 hours.

## 2018-03-20 NOTE — ASSESSMENT & PLAN NOTE
- Hx of ESRD currently on HD TTS with decreased urine production   - Afebrile, no leukocytosis, UA with 4 squam. UC gram neg vernon.  - Will continue CTX (day 5)  3/16: UC Klebsiella ESBL. D/C CTX.  Pt given 2 mg/kg gentamycin x 1.  HD 3/17 and will give gent 1 mg/kg after HD.    - Last dose 3/20 1 mg/kg after HD

## 2018-03-20 NOTE — PLAN OF CARE
ALYSIA informed Indira at [facility] that the pt can transfer tomorrow.  SHe stated that she had left a message for Willian about signing paperwork.  ALYSIA spoke with [facility] and asked that they move forward with arranging the HD at [facility] on [address].  SW will f/u as needed.    Shaye Boone, EBONI x [phone]

## 2018-03-20 NOTE — PHYSICIAN QUERY
PT Name: Eufemia Haq  MR #: [id number]    Physician Query Form - Nutrition Clarification     CDS: Dannielle Moran RN CCDS                  Contact Information: [email or web address]    This form is a permanent document in the medical record.     Query Date: March 20, 2018    By submitting this query, we are merely seeking further clarification of documentation.. Please utilize your independent clinical judgment when addressing the question(s) below.    The Medical record contains the following:   Indicators  Supporting Clinical Findings Location in Medical Record    % of Estimated Energy Intake over a time frame from p.o., TF, or TPN      Weight Status over a time frame      Subcutaneous Fat and/or Muscle Loss      Fluid Accumulation or Edema      Reduced  Strength     X Wt / BMI / Usual Body Weight BMI=20.4  Anthropometric    Delayed Wound Healing / Failure to Thrive     X Acute or Chronic Illness Protein calorie malnutrition H&P    Medication      Treatment     X Other albumin 3.0 on arrival  prealbumin pending  nutrition consulted to help optimize caloric intake     H&P     AND / ASPEN Clinical Characteristics (October 2011)  A minimum of two characteristics is recommended for diagnosing either moderate or severe malnutrition   Mild Malnutrition Moderate Malnutrition Severe Malnutrition   Energy Intake from p.o., TF or TPN. < 75% intake of estimated energy needs for less than 7 days < 75% intake of estimated energy needs for greater than 7 days < 50% intake of estimated energy needs for > 5 days   Weight Loss 1-2% in 1 month  5% in 3 months  7.5% in 6 months  10% in 1 year 1-2 % in 1 week  5% in 1 month  7.5% in 3 months  10% in 6 months  20% in 1 year > 2% in 1 week  > 5% in 1 month  > 7.5% in 3 months  > 10% in 6 months  > 20% in 1 year   Physical Findings     None *Mild subcutaneous fat and/or muscle loss  *Mild fluid accumulation  *Stage II decubitus  *Surgical wound or non-healing wound *Mod/severe  subcutaneous fat and/or muscle loss  *Mod/severe fluid accumulation  *Stage III or IV decubitus  *Non-healing surgical wound     Provider, please specify diagnosis or diagnoses associated with above clinical findings.    [X] Mild Protein-Calorie Malnutrition  [ ] Other Nutritional Diagnosis (please specify): ____________________________________  [ ] Other: ________________________________  [ ] Clinically Undetermined    Please document in your progress notes daily for the duration of treatment until resolved and include in your discharge summary.

## 2018-03-20 NOTE — PLAN OF CARE
Problem: Patient Care Overview  Goal: Plan of Care Review  Outcome: Ongoing (interventions implemented as appropriate)  Pt is AAOx4. AVSS overnight. BG monitored AC/HS & 0200; no insulin coverage needed. No c/o pain/discomfort. No acute events/changes occurred. Remains free from fall/injury. WCTM.

## 2018-03-21 LAB
ALBUMIN SERPL BCP-MCNC: 2.9 G/DL
ANION GAP SERPL CALC-SCNC: 10 MMOL/L
BASOPHILS # BLD AUTO: 0.03 K/UL
BASOPHILS NFR BLD: 0.5 %
BUN SERPL-MCNC: 39 MG/DL
CALCIUM SERPL-MCNC: 8.1 MG/DL
CHLORIDE SERPL-SCNC: 97 MMOL/L
CO2 SERPL-SCNC: 25 MMOL/L
CREAT SERPL-MCNC: 5.7 MG/DL
DIFFERENTIAL METHOD: ABNORMAL
EOSINOPHIL # BLD AUTO: 0.1 K/UL
EOSINOPHIL NFR BLD: 1.4 %
ERYTHROCYTE [DISTWIDTH] IN BLOOD BY AUTOMATED COUNT: 15.8 %
EST. GFR  (AFRICAN AMERICAN): 9.1 ML/MIN/1.73 M^2
EST. GFR  (NON AFRICAN AMERICAN): 7.9 ML/MIN/1.73 M^2
GLUCOSE SERPL-MCNC: 202 MG/DL
HCT VFR BLD AUTO: 25 %
HGB BLD-MCNC: 7.8 G/DL
IMM GRANULOCYTES # BLD AUTO: 0.04 K/UL
IMM GRANULOCYTES NFR BLD AUTO: 0.7 %
LYMPHOCYTES # BLD AUTO: 1.4 K/UL
LYMPHOCYTES NFR BLD: 25 %
MAGNESIUM SERPL-MCNC: 2.1 MG/DL
MCH RBC QN AUTO: 27.5 PG
MCHC RBC AUTO-ENTMCNC: 31.2 G/DL
MCV RBC AUTO: 88 FL
MONOCYTES # BLD AUTO: 0.7 K/UL
MONOCYTES NFR BLD: 12 %
NEUTROPHILS # BLD AUTO: 3.4 K/UL
NEUTROPHILS NFR BLD: 60.4 %
NRBC BLD-RTO: 0 /100 WBC
PHOSPHATE SERPL-MCNC: 4.1 MG/DL
PLATELET # BLD AUTO: 229 K/UL
PMV BLD AUTO: 10.3 FL
POCT GLUCOSE: 161 MG/DL (ref 70–110)
POCT GLUCOSE: 199 MG/DL (ref 70–110)
POCT GLUCOSE: 225 MG/DL (ref 70–110)
POCT GLUCOSE: 270 MG/DL (ref 70–110)
POCT GLUCOSE: 281 MG/DL (ref 70–110)
POTASSIUM SERPL-SCNC: 4.1 MMOL/L
RBC # BLD AUTO: 2.84 M/UL
SODIUM SERPL-SCNC: 132 MMOL/L
WBC # BLD AUTO: 5.59 K/UL

## 2018-03-21 PROCEDURE — 97803 MED NUTRITION INDIV SUBSEQ: CPT

## 2018-03-21 PROCEDURE — 25000003 PHARM REV CODE 250: Performed by: PHYSICIAN ASSISTANT

## 2018-03-21 PROCEDURE — 85025 COMPLETE CBC W/AUTO DIFF WBC: CPT

## 2018-03-21 PROCEDURE — 25000003 PHARM REV CODE 250: Performed by: NURSE PRACTITIONER

## 2018-03-21 PROCEDURE — 80069 RENAL FUNCTION PANEL: CPT

## 2018-03-21 PROCEDURE — 36415 COLL VENOUS BLD VENIPUNCTURE: CPT

## 2018-03-21 PROCEDURE — 63600175 PHARM REV CODE 636 W HCPCS: Performed by: NURSE PRACTITIONER

## 2018-03-21 PROCEDURE — 99232 SBSQ HOSP IP/OBS MODERATE 35: CPT | Mod: ,,, | Performed by: PHYSICIAN ASSISTANT

## 2018-03-21 PROCEDURE — 83735 ASSAY OF MAGNESIUM: CPT

## 2018-03-21 PROCEDURE — 20600001 HC STEP DOWN PRIVATE ROOM

## 2018-03-21 RX ORDER — INSULIN ASPART 100 [IU]/ML
5 INJECTION, SOLUTION INTRAVENOUS; SUBCUTANEOUS
Status: DISCONTINUED | OUTPATIENT
Start: 2018-03-21 | End: 2018-03-22 | Stop reason: HOSPADM

## 2018-03-21 RX ORDER — HYDROCHLOROTHIAZIDE 12.5 MG/1
12.5 TABLET ORAL DAILY
Qty: 30 TABLET | Refills: 0 | Status: ON HOLD
Start: 2018-03-22 | End: 2018-03-31 | Stop reason: HOSPADM

## 2018-03-21 RX ORDER — RAMELTEON 8 MG/1
8 TABLET ORAL NIGHTLY PRN
Status: COMPLETED | OUTPATIENT
Start: 2018-03-21 | End: 2018-03-21

## 2018-03-21 RX ORDER — LOSARTAN POTASSIUM 50 MG/1
100 TABLET ORAL DAILY
Status: DISCONTINUED | OUTPATIENT
Start: 2018-03-21 | End: 2018-03-22 | Stop reason: HOSPADM

## 2018-03-21 RX ORDER — SODIUM CHLORIDE 9 MG/ML
INJECTION, SOLUTION INTRAVENOUS
Status: DISCONTINUED | OUTPATIENT
Start: 2018-03-21 | End: 2018-03-22 | Stop reason: HOSPADM

## 2018-03-21 RX ORDER — LOSARTAN POTASSIUM 100 MG/1
100 TABLET ORAL DAILY
Qty: 90 TABLET | Refills: 0
Start: 2018-03-22 | End: 2018-07-18

## 2018-03-21 RX ORDER — SODIUM CHLORIDE 9 MG/ML
INJECTION, SOLUTION INTRAVENOUS ONCE
Status: COMPLETED | OUTPATIENT
Start: 2018-03-21 | End: 2018-03-22

## 2018-03-21 RX ORDER — INSULIN ASPART 100 [IU]/ML
4 INJECTION, SOLUTION INTRAVENOUS; SUBCUTANEOUS
Qty: 1 BOX | Refills: 2
Start: 2018-03-21 | End: 2018-03-26

## 2018-03-21 RX ORDER — SEVELAMER CARBONATE 800 MG/1
1600 TABLET, FILM COATED ORAL
Qty: 180 TABLET | Refills: 0
Start: 2018-03-21 | End: 2018-07-18

## 2018-03-21 RX ADMIN — SEVELAMER CARBONATE 1600 MG: 800 TABLET, FILM COATED ORAL at 05:03

## 2018-03-21 RX ADMIN — CLONIDINE HYDROCHLORIDE 0.1 MG: 0.1 TABLET ORAL at 09:03

## 2018-03-21 RX ADMIN — SEVELAMER CARBONATE 1600 MG: 800 TABLET, FILM COATED ORAL at 08:03

## 2018-03-21 RX ADMIN — LOSARTAN POTASSIUM 100 MG: 50 TABLET, FILM COATED ORAL at 08:03

## 2018-03-21 RX ADMIN — ROSUVASTATIN CALCIUM 20 MG: 20 TABLET, FILM COATED ORAL at 08:03

## 2018-03-21 RX ADMIN — HYDROCHLOROTHIAZIDE 12.5 MG: 12.5 TABLET ORAL at 08:03

## 2018-03-21 RX ADMIN — METOPROLOL TARTRATE 50 MG: 50 TABLET ORAL at 08:03

## 2018-03-21 RX ADMIN — HEPARIN SODIUM 5000 UNITS: 5000 INJECTION, SOLUTION INTRAVENOUS; SUBCUTANEOUS at 02:03

## 2018-03-21 RX ADMIN — INSULIN DETEMIR 7 UNITS: 100 INJECTION, SOLUTION SUBCUTANEOUS at 02:03

## 2018-03-21 RX ADMIN — METOCLOPRAMIDE 10 MG: 10 TABLET ORAL at 11:03

## 2018-03-21 RX ADMIN — INSULIN ASPART 4 UNITS: 100 INJECTION, SOLUTION INTRAVENOUS; SUBCUTANEOUS at 11:03

## 2018-03-21 RX ADMIN — INSULIN ASPART 3 UNITS: 100 INJECTION, SOLUTION INTRAVENOUS; SUBCUTANEOUS at 11:03

## 2018-03-21 RX ADMIN — METOCLOPRAMIDE 10 MG: 10 TABLET ORAL at 05:03

## 2018-03-21 RX ADMIN — CLONIDINE HYDROCHLORIDE 0.1 MG: 0.1 TABLET ORAL at 08:03

## 2018-03-21 RX ADMIN — ACETAMINOPHEN 650 MG: 325 TABLET, FILM COATED ORAL at 08:03

## 2018-03-21 RX ADMIN — NIFEDIPINE 60 MG: 60 TABLET, FILM COATED, EXTENDED RELEASE ORAL at 09:03

## 2018-03-21 RX ADMIN — RAMELTEON 8 MG: 8 TABLET, FILM COATED ORAL at 09:03

## 2018-03-21 RX ADMIN — SEVELAMER CARBONATE 1600 MG: 800 TABLET, FILM COATED ORAL at 11:03

## 2018-03-21 RX ADMIN — INSULIN ASPART 5 UNITS: 100 INJECTION, SOLUTION INTRAVENOUS; SUBCUTANEOUS at 05:03

## 2018-03-21 RX ADMIN — INSULIN DETEMIR 7 UNITS: 100 INJECTION, SOLUTION SUBCUTANEOUS at 09:03

## 2018-03-21 RX ADMIN — INSULIN ASPART 1 UNITS: 100 INJECTION, SOLUTION INTRAVENOUS; SUBCUTANEOUS at 02:03

## 2018-03-21 RX ADMIN — HEPARIN SODIUM 5000 UNITS: 5000 INJECTION, SOLUTION INTRAVENOUS; SUBCUTANEOUS at 05:03

## 2018-03-21 RX ADMIN — LEVETIRACETAM 250 MG: 250 TABLET, FILM COATED ORAL at 09:03

## 2018-03-21 RX ADMIN — HEPARIN SODIUM 5000 UNITS: 5000 INJECTION, SOLUTION INTRAVENOUS; SUBCUTANEOUS at 09:03

## 2018-03-21 RX ADMIN — INSULIN ASPART 4 UNITS: 100 INJECTION, SOLUTION INTRAVENOUS; SUBCUTANEOUS at 08:03

## 2018-03-21 RX ADMIN — NIFEDIPINE 60 MG: 60 TABLET, FILM COATED, EXTENDED RELEASE ORAL at 08:03

## 2018-03-21 RX ADMIN — METOPROLOL TARTRATE 50 MG: 50 TABLET ORAL at 09:03

## 2018-03-21 RX ADMIN — INSULIN ASPART 1 UNITS: 100 INJECTION, SOLUTION INTRAVENOUS; SUBCUTANEOUS at 09:03

## 2018-03-21 RX ADMIN — INSULIN DETEMIR 7 UNITS: 100 INJECTION, SOLUTION SUBCUTANEOUS at 08:03

## 2018-03-21 RX ADMIN — LEVETIRACETAM 250 MG: 250 TABLET, FILM COATED ORAL at 08:03

## 2018-03-21 NOTE — PROGRESS NOTES
[facility] Hospital Medicine  Progress Note    Patient Name: Eufemia Haq  MRN: [id number]  Patient Class: IP- Inpatient   Admission Date: 3/11/2018  Length of Stay: 7 days  Attending Physician: Hilda Duran MD  Primary Care Provider: Primary Doctor [facility] Medicine Team: [facility] F Bri Cortes PA-C    Subjective:     Principal Problem:Acute metabolic encephalopathy    HPI:  53 y/o female, who presents to the ED with c/o lethargy and hyperglycemia.  She has a PMH of recent CVA, DMI, ESRD (TTS HD), HTN, blindness of the R eye, and seizures.  Majority of history is provided by patients daughter at bedside.  She states her mother was just discharge a [facility] 2 days ago and has been lethargic since returning home.  She states that her mother suffered a stroke about 3 weeks ago.  The patient is lethargic and minimally interactive.  She appears in NAD and will follow simple commands when prompted, but drifts off when not simulated.  Additionally, she states that she was recently diagnosed with pneumonia.  She denies fever, chills, CP, SOB, N/V/D, or  symptoms since discharge.  She endorsed poorly controlled blood sugars.  Her sugar was 460 on arrival.  They report adherence to medication regimen.  However, report that her last HD treatment was Tuesday 03/06/18.    Hospital Course:  Pt admitted to observation for encephalopathy in setting of recent CVA (2 weeks ago); suspect new baseline. CTH without acute abnormality. CXR without acute process. Urine cx revealed Klebsiella ESBL; completed course of gentamicin. Blood cx NGTD. Informed by nephrology that patient is not set up for outpatient HD at [facility] in [facility]. 3/14: Overnight pt started on insulin drip due to BG>600 (Charge RN notified that pt's roommate was giving pt additional food throughout the night.) 3/15: insulin drip d/c'd due to hypoglycemia and pt transitioned to levemir 15U qhs and novolog  5U tid with meals plus low dose correction however insulin gtt resume evening 3/19. 3/20: BG>500 after missing mealtime insulin. 3/21: BGs stable; increased to 5U TIDWM. Discharge pending SNF placement and HD chair set up. Follow up with Neurology Epilepsy as outpatient.    Interval History: No acute events overnight. This AM, pt lying in bed resting comfortably; no complaints at this time. Discussed plan of care- discharge pending SNF and HD chair.    Review of Systems   Constitutional: Negative for chills, diaphoresis, fatigue and fever.   Eyes: Positive for visual disturbance (blind). Negative for discharge.   Respiratory: Negative for cough, chest tightness and shortness of breath.    Cardiovascular: Negative for chest pain, palpitations and leg swelling.   Gastrointestinal: Negative for abdominal distention, abdominal pain, diarrhea, nausea and vomiting.   Genitourinary: Positive for decreased urine volume (ESRD). Negative for dysuria.   Neurological: Positive for seizures and weakness. Negative for syncope.     Objective:     Vital Signs (Most Recent):  Temp: 98.8 °F (37.1 °C) (03/21/18 1138)  Pulse: 66 (03/21/18 1138)  Resp: 16 (03/21/18 1138)  BP: (!) 167/70 (03/21/18 1138)  SpO2: 96 % (03/21/18 1138) Vital Signs (24h Range):  Temp:  [98.2 °F (36.8 °C)-99.2 °F (37.3 °C)] 98.8 °F (37.1 °C)  Pulse:  [62-74] 66  Resp:  [16-18] 16  SpO2:  [93 %-98 %] 96 %  BP: (143-183)/(62-80) 167/70     Weight: 57 kg (125 lb 10.6 oz)  Body mass index is 19.68 kg/m².  No intake or output data in the 24 hours ending 03/21/18 1414   Physical Exam   Constitutional: She appears well-developed. No distress.   Eyes:   Blind R eye, minimal vision in L eye   Cardiovascular: Normal rate, regular rhythm, normal heart sounds and intact distal pulses.    Pulmonary/Chest: Effort normal and breath sounds normal.   Abdominal: Soft. Bowel sounds are normal.   Musculoskeletal: Normal range of motion. She exhibits no edema.   Neurological: She  is alert. She displays atrophy.   Oriented to self, place, and president; follows commands, generalized weakness appreciated R>L   Skin: Skin is warm and dry. She is not diaphoretic.   Psychiatric: Her speech is not delayed and not slurred. She is slowed. She is not withdrawn.   Nursing note and vitals reviewed.      Significant Labs: All pertinent labs within the past 24 hours have been reviewed.    Significant Imaging: I have reviewed all pertinent imaging results/findings within the past 24 hours.    Assessment/Plan:      * Acute metabolic encephalopathy    Recent CVA 2 weeks ago; suspected new baseline  - Presents with somnolence x 1 day on admission  - Afebrile, no leukocytosis, AST/ALT/ammonia WNL, lactic 2.2  - CT head with no acute intracranial abnormality noted   - Possibly uremic - missed her last two HD treatments (plan for HD 3/12)  - Blood cultures NGTD  - UC Klebsiella ESBL treated with gentamycin  - PT/OT consulted: SNF (accepted) and need to establish HD chair  - High risk of fall / injury - utilize all safety measures and fall precautions   - Aspiration precautions  Stable  - SW/CM assisting with discharge planning   3/18-3/19: Noted improvement after second gentamycin dose for UTI  3/20-3/21: Appears at baseline mentation        Type 1 diabetes mellitus with hyperglycemia    - HgA1C 9.1  - Resumed home long acting insulin dosing on admission  3/12: Aspart 4U WM  3/13: Continue detemir 15U qhs and increase aspart to 5U WM plus low dose correction  3/14: Overnight insulin drip initiated due to .  Beta-hydroxy negative. Endocrinology consulted.  [facility] Charge RN discussed with patient and patient's roommate importance of not sharing food. BG significantly improved 371->313->276.  3/15: Discharge rec: recommend levemir 15 units daily and novolog 5 units with meals with low dose correction (patient had been administering up to 10 units of additional novolog  with meals). Some minimal adjustment may be required during hospitalization  3/18-3/19: insulin drip restarted; endocrine consulted again. Unclear reason for significant hyperglycemia. Treating UTI appropriately, no new signs of infection. Pt denies receiving food from outside or from family.  3/20: BGs stable. Endo rec to continue levemir 7 units bid and novolog 4 with meals, low correction. Late 3/20: BG>500 after missing mealtime insulin.   3/21: BGs improved and stable. Increased to 5U TIDWM; continue levemir 7 units bid and low correction. Goal -180. (Upon discharge, she may need a little higher, ie levemir 8 units bid and novolog 5 with meals due to different diet habits)  - Will monitor for need to increase insulin regimen.         ESRD (end stage renal disease)    - TTS HD patient with LUE HD access  - Reports last HD treatment 03/06/18  - Nephrology consulted and plan for HD 3/12  - Trend electrolytes daily   - Strict I/O's  - Daily Wt's  3/12: Informed by nephrology that patient is not set up for outpatient HD at [facility]; CM assisting.  3/18: Increased phosphate binder from 800 to 1600 mg tid        UTI (urinary tract infection)    - Hx of ESRD currently on HD TTS with decreased urine production   - Afebrile, no leukocytosis, UA with 4 squam. UC gram neg vernon.  - Will continue CTX (day 5)  3/16: UC Klebsiella ESBL. D/C CTX.   3/20: Completed course of gentamicin        Essential hypertension    - Resumed home antihypertensive regimen  - Increased losartan to 100 mg for optimal BP control  - BP labile  - Will continue to monitor        Anemia in chronic renal disease    - Hgb 8.2 on arrival (baseline 6.7-7.4)  - Continue TTS epoetin   - Stable  - Trend daily        Seizure disorder    - Resume home levetiracetam 250 mg bid dosing  - Seizure precautions  - Levetiracetam level 7.7 WNL  3/16: Repeat levetiracetam level, more appropriate at 12.0  - Referral back to neuro epilepsy as  discharge as levetiracetam level low end of normal        Mild protein-calorie malnutrition    - Albumin 3.0 on arrival with ESRD  - Encourage PO intake  - Continue to monitor        DM gastroparesis    - Continue home dosing of metoclopramide  - Antiemetics PRN  - Stable          VTE Risk Mitigation         Ordered     heparin (porcine) injection 5,000 Units  Every 8 hours     Route:  Subcutaneous        03/11/18 2241     Medium Risk of VTE  Once      03/11/18 2241     Place CARLOS hose  Until discontinued      03/11/18 2241     Place sequential compression device  Until discontinued      03/11/18 2241              Bri Cortes PA-C  Department of Hospital Medicine   [facility]  Discussed with staff: Dr. Druan

## 2018-03-21 NOTE — SUBJECTIVE & OBJECTIVE
Interval History: No acute events overnight. This AM, pt lying in bed resting comfortably; no complaints at this time. Discussed plan of care- discharge pending SNF and HD chair.    Review of Systems   Constitutional: Negative for chills, diaphoresis, fatigue and fever.   Eyes: Positive for visual disturbance (blind). Negative for discharge.   Respiratory: Negative for cough, chest tightness and shortness of breath.    Cardiovascular: Negative for chest pain, palpitations and leg swelling.   Gastrointestinal: Negative for abdominal distention, abdominal pain, diarrhea, nausea and vomiting.   Genitourinary: Positive for decreased urine volume (ESRD). Negative for dysuria.   Neurological: Positive for seizures and weakness. Negative for syncope.     Objective:     Vital Signs (Most Recent):  Temp: 98.8 °F (37.1 °C) (03/21/18 1138)  Pulse: 66 (03/21/18 1138)  Resp: 16 (03/21/18 1138)  BP: (!) 167/70 (03/21/18 1138)  SpO2: 96 % (03/21/18 1138) Vital Signs (24h Range):  Temp:  [98.2 °F (36.8 °C)-99.2 °F (37.3 °C)] 98.8 °F (37.1 °C)  Pulse:  [62-74] 66  Resp:  [16-18] 16  SpO2:  [93 %-98 %] 96 %  BP: (143-183)/(62-80) 167/70     Weight: 57 kg (125 lb 10.6 oz)  Body mass index is 19.68 kg/m².  No intake or output data in the 24 hours ending 03/21/18 1414   Physical Exam   Constitutional: She appears well-developed. No distress.   Eyes:   Blind R eye, minimal vision in L eye   Cardiovascular: Normal rate, regular rhythm, normal heart sounds and intact distal pulses.    Pulmonary/Chest: Effort normal and breath sounds normal.   Abdominal: Soft. Bowel sounds are normal.   Musculoskeletal: Normal range of motion. She exhibits no edema.   Neurological: She is alert. She displays atrophy.   Oriented to self, place, and president; follows commands, generalized weakness appreciated R>L   Skin: Skin is warm and dry. She is not diaphoretic.   Psychiatric: Her speech is not delayed and not slurred. She is slowed. She is not  withdrawn.   Nursing note and vitals reviewed.      Significant Labs: All pertinent labs within the past 24 hours have been reviewed.    Significant Imaging: I have reviewed all pertinent imaging results/findings within the past 24 hours.

## 2018-03-21 NOTE — PLAN OF CARE
[facility]     Department of Hospital Medicine     [address] [place] [address]     [phone] after hours  [phone] fax       NURSING HOME ORDERS    03/21/2018    Admit to Nursing Home:  Skilled Bed                                            Diagnoses:  Active Hospital Problems    Diagnosis  POA    *Acute metabolic encephalopathy [G93.41]  Yes     Priority: 1 - High    Type 1 diabetes mellitus with hyperglycemia [E10.65]  Yes     Priority: 2     ESRD (end stage renal disease) [N18.6]  Yes     Priority: 2     UTI (urinary tract infection) [N39.0]  Yes     Priority: 3     Essential hypertension [I10]  Yes     Priority: 4     Anemia in chronic renal disease [N18.9, D63.1]  Yes     Priority: 5     Seizure disorder [G40.909]  Yes     Priority: 6     DM gastroparesis [E11.43, K31.84]  Yes     Priority: 7     Hyperglycemia due to type 1 diabetes mellitus [E10.65]  Yes      Resolved Hospital Problems    Diagnosis Date Resolved POA   No resolved problems to display.       Patient is homebound due to:  Acute metabolic encephalopathy    Allergies:  Review of patient's allergies indicates:   Allergen Reactions    Ciprofloxacin (bulk) Itching    Latex Itching and Other (See Comments)     Very low Oxygen    Vancomycin Shortness Of Breath and Itching    Neurontin [gabapentin] Other (See Comments)     Seizure like activity    Niacin Itching and Other (See Comments)     Burning      Bactrim [sulfamethoxazole-trimethoprim] Rash       Vitals:      Every shift (Skilled Nursing patients)    Diet: Renal, Diabetic (1800 calorie) diet    Acitivities:      - Up in a chair each morning as tolerated   - Ambulate with assistance to bathroom    LABS:  Per facility protocol   CMP, CBC each month for 3 months   Pre-albumin each month for 3 months   TSH every year    Nursing Precautions:    - Aspiration precautions:             - Total assistance with meals            -   Upright 90 degrees befor during and after meals             -  Suction at bedside          - Fall precautions per nursing home protocol   - Seizure precaution per [facility] protocol   - Decubitus precautions:        -  for positioning   - Pressure reducing foam mattress   - Turn patient every two hours. Use wedge pillows to anchor patient    CONSULTS:      Physical Therapy to evaluate and treat     Occupational Therapy to evaluate and treat     Nutrition to evaluate and recommend diet                   DIABETES CARE:       Check blood sugar:     Fingerstick blood sugar AC and HS   Fingerstick blood sugar every 6 hours if unable to eat      Report CBG < 60 or > 400 to physician.                                          Insulin Sliding Scale          Glucose  Novolog Insulin Subcutaneous        0 - 60   Orange juice or glucose tablet, hold insulin      No insulin   201-250  2 units   251-300  4 units   301-350  6 units   351-400  8 units   >400   10 units then call physician    Isolation precautions per NH protocol. Patient has completed course of antibiotics.      Medications: Discontinue all previous medication orders, if any. See new list below.   Eufemia Haq   Home Medication Instructions MARLINE:[id number]    Printed on:03/21/18 8995   Medication Information                      Acetaminophen 650 mg Tab  Take 1 tablet by mouth every 6 hours as needed for pain.                        cloNIDine (CATAPRES) 0.1 MG tablet  Take one tablet (0.1 mg) by mouth two times daily.                         hydroCHLOROthiazide (HYDRODIURIL) 12.5 MG Tab  Take 1 tablet (12.5 mg total) by mouth once daily.             insulin aspart U-100 (NOVOLOG) 100 unit/mL InPn pen  Inject 5 Units into the skin 3 (three) times daily with meals.             insulin detemir U-100 (LEVEMIR FLEXTOUCH) 100 unit/mL (3 mL) SubQ InPn pen  Inject 7 Units into the skin 2 (two) times daily.             levetiracetam (KEPPRA) 250 MG  Tab  Take one tablet (250 mg) by mouth 2 (two) times daily.             losartan (COZAAR) 100 MG tablet  Take 1 tablet (100 mg total) by mouth once daily.             metoclopramide HCl (REGLAN) 10 MG tablet  Take 10 mg by mouth 3 (three) times daily before meals.             metoprolol tartrate (LOPRESSOR) 50 MG tablet  Take 50 mg by mouth 2 (two) times daily.             nifedipine (PROCARDIA-XL) 60 MG (OSM) 24 hr tablet  Take 1 tablet (60 mg) by mouth 2 (two) times daily.             ondansetron (ZOFRAN-ODT) 4 MG TbDL  Take 1 tablet by mouth every 4 hours as needed for nausea.                        rosuvastatin (CRESTOR) 20 MG tablet  Take 1 tablet (20 mg) by mouth once daily.             sevelamer carbonate (RENVELA) 800 mg Tab  Take 2 tablets (1,600 mg total) by mouth 3 (three) times daily with meals.                       _________________________________  Bri Cortes PA-C  03/21/2018

## 2018-03-21 NOTE — ASSESSMENT & PLAN NOTE
- HgA1C 9.1  - Resumed home long acting insulin dosing on admission  3/12: Aspart 4U WM  3/13: Continue detemir 15U qhs and increase aspart to 5U WM plus low dose correction  3/14: Overnight insulin drip initiated due to .  Beta-hydroxy negative. Endocrinology consulted.  Bed request placed to transfer to [facility]. Charge RN discussed with patient and patient's roommate importance of not sharing food. BG significantly improved 371->313->276.  3/15: Discharge rec: recommend levemir 15 units daily and novolog 5 units with meals with low dose correction (patient had been administering up to 10 units of additional novolog with meals). Some minimal adjustment may be required during hospitalization  3/18-3/19: insulin drip restarted; endocrine consulted again. Unclear reason for significant hyperglycemia. Treating UTI appropriately, no new signs of infection. Pt denies receiving food from outside or from family.  3/20: BGs stable. Endo rec to continue levemir 7 units bid and novolog 4 with meals, low correction. Late 3/20: BG>500 after missing mealtime insulin.   3/21: BGs improved and stable. Increased to 5U TIDWM; continue levemir 7 units bid and low correction. Goal -180. (Upon discharge, she may need a little higher, ie levemir 8 units bid and novolog 5 with meals due to different diet habits)  - Will monitor for need to increase insulin regimen.

## 2018-03-21 NOTE — PROGRESS NOTES
"  [facility]  Adult Nutrition  Consult Note    SUMMARY     Recommendations    1. Continue current renal, 1500 kcal ADA diet.   2. RD to monitor & follow-up.    Goals: PO intake >50%  Nutrition Goal Status: new  Communication of RD Recs: reviewed with RN    Reason for Assessment    Reason for Assessment: RD follow-up  Diagnosis: other (see comments) (Encephalopathy)  Relevant Medical History: DM, HLD, DM, CKD  Interdisciplinary Rounds: did not attend    General Information Comments: Pt with good appetite, states she's consuming 100% of meals. No further questions regarding diabetic, renal diet. Pt on HD.  Nutrition Discharge Planning: Adequate PO intake    Nutrition Risk Screen    Nutrition Risk Screen: no indicators present    Nutrition/Diet History    Patient Reported Diet/Restrictions/Preferences: diabetic diet  Do you have any cultural, spiritual, Worship conflicts, given your current situation?: no  Food Allergies: NKFA  Factors Affecting Nutritional Intake: None identified at this time    Anthropometrics    Temp: 98.3 °F (36.8 °C)  Height Method: Stated  Height: 5' 7" (170.2 cm)  Height (inches): 67 in  Weight Method: Bed Scale  Weight: 57 kg (125 lb 10.6 oz)  Weight (lb): 125.66 lb  Ideal Body Weight (IBW), Female: 135 lb  % Ideal Body Weight, Female (lb): 93.08 lb  BMI (Calculated): 19.7  BMI Grade: 18.5-24.9 - normal    Lab/Procedures/Meds    Pertinent Labs Reviewed: reviewed  Pertinent Labs Comments: Na 132, BUN 39, Creat 5.7, GFR 9.1, Gluc 202, A1C 9.1  Pertinent Medications Reviewed: reviewed  Pertinent Medications Comments: Statin    Physical Findings/Assessment    Overall Physical Appearance: nourished  Oral/Mouth Cavity: WDL  Skin: intact    Estimated/Assessed Needs    Weight Used For Calorie Calculations: 57 kg (125 lb 10.6 oz)  Height: 5' 7" (170.2 cm)    Energy Calorie Requirements (kcal): 1516 kcal/d  Energy Need Method: [place] (1.25 PAL)    Protein Requirements: 69 " g/d (1.2 g/kg)  Weight Used For Protein Calculations: 57 kg (125 lb 10.6 oz)    Fluid Need Method: other (see comments) (Per MD or 1 mL/kcal)    Nutrition Prescription Ordered    Current Diet Order: Renal, 1500 kcal ADA    Nutrition Risk    Level of Risk/Frequency of Follow-up: moderate     Assessment and Plan    No nutritional dx at this time.     Monitor and Evaluation    Food and Nutrient Intake: energy intake, food and beverage intake  Food and Nutrient Adminstration: diet order  Physical Activity and Function: nutrition-related ADLs and IADLs  Anthropometric Measurements: weight, weight change  Biochemical Data, Medical Tests and Procedures: inflammatory profile, lipid profile, glucose/endocrine profile, gastrointestinal profile, electrolyte and renal panel  Nutrition-Focused Physical Findings: overall appearance     Nutrition Follow-Up    RD Follow-up?: Yes

## 2018-03-21 NOTE — PLAN OF CARE
ALYSIA spoke with Sloane and the QUITA at [facility] several times today.  [facility] will not be able to give her the chair for tomorrow. The medical director is still reviewing but has not confirmed that they will take her yet..  They will likely accept her but they havent confirmed this.  They are working on Saturday.  SHe can start on Saturday if the family can come sign paperwork.  ALYSIA spoke with Tia at the [facility] ([phone]) and she said the pt's sister Willian could come before Saturday to sign.  ALYSIA informed the dtr and gave her Tia's number to call and arrange a time.  ALYSIA spoke with the PA and the DON for the [facility].  Pt has finished her abx which is the SNF's policy to remove isolation.  This is a different policy than [facility].  DON at [facility] stated that they are fine with isolation pts but the HD units are not.  The DON will now accept the pt when the chair is confirmed and they will remove the isolation per their protocol.  ALYSIA will f/u tomorrow with [facility] intake.  ALYSIA updated the pt.    Shaye Boone, [facility] x [phone]

## 2018-03-21 NOTE — ASSESSMENT & PLAN NOTE
Recent CVA 2 weeks ago; suspected new baseline  - Presents with somnolence x 1 day on admission  - Afebrile, no leukocytosis, AST/ALT/ammonia WNL, lactic 2.2  - CT head with no acute intracranial abnormality noted   - Possibly uremic - missed her last two HD treatments (plan for HD 3/12)  - Blood cultures NGTD  - UC Klebsiella ESBL treated with gentamycin  - PT/OT consulted: SNF (accepted) and need to establish HD chair  - High risk of fall / injury - utilize all safety measures and fall precautions   - Aspiration precautions  Stable  - SW/CM assisting with discharge planning   3/18-3/19: Noted improvement after second gentamycin dose for UTI  3/20-3/21: Appears at baseline mentation

## 2018-03-21 NOTE — ASSESSMENT & PLAN NOTE
- TTS HD patient with LUE HD access  - Reports last HD treatment 03/06/18  - Nephrology consulted and plan for HD 3/12  - Trend electrolytes daily   - Strict I/O's  - Daily Wt's  3/12: Informed by nephrology that patient is not set up for outpatient HD at [facility] in [facility]; JUAN assisting.  3/18: Increased phosphate binder from 800 to 1600 mg tid

## 2018-03-21 NOTE — ASSESSMENT & PLAN NOTE
- Resumed home antihypertensive regimen  - Increased losartan to 100 mg for optimal BP control  - BP labile  - Will continue to monitor

## 2018-03-21 NOTE — PLAN OF CARE
ALYSIA spoke with [facility] and they are still working on the [facility] location.  ALYSIA informed Indira with the SNF.  SHe stated that the dtr has signed paperwork.  SW sent the orders. SNF liaison will call the SW back in a few hours.    Shaye Boone, [facility] x [phone]

## 2018-03-21 NOTE — PLAN OF CARE
Problem: Patient Care Overview  Goal: Plan of Care Review  Outcome: Ongoing (interventions implemented as appropriate)  POC reviewed with patient. AAOx4, VSS per trend. Patient transferred to Dialysis this AM, 2L removed. Patient BG monitored, >500 for dinner. Sebastian ORANTES notified, insulin administered per order. No evidence of distress, remains free of falls. WCTM

## 2018-03-22 VITALS
TEMPERATURE: 99 F | RESPIRATION RATE: 16 BRPM | HEIGHT: 67 IN | HEART RATE: 67 BPM | OXYGEN SATURATION: 94 % | DIASTOLIC BLOOD PRESSURE: 65 MMHG | BODY MASS INDEX: 19.73 KG/M2 | SYSTOLIC BLOOD PRESSURE: 156 MMHG | WEIGHT: 125.69 LBS

## 2018-03-22 LAB
ALBUMIN SERPL BCP-MCNC: 2.9 G/DL
ANION GAP SERPL CALC-SCNC: 14 MMOL/L
BASOPHILS # BLD AUTO: 0.03 K/UL
BASOPHILS NFR BLD: 0.6 %
BUN SERPL-MCNC: 57 MG/DL
CALCIUM SERPL-MCNC: 8.3 MG/DL
CHLORIDE SERPL-SCNC: 98 MMOL/L
CO2 SERPL-SCNC: 23 MMOL/L
CREAT SERPL-MCNC: 7.6 MG/DL
DIFFERENTIAL METHOD: ABNORMAL
EOSINOPHIL # BLD AUTO: 0.1 K/UL
EOSINOPHIL NFR BLD: 2.4 %
ERYTHROCYTE [DISTWIDTH] IN BLOOD BY AUTOMATED COUNT: 16.3 %
EST. GFR  (AFRICAN AMERICAN): 6.4 ML/MIN/1.73 M^2
EST. GFR  (NON AFRICAN AMERICAN): 5.6 ML/MIN/1.73 M^2
GLUCOSE SERPL-MCNC: 150 MG/DL
HCT VFR BLD AUTO: 26.4 %
HGB BLD-MCNC: 8 G/DL
IMM GRANULOCYTES # BLD AUTO: 0.03 K/UL
IMM GRANULOCYTES NFR BLD AUTO: 0.6 %
LYMPHOCYTES # BLD AUTO: 1.5 K/UL
LYMPHOCYTES NFR BLD: 28.8 %
MCH RBC QN AUTO: 27.4 PG
MCHC RBC AUTO-ENTMCNC: 30.3 G/DL
MCV RBC AUTO: 90 FL
MONOCYTES # BLD AUTO: 0.7 K/UL
MONOCYTES NFR BLD: 12.7 %
NEUTROPHILS # BLD AUTO: 2.8 K/UL
NEUTROPHILS NFR BLD: 54.9 %
NRBC BLD-RTO: 0 /100 WBC
PHOSPHATE SERPL-MCNC: 5.3 MG/DL
PLATELET # BLD AUTO: 239 K/UL
PMV BLD AUTO: 10.4 FL
POCT GLUCOSE: 150 MG/DL (ref 70–110)
POCT GLUCOSE: 222 MG/DL (ref 70–110)
POCT GLUCOSE: 242 MG/DL (ref 70–110)
POCT GLUCOSE: 263 MG/DL (ref 70–110)
POTASSIUM SERPL-SCNC: 4.2 MMOL/L
RBC # BLD AUTO: 2.92 M/UL
SODIUM SERPL-SCNC: 135 MMOL/L
WBC # BLD AUTO: 5.1 K/UL

## 2018-03-22 PROCEDURE — 63600175 PHARM REV CODE 636 W HCPCS: Performed by: NURSE PRACTITIONER

## 2018-03-22 PROCEDURE — 96372 THER/PROPH/DIAG INJ SC/IM: CPT

## 2018-03-22 PROCEDURE — 25000003 PHARM REV CODE 250: Performed by: PHYSICIAN ASSISTANT

## 2018-03-22 PROCEDURE — 36415 COLL VENOUS BLD VENIPUNCTURE: CPT

## 2018-03-22 PROCEDURE — 97530 THERAPEUTIC ACTIVITIES: CPT

## 2018-03-22 PROCEDURE — 27000207 HC ISOLATION

## 2018-03-22 PROCEDURE — 90935 HEMODIALYSIS ONE EVALUATION: CPT | Mod: ,,, | Performed by: NURSE PRACTITIONER

## 2018-03-22 PROCEDURE — 25000003 PHARM REV CODE 250: Performed by: NURSE PRACTITIONER

## 2018-03-22 PROCEDURE — 90935 HEMODIALYSIS ONE EVALUATION: CPT

## 2018-03-22 PROCEDURE — 80069 RENAL FUNCTION PANEL: CPT

## 2018-03-22 PROCEDURE — 97116 GAIT TRAINING THERAPY: CPT

## 2018-03-22 PROCEDURE — 99239 HOSP IP/OBS DSCHRG MGMT >30: CPT | Mod: ,,, | Performed by: PHYSICIAN ASSISTANT

## 2018-03-22 PROCEDURE — 85025 COMPLETE CBC W/AUTO DIFF WBC: CPT

## 2018-03-22 RX ADMIN — HYDROCHLOROTHIAZIDE 12.5 MG: 12.5 TABLET ORAL at 12:03

## 2018-03-22 RX ADMIN — METOCLOPRAMIDE 10 MG: 10 TABLET ORAL at 06:03

## 2018-03-22 RX ADMIN — CLONIDINE HYDROCHLORIDE 0.1 MG: 0.1 TABLET ORAL at 12:03

## 2018-03-22 RX ADMIN — INSULIN ASPART 1 UNITS: 100 INJECTION, SOLUTION INTRAVENOUS; SUBCUTANEOUS at 01:03

## 2018-03-22 RX ADMIN — SODIUM CHLORIDE 350 ML: 0.9 INJECTION, SOLUTION INTRAVENOUS at 09:03

## 2018-03-22 RX ADMIN — LOSARTAN POTASSIUM 100 MG: 50 TABLET, FILM COATED ORAL at 12:03

## 2018-03-22 RX ADMIN — INSULIN ASPART 5 UNITS: 100 INJECTION, SOLUTION INTRAVENOUS; SUBCUTANEOUS at 05:03

## 2018-03-22 RX ADMIN — INSULIN DETEMIR 7 UNITS: 100 INJECTION, SOLUTION SUBCUTANEOUS at 09:03

## 2018-03-22 RX ADMIN — METOPROLOL TARTRATE 50 MG: 50 TABLET ORAL at 12:03

## 2018-03-22 RX ADMIN — ERYTHROPOIETIN 6000 UNITS: 20000 INJECTION, SOLUTION INTRAVENOUS; SUBCUTANEOUS at 11:03

## 2018-03-22 RX ADMIN — LEVETIRACETAM 250 MG: 250 TABLET, FILM COATED ORAL at 12:03

## 2018-03-22 RX ADMIN — INSULIN ASPART 2 UNITS: 100 INJECTION, SOLUTION INTRAVENOUS; SUBCUTANEOUS at 05:03

## 2018-03-22 RX ADMIN — INSULIN ASPART 5 UNITS: 100 INJECTION, SOLUTION INTRAVENOUS; SUBCUTANEOUS at 10:03

## 2018-03-22 RX ADMIN — HEPARIN SODIUM 5000 UNITS: 5000 INJECTION, SOLUTION INTRAVENOUS; SUBCUTANEOUS at 02:03

## 2018-03-22 RX ADMIN — ROSUVASTATIN CALCIUM 20 MG: 20 TABLET, FILM COATED ORAL at 12:03

## 2018-03-22 RX ADMIN — SEVELAMER CARBONATE 1600 MG: 800 TABLET, FILM COATED ORAL at 12:03

## 2018-03-22 RX ADMIN — INSULIN ASPART 5 UNITS: 100 INJECTION, SOLUTION INTRAVENOUS; SUBCUTANEOUS at 12:03

## 2018-03-22 RX ADMIN — METOCLOPRAMIDE 10 MG: 10 TABLET ORAL at 05:03

## 2018-03-22 RX ADMIN — METOCLOPRAMIDE 10 MG: 10 TABLET ORAL at 12:03

## 2018-03-22 RX ADMIN — SEVELAMER CARBONATE 1600 MG: 800 TABLET, FILM COATED ORAL at 05:03

## 2018-03-22 RX ADMIN — INSULIN ASPART 3 UNITS: 100 INJECTION, SOLUTION INTRAVENOUS; SUBCUTANEOUS at 12:03

## 2018-03-22 RX ADMIN — NIFEDIPINE 60 MG: 60 TABLET, FILM COATED, EXTENDED RELEASE ORAL at 12:03

## 2018-03-22 RX ADMIN — HEPARIN SODIUM 5000 UNITS: 5000 INJECTION, SOLUTION INTRAVENOUS; SUBCUTANEOUS at 06:03

## 2018-03-22 NOTE — PLAN OF CARE
ALYSIA left another message for Jobita.  ALYSIA sent updated orders to the NH.  ALYSIA will f/u in a few hours.    Shaye Boone, \[facility]\""HARPER x [phone]

## 2018-03-22 NOTE — ASSESSMENT & PLAN NOTE
Recent CVA 2 weeks ago; suspected new baseline  - Presents with somnolence x 1 day on admission  - Afebrile, no leukocytosis, AST/ALT/ammonia WNL, lactic 2.2  - CT head with no acute intracranial abnormality noted   - Possibly uremic - missed her last two HD treatments (plan for HD 3/12)  - Blood cultures NGTD  - UC Klebsiella ESBL treated with gentamycin  - PT/OT consulted: SNF (accepted) and need to establish HD chair  - Discharged to [facility] and set up for HD at [facility]

## 2018-03-22 NOTE — PLAN OF CARE
ALYSIA spoke with Aman at [facility].  He does not have a confirmed chair but should hear back in the next few hours.  ALYSIA informed Indira at [facility].    Shaye Boone, [facility] x [phone]

## 2018-03-22 NOTE — PROGRESS NOTES
3.5hr HD treatment completed 2.5L of fluid removed pt tolerated well. Epoetin given as ordered. Both needles of a ADEN graft removed and pressure held until hemostasis achieved dressing applied. Report given to NAA Navarrete.

## 2018-03-22 NOTE — PLAN OF CARE
Problem: Patient Care Overview  Goal: Plan of Care Review  Outcome: Ongoing (interventions implemented as appropriate)  No acute events occurred overnight.  Pt complained of headache.  PRN  Tylenol administered with little relief.  Pt also requested something for sleep.  Notified med-f, one time dose of rozerem ordered and administered.  Pt rested throughout shift yet remained oriented.  Accuchecks continued AC/HS/0200- coverage given as appropriate.  PIV started.  No falls or injuries occurred.  Bed alarm and camera in use to prevent falls.  Call light/ soft call light within reach.  WCTM.

## 2018-03-22 NOTE — ASSESSMENT & PLAN NOTE
- Hx of ESRD currently on HD TTS with decreased urine production   - Afebrile, no leukocytosis, UA with 4 squam. UC gram neg vernon.  - Will continue CTX (day 5)  3/16: UC Klebsiella ESBL. D/C CTX.   3/20: Completed course of gentamicin

## 2018-03-22 NOTE — PLAN OF CARE
ALYSIA spoke with Indira.  ALYSIA gave the nurse the number to call report.  ALYSIA updated the pt and her sister.  ALYSIA called [facility] and they will  the pt around 5:30pm.    Shaye Boone, [facility] x [phone]

## 2018-03-22 NOTE — SUBJECTIVE & OBJECTIVE
Interval History: No acute events overnight. This AM, pt lying in bed resting comfortably during HD. Ate breakfast. No complaints at this time. Discussed plan of care- discharge pending HD chair.    Review of Systems   Constitutional: Negative for chills, diaphoresis, fatigue and fever.   Eyes: Positive for visual disturbance (blind). Negative for discharge.   Respiratory: Negative for cough, chest tightness and shortness of breath.    Cardiovascular: Negative for chest pain, palpitations and leg swelling.   Gastrointestinal: Negative for abdominal distention, abdominal pain, diarrhea, nausea and vomiting.   Genitourinary: Positive for decreased urine volume (ESRD). Negative for dysuria.   Neurological: Positive for seizures and weakness. Negative for syncope.     Objective:     Vital Signs (Most Recent):  Temp: 98.5 °F (36.9 °C) (03/22/18 1246)  Pulse: 73 (03/22/18 1246)  Resp: 16 (03/22/18 1246)  BP: (!) 156/65 (03/22/18 1246)  SpO2: (!) 94 % (03/22/18 1246) Vital Signs (24h Range):  Temp:  [98 °F (36.7 °C)-98.6 °F (37 °C)] 98.5 °F (36.9 °C)  Pulse:  [58-73] 73  Resp:  [16-18] 16  SpO2:  [94 %-98 %] 94 %  BP: (105-168)/(48-69) 156/65     Weight: 57 kg (125 lb 10.6 oz)  Body mass index is 19.68 kg/m².    Intake/Output Summary (Last 24 hours) at 03/22/18 1354  Last data filed at 03/22/18 1125   Gross per 24 hour   Intake              650 ml   Output             3150 ml   Net            -2500 ml      Physical Exam   Constitutional: She appears well-developed. No distress.   Eyes:   Blind R eye, minimal vision in L eye   Cardiovascular: Normal rate, regular rhythm, normal heart sounds and intact distal pulses.    Pulmonary/Chest: Effort normal and breath sounds normal.   Abdominal: Soft. Bowel sounds are normal.   Musculoskeletal: Normal range of motion. She exhibits no edema.   Neurological: She is alert. She displays atrophy.   Oriented to self, place, and president; follows commands, generalized weakness  appreciated R>L   Skin: Skin is warm and dry. She is not diaphoretic.   Psychiatric: Her speech is not delayed and not slurred. She is slowed. She is not withdrawn.   Nursing note and vitals reviewed.      Significant Labs: All pertinent labs within the past 24 hours have been reviewed.    Significant Imaging: I have reviewed all pertinent imaging results/findings within the past 24 hours.

## 2018-03-22 NOTE — ASSESSMENT & PLAN NOTE
- TTS HD patient with LUE HD access  - Reports last HD treatment 03/06/18  - Nephrology consulted  3/12: Informed by nephrology that patient is not set up for outpatient HD at [facility] in [facility]; JUAN ochoa.  3/18: Increased phosphate binder from 800 to 1600 mg tid  3/22: Set up for HD at [facility]

## 2018-03-22 NOTE — PLAN OF CARE
Problem: Physical Therapy Goal  Goal: Physical Therapy Goal  Goals to be met by: 3/22/18     Patient will increase functional independence with mobility by performin. Sit to stand transfer with Minimal Assistance with LRD ( MET )\  Revised goal: Sit to stand transfer with supervision.  2. Bed to chair transfer with Contact Guard Assistance using LRD. ( MET )  Revised goal: Bed to chair transfer with supervision  3. Gait  x 20 feet with Minimal Assistance using LRD.  ( MET )  Revised goal: Gait x 150 feet with CGA using LRD.  4. Stand for 10 minutes with Contact Guard Assistance using LRD. (MET )  5. Lower extremity exercise program x20 reps per handout, with assistance as needed      Goals remain appropriate at time. Continue with PT POC as indicated.

## 2018-03-22 NOTE — DISCHARGE SUMMARY
[facility] Hospital Medicine  Discharge Summary      Patient Name: Eufemia Haq  MRN: [id number]  Admission Date: 3/11/2018  Hospital Length of Stay: 8 days  Discharge Date and Time: No discharge date for patient encounter.  Attending Physician: Hilda Duran MD   Discharging Provider: Bri Cortes PA-C  Primary Care Provider: Primary Doctor [facility] Medicine Team: [facility] [place] [facility] F Bri Cortes PA-C    HPI:   51 y/o female, who presents to the ED with c/o lethargy and hyperglycemia.  She has a PMH of recent CVA, DMI, ESRD (TTS HD), HTN, blindness of the R eye, and seizures.  Majority of history is provided by patients daughter at bedside.  She states her mother was just discharge a [facility] 2 days ago and has been lethargic since returning home.  She states that her mother suffered a stroke about 3 weeks ago.  The patient is lethargic and minimally interactive.  She appears in NAD and will follow simple commands when prompted, but drifts off when not simulated.  Additionally, she states that she was recently diagnosed with pneumonia.  She denies fever, chills, CP, SOB, N/V/D, or  symptoms since discharge.  She endorsed poorly controlled blood sugars.  Her sugar was 460 on arrival.  They report adherence to medication regimen.  However, report that her last HD treatment was Tuesday 03/06/18.    * No surgery found *      Hospital Course:   Pt admitted to observation for encephalopathy in setting of recent CVA (2 weeks ago); suspect new baseline. CTH without acute abnormality. CXR without acute process. Urine cx revealed Klebsiella ESBL; completed course of gentamicin. Blood cx NGTD. Informed by nephrology that patient is not set up for outpatient HD at [facility] in [facility]. 3/14: Overnight pt started on insulin drip due to BG>600 (Charge RN notified that pt's roommate was giving pt additional food throughout the night.) 3/15: insulin drip d/c'd due to  hypoglycemia and pt transitioned to levemir 15U qhs and novolog 5U tid with meals plus low dose correction however insulin gtt resume evening 3/19. 3/20: BG>500 after missing mealtime insulin. 3/21-3/22: BGs stable with increased 5U TIDWM. Discharged to [facility] and set up for HD at [facility]. Follow up with Neurology Epilepsy as outpatient.     Consults:   Consults         Status Ordering Provider     Inpatient consult to Endocrinology  Once     Provider:  (Not yet assigned)    Completed DARIAN HANNA     Inpatient consult to Endocrinology  Once     Provider:  (Not yet assigned)    Completed PAOLA WARE     Inpatient consult to Endocrinology  Once     Provider:  (Not yet assigned)    Completed DARIAN HANNA     Inpatient consult to Nephrology  Once     Provider:  (Not yet assigned)    Completed LUIS DUNHAM          * Acute metabolic encephalopathy    Recent CVA 2 weeks ago; suspected new baseline  - Presents with somnolence x 1 day on admission  - Afebrile, no leukocytosis, AST/ALT/ammonia WNL, lactic 2.2  - CT head with no acute intracranial abnormality noted   - Possibly uremic - missed her last two HD treatments (plan for HD 3/12)  - Blood cultures NGTD  - UC Klebsiella ESBL treated with gentamycin  - PT/OT consulted: SNF (accepted) and need to establish HD chair  - Discharged to [facility] and set up for HD at [facility]        Type 1 diabetes mellitus with hyperglycemia    - HgA1C 9.1  - Resumed home long acting insulin dosing on admission  3/12: Aspart 4U WM  3/13: Continue detemir 15U qhs and increase aspart to 5U WM plus low dose correction  3/14: Overnight insulin drip initiated due to .  Beta-hydroxy negative. Endocrinology consulted.  Charge RN discussed with patient and patient's roommate importance of not sharing food. BG significantly improved 371->313->276.  3/18-3/19: insulin drip restarted; endocrine consulted again. Unclear reason for significant hyperglycemia. Treating UTI  appropriately, no new signs of infection. Pt denies receiving food from outside or from family.  3/20: BGs stable. Endo rec to continue levemir 7 units bid and novolog 4 with meals, low correction. Late 3/20: BG>500 after missing mealtime insulin.   3/21: BGs improved and stable. Increased to 5U TIDWM; continue levemir 7 units bid and low correction.   3/22: Improved BGs        ESRD (end stage renal disease)    - TTS HD patient with LUE HD access  - Nephrology consulted  3/12: Informed by nephrology that patient is not set up for outpatient HD at [facility] in [facility]; CM assisting.  3/18: Increased phosphate binder from 800 to 1600 mg tid  3/22: Set up for HD at [facility]        UTI (urinary tract infection)    - Hx of ESRD currently on HD TTS with decreased urine production   - Afebrile, no leukocytosis, UA with 4 squam. UC gram neg vernon.  3/16: UC Klebsiella ESBL. D/C CTX.   3/20: Completed course of gentamicin        Essential hypertension    - Resumed home antihypertensive regimen  - Increased losartan to 100 mg for optimal BP control  - BP improved and stable        Anemia in chronic renal disease    - Hgb 8.2 on arrival (baseline 6.7-7.4)  - Continue TTS epoetin   - Stable        Seizure disorder    - Resume home levetiracetam 250 mg bid dosing  - Seizure precautions  - Levetiracetam level 7.7 WNL  3/16: Repeat levetiracetam level, more appropriate at 12.0  - Referral back to neuro epilepsy as discharge as levetiracetam level low end of normal        Mild protein-calorie malnutrition    - Albumin 3.0 on arrival with ESRD  - Encourage PO intake        DM gastroparesis    - Continue home dosing of metoclopramide  - Antiemetics PRN  - Stable          Final Active Diagnoses:    Diagnosis Date Noted POA    PRINCIPAL PROBLEM:  Acute metabolic encephalopathy [G93.41] 03/12/2018 Yes    Type 1 diabetes mellitus with hyperglycemia [E10.65] 08/30/2017 Yes    ESRD (end stage renal disease) [N18.6] 08/26/2017 Yes     UTI (urinary tract infection) [N39.0] 03/09/2015 Yes    Essential hypertension [I10] 12/15/2014 Yes    Anemia in chronic renal disease [N18.9, D63.1]  Yes    Seizure disorder [G40.909] 03/12/2018 Yes    DM gastroparesis [E11.43, K31.84] 05/14/2015 Yes    Hyperglycemia due to type 1 diabetes mellitus [E10.65] 02/12/2015 Yes      Problems Resolved During this Admission:    Diagnosis Date Noted Date Resolved POA       Discharged Condition: stable    Disposition: Skilled Nursing Facility    Patient Instructions:     Ambulatory referral to Neurology Epilepsy   Referral Priority: Routine Referral Type: Consultation   Referral Reason: Specialty Services Required    Requested Specialty: Neurology    Number of Visits Requested: 1      Activity as tolerated     Notify your health care provider if you experience any of the following:  temperature >100.4     Notify your health care provider if you experience any of the following:  persistent nausea and vomiting or diarrhea     Notify your health care provider if you experience any of the following:  severe uncontrolled pain     Notify your health care provider if you experience any of the following:  redness, tenderness, or signs of infection (pain, swelling, redness, odor or green/yellow discharge around incision site)         Significant Diagnostic Studies: Microbiology:   Blood Culture   Lab Results   Component Value Date    LABBLOO No growth after 5 days. 03/11/2018    and Urine Culture    Lab Results   Component Value Date    LABURIN  03/11/2018     KLEBSIELLA PNEUMONIAE ESBL  >100,000 cfu/ml   ([facility])        Radiology: X-Ray: CXR  CT scan: CT Head  Ultrasound: VAS HD    Pending Diagnostic Studies:     None         Medications:  Reconciled Home Medications:   Current Discharge Medication List      START taking these medications    Details   hydroCHLOROthiazide (HYDRODIURIL) 12.5 MG Tab Take 1 tablet (12.5 mg total) by mouth once daily.  Qty: 30 tablet,  Refills: 0      losartan (COZAAR) 100 MG tablet Take 1 tablet (100 mg total) by mouth once daily.  Qty: 90 tablet, Refills: 0      sevelamer carbonate (RENVELA) 800 mg Tab Take 2 tablets (1,600 mg total) by mouth 3 (three) times daily with meals.  Qty: 180 tablet, Refills: 0         CONTINUE these medications which have CHANGED    Details   insulin aspart U-100 (NOVOLOG) 100 unit/mL InPn pen Inject 4 Units into the skin 3 (three) times daily with meals.  Qty: 1 Box, Refills: 2      insulin detemir U-100 (LEVEMIR FLEXTOUCH) 100 unit/mL (3 mL) SubQ InPn pen Inject 7 Units into the skin 2 (two) times daily.  Qty: 1 Box, Refills: 2         CONTINUE these medications which have NOT CHANGED    Details   acetaminophen-codeine 300-60mg (TYLENOL #4) 300-60 mg Tab Take 2 tablets by mouth daily as needed (pain).      blood sugar diagnostic Strp To use as directed with True results meter and monitor BS 6 times daily - fluctuating BS  Qty: 200 each, Refills: 12    Associated Diagnoses: Type I (juvenile type) diabetes mellitus with neurological manifestations, uncontrolled(250.63)      cloNIDine (CATAPRES) 0.1 MG tablet TK 1 T PO Q 12 H  Refills: 0      epoetin jacques (PROCRIT) 20,000 unit/mL injection Inject 1 mL (20,000 Units total) into the skin every Tues, Thurs, Sat.  Qty: 1 mL      levetiracetam (KEPPRA) 250 MG Tab TK 1 T PO BID  Refills: 0      metoclopramide HCl (REGLAN) 10 MG tablet Take 10 mg by mouth 3 (three) times daily before meals.      metoprolol tartrate (LOPRESSOR) 50 MG tablet Take 50 mg by mouth 2 (two) times daily.      nifedipine (PROCARDIA-XL) 60 MG (OSM) 24 hr tablet TK 1 T PO Q 12 H  Refills: 0      ondansetron (ZOFRAN-ODT) 4 MG TbDL Take 1-2 tablets by mouth every 4 hours as needed for nausea and vomiting      ONETOUCH DELICA LANCETS 33 gauge Misc TEST BLOOD SUGAR TID  Refills: 3      rosuvastatin (CRESTOR) 20 MG tablet Take 20 mg by mouth once daily.         STOP taking these medications       bumetanide  (BUMEX) 1 MG tablet Comments:   Reason for Stopping:         diphenoxylate-atropine 2.5-0.025 mg (LOMOTIL) 2.5-0.025 mg per tablet Comments:   Reason for Stopping:         HUMALOG KWIKPEN 100 unit/mL InPn pen Comments:   Reason for Stopping:         hydrALAZINE (APRESOLINE) 25 MG tablet Comments:   Reason for Stopping:         LEVEMIR 100 unit/mL injection Comments:   Reason for Stopping:         losartan-hydrochlorothiazide 50-12.5 mg (HYZAAR) 50-12.5 mg per tablet Comments:   Reason for Stopping:         nifedipine (ADALAT CC) 30 MG TbSR Comments:   Reason for Stopping:         nifedipine (ADALAT CC) 60 MG TbSR Comments:   Reason for Stopping:         zolpidem (AMBIEN) 10 mg Tab Comments:   Reason for Stopping:         zolpidem (AMBIEN) 5 MG Tab Comments:   Reason for Stopping:               Indwelling Lines/Drains at time of discharge:   Lines/Drains/Airways     Central Venous Catheter Line                 Hemodialysis Catheter right subclavian -- days          Drain                 Hemodialysis AV Graft Left upper arm -- days                Time spent on the discharge of patient: 45 minutes  Patient was seen and examined on the date of discharge and determined to be suitable for discharge.         Bri Cortes PA-C  Department of Hospital Medicine  [facility]  Discussed with staff: Dr. Duran

## 2018-03-22 NOTE — PT/OT/SLP PROGRESS
Physical Therapy Treatment    Patient Name:  Eufemia Haq   MRN:  [id number]    Recommendations:     Discharge Recommendations:  nursing facility, skilled   Discharge Equipment Recommendations: bedside commode, bath bench   Barriers to discharge: Decreased caregiver support    Assessment:     Eufemia Haq is a 52 y.o. female admitted with a medical diagnosis of Acute metabolic encephalopathy.  She presents with the following impairments/functional limitations:  weakness, impaired endurance, impaired self care skills, impaired functional mobilty, gait instability, impaired balance, decreased upper extremity function, decreased lower extremity function. Pt tolerated treatment fairly well, and will continue to benefit from PT services at this time. Continue with PT POC as indicated.     Rehab Prognosis:  good; patient would benefit from acute skilled PT services to address these deficits and reach maximum level of function.      Recent Surgery: * No surgery found *      Plan:     During this hospitalization, patient to be seen 4 x/week to address the above listed problems via gait training, therapeutic activities, therapeutic exercises, neuromuscular re-education  · Plan of Care Expires:  04/12/18   Plan of Care Reviewed with: patient    Subjective     Communicated with nursing prior to session.  Patient found supine upon PT entry to room, agreeable to treatment.      Chief Complaint: none stated  Patient comments/goals: none stated  Pain/Comfort:  · Pain Rating 1: 0/10  · Pain Rating Post-Intervention 1: 0/10    Patients cultural, spiritual, Zoroastrianism conflicts given the current situation: no    Objective:     Patient found with:  (all lines intact)     General Precautions: Standard, blind, aspiration, fall, seizure (cardiac)   Orthopedic Precautions:N/A   Braces: N/A     Functional Mobility:  · Bed Mobility:  Supine to Sit: contact guard assistance  · Sit to Supine: contact guard assistance  · Transfers:   Sit to Stand:  contact guard assistance with rolling walker  · Gait: 62ftx1 with RW and CGA. VC's for direction and RW mgmt.       AM-PAC 6 CLICK MOBILITY  Turning over in bed (including adjusting bedclothes, sheets and blankets)?: 3  Sitting down on and standing up from a chair with arms (e.g., wheelchair, bedside commode, etc.): 3  Moving from lying on back to sitting on the side of the bed?: 4  Moving to and from a bed to a chair (including a wheelchair)?: 3  Need to walk in hospital room?: 3  Climbing 3-5 steps with a railing?: 2  Total Score: 18       Therapeutic Activities and Exercises:   B LE therex x20 reps: AP, LAQ, Hip Flexion, and Hip Add (pillow squeezes)     Patient left supine with all lines intact and call button in reach..    GOALS:    Physical Therapy Goals        Problem: Physical Therapy Goal    Goal Priority Disciplines Outcome Goal Variances Interventions   Physical Therapy Goal     PT/OT, PT Ongoing (interventions implemented as appropriate)     Description:  Goals to be met by: 3/22/18     Patient will increase functional independence with mobility by performin. Sit to stand transfer with Minimal Assistance with LRD ( MET )\  Revised goal: Sit to stand transfer with supervision.  2. Bed to chair transfer with Contact Guard Assistance using LRD. ( MET )  Revised goal: Bed to chair transfer with supervision  3. Gait  x 20 feet with Minimal Assistance using LRD.  ( MET )  Revised goal: Gait x 150 feet with CGA using LRD.  4. Stand for 10 minutes with Contact Guard Assistance using LRD. (MET )  5. Lower extremity exercise program x20 reps per handout, with assistance as needed                       Time Tracking:     PT Received On: 18  PT Start Time: 1338     PT Stop Time: 1358  PT Total Time (min): 20 min     Billable Minutes: Therapeutic Activity 20    Treatment Type: Treatment  PT/PTA: PTA     PTA Visit Number: 1     Suze Stokes PTA  2018

## 2018-03-22 NOTE — PLAN OF CARE
Pt has been accepted to [facility] [address].  They sent the SW a schedule. SW sent it to the NH along with updated orders. SW is waiting for a call back from the nursing home about transfer time.    Shaye Boone, [facility] x [phone]

## 2018-03-22 NOTE — ASSESSMENT & PLAN NOTE
- Resumed home antihypertensive regimen  - Increased losartan to 100 mg for optimal BP control  - BP improved and stable

## 2018-03-22 NOTE — PROGRESS NOTES
[facility] H[facility]ODIALYSIS NOTE     Patient currently on hemodialysis for removal of uremic toxins .     Patient seen and evaluated on hemodialysis, tolerating treatment, see HD flowsheet for vitals and assessments.      No Hypotension, chest pain, shortness of breath, cramping, nausea or vomiting.     Tolerating 2 L . ADEN AVG no issues.     Viktoria Nguyen NP  Nephrology

## 2018-03-22 NOTE — PROGRESS NOTES
Report received from NAA Navarrete. Pt arrived from [facility]. Maintenance dialysis initiated via ADEN graft without difficulty.

## 2018-03-22 NOTE — NURSING
Patient being transferred to [facility].IV and telemetry removed. Report called to nurse, Jan. Patient will go by wheelchair van which is to arrive at 5:30 pm.

## 2018-03-22 NOTE — ASSESSMENT & PLAN NOTE
- HgA1C 9.1  - Resumed home long acting insulin dosing on admission  3/12: Aspart 4U WM  3/13: Continue detemir 15U qhs and increase aspart to 5U WM plus low dose correction  3/14: Overnight insulin drip initiated due to .  Beta-hydroxy negative. Endocrinology consulted.  Charge RN discussed with patient and patient's roommate importance of not sharing food. BG significantly improved 371->313->276.  3/18-3/19: insulin drip restarted; endocrine consulted again. Unclear reason for significant hyperglycemia. Treating UTI appropriately, no new signs of infection. Pt denies receiving food from outside or from family.  3/20: BGs stable. Endo rec to continue levemir 7 units bid and novolog 4 with meals, low correction. Late 3/20: BG>500 after missing mealtime insulin.   3/21: BGs improved and stable. Increased to 5U TIDWM; continue levemir 7 units bid and low correction.   3/22: Improved BGs

## 2018-03-23 ENCOUNTER — PATIENT OUTREACH (OUTPATIENT)
Dept: ADMINISTRATIVE | Facility: CLINIC | Age: 53
End: 2018-03-23

## 2018-03-23 NOTE — PLAN OF CARE
03/23/18 0556   Final Note   Assessment Type Final Discharge Note     Patient discharged to [facility] 3/22/18.

## 2018-03-26 ENCOUNTER — HOSPITAL ENCOUNTER (EMERGENCY)
Facility: OTHER | Age: 53
Discharge: HOME | End: 2018-03-26
Attending: EMERGENCY MEDICINE
Payer: MEDICARE

## 2018-03-26 VITALS
HEIGHT: 67 IN | WEIGHT: 140 LBS | TEMPERATURE: 98 F | DIASTOLIC BLOOD PRESSURE: 90 MMHG | BODY MASS INDEX: 21.97 KG/M2 | RESPIRATION RATE: 20 BRPM | HEART RATE: 90 BPM | OXYGEN SATURATION: 98 % | SYSTOLIC BLOOD PRESSURE: 198 MMHG

## 2018-03-26 DIAGNOSIS — N18.6 ESRD (END STAGE RENAL DISEASE): ICD-10-CM

## 2018-03-26 DIAGNOSIS — D50.9 IRON DEFICIENCY ANEMIA, UNSPECIFIED IRON DEFICIENCY ANEMIA TYPE: ICD-10-CM

## 2018-03-26 DIAGNOSIS — R73.9 HYPERGLYCEMIA: Primary | ICD-10-CM

## 2018-03-26 DIAGNOSIS — E10.65 TYPE 1 DIABETES MELLITUS WITH HYPERGLYCEMIA: ICD-10-CM

## 2018-03-26 DIAGNOSIS — R11.0 NAUSEA: ICD-10-CM

## 2018-03-26 LAB
ALBUMIN SERPL BCP-MCNC: 3.6 G/DL
ALLENS TEST: ABNORMAL
ALP SERPL-CCNC: 104 U/L
ALT SERPL W/O P-5'-P-CCNC: 6 U/L
ANION GAP SERPL CALC-SCNC: 23 MMOL/L
AST SERPL-CCNC: 12 U/L
B-OH-BUTYR BLD STRIP-SCNC: 2.2 MMOL/L
BASOPHILS # BLD AUTO: 0.01 K/UL
BASOPHILS NFR BLD: 0.1 %
BILIRUB SERPL-MCNC: 0.6 MG/DL
BUN SERPL-MCNC: 67 MG/DL
CALCIUM SERPL-MCNC: 9.3 MG/DL
CHLORIDE SERPL-SCNC: 92 MMOL/L
CO2 SERPL-SCNC: 19 MMOL/L
CREAT SERPL-MCNC: 6.8 MG/DL
DIFFERENTIAL METHOD: ABNORMAL
EOSINOPHIL # BLD AUTO: 0 K/UL
EOSINOPHIL NFR BLD: 0 %
ERYTHROCYTE [DISTWIDTH] IN BLOOD BY AUTOMATED COUNT: 17.5 %
EST. GFR  (AFRICAN AMERICAN): 7 ML/MIN/1.73 M^2
EST. GFR  (NON AFRICAN AMERICAN): 6 ML/MIN/1.73 M^2
GLUCOSE SERPL-MCNC: 370 MG/DL
HCO3 UR-SCNC: 16.3 MMOL/L (ref 24–28)
HCT VFR BLD AUTO: 29.9 %
HGB BLD-MCNC: 9.6 G/DL
LYMPHOCYTES # BLD AUTO: 0.7 K/UL
LYMPHOCYTES NFR BLD: 4.8 %
MCH RBC QN AUTO: 28.4 PG
MCHC RBC AUTO-ENTMCNC: 32.1 G/DL
MCV RBC AUTO: 89 FL
MONOCYTES # BLD AUTO: 1.6 K/UL
MONOCYTES NFR BLD: 10.5 %
NEUTROPHILS # BLD AUTO: 12.4 K/UL
NEUTROPHILS NFR BLD: 84.4 %
PCO2 BLDA: 14.9 MMHG (ref 35–45)
PH SMN: 7.65 [PH] (ref 7.35–7.45)
PLATELET # BLD AUTO: 253 K/UL
PMV BLD AUTO: 9.6 FL
PO2 BLDA: 128 MMHG (ref 40–60)
POC BE: -4 MMOL/L
POC SATURATED O2: 100 % (ref 95–100)
POCT GLUCOSE: 367 MG/DL (ref 70–110)
POCT GLUCOSE: 407 MG/DL (ref 70–110)
POTASSIUM SERPL-SCNC: 4.4 MMOL/L
PROT SERPL-MCNC: 7 G/DL
RBC # BLD AUTO: 3.38 M/UL
SAMPLE: ABNORMAL
SITE: ABNORMAL
SODIUM SERPL-SCNC: 134 MMOL/L
WBC # BLD AUTO: 14.71 K/UL

## 2018-03-26 PROCEDURE — 82962 GLUCOSE BLOOD TEST: CPT | Mod: 91

## 2018-03-26 PROCEDURE — 99285 EMERGENCY DEPT VISIT HI MDM: CPT | Mod: 25

## 2018-03-26 PROCEDURE — 96361 HYDRATE IV INFUSION ADD-ON: CPT

## 2018-03-26 PROCEDURE — 82803 BLOOD GASES ANY COMBINATION: CPT

## 2018-03-26 PROCEDURE — 96375 TX/PRO/DX INJ NEW DRUG ADDON: CPT

## 2018-03-26 PROCEDURE — 96374 THER/PROPH/DIAG INJ IV PUSH: CPT

## 2018-03-26 PROCEDURE — 25000003 PHARM REV CODE 250: Performed by: EMERGENCY MEDICINE

## 2018-03-26 PROCEDURE — 99900035 HC TECH TIME PER 15 MIN (STAT)

## 2018-03-26 PROCEDURE — 85025 COMPLETE CBC W/AUTO DIFF WBC: CPT

## 2018-03-26 PROCEDURE — 93005 ELECTROCARDIOGRAM TRACING: CPT

## 2018-03-26 PROCEDURE — 82010 KETONE BODYS QUAN: CPT

## 2018-03-26 PROCEDURE — 63600175 PHARM REV CODE 636 W HCPCS: Performed by: EMERGENCY MEDICINE

## 2018-03-26 PROCEDURE — 93010 ELECTROCARDIOGRAM REPORT: CPT | Mod: ,,, | Performed by: INTERNAL MEDICINE

## 2018-03-26 PROCEDURE — 80053 COMPREHEN METABOLIC PANEL: CPT

## 2018-03-26 RX ORDER — INSULIN ASPART 100 [IU]/ML
5 INJECTION, SOLUTION INTRAVENOUS; SUBCUTANEOUS
Qty: 1 BOX | Refills: 2 | Status: ON HOLD
Start: 2018-03-26 | End: 2018-06-08 | Stop reason: HOSPADM

## 2018-03-26 RX ORDER — ONDANSETRON 2 MG/ML
4 INJECTION INTRAMUSCULAR; INTRAVENOUS ONCE
Status: COMPLETED | OUTPATIENT
Start: 2018-03-26 | End: 2018-03-26

## 2018-03-26 RX ADMIN — ONDANSETRON HYDROCHLORIDE 4 MG: 2 INJECTION INTRAMUSCULAR; INTRAVENOUS at 07:03

## 2018-03-26 RX ADMIN — SODIUM CHLORIDE 1000 ML: 0.9 INJECTION, SOLUTION INTRAVENOUS at 06:03

## 2018-03-26 RX ADMIN — INSULIN HUMAN 4 UNITS: 100 INJECTION, SOLUTION PARENTERAL at 08:03

## 2018-03-26 NOTE — ED TRIAGE NOTES
The patient believes that she may be acidotic and states that her blood sugar is high. Her last blood glucose was 250. She has been vomiting for the last 3 days. She has had one episode of diarrhea. She denies chest pain, SOB, abdominal pain, recent fevers and chills. She is blind and is accompanied by her daughter. She is an HD patient and her regular days for dialysis are TThS and she has been making her appointments. No acute distress, will continue to monitor.

## 2018-03-26 NOTE — ED PROVIDER NOTES
Encounter Date: 3/26/2018    SCRIBE #1 NOTE: I, Marcelina Benz, am scribing for, and in the presence of, Dr. Cooper.       History     Chief Complaint   Patient presents with    Emesis     nausea and vomiting x 3 days. pt states unable to keep anything down did take her bp meds  today and they seem dolores stay stanley but trow up later. pt with a little bit of diarrhea  as well. pt is blind.     Time seen by provider: 5:11 PM    This is a 52 y.o. female who presents with complaint of vomiting that began three days ago. Patient reports that for three days she has been unable keep down foods or liquids. She states she was discharged from the hospital on 3/11/17 and has been living in a nursing home since for rehabilitation. Her vomiting is accompanied by nausea, and diarrhea (last episode yesterday). Patient reports she has an appetite but hasn't been eating less secondary to her nausea. Patient denies abdominal pain, dysuria, hematuria, or blood in vomit. She states she was given an unknown sublingual anti-nausea with no relief. Patient says she had a similar episode of nausea and vomiting the last time she was diagnosed DKA.       The history is provided by the patient.     Review of patient's allergies indicates:   Allergen Reactions    Ciprofloxacin (bulk) Itching    Latex Itching and Other (See Comments)     Very low Oxygen    Vancomycin Shortness Of Breath and Itching    Neurontin [gabapentin] Other (See Comments)     Seizure like activity    Niacin Itching and Other (See Comments)     Burning      Bactrim [sulfamethoxazole-trimethoprim] Rash     Past Medical History:   Diagnosis Date    Anemia     during pregnancys    Arthritis     neuropathy hands feet and legs//    Blood transfusion     Chronic pain     CKD (chronic kidney disease), stage IV     TThSat    Diabetes mellitus     Diabetes mellitus type I     Diabetic retinopathy     Hyperlipidemia     Hypertension     patient states that when her blood  sugar increases her blood pressure increases    Neuropathy     Pneumonia     Seizures     when sugar is high, does not quite remember    Stroke     2018    Vitamin D deficiency     Vitamin D deficiency disease      Past Surgical History:   Procedure Laterality Date     SECTION, CLASSIC      x 2    EYE SURGERY      HYSTERECTOMY       Family History   Problem Relation Age of Onset    Diabetes Mother     Heart disease Mother     Blindness Mother      diabetes    Hypertension Mother     Diabetes Father     Asthma Father     Hypertension Father     Thyroid disease Sister     Breast cancer Daughter     Diabetes Sister     Stroke Maternal Uncle     Glaucoma Maternal Grandmother     Blindness Maternal Grandmother     Cataracts Maternal Grandmother     Hypertension Maternal Grandmother     Cancer Cousin     Amblyopia Neg Hx     Macular degeneration Neg Hx     Retinal detachment Neg Hx     Strabismus Neg Hx     Colon cancer Neg Hx     Ovarian cancer Neg Hx      Social History   Substance Use Topics    Smoking status: Current Some Day Smoker     Packs/day: 0.10     Years: 10.00     Types: Cigarettes    Smokeless tobacco: Never Used      Comment: 1 pack last her about 2-3 months    Alcohol use No     Review of Systems   Constitutional: Negative for activity change, appetite change, chills, diaphoresis and fever.   HENT: Negative for congestion, sore throat and trouble swallowing.    Eyes: Negative for photophobia and visual disturbance.   Respiratory: Negative for cough, chest tightness and shortness of breath.    Cardiovascular: Negative for chest pain.   Gastrointestinal: Positive for diarrhea, nausea and vomiting. Negative for abdominal pain.        Negative for blood in vomit.    Endocrine: Negative for polydipsia and polyuria.   Genitourinary: Negative for difficulty urinating, dysuria, flank pain and hematuria.   Musculoskeletal: Negative for back pain and neck pain.   Skin:  Negative for rash.   Neurological: Negative for weakness and headaches.   Psychiatric/Behavioral: Negative for confusion.       Physical Exam     Initial Vitals [03/26/18 1654]   BP Pulse Resp Temp SpO2   (!) 152/69 91 18 99.1 °F (37.3 °C) 98 %      MAP       96.67         Physical Exam    Nursing note and vitals reviewed.  Constitutional: She appears well-nourished. She is cooperative.  Non-toxic appearance. No distress.   Chronically ill   HENT:   Head: Normocephalic and atraumatic.   Mouth/Throat: Oropharynx is clear and moist.   Eyes: Conjunctivae and EOM are normal. Pupils are equal, round, and reactive to light.   Neck: Normal range of motion and full passive range of motion without pain. Neck supple. No thyromegaly present. No JVD present.   Cardiovascular: Normal rate, regular rhythm, normal heart sounds and normal pulses.   Pulmonary/Chest: Effort normal and breath sounds normal. No respiratory distress.   Abdominal: Soft. Normal appearance and bowel sounds are normal. She exhibits no distension and no mass. There is no tenderness.   Musculoskeletal: Normal range of motion.   Neurological: She is alert and oriented to person, place, and time. She has normal strength. No cranial nerve deficit or sensory deficit.   Skin: Skin is warm, dry and intact. No rash noted.   Psychiatric: She has a normal mood and affect. Her speech is normal and behavior is normal. Judgment and thought content normal.         ED Course   Procedures  Labs Reviewed   CBC W/ AUTO DIFFERENTIAL - Abnormal; Notable for the following:        Result Value    WBC 14.71 (*)     RBC 3.38 (*)     Hemoglobin 9.6 (*)     Hematocrit 29.9 (*)     RDW 17.5 (*)     Gran # (ANC) 12.4 (*)     Lymph # 0.7 (*)     Mono # 1.6 (*)     Gran% 84.4 (*)     Lymph% 4.8 (*)     All other components within normal limits   COMPREHENSIVE METABOLIC PANEL - Abnormal; Notable for the following:     Sodium 134 (*)     Chloride 92 (*)     CO2 19 (*)     Glucose 370 (*)      BUN, Bld 67 (*)     Creatinine 6.8 (*)     ALT 6 (*)     Anion Gap 23 (*)     eGFR if  7 (*)     eGFR if non  6 (*)     All other components within normal limits   BETA - HYDROXYBUTYRATE, SERUM - Abnormal; Notable for the following:     Beta-Hydroxybutyrate 2.2 (*)     All other components within normal limits   POCT GLUCOSE - Abnormal; Notable for the following:     POCT Glucose 367 (*)     All other components within normal limits   ISTAT PROCEDURE - Abnormal; Notable for the following:     POC PH 7.648 (*)     POC PCO2 14.9 (*)     POC PO2 128 (*)     POC HCO3 16.3 (*)     All other components within normal limits   URINALYSIS   POCT GLUCOSE MONITORING CONTINUOUS   POCT GLUCOSE MONITORING CONTINUOUS     EKG Readings: (Independently Interpreted)   Initial Reading: No STEMI.   Normal sinus rhythm at a rate of 85 bpm. Normal axis. Good R wave progression. Unchanged from 3/11/18.     Imaging Results          CT Renal Stone Study ABD Pelvis WO (Final result)  Result time 03/26/18 21:04:41    Final result by Hollis Romero MD (03/26/18 21:04:41)                 Impression:      No evidence of nephrolithiasis or obstructive uropathy.    Wall thickening of the urinary bladder.  The findings may be seen with cystitis.  Suggest correlation with urinalysis.    New small right-sided pleural effusion.    Extensive diverticulosis coli without evidence of acute diverticulitis.    Stable appearance of left adrenal adenoma.    Additional findings as above.      Electronically signed by: Hollis Romero MD  Date:    03/26/2018  Time:    21:04             Narrative:    EXAMINATION:  CT RENAL STONE STUDY ABD PELVIS WO    CLINICAL HISTORY:  Abdominal pain, unspecified;    TECHNIQUE:  Axial CT scan of the abdomen and pelvis was obtained from the level of the hemidiaphragms to the pubic symphysis without intravenous contrast.  The study was performed using a renal stone protocol.  Therefore, no intravenous  or oral IV contrast was administered.    COMPARISON:  Ultrasound of the kidneys dated 10/19/2015 and CT scan of the abdomen and pelvis dated 10/24/2012    FINDINGS:  There is a small right-sided pleural effusion.  Associated compressive atelectasis is noted in the right lung base.  No discrete nodule is present.    The heart is unremarkable..  There are extensive calcifications of the abdominal aorta and its branch vessels.  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  The portal veins and mesenteric vessels are unremarkable.  There is no evidence of lymphadenopathy in the abdomen or pelvis.    The esophagus, stomach, and duodenum are within normal limits.  The small bowel loops are unremarkable.  The appendix is normal in caliber.  There is minimal high attenuation material within the appendix.  This may represent appendicoliths.  There are no CT findings to suggest acute appendicitis.  There is colonic diverticula without evidence of acute diverticulitis.  No significant bowel wall thickening is identified.  There is no evidence of bowel obstruction.    The liver is unremarkable.  The gallbladder is within normal limits.  The biliary tree is unremarkable.  The spleen is unremarkable.  There is fatty atrophy of the pancreas.  The right adrenal gland is unremarkable.  A normal left adrenal gland is not visualized.  There is a stable 4.5 x 3.0 cm low-attenuation lesion in the expected location of the left adrenal gland with Hounsfield unit of 0.5.    The kidneys are mildly atrophic.  No renal parenchymal calcifications are identified.  The ureters appear unremarkable.  There is wall thickening of the urinary bladder.  The patient appears status post hysterectomy.    There is no evidence of free fluid in the abdomen or pelvis.  There is no evidence of free air.  There is no evidence of pneumatosis.    The psoas margins are unremarkable.  The abdominal wall is within normal  limits.  There are degenerative changes in the osseous structures.                               X-Ray Chest AP Portable (Final result)  Result time 03/26/18 17:38:45    Final result by Hollis Romero MD (03/26/18 17:38:45)                 Impression:      No acute process.      Electronically signed by: Hollis Romero MD  Date:    03/26/2018  Time:    17:38             Narrative:    EXAMINATION:  XR CHEST AP PORTABLE    CLINICAL HISTORY:  hyperglycemia;    TECHNIQUE:  Single frontal view of the chest was performed.    COMPARISON:  03/11/2018    FINDINGS:  There is a loop recorder present.  The trachea is unremarkable.  The cardiomediastinal silhouette is within normal limits.  The hemidiaphragms are unremarkable.  There is no evidence of free air beneath the hemidiaphragms.  There are no pleural effusions.  There is no evidence of a pneumothorax.  There is no evidence of pneumomediastinum.  No airspace opacities are present.  There are degenerative changes in the osseous structures.  The subcutaneous tissues are within normal limits.                                X-Rays:   Independently Interpreted Readings:   Chest X-Ray: No cardiomegaly. No infiltrate. No obvious pneumothorax. No free air under the diaphragm.     Medical Decision Making:   Initial Assessment:   Urgent evaluation of 52-year-old female with CVA, diabetes, end-stage renal, hypertension and seizure disorder discharged recently after admission for altered mental status.  Patient found to have Klebsiella UTI status post gentamicin course.  Today patient presents from rehabilitation facility with her daughter given concern for persistent vomiting over the last 3 days.  Patient endorses being given her insulin, with glucose 250 today, no improvement with antiemetic medication.  Patient denies abdominal pain, endorses loose stool recently.  On exam patient nontoxic-appearing, well-hydrated, abdomen soft nontender, nondistended.  Low suspicion for acute DKA,  but we'll evaluate for metabolic disturbance, reassess.    Clinical Tests:   Lab Tests: Ordered and Reviewed  Radiological Study: Ordered and Reviewed  Medical Tests: Ordered and Reviewed  ED Management:  Ph 7.6   Labs notable for metabolic derangements with mixed picture metabolic acidosis, with respiratory alkalosis.  Anion gap 23, bicarbonate 19, + ketones- betahydroxybut 2.2  Per Epic review, patient was closely followed by endocrine, given labile hyperglycemia, and response to insulin.  Was discharged on lower dose of 3 times a day NovoLog.    Discussed these findings with the patient and her daughter, offered admission for repeat blood work after insulin and gentle hydration.  However given the patient's symptoms of nausea have now completely improved, daughter and patient desiring to return to nursing home given recent admission and not desiring for return, especially given HD session scheduled for tomorrow.   Both daughter and pt understand the need for quick return with return in symptoms, change in mental status or abdominal pain, and at that time would likely require admission. Will increase novolog to 6 u with meals, encourage glucose log and call to Endocrine tomorrow.               Scribe Attestation:   Scribe #1: I performed the above scribed service and the documentation accurately describes the services I performed. I attest to the accuracy of the note.    Attending Attestation:           Physician Attestation for Scribe:  Physician Attestation Statement for Scribe #1: I, Dr. Cooper, reviewed documentation, as scribed by Marcelina Benz in my presence, and it is both accurate and complete.                    Clinical Impression:     1. Hyperglycemia    2. ESRD (end stage renal disease)    3. Iron deficiency anemia, unspecified iron deficiency anemia type    4. Type 1 diabetes mellitus with hyperglycemia    5. Nausea          Disposition:   Disposition: Discharged  Condition: Fair                         Brittny Cooper MD  03/26/18 3030

## 2018-03-27 NOTE — DISCHARGE INSTRUCTIONS
Please call your endocrinologist tomorrow to discuss lab work and ed visit today. If your vomiting returns, you develop abdominal pain or any other concerns please return to the ED.

## 2018-03-29 ENCOUNTER — HOSPITAL ENCOUNTER (INPATIENT)
Facility: OTHER | Age: 53
LOS: 2 days | Discharge: HOME | DRG: 637 | End: 2018-03-31
Attending: EMERGENCY MEDICINE | Admitting: HOSPITALIST
Payer: MEDICARE

## 2018-03-29 DIAGNOSIS — I15.0 MALIGNANT RENOVASCULAR HYPERTENSION: ICD-10-CM

## 2018-03-29 DIAGNOSIS — H54.3 BLIND IN BOTH EYES: ICD-10-CM

## 2018-03-29 DIAGNOSIS — D63.1 ANEMIA IN CHRONIC KIDNEY DISEASE, ON CHRONIC DIALYSIS: ICD-10-CM

## 2018-03-29 DIAGNOSIS — R73.9 HYPERGLYCEMIA: Primary | ICD-10-CM

## 2018-03-29 DIAGNOSIS — Z99.2 ANEMIA IN CHRONIC KIDNEY DISEASE, ON CHRONIC DIALYSIS: ICD-10-CM

## 2018-03-29 DIAGNOSIS — Z99.2 ESRD ON DIALYSIS: ICD-10-CM

## 2018-03-29 DIAGNOSIS — E10.10 DIABETIC KETOACIDOSIS ASSOCIATED WITH TYPE 1 DIABETES MELLITUS: ICD-10-CM

## 2018-03-29 DIAGNOSIS — G40.909 SEIZURE DISORDER: ICD-10-CM

## 2018-03-29 DIAGNOSIS — N18.6 ESRD ON DIALYSIS: ICD-10-CM

## 2018-03-29 DIAGNOSIS — N18.6 ANEMIA IN CHRONIC KIDNEY DISEASE, ON CHRONIC DIALYSIS: ICD-10-CM

## 2018-03-29 DIAGNOSIS — E10.10 DIABETIC KETOACIDOSIS WITHOUT COMA ASSOCIATED WITH TYPE 1 DIABETES MELLITUS: ICD-10-CM

## 2018-03-29 DIAGNOSIS — R07.9 CHEST PAIN: ICD-10-CM

## 2018-03-29 PROBLEM — E11.10 DIABETIC KETOACIDOSIS ASSOCIATED WITH TYPE 2 DIABETES MELLITUS: Status: ACTIVE | Noted: 2018-03-29

## 2018-03-29 LAB
ANION GAP SERPL CALC-SCNC: 22 MMOL/L
B-OH-BUTYR BLD STRIP-SCNC: 0.2 MMOL/L
BASOPHILS # BLD AUTO: 0.01 K/UL
BASOPHILS NFR BLD: 0.1 %
BUN SERPL-MCNC: 101 MG/DL
CALCIUM SERPL-MCNC: 7.3 MG/DL
CHLORIDE SERPL-SCNC: 82 MMOL/L
CO2 SERPL-SCNC: 19 MMOL/L
CREAT SERPL-MCNC: 9.4 MG/DL
DIFFERENTIAL METHOD: ABNORMAL
EOSINOPHIL # BLD AUTO: 0.1 K/UL
EOSINOPHIL NFR BLD: 1.5 %
ERYTHROCYTE [DISTWIDTH] IN BLOOD BY AUTOMATED COUNT: 16.1 %
EST. GFR  (AFRICAN AMERICAN): 5 ML/MIN/1.73 M^2
EST. GFR  (NON AFRICAN AMERICAN): 4 ML/MIN/1.73 M^2
GLUCOSE SERPL-MCNC: 972 MG/DL
HCT VFR BLD AUTO: 25.7 %
HGB BLD-MCNC: 7.8 G/DL
LYMPHOCYTES # BLD AUTO: 1 K/UL
LYMPHOCYTES NFR BLD: 13.4 %
MCH RBC QN AUTO: 28.3 PG
MCHC RBC AUTO-ENTMCNC: 30.4 G/DL
MCV RBC AUTO: 93 FL
MONOCYTES # BLD AUTO: 0.3 K/UL
MONOCYTES NFR BLD: 4.7 %
NEUTROPHILS # BLD AUTO: 5.8 K/UL
NEUTROPHILS NFR BLD: 80.2 %
PLATELET # BLD AUTO: 274 K/UL
PMV BLD AUTO: 10 FL
POTASSIUM SERPL-SCNC: 5.4 MMOL/L
RBC # BLD AUTO: 2.76 M/UL
SODIUM SERPL-SCNC: 123 MMOL/L
WBC # BLD AUTO: 7.29 K/UL

## 2018-03-29 PROCEDURE — 36410 VNPNXR 3YR/> PHY/QHP DX/THER: CPT

## 2018-03-29 PROCEDURE — 12000002 HC ACUTE/MED SURGE SEMI-PRIVATE ROOM

## 2018-03-29 PROCEDURE — 80048 BASIC METABOLIC PNL TOTAL CA: CPT

## 2018-03-29 PROCEDURE — 93010 ELECTROCARDIOGRAM REPORT: CPT | Mod: ,,, | Performed by: INTERNAL MEDICINE

## 2018-03-29 PROCEDURE — 93005 ELECTROCARDIOGRAM TRACING: CPT

## 2018-03-29 PROCEDURE — 82010 KETONE BODYS QUAN: CPT

## 2018-03-29 PROCEDURE — 85025 COMPLETE CBC W/AUTO DIFF WBC: CPT

## 2018-03-29 PROCEDURE — 99285 EMERGENCY DEPT VISIT HI MDM: CPT | Mod: 25

## 2018-03-29 PROCEDURE — 82962 GLUCOSE BLOOD TEST: CPT

## 2018-03-29 PROCEDURE — 96374 THER/PROPH/DIAG INJ IV PUSH: CPT | Mod: 59

## 2018-03-29 PROCEDURE — 25000003 PHARM REV CODE 250: Performed by: EMERGENCY MEDICINE

## 2018-03-29 PROCEDURE — 20000000 HC ICU ROOM

## 2018-03-29 RX ORDER — SODIUM CHLORIDE 9 MG/ML
INJECTION, SOLUTION INTRAVENOUS CONTINUOUS
Status: DISCONTINUED | OUTPATIENT
Start: 2018-03-30 | End: 2018-03-30

## 2018-03-29 RX ORDER — ROSUVASTATIN CALCIUM 10 MG/1
20 TABLET, COATED ORAL DAILY
Status: DISCONTINUED | OUTPATIENT
Start: 2018-03-30 | End: 2018-03-31 | Stop reason: HOSPADM

## 2018-03-29 RX ORDER — LEVETIRACETAM 250 MG/1
250 TABLET ORAL 2 TIMES DAILY
Status: DISCONTINUED | OUTPATIENT
Start: 2018-03-30 | End: 2018-03-31 | Stop reason: HOSPADM

## 2018-03-29 RX ORDER — ONDANSETRON 2 MG/ML
4 INJECTION INTRAMUSCULAR; INTRAVENOUS EVERY 8 HOURS PRN
Status: DISCONTINUED | OUTPATIENT
Start: 2018-03-30 | End: 2018-03-31 | Stop reason: HOSPADM

## 2018-03-29 RX ORDER — LOSARTAN POTASSIUM 50 MG/1
100 TABLET ORAL DAILY
Status: DISCONTINUED | OUTPATIENT
Start: 2018-03-30 | End: 2018-03-31 | Stop reason: HOSPADM

## 2018-03-29 RX ORDER — CLONIDINE HYDROCHLORIDE 0.1 MG/1
0.1 TABLET ORAL 2 TIMES DAILY
Status: DISCONTINUED | OUTPATIENT
Start: 2018-03-30 | End: 2018-03-30

## 2018-03-29 RX ORDER — NIFEDIPINE 30 MG/1
60 TABLET, EXTENDED RELEASE ORAL DAILY
Status: DISCONTINUED | OUTPATIENT
Start: 2018-03-30 | End: 2018-03-30

## 2018-03-29 RX ORDER — SODIUM CHLORIDE 9 MG/ML
1000 INJECTION, SOLUTION INTRAVENOUS
Status: COMPLETED | OUTPATIENT
Start: 2018-03-29 | End: 2018-03-29

## 2018-03-29 RX ORDER — SEVELAMER CARBONATE 800 MG/1
1600 TABLET, FILM COATED ORAL
Status: DISCONTINUED | OUTPATIENT
Start: 2018-03-30 | End: 2018-03-31 | Stop reason: HOSPADM

## 2018-03-29 RX ORDER — METOCLOPRAMIDE 10 MG/1
10 TABLET ORAL
Status: DISCONTINUED | OUTPATIENT
Start: 2018-03-30 | End: 2018-03-31 | Stop reason: HOSPADM

## 2018-03-29 RX ORDER — DEXTROSE MONOHYDRATE 100 MG/ML
1000 INJECTION, SOLUTION INTRAVENOUS
Status: DISCONTINUED | OUTPATIENT
Start: 2018-03-30 | End: 2018-03-30

## 2018-03-29 RX ORDER — ACETAMINOPHEN 325 MG/1
650 TABLET ORAL EVERY 4 HOURS PRN
Status: DISCONTINUED | OUTPATIENT
Start: 2018-03-30 | End: 2018-03-31 | Stop reason: HOSPADM

## 2018-03-29 RX ORDER — METOPROLOL TARTRATE 50 MG/1
50 TABLET ORAL 2 TIMES DAILY
Status: DISCONTINUED | OUTPATIENT
Start: 2018-03-30 | End: 2018-03-31 | Stop reason: HOSPADM

## 2018-03-29 RX ORDER — HEPARIN SODIUM 5000 [USP'U]/ML
5000 INJECTION, SOLUTION INTRAVENOUS; SUBCUTANEOUS EVERY 8 HOURS
Status: DISCONTINUED | OUTPATIENT
Start: 2018-03-30 | End: 2018-03-30

## 2018-03-29 RX ADMIN — SODIUM CHLORIDE 1000 ML: 0.9 INJECTION, SOLUTION INTRAVENOUS at 10:03

## 2018-03-29 RX ADMIN — SODIUM CHLORIDE 6 UNITS/HR: 9 INJECTION, SOLUTION INTRAVENOUS at 11:03

## 2018-03-29 NOTE — PROGRESS NOTES
03/29/2018-Spoke with patient daughter (Willian)patient discharge home after going to [facility] from [facility] on 03/26/2018.  Daughter states her mother was doing good and is going to dialysis on today.

## 2018-03-30 LAB
ANION GAP SERPL CALC-SCNC: 14 MMOL/L
ANION GAP SERPL CALC-SCNC: 16 MMOL/L
ANION GAP SERPL CALC-SCNC: 19 MMOL/L
BASOPHILS # BLD AUTO: 0.01 K/UL
BASOPHILS NFR BLD: 0.2 %
BUN SERPL-MCNC: 100 MG/DL
BUN SERPL-MCNC: 36 MG/DL
BUN SERPL-MCNC: 96 MG/DL
CALCIUM SERPL-MCNC: 7.2 MG/DL
CALCIUM SERPL-MCNC: 7.2 MG/DL
CALCIUM SERPL-MCNC: 7.9 MG/DL
CHLORIDE SERPL-SCNC: 92 MMOL/L
CHLORIDE SERPL-SCNC: 93 MMOL/L
CHLORIDE SERPL-SCNC: 98 MMOL/L
CO2 SERPL-SCNC: 15 MMOL/L
CO2 SERPL-SCNC: 21 MMOL/L
CO2 SERPL-SCNC: 24 MMOL/L
CREAT SERPL-MCNC: 4.1 MG/DL
CREAT SERPL-MCNC: 8.9 MG/DL
CREAT SERPL-MCNC: 9 MG/DL
DIFFERENTIAL METHOD: ABNORMAL
EOSINOPHIL # BLD AUTO: 0.3 K/UL
EOSINOPHIL NFR BLD: 4.4 %
ERYTHROCYTE [DISTWIDTH] IN BLOOD BY AUTOMATED COUNT: 16 %
EST. GFR  (AFRICAN AMERICAN): 14 ML/MIN/1.73 M^2
EST. GFR  (AFRICAN AMERICAN): 5 ML/MIN/1.73 M^2
EST. GFR  (AFRICAN AMERICAN): 5 ML/MIN/1.73 M^2
EST. GFR  (NON AFRICAN AMERICAN): 12 ML/MIN/1.73 M^2
EST. GFR  (NON AFRICAN AMERICAN): 5 ML/MIN/1.73 M^2
EST. GFR  (NON AFRICAN AMERICAN): 5 ML/MIN/1.73 M^2
GLUCOSE SERPL-MCNC: 138 MG/DL
GLUCOSE SERPL-MCNC: 172 MG/DL
GLUCOSE SERPL-MCNC: 330 MG/DL
HCT VFR BLD AUTO: 23.9 %
HGB BLD-MCNC: 7.8 G/DL
LYMPHOCYTES # BLD AUTO: 0.9 K/UL
LYMPHOCYTES NFR BLD: 14.3 %
MAGNESIUM SERPL-MCNC: 2.1 MG/DL
MCH RBC QN AUTO: 28.5 PG
MCHC RBC AUTO-ENTMCNC: 32.6 G/DL
MCV RBC AUTO: 87 FL
MONOCYTES # BLD AUTO: 0.7 K/UL
MONOCYTES NFR BLD: 11.5 %
NEUTROPHILS # BLD AUTO: 4.3 K/UL
NEUTROPHILS NFR BLD: 69.4 %
PHOSPHATE SERPL-MCNC: 7 MG/DL
PLATELET # BLD AUTO: 230 K/UL
PMV BLD AUTO: 9.2 FL
POCT GLUCOSE: 100 MG/DL (ref 70–110)
POCT GLUCOSE: 117 MG/DL (ref 70–110)
POCT GLUCOSE: 133 MG/DL (ref 70–110)
POCT GLUCOSE: 135 MG/DL (ref 70–110)
POCT GLUCOSE: 161 MG/DL (ref 70–110)
POCT GLUCOSE: 173 MG/DL (ref 70–110)
POCT GLUCOSE: 244 MG/DL (ref 70–110)
POCT GLUCOSE: 246 MG/DL (ref 70–110)
POCT GLUCOSE: 285 MG/DL (ref 70–110)
POCT GLUCOSE: 319 MG/DL (ref 70–110)
POCT GLUCOSE: 471 MG/DL (ref 70–110)
POTASSIUM SERPL-SCNC: 3.9 MMOL/L
POTASSIUM SERPL-SCNC: 4.1 MMOL/L
POTASSIUM SERPL-SCNC: 5.5 MMOL/L
RBC # BLD AUTO: 2.74 M/UL
SODIUM SERPL-SCNC: 126 MMOL/L
SODIUM SERPL-SCNC: 130 MMOL/L
SODIUM SERPL-SCNC: 136 MMOL/L
WBC # BLD AUTO: 6.15 K/UL

## 2018-03-30 PROCEDURE — 63600175 PHARM REV CODE 636 W HCPCS: Performed by: HOSPITALIST

## 2018-03-30 PROCEDURE — 63600175 PHARM REV CODE 636 W HCPCS: Performed by: EMERGENCY MEDICINE

## 2018-03-30 PROCEDURE — 25000003 PHARM REV CODE 250: Performed by: NURSE PRACTITIONER

## 2018-03-30 PROCEDURE — 80048 BASIC METABOLIC PNL TOTAL CA: CPT | Mod: 91

## 2018-03-30 PROCEDURE — 83735 ASSAY OF MAGNESIUM: CPT

## 2018-03-30 PROCEDURE — 25000003 PHARM REV CODE 250: Performed by: EMERGENCY MEDICINE

## 2018-03-30 PROCEDURE — S5010 5% DEXTROSE AND 0.45% SALINE: HCPCS | Performed by: NURSE PRACTITIONER

## 2018-03-30 PROCEDURE — 80177 DRUG SCRN QUAN LEVETIRACETAM: CPT

## 2018-03-30 PROCEDURE — 80100016 HC MAINTENANCE HEMODIALYSIS

## 2018-03-30 PROCEDURE — 85025 COMPLETE CBC W/AUTO DIFF WBC: CPT

## 2018-03-30 PROCEDURE — 25000003 PHARM REV CODE 250: Performed by: HOSPITALIST

## 2018-03-30 PROCEDURE — 84100 ASSAY OF PHOSPHORUS: CPT

## 2018-03-30 PROCEDURE — 36415 COLL VENOUS BLD VENIPUNCTURE: CPT

## 2018-03-30 PROCEDURE — 25000003 PHARM REV CODE 250: Performed by: STUDENT IN AN ORGANIZED HEALTH CARE EDUCATION/TRAINING PROGRAM

## 2018-03-30 PROCEDURE — 20000000 HC ICU ROOM

## 2018-03-30 PROCEDURE — 63600175 PHARM REV CODE 636 W HCPCS: Performed by: NURSE PRACTITIONER

## 2018-03-30 PROCEDURE — 87340 HEPATITIS B SURFACE AG IA: CPT

## 2018-03-30 PROCEDURE — 86704 HEP B CORE ANTIBODY TOTAL: CPT

## 2018-03-30 PROCEDURE — 99223 1ST HOSP IP/OBS HIGH 75: CPT | Mod: ,,, | Performed by: HOSPITALIST

## 2018-03-30 PROCEDURE — 86706 HEP B SURFACE ANTIBODY: CPT

## 2018-03-30 RX ORDER — PARICALCITOL 5 UG/ML
2 INJECTION, SOLUTION INTRAVENOUS
Status: COMPLETED | OUTPATIENT
Start: 2018-03-30 | End: 2018-03-30

## 2018-03-30 RX ORDER — OXYCODONE AND ACETAMINOPHEN 5; 325 MG/1; MG/1
1 TABLET ORAL ONCE
Status: COMPLETED | OUTPATIENT
Start: 2018-03-30 | End: 2018-03-30

## 2018-03-30 RX ORDER — IBUPROFEN 200 MG
24 TABLET ORAL
Status: DISCONTINUED | OUTPATIENT
Start: 2018-03-30 | End: 2018-03-31 | Stop reason: HOSPADM

## 2018-03-30 RX ORDER — DEXTROSE MONOHYDRATE AND SODIUM CHLORIDE 5; .45 G/100ML; G/100ML
INJECTION, SOLUTION INTRAVENOUS CONTINUOUS
Status: DISCONTINUED | OUTPATIENT
Start: 2018-03-30 | End: 2018-03-30

## 2018-03-30 RX ORDER — GLUCAGON 1 MG
1 KIT INJECTION
Status: DISCONTINUED | OUTPATIENT
Start: 2018-03-30 | End: 2018-03-31 | Stop reason: HOSPADM

## 2018-03-30 RX ORDER — INSULIN ASPART 100 [IU]/ML
0-5 INJECTION, SOLUTION INTRAVENOUS; SUBCUTANEOUS
Status: DISCONTINUED | OUTPATIENT
Start: 2018-03-30 | End: 2018-03-31 | Stop reason: HOSPADM

## 2018-03-30 RX ORDER — HEPARIN SODIUM 5000 [USP'U]/ML
5000 INJECTION, SOLUTION INTRAVENOUS; SUBCUTANEOUS EVERY 12 HOURS
Status: DISCONTINUED | OUTPATIENT
Start: 2018-03-30 | End: 2018-03-31 | Stop reason: HOSPADM

## 2018-03-30 RX ORDER — IBUPROFEN 200 MG
16 TABLET ORAL
Status: DISCONTINUED | OUTPATIENT
Start: 2018-03-30 | End: 2018-03-31 | Stop reason: HOSPADM

## 2018-03-30 RX ORDER — OXYCODONE AND ACETAMINOPHEN 5; 325 MG/1; MG/1
1 TABLET ORAL 2 TIMES DAILY PRN
Status: DISCONTINUED | OUTPATIENT
Start: 2018-03-30 | End: 2018-03-31 | Stop reason: HOSPADM

## 2018-03-30 RX ORDER — SODIUM CHLORIDE 9 MG/ML
INJECTION, SOLUTION INTRAVENOUS ONCE
Status: COMPLETED | OUTPATIENT
Start: 2018-03-30 | End: 2018-03-30

## 2018-03-30 RX ADMIN — SEVELAMER CARBONATE 1600 MG: 800 TABLET, FILM COATED ORAL at 05:03

## 2018-03-30 RX ADMIN — LEVETIRACETAM 250 MG: 250 TABLET, FILM COATED ORAL at 09:03

## 2018-03-30 RX ADMIN — PARICALCITOL 2 MCG: 5 INJECTION, SOLUTION INTRAVENOUS at 02:03

## 2018-03-30 RX ADMIN — LOSARTAN POTASSIUM 100 MG: 50 TABLET, FILM COATED ORAL at 04:03

## 2018-03-30 RX ADMIN — METOCLOPRAMIDE 10 MG: 10 TABLET ORAL at 04:03

## 2018-03-30 RX ADMIN — METOPROLOL TARTRATE 50 MG: 50 TABLET ORAL at 08:03

## 2018-03-30 RX ADMIN — HEPARIN SODIUM 5000 UNITS: 5000 INJECTION, SOLUTION INTRAVENOUS; SUBCUTANEOUS at 08:03

## 2018-03-30 RX ADMIN — DEXTROSE AND SODIUM CHLORIDE: 5; .45 INJECTION, SOLUTION INTRAVENOUS at 05:03

## 2018-03-30 RX ADMIN — LEVETIRACETAM 250 MG: 250 TABLET, FILM COATED ORAL at 08:03

## 2018-03-30 RX ADMIN — OXYCODONE HYDROCHLORIDE AND ACETAMINOPHEN 1 TABLET: 5; 325 TABLET ORAL at 12:03

## 2018-03-30 RX ADMIN — OXYCODONE HYDROCHLORIDE AND ACETAMINOPHEN 1 TABLET: 5; 325 TABLET ORAL at 10:03

## 2018-03-30 RX ADMIN — INSULIN ASPART 3 UNITS: 100 INJECTION, SOLUTION INTRAVENOUS; SUBCUTANEOUS at 02:03

## 2018-03-30 RX ADMIN — SEVELAMER CARBONATE 1600 MG: 800 TABLET, FILM COATED ORAL at 11:03

## 2018-03-30 RX ADMIN — METOCLOPRAMIDE 10 MG: 10 TABLET ORAL at 11:03

## 2018-03-30 RX ADMIN — METOCLOPRAMIDE 10 MG: 10 TABLET ORAL at 06:03

## 2018-03-30 RX ADMIN — INSULIN ASPART 1 UNITS: 100 INJECTION, SOLUTION INTRAVENOUS; SUBCUTANEOUS at 10:03

## 2018-03-30 RX ADMIN — ERYTHROPOIETIN 20000 UNITS: 20000 INJECTION, SOLUTION INTRAVENOUS; SUBCUTANEOUS at 02:03

## 2018-03-30 RX ADMIN — HEPARIN SODIUM 5000 UNITS: 5000 INJECTION, SOLUTION INTRAVENOUS; SUBCUTANEOUS at 06:03

## 2018-03-30 RX ADMIN — SODIUM CHLORIDE: 0.9 INJECTION, SOLUTION INTRAVENOUS at 12:03

## 2018-03-30 RX ADMIN — SODIUM CHLORIDE: 0.9 INJECTION, SOLUTION INTRAVENOUS at 02:03

## 2018-03-30 RX ADMIN — ROSUVASTATIN CALCIUM 20 MG: 10 TABLET, FILM COATED ORAL at 04:03

## 2018-03-30 NOTE — SUBJECTIVE & OBJECTIVE
Interval History:  Patient lethargic and is poor historian.  Unable to determine how much insulin she takes.  She reports she has been on HD for the last 6 months and it was initiated while she was in [place].    Review of Systems   Unable to perform ROS: Acuity of condition     Objective:     Vital Signs (Most Recent):  Temp: 97.9 °F (36.6 °C) (03/30/18 1315)  Pulse: 90 (03/30/18 1500)  Resp: 10 (03/30/18 1500)  BP: (!) 159/72 (03/30/18 1500)  SpO2: 98 % (03/30/18 1500) Vital Signs (24h Range):  Temp:  [97.5 °F (36.4 °C)-98.2 °F (36.8 °C)] 97.9 °F (36.6 °C)  Pulse:  [74-90] 90  Resp:  [7-23] 10  SpO2:  [91 %-98 %] 98 %  BP: ()/(51-72) 159/72     Weight: 61.7 kg (136 lb 0.4 oz)  Body mass index is 21.3 kg/m².    Intake/Output Summary (Last 24 hours) at 03/30/18 1520  Last data filed at 03/30/18 0900   Gross per 24 hour   Intake           2050.4 ml   Output                0 ml   Net           2050.4 ml      Physical Exam   Constitutional: She appears well-developed and well-nourished.   Lethargic.   HENT:   Head: Normocephalic.   Eyes: Conjunctivae are normal. Pupils are equal, round, and reactive to light.   Neck: Neck supple. No thyromegaly present.   Cardiovascular: Normal rate, regular rhythm, normal heart sounds and intact distal pulses.  Exam reveals no gallop and no friction rub.    No murmur heard.  Pulmonary/Chest: Effort normal and breath sounds normal.   Abdominal: Soft. Bowel sounds are normal. She exhibits no distension. There is no tenderness.   Musculoskeletal: Normal range of motion. She exhibits no edema.   LUE AVF with thrill.   Lymphadenopathy:     She has no cervical adenopathy.   Neurological: She is alert.   Unable to answer questions intelligently.   Skin: Skin is warm and dry. No rash noted.       Significant Labs:   BMP:   Recent Labs  Lab 03/30/18  0535 03/30/18  1217   GLU  --  330*   NA  --  126*   K  --  5.5*   CL  --  92*   CO2  --  15*   BUN  --  96*   CREATININE  --  9.0*   CALCIUM   --  7.2*   MG 2.1  --      CBC:   Recent Labs  Lab 03/29/18  2145 03/30/18  0535   WBC 7.29 6.15   HGB 7.8* 7.8*   HCT 25.7* 23.9*    230       Significant Imaging: I have reviewed all pertinent imaging results/findings within the past 24 hours.

## 2018-03-30 NOTE — ASSESSMENT & PLAN NOTE
- Dialyzed this morning  - Patient nearly anuric  - Question of HD location - she was scheduled on [facility] but lives in [facility]  -  to address.

## 2018-03-30 NOTE — CONSULTS
Consult Note  Nephrology    Consult Requested By: Rachel Bedolla MD  Reason for Consult: ESRD    SUBJECTIVE:     History of Present Illness:  Patient is a 52 y.o. female presents with elevated CBG (900's) and admitted to ICU for insulin drip.  Extensive medical hx as outlined below.  ESRD on HD (days not established yet but supposedly going to [facility] on [address] as she just moved from [place] 3 weeks ago and has been in/out of hospitals-rehab.  Consulted for HD needs.      PT seen and examined.  NAD at present.  Denies CP/SOB/N/V/D/F/C.  Legally blind from DM.  Left AVF +.      EPIC reviewed.  Discussed with treatment team.      Past Medical History:   Diagnosis Date    Anemia     during pregnancys    Arthritis     neuropathy hands feet and legs//    Blood transfusion     Chronic pain     Diabetes mellitus     Diabetes mellitus type I     Diabetic retinopathy     ESRD (end stage renal disease) on dialysis     Hyperlipidemia     Hypertension     patient states that when her blood sugar increases her blood pressure increases    Neuropathy     Pneumonia     Seizures     when sugar is high, does not quite remember    Stroke     2018    Vitamin D deficiency      Past Surgical History:   Procedure Laterality Date     SECTION, CLASSIC      x 2    EYE SURGERY      HYSTERECTOMY       Family History   Problem Relation Age of Onset    Diabetes Mother     Heart disease Mother     Blindness Mother      diabetes    Hypertension Mother     Diabetes Father     Asthma Father     Hypertension Father     Thyroid disease Sister     Breast cancer Daughter     Diabetes Sister     Stroke Maternal Uncle     Glaucoma Maternal Grandmother     Blindness Maternal Grandmother     Cataracts Maternal Grandmother     Hypertension Maternal Grandmother     Cancer Cousin     Amblyopia Neg Hx     Macular degeneration Neg Hx     Retinal detachment Neg Hx     Strabismus Neg Hx     Colon cancer  Neg Hx     Ovarian cancer Neg Hx      Social History   Substance Use Topics    Smoking status: Current Some Day Smoker     Packs/day: 0.10     Years: 10.00     Types: Cigarettes    Smokeless tobacco: Never Used      Comment: 1 pack last her about 2-3 months    Alcohol use No       Review of patient's allergies indicates:   Allergen Reactions    Ciprofloxacin (bulk) Itching    Latex Itching and Other (See Comments)     Very low Oxygen    Vancomycin Shortness Of Breath and Itching    Neurontin [gabapentin] Other (See Comments)     Seizure like activity    Niacin Itching and Other (See Comments)     Burning      Bactrim [sulfamethoxazole-trimethoprim] Rash        Review of Systems:  Constitutional: No fever or chills  Respiratory: No cough or shortness of breath  Cardiovascular: No chest pain or palpitations  Gastrointestinal: No nausea or vomiting  Neurological: No confusion or weakness    OBJECTIVE:     Vital Signs (Most Recent)  Temp: 98 °F (36.7 °C) (03/30/18 0315)  Pulse: 74 (03/30/18 0600)  Resp: 12 (03/30/18 0600)  BP: (!) 98/55 (03/30/18 0600)  SpO2: (!) 94 % (03/30/18 0600)    Vital Signs Range (Last 24H):  Temp:  [97.5 °F (36.4 °C)-98 °F (36.7 °C)]   Pulse:  [74-87]   Resp:  [10-23]   BP: ()/(55-60)   SpO2:  [93 %-98 %]       Intake/Output Summary (Last 24 hours) at 03/30/18 0740  Last data filed at 03/30/18 0600   Gross per 24 hour   Intake           1925.4 ml   Output                0 ml   Net           1925.4 ml       Physical Exam:  General appearance: frail/chronically ill appearing.  Confused to most questions.   Eyes:  Legally blind.  Lungs: Normal respiratory effort,   clear to auscultation bilaterally   Heart: Regular rate and rhythm, S1, S2 normal, no murmur, rub or sana.  Abdomen: Soft, non-tender non-distended; bowel sounds normal; no masses,  no organomegaly  Extremities: No cyanosis or clubbing. No edema.    Skin: Skin color, texture, turgor normal. No rashes or  lesions  Neurologic: general weakness  Left UA AVF: +      Laboratory:    Recent Labs  Lab 03/30/18  0535   WBC 6.15   RBC 2.74*   HGB 7.8*   HCT 23.9*      MCV 87   MCH 28.5   MCHC 32.6     BMP:   Recent Labs  Lab 03/30/18  0534 03/30/18  0535   *  --    *  --    K 4.1  --    CL 93*  --    CO2 21*  --    *  --    CREATININE 8.9*  --    CALCIUM 7.2*  --    MG  --  2.1     Lab Results   Component Value Date    CALCIUM 7.2 (L) 03/30/2018    PHOS 7.0 (H) 03/30/2018     BNP  No results for input(s): BNP, BNPTRIAGEBLO in the last 168 hours.  Lab Results   Component Value Date    URICACID 7.4 (H) 10/20/2015     Lab Results   Component Value Date    IRON 131 03/12/2018    TIBC 311 03/12/2018    FERRITIN 1,103 (H) 03/12/2018     Lab Results   Component Value Date    .0 (H) 03/12/2018    CALCIUM 7.2 (L) 03/30/2018    PHOS 7.0 (H) 03/30/2018       Diagnostic Results:  X-Ray Chest AP Portable   Final Result      No acute cardiopulmonary process identified.         Electronically signed by: Mathew Antony MD   Date:    03/29/2018   Time:    22:17          ASSESSMENT/PLAN:     1. ESRD on HD likely secondary to DM (N18.6, E10.22, Z99.2):  See HPI.  Just moved from [place] and tentative HD at [facility] on [address].  Consult SW to verify.  HD today for metabolic clearances.  Consent signed.  Hold BP meds for now.  Renally dose meds, avoid nephrotoxins, and monitor I/O's closely.  2. DKA on insulin drip (E10.10):  Poorly controlled.  Defer. No IVF's w/ HD pts please.   3. Hypotension (I95.9):  Hold BP meds.   4. HPTH/>Phos (N25.81, E83.39):  Binders and use gentle zemplar on HD.  5. Anemia of CKD (D63.1):  epo with HD.       Thanks for consult  To floor once off insulin drip. D/C home once HD unit and shift verified by sw  See above.

## 2018-03-30 NOTE — ASSESSMENT & PLAN NOTE
- A1c 9.1. BG - 972, anion gap 22 on admission with normal beta hydroxybutyrate.  - Gap closed, discontinued insulin drip and started SSI  - Start long acting insulin when she is eating reliably.

## 2018-03-30 NOTE — H&P
[facility] Hospital Medicine  History & Physical    Patient Name: Eufemia Haq  MRN: [id number]  Admission Date: 3/29/2018  Attending Physician: Rachel Bedolla MD   Primary Care Provider: Primary Doctor No         Patient information was obtained from patient, past medical records and ER records.     Subjective:     Principal Problem:Diabetic ketoacidosis associated with type 1 diabetes mellitus    Chief Complaint:   Chief Complaint   Patient presents with    Chest Pain     midsternal sharp chest pain with lying down, EMS states CBG reading high, nursing home gave insulin at 2000        HPI: This is a 52 y.o. female, with a history of CVA, DM, ESRD, HTN, and seizures, who presents via EMS with complaint of missed dialysis today. Patient reports last receiving dialysis this Tuesday. She reports receiving dialysis at [facility], but is unsure of who her doctor is. She has been on dialysis for six months. Patient states she still makes urine. She denies shortness of breath, abdominal pain, abdominal distension, nausea, vomiting, or decreased urine. She reports recently moving from [place] and is living with her sister who is her caretaker. Patient was admitted to [facility] on 3/11 s/p seizure and discharged to [facility] rehabilitation facility. Per sister, patient was removed from [facility] rehabilitation two days ago. Patient's dialysis is being coordinated by [facility], but they are trying to change to somewhere more accessible in [facility]. Patient reports her sister, Willian, helps manage her healthcare her phone number is: [phone].     Past Medical History:   Diagnosis Date    Anemia     during pregnancys    Arthritis     neuropathy hands feet and legs//    Blood transfusion     Chronic pain     CKD (chronic kidney disease), stage IV     TThSat    Diabetes mellitus     Diabetes mellitus type I     Diabetic retinopathy      Hyperlipidemia     Hypertension     patient states that when her blood sugar increases her blood pressure increases    Neuropathy     Pneumonia     Seizures     when sugar is high, does not quite remember    Stroke     2018    Vitamin D deficiency     Vitamin D deficiency disease        Past Surgical History:   Procedure Laterality Date     SECTION, CLASSIC      x 2    EYE SURGERY      HYSTERECTOMY         Review of patient's allergies indicates:   Allergen Reactions    Ciprofloxacin (bulk) Itching    Latex Itching and Other (See Comments)     Very low Oxygen    Vancomycin Shortness Of Breath and Itching    Neurontin [gabapentin] Other (See Comments)     Seizure like activity    Niacin Itching and Other (See Comments)     Burning      Bactrim [sulfamethoxazole-trimethoprim] Rash       No current facility-administered medications on file prior to encounter.      Current Outpatient Prescriptions on File Prior to Encounter   Medication Sig    acetaminophen-codeine 300-60mg (TYLENOL #4) 300-60 mg Tab Take 2 tablets by mouth daily as needed (pain).    blood sugar diagnostic Strp To use as directed with True results meter and monitor BS 6 times daily - fluctuating BS    cloNIDine (CATAPRES) 0.1 MG tablet TK 1 T PO Q 12 H    epoetin jacques (PROCRIT) 20,000 unit/mL injection Inject 1 mL (20,000 Units total) into the skin every es, Th, Sat.    hydroCHLOROthiazide (HYDRODIURIL) 12.5 MG Tab Take 1 tablet (12.5 mg total) by mouth once daily.    insulin aspart U-100 (NOVOLOG) 100 unit/mL InPn pen Inject 5 Units into the skin 3 (three) times daily with meals.    insulin detemir U-100 (LEVEMIR FLEXTOUCH) 100 unit/mL (3 mL) SubQ InPn pen Inject 7 Units into the skin 2 (two) times daily.    levetiracetam (KEPPRA) 250 MG Tab TK 1 T PO BID    losartan (COZAAR) 100 MG tablet Take 1 tablet (100 mg total) by mouth once daily.    metoclopramide HCl (REGLAN) 10 MG tablet Take 10 mg by mouth 3  (three) times daily before meals.    metoprolol tartrate (LOPRESSOR) 50 MG tablet Take 50 mg by mouth 2 (two) times daily.    nifedipine (PROCARDIA-XL) 60 MG (OSM) 24 hr tablet TK 1 T PO Q 12 H    ondansetron (ZOFRAN-ODT) 4 MG TbDL Take 1-2 tablets by mouth every 4 hours as needed for nausea and vomiting    ONETOUCH DELICA LANCETS 33 gauge Misc TEST BLOOD SUGAR TID    rosuvastatin (CRESTOR) 20 MG tablet Take 20 mg by mouth once daily.    sevelamer carbonate (RENVELA) 800 mg Tab Take 2 tablets (1,600 mg total) by mouth 3 (three) times daily with meals.     Family History     Problem Relation (Age of Onset)    Asthma Father    Blindness Mother, Maternal Grandmother    Breast cancer Daughter    Cancer Cousin    Cataracts Maternal Grandmother    Diabetes Mother, Father, Sister    Glaucoma Maternal Grandmother    Heart disease Mother    Hypertension Mother, Father, Maternal Grandmother    Stroke Maternal Uncle    Thyroid disease Sister        Social History Main Topics    Smoking status: Current Some Day Smoker     Packs/day: 0.10     Years: 10.00     Types: Cigarettes    Smokeless tobacco: Never Used      Comment: 1 pack last her about 2-3 months    Alcohol use No    Drug use: No    Sexual activity: Yes     Partners: Male     Birth control/ protection: None     Review of Systems   Constitutional: Positive for activity change and appetite change. Negative for fever.   HENT: Negative for congestion, ear pain, rhinorrhea and sinus pressure.    Eyes: Negative for pain and discharge.   Respiratory: Negative for cough, chest tightness, shortness of breath and wheezing.    Cardiovascular: Negative for chest pain and leg swelling.   Gastrointestinal: Negative for abdominal distention, abdominal pain, diarrhea, nausea and vomiting.   Endocrine: Negative for cold intolerance and heat intolerance.   Genitourinary: Negative for difficulty urinating, flank pain, frequency, hematuria and urgency.   Musculoskeletal:  Positive for myalgias. Negative for arthralgias and joint swelling.   Allergic/Immunologic: Negative for environmental allergies and food allergies.   Neurological: Positive for weakness. Negative for dizziness, light-headedness and headaches.   Hematological: Does not bruise/bleed easily.   Psychiatric/Behavioral: Negative for agitation, behavioral problems and decreased concentration.     Objective:     Vital Signs (Most Recent):  Temp: 97.6 °F (36.4 °C) (03/30/18 0000)  Pulse: 80 (03/30/18 0115)  Resp: 12 (03/30/18 0115)  BP: (!) 118/58 (03/30/18 0115)  SpO2: (!) 94 % (03/30/18 0115) Vital Signs (24h Range):  Temp:  [97.5 °F (36.4 °C)-97.8 °F (36.6 °C)] 97.6 °F (36.4 °C)  Pulse:  [80-87] 80  Resp:  [10-23] 12  SpO2:  [94 %-98 %] 94 %  BP: (113-127)/(55-60) 118/58     Weight: 61.7 kg (136 lb 0.4 oz)  Body mass index is 21.3 kg/m².    Physical Exam   Constitutional: She is oriented to person, place, and time. She appears well-developed and well-nourished.   HENT:   Head: Normocephalic.   Eyes: Right eye exhibits no discharge. Left eye exhibits no discharge.   Patient is blind.   Neck: Normal range of motion. Neck supple.   Cardiovascular: Normal rate, regular rhythm, normal heart sounds and intact distal pulses.    Mild edema noted to lower extremities   Pulmonary/Chest: Effort normal and breath sounds normal. No respiratory distress.   Abdominal: Soft. Bowel sounds are normal. She exhibits no distension. There is no tenderness.   Musculoskeletal: Normal range of motion.   Neurological: She is alert and oriented to person, place, and time. GCS eye subscore is 4. GCS verbal subscore is 5. GCS motor subscore is 6.   Skin: Skin is warm and dry. Capillary refill takes more than 3 seconds.   Psychiatric: She has a normal mood and affect. Her speech is normal and behavior is normal.           Significant Labs:   CBC:   Recent Labs  Lab 03/29/18  2145   WBC 7.29   HGB 7.8*   HCT 25.7*        CMP:   Recent Labs  Lab  03/29/18  2145   *   K 5.4*   CL 82*   CO2 19*   *   *   CREATININE 9.4*   CALCIUM 7.3*   ANIONGAP 22*   EGFRNONAA 4*       Significant Imaging: I have reviewed all pertinent imaging results/findings within the past 24 hours.    Assessment/Plan:     * Diabetic ketoacidosis associated with type 1 diabetes mellitus    A1c 9.1. BG- 972. Anion gap-22. Beta hydroxybutyrate .2  Corrected Na- 139, K- 5.4    Insulin drip infusing  NS at 150ml/hr  BMP Q6  BG-Q hour          Seizure disorder    Continue Keppra  levetiracetam level pending        ESRD on dialysis    Consult Nephrology-will dialyze tonight          HTN (hypertension), benign    Normotensive    Continue clonidine, losartan, lopressor, nifedipine          Hypercholesteremia    Lipid Panel pending  Continue Crestor          VTE Risk Mitigation         Ordered     heparin (porcine) injection 5,000 Units  Every 8 hours     Route:  Subcutaneous        03/29/18 2351     Place sequential compression device  Until discontinued      03/29/18 2351     IP VTE HIGH RISK PATIENT  Once      03/29/18 2351             Louie Gil NP  Department of Hospital Medicine   [facility]

## 2018-03-30 NOTE — ED TRIAGE NOTES
Patient presents to ED via NOEMS with c/o mid sternal chest pain, worse with lying down. Patient reports she is scheduled for dialysis T/Th/Sat, states she missed dialysis today because she had no way of getting there, and last full treatment was Tuesday. EMS reports CBG read >500, pt states her sister gave her insulin at 2000. Pt AAO x4.

## 2018-03-30 NOTE — PROGRESS NOTES
[facility] Medicine  Progress Note    Patient Name: Eufemia Haq  MRN: [id number]  Patient Class: IP- Inpatient   Admission Date: 3/29/2018  Length of Stay: 1 days  Attending Physician: Rachel Bedolla MD  Primary Care Provider: Primary Doctor No        Subjective:     Principal Problem:Diabetic ketoacidosis associated with type 1 diabetes mellitus    HPI:  Ms. Haq is a 52 year old woman who presented after missing HD.  Her story is very confusing and she is a poor historian, so it was largely obtained from the chart.  She is supposed to be on HD on a TTS schedule, but missed Thursday after being discharged from a rehab facility.  She has been on HD for 6 months, started while living in [place] but recently moved here.  She was hospitalized in [place] with a stroke in February and then moved in with her sister in [place].  She missed HD for the next few sessions and was hospitalized [place] at [facility] with [place]argy and hyperglycemia, discharged to [facility] rehab facility and left there 2 days ago.  She had been scheduled to follow up for HD on the [facility], but she lives in [facility] and prefers to go there.  Medical history includes the stroke in February.  She has poorly controlled type I diabetes and is blind in her right eye.  She has a seizure disorder for which she is on Keppra.     Workup in ED showed patient markedly hyperglycemic with .  Her anion gap was 22 but beta hydroxybutyrate only 0.2.  WBC 14K.  BUN/creatinine 101/9.4 and potassium 5.4.  She was admitted to ICU on an insulin drip.    Hospital Course:  Patient was admitted for urgent HD and insulin drip.  She was hypotensive and BP medications were held.  The insulin drip was discontinued the following morning and she was continued on SSI until BG stabilized.    Interval History:  Patient lethargic and is poor historian.  Unable to determine how much insulin she takes.  She reports  she has been on HD for the last 6 months and it was initiated while she was in [place].    Review of Systems   Unable to perform ROS: Acuity of condition     Objective:     Vital Signs (Most Recent):  Temp: 97.9 °F (36.6 °C) (03/30/18 1315)  Pulse: 90 (03/30/18 1500)  Resp: 10 (03/30/18 1500)  BP: (!) 159/72 (03/30/18 1500)  SpO2: 98 % (03/30/18 1500) Vital Signs (24h Range):  Temp:  [97.5 °F (36.4 °C)-98.2 °F (36.8 °C)] 97.9 °F (36.6 °C)  Pulse:  [74-90] 90  Resp:  [7-23] 10  SpO2:  [91 %-98 %] 98 %  BP: ()/(51-72) 159/72     Weight: 61.7 kg (136 lb 0.4 oz)  Body mass index is 21.3 kg/m².    Intake/Output Summary (Last 24 hours) at 03/30/18 1520  Last data filed at 03/30/18 0900   Gross per 24 hour   Intake           2050.4 ml   Output                0 ml   Net           2050.4 ml      Physical Exam   Constitutional: She appears well-developed and well-nourished.   Lethargic.   HENT:   Head: Normocephalic.   Eyes: Conjunctivae are normal. Pupils are equal, round, and reactive to light.   Neck: Neck supple. No thyromegaly present.   Cardiovascular: Normal rate, regular rhythm, normal heart sounds and intact distal pulses.  Exam reveals no gallop and no friction rub.    No murmur heard.  Pulmonary/Chest: Effort normal and breath sounds normal.   Abdominal: Soft. Bowel sounds are normal. She exhibits no distension. There is no tenderness.   Musculoskeletal: Normal range of motion. She exhibits no edema.   LUE AVF with thrill.   Lymphadenopathy:     She has no cervical adenopathy.   Neurological: She is alert.   Unable to answer questions intelligently.   Skin: Skin is warm and dry. No rash noted.       Significant Labs:   BMP:   Recent Labs  Lab 03/30/18  0535 03/30/18  1217   GLU  --  330*   NA  --  126*   K  --  5.5*   CL  --  92*   CO2  --  15*   BUN  --  96*   CREATININE  --  9.0*   CALCIUM  --  7.2*   MG 2.1  --      CBC:   Recent Labs  Lab 03/29/18  2145 03/30/18  0535   WBC 7.29 6.15   HGB 7.8* 7.8*   HCT  25.7* 23.9*    230       Significant Imaging: I have reviewed all pertinent imaging results/findings within the past 24 hours.    Assessment/Plan:      * Diabetic ketoacidosis associated with type 1 diabetes mellitus    - A1c 9.1. BG - 972, anion gap 22 on admission with normal beta hydroxybutyrate.  - Gap closed, discontinued insulin drip and started SSI  - Start long acting insulin when she is eating reliably.          Seizure disorder    - Patient with epilepsy.  She is supposed to follow up in epilepsy clinic        ESRD on dialysis    - Dialyzed this morning  - Patient nearly anuric  - Question of HD location - she was scheduled on [email or web address] but lives in [facility]  -  to address.        Anemia in chronic renal disease    - H/H low but appears at baseline  - Defer to nephrology        HTN (hypertension), benign    - Hold meds for HD and re-evaluate need for antihypertensives.          Hypercholesteremia    Lipid Panel pending  Continue Crestor          VTE Risk Mitigation         Ordered     heparin (porcine) injection 5,000 Units  Every 12 hours     Route:  Subcutaneous        03/30/18 0829     Place sequential compression device  Until discontinued      03/29/18 2351     IP VTE HIGH RISK PATIENT  Once      03/29/18 2351              Rachel Longoria MD  Department of Hospital Medicine   [facility]

## 2018-03-30 NOTE — SUBJECTIVE & OBJECTIVE
Past Medical History:   Diagnosis Date    Anemia     during pregnancys    Arthritis     neuropathy hands feet and legs//    Blood transfusion     Chronic pain     CKD (chronic kidney disease), stage IV     TThSat    Diabetes mellitus     Diabetes mellitus type I     Diabetic retinopathy     Hyperlipidemia     Hypertension     patient states that when her blood sugar increases her blood pressure increases    Neuropathy     Pneumonia     Seizures     when sugar is high, does not quite remember    Stroke     2018    Vitamin D deficiency     Vitamin D deficiency disease        Past Surgical History:   Procedure Laterality Date     SECTION, CLASSIC      x 2    EYE SURGERY      HYSTERECTOMY         Review of patient's allergies indicates:   Allergen Reactions    Ciprofloxacin (bulk) Itching    Latex Itching and Other (See Comments)     Very low Oxygen    Vancomycin Shortness Of Breath and Itching    Neurontin [gabapentin] Other (See Comments)     Seizure like activity    Niacin Itching and Other (See Comments)     Burning      Bactrim [sulfamethoxazole-trimethoprim] Rash       No current facility-administered medications on file prior to encounter.      Current Outpatient Prescriptions on File Prior to Encounter   Medication Sig    acetaminophen-codeine 300-60mg (TYLENOL #4) 300-60 mg Tab Take 2 tablets by mouth daily as needed (pain).    blood sugar diagnostic Strp To use as directed with True results meter and monitor BS 6 times daily - fluctuating BS    cloNIDine (CATAPRES) 0.1 MG tablet TK 1 T PO Q 12 H    epoetin jacques (PROCRIT) 20,000 unit/mL injection Inject 1 mL (20,000 Units total) into the skin every es, Th, Sat.    hydroCHLOROthiazide (HYDRODIURIL) 12.5 MG Tab Take 1 tablet (12.5 mg total) by mouth once daily.    insulin aspart U-100 (NOVOLOG) 100 unit/mL InPn pen Inject 5 Units into the skin 3 (three) times daily with meals.    insulin detemir U-100 (LEVEMIR  FLEXTOUCH) 100 unit/mL (3 mL) SubQ InPn pen Inject 7 Units into the skin 2 (two) times daily.    levetiracetam (KEPPRA) 250 MG Tab TK 1 T PO BID    losartan (COZAAR) 100 MG tablet Take 1 tablet (100 mg total) by mouth once daily.    metoclopramide HCl (REGLAN) 10 MG tablet Take 10 mg by mouth 3 (three) times daily before meals.    metoprolol tartrate (LOPRESSOR) 50 MG tablet Take 50 mg by mouth 2 (two) times daily.    nifedipine (PROCARDIA-XL) 60 MG (OSM) 24 hr tablet TK 1 T PO Q 12 H    ondansetron (ZOFRAN-ODT) 4 MG TbDL Take 1-2 tablets by mouth every 4 hours as needed for nausea and vomiting    ONETOUCH DELICA LANCETS 33 gauge Misc TEST BLOOD SUGAR TID    rosuvastatin (CRESTOR) 20 MG tablet Take 20 mg by mouth once daily.    sevelamer carbonate (RENVELA) 800 mg Tab Take 2 tablets (1,600 mg total) by mouth 3 (three) times daily with meals.     Family History     Problem Relation (Age of Onset)    Asthma Father    Blindness Mother, Maternal Grandmother    Breast cancer Daughter    Cancer Cousin    Cataracts Maternal Grandmother    Diabetes Mother, Father, Sister    Glaucoma Maternal Grandmother    Heart disease Mother    Hypertension Mother, Father, Maternal Grandmother    Stroke Maternal Uncle    Thyroid disease Sister        Social History Main Topics    Smoking status: Current Some Day Smoker     Packs/day: 0.10     Years: 10.00     Types: Cigarettes    Smokeless tobacco: Never Used      Comment: 1 pack last her about 2-3 months    Alcohol use No    Drug use: No    Sexual activity: Yes     Partners: Male     Birth control/ protection: None     Review of Systems   Constitutional: Positive for activity change and appetite change. Negative for fever.   HENT: Negative for congestion, ear pain, rhinorrhea and sinus pressure.    Eyes: Negative for pain and discharge.   Respiratory: Negative for cough, chest tightness, shortness of breath and wheezing.    Cardiovascular: Negative for chest pain and leg  swelling.   Gastrointestinal: Negative for abdominal distention, abdominal pain, diarrhea, nausea and vomiting.   Endocrine: Negative for cold intolerance and heat intolerance.   Genitourinary: Negative for difficulty urinating, flank pain, frequency, hematuria and urgency.   Musculoskeletal: Positive for myalgias. Negative for arthralgias and joint swelling.   Allergic/Immunologic: Negative for environmental allergies and food allergies.   Neurological: Positive for weakness. Negative for dizziness, light-headedness and headaches.   Hematological: Does not bruise/bleed easily.   Psychiatric/Behavioral: Negative for agitation, behavioral problems and decreased concentration.     Objective:     Vital Signs (Most Recent):  Temp: 97.6 °F (36.4 °C) (03/30/18 0000)  Pulse: 80 (03/30/18 0115)  Resp: 12 (03/30/18 0115)  BP: (!) 118/58 (03/30/18 0115)  SpO2: (!) 94 % (03/30/18 0115) Vital Signs (24h Range):  Temp:  [97.5 °F (36.4 °C)-97.8 °F (36.6 °C)] 97.6 °F (36.4 °C)  Pulse:  [80-87] 80  Resp:  [10-23] 12  SpO2:  [94 %-98 %] 94 %  BP: (113-127)/(55-60) 118/58     Weight: 61.7 kg (136 lb 0.4 oz)  Body mass index is 21.3 kg/m².    Physical Exam   Constitutional: She is oriented to person, place, and time. She appears well-developed and well-nourished.   HENT:   Head: Normocephalic.   Eyes: Right eye exhibits no discharge. Left eye exhibits no discharge.   Patient is blind.   Neck: Normal range of motion. Neck supple.   Cardiovascular: Normal rate, regular rhythm, normal heart sounds and intact distal pulses.    Mild edema noted to lower extremities   Pulmonary/Chest: Effort normal and breath sounds normal. No respiratory distress.   Abdominal: Soft. Bowel sounds are normal. She exhibits no distension. There is no tenderness.   Musculoskeletal: Normal range of motion.   Neurological: She is alert and oriented to person, place, and time. GCS eye subscore is 4. GCS verbal subscore is 5. GCS motor subscore is 6.   Skin: Skin  is warm and dry. Capillary refill takes more than 3 seconds.   Psychiatric: She has a normal mood and affect. Her speech is normal and behavior is normal.           Significant Labs:   CBC:   Recent Labs  Lab 03/29/18  2145   WBC 7.29   HGB 7.8*   HCT 25.7*        CMP:   Recent Labs  Lab 03/29/18  2145   *   K 5.4*   CL 82*   CO2 19*   *   *   CREATININE 9.4*   CALCIUM 7.3*   ANIONGAP 22*   EGFRNONAA 4*       Significant Imaging: I have reviewed all pertinent imaging results/findings within the past 24 hours.

## 2018-03-30 NOTE — NURSING
Patient completed HD with 1 liter off; sitting up in bed eating dinner with difficulty; using utensils is difficult; needs OT & PT for ADL's; new orders noted; VSS; no distress at this time; will continue to monitor

## 2018-03-30 NOTE — PLAN OF CARE
Problem: Patient Care Overview  Goal: Plan of Care Review  VSS, afebrile.  Insulin drip infusing.  Percocet given x 1 for c/o chronic back pain.  Repositions independently, no skin breakdown noted. Patient does have some incontinent episodes. Patient is blind and requires assistance with ADL's.  Plan for dialysis this am. Up to date with plan of care.

## 2018-03-30 NOTE — HOSPITAL COURSE
Patient was admitted for urgent HD and insulin drip.  She was initially hypotensive and BP medications were held, then resumed when she became hypertensive.  The insulin drip was discontinued and she stayed on an SSI until BG stabilized.  HD was arranged for her at the [facility] for TTS schedule.  She was dialyzed again this morning and should be stable until her next session at the new HD unit.  She was tolerating a diet and feeling well on discharge.

## 2018-03-30 NOTE — ED NOTES
"Pt given ice chips, she states "but I'm hungry" told pt she could not eat right now with a blood sugar that high  "
CBG >500  
Xray at bedside.   
98

## 2018-03-30 NOTE — PLAN OF CARE
Met with patient at bedside to complete discharge planning assessment. Patient is in need of a PCP just recently moved back from [place] - wants to be set up with a [facility] PCP. Pharmacy of choice is [facility] - epic updated. Patient was attending dialysis at [facility] but recently moved in with her [place] the [place] [facility] & does not have transportation [place] [facility]. Patient's last dialysis treatment was Tuesday 3/26. Patient's sister Willian reports she has reached out to [facility] who is in the process of securing patient a chair at [facility] [address]. This was supposed to happen by yesterday but didn't & therefore patient missed dialysis. Patient requires assist at home with ADLs that her daughter & sister provide. Patient was recently discharged from [facility] for similar symptoms. Will continue to follow & assist in DC needs      03/30/18 1508   Discharge Assessment   Assessment Type Discharge Planning Assessment   Confirmed/corrected address and phone number on facesheet? Yes   Assessment information obtained from? Patient   Communicated expected length of stay with patient/caregiver no   Prior to hospitilization cognitive status: Alert/Oriented   Prior to hospitalization functional status: Assistive Equipment;Needs Assistance   Current cognitive status: Not Oriented to Time   Current Functional Status: Partially Dependent;Assistive Equipment   Lives With parent(s)   Able to Return to Prior Arrangements yes   Is patient able to care for self after discharge? No   Who are your caregiver(s) and their phone number(s)? Willian Najera [phone]   Patient's perception of discharge disposition home or selfcare   Readmission Within The Last 30 Days previous discharge plan unsuccessful   If yes, most recent facility name: [facility]   Patient currently being followed by outpatient case management? No   Patient currently receives any other outside agency services? No    Equipment Currently Used at Home walker, rolling   Do you have any problems affording any of your prescribed medications? No   Is the patient taking medications as prescribed? yes   Does the patient have transportation home? Yes   Transportation Available family or friend will provide   Dialysis Name and Scheduled days [facility]   Does the patient receive services at the [facility]? No   Discharge Plan A Home Health   Discharge Plan B Home with family   Patient/Family In Agreement With Plan yes   Readmission Questionnaire   At the time of your discharge, did someone talk to you about what your health problems were? Yes   At the time of discharge, did someone talk to you about what to watch out for regarding worsening of your health problem? Yes   At the time of discharge, did someone talk to you about what to do if you experienced worsening of your health problem? Yes   At the time of discharge, did someone talk to you about which medication to take when you left the hospital and which ones to stop taking? Yes   At the time of discharge, did someone talk to you about when and where to follow up with a doctor after you left the hospital? Yes   What do you believe caused you to be sick enough to be re-admitted? my sugar   How often do you need to have someone help you when you read instructions, pamphlets, or other written material from your doctor or pharmacy? Often   Do you have problems taking your medications as prescribed? No   Do you have any problems affording any of  your prescribed medications? No   Do you have problems obtaining/receiving your medications? No   Does the patient have transportation to healthcare appointments? Yes   Living Arrangements house   Does the patient have family/friends to help with healtcare needs after discharge? yes   Does your caregiver provide all the help you need? Yes   Are you currently feeling confused? No   Are you currently having problems thinking? Yes    Are you currently having memory problems? Yes   Have you felt down, depressed, or hopeless? 0   Have you felt little interest or pleasure in doing things? 0   In the last 7 days, my sleep quality was: good

## 2018-03-30 NOTE — ASSESSMENT & PLAN NOTE
A1c 9.1. BG- 972. Anion gap-22. Beta hydroxybutyrate .2  Corrected Na- 139, K- 5.4    Insulin drip infusing  NS at 150ml/hr  BMP Q6  BG-Q hour

## 2018-03-30 NOTE — ED PROVIDER NOTES
Encounter Date: 3/29/2018    SCRIBE #1 NOTE: I, Allyson Crockett, am scribing for, and in the presence of, Dr. Erazo.       History     Chief Complaint   Patient presents with    Chest Pain     midsternal sharp chest pain with lying down, EMS states CBG reading high, nursing home gave insulin at 2000     Time seen by provider: 9:07 PM    This is a 52 y.o. female, with a history of CVA, DM, ESRD, HTN, and seizures, who presents via EMS with complaint of missed dialysis today. Patient reports last receiving dialysis this Tuesday. She reports receiving dialysis at [facility], but is unsure of who her doctor is. She has been on dialysis for six months. Patient states she still makes urine. She denies shortness of breath, abdominal pain, abdominal distension, nausea, vomiting, or decreased urine. She reports recently moving from [place] and is living with her sister who is her caretaker. Patient was admitted to [facility] on 3/11 s/p seizure and discharged to  rehabilitation facility. Per sister, patient was removed from [facility] rehabilitation two days ago. Patient's dialysis is being coordinated by [facility], but they are trying to change to somewhere more accessible in [facility]. Patient reports her sister, Willian, helps manage her healthcare her phone number is: [phone].       The history is provided by the patient and a relative.     Review of patient's allergies indicates:   Allergen Reactions    Ciprofloxacin (bulk) Itching    Latex Itching and Other (See Comments)     Very low Oxygen    Vancomycin Shortness Of Breath and Itching    Neurontin [gabapentin] Other (See Comments)     Seizure like activity    Niacin Itching and Other (See Comments)     Burning      Bactrim [sulfamethoxazole-trimethoprim] Rash     Past Medical History:   Diagnosis Date    Anemia     during pregnancys    Arthritis     neuropathy hands feet and legs//    Blood transfusion      Chronic pain     CKD (chronic kidney disease), stage IV     TThSat    Diabetes mellitus     Diabetes mellitus type I     Diabetic retinopathy     Hyperlipidemia     Hypertension     patient states that when her blood sugar increases her blood pressure increases    Neuropathy     Pneumonia     Seizures     when sugar is high, does not quite remember    Stroke     2018    Vitamin D deficiency     Vitamin D deficiency disease      Past Surgical History:   Procedure Laterality Date     SECTION, CLASSIC      x 2    EYE SURGERY      HYSTERECTOMY       Family History   Problem Relation Age of Onset    Diabetes Mother     Heart disease Mother     Blindness Mother      diabetes    Hypertension Mother     Diabetes Father     Asthma Father     Hypertension Father     Thyroid disease Sister     Breast cancer Daughter     Diabetes Sister     Stroke Maternal Uncle     Glaucoma Maternal Grandmother     Blindness Maternal Grandmother     Cataracts Maternal Grandmother     Hypertension Maternal Grandmother     Cancer Cousin     Amblyopia Neg Hx     Macular degeneration Neg Hx     Retinal detachment Neg Hx     Strabismus Neg Hx     Colon cancer Neg Hx     Ovarian cancer Neg Hx      Social History   Substance Use Topics    Smoking status: Current Some Day Smoker     Packs/day: 0.10     Years: 10.00     Types: Cigarettes    Smokeless tobacco: Never Used      Comment: 1 pack last her about 2-3 months    Alcohol use No     Review of Systems   Constitutional: Negative for fever.   HENT: Negative for sore throat.    Respiratory: Negative for shortness of breath.    Cardiovascular: Negative for chest pain.   Gastrointestinal: Negative for abdominal distention, abdominal pain, nausea and vomiting.   Genitourinary: Negative for decreased urine volume and dysuria.   Musculoskeletal: Negative for back pain.   Skin: Negative for rash.   Neurological: Negative for weakness.   Hematological:  Does not bruise/bleed easily.       Physical Exam     Initial Vitals [03/29/18 2101]   BP Pulse Resp Temp SpO2   127/60 87 18 97.5 °F (36.4 °C) 98 %      MAP       82.33         Physical Exam    Nursing note and vitals reviewed.  Constitutional: Vital signs are normal. She appears well-developed and well-nourished. No distress.   HENT:   Head: Normocephalic and atraumatic.   Mouth/Throat: Oropharynx is clear and moist.   Eyes: Conjunctivae are normal.   Patient is blind.    Neck: Normal range of motion. Neck supple.   Cardiovascular: Normal rate, regular rhythm and normal heart sounds. Exam reveals no gallop and no friction rub.    No murmur heard.  Pulmonary/Chest: Breath sounds normal. No respiratory distress. She has no wheezes. She has no rhonchi. She has no rales.   Abdominal: Soft. Bowel sounds are normal. There is no tenderness. There is no rebound and no guarding.   Musculoskeletal:   Av graft to left bicep with palpable thrill. 1+ lower extremity edema.    Neurological: She is alert and oriented to person, place, and time. She has normal strength and normal reflexes. She displays normal reflexes. No cranial nerve deficit or sensory deficit. She displays a negative Romberg sign.   Moves all extremities.    Skin: Skin is warm and dry. No rash noted. No pallor.         ED Course   External Jugular IV  Date/Time: 3/29/2018 9:52 PM  Performed by: CHELY COTTER.  Authorized by: CHELY COTTER.   Location (Ext Jugular): Left.  Catheter Size: 18 ga.  Catheter Type: Jelco.  Fixation/Dressing: Taped in place.  Patient tolerance: Patient tolerated the procedure well with no immediate complications    Critical Care  Date/Time: 3/29/2018 10:26 PM  Performed by: CHELY COTTER.  Authorized by: CHELY COTTER.   Direct patient critical care time: 10 minutes  Additional history critical care time: 10 minutes  Ordering / reviewing critical care time: 5 minutes  Documentation critical care time: 5  minutes  Consulting other physicians critical care time: 5 minutes  Consult with family critical care time: 10 minutes  Total critical care time (exclusive of procedural time) : 45 minutes  Critical care was necessary to treat or prevent imminent or life-threatening deterioration of the following conditions: metabolic crisis.  Critical care was time spent personally by me on the following activities: examination of patient, obtaining history from patient or surrogate, ordering and performing treatments and interventions, ordering and review of laboratory studies, ordering and review of radiographic studies, re-evaluation of patient's condition and review of old charts.        Labs Reviewed   CBC W/ AUTO DIFFERENTIAL - Abnormal; Notable for the following:        Result Value    RBC 2.76 (*)     Hemoglobin 7.8 (*)     Hematocrit 25.7 (*)     MCHC 30.4 (*)     RDW 16.1 (*)     Gran% 80.2 (*)     Lymph% 13.4 (*)     All other components within normal limits   BASIC METABOLIC PANEL - Abnormal; Notable for the following:     Sodium 123 (*)     Potassium 5.4 (*)     Chloride 82 (*)     CO2 19 (*)     Glucose 972 (*)     BUN, Bld 101 (*)     Creatinine 9.4 (*)     Calcium 7.3 (*)     Anion Gap 22 (*)     eGFR if  5 (*)     eGFR if non  4 (*)     All other components within normal limits    Narrative:     Glu critical result(s) called and verbal readback obtained from   Melina Carranza RN@[phone] [id number], 03/29/2018 22:22   BETA - HYDROXYBUTYRATE, SERUM   URINALYSIS   POCT GLUCOSE MONITORING CONTINUOUS     EKG Readings: (Independently Interpreted)   Normal sinus rhythm at a rate of 86 bpm. Normal intervals. Narrow QRS. No acute St/T wave abnormalities. Compared to an EKG from March 26, 2018 shows no significant change.      Imaging Results          X-Ray Chest AP Portable (Final result)  Result time 03/29/18 22:17:30    Final result by Mathew Antony MD (03/29/18 22:17:30)                  Impression:      No acute cardiopulmonary process identified.      Electronically signed by: Mathew Antony MD  Date:    03/29/2018  Time:    22:17             Narrative:    EXAMINATION:  XR CHEST AP PORTABLE    CLINICAL HISTORY:  Chest pain, unspecified    TECHNIQUE:  Single frontal view of the chest was performed.    COMPARISON:  03/26/2018.    FINDINGS:  Cardiac silhouette is normal and stable in size.  Loop recorder device is noted.  Lungs are symmetrically expanded.  No evidence of focal consolidative process, pneumothorax, or significant effusion.  No acute osseous abnormality identified.                                X-Rays:   Independently Interpreted Readings:   Chest X-Ray: No infiltrate or effusion. No cardiomegaly.      Medical Decision Making:   History:   I obtained history from: someone other than patient.  Old Records Summarized: records from previous admission(s).       <> Summary of Records: On review of medical records, patient was seen here 3 days ago for hyperglycemia and vomiting. Patient was found to be hyperglycemia with some metabolic derangements.   Independently Interpreted Test(s):   I have ordered and independently interpreted X-rays - see prior notes.  I have ordered and independently interpreted EKG Reading(s) - see prior notes  Clinical Tests:   Lab Tests: Ordered and Reviewed  Radiological Study: Ordered and Reviewed  Medical Tests: Ordered and Reviewed  ED Management:  52-year-old female who presents with missed dialysis as well as chest pain.  The patient is not the best historian.  Reviewed her medical records showing that she is here 3 days ago for hyperglycemia.  Was concern looking over the labs of possible DKA.  Accu-Chek is significantly elevated here.  We'll repeat labs as I'm still concerned of DKA.  She apparently did miss her dialysis session today.  She does still make urine.    10:15 PM labs reviewed showing a glucose of 972, anion gap at 22.  Glucose is 123 but  correct this for hyperglycemia and is approximately 135.  We'll speak with Hospital medicine.  Then plan to speak with nephrology as I do feel the patient will need dialysis this evening.  She is not in any respiratory distress however her a reason for being here in the necessitates significant amount of fluids and metabolic correction that I do not feel should wait for the next 8 hours.    11:07 PM. Consulted and discussed case with Dr. Lim, nephrology. Will start the patient on 0.1u/kg insulin drip and will get dialyzed this evening. Updated hospital medicine.              Attending Attestation:           Physician Attestation for Scribe:  Physician Attestation Statement for Scribe #1: I, Dr. Erazo, reviewed documentation, as scribed by Allyson Crockett in my presence, and it is both accurate and complete.                    Clinical Impression:     1. Hyperglycemia    2. Chest pain                                 Pablo Erazo, DO  03/29/18 4775

## 2018-03-31 VITALS
WEIGHT: 136 LBS | BODY MASS INDEX: 21.35 KG/M2 | OXYGEN SATURATION: 96 % | HEART RATE: 80 BPM | HEIGHT: 67 IN | TEMPERATURE: 100 F | DIASTOLIC BLOOD PRESSURE: 78 MMHG | SYSTOLIC BLOOD PRESSURE: 166 MMHG | RESPIRATION RATE: 14 BRPM

## 2018-03-31 PROBLEM — H54.3 BLIND IN BOTH EYES: Status: ACTIVE | Noted: 2018-03-31

## 2018-03-31 LAB
ANION GAP SERPL CALC-SCNC: 14 MMOL/L
BACTERIA #/AREA URNS HPF: ABNORMAL /HPF
BASOPHILS # BLD AUTO: 0.01 K/UL
BASOPHILS NFR BLD: 0.2 %
BILIRUB UR QL STRIP: NEGATIVE
BUN SERPL-MCNC: 45 MG/DL
CALCIUM SERPL-MCNC: 7.6 MG/DL
CHLORIDE SERPL-SCNC: 97 MMOL/L
CLARITY UR: CLEAR
CO2 SERPL-SCNC: 25 MMOL/L
COLOR UR: YELLOW
CREAT SERPL-MCNC: 5.2 MG/DL
DIFFERENTIAL METHOD: ABNORMAL
EOSINOPHIL # BLD AUTO: 0.1 K/UL
EOSINOPHIL NFR BLD: 2.4 %
ERYTHROCYTE [DISTWIDTH] IN BLOOD BY AUTOMATED COUNT: 16.2 %
EST. GFR  (AFRICAN AMERICAN): 10 ML/MIN/1.73 M^2
EST. GFR  (NON AFRICAN AMERICAN): 9 ML/MIN/1.73 M^2
GLUCOSE SERPL-MCNC: 312 MG/DL
GLUCOSE UR QL STRIP: ABNORMAL
HCT VFR BLD AUTO: 25.7 %
HGB BLD-MCNC: 8 G/DL
HGB UR QL STRIP: ABNORMAL
HYALINE CASTS #/AREA URNS LPF: 1 /LPF
KETONES UR QL STRIP: NEGATIVE
LEUKOCYTE ESTERASE UR QL STRIP: ABNORMAL
LYMPHOCYTES # BLD AUTO: 1 K/UL
LYMPHOCYTES NFR BLD: 16.4 %
MAGNESIUM SERPL-MCNC: 2 MG/DL
MCH RBC QN AUTO: 28.1 PG
MCHC RBC AUTO-ENTMCNC: 31.1 G/DL
MCV RBC AUTO: 90 FL
MICROSCOPIC COMMENT: ABNORMAL
MONOCYTES # BLD AUTO: 0.5 K/UL
MONOCYTES NFR BLD: 8.6 %
NEUTROPHILS # BLD AUTO: 4.2 K/UL
NEUTROPHILS NFR BLD: 72.2 %
NITRITE UR QL STRIP: NEGATIVE
PH UR STRIP: 6 [PH] (ref 5–8)
PHOSPHATE SERPL-MCNC: 3.6 MG/DL
PLATELET # BLD AUTO: 211 K/UL
PMV BLD AUTO: 9.7 FL
POCT GLUCOSE: 276 MG/DL (ref 70–110)
POCT GLUCOSE: 321 MG/DL (ref 70–110)
POTASSIUM SERPL-SCNC: 4.5 MMOL/L
PROT UR QL STRIP: ABNORMAL
RBC # BLD AUTO: 2.85 M/UL
RBC #/AREA URNS HPF: 0 /HPF (ref 0–4)
SODIUM SERPL-SCNC: 136 MMOL/L
SP GR UR STRIP: 1.01 (ref 1–1.03)
URN SPEC COLLECT METH UR: ABNORMAL
UROBILINOGEN UR STRIP-ACNC: NEGATIVE EU/DL
WBC # BLD AUTO: 5.79 K/UL
WBC #/AREA URNS HPF: 4 /HPF (ref 0–5)
YEAST URNS QL MICRO: ABNORMAL

## 2018-03-31 PROCEDURE — 25000003 PHARM REV CODE 250: Performed by: NURSE PRACTITIONER

## 2018-03-31 PROCEDURE — 36415 COLL VENOUS BLD VENIPUNCTURE: CPT

## 2018-03-31 PROCEDURE — 97535 SELF CARE MNGMENT TRAINING: CPT

## 2018-03-31 PROCEDURE — 83735 ASSAY OF MAGNESIUM: CPT

## 2018-03-31 PROCEDURE — 87086 URINE CULTURE/COLONY COUNT: CPT

## 2018-03-31 PROCEDURE — 63600175 PHARM REV CODE 636 W HCPCS: Performed by: NURSE PRACTITIONER

## 2018-03-31 PROCEDURE — 25000003 PHARM REV CODE 250: Performed by: HOSPITALIST

## 2018-03-31 PROCEDURE — 80048 BASIC METABOLIC PNL TOTAL CA: CPT

## 2018-03-31 PROCEDURE — 84100 ASSAY OF PHOSPHORUS: CPT

## 2018-03-31 PROCEDURE — 63600175 PHARM REV CODE 636 W HCPCS: Performed by: HOSPITALIST

## 2018-03-31 PROCEDURE — 85025 COMPLETE CBC W/AUTO DIFF WBC: CPT

## 2018-03-31 PROCEDURE — 97165 OT EVAL LOW COMPLEX 30 MIN: CPT

## 2018-03-31 PROCEDURE — 99239 HOSP IP/OBS DSCHRG MGMT >30: CPT | Mod: ,,, | Performed by: HOSPITALIST

## 2018-03-31 PROCEDURE — 80100016 HC MAINTENANCE HEMODIALYSIS

## 2018-03-31 PROCEDURE — 81000 URINALYSIS NONAUTO W/SCOPE: CPT

## 2018-03-31 RX ORDER — INSULIN ASPART 100 [IU]/ML
5 INJECTION, SOLUTION INTRAVENOUS; SUBCUTANEOUS
Status: DISCONTINUED | OUTPATIENT
Start: 2018-03-31 | End: 2018-03-31 | Stop reason: HOSPADM

## 2018-03-31 RX ORDER — HYDRALAZINE HYDROCHLORIDE 20 MG/ML
10 INJECTION INTRAMUSCULAR; INTRAVENOUS ONCE
Status: COMPLETED | OUTPATIENT
Start: 2018-03-31 | End: 2018-03-31

## 2018-03-31 RX ORDER — NIFEDIPINE 30 MG/1
60 TABLET, EXTENDED RELEASE ORAL DAILY
Status: DISCONTINUED | OUTPATIENT
Start: 2018-03-31 | End: 2018-03-31 | Stop reason: HOSPADM

## 2018-03-31 RX ADMIN — ROSUVASTATIN CALCIUM 20 MG: 10 TABLET, FILM COATED ORAL at 08:03

## 2018-03-31 RX ADMIN — METOCLOPRAMIDE 10 MG: 10 TABLET ORAL at 04:03

## 2018-03-31 RX ADMIN — METOPROLOL TARTRATE 50 MG: 50 TABLET ORAL at 08:03

## 2018-03-31 RX ADMIN — HYDRALAZINE HYDROCHLORIDE 10 MG: 20 INJECTION INTRAMUSCULAR; INTRAVENOUS at 12:03

## 2018-03-31 RX ADMIN — INSULIN DETEMIR 7 UNITS: 100 INJECTION, SOLUTION SUBCUTANEOUS at 09:03

## 2018-03-31 RX ADMIN — METOCLOPRAMIDE 10 MG: 10 TABLET ORAL at 05:03

## 2018-03-31 RX ADMIN — METOCLOPRAMIDE 10 MG: 10 TABLET ORAL at 10:03

## 2018-03-31 RX ADMIN — SEVELAMER CARBONATE 1600 MG: 800 TABLET, FILM COATED ORAL at 10:03

## 2018-03-31 RX ADMIN — LEVETIRACETAM 250 MG: 250 TABLET, FILM COATED ORAL at 08:03

## 2018-03-31 RX ADMIN — INSULIN ASPART 4 UNITS: 100 INJECTION, SOLUTION INTRAVENOUS; SUBCUTANEOUS at 07:03

## 2018-03-31 RX ADMIN — HEPARIN SODIUM 5000 UNITS: 5000 INJECTION, SOLUTION INTRAVENOUS; SUBCUTANEOUS at 08:03

## 2018-03-31 RX ADMIN — HYDRALAZINE HYDROCHLORIDE 10 MG: 20 INJECTION INTRAMUSCULAR; INTRAVENOUS at 05:03

## 2018-03-31 RX ADMIN — INSULIN ASPART 5 UNITS: 100 INJECTION, SOLUTION INTRAVENOUS; SUBCUTANEOUS at 11:03

## 2018-03-31 RX ADMIN — INSULIN ASPART 3 UNITS: 100 INJECTION, SOLUTION INTRAVENOUS; SUBCUTANEOUS at 10:03

## 2018-03-31 RX ADMIN — NIFEDIPINE 60 MG: 30 TABLET, FILM COATED, EXTENDED RELEASE ORAL at 09:03

## 2018-03-31 RX ADMIN — SEVELAMER CARBONATE 1600 MG: 800 TABLET, FILM COATED ORAL at 07:03

## 2018-03-31 RX ADMIN — LOSARTAN POTASSIUM 100 MG: 50 TABLET, FILM COATED ORAL at 08:03

## 2018-03-31 RX ADMIN — ONDANSETRON HYDROCHLORIDE 4 MG: 2 INJECTION, SOLUTION INTRAMUSCULAR; INTRAVENOUS at 04:03

## 2018-03-31 NOTE — ASSESSMENT & PLAN NOTE
- Patient with epilepsy.  She is supposed to follow up in epilepsy clinic  - Continue Keppra.  No indication to follow level for Keppra.

## 2018-03-31 NOTE — PLAN OF CARE
Problem: Occupational Therapy Goal  Goal: Occupational Therapy Goal  Goals to be met by 4/30/18  1. Set-up (manual assist locating objects on tray  2. SBA G/H (3 tasks) standing at sink  3. SBA toilet use in bathroom    Outcome: Ongoing (interventions implemented as appropriate)  OT evaluation completed with treatment initiated.  Recommend Home Health PT, OT (vision specialist) and provide contact information for the [facility].  DME: 3-in-1 commode.  GIOVANI Bledsoe 3/31/2018

## 2018-03-31 NOTE — ASSESSMENT & PLAN NOTE
- Multifactorial, with hypertensive retinopathy with likely additional contributor of diabetic retinopathy.  - Reports no vision at all, which is her baseline.

## 2018-03-31 NOTE — ASSESSMENT & PLAN NOTE
- A1c 9.1. BG - 972, anion gap 22 on admission with normal beta hydroxybutyrate.  - Gap closed, discontinued insulin drip and started SSI yesterday  - Add long-acting and prandial insulin today

## 2018-03-31 NOTE — ASSESSMENT & PLAN NOTE
- Meds held for HD, resumed this AM  - Clonidine 0.1 mg bid and HCTZ 12.5 mg daily discontinued on admission  - Adding back nifedipine, losartan, metoprolol today.  - Severe hypertension which has contributed to her blindness.

## 2018-03-31 NOTE — DISCHARGE SUMMARY
[facility] Hospital Medicine  Discharge Summary      Patient Name: Eufemia Haq  MRN: [id number]  Admission Date: 3/29/2018  Hospital Length of Stay: 2 days  Discharge Date and Time:  03/31/2018 12:20 PM  Attending Physician: Rachel Bedolla MD   Discharging Provider: Rachel Bedolla MD  Primary Care Provider: Primary Doctor No      HPI:   Ms. Haq is a 52 year old woman who presented after missing HD.  She is supposed to be on HD on a TTS schedule, but missed Thursday after being discharged from a rehab facility.  She has been on HD for 6 months, had a fistula placed and was started on HD while living in [place] but recently moved here.  She was hospitalized in [place] with a stroke in February and then moved in with her sister in [place].  She missed HD for the next few sessions and was hospitalized again at [facility] with lethargy and hyperglycemia, discharged to [facility] rehab facility and left there 2 days ago.  She had been scheduled to follow up for HD on the [facility], but she is now living with her father in [facility] and prefers to go there.  Medical history includes the stroke in February.  She has poorly controlled type I diabetes and is blind.  She has a seizure disorder for which she is on Keppra.     Workup in ED showed patient was markedly hyperglycemic with .  Her anion gap was 22 but beta hydroxybutyrate only 0.2.  WBC 14K.  BUN/creatinine 101/9.4 and potassium 5.4.  She was admitted to ICU on an insulin drip.          Hospital Course:   Patient was admitted for urgent HD and insulin drip.  She was initially hypotensive and BP medications were held, then resumed when she became hypertensive.  The insulin drip was discontinued and she stayed on an SSI until BG stabilized.  HD was arranged for her at the [facility] for TTS schedule.  She was dialyzed again this morning and should be stable until her next session at the [facility]  HD unit.  She was tolerating a diet and feeling well on discharge.     Consults:   Consults         Status Ordering Provider     Inpatient consult to Nephrology  Once     Provider:  Ernie Lim MD    Completed CLEVE GOLD     Inpatient consult to Social Work  Once     Provider:  (Not yet assigned)    Completed NIURKA FRANKEL     Inpatient consult to Social Work/Case Management  Once     Provider:  (Not yet assigned)    Completed ANNETTE HESTER            Final Active Diagnoses:    Diagnosis Date Noted POA    PRINCIPAL PROBLEM:  Diabetic ketoacidosis associated with type 1 diabetes mellitus [E10.10] 03/29/2018 Yes    Blind in both eyes [H54.3] 03/31/2018 Yes    Seizure disorder [G40.909] 03/12/2018 Yes    ESRD on dialysis [N18.6, Z99.2] 08/26/2017 Not Applicable    Anemia in chronic renal disease [N18.9, D63.1]  Yes    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    Specialty:  Dialysis Center  Why:  Tuesday, Thursday, & Saturday at 11am for dialysis. Please arrive 1 hour early to first treatment Tuesday 4/3 to complete paperwork  Contact information:  [id number]  [address] [address] [phone]                 Patient Instructions:     Diet renal     Diet diabetic     Activity as tolerated       Medications:  Reconciled Home Medications:      Medication List      CONTINUE taking these medications    blood sugar diagnostic Strp  To use as directed with True results meter and monitor BS 6 times daily - fluctuating BS     epoetin jacques 20,000 unit/mL injection  Commonly known as:  PROCRIT  Inject 1 mL (20,000 Units total) into the skin every Tues, Thurs, Sat.     insulin aspart U-100 100 unit/mL Inpn pen  Commonly known as:  NovoLOG  Inject 5 Units into  the skin 3 (three) times daily with meals.     insulin detemir U-100 100 unit/mL (3 mL) Inpn pen  Commonly known as:  LEVEMIR FLEXTOUCH  Inject 7 Units into the skin 2 (two) times daily.     levETIRAcetam 250 MG Tab  Commonly known as:  KEPPRA     losartan 100 MG tablet  Commonly known as:  COZAAR  Take 1 tablet (100 mg total) by mouth once daily.     metoclopramide HCl 10 MG tablet  Commonly known as:  REGLAN     metoprolol tartrate 50 MG tablet  Commonly known as:  LOPRESSOR     NIFEdipine 60 MG (OSM) 24 hr tablet  Commonly known as:  PROCARDIA-XL     ondansetron 4 MG Tbdl  Commonly known as:  ZOFRAN-ODT     ONETOUCH DELICA LANCETS 33 gauge Misc  Generic drug:  lancets     rosuvastatin 20 MG tablet  Commonly known as:  CRESTOR     sevelamer carbonate 800 mg Tab  Commonly known as:  RENVELA  Take 2 tablets (1,600 mg total) by mouth 3 (three) times daily with meals.        STOP taking these medications    acetaminophen-codeine 300-60mg 300-60 mg Tab  Commonly known as:  TYLENOL #4     cloNIDine 0.1 MG tablet  Commonly known as:  CATAPRES     hydroCHLOROthiazide 12.5 MG Tab  Commonly known as:  HYDRODIURIL            Indwelling Lines/Drains at time of discharge:       Drain                 Hemodialysis AV Graft Left upper arm -- days                Time spent on the discharge of patient: >30 minutes  Patient was seen and examined on the date of discharge and determined to be suitable for discharge.         Rachel Longoria MD  Department of Hospital Medicine  [facility]

## 2018-03-31 NOTE — PLAN OF CARE
Glenn from [facility] [address] confirmed patient has been assigned a chair time of TTS 11am beginning Tuesday 4/3/18. She needs to be there for 10am Tuesday to complete admission paperwork. Dr Bedolla & sister Willian notified

## 2018-03-31 NOTE — PLAN OF CARE
Appt scheduled with Dr Kavin Muir 4/16/18 at 9:20am to establish PCP. AVS updated      03/31/18 1228   Final Note   Assessment Type Final Discharge Note   Discharge Disposition Home   What phone number can be called within the next 1-3 days to see how you are doing after discharge? [phone]   Hospital Follow Up  Appt(s) scheduled? Yes   Discharge plans and expectations educations in teach back method with documentation complete? Yes   Right Care Referral Info   Post Acute Recommendation No Care

## 2018-03-31 NOTE — SUBJECTIVE & OBJECTIVE
Interval History:  Confusion has resolved.  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  Does not see light or shapes either.  She is quite hypertensive this morning after BP meds held yesterday.  BG elevated with long-acting insulin held.      Review of Systems   Constitutional: Negative for chills and fever.   Respiratory: Negative for cough and shortness of breath.    Cardiovascular: Negative for chest pain and palpitations.     Objective:     Vital Signs (Most Recent):  Temp: 99.2 °F (37.3 °C) (03/31/18 0715)  Pulse: 92 (03/31/18 0814)  Resp: 13 (03/31/18 0715)  BP: (!) 199/81 (03/31/18 0835)  SpO2: 96 % (03/31/18 0715) Vital Signs (24h Range):  Temp:  [97.9 °F (36.6 °C)-100 °F (37.8 °C)] 99.2 °F (37.3 °C)  Pulse:  [] 92  Resp:  [8-29] 13  SpO2:  [92 %-98 %] 96 %  BP: (103-205)/() 199/81     Weight: 61.7 kg (136 lb 0.4 oz)  Body mass index is 21.3 kg/m².    Intake/Output Summary (Last 24 hours) at 03/31/18 0928  Last data filed at 03/31/18 0600   Gross per 24 hour   Intake              120 ml   Output             1725 ml   Net            -1605 ml      Physical Exam   Constitutional: She is oriented to person, place, and time. She appears well-developed and well-nourished.   HENT:   Head: Normocephalic.   Eyes: Conjunctivae are normal. Pupils are equal, round, and reactive to light.   Neck: Neck supple. No thyromegaly present.   Cardiovascular: Normal rate, regular rhythm, normal heart sounds and intact distal pulses.  Exam reveals no gallop and no friction rub.    No murmur heard.  Pulmonary/Chest: Effort normal and breath sounds normal.   Abdominal: Soft. Bowel sounds are normal. She exhibits no distension. There is no tenderness.   Musculoskeletal: Normal range of motion. She exhibits no edema.   LUE AVF with thrill.   Lymphadenopathy:     She has no cervical adenopathy.   Neurological: She is alert and oriented to person, place, and time.   Skin: Skin is warm and  dry. No rash noted.   Psychiatric: She has a normal mood and affect. Her behavior is normal. Thought content normal.       Significant Labs:   BMP:   Recent Labs  Lab 03/31/18  0535   *      K 4.5   CL 97   CO2 25   BUN 45*   CREATININE 5.2*   CALCIUM 7.6*   MG 2.0     CBC:   Recent Labs  Lab 03/29/18  2145 03/30/18  0535 03/31/18  0535   WBC 7.29 6.15 5.79   HGB 7.8* 7.8* 8.0*   HCT 25.7* 23.9* 25.7*    230 211       Significant Imaging: I have reviewed all pertinent imaging results/findings within the past 24 hours.

## 2018-03-31 NOTE — PT/OT/SLP EVAL
"Occupational Therapy   Evaluation/Treatment    Name: Eufemia Haq  MRN: [id number]  Admitting Diagnosis:  Diabetic ketoacidosis associated with type 1 diabetes mellitus      Recommendations:     Discharge Recommendations: home health PT, home health OT (Provide contact information: [facility])  Discharge Equipment Recommendations:  3-in-1 commode  Barriers to discharge:  None    History:     Occupational Profile:  Living Environment: lives with her daughter in a 2nd floor apartment  Previous level of function: ambulatory with RW, assist with all self-care tasks (limited prior leaning on blind adaptation)  Roles and Routines: none reported  Equipment Owned:  walker, rolling, wheelchair  Assistance upon Discharge: she reports to stay in bed for much of the day, when her daughter is at work    Past Medical History:   Diagnosis Date    Anemia     during pregnancys    Arthritis     neuropathy hands feet and legs//    Blood transfusion     Chronic pain     Diabetes mellitus     Diabetes mellitus type I     Diabetic retinopathy     ESRD (end stage renal disease) on dialysis     Hyperlipidemia     Hypertension     patient states that when her blood sugar increases her blood pressure increases    Neuropathy     Pneumonia     Seizures     when sugar is high, does not quite remember    Stroke     2018    Vitamin D deficiency        Past Surgical History:   Procedure Laterality Date     SECTION, CLASSIC      x 2    EYE SURGERY      HYSTERECTOMY         Subjective     Chief Complaint: none reported  Patient/Family stated goals: "do more for myself"  Communicated with: patient and her nurse prior to session.  The patient was agreeable to the OT session.  Pain/Comfort:  · Pain Rating 1: 0/10  · Pain Rating Post-Intervention 1: 0/10    Patients cultural, spiritual, Mandaen conflicts given the current situation: none    Objective:     Patient found with: cervical collar, telemetry, " blood pressure cuff    General Precautions: Standard, fall, blind, diabetic, seizure, contact   Orthopedic Precautions:N/A   Braces: N/A     Occupational Performance:    Bed Mobility:    · Min A sit>supine (assist for body space orientation)  · Min A (set-up) scoot up in flat bed    Functional Mobility/Transfers:  · Min A sit><stand (HHA)  · Min A (spatial orientation to bed) BSC>bed transfer    Activities of Daily Living:  · Min A self-feeding (instruction for finding food on plate, spatial orientation to task)  · Mod A G/H (clean dentures, wash face and hands)-assist set-up, finding objects on tray table, task completeness  · Total A UBD (don hospital gown as robe) sitting EOB (trouble with spatial orientation of gown to body)  · Min A LBD (doff/don socks) sitting EOB-assist body space orientation  · SBA toilet hygiene [place] [facility]    Cognitive/Visual Perceptual:  Cognitive/Psychosocial Skills:     -       Oriented to: Person, Place and Situation   -       Follows Commands/attention:Follows two-step commands  -       Communication: clear/fluent and hesitant to express her needs and opinions  -       Memory: Impaired LTM  -       Safety awareness/insight to disability: impaired   -       Mood/Affect/Coping skills/emotional control: Appropriate to situation, Cooperative, Flat affect and Withdrawn  Visual/Perceptual:      -blind in both eyes    Physical Exam:  Postural examination/scapula alignment:    -       Rounded shoulders  -       Forward head  Skin integrity: Visible skin intact  Edema:  Mild feet  Sensation:    -       Intact  Motor Planning:    -       fair, hesitant to try to lean new things (appears fearful of failure)  Dominant hand:    -       Right  Gross motor coordination:   sitting balance F+ sitting EOB (no LOB during seated tasks), impaired LE balance-gait with BSC>bed transfer    Patient left with bed in chair position with all lines intact, call button in reach, bed alarm on and nurse  "notified    AM PAC 6 Click:  AM PAC Total Score: 14    Treatment & Education:  Blind technique: finding objects on bed table, identifying people and their location in the room, self-feeding (finding food, self-feeding)  Education:    Assessment:     Eufemia Haq is a 52 y.o. female with a medical diagnosis of Diabetic ketoacidosis associated with type 1 diabetes mellitus.  She presents with Performance deficits affecting function are weakness, impaired endurance, impaired self care skills, impaired functional mobilty, gait instability, impaired balance, decreased lower extremity function, decreased upper extremity function, decreased coordination, visual deficits, decreased safety awareness. Effected performance during the session.  OT treatment is needed to maximize function while in the hospital.     Rehab Prognosis:  good; patient would benefit from acute skilled OT services to address these deficits and reach maximum level of function.         Clinical Decision Makin.  OT Low:  "Pt evaluation falls under low complexity for evaluation coding due to performance deficits noted in 1-3 areas as stated above and 0 co-morbities affecting current functional status. Data obtained from problem focused assessments. No modifications or assistance was required for completion of evaluation. Only brief occupational profile and history review completed."     Plan:     Patient to be seen 5 x/week to address the above listed problems via self-care/home management, therapeutic exercises, therapeutic activities  · Plan of Care Expires:    · Plan of Care Reviewed with: patient    This Plan of care has been discussed with the patient who was involved in its development and understands and is in agreement with the identified goals and treatment plan    GOALS:    Occupational Therapy Goals        Problem: Occupational Therapy Goal    Goal Priority Disciplines Outcome Interventions   Occupational Therapy Goal     OT, " PT/OT Ongoing (interventions implemented as appropriate)    Description:  Goals to be met by 4/30/18  1. Set-up (manual assist locating objects on tray  2. SBA G/H (3 tasks) standing at sink  3. SBA toilet use in bathroom                      Time Tracking:     OT Date of Treatment: 03/31/18  OT Start Time: 1101  OT Stop Time: 1205  OT Total Time (min): 64 min    Billable Minutes:Evaluation 24  Self Care/Home Management 40    GIOVANI Bledsoe  3/31/2018

## 2018-03-31 NOTE — PLAN OF CARE
Spoke with Bri at [facility] who reports patient was only dialyzed once with them & that was Saturday 3/24 - patient was scheduled to transfer to [facility] & she's not sure what happened there. Spoke with Glenn at [facility] who reports patient is transferring to their facility but doesn't have an assigned chair time as of yet - Glenn to reach out to Carmela who was handling the transfer & return call to . MD updated

## 2018-03-31 NOTE — PROGRESS NOTES
Progress Note  [facility]    Admit Date: 3/29/2018   LOS: 2 days     SUBJECTIVE:     Follow-up For:      Interval History:     C/o some swelling    Review of Systems:  Constitutional: No fever or chills  Respiratory: No cough or shortness of breath  Cardiovascular: No chest pain or palpitations  Gastrointestinal: No nausea or vomiting  Neurological: No confusion or weakness    OBJECTIVE:     Vital Signs Range (Last 24H):  Vitals:    03/31/18 0939   BP: (!) 156/68   Pulse:    Resp:    Temp:        Temp:  [97.9 °F (36.6 °C)-100 °F (37.8 °C)]   Pulse:  []   Resp:  [8-29]   BP: (113-205)/()   SpO2:  [93 %-98 %]     I & O (Last 24H):  Intake/Output Summary (Last 24 hours) at 03/31/18 1139  Last data filed at 03/31/18 1115   Gross per 24 hour   Intake              120 ml   Output             1950 ml   Net            -1830 ml       Physical Exam:  General appearance: frail/chronically ill appearing.    Eyes:  Legally blind.  Lungs: Normal respiratory effort,   clear to auscultation bilaterally   Heart: Regular rate and rhythm, S1, S2 normal, no murmur, rub or sana.  Abdomen: Soft, non-tender non-distended; bowel sounds normal; no masses,  no organomegaly  Extremities: No cyanosis or clubbing. No edema.    Skin: Skin color, texture, turgor normal. No rashes or lesions  Neurologic: general weakness  Left UA AVF: +    Laboratory Data:      Recent Labs  Lab 03/31/18  0535      K 4.5   CL 97   CO2 25   BUN 45*   CREATININE 5.2*   CALCIUM 7.6*   PHOS 3.6     Lab Results   Component Value Date    .0 (H) 03/12/2018    CALCIUM 7.6 (L) 03/31/2018    PHOS 3.6 03/31/2018     Lab Results   Component Value Date    IRON 131 03/12/2018    TIBC 311 03/12/2018    FERRITIN 1,103 (H) 03/12/2018       Medications:  Medication list was reviewed and changes noted under Assessment/Plan.    Diagnostic Results:    Reviewed most recent CT/US/Echo/MRI    ASSESSMENT/PLAN:         1. ESRD on HD likely secondary to DM  (N18.6, E10.22, Z99.2):    Just moved from [place] and tentative HD at [facility] on [address] SW has set up out patient unit. Will HD today for volume issues.  Consent signed.   Renally dose meds, avoid nephrotoxins, and monitor I/O's closely. OK to d/c home after dialysis. No f/u with me needed. Will see on HD.  2. DM:poor control. Defer to hospitalist  3. Hypertension (I12.0): agree with current meds. UF will help.  4. HPTH/>Phos (N25.81, E83.39):  Binders with meals.  5. Anemia of CKD (D63.1):  epo with HD.

## 2018-03-31 NOTE — ASSESSMENT & PLAN NOTE
- Dialyzed yesterday after missing at least one session due to location problems  - Patient oliguric, on average urinates once/day.  - She is set up for HD at [facility] [address] TTS schedule - probably needs additional session here before then.

## 2018-03-31 NOTE — PT/OT/SLP PROGRESS
Physical Therapy      Patient Name:  Eufemia Haq   MRN:  [id number]    Patient not seen today secondary to Unavailable in dialysis. Will follow-up as Schedule allows.    Joo Cuba, PT

## 2018-03-31 NOTE — PROGRESS NOTES
[facility] Hospital Medicine  Progress Note    Patient Name: Eufemia Haq  MRN: [id number]  Patient Class: IP- Inpatient   Admission Date: 3/29/2018  Length of Stay: 2 days  Attending Physician: Rachel Bedolla MD  Primary Care Provider: Primary Doctor No        Subjective:     Principal Problem:Diabetic ketoacidosis associated with type 1 diabetes mellitus    HPI:  Ms. Haq is a 52 year old woman who presented after missing HD.  Her story is very confusing and she is a poor historian, so it was largely obtained from the chart.  She is supposed to be on HD on a TTS schedule, but missed Thursday after being discharged from a rehab facility.  She has been on HD for 6 months, started while living in [place] but recently moved here.  She was hospitalized in [place] with a stroke in February and then moved in with her sister in [place].  She missed HD for the next few sessions and [place] [facility] [place], discharged to [facility] rehab facility and left there 2 days ago.  She had been scheduled to follow up for HD on the [facility], but [place] and prefers to go there.  Medical history includes the stroke in February.  She has poorly controlled type I diabetes and is blind in her right eye.  She has a seizure disorder for which she is on Keppra.     Workup in ED showed patient markedly hyperglycemic with .  Her anion gap was 22 but beta hydroxybutyrate only 0.2.  WBC 14K.  BUN/creatinine 101/9.4 and potassium 5.4.  She was admitted to ICU on an insulin drip.    Hospital Course:  Patient was admitted for urgent HD and insulin drip.  She was hypotensive and BP medications were held, then resumed when she became hypertensive.  The insulin drip was discontinued and she was continued on SSI until BG stabilized.  HD was arranged for her at the [facility] for TTS schedule.    Interval History:   Confusion has resolved.  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  Does not see light or shapes either.  She is quite hypertensive this morning after BP meds held yesterday.  BG elevated with long-acting insulin held.      Review of Systems   Constitutional: Negative for chills and fever.   Respiratory: Negative for cough and shortness of breath.    Cardiovascular: Negative for chest pain and palpitations.     Objective:     Vital Signs (Most Recent):  Temp: 99.2 °F (37.3 °C) (03/31/18 0715)  Pulse: 92 (03/31/18 0814)  Resp: 13 (03/31/18 0715)  BP: (!) 199/81 (03/31/18 0835)  SpO2: 96 % (03/31/18 0715) Vital Signs (24h Range):  Temp:  [97.9 °F (36.6 °C)-100 °F (37.8 °C)] 99.2 °F (37.3 °C)  Pulse:  [] 92  Resp:  [8-29] 13  SpO2:  [92 %-98 %] 96 %  BP: (103-205)/() 199/81     Weight: 61.7 kg (136 lb 0.4 oz)  Body mass index is 21.3 kg/m².    Intake/Output Summary (Last 24 hours) at 03/31/18 0928  Last data filed at 03/31/18 0600   Gross per 24 hour   Intake              120 ml   Output             1725 ml   Net            -1605 ml      Physical Exam   Constitutional: She is oriented to person, place, and time. She appears well-developed and well-nourished.   HENT:   Head: Normocephalic.   Eyes: Conjunctivae are normal. Pupils are equal, round, and reactive to light.   Neck: Neck supple. No thyromegaly present.   Cardiovascular: Normal rate, regular rhythm, normal heart sounds and intact distal pulses.  Exam reveals no gallop and no friction rub.    No murmur heard.  Pulmonary/Chest: Effort normal and breath sounds normal.   Abdominal: Soft. Bowel sounds are normal. She exhibits no distension. There is no tenderness.   Musculoskeletal: Normal range of motion. She exhibits no edema.   LUE AVF with thrill.   Lymphadenopathy:     She has no cervical adenopathy.   Neurological: She is alert and oriented to person, place, and time.   Skin: Skin is warm and dry. No rash noted.    Psychiatric: She has a normal mood and affect. Her behavior is normal. Thought content normal.       Significant Labs:   BMP:   Recent Labs  Lab 03/31/18  0535   *      K 4.5   CL 97   CO2 25   BUN 45*   CREATININE 5.2*   CALCIUM 7.6*   MG 2.0     CBC:   Recent Labs  Lab 03/29/18  2145 03/30/18  0535 03/31/18  0535   WBC 7.29 6.15 5.79   HGB 7.8* 7.8* 8.0*   HCT 25.7* 23.9* 25.7*    230 211       Significant Imaging: I have reviewed all pertinent imaging results/findings within the past 24 hours.    Assessment/Plan:      * Diabetic ketoacidosis associated with type 1 diabetes mellitus    - A1c 9.1. BG - 972, anion gap 22 on admission with normal beta hydroxybutyrate.  - Gap closed, discontinued insulin drip and started SSI yesterday  - Add long-acting and prandial insulin today        Blind in both eyes    - Multifactorial, with hypertensive retinopathy with likely additional contributor of diabetic retinopathy.  - Reports no vision at all, which is her baseline.          Seizure disorder    - Patient with epilepsy.  She is supposed to follow up in epilepsy clinic  - Continue Keppra.  [facility]        ESRD on dialysis    - Dialyzed yesterday after missing at least one session due to location problems  - Patient oliguric, on average urinates once/day.  - She is set up for HD at [facility] [address] T[facility] schedule - probably needs additional session here before then.          Anemia in chronic renal disease    - H/H low but appears at baseline  - Defer to nephrology        Malignant renovascular hypertension    - Meds held for HD, resumed this AM  - Clonidine 0.1 mg bid and HCTZ 12.5 mg daily discontinued on admission  - Adding back nifedipine, losartan, metoprolol today.  - Severe hypertension which has contributed to her blindness.        Hypercholesteremia    Lipid Panel pending  Continue Crestor          VTE Risk Mitigation         Ordered     heparin (porcine)  injection 5,000 Units  Every 12 hours     Route:  Subcutaneous        03/30/18 0829     Place sequential compression device  Until discontinued      03/29/18 2351     IP VTE HIGH RISK PATIENT  Once      03/29/18 2351              Rachel Longoria MD  Department of Hospital Medicine   [facility]

## 2018-04-01 LAB
BACTERIA UR CULT: NORMAL
BACTERIA UR CULT: NORMAL

## 2018-04-01 NOTE — PROGRESS NOTES
Physical Therapy Discharge Summary    Name: Eufemia Haq  MRN: [id number]   Principal Problem: Diabetic ketoacidosis associated with type 1 diabetes mellitus     Patient Discharged from acute Physical Therapy on 3-31-18.       Assessment:     Patient appropriate for care in another setting.    Objective:     GOALS:    Physical Therapy Goals     Not on file                Reasons for Discontinuation of Therapy Services  Transfer to alternate level of care.      Plan:     Patient Discharged to home before PT able to evaluate.    Joo Cuba, PT  4/1/2018

## 2018-04-01 NOTE — PT/OT/SLP DISCHARGE
Occupational Therapy Discharge Summary    Eufemia Haq  MRN: [id number]   Principal Problem: Diabetic ketoacidosis associated with type 1 diabetes mellitus      Patient Discharged from acute Occupational Therapy on 3/31/18.  Please refer to prior OT note dated 3/31/18 for functional status.    Assessment:      Patient appropriate for care in another setting.    Objective:     GOALS:    Occupational Therapy Goals        Problem: Occupational Therapy Goal    Goal Priority Disciplines Outcome Interventions   Occupational Therapy Goal     OT, PT/OT Ongoing (interventions implemented as appropriate)    Description:  Goals to be met by 4/30/18  1. Set-up (manual assist locating objects on tray  2. SBA G/H (3 tasks) standing at sink  3. SBA toilet use in bathroom                      Reasons for Discontinuation of Therapy Services  hospital dischsrge      Plan:     Patient Discharged to: home with no services; Home Health PT/OT with vision specialist and request the pt be ging contact information for [facility] recommended.    GIOVANI Bledsoe  4/1/2018

## 2018-04-02 LAB
HBV CORE AB SERPL QL IA: NEGATIVE
HBV SURFACE AG SERPL QL IA: NEGATIVE
HEP. B SURF AB, QUAL: NEGATIVE
HEP. B SURF AB, QUANT.: 4 MIU/ML
LEVETIRACETAM SERPL-MCNC: 15.9 UG/ML (ref 3–60)
POCT GLUCOSE: 257 MG/DL (ref 70–110)
POCT GLUCOSE: >500 MG/DL (ref 70–110)

## 2018-04-02 NOTE — PHYSICIAN QUERY
PT Name: Eufemia Haq  MR #: [id number]     Physician Query Form - Documentation Clarification      Saad Scott RN CDS    Contact Information: [phone]    This form is a permanent document in the medical record.     Query Date: April 2, 2018    By submitting this query, we are merely seeking further clarification of documentation. Please utilize your independent clinical judgment when addressing the question(s) below.    The Medical record reflects the following:    Supporting Clinical Findings Location in Medical Record   Sodium 123,  130, 126  Chloride 82,  93,  92     Patient was found to be hyperglycemia with some metabolic derangements.         She missed HD for the next few sessions and was hospitalized again at [facility]  with lethargy and hyperglycemia,    Lab 3/29, 3/30 0554, , 1217          ED Prov Note 03/29          Hospital Medicine PN 3/30     0.9 % NaCl infusion  Dose 1000 ml 999 ml/hr Intravenous  0.9%  NaCl infusion  :  150 mL/hr  :  Intravenous    0.9%  NaCl infusion  :  100 mL/hr  : Intravenous       MAR 3/29  MAR 3/30                                                                            Doctor, Please specify diagnosis or diagnoses associated with above clinical findings.    Provider Use Only    [ x ] Hyponatremia    [  ] Other Conditions (Specify)______________________                                                                                                             [  ] Clinically undetermined

## 2018-04-05 ENCOUNTER — PATIENT OUTREACH (OUTPATIENT)
Dept: INTERNAL MEDICINE | Facility: CLINIC | Age: 53
End: 2018-04-05

## 2018-04-05 NOTE — PROGRESS NOTES
[facility] is committed to your overall health.  To help you get the most out of each of your visits, we will review your information to make sure you are up to date on all of your recommended tests and/or procedures.       Your PCP  Kavin Muir MD   found that you may be due for:       Health Maintenance Due   Topic Date Due    Pneumococcal PCV13 (High Risk) (1 - PCV13 Required) 04/30/1966    TETANUS VACCINE  04/30/1983    Foot Exam  08/24/2016    Lipid Panel  01/30/2018     Medicare does not cover all immunizations to be given in the clinic. Check your benefits to ensure that you do not need to receive your immunizations at the pharmacy.        If you have had any of the above done at another facility, please bring the records or information with you so that your record at [facility] will be complete.  If you would like to schedule any of these, please contact me.     If you are currently taking medication, please bring it with you to your appointment for review.     Also, if you have any type of Advanced Directives, please bring them with you to your office visit so we may scan them into your chart.       Thank you for Choosing [facility] for your healthcare needs.        Additional Information  If you have questions, you can email [email or web address] or call [phone]  to talk to our [facility] staff. Remember, [facility] is NOT to be used for urgent needs. For medical emergencies, dial 911.

## 2018-06-02 ENCOUNTER — HOSPITAL ENCOUNTER (INPATIENT)
Facility: HOSPITAL | Age: 53
LOS: 6 days | Discharge: HOME HEALTH | DRG: 100 | End: 2018-06-08
Attending: EMERGENCY MEDICINE | Admitting: EMERGENCY MEDICINE
Payer: MEDICARE

## 2018-06-02 DIAGNOSIS — G93.40 ENCEPHALOPATHY: ICD-10-CM

## 2018-06-02 DIAGNOSIS — Z99.2 ESRD ON DIALYSIS: ICD-10-CM

## 2018-06-02 DIAGNOSIS — G40.909 SEIZURE DISORDER: ICD-10-CM

## 2018-06-02 DIAGNOSIS — I63.9 CEREBROVASCULAR ACCIDENT (CVA), UNSPECIFIED MECHANISM: ICD-10-CM

## 2018-06-02 DIAGNOSIS — R53.1 LEFT-SIDED WEAKNESS: ICD-10-CM

## 2018-06-02 DIAGNOSIS — J81.0 ACUTE PULMONARY EDEMA: ICD-10-CM

## 2018-06-02 DIAGNOSIS — I63.9 STROKE: ICD-10-CM

## 2018-06-02 DIAGNOSIS — R41.82 ALTERED MENTAL STATUS, UNSPECIFIED ALTERED MENTAL STATUS TYPE: Primary | ICD-10-CM

## 2018-06-02 DIAGNOSIS — N18.6 ESRD ON DIALYSIS: ICD-10-CM

## 2018-06-02 DIAGNOSIS — I16.1 HYPERTENSIVE EMERGENCY: ICD-10-CM

## 2018-06-02 LAB
ABO + RH BLD: NORMAL
ALBUMIN SERPL BCP-MCNC: 3.1 G/DL
ALBUMIN SERPL BCP-MCNC: 3.5 G/DL
ALP SERPL-CCNC: 384 U/L
ALP SERPL-CCNC: 445 U/L
ALT SERPL W/O P-5'-P-CCNC: 35 U/L
ALT SERPL W/O P-5'-P-CCNC: 37 U/L
ANION GAP SERPL CALC-SCNC: 16 MMOL/L
ANION GAP SERPL CALC-SCNC: 20 MMOL/L
AST SERPL-CCNC: 28 U/L
AST SERPL-CCNC: 38 U/L
BASOPHILS # BLD AUTO: 0.02 K/UL
BASOPHILS # BLD AUTO: 0.04 K/UL
BASOPHILS NFR BLD: 0.4 %
BASOPHILS NFR BLD: 0.6 %
BILIRUB SERPL-MCNC: 0.4 MG/DL
BILIRUB SERPL-MCNC: 0.4 MG/DL
BLD GP AB SCN CELLS X3 SERPL QL: NORMAL
BUN SERPL-MCNC: 65 MG/DL
BUN SERPL-MCNC: 74 MG/DL
CALCIUM SERPL-MCNC: 8.1 MG/DL
CALCIUM SERPL-MCNC: 8.6 MG/DL
CHLORIDE SERPL-SCNC: 95 MMOL/L
CHLORIDE SERPL-SCNC: 98 MMOL/L
CHOLEST SERPL-MCNC: 197 MG/DL
CHOLEST/HDLC SERPL: 2 {RATIO}
CO2 SERPL-SCNC: 22 MMOL/L
CO2 SERPL-SCNC: 25 MMOL/L
CREAT SERPL-MCNC: 4.1 MG/DL (ref 0.5–1.4)
CREAT SERPL-MCNC: 4.4 MG/DL
CREAT SERPL-MCNC: 4.9 MG/DL
DIFFERENTIAL METHOD: ABNORMAL
DIFFERENTIAL METHOD: ABNORMAL
EOSINOPHIL # BLD AUTO: 0 K/UL
EOSINOPHIL # BLD AUTO: 0.2 K/UL
EOSINOPHIL NFR BLD: 0 %
EOSINOPHIL NFR BLD: 3.3 %
ERYTHROCYTE [DISTWIDTH] IN BLOOD BY AUTOMATED COUNT: 15.7 %
ERYTHROCYTE [DISTWIDTH] IN BLOOD BY AUTOMATED COUNT: 15.9 %
EST. GFR  (AFRICAN AMERICAN): 10.9 ML/MIN/1.73 M^2
EST. GFR  (AFRICAN AMERICAN): 12.4 ML/MIN/1.73 M^2
EST. GFR  (NON AFRICAN AMERICAN): 10.8 ML/MIN/1.73 M^2
EST. GFR  (NON AFRICAN AMERICAN): 9.4 ML/MIN/1.73 M^2
ESTIMATED AVG GLUCOSE: 226 MG/DL
GLUCOSE SERPL-MCNC: 336 MG/DL
GLUCOSE SERPL-MCNC: 418 MG/DL
HBA1C MFR BLD HPLC: 9.5 %
HCT VFR BLD AUTO: 35.2 %
HCT VFR BLD AUTO: 37.4 %
HDLC SERPL-MCNC: 101 MG/DL
HDLC SERPL: 51.3 %
HGB BLD-MCNC: 10.9 G/DL
HGB BLD-MCNC: 11.1 G/DL
IMM GRANULOCYTES # BLD AUTO: 0.02 K/UL
IMM GRANULOCYTES # BLD AUTO: 0.04 K/UL
IMM GRANULOCYTES NFR BLD AUTO: 0.4 %
IMM GRANULOCYTES NFR BLD AUTO: 0.6 %
INR PPP: 0.9
INR PPP: 0.9
LDLC SERPL CALC-MCNC: 83 MG/DL
LYMPHOCYTES # BLD AUTO: 0.6 K/UL
LYMPHOCYTES # BLD AUTO: 0.8 K/UL
LYMPHOCYTES NFR BLD: 12.1 %
LYMPHOCYTES NFR BLD: 12.2 %
MAGNESIUM SERPL-MCNC: 2.4 MG/DL
MCH RBC QN AUTO: 27.6 PG
MCH RBC QN AUTO: 28.2 PG
MCHC RBC AUTO-ENTMCNC: 29.7 G/DL
MCHC RBC AUTO-ENTMCNC: 31 G/DL
MCV RBC AUTO: 91 FL
MCV RBC AUTO: 93 FL
MONOCYTES # BLD AUTO: 0.3 K/UL
MONOCYTES # BLD AUTO: 0.3 K/UL
MONOCYTES NFR BLD: 5.2 %
MONOCYTES NFR BLD: 5.3 %
NEUTROPHILS # BLD AUTO: 4.1 K/UL
NEUTROPHILS # BLD AUTO: 5 K/UL
NEUTROPHILS NFR BLD: 78 %
NEUTROPHILS NFR BLD: 81.9 %
NONHDLC SERPL-MCNC: 96 MG/DL
NRBC BLD-RTO: 0 /100 WBC
NRBC BLD-RTO: 0 /100 WBC
PHOSPHATE SERPL-MCNC: 7 MG/DL
PLATELET # BLD AUTO: 208 K/UL
PLATELET # BLD AUTO: 233 K/UL
PMV BLD AUTO: 10.9 FL
PMV BLD AUTO: 11 FL
POC PTINR: 1 (ref 0.9–1.2)
POC PTWBT: 11.9 SEC (ref 9.7–14.3)
POCT GLUCOSE: 344 MG/DL (ref 70–110)
POCT GLUCOSE: 345 MG/DL (ref 70–110)
POCT GLUCOSE: 364 MG/DL (ref 70–110)
POCT GLUCOSE: 474 MG/DL (ref 70–110)
POCT GLUCOSE: 481 MG/DL (ref 70–110)
POTASSIUM SERPL-SCNC: 4.3 MMOL/L
POTASSIUM SERPL-SCNC: 4.7 MMOL/L
PROT SERPL-MCNC: 6.8 G/DL
PROT SERPL-MCNC: 7.1 G/DL
PROTHROMBIN TIME: 10.1 SEC
PROTHROMBIN TIME: 9.6 SEC
RBC # BLD AUTO: 3.86 M/UL
RBC # BLD AUTO: 4.02 M/UL
SAMPLE: ABNORMAL
SAMPLE: NORMAL
SODIUM SERPL-SCNC: 137 MMOL/L
SODIUM SERPL-SCNC: 139 MMOL/L
TRIGL SERPL-MCNC: 65 MG/DL
TSH SERPL DL<=0.005 MIU/L-ACNC: 3.93 UIU/ML
WBC # BLD AUTO: 4.96 K/UL
WBC # BLD AUTO: 6.41 K/UL

## 2018-06-02 PROCEDURE — 82565 ASSAY OF CREATININE: CPT

## 2018-06-02 PROCEDURE — 63600175 PHARM REV CODE 636 W HCPCS: Performed by: HOSPITALIST

## 2018-06-02 PROCEDURE — 95819 EEG AWAKE AND ASLEEP: CPT | Mod: 26,,, | Performed by: PSYCHIATRY & NEUROLOGY

## 2018-06-02 PROCEDURE — 85610 PROTHROMBIN TIME: CPT | Mod: 91

## 2018-06-02 PROCEDURE — 20000000 HC ICU ROOM

## 2018-06-02 PROCEDURE — 99223 1ST HOSP IP/OBS HIGH 75: CPT | Mod: GC,,, | Performed by: PSYCHIATRY & NEUROLOGY

## 2018-06-02 PROCEDURE — 86901 BLOOD TYPING SEROLOGIC RH(D): CPT

## 2018-06-02 PROCEDURE — 80053 COMPREHEN METABOLIC PANEL: CPT

## 2018-06-02 PROCEDURE — 99291 CRITICAL CARE FIRST HOUR: CPT | Mod: ,,, | Performed by: EMERGENCY MEDICINE

## 2018-06-02 PROCEDURE — 63600175 PHARM REV CODE 636 W HCPCS: Performed by: NURSE PRACTITIONER

## 2018-06-02 PROCEDURE — 85610 PROTHROMBIN TIME: CPT

## 2018-06-02 PROCEDURE — 82962 GLUCOSE BLOOD TEST: CPT | Mod: 59

## 2018-06-02 PROCEDURE — 96374 THER/PROPH/DIAG INJ IV PUSH: CPT

## 2018-06-02 PROCEDURE — 99291 CRITICAL CARE FIRST HOUR: CPT | Mod: ,,, | Performed by: PSYCHIATRY & NEUROLOGY

## 2018-06-02 PROCEDURE — 63600175 PHARM REV CODE 636 W HCPCS: Performed by: EMERGENCY MEDICINE

## 2018-06-02 PROCEDURE — 99222 1ST HOSP IP/OBS MODERATE 55: CPT | Mod: GC,,, | Performed by: INTERNAL MEDICINE

## 2018-06-02 PROCEDURE — 83036 HEMOGLOBIN GLYCOSYLATED A1C: CPT

## 2018-06-02 PROCEDURE — 96375 TX/PRO/DX INJ NEW DRUG ADDON: CPT

## 2018-06-02 PROCEDURE — 80061 LIPID PANEL: CPT

## 2018-06-02 PROCEDURE — 25000003 PHARM REV CODE 250: Performed by: EMERGENCY MEDICINE

## 2018-06-02 PROCEDURE — 25000003 PHARM REV CODE 250: Performed by: NURSE PRACTITIONER

## 2018-06-02 PROCEDURE — 84443 ASSAY THYROID STIM HORMONE: CPT

## 2018-06-02 PROCEDURE — 93010 ELECTROCARDIOGRAM REPORT: CPT | Mod: ,,, | Performed by: INTERNAL MEDICINE

## 2018-06-02 PROCEDURE — 96376 TX/PRO/DX INJ SAME DRUG ADON: CPT

## 2018-06-02 PROCEDURE — 85025 COMPLETE CBC W/AUTO DIFF WBC: CPT

## 2018-06-02 PROCEDURE — 99285 EMERGENCY DEPT VISIT HI MDM: CPT | Mod: 25

## 2018-06-02 PROCEDURE — 83735 ASSAY OF MAGNESIUM: CPT

## 2018-06-02 PROCEDURE — 84100 ASSAY OF PHOSPHORUS: CPT

## 2018-06-02 PROCEDURE — 80053 COMPREHEN METABOLIC PANEL: CPT | Mod: 91

## 2018-06-02 RX ORDER — LOSARTAN POTASSIUM 50 MG/1
100 TABLET ORAL DAILY
Status: DISCONTINUED | OUTPATIENT
Start: 2018-06-02 | End: 2018-06-08 | Stop reason: HOSPADM

## 2018-06-02 RX ORDER — INSULIN ASPART 100 [IU]/ML
0-5 INJECTION, SOLUTION INTRAVENOUS; SUBCUTANEOUS EVERY 6 HOURS PRN
Status: DISCONTINUED | OUTPATIENT
Start: 2018-06-02 | End: 2018-06-04

## 2018-06-02 RX ORDER — HYDRALAZINE HYDROCHLORIDE 20 MG/ML
10 INJECTION INTRAMUSCULAR; INTRAVENOUS EVERY 6 HOURS PRN
Status: DISCONTINUED | OUTPATIENT
Start: 2018-06-02 | End: 2018-06-02

## 2018-06-02 RX ORDER — METOPROLOL TARTRATE 50 MG/1
50 TABLET ORAL 2 TIMES DAILY
Status: DISCONTINUED | OUTPATIENT
Start: 2018-06-02 | End: 2018-06-06

## 2018-06-02 RX ORDER — HYDRALAZINE HYDROCHLORIDE 20 MG/ML
20 INJECTION INTRAMUSCULAR; INTRAVENOUS
Status: COMPLETED | OUTPATIENT
Start: 2018-06-02 | End: 2018-06-02

## 2018-06-02 RX ORDER — LABETALOL HYDROCHLORIDE 5 MG/ML
10 INJECTION, SOLUTION INTRAVENOUS
Status: COMPLETED | OUTPATIENT
Start: 2018-06-02 | End: 2018-06-02

## 2018-06-02 RX ORDER — NICARDIPINE HYDROCHLORIDE 0.2 MG/ML
2.5 INJECTION INTRAVENOUS CONTINUOUS
Status: DISCONTINUED | OUTPATIENT
Start: 2018-06-02 | End: 2018-06-04

## 2018-06-02 RX ORDER — LORAZEPAM 2 MG/ML
1 INJECTION INTRAMUSCULAR
Status: COMPLETED | OUTPATIENT
Start: 2018-06-02 | End: 2018-06-02

## 2018-06-02 RX ORDER — HYDRALAZINE HYDROCHLORIDE 20 MG/ML
10 INJECTION INTRAMUSCULAR; INTRAVENOUS EVERY 6 HOURS PRN
Status: DISCONTINUED | OUTPATIENT
Start: 2018-06-02 | End: 2018-06-05

## 2018-06-02 RX ORDER — LEVETIRACETAM 10 MG/ML
1000 INJECTION INTRAVASCULAR
Status: COMPLETED | OUTPATIENT
Start: 2018-06-02 | End: 2018-06-02

## 2018-06-02 RX ORDER — SODIUM CHLORIDE 0.9 % (FLUSH) 0.9 %
3 SYRINGE (ML) INJECTION
Status: DISCONTINUED | OUTPATIENT
Start: 2018-06-02 | End: 2018-06-08 | Stop reason: HOSPADM

## 2018-06-02 RX ORDER — METOPROLOL TARTRATE 50 MG/1
50 TABLET ORAL
Status: COMPLETED | OUTPATIENT
Start: 2018-06-02 | End: 2018-06-02

## 2018-06-02 RX ORDER — NIFEDIPINE 60 MG/1
60 TABLET, EXTENDED RELEASE ORAL DAILY
Status: DISCONTINUED | OUTPATIENT
Start: 2018-06-02 | End: 2018-06-08 | Stop reason: HOSPADM

## 2018-06-02 RX ADMIN — INSULIN ASPART 5 UNITS: 100 INJECTION, SOLUTION INTRAVENOUS; SUBCUTANEOUS at 06:06

## 2018-06-02 RX ADMIN — NICARDIPINE HYDROCHLORIDE 5 MG/HR: 0.2 INJECTION, SOLUTION INTRAVENOUS at 11:06

## 2018-06-02 RX ADMIN — METOPROLOL TARTRATE 50 MG: 50 TABLET ORAL at 09:06

## 2018-06-02 RX ADMIN — HYDRALAZINE HYDROCHLORIDE 20 MG: 20 INJECTION INTRAMUSCULAR; INTRAVENOUS at 02:06

## 2018-06-02 RX ADMIN — LABETALOL HYDROCHLORIDE 10 MG: 5 INJECTION, SOLUTION INTRAVENOUS at 02:06

## 2018-06-02 RX ADMIN — LOSARTAN POTASSIUM 100 MG: 50 TABLET, FILM COATED ORAL at 01:06

## 2018-06-02 RX ADMIN — INSULIN DETEMIR 7 UNITS: 100 INJECTION, SOLUTION SUBCUTANEOUS at 09:06

## 2018-06-02 RX ADMIN — LABETALOL HYDROCHLORIDE 10 MG: 5 INJECTION, SOLUTION INTRAVENOUS at 11:06

## 2018-06-02 RX ADMIN — METOPROLOL TARTRATE 50 MG: 50 TABLET ORAL at 01:06

## 2018-06-02 RX ADMIN — NIFEDIPINE 60 MG: 30 TABLET, FILM COATED, EXTENDED RELEASE ORAL at 01:06

## 2018-06-02 RX ADMIN — SODIUM CHLORIDE 3 UNITS/HR: 9 INJECTION, SOLUTION INTRAVENOUS at 10:06

## 2018-06-02 RX ADMIN — LEVETIRACETAM 1000 MG: 10 INJECTION INTRAVENOUS at 11:06

## 2018-06-02 RX ADMIN — LORAZEPAM 1 MG: 2 INJECTION INTRAMUSCULAR; INTRAVENOUS at 11:06

## 2018-06-02 NOTE — ASSESSMENT & PLAN NOTE
53 y.o. female with medical history of DM, HLD, HTN and history of 2 prior strokes / reported history of seizures presenting with stroke symptoms of left sided gaze and left sided head deviation / LSW.      - Stroke code 6/2/18 / LKN 6/1/18, unsure what time.   - Gaze / weakness on one side. No clear focal neurological deficits notes   - No concerns for hypoglycemia or clinical seizure events. No concerns of TB / UI.      - CT head: No acute intracranial process. Interval enlargement of hypoattenuating lesion within the right high parietal lobe suggestive for evolving infarction.  No acute intracranial hemorrhage.Relatively stable remote small areas of encephalomalacia involving the left occipital and right central semiovale most compatible with remote infarcts.     - DDx stroke mimics / seizures >>> acute stroke     - Active managements or evaluation of encephalopathy as per primary - seizures / HTN encephalopathy / metabolic   - Shall perform stroke evaluation for sub-acute stroke evolution     Antithrombotics for secondary stroke prevention: Antiplatelets: Aspirin: 81 mg daily    Statins for secondary stroke prevention and hyperlipidemia, if present:   Statins: Atorvastatin- 40 mg daily    Aggressive risk factor modification: HTN, DM, HLD     Rehab efforts: Occupational Therapy, PT/OT/SLP to evaluate and treat, PM&R consult     Diagnostics ordered/pending: HgbA1C to assess blood glucose levels, Lipid Profile to assess cholesterol levels, MRA head to assess vasculature, MRA neck/arch to assess vasculature, MRI head without contrast to assess brain parenchyma, TTE to assess cardiac function/status , TSH to assess thyroid function    VTE prophylaxis: Heparin 5000 units SQ every 8 hours    BP parameters: Infarct: No intervention, SBP <220

## 2018-06-02 NOTE — SUBJECTIVE & OBJECTIVE
Past Medical History:   Diagnosis Date    Anemia     during pregnancys    Arthritis     neuropathy hands feet and legs//    Blood transfusion     Chronic pain     Diabetes mellitus     Diabetes mellitus type I     Diabetic retinopathy     ESRD (end stage renal disease) on dialysis     Hyperlipidemia     Hypertension     patient states that when her blood sugar increases her blood pressure increases    Neuropathy     Pneumonia     Seizures     when sugar is high, does not quite remember    Stroke     2018    Vitamin D deficiency      Past Surgical History:   Procedure Laterality Date     SECTION, CLASSIC      x 2    EYE SURGERY      HYSTERECTOMY       Family History   Problem Relation Age of Onset    Diabetes Mother     Heart disease Mother     Blindness Mother         diabetes    Hypertension Mother     Diabetes Father     Asthma Father     Hypertension Father     Thyroid disease Sister     Breast cancer Daughter     Diabetes Sister     Stroke Maternal Uncle     Glaucoma Maternal Grandmother     Blindness Maternal Grandmother     Cataracts Maternal Grandmother     Hypertension Maternal Grandmother     Cancer Cousin     Amblyopia Neg Hx     Macular degeneration Neg Hx     Retinal detachment Neg Hx     Strabismus Neg Hx     Colon cancer Neg Hx     Ovarian cancer Neg Hx      Social History   Substance Use Topics    Smoking status: Current Some Day Smoker     Packs/day: 0.10     Years: 10.00     Types: Cigarettes    Smokeless tobacco: Never Used      Comment: 1 pack last her about 2-3 months    Alcohol use No     Review of patient's allergies indicates:   Allergen Reactions    Ciprofloxacin (bulk) Itching    Latex Itching and Other (See Comments)     Very low Oxygen    Vancomycin Shortness Of Breath and Itching    Neurontin [gabapentin] Other (See Comments)     Seizure like activity    Niacin Itching and Other (See Comments)     Burning      Bactrim  [sulfamethoxazole-trimethoprim] Rash       Medications: I have reviewed the current medication administration record.      (Not in a hospital admission)    Review of Systems   Unable to perform ROS: Acuity of condition   Constitutional: Negative for fever.   Eyes: Negative for visual disturbance.   Respiratory: Negative for shortness of breath.    Cardiovascular: Negative for chest pain.   Genitourinary: Negative for dysuria.   Musculoskeletal: Negative for back pain.   Neurological: Positive for speech difficulty and weakness. Negative for facial asymmetry and headaches.   Psychiatric/Behavioral: Positive for confusion.     Objective:     Vital Signs (Most Recent):  Pulse: 77 (06/02/18 1702)  Resp: 18 (06/02/18 1057)  BP: (!) 163/77 (06/02/18 1702)  SpO2: 100 % (06/02/18 1702)    Vital Signs Range (Last 24H):  Pulse:  []   Resp:  [18]   BP: (131-257)/()   SpO2:  [93 %-100 %]     Physical Exam   HENT:   Head: Atraumatic.   Eyes: EOM are normal. Pupils are equal, round, and reactive to light.   Neck: Normal range of motion. Neck supple.   Cardiovascular:   No murmur heard.  Pulmonary/Chest: No respiratory distress.   Abdominal: Soft.       Neurological Exam:   LOC: drowsy  Attention Span: poor  Language: Mute  Articulation: Mute/Anarthric  Orientation: Untestable   Visual Fields: Full  EOM (CN III, IV, VI): Full/intact  Pupils (CN II, III): PERRL  Facial Sensation (CN V): Normal  Facial Movement (CN VII): Symmetric facial expression    Motor: Arm left  Normal 2-3/5  Leg left  Normal 2-3/5  Arm right  Normal 2-3/5  Leg right Normal 2-3/5  Sensation: Intact to light touch, temperature and vibration    Laboratory:  CMP:   Recent Labs  Lab 06/02/18  1118   CALCIUM 8.1*   ALBUMIN 3.1*   PROT 6.8      K 4.3   CO2 25   CL 98   BUN 65*   CREATININE 4.4*   ALKPHOS 384*   ALT 35   AST 28   BILITOT 0.4     CBC:   Recent Labs  Lab 06/02/18  1118   WBC 6.41   RBC 3.86*   HGB 10.9*   HCT 35.2*      MCV 91    MCH 28.2   MCHC 31.0*     Lipid Panel:   Recent Labs  Lab 06/02/18  1118   CHOL 197   LDLCALC 83.0   *   TRIG 65     Coagulation:   Recent Labs  Lab 06/02/18  1118   INR 0.9     Hgb A1C:   Recent Labs  Lab 06/02/18  1118   HGBA1C 9.5*     TSH:   Recent Labs  Lab 06/02/18  1118   TSH 3.932       Diagnostic Results:      Brain imaging:  CT head: Interval enlargement of hypoattenuating lesion within the right high parietal lobe suggestive for evolving infarction. No acute intracranial hemorrhage.    Relatively stable remote small areas of encephalomalacia involving the left occipital and right central semiovale most compatible with remote infarcts.    Vessel Imaging:  None    Cardiac Evaluation:   Pending

## 2018-06-02 NOTE — ASSESSMENT & PLAN NOTE
-SBP elevated to 243 this am  -may be contributing to encephalopathy  -Restarted home BP meds of losartan 100 mg, metoprolol 50 mg and nifedipine 60 mg dialy  -SBP <180  -hydralazine prn

## 2018-06-02 NOTE — ED PROVIDER NOTES
Encounter Date: 6/2/2018    SCRIBE #1 NOTE: I, Arelis Mcgregor, am scribing for, and in the presence of,  Dr. Prince. I have scribed the entire note.       History     Chief Complaint   Patient presents with    Left sided gaze     left sided gaze with left sided head deviation, last known well last pm, unknown time     Time seen by provider: 10:58 AM    This is a 53 y.o. female with medical problems including diabetes, HLD, and HTN and history of 2 prior strokes who presents with left sided gaze, left sided weakness, and left sided head deviation.   Per EMS, pt was last seen normal last night, unsure what time. She was found by family with symptoms this morning prior to arrival. They note her sugar was 360 en route.      arrived who states he has just returned from being [place] for several months. He reports that before being overseas, pt's health was relatively good but while he was gone she developed her visual problems, seizure disorder, and had her stroke. He has been home for only approximately 2 weeks and states that since he has been home her sugar has been running high and approximately in the 300's. He states that when her sugar gets 250 or below she doesn't tolerate this because it quickly starts to plummet. Last night she was normal. This morning he went to take her to dialysis and found her gazing to the left and not responding normally. She was repeating the answers to  His questions over and over.  He reports that she does not have one sided weakness from her stroke but has difficulty with her balance. She has not had any of her medications today yet.     History obtained from , Benson Iniguez [phone].       The history is provided by the EMS personnel, medical records and the spouse.     Review of patient's allergies indicates:   Allergen Reactions    Ciprofloxacin (bulk) Itching    Latex Itching and Other (See Comments)     Very low Oxygen    Vancomycin Shortness Of Breath and  Itching    Neurontin [gabapentin] Other (See Comments)     Seizure like activity    Niacin Itching and Other (See Comments)     Burning      Bactrim [sulfamethoxazole-trimethoprim] Rash     Past Medical History:   Diagnosis Date    Anemia     during pregnancys    Arthritis     neuropathy hands feet and legs//    Blood transfusion     Chronic pain     Diabetes mellitus     Diabetes mellitus type I     Diabetic retinopathy     ESRD (end stage renal disease) on dialysis     Hyperlipidemia     Hypertension     patient states that when her blood sugar increases her blood pressure increases    Neuropathy     Pneumonia     Seizures     when sugar is high, does not quite remember    Stroke     2018    Vitamin D deficiency      Past Surgical History:   Procedure Laterality Date     SECTION, CLASSIC      x 2    EYE SURGERY      HYSTERECTOMY       Family History   Problem Relation Age of Onset    Diabetes Mother     Heart disease Mother     Blindness Mother         diabetes    Hypertension Mother     Diabetes Father     Asthma Father     Hypertension Father     Thyroid disease Sister     Breast cancer Daughter     Diabetes Sister     Stroke Maternal Uncle     Glaucoma Maternal Grandmother     Blindness Maternal Grandmother     Cataracts Maternal Grandmother     Hypertension Maternal Grandmother     Cancer Cousin     Amblyopia Neg Hx     Macular degeneration Neg Hx     Retinal detachment Neg Hx     Strabismus Neg Hx     Colon cancer Neg Hx     Ovarian cancer Neg Hx      Social History   Substance Use Topics    Smoking status: Current Some Day Smoker     Packs/day: 0.10     Years: 10.00     Types: Cigarettes    Smokeless tobacco: Never Used      Comment: 1 pack last her about 2-3 months    Alcohol use No     Review of Systems   Unable to perform ROS: Acuity of condition   Neurological:        Positive for left sided gaze and left sided head deviation.        Physical Exam      Initial Vitals   BP Pulse Resp Temp SpO2   06/02/18 1057 06/02/18 1057 06/02/18 1057 -- 06/02/18 1131   (!) 243/117 95 18  (!) 93 %      MAP       06/02/18 1057       159         Physical Exam    Nursing note and vitals reviewed.  Constitutional: She appears well-developed and well-nourished.   HENT:   Head: Normocephalic and atraumatic.   Right Ear: External ear normal.   Left Ear: External ear normal.   Mouth/Throat: Oropharynx is clear and moist.   Eyes:   Right pupil irregular. Left pupil round. Both reactive to light. She has left sided nystagmus and gaze is downward and to the left.    Neck: Normal range of motion. Neck supple.   Cardiovascular: Normal rate, regular rhythm and normal heart sounds. Exam reveals no gallop and no friction rub.    No murmur heard.  Pulmonary/Chest: No respiratory distress. She has wheezes. She has no rhonchi. She has no rales.   Airway intact.    Abdominal: Soft. She exhibits no distension. There is no tenderness.   Musculoskeletal: She exhibits edema (2+ pitting to bilateral lower extremities).   Neurological:   Left hemiparesis. RUE strength 5/5. Pt does not lift the right leg to my command. Her head is turned to the left, when you attempt to turn it to the midline it drifts back to the left.    Skin: Skin is warm and dry.         ED Course   Procedures  Labs Reviewed   CBC W/ AUTO DIFFERENTIAL - Abnormal; Notable for the following:        Result Value    RBC 3.86 (*)     Hemoglobin 10.9 (*)     Hematocrit 35.2 (*)     MCHC 31.0 (*)     RDW 15.7 (*)     Immature Granulocytes 0.6 (*)     Lymph # 0.8 (*)     Gran% 78.0 (*)     Lymph% 12.2 (*)     All other components within normal limits   COMPREHENSIVE METABOLIC PANEL - Abnormal; Notable for the following:     Glucose 336 (*)     BUN, Bld 65 (*)     Creatinine 4.4 (*)     Calcium 8.1 (*)     Albumin 3.1 (*)     Alkaline Phosphatase 384 (*)     eGFR if  12.4 (*)     eGFR if non  10.8 (*)     All  other components within normal limits   LIPID PANEL - Abnormal; Notable for the following:      (*)     HDL/Chol Ratio 51.3 (*)     All other components within normal limits   ISTAT CREATININE - Abnormal; Notable for the following:     POC Creatinine 4.1 (*)     All other components within normal limits   POCT GLUCOSE - Abnormal; Notable for the following:     POCT Glucose 344 (*)     All other components within normal limits   PROTIME-INR   TSH   POCT GLUCOSE   ISTAT PROCEDURE     EKG Readings: (Independently Interpreted)   Normal sinus rhythm at 94 with prominent T-waves in the precordial leads which appears changed when compared to March 29.           Medical Decision Making:   History:   Old Medical Records: I decided to obtain old medical records.  Old Records Summarized: other records.       <> Summary of Records: Discharge summary from March 29-31: pt was admitted after missing dialysis. She is a diabetic pt who presented with a sugar of 972. She was treated with an insulin drip in the ICU. Medical history includes stroke in February, blindness, diabetes, and seizures for which she is on keppra.   Initial Assessment:   A stroke code was called upon arrival and Dr. Bateman was at the bedside. We discussed the case and feel this is most likely a seizure rather than stroke. Pt was sent to CT emergently. Upon arrival to her room she was still hypertensive. I ordered 10 mg of labetalol IV as well as 1 mg of ativan for presumed seizure activity. I also gave 1 gram of keppra IV.   Independently Interpreted Test(s):   I have ordered and independently interpreted EKG Reading(s) - see prior notes  Clinical Tests:   Lab Tests: Ordered and Reviewed  Radiological Study: Ordered and Reviewed  Medical Tests: Ordered and Reviewed  Other:   I have discussed this case with another health care provider.       <> Summary of the Discussion: Vascular neurology   Neuro critical care  Nephrology             Scribe Attestation:    Scribe #1: I performed the above scribed service and the documentation accurately describes the services I performed. I attest to the accuracy of the note.    Attending Attestation:         Attending Critical Care:   Critical Care Times:   Direct Patient Care (initial evaluation, reassessments, and time considering the case)................................................................25 minutes.   Additional History from reviewing old medical records or taking additional history from the family, EMS, PCP, etc.......................5 minutes.   Ordering, Reviewing, and Interpreting Diagnostic Studies...............................................................................................................5 minutes.   Documentation..................................................................................................................................................................................5 minutes.   Consultation with other Physicians. .................................................................................................................................................8 minutes.   ==============================================================  · Total Critical Care Time - exclusive of procedural time: 48 minutes.  ==============================================================  Critical Care Condition: life-threatening   Critical Care Comments: Critical care time required in this patient who presented with evidence of seizure vs stroke and while in the ED never returned to her baseline mental status.  Also had very difficult to control blood pressure, requiring IV antihypertensives.  Pt had the potential for deterioration in her condition at any time.     Physician Attestation for [place]:      Comments: I, Dr. Chanelle Glover, personally performed the services described in this documentation. All medical record entries made by the yawibjose were at my direction and in my presence.   I have reviewed the chart and agree that the record reflects my personal performance and is accurate and complete. Chanelle Glover MD.  5:18 PM 06/02/2018      Attending ED Notes:   11:55 AM  I discussed the head CT findings with radiology which showed an area in the right parietal region that appears to be larger and different than an old CT scan. I discussed this with Dr. Bateman who feel this may be watershed. After labetalol and ativan, pt no longer has nystagmus. Her blood pressure has improved. She is now sleeping.     12:20 PM   I have discussed the case with neuro critical care and they will come evaluate the pt.     1:28 PM   Pt was evaluated by neuro ICU. I discussed the case with neuro ICU staff, Dr. Pena. Pt is now waking up. He feels she may be stable for the floor if her blood pressure is better controlled. He recommends getting an MRI and giving home blood pressure medications.     1:34 PM   I discussed the case with nephrology as the pt will need dialysis, most likely today. They will evaluate the pt.     2:07 PM   On reassessment, pt is more arousable, however, she is lethargic and confused. She does not follow my commands. She is moving all extremities symmetrically and no longer has any gaze preference. She is still significantly hypertensive. Last blood pressure was 223/107. I have given her her daily medication. I have also given her 10 mg of labetalol.     3:04 PM   There was a question about her loop recorder and whether that would prevent her from getting an LIZZ I discussed with the cardiology fellow in the ED who stated this was not an issue and she could procede with MRI as planned. Pt was evaluated by Dr. Childs with renal who recommended the pt be placed in the ICU. I again contacted neuro ICU who again evaluated the pt. They agreed to admit the pt to their service.              Clinical Impression:   The primary encounter diagnosis was Altered mental status, unspecified altered  mental status type. Seizure disorder.  Renal failure.  Hypertensive emergency.  Left-sided weakness were also pertinent to this visit.    X-Ray Chest AP Portable   Final Result      Please see above         Electronically signed by: Florin Pereyra DO   Date:    06/02/2018   Time:    12:05      CT Head Without Contrast   Final Result      Interval enlargement of hypoattenuating lesion within the right high parietal lobe suggestive for evolving infarction.  No acute intracranial hemorrhage.      Relatively stable remote small areas of encephalomalacia involving the left occipital and right central semiovale most compatible with remote infarcts.      There is no evidence for acute intracranial hemorrhage.      Clinical correlation and further evaluation with MRI if patient compatible      Electronically signed by resident: Blue Pérez   Date:    06/02/2018   Time:    11:23      Electronically signed by: Florin Pereyra DO   Date:    06/02/2018   Time:    11:46      MRI Brain Without Contrast    (Results Pending)       Disposition:   Disposition: Admitted  Neuro ICU                        Chanelle Glover MD  06/02/18 5335

## 2018-06-02 NOTE — CONSULTS
[facility]  Nephrology  Consult Note    Patient Name: Eufemia Haq  MRN: [id number]  Admission Date: 2018  Hospital Length of Stay: 0 days  Attending Provider: Chanelle Glover MD   Primary Care Physician: Kavin Muir MD  Principal Problem:<principal problem not specified>    Inpatient consult to Nephrology  Consult performed by: SAMIA GALDAMEZ  Consult ordered by: CHANELLE GLOVER  Reason for consult: ESRD on HD, inpatient dialysis treatment         Subjective:     HPI: Eufemia Haq is a 53 year old female with past medical history of DMT1, recent CVA with residual behavior problems and ESRD on HD. She arrived after being found by  unresponsive. In ED has been presenting with hypertensive urgency with blood pressures greater than 220s of systolic and diastolic up to 110. Had a brain CT scan with probable new ischemic infarct in posterior parietal lobe. Chest x ray with evidence of pulmonary edema, on nasal canula 2 L O2 with saturation 100%. Nephrology was consulted for inpatient dialysis treatment.     Nephrology: [facility], [facility] on [address] Access is ADEN AVG. EDW= Unknown.     Past Medical History:   Diagnosis Date    Anemia     during pregnancys    Arthritis     neuropathy hands feet and legs//    Blood transfusion     Chronic pain     Diabetes mellitus     Diabetes mellitus type I     Diabetic retinopathy     ESRD (end stage renal disease) on dialysis     Hyperlipidemia     Hypertension     patient states that when her blood sugar increases her blood pressure increases    Neuropathy     Pneumonia     Seizures     when sugar is high, does not quite remember    Stroke     2018    Vitamin D deficiency        Past Surgical History:   Procedure Laterality Date     SECTION, CLASSIC      x 2    EYE SURGERY      HYSTERECTOMY         Review of patient's allergies indicates:   Allergen Reactions    Ciprofloxacin (bulk) Itching     Latex Itching and Other (See Comments)     Very low Oxygen    Vancomycin Shortness Of Breath and Itching    Neurontin [gabapentin] Other (See Comments)     Seizure like activity    Niacin Itching and Other (See Comments)     Burning      Bactrim [sulfamethoxazole-trimethoprim] Rash     Current Facility-Administered Medications   Medication Frequency    hydrALAZINE injection 10 mg Q6H PRN    losartan tablet 100 mg Daily    NIFEdipine 24 hr tablet 60 mg Daily    sodium chloride 0.9% flush 3 mL PRN     Current Outpatient Prescriptions   Medication    epoetin jacques (PROCRIT) 20,000 unit/mL injection    insulin aspart U-100 (NOVOLOG) 100 unit/mL InPn pen    insulin detemir U-100 (LEVEMIR FLEXTOUCH) 100 unit/mL (3 mL) SubQ InPn pen    levetiracetam (KEPPRA) 250 MG Tab    losartan (COZAAR) 100 MG tablet    metoclopramide HCl (REGLAN) 10 MG tablet    metoprolol tartrate (LOPRESSOR) 50 MG tablet    nifedipine (PROCARDIA-XL) 60 MG (OSM) 24 hr tablet    sevelamer carbonate (RENVELA) 800 mg Tab    blood sugar diagnostic Strp    ondansetron (ZOFRAN-ODT) 4 MG TbDL    ONETOUCH DELICA LANCETS 33 gauge Misc    rosuvastatin (CRESTOR) 20 MG tablet     Family History     Problem Relation (Age of Onset)    Asthma Father    Blindness Mother, Maternal Grandmother    Breast cancer Daughter    Cancer Cousin    Cataracts Maternal Grandmother    Diabetes Mother, Father, Sister    Glaucoma Maternal Grandmother    Heart disease Mother    Hypertension Mother, Father, Maternal Grandmother    Stroke Maternal Uncle    Thyroid disease Sister        Social History Main Topics    Smoking status: Current Some Day Smoker     Packs/day: 0.10     Years: 10.00     Types: Cigarettes    Smokeless tobacco: Never Used      Comment: 1 pack last her about 2-3 months    Alcohol use No    Drug use: No    Sexual activity: Yes     Partners: Male     Birth control/ protection: None     Review of Systems   Unable to perform ROS: Patient  nonverbal     Objective:     Vital Signs (Most Recent):  Pulse: 86 (06/02/18 1446)  Resp: 18 (06/02/18 1057)  BP: (!) 216/98 (06/02/18 1446)  SpO2: 100 % (06/02/18 1446) Vital Signs (24h Range):  Pulse:  [] 86  Resp:  [18] 18  SpO2:  [93 %-100 %] 100 %  BP: (186-257)/() 216/98     Weight: 65 kg (143 lb 4.8 oz) (06/02/18 1057)  Body mass index is 23.85 kg/m².  Body surface area is 1.73 meters squared.    No intake/output data recorded.    Physical Exam   Constitutional: No distress.   HENT:   Head: Normocephalic and atraumatic.   Right Ear: External ear normal.   Left Ear: External ear normal.   Nasal canula   Eyes: Right eye exhibits no discharge. Left eye exhibits no discharge.   Cardiovascular: Normal rate and regular rhythm.    ADEN AVG   Pulmonary/Chest: She has rales.   Abdominal: Soft. She exhibits no distension. There is no tenderness.   Musculoskeletal: She exhibits edema.   Neurological:   Non verbal    Skin: Skin is warm.       Significant Labs:  ABGs: No results for input(s): PH, PCO2, HCO3, POCSATURATED, BE in the last 168 hours.  BMP:   Recent Labs  Lab 06/02/18  1118   *   CL 98   CO2 25   BUN 65*   CREATININE 4.4*   CALCIUM 8.1*     CBC:   Recent Labs  Lab 06/02/18  1118   WBC 6.41   RBC 3.86*   HGB 10.9*   HCT 35.2*      MCV 91   MCH 28.2   MCHC 31.0*     CMP:   Recent Labs  Lab 06/02/18  1118   *   CALCIUM 8.1*   ALBUMIN 3.1*   PROT 6.8      K 4.3   CO2 25   CL 98   BUN 65*   CREATININE 4.4*   ALKPHOS 384*   ALT 35   AST 28   BILITOT 0.4     PTH: No results for input(s): PTH in the last 168 hours.  No results for input(s): COLORU, CLARITYU, SPECGRAV, PHUR, PROTEINUA, GLUCOSEU, BILIRUBINCON, BLOODU, WBCU, RBCU, BACTERIA, MUCUS, NITRITE, LEUKOCYTESUR, UROBILINOGEN, HYALINECASTS in the last 168 hours.  All labs within the past 24 hours have been reviewed.      Assessment/Plan:     ESRD on dialysis    Eufemia Severino is a 53 year old female with ESRD on HD (TTS)  admitted for ischemic posterior lobe ischemic stroke and hypertensive urgency. Nephrology consulted for inpatient dialysis treatment.     Nephrology: [facility], [facility] on [address] Access is [facility] AVG. EDW= Unknown.     Plan:  - Patient currently with uncontrol blood pressures > 220, has not responded well with labetalol may benefit of nicardipine for better control.    - Currently patient not stable for dialysis. Will re-evaluate for need for dialysis for tomorrow if stable.    - Patient will need to be placed on ICU   - Chest x ray with edema, currently O2 Sat 100 with nasal canula  - Acid/base: with no sign of acidosis  - Anemia chronic renal disease: stable at 10.9             Galindo Alvarez  Nephrology  Fellow  [facility]    Pager [phone]

## 2018-06-02 NOTE — HOSPITAL COURSE
6/2/18: Patient presented to [facility] with left sided gaze preference and decreased responsiveness; admitted to [facility] for higher level of care  6/3: improved mental status, off nicardipine, insulin gtt  6/4: hyperglycemia insulin gtt restarted. ESRD on HD  6/5: stepdown to hospital medicine, HD today, Endocrine following for DM management

## 2018-06-02 NOTE — ASSESSMENT & PLAN NOTE
Eufemia Haq is a 53 year old female with ESRD on HD (TTS) admitted for ischemic posterior lobe ischemic stroke and hypertensive urgency. Nephrology consulted for inpatient dialysis treatment.     Nephrology: [facility], [facility] on [address] Access is [facility] AVG. EDW= Unknown.     Plan:  - Patient currently with uncontrol blood pressures > 220, has not responded well with labetalol may benefit of nicardipine for better control.    - Currently patient not stable for dialysis. Will re-evaluate for need for dialysis for tomorrow if stable.    - Patient will need to be placed on ICU   - Chest x ray with edema, currently O2 Sat 100 with nasal canula  - Acid/base: with no sign of acidosis  - Anemia chronic renal disease: stable at 10.9

## 2018-06-02 NOTE — HPI
Eufemia Tubbs is a 53 y.o. female with medical history of DM, HLD, HTN and history of 2 prior strokes / reported history of seizures presenting with stroke symptoms of left sided gaze and left sided head deviation.     Stroke code 6/2/18: 10:54.  Per EMS, pt was last seen normal last night (6/1/18), unsure what time. She was found by family with symptoms left sided gaze and left sided head deviation / with ? LSW. As reported - patient's  went in her room to take her to dialysis this morning. At that time, she was slow to respond and had left sided gaze preference. No concerns for hypoglycemia or clinical seizure events. No concerns of TB / UI.     CT head: No acute intracranial process. Interval enlargement of hypoattenuating lesion within the right high parietal lobe suggestive for evolving infarction.  No acute intracranial hemorrhage.Relatively stable remote small areas of encephalomalacia involving the left occipital and right central semiovale most compatible with remote infarcts.

## 2018-06-02 NOTE — ED NOTES
Dr. Prince notified of abnormal shape of the right eye and left eye non-reactive. Dr. Prince aware of pt bp 250/122. Awaiting orders.

## 2018-06-02 NOTE — SUBJECTIVE & OBJECTIVE
Past Medical History:   Diagnosis Date    Anemia     during pregnancys    Arthritis     neuropathy hands feet and legs//    Blood transfusion     Chronic pain     Diabetes mellitus     Diabetes mellitus type I     Diabetic retinopathy     ESRD (end stage renal disease) on dialysis     Hyperlipidemia     Hypertension     patient states that when her blood sugar increases her blood pressure increases    Neuropathy     Pneumonia     Seizures     when sugar is high, does not quite remember    Stroke     2018    Vitamin D deficiency      Past Surgical History:   Procedure Laterality Date     SECTION, CLASSIC      x 2    EYE SURGERY      HYSTERECTOMY        No current facility-administered medications on file prior to encounter.      Current Outpatient Prescriptions on File Prior to Encounter   Medication Sig Dispense Refill    epoetin jacques (PROCRIT) 20,000 unit/mL injection Inject 1 mL (20,000 Units total) into the skin every Tues, Th, Sat. 1 mL     insulin aspart U-100 (NOVOLOG) 100 unit/mL InPn pen Inject 5 Units into the skin 3 (three) times daily with meals. 1 Box 2    insulin detemir U-100 (LEVEMIR FLEXTOUCH) 100 unit/mL (3 mL) SubQ InPn pen Inject 7 Units into the skin 2 (two) times daily. 1 Box 2    levetiracetam (KEPPRA) 250 MG Tab TK 1 T PO BID  0    losartan (COZAAR) 100 MG tablet Take 1 tablet (100 mg total) by mouth once daily. 90 tablet 0    metoclopramide HCl (REGLAN) 10 MG tablet Take 10 mg by mouth 3 (three) times daily before meals.      metoprolol tartrate (LOPRESSOR) 50 MG tablet Take 50 mg by mouth 2 (two) times daily.      nifedipine (PROCARDIA-XL) 60 MG (OSM) 24 hr tablet TK 1 T PO Q 12 H  0    sevelamer carbonate (RENVELA) 800 mg Tab Take 2 tablets (1,600 mg total) by mouth 3 (three) times daily with meals. 180 tablet 0    blood sugar diagnostic Strp To use as directed with True results meter and monitor BS 6 times daily - fluctuating  each 12     ondansetron (ZOFRAN-ODT) 4 MG TbDL Take 1-2 tablets by mouth every 4 hours as needed for nausea and vomiting      ONETOUCH DELICA LANCETS 33 gauge Misc TEST BLOOD SUGAR TID  3    rosuvastatin (CRESTOR) 20 MG tablet Take 20 mg by mouth once daily.        Allergies: Ciprofloxacin (bulk); Latex; Vancomycin; Neurontin [gabapentin]; Niacin; and Bactrim [sulfamethoxazole-trimethoprim]    Family History   Problem Relation Age of Onset    Diabetes Mother     Heart disease Mother     Blindness Mother         diabetes    Hypertension Mother     Diabetes Father     Asthma Father     Hypertension Father     Thyroid disease Sister     Breast cancer Daughter     Diabetes Sister     Stroke Maternal Uncle     Glaucoma Maternal Grandmother     Blindness Maternal Grandmother     Cataracts Maternal Grandmother     Hypertension Maternal Grandmother     Cancer Cousin     Amblyopia Neg Hx     Macular degeneration Neg Hx     Retinal detachment Neg Hx     Strabismus Neg Hx     Colon cancer Neg Hx     Ovarian cancer Neg Hx      Social History   Substance Use Topics    Smoking status: Current Some Day Smoker     Packs/day: 0.10     Years: 10.00     Types: Cigarettes    Smokeless tobacco: Never Used      Comment: 1 pack last her about 2-3 months    Alcohol use No     Review of Systems   Unable to perform ROS: Mental status change     Objective:     Vitals:    Pulse: 86  BP: (!) 216/98  MAP (mmHg): 141  Resp: 18  SpO2: 100 %    Pulse  Min: 77  Max: 117  BP  Min: 186/86  Max: 257/122  MAP (mmHg)  Min: 117  Max: 175  Resp  Min: 18  Max: 18  SpO2  Min: 93 %  Max: 100 %    No intake/output data recorded.         Physical Exam   Constitutional: She appears well-developed and well-nourished.   HENT:   Head: Normocephalic.   Pulmonary/Chest: Effort normal.   Neurological:   Asleep in bed with eyes closed  Opens eyes to verbal stimuli  Patient not responding to questions at this time  No gross facial asymmetry noted  Motor:  Moving all extremities spontaneously  Sensory,co-ordination and gait unable to assess sec to mental status     Nursing note and vitals reviewed.    Today I personally reviewed pertinent medications, imaging, lab results, notably:

## 2018-06-02 NOTE — HPI
Ms. Haq is a 53 year old Female with PMHx of Diabetes, HTN, HLD, ESRD (on HD T,Th,Sat) 2 prior strokes and seizures who presented to [facility] after she was found by with left sided gaze preference. Per notes, patient's LKN was last night. The patient's  went in her room to take her to dialysis this morning. At that time, she was slow to respond and had left sided gaze preference. Patient will be admitted to [facility] for higher level of care.

## 2018-06-02 NOTE — ASSESSMENT & PLAN NOTE
-Patient with hx of prior stroke in February  -CT head with interval enlargement of hypoattenuating lesion within the right high parietal lobe suggestive for evolving infarction  -Stroke consulted  -MRI brain ordered  -Lipid panel and A1C ordered  -PT/OT/ST ordered

## 2018-06-02 NOTE — ASSESSMENT & PLAN NOTE
-Reported seizure history   -Patient s/p 1gram Keppra IV load   -Continue patient's home AED of Keppra 250 mg BID; adjusted for renal dosing  -EEG ordered  -Will continue to monitor

## 2018-06-02 NOTE — H&P
[facility]  Neurocritical Care  History & Physical    Admit Date: 2018  Service Date: 2018  Length of Stay: 0    Subjective:     Chief Complaint: <principal problem not specified>    History of Present Illness: Ms. Haq is a 53 year old Female with PMHx of Diabetes, HTN, HLD, ESRD (on HD ,,Sat) 2 prior strokes and seizures who [place] [facility] [place] found by with left sided gaze preference. Per notes, patient's LKN was last night. The patient's  went in her room to take her to dialysis this morning. At that time, she was slow to respond and had left sided gaze preference. Patient will be admitted to [facility] for higher level of care.        Past Medical History:   Diagnosis Date    Anemia     during pregnancys    Arthritis     neuropathy hands feet and legs//    Blood transfusion     Chronic pain     Diabetes mellitus     Diabetes mellitus type I     Diabetic retinopathy     ESRD (end stage renal disease) on dialysis     Hyperlipidemia     Hypertension     patient states that when her blood sugar increases her blood pressure increases    Neuropathy     Pneumonia     Seizures     when sugar is high, does not quite remember    Stroke     2018    Vitamin D deficiency      Past Surgical History:   Procedure Laterality Date     SECTION, CLASSIC      x 2    EYE SURGERY      HYSTERECTOMY        No current facility-administered medications on file prior to encounter.      Current Outpatient Prescriptions on File Prior to Encounter   Medication Sig Dispense Refill    epoetin jacques (PROCRIT) 20,000 unit/mL injection Inject 1 mL (20,000 Units total) into the skin every Tues, Thurs, Sat. 1 mL     insulin aspart U-100 (NOVOLOG) 100 unit/mL InPn pen Inject 5 Units into the skin 3 (three) times daily with meals. 1 Box 2    insulin detemir U-100 (LEVEMIR FLEXTOUCH) 100 unit/mL (3 mL) SubQ InPn pen Inject 7 Units into the skin 2 (two) times daily. 1 Box  2    levetiracetam (KEPPRA) 250 MG Tab TK 1 T PO BID  0    losartan (COZAAR) 100 MG tablet Take 1 tablet (100 mg total) by mouth once daily. 90 tablet 0    metoclopramide HCl (REGLAN) 10 MG tablet Take 10 mg by mouth 3 (three) times daily before meals.      metoprolol tartrate (LOPRESSOR) 50 MG tablet Take 50 mg by mouth 2 (two) times daily.      nifedipine (PROCARDIA-XL) 60 MG (OSM) 24 hr tablet TK 1 T PO Q 12 H  0    sevelamer carbonate (RENVELA) 800 mg Tab Take 2 tablets (1,600 mg total) by mouth 3 (three) times daily with meals. 180 tablet 0    blood sugar diagnostic Strp To use as directed with True results meter and monitor BS 6 times daily - fluctuating  each 12    ondansetron (ZOFRAN-ODT) 4 MG TbDL Take 1-2 tablets by mouth every 4 hours as needed for nausea and vomiting      ONETOUCH DELICA LANCETS 33 gauge Misc TEST BLOOD SUGAR TID  3    rosuvastatin (CRESTOR) 20 MG tablet Take 20 mg by mouth once daily.        Allergies: Ciprofloxacin (bulk); Latex; Vancomycin; Neurontin [gabapentin]; Niacin; and Bactrim [sulfamethoxazole-trimethoprim]    Family History   Problem Relation Age of Onset    Diabetes Mother     Heart disease Mother     Blindness Mother         diabetes    Hypertension Mother     Diabetes Father     Asthma Father     Hypertension Father     Thyroid disease Sister     Breast cancer Daughter     Diabetes Sister     Stroke Maternal Uncle     Glaucoma Maternal Grandmother     Blindness Maternal Grandmother     Cataracts Maternal Grandmother     Hypertension Maternal Grandmother     Cancer Cousin     Amblyopia Neg Hx     Macular degeneration Neg Hx     Retinal detachment Neg Hx     Strabismus Neg Hx     Colon cancer Neg Hx     Ovarian cancer Neg Hx      Social History   Substance Use Topics    Smoking status: Current Some Day Smoker     Packs/day: 0.10     Years: 10.00     Types: Cigarettes    Smokeless tobacco: Never Used      Comment: 1 pack last her about  2-3 months    Alcohol use No     Review of Systems   Unable to perform ROS: Mental status change     Objective:     Vitals:    Pulse: 86  BP: (!) 216/98  MAP (mmHg): 141  Resp: 18  SpO2: 100 %    Pulse  Min: 77  Max: 117  BP  Min: 186/86  Max: 257/122  MAP (mmHg)  Min: 117  Max: 175  Resp  Min: 18  Max: 18  SpO2  Min: 93 %  Max: 100 %    No intake/output data recorded.         Physical Exam   Constitutional: She appears well-developed and well-nourished.   HENT:   Head: Normocephalic.   Pulmonary/Chest: Effort normal.   Neurological:   Asleep in bed with eyes closed  Opens eyes to verbal stimuli  Patient not responding to questions at this time  No gross facial asymmetry noted  Motor: Moving all extremities spontaneously  Sensory,co-ordination and gait unable to assess sec to mental status     Nursing note and vitals reviewed.    Today I personally reviewed pertinent medications, imaging, lab results, notably:        Assessment/Plan:     Neuro   Stroke    -Patient with hx of prior stroke in February  -CT head with interval enlargement of hypoattenuating lesion within the right high parietal lobe suggestive for evolving infarction  -Stroke consulted  -MRI brain ordered  -Lipid panel and A1C ordered  -PT/OT/ST ordered        Encephalopathy    -Patient with PMHx of Seizures presented to [facility] with left sided gaze and decreased responsiveness  -Pt with elevated BP that could be contributing to encephalopathy  -No gaze preference noted on exam in ED  -Will order EEG to rule out seizure  -Patient s/p Keppra 1 gram IV load  -NPO for now        Seizure disorder    -Reported seizure history   -Patient s/p 1gram Keppra IV load   -Continue patient's home AED of Keppra 250 mg BID; adjusted for renal dosing  -EEG ordered  -Will continue to monitor        Cardiac/Vascular   Essential hypertension    -SBP elevated to 243 this am  -may be contributing to encephalopathy  -Restarted home BP meds of losartan 100 mg, metoprolol 50 mg  and nifedipine 60 mg dialy  -SBP <180  -hydralazine prn         Renal/   ESRD on dialysis    -Patient with outpatient HD Tuesday, Thursday, Saturday  -BUN/Creatinine 65/4.4 today  -Nephrology consulted in ED for HD         Endocrine   Hyperglycemia due to type 1 diabetes mellitus    -Home Determir 7 units BID ordered   -Accuchecks and SSI            Prophylaxis:  Venous Thromboembolism: mechanical  Stress Ulcer: None  Ventilator Pneumonia: not applicable      Activity Orders          Up with assistance starting at 06/02 1513        Full Code    Stephanie Centeno MD  Neurocritical Care  [facility]

## 2018-06-02 NOTE — ASSESSMENT & PLAN NOTE
-Patient with outpatient HD Tuesday, Thursday, Saturday  -BUN/Creatinine 65/4.4 today  -Nephrology consulted in ED for HD

## 2018-06-02 NOTE — SUBJECTIVE & OBJECTIVE
Past Medical History:   Diagnosis Date    Anemia     during pregnancys    Arthritis     neuropathy hands feet and legs//    Blood transfusion     Chronic pain     Diabetes mellitus     Diabetes mellitus type I     Diabetic retinopathy     ESRD (end stage renal disease) on dialysis     Hyperlipidemia     Hypertension     patient states that when her blood sugar increases her blood pressure increases    Neuropathy     Pneumonia     Seizures     when sugar is high, does not quite remember    Stroke     2018    Vitamin D deficiency        Past Surgical History:   Procedure Laterality Date     SECTION, CLASSIC      x 2    EYE SURGERY      HYSTERECTOMY         Review of patient's allergies indicates:   Allergen Reactions    Ciprofloxacin (bulk) Itching    Latex Itching and Other (See Comments)     Very low Oxygen    Vancomycin Shortness Of Breath and Itching    Neurontin [gabapentin] Other (See Comments)     Seizure like activity    Niacin Itching and Other (See Comments)     Burning      Bactrim [sulfamethoxazole-trimethoprim] Rash     Current Facility-Administered Medications   Medication Frequency    hydrALAZINE injection 10 mg Q6H PRN    losartan tablet 100 mg Daily    NIFEdipine 24 hr tablet 60 mg Daily    sodium chloride 0.9% flush 3 mL PRN     Current Outpatient Prescriptions   Medication    epoetin jacques (PROCRIT) 20,000 unit/mL injection    insulin aspart U-100 (NOVOLOG) 100 unit/mL InPn pen    insulin detemir U-100 (LEVEMIR FLEXTOUCH) 100 unit/mL (3 mL) SubQ InPn pen    levetiracetam (KEPPRA) 250 MG Tab    losartan (COZAAR) 100 MG tablet    metoclopramide HCl (REGLAN) 10 MG tablet    metoprolol tartrate (LOPRESSOR) 50 MG tablet    nifedipine (PROCARDIA-XL) 60 MG (OSM) 24 hr tablet    sevelamer carbonate (RENVELA) 800 mg Tab    blood sugar diagnostic Strp    ondansetron (ZOFRAN-ODT) 4 MG TbDL    ONETOUCH DELICA LANCETS 33 gauge Misc    rosuvastatin (CRESTOR) 20  MG tablet     Family History     Problem Relation (Age of Onset)    Asthma Father    Blindness Mother, Maternal Grandmother    Breast cancer Daughter    Cancer Cousin    Cataracts Maternal Grandmother    Diabetes Mother, Father, Sister    Glaucoma Maternal Grandmother    Heart disease Mother    Hypertension Mother, Father, Maternal Grandmother    Stroke Maternal Uncle    Thyroid disease Sister        Social History Main Topics    Smoking status: Current Some Day Smoker     Packs/day: 0.10     Years: 10.00     Types: Cigarettes    Smokeless tobacco: Never Used      Comment: 1 pack last her about 2-3 months    Alcohol use No    Drug use: No    Sexual activity: Yes     Partners: Male     Birth control/ protection: None     Review of Systems   Unable to perform ROS: Patient nonverbal     Objective:     Vital Signs (Most Recent):  Pulse: 86 (06/02/18 1446)  Resp: 18 (06/02/18 1057)  BP: (!) 216/98 (06/02/18 1446)  SpO2: 100 % (06/02/18 1446) Vital Signs (24h Range):  Pulse:  [] 86  Resp:  [18] 18  SpO2:  [93 %-100 %] 100 %  BP: (186-257)/() 216/98     Weight: 65 kg (143 lb 4.8 oz) (06/02/18 1057)  Body mass index is 23.85 kg/m².  Body surface area is 1.73 meters squared.    No intake/output data recorded.    Physical Exam   Constitutional: No distress.   HENT:   Head: Normocephalic and atraumatic.   Right Ear: External ear normal.   Left Ear: External ear normal.   Nasal canula   Eyes: Right eye exhibits no discharge. Left eye exhibits no discharge.   Cardiovascular: Normal rate and regular rhythm.    ADEN AVG   Pulmonary/Chest: She has rales.   Abdominal: Soft. She exhibits no distension. There is no tenderness.   Musculoskeletal: She exhibits edema.   Neurological:   Non verbal    Skin: Skin is warm.       Significant Labs:  ABGs: No results for input(s): PH, PCO2, HCO3, POCSATURATED, BE in the last 168 hours.  BMP:   Recent Labs  Lab 06/02/18  1118   *   CL 98   CO2 25   BUN 65*   CREATININE  4.4*   CALCIUM 8.1*     CBC:   Recent Labs  Lab 06/02/18  1118   WBC 6.41   RBC 3.86*   HGB 10.9*   HCT 35.2*      MCV 91   MCH 28.2   MCHC 31.0*     CMP:   Recent Labs  Lab 06/02/18  1118   *   CALCIUM 8.1*   ALBUMIN 3.1*   PROT 6.8      K 4.3   CO2 25   CL 98   BUN 65*   CREATININE 4.4*   ALKPHOS 384*   ALT 35   AST 28   BILITOT 0.4     PTH: No results for input(s): PTH in the last 168 hours.  No results for input(s): COLORU, CLARITYU, SPECGRAV, PHUR, PROTEINUA, GLUCOSEU, BILIRUBINCON, BLOODU, WBCU, RBCU, BACTERIA, MUCUS, NITRITE, LEUKOCYTESUR, UROBILINOGEN, HYALINECASTS in the last 168 hours.  All labs within the past 24 hours have been reviewed.

## 2018-06-02 NOTE — ED TRIAGE NOTES
Pt brought in via [facility]. Pt was last seen normal lastnight. Pt was found by family this morning with a left sided gaze. Pt has a hx of stroke with left sided  Residual. Pt has a hx of seizures.

## 2018-06-02 NOTE — CONSULTS
[facility]  Vascular Neurology  Comprehensive Stroke Center  Consult Note    Inpatient consult to Vascular (Stroke) Neurology  Consult performed by: MARIO ROWELL  Consult ordered by: NNAMDI CHEN        Assessment/Plan:     Patient is a 53 y.o. year old female with:    Stroke    53 y.o. female with medical history of DM, HLD, HTN and history of 2 prior strokes / reported history of seizures presenting with stroke symptoms of left sided gaze and left sided head deviation / LSW.      - Stroke code 6/2/18 / LKN 6/1/18, unsure what time.   - Gaze / weakness on one side. No clear focal neurological deficits notes   - No concerns for hypoglycemia or clinical seizure events. No concerns of TB / UI.      - CT head: No acute intracranial process. Interval enlargement of hypoattenuating lesion within the right high parietal lobe suggestive for evolving infarction.  No acute intracranial hemorrhage.Relatively stable remote small areas of encephalomalacia involving the left occipital and right central semiovale most compatible with remote infarcts.     - DDx stroke mimics / seizures >>> acute stroke     - Active managements or evaluation of encephalopathy as per primary - seizures / HTN encephalopathy / metabolic   - Shall perform stroke evaluation for sub-acute stroke evolution     Antithrombotics for secondary stroke prevention: Antiplatelets: Aspirin: 81 mg daily    Statins for secondary stroke prevention and hyperlipidemia, if present:   Statins: Atorvastatin- 40 mg daily    Aggressive risk factor modification: HTN, DM, HLD     Rehab efforts: Occupational Therapy, PT/OT/SLP to evaluate and treat, PM&R consult     Diagnostics ordered/pending: HgbA1C to assess blood glucose levels, Lipid Profile to assess cholesterol levels, MRA head to assess vasculature, MRA neck/arch to assess vasculature, MRI head without contrast to assess brain parenchyma, TTE to assess cardiac function/status , TSH  to assess thyroid function    VTE prophylaxis: Heparin 5000 units SQ every 8 hours    BP parameters: Infarct: No intervention, SBP <220            Encephalopathy    - see section above         Seizure disorder    - as per primary   - AEDs as needed         ESRD on dialysis    - correct lytes as needed         Hyperglycemia due to type 1 diabetes mellitus    - avoid hypo or hyperglycemia         Essential hypertension    - maintain cerebral perfusion   - target SBP < 220            STROKE DOCUMENTATION     Acute Stroke Times   Last Known Normal Date: 06/01/18  Last Known Normal Time: 2100  Symptom Onset Date: 06/01/18  Symptom Onset Time: 2100  Stroke Team Called Date: 06/02/18  Stroke Team Called Time: 1054  Stroke Team Arrival Date: 06/02/18  Stroke Team Arrival Time: 1100  CT Interpretation Time: 1110  Decision to Treat Time for Alteplase:  (Out of t-PA)  Decision to Treat Time for IR:  (Stroke mimic )    NIH Scale:  1a. Level Of Consciousness: 1-->Not alert: but arousable by minor stimulation to obey, answer, or respond  1b. LOC Questions: 2-->Answers neither question correctly  1c. LOC Commands: 2-->Performs neither task correctly  2. Best Gaze: 1-->Partial gaze palsy: gaze is abnormal in one or both eyes, but forced deviation or total gaze paresis is not present  3. Visual: 0-->No visual loss  4. Facial Palsy: 0-->Normal symmetrical movements  5a. Motor Arm, Left: 2-->Some effort against gravity: limb cannot get to or maintain (if cued) 90 (or 45) degrees, drifts down to bed, but has some effort against gravity  5b. Motor Arm, Right: 2-->Some effort against gravity: limb cannot get to or maintain (if cued) 90 (or 45) degrees, drifts down to bed, but has some effort against gravity  6a. Motor Leg, Left: 2-->Some effort against gravity: leg falls to bed by 5 secs, but has some effort against gravity  6b. Motor Leg, Right: 2-->Some effort against gravity: leg falls to bed by 5 secs, but has some effort against  gravity  7. Limb Ataxia: 0-->Absent  8. Sensory: 0-->Normal: no sensory loss  9. Best Language: 3-->Mute, global aphasia: no usable speech or auditory comprehension  10. Dysarthria: 2-->Severe dysarthria: patients speech is so slurred as to be unintelligible in the absence of or out of proportion to any dysphasia, or is mute/anarthric  11. Extinction and Inattention (formerly Neglect): 0-->No abnormality  Total (NIH Stroke Scale): 19    Modified Lissett Score: 0  [place] Coma Scale:8   ABCD2 Score:    [id number] Score:   HAS -BLED Score:   ICH Score:   Hunt & Burk Classification:       Thrombolysis Candidate? No, Out of window       Interventional Revascularization Candidate?   Is the patient eligible for mechanical endovascular reperfusion (SULEIMAN)?  Stroke mimic       Hemorrhagic change of an Ischemic Stroke: Does this patient have an ischemic stroke with hemorrhagic changes? No     Subjective:     History of Present Illness:  Eufemia Tubbs is a 53 y.o. female with medical history of DM, HLD, HTN and history of 2 prior strokes / reported history of seizures presenting with stroke symptoms of left sided gaze and left sided head deviation.     Stroke code 6/2/18: 10:54.  Per EMS, pt was last seen normal last night (6/1/18), unsure what time. She was found by family with symptoms left sided gaze and left sided head deviation / with ? LSW. As reported - patient's  went in her room to take her to dialysis this morning. At that time, she was slow to respond and had left sided gaze preference. No concerns for hypoglycemia or clinical seizure events. No concerns of TB / UI.     CT head: No acute intracranial process. Interval enlargement of hypoattenuating lesion within the right high parietal lobe suggestive for evolving infarction.  No acute intracranial hemorrhage.Relatively stable remote small areas of encephalomalacia involving the left occipital and right central semiovale most compatible with remote  infarcts.         Past Medical History:   Diagnosis Date    Anemia     during pregnancys    Arthritis     neuropathy hands feet and legs//    Blood transfusion     Chronic pain     Diabetes mellitus     Diabetes mellitus type I     Diabetic retinopathy     ESRD (end stage renal disease) on dialysis     Hyperlipidemia     Hypertension     patient states that when her blood sugar increases her blood pressure increases    Neuropathy     Pneumonia     Seizures     when sugar is high, does not quite remember    Stroke     2018    Vitamin D deficiency      Past Surgical History:   Procedure Laterality Date     SECTION, CLASSIC      x 2    EYE SURGERY      HYSTERECTOMY       Family History   Problem Relation Age of Onset    Diabetes Mother     Heart disease Mother     Blindness Mother         diabetes    Hypertension Mother     Diabetes Father     Asthma Father     Hypertension Father     Thyroid disease Sister     Breast cancer Daughter     Diabetes Sister     Stroke Maternal Uncle     Glaucoma Maternal Grandmother     Blindness Maternal Grandmother     Cataracts Maternal Grandmother     Hypertension Maternal Grandmother     Cancer Cousin     Amblyopia Neg Hx     Macular degeneration Neg Hx     Retinal detachment Neg Hx     Strabismus Neg Hx     Colon cancer Neg Hx     Ovarian cancer Neg Hx      Social History   Substance Use Topics    Smoking status: Current Some Day Smoker     Packs/day: 0.10     Years: 10.00     Types: Cigarettes    Smokeless tobacco: Never Used      Comment: 1 pack last her about 2-3 months    Alcohol use No     Review of patient's allergies indicates:   Allergen Reactions    Ciprofloxacin (bulk) Itching    Latex Itching and Other (See Comments)     Very low Oxygen    Vancomycin Shortness Of Breath and Itching    Neurontin [gabapentin] Other (See Comments)     Seizure like activity    Niacin Itching and Other (See Comments)     Burning       Bactrim [sulfamethoxazole-trimethoprim] Rash       Medications: I have reviewed the current medication administration record.      (Not in a hospital admission)    Review of Systems   Unable to perform ROS: Acuity of condition   Constitutional: Negative for fever.   Eyes: Negative for visual disturbance.   Respiratory: Negative for shortness of breath.    Cardiovascular: Negative for chest pain.   Genitourinary: Negative for dysuria.   Musculoskeletal: Negative for back pain.   Neurological: Positive for speech difficulty and weakness. Negative for facial asymmetry and headaches.   Psychiatric/Behavioral: Positive for confusion.     Objective:     Vital Signs (Most Recent):  Pulse: 77 (06/02/18 1702)  Resp: 18 (06/02/18 1057)  BP: (!) 163/77 (06/02/18 1702)  SpO2: 100 % (06/02/18 1702)    Vital Signs Range (Last 24H):  Pulse:  []   Resp:  [18]   BP: (131-257)/()   SpO2:  [93 %-100 %]     Physical Exam   HENT:   Head: Atraumatic.   Eyes: EOM are normal. Pupils are equal, round, and reactive to light.   Neck: Normal range of motion. Neck supple.   Cardiovascular:   No murmur heard.  Pulmonary/Chest: No respiratory distress.   Abdominal: Soft.       Neurological Exam:   LOC: drowsy  Attention Span: poor  Language: Mute  Articulation: Mute/Anarthric  Orientation: Untestable   Visual Fields: Full  EOM (CN III, IV, VI): Full/intact  Pupils (CN II, III): PERRL  Facial Sensation (CN V): Normal  Facial Movement (CN VII): Symmetric facial expression    Motor: Arm left  Normal 2-3/5  Leg left  Normal 2-3/5  Arm right  Normal 2-3/5  Leg right Normal 2-3/5  Sensation: Intact to light touch, temperature and vibration    Laboratory:  CMP:   Recent Labs  Lab 06/02/18  1118   CALCIUM 8.1*   ALBUMIN 3.1*   PROT 6.8      K 4.3   CO2 25   CL 98   BUN 65*   CREATININE 4.4*   ALKPHOS 384*   ALT 35   AST 28   BILITOT 0.4     CBC:   Recent Labs  Lab 06/02/18  1118   WBC 6.41   RBC 3.86*   HGB 10.9*   HCT 35.2*       MCV 91   MCH 28.2   MCHC 31.0*     Lipid Panel:   Recent Labs  Lab 06/02/18  1118   CHOL 197   LDLCALC 83.0   *   TRIG 65     Coagulation:   Recent Labs  Lab 06/02/18  1118   INR 0.9     Hgb A1C:   Recent Labs  Lab 06/02/18  1118   HGBA1C 9.5*     TSH:   Recent Labs  Lab 06/02/18  1118   TSH 3.932       Diagnostic Results:      Brain imaging:  CT head: Interval enlargement of hypoattenuating lesion within the right high parietal lobe suggestive for evolving infarction. No acute intracranial hemorrhage.    Relatively stable remote small areas of encephalomalacia involving the left occipital and right central semiovale most compatible with remote infarcts.    Vessel Imaging:  None    Cardiac Evaluation:   Pending       Reta Jain MD  Comprehensive Stroke Center  Department of Vascular Neurology   [facility]

## 2018-06-02 NOTE — HPI
Eufemia Haq is a 53 year old female with past medical history of DMT1, recent CVA with residual behavior problems and ESRD on HD. She arrived after being found by  unresponsive. In ED has been presenting with hypertensive urgency with blood pressures greater than 220s of systolic and diastolic up to 110. Had a brain CT scan with probable new ischemic infarct in posterior parietal lobe. Chest x ray with evidence of pulmonary edema, on nasal canula 2 L O2 with saturation 100%. Nephrology was consulted for inpatient dialysis treatment.     Nephrology: [facility], [facility] on [address]. Duration 4 hrs , UF 4 , Access is ADEN AVG,  EDW= 65 kg and has residual renal function.    details… detailed exam

## 2018-06-02 NOTE — ASSESSMENT & PLAN NOTE
-Patient with PMHx of Seizures presented [place] [facility] with left sided gaze and decreased responsiveness  -Pt with elevated BP that could be contributing to encephalopathy  -No gaze preference noted on exam in ED  -Will order EEG to rule out seizure  -Patient s/p Keppra 1 gram IV load  -NPO for now

## 2018-06-03 LAB
ALBUMIN SERPL BCP-MCNC: 2.8 G/DL
ANION GAP SERPL CALC-SCNC: 16 MMOL/L
BUN SERPL-MCNC: 74 MG/DL
CALCIUM SERPL-MCNC: 8.3 MG/DL
CHLORIDE SERPL-SCNC: 99 MMOL/L
CO2 SERPL-SCNC: 25 MMOL/L
CREAT SERPL-MCNC: 4.9 MG/DL
EST. GFR  (AFRICAN AMERICAN): 10.9 ML/MIN/1.73 M^2
EST. GFR  (NON AFRICAN AMERICAN): 9.4 ML/MIN/1.73 M^2
GLUCOSE SERPL-MCNC: 100 MG/DL
PHOSPHATE SERPL-MCNC: 6.8 MG/DL
POCT GLUCOSE: 100 MG/DL (ref 70–110)
POCT GLUCOSE: 104 MG/DL (ref 70–110)
POCT GLUCOSE: 120 MG/DL (ref 70–110)
POCT GLUCOSE: 122 MG/DL (ref 70–110)
POCT GLUCOSE: 155 MG/DL (ref 70–110)
POCT GLUCOSE: 175 MG/DL (ref 70–110)
POCT GLUCOSE: 177 MG/DL (ref 70–110)
POCT GLUCOSE: 222 MG/DL (ref 70–110)
POCT GLUCOSE: 303 MG/DL (ref 70–110)
POCT GLUCOSE: 371 MG/DL (ref 70–110)
POCT GLUCOSE: 62 MG/DL (ref 70–110)
POCT GLUCOSE: 65 MG/DL (ref 70–110)
POCT GLUCOSE: 75 MG/DL (ref 70–110)
POCT GLUCOSE: 79 MG/DL (ref 70–110)
POCT GLUCOSE: 93 MG/DL (ref 70–110)
POCT GLUCOSE: 96 MG/DL (ref 70–110)
POTASSIUM SERPL-SCNC: 4.4 MMOL/L
SODIUM SERPL-SCNC: 140 MMOL/L

## 2018-06-03 PROCEDURE — 63600175 PHARM REV CODE 636 W HCPCS: Performed by: HOSPITALIST

## 2018-06-03 PROCEDURE — 25000003 PHARM REV CODE 250: Performed by: NURSE PRACTITIONER

## 2018-06-03 PROCEDURE — 36415 COLL VENOUS BLD VENIPUNCTURE: CPT

## 2018-06-03 PROCEDURE — 95819 EEG AWAKE AND ASLEEP: CPT

## 2018-06-03 PROCEDURE — 25000003 PHARM REV CODE 250: Performed by: HOSPITALIST

## 2018-06-03 PROCEDURE — 80100014 HC HEMODIALYSIS 1:1

## 2018-06-03 PROCEDURE — 99232 SBSQ HOSP IP/OBS MODERATE 35: CPT | Mod: ,,, | Performed by: PSYCHIATRY & NEUROLOGY

## 2018-06-03 PROCEDURE — 25000003 PHARM REV CODE 250: Performed by: EMERGENCY MEDICINE

## 2018-06-03 PROCEDURE — 99232 SBSQ HOSP IP/OBS MODERATE 35: CPT | Mod: GC,,, | Performed by: INTERNAL MEDICINE

## 2018-06-03 PROCEDURE — 80069 RENAL FUNCTION PANEL: CPT

## 2018-06-03 PROCEDURE — 99233 SBSQ HOSP IP/OBS HIGH 50: CPT | Mod: ,,, | Performed by: PSYCHIATRY & NEUROLOGY

## 2018-06-03 PROCEDURE — 63600175 PHARM REV CODE 636 W HCPCS: Performed by: NURSE PRACTITIONER

## 2018-06-03 PROCEDURE — 25000003 PHARM REV CODE 250: Performed by: INTERNAL MEDICINE

## 2018-06-03 PROCEDURE — 20000000 HC ICU ROOM

## 2018-06-03 RX ORDER — HEPARIN SODIUM 5000 [USP'U]/ML
5000 INJECTION, SOLUTION INTRAVENOUS; SUBCUTANEOUS EVERY 8 HOURS
Status: DISCONTINUED | OUTPATIENT
Start: 2018-06-03 | End: 2018-06-08 | Stop reason: HOSPADM

## 2018-06-03 RX ORDER — SODIUM CHLORIDE 9 MG/ML
INJECTION, SOLUTION INTRAVENOUS
Status: DISCONTINUED | OUTPATIENT
Start: 2018-06-03 | End: 2018-06-08 | Stop reason: HOSPADM

## 2018-06-03 RX ORDER — LEVETIRACETAM 250 MG/1
250 TABLET ORAL 2 TIMES DAILY
Status: DISCONTINUED | OUTPATIENT
Start: 2018-06-03 | End: 2018-06-08 | Stop reason: HOSPADM

## 2018-06-03 RX ORDER — SODIUM CHLORIDE 9 MG/ML
INJECTION, SOLUTION INTRAVENOUS ONCE
Status: COMPLETED | OUTPATIENT
Start: 2018-06-03 | End: 2018-06-03

## 2018-06-03 RX ADMIN — HEPARIN SODIUM 5000 UNITS: 5000 INJECTION, SOLUTION INTRAVENOUS; SUBCUTANEOUS at 10:06

## 2018-06-03 RX ADMIN — LEVETIRACETAM 250 MG: 250 TABLET, FILM COATED ORAL at 10:06

## 2018-06-03 RX ADMIN — NIFEDIPINE 60 MG: 30 TABLET, FILM COATED, EXTENDED RELEASE ORAL at 08:06

## 2018-06-03 RX ADMIN — SODIUM CHLORIDE 0.25 UNITS/HR: 9 INJECTION, SOLUTION INTRAVENOUS at 09:06

## 2018-06-03 RX ADMIN — SODIUM CHLORIDE 100 ML/HR: 0.9 INJECTION, SOLUTION INTRAVENOUS at 02:06

## 2018-06-03 RX ADMIN — HEPARIN SODIUM 5000 UNITS: 5000 INJECTION, SOLUTION INTRAVENOUS; SUBCUTANEOUS at 01:06

## 2018-06-03 RX ADMIN — METOPROLOL TARTRATE 50 MG: 50 TABLET ORAL at 08:06

## 2018-06-03 RX ADMIN — DEXTROSE 250 MG: 50 INJECTION, SOLUTION INTRAVENOUS at 09:06

## 2018-06-03 RX ADMIN — DEXTROSE 250 MG: 50 INJECTION, SOLUTION INTRAVENOUS at 08:06

## 2018-06-03 RX ADMIN — LOSARTAN POTASSIUM 100 MG: 50 TABLET, FILM COATED ORAL at 08:06

## 2018-06-03 RX ADMIN — INSULIN ASPART 5 UNITS: 100 INJECTION, SOLUTION INTRAVENOUS; SUBCUTANEOUS at 08:06

## 2018-06-03 RX ADMIN — DEXTROSE MONOHYDRATE 12.5 G: 25 INJECTION, SOLUTION INTRAVENOUS at 04:06

## 2018-06-03 RX ADMIN — INSULIN DETEMIR 7 UNITS: 100 INJECTION, SOLUTION SUBCUTANEOUS at 08:06

## 2018-06-03 NOTE — PROGRESS NOTES
RN walked into room, pt had ripped out IV that was giving her Cardene. [facility] team notified

## 2018-06-03 NOTE — PROGRESS NOTES
[facility]  Neurocritical Care  Progress Note    Admit Date: 6/2/2018  Service Date: 06/03/2018  Length of Stay: 1    Subjective:     Chief Complaint: <principal problem not specified>    History of Present Illness: Ms. Haq is a 53 year old Female with PMHx of Diabetes, HTN, HLD, ESRD (on HD T,Th,Sat) 2 prior strokes and seizures who presented to [facility] after she was found by with left sided gaze preference. Per notes, patient's LKN was last night. The patient's  went in her room to take her to dialysis this morning. At that time, she was slow to respond and had left sided gaze preference. Patient will be admitted to [facility] for higher level of care.        Hospital Course: 6/2/18: Patient presented to [facility] with left sided gaze preference and decreased responsiveness; admitted to [facility] for higher level of care  6/3: improved mental status, off nicardipine, insulin gtt      Review of Symptoms:   Constitutional: Denies fevers or chills.  Pulmonary: Denies shortness of breath or cough.  Cardiology: Denies chest pain or palpitations.  GI: Denies abdominal pain or constipation.  Neurologic: Denies new weakness, headaches, or paresthesias.     Medications:  Continuous Infusions:   insulin (HUMAN R) infusion (adults) 0.25 Units/hr (06/03/18 0919)    nicardipine Stopped (06/03/18 0100)     Scheduled Meds:   insulin detemir U-100  7 Units Subcutaneous BID    levetiracetam IVPB  250 mg Intravenous Q12H    losartan  100 mg Oral Daily    metoprolol tartrate  50 mg Oral BID    NIFEdipine  60 mg Oral Daily     PRN Meds:.dextrose 50%, dextrose 50%, dextrose 50%, hydrALAZINE, insulin aspart U-100, sodium chloride 0.9%    OBJECTIVE:   Vital Signs (Most Recent):   Temp: 97.5 °F (36.4 °C) (06/03/18 0745)  Pulse: 69 (06/03/18 0950)  Resp: 19 (06/03/18 0950)  BP: (!) 191/78 (06/03/18 0950)  SpO2: 98 % (06/03/18 0950)    Vital Signs (24h Range):   Temp:  [97.5 °F (36.4 °C)-97.7 °F (36.5 °C)] 97.5 °F (36.4  °C)  Pulse:  [] 69  Resp:  [10-39] 19  SpO2:  [91 %-100 %] 98 %  BP: (131-257)/() 191/78    ICP/CPP (Last 24h):        I & O (Last 24h):   Intake/Output Summary (Last 24 hours) at 06/03/18 1114  Last data filed at 06/03/18 1000   Gross per 24 hour   Intake           373.08 ml   Output              500 ml   Net          -126.92 ml     Physical Exam:  GA: Alert, comfortable, no acute distress.   HEENT: No scleral icterus or JVD.   Pulmonary: Clear to auscultation A/P/L. No wheezing, crackles, or rhonchi.  Cardiac: RRR S1 & S2 w/o rubs/murmurs/gallops.   Abdominal: Bowel sounds present x 4. No appreciable hepatosplenomegaly.  Skin: No jaundice, rashes, or visible lesions.  Neuro:  --GCS: 15  --Mental Status:  Awake and alert, aao x2, fluent follows commands  --CN II-XII grossly intact.   --Pupils 3.5mm, PERRL.   --Corneal reflex, gag, cough intact.  --LUE strength: 4+/5  --RUE strength: 4+/5  --LLE strength: 4+/5  --RLE strength: 4+/5    Vent Data:        Lines/Drains/Airway:          Hemodialysis Catheter right subclavian (Active)             Hemodialysis Catheter right subclavian (Active)            Hemodialysis AV Graft Left upper arm (Active)     Nutrition/Tube Feeds (if NPO state why): swallow eval     Labs:  ABG: No results for input(s): PH, PO2, PCO2, HCO3, POCSATURATED, BE in the last 24 hours.  BMP:  Recent Labs  Lab 06/02/18  2221      K 4.7   CL 95   CO2 22*   BUN 74*   CREATININE 4.9*   *   MG 2.4   PHOS 7.0*     LFT: Lab Results   Component Value Date    AST 38 06/02/2018    ALT 37 06/02/2018    ALKPHOS 445 (H) 06/02/2018    BILITOT 0.4 06/02/2018    ALBUMIN 3.5 06/02/2018    PROT 7.1 06/02/2018     CBC:   Lab Results   Component Value Date    WBC 4.96 06/02/2018    HGB 11.1 (L) 06/02/2018    HCT 37.4 06/02/2018    MCV 93 06/02/2018     06/02/2018     Microbiology x 7d:   Microbiology Results (last 7 days)     ** No results found for the last 168 hours. **         Imaging:    I personally reviewed the above image.    ASSESSMENT/PLAN:     Active Hospital Problems    Diagnosis    Encephalopathy    Seizure disorder    ESRD on dialysis    Hyperglycemia due to type 1 diabetes mellitus    Essential hypertension      Neuro:   Seizure with post-ictal encephalopathy  On keppra 250 q12  Metabolic encephalopathy   Pt/ot  oob to chair as tolerated  MRI pending.     Pulmonary:   Stable saturation on room air    Cardiac:   On nicardipine gtt  On oral antihypertensives - add clonidine 0.1mg q12    Renal:    ESRD  Will differ to nephrology for dialysis. Will likely benefit and improve bld pr    ID:   Afebrile, normal wbc    Hem/Onc:   Stable hh and plt count    Endocrine:    BS trended down   Resume diabetic diet  On scheduled insulin    Fluids/Electrolytes/Nutrition/GI:   Resume oral intake  Lines:  Art NA  CVC NA  ETT NA  Andrade NA  NG NA  PEG NA    Proph:  DVT:SCD/heparin  Constipation:  Last output:bowel regimen  PUP:NA  VAP:NA    Transfer to GNF    Full Code  Uninterrupted Critical Care/Counseling Time (not including procedures):: III    Adam Pena MD  Neurocritical care attending    Adam Pena MD  Neurocritical Care  [facility]

## 2018-06-03 NOTE — PROGRESS NOTES
[facility]  Vascular Neurology  Comprehensive Stroke Center  Progress Note    Assessment/Plan:     Encephalopathy    - see section above         Seizure disorder    - as per primary   - AEDs as needed   -EEG as needed        ESRD on dialysis    - correct lytes as needed   -Nephrology consulted.          Hyperglycemia due to type 1 diabetes mellitus    - avoid hypo or hyperglycemia   -On insulin gtt.  Managed by primary team.        Essential hypertension    - maintain cerebral perfusion   - target SBP < 220             06/03/2018 NAEON.  Cardene gtt off.  Remains on insulin gtt.  Awaiting MRI brain to identify acute lesions.  Improved neuro exam.    STROKE DOCUMENTATION   Acute Stroke Times   Last Known Normal Date: 06/01/18  Last Known Normal Time: 2100  Symptom Onset Date: 06/01/18  Symptom Onset Time: 2100  Stroke Team Called Date: 06/02/18  Stroke Team Called Time: 1054  Stroke Team Arrival Date: 06/02/18  Stroke Team Arrival Time: 1100  CT Interpretation Time: 1110  Decision to Treat Time for Alteplase:  (Out of t-PA)  Decision to Treat Time for IR:  (Stroke mimic )    NIH Scale:  1a. Level Of Consciousness: 0-->Alert: keenly responsive  1b. LOC Questions: 0-->Answers both questions correctly  1c. LOC Commands: 0-->Performs both tasks correctly  2. Best Gaze: 0-->Normal  3. Visual: 0-->No visual loss  4. Facial Palsy: 0-->Normal symmetrical movements  5a. Motor Arm, Left: 1-->Drift: limb holds 90 (or 45) degrees, but drifts down before full 10 seconds: does not hit bed or other support  5b. Motor Arm, Right: 1-->Drift: limb holds 90 (or 45) degrees, but drifts down before full 10 secs: does not hit bed or other support  6a. Motor Leg, Left: 1-->Drift: leg falls by the end of the 5-sec period but does not hit bed  6b. Motor Leg, Right: 1-->Drift: leg falls by the end of the 5-sec period but does not hit bed  7. Limb Ataxia: 0-->Absent  8. Sensory: 0-->Normal: no sensory loss  9. Best  Language: 1-->Mild-to-moderate aphasia: some obvious loss of fluency or facility of comprehension, without significant limitation on ideas expressed or form of expression. Reduction of speech and/or comprehension, however, makes conversation. . . (see row details)  10. Dysarthria: 1-->Mild-to-moderate dysarthria: patient slurs at least some words and, at worst, can be understood with some difficulty  11. Extinction and Inattention (formerly Neglect): 0-->No abnormality  Total (NIH Stroke Scale): 6       Modified Hollywood Score: 0  [place] Coma Scale:14   ABCD2 Score:    [id number] Score:   HAS -BLED Score:   ICH Score:   Hunt & Burk Classification:      Hemorrhagic change of an Ischemic Stroke: Does this patient have an ischemic stroke with hemorrhagic changes? No     Neurologic Chief Complaint: Left gaze preference, slow to respond    Subjective:     Interval History: Patient is seen for follow-up neurological assessment and treatment recommendations: NAEON.  Patient drowsy but responsive this morning, without complaints.  Neuro exam improving.  Awaiting MRI Brain to identify acute lesions.    HPI, Past Medical, Family, and Social History remains the same as documented in the initial encounter.     Review of Systems   Constitutional: Negative for chills and fever.   HENT: Negative for drooling.    Eyes: Positive for visual disturbance (baseline vision impairment).   Respiratory: Negative for cough and shortness of breath.    Cardiovascular: Negative for chest pain and leg swelling.   Gastrointestinal: Negative for abdominal pain, diarrhea, nausea and vomiting.   Genitourinary: Negative for hematuria.   Musculoskeletal: Negative for neck pain and neck stiffness.   Skin: Negative for rash.   Neurological: Positive for weakness (generalized). Negative for dizziness, facial asymmetry, numbness and headaches.     Scheduled Meds:   sodium chloride 0.9%   Intravenous Once    heparin (porcine)  5,000 Units Subcutaneous  Q8H    insulin detemir U-100  7 Units Subcutaneous BID    levetiracetam IVPB  250 mg Intravenous Q12H    losartan  100 mg Oral Daily    metoprolol tartrate  50 mg Oral BID    NIFEdipine  60 mg Oral Daily     Continuous Infusions:   insulin (HUMAN R) infusion (adults) Stopped (06/03/18 1100)    nicardipine Stopped (06/03/18 0100)     PRN Meds:sodium chloride 0.9%, dextrose 50%, dextrose 50%, dextrose 50%, hydrALAZINE, insulin aspart U-100, sodium chloride 0.9%    Objective:     Vital Signs (Most Recent):  Temp: 97.6 °F (36.4 °C) (06/03/18 1100)  Pulse: 69 (06/03/18 1359)  Resp: 16 (06/03/18 1359)  BP: (!) 170/78 (06/03/18 1359)  SpO2: 98 % (06/03/18 1359)       Vital Signs Range (Last 24H):  Temp:  [97.5 °F (36.4 °C)-97.7 °F (36.5 °C)]   Pulse:  [62-86]   Resp:  [10-39]   BP: (131-216)/()   SpO2:  [91 %-100 %]        Physical Exam   Constitutional: She appears well-developed and well-nourished. She appears lethargic. No distress.   Eyes: Conjunctivae are normal. Right eye exhibits no discharge. Left eye exhibits no discharge. No scleral icterus.   Cardiovascular: Regular rhythm.    Pulmonary/Chest: Effort normal and breath sounds normal.   Abdominal: Soft. Bowel sounds are normal. There is no tenderness.   Musculoskeletal: She exhibits no edema.   Neurological: She appears lethargic.   Skin: Skin is warm and dry. Capillary refill takes less than 2 seconds. She is not diaphoretic.   Nursing note and vitals reviewed.      Neurological Exam:   LOC: drowsy  Attention Span: Good   Language: No aphasia  Articulation: No dysarthria  Facial Movement (CN VII): Symmetric facial expression    Motor: generalized, equal weakness in all extremities  Sensation: Intact to light touch, temperature and vibration    Laboratory:  CMP:   Recent Labs  Lab 06/02/18  2221 06/03/18  1114   CALCIUM 8.6* 8.3*   ALBUMIN 3.5 2.8*   PROT 7.1  --     140   K 4.7 4.4   CO2 22* 25   CL 95 99   BUN 74* 74*   CREATININE 4.9* 4.9*    ALKPHOS 445*  --    ALT 37  --    AST 38  --    BILITOT 0.4  --      CBC:   Recent Labs  Lab 06/02/18  2221   WBC 4.96   RBC 4.02   HGB 11.1*   HCT 37.4      MCV 93   MCH 27.6   MCHC 29.7*     Lipid Panel:   Recent Labs  Lab 06/02/18  1118   CHOL 197   LDLCALC 83.0   *   TRIG 65     Coagulation:   Recent Labs  Lab 06/02/18  2221   INR 0.9     Hgb A1C:   Recent Labs  Lab 06/02/18  1118   HGBA1C 9.5*     TSH:   Recent Labs  Lab 06/02/18  1118   TSH 3.932       Diagnostic Results     Brain Imaging   CTH 06/02/18 Interval enlargement of hypoattenuating lesion within the right high parietal lobe suggestive for evolving infarction.  No acute intracranial hemorrhage.    MRI Brain pending    Vessel Imaging   None    Cardiac Imaging   None      Dannielle Gallegos, TERI, NP  Comprehensive Stroke Center  Department of Vascular Neurology   [facility]

## 2018-06-03 NOTE — ASSESSMENT & PLAN NOTE
Eufemia Haq is a 53 year old female with ESRD on HD (TTS) admitted for ischemic posterior lobe ischemic stroke and hypertensive urgency. Nephrology consulted for inpatient dialysis treatment.     Nephrology: [facility], [facility] on [address] Access is ADEN AVG. EDW= Unknown.     Plan:  - Patient currently more alert   - Patient blood 180s of systolic   - Will schedule for dialysis treatment today, for clearance and volume removal, UF 1-2 L as tolerated by patient, maintain MAP>65   - No longer require oxygen today, O2 Sat 98 % at room air.

## 2018-06-03 NOTE — SUBJECTIVE & OBJECTIVE
Interval History:   Patient evaluated at bedside, no significant event overnight.     Review of patient's allergies indicates:   Allergen Reactions    Ciprofloxacin (bulk) Itching    Latex Itching and Other (See Comments)     Very low Oxygen    Vancomycin Shortness Of Breath and Itching    Neurontin [gabapentin] Other (See Comments)     Seizure like activity    Niacin Itching and Other (See Comments)     Burning      Bactrim [sulfamethoxazole-trimethoprim] Rash     Current Facility-Administered Medications   Medication Frequency    dextrose 50% injection 12.5 g PRN    dextrose 50% injection 12.5 g PRN    dextrose 50% injection 25 g PRN    hydrALAZINE injection 10 mg Q6H PRN    insulin aspart U-100 pen 0-5 Units Q6H PRN    insulin detemir U-100 pen 7 Units BID    insulin regular (Humulin R) 100 Units in sodium chloride 0.9% 100 mL infusion Continuous    levETIRAcetam (KEPPRA) 250 mg in dextrose 5 % 100 mL IVPB Q12H    losartan tablet 100 mg Daily    metoprolol tartrate (LOPRESSOR) tablet 50 mg BID    niCARdipine 40 mg/200 mL infusion Continuous    NIFEdipine 24 hr tablet 60 mg Daily    sodium chloride 0.9% flush 3 mL PRN       Objective:     Vital Signs (Most Recent):  Temp: 97.5 °F (36.4 °C) (06/03/18 0745)  Pulse: 69 (06/03/18 0950)  Resp: 19 (06/03/18 0950)  BP: (!) 191/78 (06/03/18 0950)  SpO2: 98 % (06/03/18 0950) Vital Signs (24h Range):  Temp:  [97.5 °F (36.4 °C)-97.7 °F (36.5 °C)] 97.5 °F (36.4 °C)  Pulse:  [] 69  Resp:  [10-39] 19  SpO2:  [91 %-100 %] 98 %  BP: (131-257)/() 191/78     Weight: 65 kg (143 lb 4.8 oz) (06/02/18 2300)  Body mass index is 23.85 kg/m².  Body surface area is 1.73 meters squared.    I/O last 3 completed shifts:  In: 40.3 [I.V.:40.3]  Out: 500 [Urine:500]    Physical Exam   Constitutional: No distress.   HENT:   Head: Normocephalic and atraumatic.   Right Ear: External ear normal.   Left Ear: External ear normal.   Nasal canula   Eyes: Right eye exhibits  no discharge. Left eye exhibits no discharge.   Cardiovascular: Normal rate and regular rhythm.    ADEN AVG   Pulmonary/Chest: She has rales.   Abdominal: Soft. She exhibits no distension. There is no tenderness.   Musculoskeletal: She exhibits edema.   Neurological:   Non verbal    Skin: Skin is warm.       Significant Labs:  ABGs: No results for input(s): PH, PCO2, HCO3, POCSATURATED, BE in the last 168 hours.  BMP:   Recent Labs  Lab 06/02/18  2221   *   CL 95   CO2 22*   BUN 74*   CREATININE 4.9*   CALCIUM 8.6*   MG 2.4     CBC:   Recent Labs  Lab 06/02/18  2221   WBC 4.96   RBC 4.02   HGB 11.1*   HCT 37.4      MCV 93   MCH 27.6   MCHC 29.7*     CMP:   Recent Labs  Lab 06/02/18  2221   *   CALCIUM 8.6*   ALBUMIN 3.5   PROT 7.1      K 4.7   CO2 22*   CL 95   BUN 74*   CREATININE 4.9*   ALKPHOS 445*   ALT 37   AST 38   BILITOT 0.4     All labs within the past 24 hours have been reviewed.

## 2018-06-03 NOTE — PROGRESS NOTES
Maintenance bedside dialysis initiated via ADEN graft. Primary nurse and family member at bedside.

## 2018-06-03 NOTE — PROGRESS NOTES
[facility]  Nephrology  Progress Note    Patient Name: Eufemia Haq  MRN: [id number]  Admission Date: 6/2/2018  Hospital Length of Stay: 1 days  Attending Provider: Adam Pena MD   Primary Care Physician: Kavin Muir MD  Principal Problem:<principal problem not specified>    Subjective:     HPI: Eufemia Haq is a 53 year old female with past medical history of DMT1, recent CVA with residual behavior problems and ESRD on HD. She arrived after being found by  unresponsive. In ED has been presenting with hypertensive urgency with blood pressures greater than 220s of systolic and diastolic up to 110. Had a brain CT scan with probable new ischemic infarct in posterior parietal lobe. Chest x ray with evidence of pulmonary edema, on nasal canula 2 L O2 with saturation 100%. Nephrology was consulted for inpatient dialysis treatment.     Nephrology: [facility], [facility] on [address] Duration 4 hrs , UF 4 , Access is ADEN AVG,  EDW= 65 kg and has residual renal function.     Interval History:   Patient evaluated at bedside, no significant event overnight.     Review of patient's allergies indicates:   Allergen Reactions    Ciprofloxacin (bulk) Itching    Latex Itching and Other (See Comments)     Very low Oxygen    Vancomycin Shortness Of Breath and Itching    Neurontin [gabapentin] Other (See Comments)     Seizure like activity    Niacin Itching and Other (See Comments)     Burning      Bactrim [sulfamethoxazole-trimethoprim] Rash     Current Facility-Administered Medications   Medication Frequency    dextrose 50% injection 12.5 g PRN    dextrose 50% injection 12.5 g PRN    dextrose 50% injection 25 g PRN    hydrALAZINE injection 10 mg Q6H PRN    insulin aspart U-100 pen 0-5 Units Q6H PRN    insulin detemir U-100 pen 7 Units BID    insulin regular (Humulin R) 100 Units in sodium chloride 0.9% 100 mL infusion Continuous    levETIRAcetam (KEPPRA) 250 mg in dextrose  5 % 100 mL IVPB Q12H    losartan tablet 100 mg Daily    metoprolol tartrate (LOPRESSOR) tablet 50 mg BID    niCARdipine 40 mg/200 mL infusion Continuous    NIFEdipine 24 hr tablet 60 mg Daily    sodium chloride 0.9% flush 3 mL PRN       Objective:     Vital Signs (Most Recent):  Temp: 97.5 °F (36.4 °C) (06/03/18 0745)  Pulse: 69 (06/03/18 0950)  Resp: 19 (06/03/18 0950)  BP: (!) 191/78 (06/03/18 0950)  SpO2: 98 % (06/03/18 0950) Vital Signs (24h Range):  Temp:  [97.5 °F (36.4 °C)-97.7 °F (36.5 °C)] 97.5 °F (36.4 °C)  Pulse:  [] 69  Resp:  [10-39] 19  SpO2:  [91 %-100 %] 98 %  BP: (131-257)/() 191/78     Weight: 65 kg (143 lb 4.8 oz) (06/02/18 2300)  Body mass index is 23.85 kg/m².  Body surface area is 1.73 meters squared.    I/O last 3 completed shifts:  In: 40.3 [I.V.:40.3]  Out: 500 [Urine:500]    Physical Exam   Constitutional: No distress.   HENT:   Head: Normocephalic and atraumatic.   Right Ear: External ear normal.   Left Ear: External ear normal.   Nasal canula   Eyes: Right eye exhibits no discharge. Left eye exhibits no discharge.   Cardiovascular: Normal rate and regular rhythm.    ADEN AVG   Pulmonary/Chest: She has rales.   Abdominal: Soft. She exhibits no distension. There is no tenderness.   Musculoskeletal: She exhibits edema.   Neurological:   Non verbal    Skin: Skin is warm.       Significant Labs:  ABGs: No results for input(s): PH, PCO2, HCO3, POCSATURATED, BE in the last 168 hours.  BMP:   Recent Labs  Lab 06/02/18 2221   *   CL 95   CO2 22*   BUN 74*   CREATININE 4.9*   CALCIUM 8.6*   MG 2.4     CBC:   Recent Labs  Lab 06/02/18 2221   WBC 4.96   RBC 4.02   HGB 11.1*   HCT 37.4      MCV 93   MCH 27.6   MCHC 29.7*     CMP:   Recent Labs  Lab 06/02/18  2221   *   CALCIUM 8.6*   ALBUMIN 3.5   PROT 7.1      K 4.7   CO2 22*   CL 95   BUN 74*   CREATININE 4.9*   ALKPHOS 445*   ALT 37   AST 38   BILITOT 0.4     All labs within the past 24 hours have been  reviewed.       Assessment/Plan:     ESRD on dialysis    Eufemia Haq is a 53 year old female with ESRD on HD (TTS) admitted for ischemic posterior lobe ischemic stroke and hypertensive urgency. Nephrology consulted for inpatient dialysis treatment.     Nephrology: [facility], [facility] on [address] Access is ADEN AVG. EDW= Unknown.     Plan:  - Patient currently more alert   - Patient blood 180s of systolic   - Will schedule for dialysis treatment today, for clearance and volume removal, UF 1-2 L as tolerated by patient, maintain MAP>65   - No longer require oxygen today, O2 Sat 98 % at room air.                 Galindo Alvarez  Nephrology  Fellow  [facility]    Pager [phone]

## 2018-06-03 NOTE — HOSPITAL COURSE
06/03/2018 NAEON.  Cardene gtt off.  Remains on insulin gtt.  Awaiting MRI brain to identify acute lesions.  Improved neuro exam.

## 2018-06-03 NOTE — PROGRESS NOTES
[facility]  Nephrology  Progress Note    Patient Name: Eufemia Haq  MRN: [id number]  Admission Date: 6/2/2018  Hospital Length of Stay: 1 days  Attending Provider: Adam Pena MD   Primary Care Physician: Kavin Muir MD  Principal Problem:<principal problem not specified>    Subjective:     HPI: Eufemia Haq is a 53 year old female with past medical history of DMT1, recent CVA with residual behavior problems and ESRD on HD. She arrived after being found by  unresponsive. In ED has been presenting with hypertensive urgency with blood pressures greater than 220s of systolic and diastolic up to 110. Had a brain CT scan with probable new ischemic infarct in posterior parietal lobe. Chest x ray with evidence of pulmonary edema, on nasal canula 2 L O2 with saturation 100%. Nephrology was consulted for inpatient dialysis treatment.     Nephrology: [facility], [facility] on [address]. Duration 4 hrs , UF 4 , Access is ADEN AVG,  EDW= 65 kg and has residual renal function.     Interval History:   Patient evaluated at bedside, no significant event overnight.     Review of patient's allergies indicates:   Allergen Reactions    Ciprofloxacin (bulk) Itching    Latex Itching and Other (See Comments)     Very low Oxygen    Vancomycin Shortness Of Breath and Itching    Neurontin [gabapentin] Other (See Comments)     Seizure like activity    Niacin Itching and Other (See Comments)     Burning      Bactrim [sulfamethoxazole-trimethoprim] Rash     Current Facility-Administered Medications   Medication Frequency    dextrose 50% injection 12.5 g PRN    dextrose 50% injection 12.5 g PRN    dextrose 50% injection 25 g PRN    hydrALAZINE injection 10 mg Q6H PRN    insulin aspart U-100 pen 0-5 Units Q6H PRN    insulin detemir U-100 pen 7 Units BID    insulin regular (Humulin R) 100 Units in sodium chloride 0.9% 100 mL infusion Continuous    levETIRAcetam (KEPPRA) 250 mg in dextrose  5 % 100 mL IVPB Q12H    losartan tablet 100 mg Daily    metoprolol tartrate (LOPRESSOR) tablet 50 mg BID    niCARdipine 40 mg/200 mL infusion Continuous    NIFEdipine 24 hr tablet 60 mg Daily    sodium chloride 0.9% flush 3 mL PRN       Objective:     Vital Signs (Most Recent):  Temp: 97.5 °F (36.4 °C) (06/03/18 0745)  Pulse: 69 (06/03/18 0950)  Resp: 19 (06/03/18 0950)  BP: (!) 191/78 (06/03/18 0950)  SpO2: 98 % (06/03/18 0950) Vital Signs (24h Range):  Temp:  [97.5 °F (36.4 °C)-97.7 °F (36.5 °C)] 97.5 °F (36.4 °C)  Pulse:  [] 69  Resp:  [10-39] 19  SpO2:  [91 %-100 %] 98 %  BP: (131-257)/() 191/78     Weight: 65 kg (143 lb 4.8 oz) (06/02/18 2300)  Body mass index is 23.85 kg/m².  Body surface area is 1.73 meters squared.    I/O last 3 completed shifts:  In: 40.3 [I.V.:40.3]  Out: 500 [Urine:500]    Physical Exam   Constitutional: No distress.   HENT:   Head: Normocephalic and atraumatic.   Right Ear: External ear normal.   Left Ear: External ear normal.   Nasal canula   Eyes: Right eye exhibits no discharge. Left eye exhibits no discharge.   Cardiovascular: Normal rate and regular rhythm.    ADEN AVG   Pulmonary/Chest: She has rales.   Abdominal: Soft. She exhibits no distension. There is no tenderness.   Musculoskeletal: She exhibits edema.   Neurological:   Non verbal    Skin: Skin is warm.       Significant Labs:  ABGs: No results for input(s): PH, PCO2, HCO3, POCSATURATED, BE in the last 168 hours.  BMP:   Recent Labs  Lab 06/02/18 2221   *   CL 95   CO2 22*   BUN 74*   CREATININE 4.9*   CALCIUM 8.6*   MG 2.4     CBC:   Recent Labs  Lab 06/02/18 2221   WBC 4.96   RBC 4.02   HGB 11.1*   HCT 37.4      MCV 93   MCH 27.6   MCHC 29.7*     CMP:   Recent Labs  Lab 06/02/18  2221   *   CALCIUM 8.6*   ALBUMIN 3.5   PROT 7.1      K 4.7   CO2 22*   CL 95   BUN 74*   CREATININE 4.9*   ALKPHOS 445*   ALT 37   AST 38   BILITOT 0.4     All labs within the past 24 hours have been  reviewed.       Assessment/Plan:     ESRD on dialysis    Eufemia Haq is a 53 year old female with ESRD on HD (TTS) admitted for ischemic posterior lobe ischemic stroke and hypertensive urgency. Nephrology consulted for inpatient dialysis treatment.     Nephrology: [facility], [facility] on [address] Access is ADEN AVG. EDW= Unknown.     Plan:  - Patient currently more alert   - Patient blood pressure where managed with nicardipine 191/78 mmHg.   - Will order lab work to be done STAT and assess if patient require HD for today.   - No longer require oxygen today, O2 Sat 98 % at room air.              Galindo Alvarez  Nephrology  Fellow  [facility]    Pager [phone]

## 2018-06-03 NOTE — PROGRESS NOTES
Patient arrived to [facility] from [facility]    Type of stroke/diagnosis: metabolic encephalopathy    Skin assessment done: Yes  Wounds noted: none noted    NCC notified: Joshua Ulrich NP

## 2018-06-03 NOTE — PROCEDURES
DATE OF PROCEDURE:  06/02/2018    EEG NUMBER:  [id number]    REFERRING PHYSICIAN:  Dr. Pena.    This EEG was performed to assess for subclinical seizures.    ELECTROENCEPHALOGRAM REPORT      METHODOLOGY:  Electroencephalographic (EEG) recording is recorded with   electrodes placed according to the International 10-20 placement system.  Thirty   two (32) channels of digital signal (sampling rate of 512/sec), including T1   and T2, were simultaneously recorded from the scalp and may include EKG, EMG,   and/or eye monitors.  Recording band pass was 0.1 to 512 Hz.  Digital video   recording of the patient is simultaneously recorded with the EEG.  The patient   is instructed to report clinical symptoms which may occur during the recording   session.  EEG and video recording are stored and archived in digital format.    Activation procedures, which include photic stimulation, hyperventilation and   instructing patients to perform simple tasks, are done in selected patients.    The EEG is displayed on a monitor screen and can be reviewed using different   montages.  Computer assisted-analysis is employed to detect spike and   electrographic seizure activity.  The entire record is submitted for computer   analysis.  The entire recording is visually reviewed, and the times identified   by computer analysis as being spikes or seizures are reviewed again.    Compressed spectral analysis (CSA) is also performed on the activity recorded   from each individual channel.  This is displayed as a power display of   frequencies from 0 to 30 Hz over time.  The CSA is reviewed looking for   asymmetries in power between homologous areas of the scalp, then compared with   the original EEG recording.    Auct[employer] software was also utilized in the review of this study.  This software   suite analyzes the EEG recording in multiple domains.  Coherence and rhythmicity   are computed to identify EEG sections which may contain organized  seizures.    Each channel undergoes analysis to detect the presence of spike and sharp waves   which have special and morphological characteristics of epileptic activity.  The   routine EEG recording is converted from special into frequency domain.  This is   then displayed comparing homologous areas to identify areas of significant   asymmetry.  Algorithm to identify non-cortically generated artifact is used to   separate artifact from the EEG.    EEG FINDINGS:  The recording was obtained with a number of standard bipolar and   referential montages during wakefulness and drowsiness.  In the alert state, the   background was diffusely disorganized and nonsustained posterior dominant   rhythm of 7 Hz, which was symmetric.  Mixed theta range slowing was noted   throughout the record.  In addition, intermittent focal slowing was noted in the   right posterior temporal region and left frontocentral region, characterized by   an admixture of delta and theta range frequencies.  During drowsiness, the   background rhythm waxed and waned and there were periods of slowing.  During   stage II sleep, symmetric V waves and sleep spindles were noted.  There were no   interictal epileptiform abnormalities and no clinical or electrographic seizures   were recorded.  The EKG channel revealed sinus rhythm.    IMPRESSION:  This is an abnormal EEG during wakefulness, drowsiness and sleep.    Diffuse disorganized slowing of the background was noted.  Focal slowing was   noted in the right temporal and left frontocentral regions.    CLINICAL CORRELATION:  The patient is a 53-year-old female who presented with   left-sided gaze preference and is currently maintained on Keppra.  This is an   abnormal EEG during wakefulness, drowsiness and sleep.  The overall degree of   slowing and disorganization is suggestive of a mild encephalopathy, nonspecific   to the cause.  The presence of focal slowing in the right temporal and left   frontal  central region is suggestive of focal neuronal dysfunction in these   regions.  There is no evidence of an epileptic process on this recording.  No   seizures were recorded during this study.  These findings were relayed to the   Neurocritical Care Team.      FAK/HN  dd: 06/03/2018 11:59:54 (CDT)  td: 06/03/2018 12:23:08 (CDT)  Doc ID   #[id number]  Job ID #[id number]    CC:

## 2018-06-03 NOTE — PLAN OF CARE
POC reviewed with pt and family at 0400. Pt verbalized understanding. Questions and concerns addressed. No acute events over night. Insulin infusion stopped due to low blood sugar. Cardene stopped and PRNs givenPt progressing toward goals. Will continue to monitor. See flowsheets for full assessment and VS info.

## 2018-06-03 NOTE — SUBJECTIVE & OBJECTIVE
Neurologic Chief Complaint: Left gaze preference, slow to respond    Subjective:     Interval History: Patient is seen for follow-up neurological assessment and treatment recommendations: ZONIA.  Patient drowsy but responsive this morning, without complaints.  Neuro exam improving.  Awaiting MRI Brain to identify acute lesions.    HPI, Past Medical, Family, and Social History remains the same as documented in the initial encounter.     Review of Systems   Constitutional: Negative for chills and fever.   HENT: Negative for drooling.    Eyes: Positive for visual disturbance (baseline vision impairment).   Respiratory: Negative for cough and shortness of breath.    Cardiovascular: Negative for chest pain and leg swelling.   Gastrointestinal: Negative for abdominal pain, diarrhea, nausea and vomiting.   Genitourinary: Negative for hematuria.   Musculoskeletal: Negative for neck pain and neck stiffness.   Skin: Negative for rash.   Neurological: Positive for weakness (generalized). Negative for dizziness, facial asymmetry, numbness and headaches.     Scheduled Meds:   sodium chloride 0.9%   Intravenous Once    heparin (porcine)  5,000 Units Subcutaneous Q8H    insulin detemir U-100  7 Units Subcutaneous BID    levetiracetam IVPB  250 mg Intravenous Q12H    losartan  100 mg Oral Daily    metoprolol tartrate  50 mg Oral BID    NIFEdipine  60 mg Oral Daily     Continuous Infusions:   insulin (HUMAN R) infusion (adults) Stopped (06/03/18 1100)    nicardipine Stopped (06/03/18 0100)     PRN Meds:sodium chloride 0.9%, dextrose 50%, dextrose 50%, dextrose 50%, hydrALAZINE, insulin aspart U-100, sodium chloride 0.9%    Objective:     Vital Signs (Most Recent):  Temp: 97.6 °F (36.4 °C) (06/03/18 1100)  Pulse: 69 (06/03/18 1359)  Resp: 16 (06/03/18 1359)  BP: (!) 170/78 (06/03/18 1359)  SpO2: 98 % (06/03/18 1359)       Vital Signs Range (Last 24H):  Temp:  [97.5 °F (36.4 °C)-97.7 °F (36.5 °C)]   Pulse:  [62-86]   Resp:   [10-39]   BP: (131-216)/()   SpO2:  [91 %-100 %]        Physical Exam   Constitutional: She appears well-developed and well-nourished. She appears lethargic. No distress.   Eyes: Conjunctivae are normal. Right eye exhibits no discharge. Left eye exhibits no discharge. No scleral icterus.   Cardiovascular: Regular rhythm.    Pulmonary/Chest: Effort normal and breath sounds normal.   Abdominal: Soft. Bowel sounds are normal. There is no tenderness.   Musculoskeletal: She exhibits no edema.   Neurological: She appears lethargic.   Skin: Skin is warm and dry. Capillary refill takes less than 2 seconds. She is not diaphoretic.   Nursing note and vitals reviewed.      Neurological Exam:   LOC: drowsy  Attention Span: Good   Language: No aphasia  Articulation: No dysarthria  Facial Movement (CN VII): Symmetric facial expression    Motor: generalized, equal weakness in all extremities  Sensation: Intact to light touch, temperature and vibration    Laboratory:  CMP:   Recent Labs  Lab 06/02/18  2221 06/03/18  1114   CALCIUM 8.6* 8.3*   ALBUMIN 3.5 2.8*   PROT 7.1  --     140   K 4.7 4.4   CO2 22* 25   CL 95 99   BUN 74* 74*   CREATININE 4.9* 4.9*   ALKPHOS 445*  --    ALT 37  --    AST 38  --    BILITOT 0.4  --      CBC:   Recent Labs  Lab 06/02/18  2221   WBC 4.96   RBC 4.02   HGB 11.1*   HCT 37.4      MCV 93   MCH 27.6   MCHC 29.7*     Lipid Panel:   Recent Labs  Lab 06/02/18  1118   CHOL 197   LDLCALC 83.0   *   TRIG 65     Coagulation:   Recent Labs  Lab 06/02/18  2221   INR 0.9     Hgb A1C:   Recent Labs  Lab 06/02/18  1118   HGBA1C 9.5*     TSH:   Recent Labs  Lab 06/02/18  1118   TSH 3.932       Diagnostic Results     Brain Imaging   CT 06/02/18 Interval enlargement of hypoattenuating lesion within the right high parietal lobe suggestive for evolving infarction.  No acute intracranial hemorrhage.    MRI Brain pending    Vessel Imaging   None    Cardiac Imaging   None

## 2018-06-04 PROBLEM — R41.82 ALTERED MENTAL STATUS: Status: ACTIVE | Noted: 2018-06-04

## 2018-06-04 LAB
ALBUMIN SERPL BCP-MCNC: 2.7 G/DL
ALP SERPL-CCNC: 303 U/L
ALT SERPL W/O P-5'-P-CCNC: 22 U/L
ANION GAP SERPL CALC-SCNC: 10 MMOL/L
AST SERPL-CCNC: 34 U/L
BASOPHILS # BLD AUTO: 0.02 K/UL
BASOPHILS NFR BLD: 0.4 %
BILIRUB SERPL-MCNC: 0.2 MG/DL
BUN SERPL-MCNC: 50 MG/DL
CALCIUM SERPL-MCNC: 7.4 MG/DL
CHLORIDE SERPL-SCNC: 102 MMOL/L
CO2 SERPL-SCNC: 22 MMOL/L
CREAT SERPL-MCNC: 4.8 MG/DL
DIFFERENTIAL METHOD: ABNORMAL
EOSINOPHIL # BLD AUTO: 0.1 K/UL
EOSINOPHIL NFR BLD: 1.7 %
ERYTHROCYTE [DISTWIDTH] IN BLOOD BY AUTOMATED COUNT: 16.5 %
EST. GFR  (AFRICAN AMERICAN): 11.2 ML/MIN/1.73 M^2
EST. GFR  (NON AFRICAN AMERICAN): 9.7 ML/MIN/1.73 M^2
GLUCOSE SERPL-MCNC: 658 MG/DL
HCT VFR BLD AUTO: 30.8 %
HGB BLD-MCNC: 9.1 G/DL
IMM GRANULOCYTES # BLD AUTO: 0.04 K/UL
IMM GRANULOCYTES NFR BLD AUTO: 0.9 %
LYMPHOCYTES # BLD AUTO: 0.7 K/UL
LYMPHOCYTES NFR BLD: 14 %
MCH RBC QN AUTO: 27.6 PG
MCHC RBC AUTO-ENTMCNC: 29.5 G/DL
MCV RBC AUTO: 93 FL
MONOCYTES # BLD AUTO: 0.4 K/UL
MONOCYTES NFR BLD: 9.3 %
NEUTROPHILS # BLD AUTO: 3.4 K/UL
NEUTROPHILS NFR BLD: 73.7 %
NRBC BLD-RTO: 0 /100 WBC
PLATELET # BLD AUTO: 167 K/UL
PMV BLD AUTO: 10.9 FL
POCT GLUCOSE: 193 MG/DL (ref 70–110)
POCT GLUCOSE: 201 MG/DL (ref 70–110)
POCT GLUCOSE: 228 MG/DL (ref 70–110)
POCT GLUCOSE: 239 MG/DL (ref 70–110)
POCT GLUCOSE: 242 MG/DL (ref 70–110)
POCT GLUCOSE: 247 MG/DL (ref 70–110)
POCT GLUCOSE: 268 MG/DL (ref 70–110)
POCT GLUCOSE: 287 MG/DL (ref 70–110)
POCT GLUCOSE: 298 MG/DL (ref 70–110)
POCT GLUCOSE: 393 MG/DL (ref 70–110)
POCT GLUCOSE: 435 MG/DL (ref 70–110)
POCT GLUCOSE: 448 MG/DL (ref 70–110)
POCT GLUCOSE: >500 MG/DL (ref 70–110)
POTASSIUM SERPL-SCNC: 4.8 MMOL/L
POTASSIUM SERPL-SCNC: 6.7 MMOL/L
PROT SERPL-MCNC: 6.1 G/DL
RBC # BLD AUTO: 3.3 M/UL
SODIUM SERPL-SCNC: 134 MMOL/L
WBC # BLD AUTO: 4.63 K/UL

## 2018-06-04 PROCEDURE — 25000242 PHARM REV CODE 250 ALT 637 W/ HCPCS: Performed by: NURSE PRACTITIONER

## 2018-06-04 PROCEDURE — 63600175 PHARM REV CODE 636 W HCPCS: Performed by: HOSPITALIST

## 2018-06-04 PROCEDURE — 63600175 PHARM REV CODE 636 W HCPCS: Performed by: INTERNAL MEDICINE

## 2018-06-04 PROCEDURE — 99233 SBSQ HOSP IP/OBS HIGH 50: CPT | Mod: ,,, | Performed by: NURSE PRACTITIONER

## 2018-06-04 PROCEDURE — 94640 AIRWAY INHALATION TREATMENT: CPT

## 2018-06-04 PROCEDURE — 63600175 PHARM REV CODE 636 W HCPCS: Performed by: NURSE PRACTITIONER

## 2018-06-04 PROCEDURE — 25000003 PHARM REV CODE 250: Performed by: NURSE PRACTITIONER

## 2018-06-04 PROCEDURE — 85025 COMPLETE CBC W/AUTO DIFF WBC: CPT

## 2018-06-04 PROCEDURE — 99232 SBSQ HOSP IP/OBS MODERATE 35: CPT | Mod: GC,,, | Performed by: INTERNAL MEDICINE

## 2018-06-04 PROCEDURE — 25000003 PHARM REV CODE 250

## 2018-06-04 PROCEDURE — 20000000 HC ICU ROOM

## 2018-06-04 PROCEDURE — 84132 ASSAY OF SERUM POTASSIUM: CPT

## 2018-06-04 PROCEDURE — 80053 COMPREHEN METABOLIC PANEL: CPT

## 2018-06-04 PROCEDURE — 99233 SBSQ HOSP IP/OBS HIGH 50: CPT | Mod: ,,, | Performed by: PSYCHIATRY & NEUROLOGY

## 2018-06-04 PROCEDURE — 25000003 PHARM REV CODE 250: Performed by: EMERGENCY MEDICINE

## 2018-06-04 RX ORDER — LABETALOL HYDROCHLORIDE 5 MG/ML
10 INJECTION, SOLUTION INTRAVENOUS EVERY 4 HOURS PRN
Status: DISCONTINUED | OUTPATIENT
Start: 2018-06-04 | End: 2018-06-05

## 2018-06-04 RX ORDER — CLONIDINE HYDROCHLORIDE 0.1 MG/1
0.1 TABLET ORAL 3 TIMES DAILY
Status: DISCONTINUED | OUTPATIENT
Start: 2018-06-04 | End: 2018-06-08 | Stop reason: HOSPADM

## 2018-06-04 RX ORDER — IBUPROFEN 200 MG
24 TABLET ORAL
Status: DISCONTINUED | OUTPATIENT
Start: 2018-06-04 | End: 2018-06-08 | Stop reason: HOSPADM

## 2018-06-04 RX ORDER — ALBUTEROL SULFATE 0.83 MG/ML
10 SOLUTION RESPIRATORY (INHALATION) ONCE
Status: COMPLETED | OUTPATIENT
Start: 2018-06-04 | End: 2018-06-04

## 2018-06-04 RX ORDER — LABETALOL HYDROCHLORIDE 5 MG/ML
INJECTION, SOLUTION INTRAVENOUS
Status: COMPLETED
Start: 2018-06-04 | End: 2018-06-04

## 2018-06-04 RX ORDER — IBUPROFEN 200 MG
16 TABLET ORAL
Status: DISCONTINUED | OUTPATIENT
Start: 2018-06-04 | End: 2018-06-08 | Stop reason: HOSPADM

## 2018-06-04 RX ORDER — INSULIN ASPART 100 [IU]/ML
0-5 INJECTION, SOLUTION INTRAVENOUS; SUBCUTANEOUS
Status: DISCONTINUED | OUTPATIENT
Start: 2018-06-04 | End: 2018-06-08 | Stop reason: HOSPADM

## 2018-06-04 RX ORDER — INSULIN ASPART 100 [IU]/ML
5 INJECTION, SOLUTION INTRAVENOUS; SUBCUTANEOUS
Status: DISCONTINUED | OUTPATIENT
Start: 2018-06-04 | End: 2018-06-05

## 2018-06-04 RX ORDER — GLUCAGON 1 MG
1 KIT INJECTION
Status: DISCONTINUED | OUTPATIENT
Start: 2018-06-04 | End: 2018-06-08 | Stop reason: HOSPADM

## 2018-06-04 RX ADMIN — HEPARIN SODIUM 5000 UNITS: 5000 INJECTION, SOLUTION INTRAVENOUS; SUBCUTANEOUS at 05:06

## 2018-06-04 RX ADMIN — LEVETIRACETAM 250 MG: 250 TABLET, FILM COATED ORAL at 09:06

## 2018-06-04 RX ADMIN — INSULIN DETEMIR 7 UNITS: 100 INJECTION, SOLUTION SUBCUTANEOUS at 08:06

## 2018-06-04 RX ADMIN — SODIUM CHLORIDE 3 UNITS/HR: 9 INJECTION, SOLUTION INTRAVENOUS at 03:06

## 2018-06-04 RX ADMIN — CLONIDINE HYDROCHLORIDE 0.1 MG: 0.1 TABLET ORAL at 08:06

## 2018-06-04 RX ADMIN — ALBUTEROL SULFATE 10 MG: 2.5 SOLUTION RESPIRATORY (INHALATION) at 05:06

## 2018-06-04 RX ADMIN — INSULIN ASPART 5 UNITS: 100 INJECTION, SOLUTION INTRAVENOUS; SUBCUTANEOUS at 05:06

## 2018-06-04 RX ADMIN — CLONIDINE HYDROCHLORIDE 0.1 MG: 0.1 TABLET ORAL at 11:06

## 2018-06-04 RX ADMIN — NIFEDIPINE 60 MG: 30 TABLET, FILM COATED, EXTENDED RELEASE ORAL at 08:06

## 2018-06-04 RX ADMIN — INSULIN ASPART 5 UNITS: 100 INJECTION, SOLUTION INTRAVENOUS; SUBCUTANEOUS at 08:06

## 2018-06-04 RX ADMIN — METOPROLOL TARTRATE 50 MG: 50 TABLET ORAL at 08:06

## 2018-06-04 RX ADMIN — CLONIDINE HYDROCHLORIDE 0.1 MG: 0.1 TABLET ORAL at 02:06

## 2018-06-04 RX ADMIN — HEPARIN SODIUM 5000 UNITS: 5000 INJECTION, SOLUTION INTRAVENOUS; SUBCUTANEOUS at 02:06

## 2018-06-04 RX ADMIN — HYDRALAZINE HYDROCHLORIDE 10 MG: 20 INJECTION INTRAMUSCULAR; INTRAVENOUS at 12:06

## 2018-06-04 RX ADMIN — SODIUM POLYSTYRENE SULFONATE 30 G: 15 SUSPENSION ORAL; RECTAL at 05:06

## 2018-06-04 RX ADMIN — LEVETIRACETAM 250 MG: 250 TABLET, FILM COATED ORAL at 08:06

## 2018-06-04 RX ADMIN — LOSARTAN POTASSIUM 100 MG: 50 TABLET, FILM COATED ORAL at 08:06

## 2018-06-04 RX ADMIN — HYDRALAZINE HYDROCHLORIDE 10 MG: 20 INJECTION INTRAMUSCULAR; INTRAVENOUS at 09:06

## 2018-06-04 RX ADMIN — LABETALOL HYDROCHLORIDE 10 MG: 5 INJECTION, SOLUTION INTRAVENOUS at 11:06

## 2018-06-04 RX ADMIN — HEPARIN SODIUM 5000 UNITS: 5000 INJECTION, SOLUTION INTRAVENOUS; SUBCUTANEOUS at 10:06

## 2018-06-04 NOTE — PLAN OF CARE
06/04/18 1650   Discharge Assessment   Assessment Type Discharge Planning Assessment   Confirmed/corrected address and phone number on facesheet? Yes   Assessment information obtained from? Patient   Expected Length of Stay (days) 553   Communicated expected length of stay with patient/caregiver yes   Prior to hospitilization cognitive status: Alert/Oriented   Prior to hospitalization functional status: Independent   Current cognitive status: Alert/Oriented   Current Functional Status: Needs Assistance   Lives With spouse   Able to Return to Prior Arrangements unable to determine at this time (comments)   Is patient able to care for self after discharge? Unable to determine at this time (comments)   Who are your caregiver(s) and their phone number(s)? Benson Iniguez ()  [phone]   Patient's perception of discharge disposition home or selfcare   Readmission Within The Last 30 Days no previous admission in last 30 days   Patient currently being followed by outpatient case management? No   Patient currently receives any other outside agency services? No   Equipment Currently Used at Home walker, rolling;wheelchair   Do you have any problems affording any of your prescribed medications? No   Is the patient taking medications as prescribed? yes   Does the patient have transportation home? Yes   Transportation Available family or friend will provide   Dialysis Name and Scheduled days [facility] [address] [facility]    Does the patient receive services at the Coumad[facility] Clinic? No   Discharge Plan A Home with family;Home Health   Discharge Plan B Rehab   Patient/Family In Agreement With Plan yes         Discharge/ My Health Packet Folder Given to patient/family:      yes      PCP:  none      Pharmacy:    [facility] [address] [facility] [address] AT SEC of [address]  [address]  [facility] [address]  Phone: [phone] Fax: [phone]        Emergency  Contacts:  Extended Emergency Contact Information  Primary Emergency Contact: Willian Najera   [place]  Mobile Phone: [phone]  Relation: Sister  Secondary Emergency Contact: Sheila Najera   [place]  Mobile Phone: [phone]  Relation: Sister  Father: Mo Najera  Address: [address] [place] [address]  Mobile Phone: [phone]      Insurance:    Payor: MEDICARE / Plan: MEDICARE PART A & B / Product Type: Government /       Cecille Haq RN, CCRN-K, [facility]  Neuro-Critical Care   X [phone]

## 2018-06-04 NOTE — HPI
Reason for Consult: Management of T1DM, Hyperglycemia     Diabetes diagnosis year: age 26     Home Diabetes Medications:    Patient reports on 6/7/18 that home regimen is  Toujeo 15 units daily.   humalog 5 units ac plus  sliding scale up to 25 units with meals    How often checking glucose at home? 8-10x per day  BG readings on regimen: 's  Hypoglycemia on the regimen?  Yes  Missed doses on regimen?  yes    Diabetes Complications include:     Hyperglycemia, Diabetic chronic kidney disease     , Diabetic retinopathy , Diabetic peripheral neuropathy  and Diabetic gastroparesis      Complicating diabetes co morbidities:   History of CVA, Residual deficits from CVA  and ESRD       HPI:   Patient is a 52 y.o. female with a diagnosis of dm type 1, esrd, recent cva admitted for worsening encephalopathy and left sided preference. Found to have hyperglycemia on admission with bg in mid 300s. Started on home dose insulin regimen with subsequent severe hyperglycemia yesterday at 658.  She was started on insulin gtt.  Endocrine consulted for dm type 1 management as patient being stepped down to floor.

## 2018-06-04 NOTE — PROVIDER TRANSFER
Neuro Critical Care Transfer of Care note    Date of Admit: 6/2/2018  Date of Transfer / Stepdown: 6/4/2018    Brief History of Present Illness:      Eufemia Haq is a 53 y.o. female who  has a past medical history of Anemia; Arthritis; Blood transfusion; Chronic pain; Diabetes mellitus; Diabetes mellitus type I; Diabetic retinopathy; ESRD (end stage renal disease) on dialysis; Hyperlipidemia; Hypertension; Neuropathy; Pneumonia; Seizures; Stroke; and Vitamin D deficiency.. The patient presented to [facility] on 6/2/2018 with a primary complaint of Left sided gaze (left sided gaze with left sided head deviation, last known well last pm, unknown time)    Hospital Course: 6/2/18: Patient presented to [facility] with left sided gaze preference and decreased responsiveness; admitted to [facility] for higher level of care  6/3: improved mental status, off nicardipine, insulin gtt  6/4: hyperglycemia insulin gtt restarted. ESRD on HD      Hospital Course By Problem with Pertinent Findings:     1.   Seizure with post-ictal encephalopathy. Now resolved  On keppra 250 q12  Metabolic encephalopathy   Pt/ot  oob to chair as tolerated  MRI pending.     2. Endocrine:    Resume diabetic diet  On insulin gtt for hyperglycemia. Trending down  Endocrinology consult.      Consultants and Procedures:     Consultants:  Nephrology for HD  Vascular neurology    Procedures:    n/a    Transfer Information:     Diet:  Renal/diabetic    Physical Activity:  As tolerated    To Do / Pending Studies / Follow ups:   -Endocrine consult-management of DM   -MRI brain-has a loop recorder    Carmela Bianchi  Neuro Critical Care

## 2018-06-04 NOTE — PROGRESS NOTES
[facility]  Neurocritical Care  Progress Note    Admit Date: 6/2/2018  Service Date: 06/04/2018  Length of Stay: 2    Subjective:     Chief Complaint: <principal problem not specified>    History of Present Illness: Ms. Haq is a 53 year old Female with PMHx of Diabetes, HTN, HLD, ESRD (on HD T,Th,Sat) 2 prior strokes and seizures who presented to [facility] after she was found by with left sided gaze preference. Per notes, patient's LKN was last night. The patient's  went in her room to take her to dialysis this morning. At that time, she was slow to respond and had left sided gaze preference. Patient will be admitted to [facility] for higher level of care.        Hospital Course: 6/2/18: Patient presented to [facility] with left sided gaze preference and decreased responsiveness; admitted to [facility] for higher level of care  6/3: improved mental status, off nicardipine, insulin gtt  6/4: hyperglycemia insulin gtt restarted. ESRD on HD    Review of Symptoms:   Constitutional: Denies fevers or chills.  Pulmonary: Denies shortness of breath or cough.  Cardiology: Denies chest pain or palpitations.  GI: Denies abdominal pain or constipation.  Neurologic: Denies new weakness, headaches, or paresthesias.     Medications:  Continuous Infusions:   insulin (HUMAN R) infusion (adults) 2.4 Units/hr (06/04/18 1010)    nicardipine Stopped (06/03/18 0100)     Scheduled Meds:   heparin (porcine)  5,000 Units Subcutaneous Q8H    levETIRAcetam  250 mg Oral BID    losartan  100 mg Oral Daily    metoprolol tartrate  50 mg Oral BID    NIFEdipine  60 mg Oral Daily     PRN Meds:.sodium chloride 0.9%, dextrose 50%, dextrose 50%, dextrose 50%, hydrALAZINE, labetalol, sodium chloride 0.9%    OBJECTIVE:   Vital Signs (Most Recent):   Temp: 98 °F (36.7 °C) (06/04/18 0705)  Pulse: 79 (06/04/18 1105)  Resp: 15 (06/04/18 1105)  BP: (!) 185/81 (06/04/18 1115)  SpO2: 100 % (06/04/18 1105)    Vital Signs (24h Range):   Temp:  [97.3  °F (36.3 °C)-98 °F (36.7 °C)] 98 °F (36.7 °C)  Pulse:  [66-88] 79  Resp:  [11-29] 15  SpO2:  [90 %-100 %] 100 %  BP: (143-215)/() 185/81    ICP/CPP (Last 24h):        I & O (Last 24h):     Intake/Output Summary (Last 24 hours) at 06/04/18 1125  Last data filed at 06/04/18 1005   Gross per 24 hour   Intake          1556.19 ml   Output             1750 ml   Net          -193.81 ml     Physical Exam:  GA: Alert, comfortable, no acute distress.   HEENT: No scleral icterus or JVD.   Pulmonary: Clear to auscultation A/P/L. No wheezing, crackles, or rhonchi.  Cardiac: RRR S1 & S2 w/o rubs/murmurs/gallops.   Abdominal: Bowel sounds present x 4. No appreciable hepatosplenomegaly.  Skin: No jaundice, rashes, or visible lesions.  Neuro:  --GCS: 15  --Mental Status:  Awake and alert, aao x2, fluent follows commands  --CN II-XII grossly intact.   --Pupils 3.5mm, PERRL.   --Corneal reflex, gag, cough intact.  --LUE strength: 4+/5  --RUE strength: 4+/5  --LLE strength: 4+/5  --RLE strength: 4+/5    Vent Data:        Lines/Drains/Airway:          Hemodialysis Catheter right subclavian (Active)             Hemodialysis Catheter right subclavian (Active)            Hemodialysis AV Graft Left upper arm (Active)     Nutrition/Tube Feeds (if NPO state why): po    Labs:  ABG: No results for input(s): PH, PO2, PCO2, HCO3, POCSATURATED, BE in the last 24 hours.  BMP:    Recent Labs  Lab 06/04/18  0316 06/04/18  0450   *  --    K 6.7* 4.8     --    CO2 22*  --    BUN 50*  --    CREATININE 4.8*  --    *  --      LFT:   Lab Results   Component Value Date    AST 34 06/04/2018    ALT 22 06/04/2018    ALKPHOS 303 (H) 06/04/2018    BILITOT 0.2 06/04/2018    ALBUMIN 2.7 (L) 06/04/2018    PROT 6.1 06/04/2018     CBC:   Lab Results   Component Value Date    WBC 4.63 06/04/2018    HGB 9.1 (L) 06/04/2018    HCT 30.8 (L) 06/04/2018    MCV 93 06/04/2018     06/04/2018     Microbiology x 7d:   Microbiology Results (last 7  days)     ** No results found for the last 168 hours. **        Imaging:    I personally reviewed the above image.    ASSESSMENT/PLAN:     Active Hospital Problems    Diagnosis    Encephalopathy    Seizure disorder    ESRD on dialysis    Hyperglycemia due to type 1 diabetes mellitus    Essential hypertension      Neuro:   Seizure with post-ictal encephalopathy. Now resolved  On keppra 250 q12  Metabolic encephalopathy   Pt/ot  oob to chair as tolerated  MRI pending.     Pulmonary:   Stable saturation on room air    Cardiac:   On nicardipine gtt  On oral antihypertensives - add clonidine 0.1mg q12    Renal:    ESRD on HD  Dialyzed 6/3    ID:   Afebrile, normal wbc    Hem/Onc:   Stable hh and plt count    Endocrine:    Resume diabetic diet  On insulin gtt for hyperglycemia. Trending down  Endocrinology consult.    Fluids/Electrolytes/Nutrition/GI:   po  Lines:  Art NA  CVC NA  ETT NA  Andrade NA  NG NA  PEG NA    Proph:  DVT:SCD/heparin  Constipation:  Last output:bowel regimen  PUP:NA  VAP:NA    Transfer to F with endocrinology consult for blood sugar management    Full Code  Uninterrupted Critical Care/Counseling Time (not including procedures):: III    Adam Pena MD  Neurocritical care attending    Adam Pena MD  Neurocritical Care  [facility]

## 2018-06-04 NOTE — PROGRESS NOTES
RN walked in on patient trying to pull IV out. Pt educated on why IV is needed in ICU. Will continue to monitor.

## 2018-06-04 NOTE — PLAN OF CARE
Problem: Patient Care Overview  Goal: Plan of Care Review  Outcome: Ongoing (interventions implemented as appropriate)  POC reviewed with pt and family at 1400. Pt verbalized understanding. Questions and concerns addressed. No acute events today. Transfer orders in for NSU. Pt progressing toward goals. Will continue to monitor. See flowsheets for full assessment and VS info.

## 2018-06-04 NOTE — PLAN OF CARE
Notified NSCCU team NP pt BS elevated after stopping insulin gtt. (SEE LABS). States will review chart. Will continue to monitor.

## 2018-06-04 NOTE — SUBJECTIVE & OBJECTIVE
Interval History:   HD treatment completed yesterday with net UF of 1L, tolerated well.  Improved mental status this morning, AOx4, able to follow commands.      Review of patient's allergies indicates:   Allergen Reactions    Ciprofloxacin (bulk) Itching    Latex Itching and Other (See Comments)     Very low Oxygen    Vancomycin Shortness Of Breath and Itching    Neurontin [gabapentin] Other (See Comments)     Seizure like activity    Niacin Itching and Other (See Comments)     Burning      Bactrim [sulfamethoxazole-trimethoprim] Rash     Current Facility-Administered Medications   Medication Frequency    0.9%  NaCl infusion PRN    cloNIDine tablet 0.1 mg TID    dextrose 50% injection 12.5 g PRN    dextrose 50% injection 25 g PRN    glucagon (human recombinant) injection 1 mg PRN    glucose chewable tablet 16 g PRN    glucose chewable tablet 24 g PRN    heparin (porcine) injection 5,000 Units Q8H    hydrALAZINE injection 10 mg Q6H PRN    insulin aspart U-100 pen 0-5 Units QID (AC + HS) PRN    insulin aspart U-100 pen 5 Units TIDWM    insulin detemir U-100 pen 7 Units BID    labetalol injection 10 mg Q4H PRN    levETIRAcetam tablet 250 mg BID    losartan tablet 100 mg Daily    metoprolol tartrate (LOPRESSOR) tablet 50 mg BID    niCARdipine 40 mg/200 mL infusion Continuous    NIFEdipine 24 hr tablet 60 mg Daily    sodium chloride 0.9% flush 3 mL PRN       Objective:     Vital Signs (Most Recent):  Temp: 98.4 °F (36.9 °C) (06/04/18 1505)  Pulse: 72 (06/04/18 1505)  Resp: 19 (06/04/18 1505)  BP: 130/81 (06/04/18 1505)  SpO2: 95 % (06/04/18 1505)  O2 Device (Oxygen Therapy): nasal cannula (06/04/18 1005) Vital Signs (24h Range):  Temp:  [97.6 °F (36.4 °C)-98.4 °F (36.9 °C)] 98.4 °F (36.9 °C)  Pulse:  [68-88] 72  Resp:  [11-29] 19  SpO2:  [91 %-100 %] 95 %  BP: (130-215)/() 130/81     Weight: 65 kg (143 lb 4.8 oz) (06/03/18 1905)  Body mass index is 23.85 kg/m².  Body surface area is 1.73  meters squared.    I/O last 3 completed shifts:  In: 1434.4 [P.O.:680; I.V.:54.4; Other:600; IV Piggyback:100]  Out: 2250 [Urine:750; Other:1500]    Physical Exam   Constitutional: She is oriented to person, place, and time. She appears well-developed. No distress.   HENT:   Head: Normocephalic and atraumatic.   Right Ear: External ear normal.   Left Ear: External ear normal.   Eyes: Conjunctivae and EOM are normal. Right eye exhibits no discharge. Left eye exhibits no discharge.   Cardiovascular: Normal rate and regular rhythm.  Exam reveals no gallop and no friction rub.    No murmur heard.  ADEN AVG   Pulmonary/Chest: Effort normal and breath sounds normal. No respiratory distress. She has no wheezes. She has no rales.   Abdominal: Soft. She exhibits no distension. There is no tenderness.   Musculoskeletal: She exhibits edema.   Neurological: She is alert and oriented to person, place, and time.   Skin: Skin is warm and dry. She is not diaphoretic.   Psychiatric: She has a normal mood and affect. Her behavior is normal.       Significant Labs:  CBC:   Recent Labs  Lab 06/04/18  0316   WBC 4.63   RBC 3.30*   HGB 9.1*   HCT 30.8*      MCV 93   MCH 27.6   MCHC 29.5*     CMP:   Recent Labs  Lab 06/04/18  0316 06/04/18  0450   *  --    CALCIUM 7.4*  --    ALBUMIN 2.7*  --    PROT 6.1  --    *  --    K 6.7* 4.8   CO2 22*  --      --    BUN 50*  --    CREATININE 4.8*  --    ALKPHOS 303*  --    ALT 22  --    AST 34  --    BILITOT 0.2  --

## 2018-06-04 NOTE — NURSING
Spoke to MD about MRI ordered yesterday. Pt has a Loop recorder that has not been interrogated, MRI tech states all info will be erased after MRI completed. MD states hold MRI for now will address during rounds. ([id number] moon in MRI)

## 2018-06-04 NOTE — CONSULTS
[facility]  Endocrinology  Diabetes Consult Note    Consult Requested by: Adam Pena MD   Reason for admit: <principal problem not specified>    HISTORY OF PRESENT ILLNESS:  Reason for Consult: Management of T1DM, Hyperglycemia     Diabetes diagnosis year: age 26     Home Diabetes Medications:    levemir 7 units BID  novolog 5 ac   sliding scale up to 15 units with meals    How often checking glucose at home? 8-10x per day  BG readings on regimen: 's  Hypoglycemia on the regimen?  Yes  Missed doses on regimen?  yes    Diabetes Complications include:     Hyperglycemia, Diabetic chronic kidney disease     , Diabetic retinopathy , Diabetic peripheral neuropathy  and Diabetic gastroparesis      Complicating diabetes co morbidities:   History of CVA, Residual deficits from CVA  and ESRD       HPI:   Patient is a 52 y.o. female with a diagnosis of dm type 1, esrd, recent cva admitted for worsening encephalopathy and left sided preference. Found to have hyperglycemia on admission with bg in mid 300s. Started on home dose insulin regimen with subsequent severe hyperglycemia yesterday at 658.  She was started on insulin gtt.  Endocrine consulted for dm type 1 management as patient being stepped down to floor.    Interval HPI:   Overnight events:  AF, hypertensive, otherwise stable.    Upon review of BG management thus far, patient only received insulin gtt, and/or Levemir 7u.  No prandial insulin scheduled.  Tolerating 100% diet.      Had previous episode of hypoglycemia 6/3/18.  None since.  No nausea.     PMH, PSH, FH, SH updated and reviewed     ROS:    Review of Systems   Constitutional: Negative for unexpected weight change.   Eyes: Negative for visual disturbance.   Respiratory: Negative for shortness of breath.    Cardiovascular: Negative for chest pain.   Gastrointestinal: Negative for abdominal pain.   Genitourinary: Negative for urgency.   Musculoskeletal: Negative for  arthralgias.   Skin: Negative for wound.   Neurological: Negative for headaches.   Hematological: Does not bruise/bleed easily.   Psychiatric/Behavioral: Negative for sleep disturbance.       Current Medications and/or Treatments Impacting Glycemic Control  Immunotherapy:    Immunosuppressants     None        Steroids:   Hormones     None        Pressors:    Autonomic Drugs     None        Hyperglycemia/Diabetes Medications:   Antihyperglycemics     Start     Stop Route Frequency Ordered    06/02/18 2230  insulin regular (Humulin R) 100 Units in sodium chloride 0.9% 100 mL infusion     Question:  Insulin Rate Adjustment (DO NOT MODIFY ANSWER)  Answer:  \\[employer]\epic\Images\Pharmacy\InsulinInfusions\InsulinRegAdj [id number].pdf    -- IV Continuous 06/02/18 2131             PHYSICAL EXAMINATION:  Vitals:    06/04/18 1145   BP: (!) 172/78   Pulse:    Resp:    Temp:      Body mass index is 23.85 kg/m².    Physical Exam   Constitutional: She appears well-developed.   HENT:   Right Ear: External ear normal.   Left Ear: External ear normal.   Nose: Nose normal.   Hearing normal  Dentition good   Neck: No tracheal deviation present. No thyromegaly present.   Cardiovascular: Normal rate.    No murmur heard.  Pulmonary/Chest: Effort normal and breath sounds normal.   Abdominal: Soft. There is no tenderness. No hernia.   Musculoskeletal: She exhibits no edema.   Neurological: She has normal reflexes. No cranial nerve deficit.   Skin: No rash noted.   No nodules   Psychiatric: She has a normal mood and affect. Judgment normal.   Vitals reviewed.        Labs Reviewed and Include     Recent Labs  Lab 06/04/18  0316 06/04/18  0450   *  --    CALCIUM 7.4*  --    ALBUMIN 2.7*  --    PROT 6.1  --    *  --    K 6.7* 4.8   CO2 22*  --      --    BUN 50*  --    CREATININE 4.8*  --    ALKPHOS 303*  --    ALT 22  --    AST 34  --    BILITOT 0.2  --      Lab Results   Component Value Date    WBC 4.63 06/04/2018    HGB  9.1 (L) 06/04/2018    HCT 30.8 (L) 06/04/2018    MCV 93 06/04/2018     06/04/2018       Recent Labs  Lab 06/02/18  1118   TSH 3.932     Lab Results   Component Value Date    HGBA1C 9.5 (H) 06/02/2018       Nutritional status:   Body mass index is 23.85 kg/m².  Lab Results   Component Value Date    ALBUMIN 2.7 (L) 06/04/2018    ALBUMIN 2.8 (L) 06/03/2018    ALBUMIN 3.5 06/02/2018     No results found for: PREALBUMIN    Estimated Creatinine Clearance: 12.2 mL/min (A) (based on SCr of 4.8 mg/dL (H)).    Accu-Checks  Recent Labs      06/04/18   0246  06/04/18   0248  06/04/18   0359  06/04/18   0457  06/04/18   0603  06/04/18   0708  06/04/18   0805  06/04/18   0909  06/04/18   1009  06/04/18   1114   POCTGLUCOSE  >500*  >500*  >500*  435*  287*  247*  201*  242*  239*  228*        ASSESSMENT and PLAN    Encephalopathy      Per primary          ESRD on dialysis      Avoid hypoglycemia        Hyperglycemia due to type 1 diabetes mellitus    BG goal 140-180 while inpatient.  Lab Results   Component Value Date    HGBA1C 9.5 (H) 06/02/2018     Recommend restarting previous home regimen  Levemir 7u BID  Novolog 5u AC  Low correction insulin  POC AC/HS/0200    Will continue to monitor BG and make changes as necessary.    Discharge:  TBD.          Essential hypertension      Per primary.            Plan discussed with patient, family, and RN at bedside.     Paz Hughes MD  Endocrinology  [facility]

## 2018-06-04 NOTE — PROGRESS NOTES
[facility]  Nephrology  Progress Note    Patient Name: Eufemia Haq  MRN: [id number]  Admission Date: 6/2/2018  Hospital Length of Stay: 2 days  Attending Provider: Adam Pena MD   Primary Care Physician: Kavin Muir MD  Principal Problem:<principal problem not specified>    Subjective:     HPI: Eufemia Haq is a 53 year old female with past medical history of DMT1, recent CVA with residual behavior problems and ESRD on HD. She arrived after being found by  unresponsive. In ED has been presenting with hypertensive urgency with blood pressures greater than 220s of systolic and diastolic up to 110. Had a brain CT scan with probable new ischemic infarct in posterior parietal lobe. Chest x ray with evidence of pulmonary edema, on nasal canula 2 L O2 with saturation 100%. Nephrology was consulted for inpatient dialysis treatment.     Nephrology: iHD TTS, [facility] on [address]. Duration 4 hrs , UF 4 , Access is ADEN AVG,  EDW= 65 kg and has residual renal function.     Interval History:   HD treatment completed yesterday with net UF of 1L, tolerated well.  Improved mental status this morning, AOx4, able to follow commands.      Review of patient's allergies indicates:   Allergen Reactions    Ciprofloxacin (bulk) Itching    Latex Itching and Other (See Comments)     Very low Oxygen    Vancomycin Shortness Of Breath and Itching    Neurontin [gabapentin] Other (See Comments)     Seizure like activity    Niacin Itching and Other (See Comments)     Burning      Bactrim [sulfamethoxazole-trimethoprim] Rash     Current Facility-Administered Medications   Medication Frequency    0.9%  NaCl infusion PRN    cloNIDine tablet 0.1 mg TID    dextrose 50% injection 12.5 g PRN    dextrose 50% injection 25 g PRN    glucagon (human recombinant) injection 1 mg PRN    glucose chewable tablet 16 g PRN    glucose chewable tablet 24 g PRN    heparin (porcine) injection 5,000 Units  Q8H    hydrALAZINE injection 10 mg Q6H PRN    insulin aspart U-100 pen 0-5 Units QID (AC + HS) PRN    insulin aspart U-100 pen 5 Units TIDWM    insulin detemir U-100 pen 7 Units BID    labetalol injection 10 mg Q4H PRN    levETIRAcetam tablet 250 mg BID    losartan tablet 100 mg Daily    metoprolol tartrate (LOPRESSOR) tablet 50 mg BID    niCARdipine 40 mg/200 mL infusion Continuous    NIFEdipine 24 hr tablet 60 mg Daily    sodium chloride 0.9% flush 3 mL PRN       Objective:     Vital Signs (Most Recent):  Temp: 98.4 °F (36.9 °C) (06/04/18 1505)  Pulse: 72 (06/04/18 1505)  Resp: 19 (06/04/18 1505)  BP: 130/81 (06/04/18 1505)  SpO2: 95 % (06/04/18 1505)  O2 Device (Oxygen Therapy): nasal cannula (06/04/18 1005) Vital Signs (24h Range):  Temp:  [97.6 °F (36.4 °C)-98.4 °F (36.9 °C)] 98.4 °F (36.9 °C)  Pulse:  [68-88] 72  Resp:  [11-29] 19  SpO2:  [91 %-100 %] 95 %  BP: (130-215)/() 130/81     Weight: 65 kg (143 lb 4.8 oz) (06/03/18 1905)  Body mass index is 23.85 kg/m².  Body surface area is 1.73 meters squared.    I/O last 3 completed shifts:  In: 1434.4 [P.O.:680; I.V.:54.4; Other:600; IV Piggyback:100]  Out: 2250 [Urine:750; Other:1500]    Physical Exam   Constitutional: She is oriented to person, place, and time. She appears well-developed. No distress.   HENT:   Head: Normocephalic and atraumatic.   Right Ear: External ear normal.   Left Ear: External ear normal.   Eyes: Conjunctivae and EOM are normal. Right eye exhibits no discharge. Left eye exhibits no discharge.   Cardiovascular: Normal rate and regular rhythm.  Exam reveals no gallop and no friction rub.    No murmur heard.  ADEN AVG   Pulmonary/Chest: Effort normal and breath sounds normal. No respiratory distress. She has no wheezes. She has no rales.   Abdominal: Soft. She exhibits no distension. There is no tenderness.   Musculoskeletal: She exhibits edema.   Neurological: She is alert and oriented to person, place, and time.   Skin:  Skin is warm and dry. She is not diaphoretic.   Psychiatric: She has a normal mood and affect. Her behavior is normal.       Significant Labs:  CBC:   Recent Labs  Lab 06/04/18 0316   WBC 4.63   RBC 3.30*   HGB 9.1*   HCT 30.8*      MCV 93   MCH 27.6   MCHC 29.5*     CMP:   Recent Labs  Lab 06/04/18 0316 06/04/18  0450   *  --    CALCIUM 7.4*  --    ALBUMIN 2.7*  --    PROT 6.1  --    *  --    K 6.7* 4.8   CO2 22*  --      --    BUN 50*  --    CREATININE 4.8*  --    ALKPHOS 303*  --    ALT 22  --    AST 34  --    BILITOT 0.2  --             Assessment/Plan:     ESRD on dialysis    Eufemia Haq is a 53 year old female with ESRD on HD (TTS) admitted for ischemic posterior lobe ischemic stroke and hypertensive urgency. Nephrology consulted for inpatient dialysis treatment.     Nephrology: iHD TTS, [facility] on [address] [facility]. Access is ADEN AVG. EDW= Unknown.     Plan:  No acute need for RRT today.  Will plan for HD treatment tomorrow           Aman Barriga NP  Nephrology  [facility]  Pager:  [phone]

## 2018-06-04 NOTE — PROGRESS NOTES
3hr HD treatment stopped 15 minutes early due to system clotting 1L of fluid removed pt tolerated well. Both needles of a ADEN graft removed and pressure held for 20 minutes to arterial side and 5 minutes to venous side to achieve hemostasis. Report given to primary nurse at bedside.

## 2018-06-04 NOTE — ASSESSMENT & PLAN NOTE
Eufemia Haq is a 53 year old female with ESRD on HD (TTS) admitted for ischemic posterior lobe ischemic stroke and hypertensive urgency. Nephrology consulted for inpatient dialysis treatment.     Nephrology: [facility]HD TTS, [facility] on [address] Access is ADEN AVG. EDW= Unknown.     Plan:  No acute need for RRT today.  Will plan for HD treatment tomorrow

## 2018-06-04 NOTE — SUBJECTIVE & OBJECTIVE
Interval HPI:   Overnight events:  AF, hypertensive, otherwise stable.    Upon review of BG management thus far, patient only received insulin gtt, and/or Levemir 7u.  No prandial insulin scheduled.  Tolerating 100% diet.      Had previous episode of hypoglycemia 6/3/18.  None since.  No nausea.     PMH, PSH, FH, SH updated and reviewed     ROS:    Review of Systems   Constitutional: Negative for unexpected weight change.   Eyes: Negative for visual disturbance.   Respiratory: Negative for shortness of breath.    Cardiovascular: Negative for chest pain.   Gastrointestinal: Negative for abdominal pain.   Genitourinary: Negative for urgency.   Musculoskeletal: Negative for arthralgias.   Skin: Negative for wound.   Neurological: Negative for headaches.   Hematological: Does not bruise/bleed easily.   Psychiatric/Behavioral: Negative for sleep disturbance.       Current Medications and/or Treatments Impacting Glycemic Control  Immunotherapy:    Immunosuppressants     None        Steroids:   Hormones     None        Pressors:    Autonomic Drugs     None        Hyperglycemia/Diabetes Medications:   Antihyperglycemics     Start     Stop Route Frequency Ordered    06/02/18 2230  insulin regular (Humulin R) 100 Units in sodium chloride 0.9% 100 mL infusion     Question:  Insulin Rate Adjustment (DO NOT MODIFY ANSWER)  Answer:  \\[email or web address]\epic\Images\Pharmacy\InsulinInfusions\InsulinRegAdj [id number].pdf    -- IV Continuous 06/02/18 2131             PHYSICAL EXAMINATION:  Vitals:    06/04/18 1145   BP: (!) 172/78   Pulse:    Resp:    Temp:      Body mass index is 23.85 kg/m².    Physical Exam   Constitutional: She appears well-developed.   HENT:   Right Ear: External ear normal.   Left Ear: External ear normal.   Nose: Nose normal.   Hearing normal  Dentition good   Neck: No tracheal deviation present. No thyromegaly present.   Cardiovascular: Normal rate.    No murmur heard.  Pulmonary/Chest: Effort normal and breath sounds  normal.   Abdominal: Soft. There is no tenderness. No hernia.   Musculoskeletal: She exhibits no edema.   Neurological: She has normal reflexes. No cranial nerve deficit.   Skin: No rash noted.   No nodules   Psychiatric: She has a normal mood and affect. Judgment normal.   Vitals reviewed.

## 2018-06-04 NOTE — ASSESSMENT & PLAN NOTE
BG goal 140-180 while inpatient.  Lab Results   Component Value Date    HGBA1C 9.5 (H) 06/02/2018     Recommend restarting previous home regimen  Levemir 7u BID  Novolog 5u AC  Low correction insulin  POC AC/HS/0200    Will continue to monitor BG and make changes as necessary.    Discharge:  TBD.

## 2018-06-04 NOTE — PROGRESS NOTES
Pt unable to go for MRI scan due to Loop Recorder. Loop recorder is MRI safe but scan will delete all information on it. Pt does not have card that has the information about the device with her. Notified NCC team. Scan will be put on hold until further information can be collected.

## 2018-06-05 PROBLEM — R53.1 LEFT-SIDED WEAKNESS: Status: ACTIVE | Noted: 2018-06-05

## 2018-06-05 PROBLEM — J81.0 ACUTE PULMONARY EDEMA: Status: ACTIVE | Noted: 2018-06-05

## 2018-06-05 PROBLEM — I63.9 CEREBROVASCULAR ACCIDENT (CVA): Status: ACTIVE | Noted: 2018-06-05

## 2018-06-05 LAB
ALBUMIN SERPL BCP-MCNC: 2.6 G/DL
ALP SERPL-CCNC: 258 U/L
ALT SERPL W/O P-5'-P-CCNC: 15 U/L
ANION GAP SERPL CALC-SCNC: 12 MMOL/L
AST SERPL-CCNC: 12 U/L
BASOPHILS # BLD AUTO: 0.03 K/UL
BASOPHILS NFR BLD: 0.6 %
BILIRUB SERPL-MCNC: 0.2 MG/DL
BUN SERPL-MCNC: 68 MG/DL
CALCIUM SERPL-MCNC: 7.5 MG/DL
CHLORIDE SERPL-SCNC: 103 MMOL/L
CO2 SERPL-SCNC: 23 MMOL/L
CREAT SERPL-MCNC: 5.6 MG/DL
DIFFERENTIAL METHOD: ABNORMAL
EOSINOPHIL # BLD AUTO: 0.2 K/UL
EOSINOPHIL NFR BLD: 4.5 %
ERYTHROCYTE [DISTWIDTH] IN BLOOD BY AUTOMATED COUNT: 16 %
EST. GFR  (AFRICAN AMERICAN): 9.3 ML/MIN/1.73 M^2
EST. GFR  (NON AFRICAN AMERICAN): 8 ML/MIN/1.73 M^2
GLUCOSE SERPL-MCNC: 284 MG/DL
HCT VFR BLD AUTO: 30.4 %
HGB BLD-MCNC: 9.1 G/DL
IMM GRANULOCYTES # BLD AUTO: 0.02 K/UL
IMM GRANULOCYTES NFR BLD AUTO: 0.4 %
LYMPHOCYTES # BLD AUTO: 1 K/UL
LYMPHOCYTES NFR BLD: 20.7 %
MCH RBC QN AUTO: 28.2 PG
MCHC RBC AUTO-ENTMCNC: 29.9 G/DL
MCV RBC AUTO: 94 FL
MONOCYTES # BLD AUTO: 0.5 K/UL
MONOCYTES NFR BLD: 10.8 %
NEUTROPHILS # BLD AUTO: 2.9 K/UL
NEUTROPHILS NFR BLD: 63 %
NRBC BLD-RTO: 0 /100 WBC
PLATELET # BLD AUTO: 167 K/UL
PMV BLD AUTO: 11.2 FL
POCT GLUCOSE: 117 MG/DL (ref 70–110)
POCT GLUCOSE: 245 MG/DL (ref 70–110)
POCT GLUCOSE: 247 MG/DL (ref 70–110)
POCT GLUCOSE: 259 MG/DL (ref 70–110)
POCT GLUCOSE: 351 MG/DL (ref 70–110)
POCT GLUCOSE: 95 MG/DL (ref 70–110)
POTASSIUM SERPL-SCNC: 4.7 MMOL/L
PROT SERPL-MCNC: 5.5 G/DL
RBC # BLD AUTO: 3.23 M/UL
SODIUM SERPL-SCNC: 138 MMOL/L
WBC # BLD AUTO: 4.63 K/UL

## 2018-06-05 PROCEDURE — 25000003 PHARM REV CODE 250: Performed by: NURSE PRACTITIONER

## 2018-06-05 PROCEDURE — 63600175 PHARM REV CODE 636 W HCPCS: Performed by: NURSE PRACTITIONER

## 2018-06-05 PROCEDURE — 80053 COMPREHEN METABOLIC PANEL: CPT

## 2018-06-05 PROCEDURE — 25000003 PHARM REV CODE 250: Performed by: EMERGENCY MEDICINE

## 2018-06-05 PROCEDURE — 99233 SBSQ HOSP IP/OBS HIGH 50: CPT | Mod: ,,, | Performed by: NURSE PRACTITIONER

## 2018-06-05 PROCEDURE — 80100014 HC HEMODIALYSIS 1:1

## 2018-06-05 PROCEDURE — 11000001 HC ACUTE MED/SURG PRIVATE ROOM

## 2018-06-05 PROCEDURE — 99232 SBSQ HOSP IP/OBS MODERATE 35: CPT | Mod: ,,, | Performed by: NURSE PRACTITIONER

## 2018-06-05 PROCEDURE — 85025 COMPLETE CBC W/AUTO DIFF WBC: CPT

## 2018-06-05 RX ORDER — INSULIN ASPART 100 [IU]/ML
7 INJECTION, SOLUTION INTRAVENOUS; SUBCUTANEOUS
Status: DISCONTINUED | OUTPATIENT
Start: 2018-06-05 | End: 2018-06-06

## 2018-06-05 RX ORDER — SEVELAMER CARBONATE 800 MG/1
1600 TABLET, FILM COATED ORAL
Status: DISCONTINUED | OUTPATIENT
Start: 2018-06-05 | End: 2018-06-08 | Stop reason: HOSPADM

## 2018-06-05 RX ORDER — ACETAMINOPHEN 325 MG/1
650 TABLET ORAL EVERY 6 HOURS PRN
Status: DISCONTINUED | OUTPATIENT
Start: 2018-06-05 | End: 2018-06-06

## 2018-06-05 RX ADMIN — INSULIN ASPART 7 UNITS: 100 INJECTION, SOLUTION INTRAVENOUS; SUBCUTANEOUS at 01:06

## 2018-06-05 RX ADMIN — INSULIN DETEMIR 7 UNITS: 100 INJECTION, SOLUTION SUBCUTANEOUS at 08:06

## 2018-06-05 RX ADMIN — LEVETIRACETAM 250 MG: 250 TABLET, FILM COATED ORAL at 08:06

## 2018-06-05 RX ADMIN — LOSARTAN POTASSIUM 100 MG: 50 TABLET, FILM COATED ORAL at 08:06

## 2018-06-05 RX ADMIN — NIFEDIPINE 60 MG: 30 TABLET, FILM COATED, EXTENDED RELEASE ORAL at 08:06

## 2018-06-05 RX ADMIN — ACETAMINOPHEN 650 MG: 325 TABLET ORAL at 09:06

## 2018-06-05 RX ADMIN — HEPARIN SODIUM 5000 UNITS: 5000 INJECTION, SOLUTION INTRAVENOUS; SUBCUTANEOUS at 02:06

## 2018-06-05 RX ADMIN — SEVELAMER CARBONATE 1600 MG: 800 TABLET, FILM COATED ORAL at 05:06

## 2018-06-05 RX ADMIN — SEVELAMER CARBONATE 1600 MG: 800 TABLET, FILM COATED ORAL at 11:06

## 2018-06-05 RX ADMIN — CLONIDINE HYDROCHLORIDE 0.1 MG: 0.1 TABLET ORAL at 08:06

## 2018-06-05 RX ADMIN — INSULIN ASPART 5 UNITS: 100 INJECTION, SOLUTION INTRAVENOUS; SUBCUTANEOUS at 08:06

## 2018-06-05 RX ADMIN — METOPROLOL TARTRATE 50 MG: 50 TABLET ORAL at 08:06

## 2018-06-05 RX ADMIN — CLONIDINE HYDROCHLORIDE 0.1 MG: 0.1 TABLET ORAL at 02:06

## 2018-06-05 RX ADMIN — INSULIN ASPART 2 UNITS: 100 INJECTION, SOLUTION INTRAVENOUS; SUBCUTANEOUS at 01:06

## 2018-06-05 RX ADMIN — INSULIN ASPART 7 UNITS: 100 INJECTION, SOLUTION INTRAVENOUS; SUBCUTANEOUS at 09:06

## 2018-06-05 RX ADMIN — HEPARIN SODIUM 5000 UNITS: 5000 INJECTION, SOLUTION INTRAVENOUS; SUBCUTANEOUS at 06:06

## 2018-06-05 RX ADMIN — HEPARIN SODIUM 5000 UNITS: 5000 INJECTION, SOLUTION INTRAVENOUS; SUBCUTANEOUS at 09:06

## 2018-06-05 RX ADMIN — INSULIN ASPART 8 UNITS: 100 INJECTION, SOLUTION INTRAVENOUS; SUBCUTANEOUS at 08:06

## 2018-06-05 RX ADMIN — INSULIN DETEMIR 10 UNITS: 100 INJECTION, SOLUTION SUBCUTANEOUS at 09:06

## 2018-06-05 NOTE — PT/OT/SLP DISCHARGE
Physical Therapy Discharge Summary    Name: Eufemia Haq  MRN: [id number]   Principal Problem: Acute metabolic encephalopathy     Patient Discharged from acute Physical Therapy on 3/22/18.  Please refer to prior PT noted date on 3/22/18 for functional status.     Assessment:     Patient was discharged unexpectedly.  Information required to complete an accurate discharge summary is unknown.  Refer to therapy initial evaluation and last progress note for initial and most recent functional status and goal achievement.  Recommendations made may be found in medical record.    Objective:     GOALS:    Physical Therapy Goals     Not on file          Multidisciplinary Problems (Resolved)        Problem: Physical Therapy Goal    Goal Priority Disciplines Outcome Goal Variances Interventions   Physical Therapy Goal   (Resolved)     PT/OT, PT Outcome(s) achieved     Description:  Goals to be met by: 3/22/18     Patient will increase functional independence with mobility by performin. Sit to stand transfer with Minimal Assistance with LRD ( MET )\  Revised goal: Sit to stand transfer with supervision.  2. Bed to chair transfer with Contact Guard Assistance using LRD. ( MET )  Revised goal: Bed to chair transfer with supervision  3. Gait  x 20 feet with Minimal Assistance using LRD.  ( MET )  Revised goal: Gait x 150 feet with CGA using LRD.  4. Stand for 10 minutes with Contact Guard Assistance using LRD. (MET )  5. Lower extremity exercise program x20 reps per handout, with assistance as needed                       Reasons for Discontinuation of Therapy Services  Transfer to alternate level of care.      Plan:     Patient Discharged to: Skilled Nursing Facility.    Papo Barillas, PT  2018

## 2018-06-05 NOTE — PROGRESS NOTES
[facility]  Neurocritical Care  Progress Note    Admit Date: 6/2/2018  Service Date: 06/05/2018  Length of Stay: 3    Subjective:     Chief Complaint: Encephalopathy    History of Present Illness: Ms. Haq is a 53 year old Female with PMHx of Diabetes, HTN, HLD, ESRD (on HD T,Th,Sat) 2 prior strokes and seizures who presented to [facility] after she was found by with left sided gaze preference. Per notes, patient's LKN was last night. The patient's  went in her room to take her to dialysis this morning. At that time, she was slow to respond and had left sided gaze preference. Patient will be admitted to [facility] for higher level of care.        Hospital Course: 6/2/18: Patient presented to [facility] with left sided gaze preference and decreased responsiveness; admitted to [facility] for higher level of care  6/3: improved mental status, off nicardipine, insulin gtt  6/4: hyperglycemia insulin gtt restarted. ESRD on HD  6/5: stepdown to hospital medicine, HD today, Endocrine following for DM management        Review of Systems    Review of symptoms  Constitutional: Denies fevers or chills.  Pulmonary: Denies shortness of breath or cough.  Cardiology: Denies chest pain or palpitations.  GI: Denies abdominal pain or constipation.  Neurologic: Denies new weakness,  headache, or paresthesias.  Objective:     Vitals:  Temp: 98.6 °F (37 °C)  Pulse: 67  Rhythm: normal sinus rhythm  BP: (!) 152/67  MAP (mmHg): 104  Resp: (!) 22  SpO2: 99 %  O2 Device (Oxygen Therapy): room air    Temp  Min: 98.1 °F (36.7 °C)  Max: 98.6 °F (37 °C)  Pulse  Min: 60  Max: 76  BP  Min: 126/58  Max: 183/77  MAP (mmHg)  Min: 83  Max: 132  Resp  Min: 11  Max: 27  SpO2  Min: 88 %  Max: 99 %    06/04 0701 - 06/05 0700  In: 794.8 [P.O.:786; I.V.:8.8]  Out: -    Unmeasured Output  Urine Occurrence: 1  Stool Occurrence: 1  Pad Count: 1       Physical Exam    Physical Exam:  GA: Alert, comfortable, no acute distress.   HEENT: No scleral icterus or  JVD. blind  Pulmonary: Clear to auscultation A/L. No wheezing, crackles, or rhonchi.  Cardiac: RRR S1 & S2 w/o rubs/murmurs/gallops.   Abdominal: Bowel sounds present x 4. No appreciable hepatosplenomegaly.  Skin: No jaundice, rashes, or visible lesions.  Neuro:  --GCS: E4 V5 M6  --Mental Status:  Awake,alert, oriented X3, follows all commands, speech clear  --CN II-XII grossly intact.   --Pupils 3mm, PERRL.   --Corneal reflex, gag, cough intact.  --LUE strength: 5/5  --RUE strength: 5/5  --LLE strength: 5/5  --RLE strength: 5/5      Medications:  Continuous Scheduled  cloNIDine 0.1 mg TID   heparin (porcine) 5,000 Units Q8H   insulin aspart U-100 7 Units TIDWM   insulin detemir U-100 10 Units BID   insulin detemir U-100 3 Units Once   levETIRAcetam 250 mg BID   losartan 100 mg Daily   metoprolol tartrate 50 mg BID   NIFEdipine 60 mg Daily   sevelamer carbonate 1,600 mg TID WM   PRN  sodium chloride 0.9%  PRN   dextrose 50% 12.5 g PRN   dextrose 50% 25 g PRN   glucagon (human recombinant) 1 mg PRN   glucose 16 g PRN   glucose 24 g PRN   insulin aspart U-100 0-5 Units QID (AC + HS) PRN   sodium chloride 0.9% 3 mL PRN     Today I personally reviewed pertinent medications, lines/drains/airways, imaging, lab results, microbiology results,         Assessment/Plan:     Neuro   * Encephalopathy    -Patient with PMHx of Seizures [place] [facility] [place]ided gaze and decreased responsiveness  -Pt with elevated BP that could be contributing to encephalopathy  -No gaze preference noted on exam in ED  -Patient s/p Keppra 1 gram IV load  -Continue keppra 250 BID  -Stepdown to Hospital Medicine        Seizure disorder    -Reported seizure history   -Patient s/p 1gram Keppra IV load   -Continue patient's home AED of Keppra 250 mg BID; adjusted for renal dosing  -EEG ordered  -Will continue to monitor        Cardiac/Vascular   Essential hypertension      -may be contributing to encephalopathy  -continue home BP meds of losartan  100 mg, metoprolol 50 mg and nifedipine 60 mg dialy  -SBP <180          Renal/   ESRD on dialysis    -Patient with outpatient HD Tuesday, Thursday, Saturday  -Nephrology following  -HD today            Prophylaxis:  Venous Thromboembolism: chemical  Stress Ulcer: NA  Ventilator Pneumonia: not applicable     Full Code    Carmela Bianchi NP  Neurocritical Care  [facility]

## 2018-06-05 NOTE — NURSING
Spoke to [facility] NP pt ok to go to MRI after HD completion. (pt has loop recorder and data will be erased after MRI).

## 2018-06-05 NOTE — PLAN OF CARE
Notified team pt receiving HD.  , HD ordered to take off 2L States ok to give clonidine medication.

## 2018-06-05 NOTE — SUBJECTIVE & OBJECTIVE
Interval History:   NAEON.  Oxygenating well on RA with no complaints of SOB, electrolytes stable this morning.  Orders for Step-down per primary team.    Review of patient's allergies indicates:   Allergen Reactions    Ciprofloxacin (bulk) Itching    Latex Itching and Other (See Comments)     Very low Oxygen    Vancomycin Shortness Of Breath and Itching    Neurontin [gabapentin] Other (See Comments)     Seizure like activity    Niacin Itching and Other (See Comments)     Burning      Bactrim [sulfamethoxazole-trimethoprim] Rash     Current Facility-Administered Medications   Medication Frequency    0.9%  NaCl infusion PRN    cloNIDine tablet 0.1 mg TID    dextrose 50% injection 12.5 g PRN    dextrose 50% injection 25 g PRN    glucagon (human recombinant) injection 1 mg PRN    glucose chewable tablet 16 g PRN    glucose chewable tablet 24 g PRN    heparin (porcine) injection 5,000 Units Q8H    insulin aspart U-100 pen 0-5 Units QID (AC + HS) PRN    insulin aspart U-100 pen 7 Units TIDWM    insulin detemir U-100 pen 10 Units BID    insulin detemir U-100 pen 3 Units Once    levETIRAcetam tablet 250 mg BID    losartan tablet 100 mg Daily    metoprolol tartrate (LOPRESSOR) tablet 50 mg BID    NIFEdipine 24 hr tablet 60 mg Daily    sodium chloride 0.9% flush 3 mL PRN       Objective:     Vital Signs (Most Recent):  Temp: 98.1 °F (36.7 °C) (06/05/18 0705)  Pulse: 68 (06/05/18 0705)  Resp: 14 (06/05/18 0705)  BP: (!) 174/76 (06/05/18 0705)  SpO2: (!) 94 % (06/05/18 0705)  O2 Device (Oxygen Therapy): room air (06/05/18 0705) Vital Signs (24h Range):  Temp:  [98.1 °F (36.7 °C)-98.4 °F (36.9 °C)] 98.1 °F (36.7 °C)  Pulse:  [66-79] 68  Resp:  [11-27] 14  SpO2:  [88 %-100 %] 94 %  BP: (130-197)/(66-90) 174/76     Weight: 65 kg (143 lb 4.8 oz) (06/04/18 1905)  Body mass index is 23.85 kg/m².  Body surface area is 1.73 meters squared.    I/O last 3 completed shifts:  In: 1406.2 [P.O.:786; I.V.:20.2;  Other:600]  Out: 1500 [Other:1500]    Physical Exam   Constitutional: She is oriented to person, place, and time. She appears well-developed. No distress.   HENT:   Head: Normocephalic and atraumatic.   Right Ear: External ear normal.   Left Ear: External ear normal.   Eyes: Conjunctivae and EOM are normal. Right eye exhibits no discharge. Left eye exhibits no discharge.   Cardiovascular: Normal rate and regular rhythm.  Exam reveals no gallop and no friction rub.    No murmur heard.  ADEN AVG   Pulmonary/Chest: Effort normal and breath sounds normal. No respiratory distress. She has no wheezes. She has no rales.   Abdominal: Soft. She exhibits no distension. There is no tenderness.   Musculoskeletal: She exhibits edema.   Neurological: She is alert and oriented to person, place, and time.   Skin: Skin is warm and dry. She is not diaphoretic.   Psychiatric: She has a normal mood and affect. Her behavior is normal.       Significant Labs:  CBC:   Recent Labs  Lab 06/05/18  0340   WBC 4.63   RBC 3.23*   HGB 9.1*   HCT 30.4*      MCV 94   MCH 28.2   MCHC 29.9*     CMP:   Recent Labs  Lab 06/05/18  0340   *   CALCIUM 7.5*   ALBUMIN 2.6*   PROT 5.5*      K 4.7   CO2 23      BUN 68*   CREATININE 5.6*   ALKPHOS 258*   ALT 15   AST 12   BILITOT 0.2

## 2018-06-05 NOTE — PROGRESS NOTES
[facility]  Nephrology  Progress Note    Patient Name: Eufemia Haq  MRN: [id number]  Admission Date: 6/2/2018  Hospital Length of Stay: 3 days  Attending Provider: Adam Pena MD   Primary Care Physician: No primary care provider on file.  Principal Problem:<principal problem not specified>    Subjective:     HPI: Eufemia Haq is a 53 year old female with past medical history of DMT1, recent CVA with residual behavior problems and ESRD on HD. She arrived after being found by  unresponsive. In ED has been presenting with hypertensive urgency with blood pressures greater than 220s of systolic and diastolic up to 110. Had a brain CT scan with probable new ischemic infarct in posterior parietal lobe. Chest x ray with evidence of pulmonary edema, on nasal canula 2 L O2 with saturation 100%. Nephrology was consulted for inpatient dialysis treatment.     Nephrology: iHD TTS, [facility] on [address]. Duration 4 hrs , UF 4 , Access is ADEN AVG,  EDW= 65 kg and has residual renal function.     Interval History:   NAEON.  Oxygenating well on RA with no complaints of SOB, electrolytes stable this morning.  Orders for Step-down per primary team.    Review of patient's allergies indicates:   Allergen Reactions    Ciprofloxacin (bulk) Itching    Latex Itching and Other (See Comments)     Very low Oxygen    Vancomycin Shortness Of Breath and Itching    Neurontin [gabapentin] Other (See Comments)     Seizure like activity    Niacin Itching and Other (See Comments)     Burning      Bactrim [sulfamethoxazole-trimethoprim] Rash     Current Facility-Administered Medications   Medication Frequency    0.9%  NaCl infusion PRN    cloNIDine tablet 0.1 mg TID    dextrose 50% injection 12.5 g PRN    dextrose 50% injection 25 g PRN    glucagon (human recombinant) injection 1 mg PRN    glucose chewable tablet 16 g PRN    glucose chewable tablet 24 g PRN    heparin (porcine) injection  5,000 Units Q8H    insulin aspart U-100 pen 0-5 Units QID (AC + HS) PRN    insulin aspart U-100 pen 7 Units TIDWM    insulin detemir U-100 pen 10 Units BID    insulin detemir U-100 pen 3 Units Once    levETIRAcetam tablet 250 mg BID    losartan tablet 100 mg Daily    metoprolol tartrate (LOPRESSOR) tablet 50 mg BID    NIFEdipine 24 hr tablet 60 mg Daily    sodium chloride 0.9% flush 3 mL PRN       Objective:     Vital Signs (Most Recent):  Temp: 98.1 °F (36.7 °C) (06/05/18 0705)  Pulse: 68 (06/05/18 0705)  Resp: 14 (06/05/18 0705)  BP: (!) 174/76 (06/05/18 0705)  SpO2: (!) 94 % (06/05/18 0705)  O2 Device (Oxygen Therapy): room air (06/05/18 0705) Vital Signs (24h Range):  Temp:  [98.1 °F (36.7 °C)-98.4 °F (36.9 °C)] 98.1 °F (36.7 °C)  Pulse:  [66-79] 68  Resp:  [11-27] 14  SpO2:  [88 %-100 %] 94 %  BP: (130-197)/(66-90) 174/76     Weight: 65 kg (143 lb 4.8 oz) (06/04/18 1905)  Body mass index is 23.85 kg/m².  Body surface area is 1.73 meters squared.    I/O last 3 completed shifts:  In: 1406.2 [P.O.:786; I.V.:20.2; Other:600]  Out: 1500 [Other:1500]    Physical Exam   Constitutional: She is oriented to person, place, and time. She appears well-developed. No distress.   HENT:   Head: Normocephalic and atraumatic.   Right Ear: External ear normal.   Left Ear: External ear normal.   Eyes: Conjunctivae and EOM are normal. Right eye exhibits no discharge. Left eye exhibits no discharge.   Cardiovascular: Normal rate and regular rhythm.  Exam reveals no gallop and no friction rub.    No murmur heard.  ADEN AVG   Pulmonary/Chest: Effort normal and breath sounds normal. No respiratory distress. She has no wheezes. She has no rales.   Abdominal: Soft. She exhibits no distension. There is no tenderness.   Musculoskeletal: She exhibits edema.   Neurological: She is alert and oriented to person, place, and time.   Skin: Skin is warm and dry. She is not diaphoretic.   Psychiatric: She has a normal mood and affect. Her  behavior is normal.       Significant Labs:  CBC:   Recent Labs  Lab 06/05/18  0340   WBC 4.63   RBC 3.23*   HGB 9.1*   HCT 30.4*      MCV 94   MCH 28.2   MCHC 29.9*     CMP:   Recent Labs  Lab 06/05/18  0340   *   CALCIUM 7.5*   ALBUMIN 2.6*   PROT 5.5*      K 4.7   CO2 23      BUN 68*   CREATININE 5.6*   ALKPHOS 258*   ALT 15   AST 12   BILITOT 0.2            Assessment/Plan:     ESRD on dialysis    Eufemia Haq is a 53 year old female with ESRD on HD (TTS) admitted for ischemic posterior lobe ischemic stroke and hypertensive urgency. Nephrology consulted for inpatient dialysis treatment.     Nephrology: [facility] TTS, [facility] [address] [facility]. Access is ADEN AVG. EDW= Unknown.     Plan:  Will provide 3 hour HD treatment today for metabolic clearance/volume management.  UF 2L as tolerated.      BMM  Renal diet  Renvela 1600 mg PO TID with meals  Phos level in AM              Aman Barriga NP  Nephrology  [facility]  Pager:  [phone]

## 2018-06-05 NOTE — ASSESSMENT & PLAN NOTE
-may be contributing to encephalopathy  -continue home BP meds of losartan 100 mg, metoprolol 50 mg and nifedipine 60 mg dialy  -SBP <180

## 2018-06-05 NOTE — ASSESSMENT & PLAN NOTE
Avoid hypoglycemia and insulin stacking.   Lab Results   Component Value Date    CREATININE 5.6 (H) 06/05/2018     HD.

## 2018-06-05 NOTE — PROGRESS NOTES
Patient was dialyzed for 3hrs with 2.5L fluid removal. No issues noted. Patient tolerated the treatment well. Needles removed and hemostasis achieved. Endorsed to the primary nurse Raegan

## 2018-06-05 NOTE — PROGRESS NOTES
"[facility]  Endocrinology  Progress Note    Admit Date: 2018     Reason for Consult: Management of T1DM, Hyperglycemia     Diabetes diagnosis year: age 26     Home Diabetes Medications:    levemir 7 units BID  novolog 5 ac   sliding scale up to 15 units with meals    How often checking glucose at home? 8-10x per day  BG readings on regimen: 's  Hypoglycemia on the regimen?  Yes  Missed doses on regimen?  yes    Diabetes Complications include:     Hyperglycemia, Diabetic chronic kidney disease     , Diabetic retinopathy , Diabetic peripheral neuropathy  and Diabetic gastroparesis      Complicating diabetes co morbidities:   History of CVA, Residual deficits from CVA  and ESRD       HPI:   Patient is a 52 y.o. female with a diagnosis of dm type 1, esrd, recent cva admitted for worsening encephalopathy and left sided preference. Found to have hyperglycemia on admission with bg in mid 300s. Started on home dose insulin regimen with subsequent severe hyperglycemia yesterday at 658.  She was started on insulin gtt.  Endocrine consulted for dm type 1 management as patient being stepped down to floor.    Interval HPI:   Overnight events: remains in NSU, awaiting step down bed. BG globally elevated with basal and prandial coverage; requiring correction scale. HD today per nephrology.   Eatin%  Nausea: No  Hypoglycemia and intervention: No  Fever: No  TPN and/or TF: No    BP (!) 174/76 (BP Location: Right arm, Patient Position: Lying)   Pulse 68   Temp 98.1 °F (36.7 °C) (Oral)   Resp 14   Ht 5' 5" (1.651 m)   Wt 65 kg (143 lb 4.8 oz)   LMP 2000 (Approximate)   SpO2 (!) 94%   Breastfeeding? No   BMI 23.85 kg/m²       Labs Reviewed and Include      Recent Labs  Lab 18  0340   *   CALCIUM 7.5*   ALBUMIN 2.6*   PROT 5.5*      K 4.7   CO2 23      BUN 68*   CREATININE 5.6*   ALKPHOS 258*   ALT 15   AST 12   BILITOT 0.2     Lab Results   Component Value " Date    WBC 4.63 06/05/2018    HGB 9.1 (L) 06/05/2018    HCT 30.4 (L) 06/05/2018    MCV 94 06/05/2018     06/05/2018       Recent Labs  Lab 06/02/18  1118   TSH 3.932     Lab Results   Component Value Date    HGBA1C 9.5 (H) 06/02/2018       Nutritional status:   Body mass index is 23.85 kg/m².  Lab Results   Component Value Date    ALBUMIN 2.6 (L) 06/05/2018    ALBUMIN 2.7 (L) 06/04/2018    ALBUMIN 2.8 (L) 06/03/2018     No results found for: PREALBUMIN    Estimated Creatinine Clearance: 10.5 mL/min (A) (based on SCr of 5.6 mg/dL (H)).    Accu-Checks  Recent Labs      06/04/18   0909  06/04/18   1009  06/04/18   1114  06/04/18   1214  06/04/18   1303  06/04/18   1703  06/04/18   2022  06/04/18   2259  06/05/18   0141  06/05/18   0815   POCTGLUCOSE  242*  239*  228*  268*  193*  448*  393*  298*  259*  351*       Hyperglycemia/Diabetes Medications: Antihyperglycemics     Start     Stop Route Frequency Ordered    06/05/18 2100  insulin detemir U-100 pen 7 Units      -- SubQ 2 times daily 06/05/18 0941    06/05/18 1130  insulin aspart U-100 pen 5 Units      -- SubQ 3 times daily with meals 06/05/18 0941          06/05/18 0941    06/04/18 1306  insulin aspart U-100 pen 0-5 Units      -- SubQ Before meals & nightly PRN 06/04/18 1207          ASSESSMENT and PLAN    * Encephalopathy      Per primary          Hyperglycemia due to type 1 diabetes mellitus    BG goal 140-180 while inpatient.      Increase Levemir 10 units BID, first dose this AM  Increase Novolog 7 units with meals   Continue bg monitoring ac/hs and low dose correction scale.     Discharge:  TBD.  Likely intensification of regimen given elevated A1C on admit.         ESRD on dialysis      Avoid hypoglycemia and insulin stacking.   Lab Results   Component Value Date    CREATININE 5.6 (H) 06/05/2018     HD.         Essential hypertension      Per primary.  If uncontrolled may increase insulin resistance.         Cerebrovascular accident (CVA)    Per  primary.    Optimize bg control.             Saray Meza NP  Endocrinology  [facility]

## 2018-06-05 NOTE — NURSING
Pt transferred to unit from ICU, oriented to room, able to make needs known, VS stable, no distress noted, will continue to monitor.

## 2018-06-05 NOTE — ASSESSMENT & PLAN NOTE
-Patient with PMHx of Seizures presented to [facility] with left sided gaze and decreased responsiveness  -Pt with elevated BP that could be contributing to encephalopathy  -No gaze preference noted on exam in ED  -Will order EEG to rule out seizure  -Patient s/p Keppra 1 gram IV load  -Continue keppra 250 BID  -Stepdown to Hospital Medicine

## 2018-06-05 NOTE — ASSESSMENT & PLAN NOTE
BG goal 140-180 while inpatient.      Increase Levemir 10 units BID, first dose this AM  Increase Novolog 7 units with meals   Continue bg monitoring ac/hs and low dose correction scale.     Discharge:  TBD.  Likely intensification of regimen given elevated A1C on admit.

## 2018-06-05 NOTE — SUBJECTIVE & OBJECTIVE
Review of Systems    Review of symptoms  Constitutional: Denies fevers or chills.  Pulmonary: Denies shortness of breath or cough.  Cardiology: Denies chest pain or palpitations.  GI: Denies abdominal pain or constipation.  Neurologic: Denies new weakness,  headache, or paresthesias.  Objective:     Vitals:  Temp: 98.6 °F (37 °C)  Pulse: 67  Rhythm: normal sinus rhythm  BP: (!) 152/67  MAP (mmHg): 104  Resp: (!) 22  SpO2: 99 %  O2 Device (Oxygen Therapy): room air    Temp  Min: 98.1 °F (36.7 °C)  Max: 98.6 °F (37 °C)  Pulse  Min: 60  Max: 76  BP  Min: 126/58  Max: 183/77  MAP (mmHg)  Min: 83  Max: 132  Resp  Min: 11  Max: 27  SpO2  Min: 88 %  Max: 99 %    06/04 0701 - 06/05 0700  In: 794.8 [P.O.:786; I.V.:8.8]  Out: -    Unmeasured Output  Urine Occurrence: 1  Stool Occurrence: 1  Pad Count: 1       Physical Exam    Physical Exam:  GA: Alert, comfortable, no acute distress.   HEENT: No scleral icterus or JVD. blind  Pulmonary: Clear to auscultation A/L. No wheezing, crackles, or rhonchi.  Cardiac: RRR S1 & S2 w/o rubs/murmurs/gallops.   Abdominal: Bowel sounds present x 4. No appreciable hepatosplenomegaly.  Skin: No jaundice, rashes, or visible lesions.  Neuro:  --GCS: E4 V5 M6  --Mental Status:  Awake,alert, oriented X3, follows all commands, speech clear  --CN II-XII grossly intact.   --Pupils 3mm, PERRL.   --Corneal reflex, gag, cough intact.  --LUE strength: 5/5  --RUE strength: 5/5  --LLE strength: 5/5  --RLE strength: 5/5      Medications:  Continuous Scheduled  cloNIDine 0.1 mg TID   heparin (porcine) 5,000 Units Q8H   insulin aspart U-100 7 Units TIDWM   insulin detemir U-100 10 Units BID   insulin detemir U-100 3 Units Once   levETIRAcetam 250 mg BID   losartan 100 mg Daily   metoprolol tartrate 50 mg BID   NIFEdipine 60 mg Daily   sevelamer carbonate 1,600 mg TID WM   PRN  sodium chloride 0.9%  PRN   dextrose 50% 12.5 g PRN   dextrose 50% 25 g PRN   glucagon (human recombinant) 1 mg PRN   glucose 16 g  PRN   glucose 24 g PRN   insulin aspart U-100 0-5 Units QID (AC + HS) PRN   sodium chloride 0.9% 3 mL PRN     Today I personally reviewed pertinent medications, lines/drains/airways, imaging, lab results, microbiology results,

## 2018-06-05 NOTE — ASSESSMENT & PLAN NOTE
Eufemia Haq is a 53 year old female with ESRD on HD (TTS) admitted for ischemic posterior lobe ischemic stroke and hypertensive urgency. Nephrology consulted for inpatient dialysis treatment.     Nephrology: [facility]HD TTS, [facility] on [address]. Access is ADEN AVG. EDW= Unknown.     Plan:  Will provide 3 hour HD treatment today for metabolic clearance/volume management.  UF 2L as tolerated.      BMM  Renal diet  Renvela 1600 mg PO TID with meals  Phos level in AM

## 2018-06-05 NOTE — SUBJECTIVE & OBJECTIVE
"Interval HPI:   Overnight events: remains in NSU, awaiting step down bed. BG globally elevated with basal and prandial coverage; requiring correction scale. HD today per nephrology.   Eatin%  Nausea: No  Hypoglycemia and intervention: No  Fever: No  TPN and/or TF: No    BP (!) 174/76 (BP Location: Right arm, Patient Position: Lying)   Pulse 68   Temp 98.1 °F (36.7 °C) (Oral)   Resp 14   Ht 5' 5" (1.651 m)   Wt 65 kg (143 lb 4.8 oz)   LMP 2000 (Approximate)   SpO2 (!) 94%   Breastfeeding? No   BMI 23.85 kg/m²     Labs Reviewed and Include      Recent Labs  Lab 18  0340   *   CALCIUM 7.5*   ALBUMIN 2.6*   PROT 5.5*      K 4.7   CO2 23      BUN 68*   CREATININE 5.6*   ALKPHOS 258*   ALT 15   AST 12   BILITOT 0.2     Lab Results   Component Value Date    WBC 4.63 2018    HGB 9.1 (L) 2018    HCT 30.4 (L) 2018    MCV 94 2018     2018       Recent Labs  Lab 18  1118   TSH 3.932     Lab Results   Component Value Date    HGBA1C 9.5 (H) 2018       Nutritional status:   Body mass index is 23.85 kg/m².  Lab Results   Component Value Date    ALBUMIN 2.6 (L) 2018    ALBUMIN 2.7 (L) 2018    ALBUMIN 2.8 (L) 2018     No results found for: PREALBUMIN    Estimated Creatinine Clearance: 10.5 mL/min (A) (based on SCr of 5.6 mg/dL (H)).    Accu-Checks  Recent Labs      18   0909  18   1009  18   1114  18   1214  18   1303  18   1703  18   2022  18   2259  18   0141  18   0815   POCTGLUCOSE  242*  239*  228*  268*  193*  448*  393*  298*  259*  351*       Hyperglycemia/Diabetes Medications: Antihyperglycemics     Start     Stop Route Frequency Ordered    18 2100  insulin detemir U-100 pen 7 Units      -- SubQ 2 times daily 18 0941    18 1130  insulin aspart U-100 pen 5 Units      -- SubQ 3 times daily with meals 18 0941          18 0941 "    06/04/18 1306  insulin aspart U-100 pen 0-5 Units      -- SubQ Before meals & nightly PRN 06/04/18 1207

## 2018-06-05 NOTE — PLAN OF CARE
ICU Attending Note  Neurocritical Care    [id number]    -levetiracetam  --200  -antihypertensives  -HD per Nephrology  -aspart, detemir, ISS per Endocrine  -heparin prophylaxis  -coordinate transfer to floor with Stroke

## 2018-06-05 NOTE — PROGRESS NOTES
Received patient awake, alert and coherent. On room air, not in labored respirations. AVG left upper arm with good bruit and thrill, hooked to HD aseptically 3hrs x 2L, with BP precautions. Will monitor accordingly.

## 2018-06-05 NOTE — PLAN OF CARE
POC reviewed with pt and family at 0400. Pt verbalized understanding. Questions and concerns addressed. No acute events over night. Pt progressing toward goals. Awaiting bed on stepdown. Will continue to monitor. See flowsheets for full assessment and VS info.

## 2018-06-06 PROBLEM — J81.0 ACUTE PULMONARY EDEMA: Status: RESOLVED | Noted: 2018-06-05 | Resolved: 2018-06-06

## 2018-06-06 LAB
ALBUMIN SERPL BCP-MCNC: 2.5 G/DL
ALP SERPL-CCNC: 246 U/L
ALT SERPL W/O P-5'-P-CCNC: 13 U/L
ANION GAP SERPL CALC-SCNC: 9 MMOL/L
AST SERPL-CCNC: 11 U/L
BASOPHILS # BLD AUTO: 0.03 K/UL
BASOPHILS NFR BLD: 0.6 %
BILIRUB SERPL-MCNC: 0.2 MG/DL
BUN SERPL-MCNC: 43 MG/DL
CALCIUM SERPL-MCNC: 7.8 MG/DL
CHLORIDE SERPL-SCNC: 104 MMOL/L
CO2 SERPL-SCNC: 25 MMOL/L
CREAT SERPL-MCNC: 3.9 MG/DL
DIFFERENTIAL METHOD: ABNORMAL
EOSINOPHIL # BLD AUTO: 0.2 K/UL
EOSINOPHIL NFR BLD: 4.7 %
ERYTHROCYTE [DISTWIDTH] IN BLOOD BY AUTOMATED COUNT: 15.4 %
EST. GFR  (AFRICAN AMERICAN): 14.3 ML/MIN/1.73 M^2
EST. GFR  (NON AFRICAN AMERICAN): 12.4 ML/MIN/1.73 M^2
GLUCOSE SERPL-MCNC: 136 MG/DL
HCT VFR BLD AUTO: 31.4 %
HGB BLD-MCNC: 9.4 G/DL
IMM GRANULOCYTES # BLD AUTO: 0.03 K/UL
IMM GRANULOCYTES NFR BLD AUTO: 0.6 %
LYMPHOCYTES # BLD AUTO: 0.9 K/UL
LYMPHOCYTES NFR BLD: 20 %
MCH RBC QN AUTO: 27.8 PG
MCHC RBC AUTO-ENTMCNC: 29.9 G/DL
MCV RBC AUTO: 93 FL
MONOCYTES # BLD AUTO: 0.5 K/UL
MONOCYTES NFR BLD: 10.1 %
NEUTROPHILS # BLD AUTO: 3 K/UL
NEUTROPHILS NFR BLD: 64 %
NRBC BLD-RTO: 0 /100 WBC
PLATELET # BLD AUTO: 145 K/UL
PMV BLD AUTO: 10.3 FL
POCT GLUCOSE: 114 MG/DL (ref 70–110)
POCT GLUCOSE: 154 MG/DL (ref 70–110)
POCT GLUCOSE: 178 MG/DL (ref 70–110)
POCT GLUCOSE: 183 MG/DL (ref 70–110)
POCT GLUCOSE: 228 MG/DL (ref 70–110)
POCT GLUCOSE: 232 MG/DL (ref 70–110)
POCT GLUCOSE: 53 MG/DL (ref 70–110)
POCT GLUCOSE: 56 MG/DL (ref 70–110)
POCT GLUCOSE: 57 MG/DL (ref 70–110)
POCT GLUCOSE: 69 MG/DL (ref 70–110)
POTASSIUM SERPL-SCNC: 4.2 MMOL/L
PROT SERPL-MCNC: 5.4 G/DL
RBC # BLD AUTO: 3.38 M/UL
SODIUM SERPL-SCNC: 138 MMOL/L
WBC # BLD AUTO: 4.65 K/UL

## 2018-06-06 PROCEDURE — 25000003 PHARM REV CODE 250: Performed by: NURSE PRACTITIONER

## 2018-06-06 PROCEDURE — 85025 COMPLETE CBC W/AUTO DIFF WBC: CPT

## 2018-06-06 PROCEDURE — 25000003 PHARM REV CODE 250: Performed by: HOSPITALIST

## 2018-06-06 PROCEDURE — 25000003 PHARM REV CODE 250: Performed by: INTERNAL MEDICINE

## 2018-06-06 PROCEDURE — 99232 SBSQ HOSP IP/OBS MODERATE 35: CPT | Mod: ,,, | Performed by: NURSE PRACTITIONER

## 2018-06-06 PROCEDURE — 11000001 HC ACUTE MED/SURG PRIVATE ROOM

## 2018-06-06 PROCEDURE — 80053 COMPREHEN METABOLIC PANEL: CPT

## 2018-06-06 PROCEDURE — 25000003 PHARM REV CODE 250: Performed by: EMERGENCY MEDICINE

## 2018-06-06 PROCEDURE — 36415 COLL VENOUS BLD VENIPUNCTURE: CPT

## 2018-06-06 PROCEDURE — 99232 SBSQ HOSP IP/OBS MODERATE 35: CPT | Mod: ,,, | Performed by: HOSPITALIST

## 2018-06-06 PROCEDURE — 63600175 PHARM REV CODE 636 W HCPCS: Performed by: NURSE PRACTITIONER

## 2018-06-06 RX ORDER — ACETAMINOPHEN 325 MG/1
650 TABLET ORAL EVERY 4 HOURS PRN
Status: DISCONTINUED | OUTPATIENT
Start: 2018-06-06 | End: 2018-06-08 | Stop reason: HOSPADM

## 2018-06-06 RX ORDER — SODIUM CHLORIDE 9 MG/ML
INJECTION, SOLUTION INTRAVENOUS
Status: DISCONTINUED | OUTPATIENT
Start: 2018-06-06 | End: 2018-06-08 | Stop reason: HOSPADM

## 2018-06-06 RX ORDER — ACETAMINOPHEN 325 MG/1
650 TABLET ORAL EVERY 4 HOURS PRN
Status: DISCONTINUED | OUTPATIENT
Start: 2018-06-06 | End: 2018-06-06

## 2018-06-06 RX ORDER — CARVEDILOL 6.25 MG/1
6.25 TABLET ORAL 2 TIMES DAILY
Status: DISCONTINUED | OUTPATIENT
Start: 2018-06-06 | End: 2018-06-08 | Stop reason: HOSPADM

## 2018-06-06 RX ORDER — SODIUM CHLORIDE 9 MG/ML
INJECTION, SOLUTION INTRAVENOUS ONCE
Status: DISCONTINUED | OUTPATIENT
Start: 2018-06-06 | End: 2018-06-08 | Stop reason: HOSPADM

## 2018-06-06 RX ORDER — INSULIN ASPART 100 [IU]/ML
5 INJECTION, SOLUTION INTRAVENOUS; SUBCUTANEOUS
Status: DISCONTINUED | OUTPATIENT
Start: 2018-06-06 | End: 2018-06-08 | Stop reason: HOSPADM

## 2018-06-06 RX ADMIN — HEPARIN SODIUM 5000 UNITS: 5000 INJECTION, SOLUTION INTRAVENOUS; SUBCUTANEOUS at 09:06

## 2018-06-06 RX ADMIN — NIFEDIPINE 60 MG: 30 TABLET, FILM COATED, EXTENDED RELEASE ORAL at 09:06

## 2018-06-06 RX ADMIN — Medication 16 G: at 12:06

## 2018-06-06 RX ADMIN — SEVELAMER CARBONATE 1600 MG: 800 TABLET, FILM COATED ORAL at 09:06

## 2018-06-06 RX ADMIN — CARVEDILOL 6.25 MG: 6.25 TABLET, FILM COATED ORAL at 09:06

## 2018-06-06 RX ADMIN — ACETAMINOPHEN 650 MG: 325 TABLET ORAL at 09:06

## 2018-06-06 RX ADMIN — INSULIN DETEMIR 7 UNITS: 100 INJECTION, SOLUTION SUBCUTANEOUS at 09:06

## 2018-06-06 RX ADMIN — LOSARTAN POTASSIUM 100 MG: 50 TABLET, FILM COATED ORAL at 09:06

## 2018-06-06 RX ADMIN — LEVETIRACETAM 250 MG: 250 TABLET, FILM COATED ORAL at 09:06

## 2018-06-06 RX ADMIN — SEVELAMER CARBONATE 1600 MG: 800 TABLET, FILM COATED ORAL at 12:06

## 2018-06-06 RX ADMIN — SEVELAMER CARBONATE 1600 MG: 800 TABLET, FILM COATED ORAL at 05:06

## 2018-06-06 RX ADMIN — INSULIN ASPART 2 UNITS: 100 INJECTION, SOLUTION INTRAVENOUS; SUBCUTANEOUS at 05:06

## 2018-06-06 RX ADMIN — INSULIN ASPART 1 UNITS: 100 INJECTION, SOLUTION INTRAVENOUS; SUBCUTANEOUS at 09:06

## 2018-06-06 RX ADMIN — INSULIN ASPART 5 UNITS: 100 INJECTION, SOLUTION INTRAVENOUS; SUBCUTANEOUS at 05:06

## 2018-06-06 RX ADMIN — ACETAMINOPHEN 650 MG: 325 TABLET ORAL at 03:06

## 2018-06-06 RX ADMIN — HEPARIN SODIUM 5000 UNITS: 5000 INJECTION, SOLUTION INTRAVENOUS; SUBCUTANEOUS at 05:06

## 2018-06-06 RX ADMIN — INSULIN DETEMIR 10 UNITS: 100 INJECTION, SOLUTION SUBCUTANEOUS at 09:06

## 2018-06-06 RX ADMIN — CLONIDINE HYDROCHLORIDE 0.1 MG: 0.1 TABLET ORAL at 02:06

## 2018-06-06 RX ADMIN — METOPROLOL TARTRATE 50 MG: 50 TABLET ORAL at 09:06

## 2018-06-06 RX ADMIN — CLONIDINE HYDROCHLORIDE 0.1 MG: 0.1 TABLET ORAL at 09:06

## 2018-06-06 RX ADMIN — HEPARIN SODIUM 5000 UNITS: 5000 INJECTION, SOLUTION INTRAVENOUS; SUBCUTANEOUS at 02:06

## 2018-06-06 RX ADMIN — INSULIN ASPART 7 UNITS: 100 INJECTION, SOLUTION INTRAVENOUS; SUBCUTANEOUS at 09:06

## 2018-06-06 NOTE — PROGRESS NOTES
"[facility] Hospital Medicine  Progress Note    Patient Name: Eufemia Haq  MRN: [id number]  Patient Class: IP- Inpatient   Admission Date: 6/2/2018  Length of Stay: 4 days  Attending Physician: Ema Fischer*  Primary Care Provider: No primary care provider on file.    Hospital Medicine Team: [facility]OS[facility] MED A Ema Fischer MD    Subjective:     Principal Problem:Encephalopathy    HPI:  Ms. Haq is a 53 year old Female with PMHx of Diabetes, HTN, HLD, ESRD (on HD T,Th,Sat) 2 prior strokes and seizures who presented to [facility] after she was found by with left sided gaze preference. Per notes, patient's LKN was last night. The patient's  went in her room to take her to dialysis this morning. At that time, she was slow to respond and had left sided gaze preference. Patient will be admitted to [facility] for higher level of care.    Hospital Course:  6/2/18: Patient presented to [facility] with left sided gaze preference and decreased responsiveness; admitted to [facility] for higher level of care  6/3: improved mental status, off nicardipine, insulin gtt  6/4: hyperglycemia insulin gtt restarted. ESRD on HD  6/5: stepdown to hospital medicine, HD today, Endocrine following for DM management    Interval History: Alert, awake, oriented this am. Feeling well. No complaints. Concerned about "what will happen if I have a seizure when I'm alone".     Review of Systems   Constitutional: Negative for activity change, appetite change, chills, diaphoresis, fatigue, fever and unexpected weight change.   Eyes: Negative for photophobia and visual disturbance.   Respiratory: Negative for apnea, cough, choking, shortness of breath, wheezing and stridor.    Cardiovascular: Negative for chest pain, palpitations and leg swelling.   Gastrointestinal: Negative for abdominal pain, blood in stool, constipation, diarrhea, nausea and vomiting.   Endocrine: Negative for cold intolerance and heat intolerance. "   Genitourinary: Negative for difficulty urinating.   Skin: Negative for rash and wound.   Allergic/Immunologic: Negative for immunocompromised state.   Neurological: Negative for dizziness, weakness and headaches.     Objective:     Vital Signs (Most Recent):  Temp: 98.4 °F (36.9 °C) (06/06/18 1149)  Pulse: 64 (06/06/18 1149)  Resp: 18 (06/06/18 1149)  BP: (!) 167/81 (06/06/18 1149)  SpO2: (!) 91 % (06/06/18 1149) Vital Signs (24h Range):  Temp:  [97.2 °F (36.2 °C)-98.6 °F (37 °C)] 98.4 °F (36.9 °C)  Pulse:  [61-69] 64  Resp:  [11-22] 18  SpO2:  [91 %-99 %] 91 %  BP: (126-190)/(58-85) 167/81     Weight: 65 kg (143 lb 4.8 oz)  Body mass index is 23.85 kg/m².    Intake/Output Summary (Last 24 hours) at 06/06/18 1517  Last data filed at 06/05/18 1800   Gross per 24 hour   Intake              600 ml   Output             3400 ml   Net            -2800 ml      Physical Exam   Constitutional: She is oriented to person, place, and time. She appears well-developed. No distress.   HENT:   Head: Normocephalic and atraumatic.   Right Ear: External ear normal.   Left Ear: External ear normal.   Eyes: Conjunctivae and EOM are normal. Right eye exhibits no discharge. Left eye exhibits no discharge.   Cardiovascular: Normal rate and regular rhythm.  Exam reveals no gallop and no friction rub.    No murmur heard.  LUE AVG   Pulmonary/Chest: Effort normal and breath sounds normal. No respiratory distress. She has no wheezes. She has no rales.   Abdominal: Soft. She exhibits no distension. There is no tenderness.   Musculoskeletal: She exhibits edema.   Neurological: She is alert and oriented to person, place, and time.   Skin: Skin is warm and dry. She is not diaphoretic.   Psychiatric: She has a normal mood and affect. Her behavior is normal.       MELD-Na score: 22 at 6/4/2018  3:16 AM  MELD score: 20 at 6/4/2018  3:16 AM  Calculated from:  Serum Creatinine: 4.8 mg/dL (Rounded to 4) at 6/4/2018  3:16 AM  Serum Sodium: 134 mmol/L  at 6/4/2018  3:16 AM  Total Bilirubin: 0.2 mg/dL (Rounded to 1) at 6/4/2018  3:16 AM  INR(ratio): 0.9 (Rounded to 1) at 6/2/2018 10:21 PM  Age: 53 years    Significant Labs:  CBC:    Recent Labs  Lab 06/05/18  0340 06/06/18  0407   WBC 4.63 4.65   HGB 9.1* 9.4*   HCT 30.4* 31.4*    145*     CMP:    Recent Labs  Lab 06/05/18  0340 06/06/18  0407    138   K 4.7 4.2    104   CO2 23 25   * 136*   BUN 68* 43*   CREATININE 5.6* 3.9*   CALCIUM 7.5* 7.8*   PROT 5.5* 5.4*   ALBUMIN 2.6* 2.5*   BILITOT 0.2 0.2   ALKPHOS 258* 246*   AST 12 11   ALT 15 13   ANIONGAP 12 9   EGFRNONAA 8.0* 12.4*     Assessment/Plan:      * Encephalopathy    Patient with PMHx of Seizures presented to [facility] with left sided gaze and decreased responsiveness  -Pt with elevated BP that could be contributing to encephalopathy  -Patient s/p Keppra 1 gram IV load  -Continue keppra 250 BID  -BP control          Cerebrovascular accident (CVA)    Continue secondary prevention measures: ASA, crestor, BP control, smoking cessation   PT/OT          Seizure disorder    -Patient s/p 1gram Keppra IV load   -Continue patient's home AED of Keppra 250 mg BID; adjusted for renal dosing  -EEG ordered        Essential hypertension    Continue Losartan, Procardia, change lopressor to Coreg             VTE Risk Mitigation         Ordered     heparin (porcine) injection 5,000 Units  Every 8 hours      06/03/18 1121     IP VTE LOW RISK PATIENT  Once      06/02/18 1517     Place sequential compression device  Until discontinued      06/02/18 1517     Place CARLOS hose  Until discontinued      06/02/18 1517              Ema Fischer MD  Department of Hospital Medicine   [facility]

## 2018-06-06 NOTE — ASSESSMENT & PLAN NOTE
BG goal 140-180 while inpatient.      Continue Levemir 9 units daily and increase to Levemir 8 units q HS.   Continue Novolog 5 units with meals.   Continue bg monitoring ac/hs and low dose correction scale.     Patient refuses insulin with BG <200 for fear of hypoglycemia.         Discharge:  Patient verbalizes that her body does not respond the same way to food and insulin as typical patient with DM. Patient wishes to resume home regimen which is as follows.Toujeo 15 units daily. Resume Humalog 5 units with meals and correction scale.    Patient recently moved back to [place] and is in need to endocrine provider. Patient is agreeable to DE education for initial hospital follow up and to seeing DM NP. Will schedule for DE education Monday or Tuesday of next week; would benefit from CGM and from education in management/available devices to help manage DM. Hospital f/u with endocrine; will re-schedule with either NEETA Wells NP or KATT Pickard NP because they specialize in DM and see most of the type 1 DM patients in clinic.

## 2018-06-06 NOTE — ASSESSMENT & PLAN NOTE
Patient with PMHx of Seizures presented to [facility] with left sided gaze and decreased responsiveness  -Pt with elevated BP that could be contributing to encephalopathy  -Patient s/p Keppra 1 gram IV load  -Continue keppra 250 BID  -BP control

## 2018-06-06 NOTE — ASSESSMENT & PLAN NOTE
-Patient s/p 1gram Keppra IV load   -Continue patient's home AED of Keppra 250 mg BID; adjusted for renal dosing  -EEG ordered

## 2018-06-06 NOTE — PLAN OF CARE
Problem: Patient Care Overview  Goal: Plan of Care Review  Outcome: Ongoing (interventions implemented as appropriate)  Pt remains free of falls and injury. Pt provided with PRN analgesic for GENI pain with positive results. Bed low and locked, Call light within reach.

## 2018-06-06 NOTE — ASSESSMENT & PLAN NOTE
BG goal 140-180 while inpatient.    FBG at goal but only received 17 units of Levermir yesterday.   Decrease Levemir 8 units BID.  Decrease Novolog 5 units with meals   Continue bg monitoring ac/hs and low dose correction scale.     Discharge:  TBD.  Needs DE education in 1-2 weeks; would benefit from CGM. Hospital f/u with endocrine in 2-4 weeks.

## 2018-06-06 NOTE — PROGRESS NOTES
"[facility]  Endocrinology  Progress Note    Admit Date: 2018     Reason for Consult: Management of T1DM, Hyperglycemia     Diabetes diagnosis year: age 26     Home Diabetes Medications:    levemir 7 units BID  novolog 5 ac   sliding scale up to 15 units with meals    How often checking glucose at home? 8-10x per day  BG readings on regimen: 's  Hypoglycemia on the regimen?  Yes  Missed doses on regimen?  yes    Diabetes Complications include:     Hyperglycemia, Diabetic chronic kidney disease     , Diabetic retinopathy , Diabetic peripheral neuropathy  and Diabetic gastroparesis      Complicating diabetes co morbidities:   History of CVA, Residual deficits from CVA  and ESRD       HPI:   Patient is a 52 y.o. female with a diagnosis of dm type 1, esrd, recent cva admitted for worsening encephalopathy and left sided preference. Found to have hyperglycemia on admission with bg in mid 300s. Started on home dose insulin regimen with subsequent severe hyperglycemia yesterday at 658.  She was started on insulin gtt.  Endocrine consulted for dm type 1 management as patient being stepped down to floor.    Interval HPI:   Overnight events: transferred to step down floor from ICU. HD Tuesday, Thursday, Saturday per nephrology. BG elevated yesterday evening but trending down on increased doses of insulin. C/o pain.   Eatin%  Nausea: No  Hypoglycemia and intervention: Yes, 53 at lunch; oral dextrose given   Fever: No  TPN and/or TF: No    BP (!) 167/74 (BP Location: Right arm, Patient Position: Lying)   Pulse 65   Temp 98.6 °F (37 °C) (Oral)   Resp 20   Ht 5' 5" (1.651 m)   Wt 65 kg (143 lb 4.8 oz)   LMP 2000 (Approximate)   SpO2 (!) 93%   Breastfeeding? No   BMI 23.85 kg/m²       Labs Reviewed and Include      Recent Labs  Lab 18  0407   *   CALCIUM 7.8*   ALBUMIN 2.5*   PROT 5.4*      K 4.2   CO2 25      BUN 43*   CREATININE 3.9*   ALKPHOS 246*   ALT " 13   AST 11   BILITOT 0.2     Lab Results   Component Value Date    WBC 4.65 06/06/2018    HGB 9.4 (L) 06/06/2018    HCT 31.4 (L) 06/06/2018    MCV 93 06/06/2018     (L) 06/06/2018       Recent Labs  Lab 06/02/18  1118   TSH 3.932     Lab Results   Component Value Date    HGBA1C 9.5 (H) 06/02/2018       Nutritional status:   Body mass index is 23.85 kg/m².  Lab Results   Component Value Date    ALBUMIN 2.5 (L) 06/06/2018    ALBUMIN 2.6 (L) 06/05/2018    ALBUMIN 2.7 (L) 06/04/2018     No results found for: PREALBUMIN    Estimated Creatinine Clearance: 15 mL/min (A) (based on SCr of 3.9 mg/dL (H)).    Accu-Checks  Recent Labs      06/04/18   1703  06/04/18   2022  06/04/18   2259  06/05/18   0141  06/05/18   0815  06/05/18   1339  06/05/18   1641  06/05/18   1643  06/05/18   2136  06/06/18   0253   POCTGLUCOSE  448*  393*  298*  259*  351*  247*  95  117*  245*  154*       Hyperglycemia/Diabetes Medications:   Antihyperglycemics     Start     Stop Route Frequency Ordered    06/05/18 2100  insulin detemir U-100 pen 10 Units      -- SubQ 2 times daily 06/05/18 0941    06/05/18 1130  insulin aspart U-100 pen 7 Units      -- SubQ 3 times daily with meals 06/05/18 0941    06/05/18 0941  insulin detemir U-100 pen 3 Units      -- SubQ Once 06/05/18 0941    06/04/18 1306  insulin aspart U-100 pen 0-5 Units      -- SubQ Before meals & nightly PRN 06/04/18 1207          ASSESSMENT and PLAN    * Encephalopathy      Per primary          Type 1 diabetes mellitus    BG goal 140-180 while inpatient.    FBG at goal but only received 17 units of Levermir yesterday.   Decrease Levemir 8 units BID.  Decrease Novolog 5 units with meals   Continue bg monitoring ac/hs and low dose correction scale.     Discharge:  TBD.  Needs DE education in 1-2 weeks; would benefit from CGM. Hospital f/u with endocrine in 2-4 weeks.         ESRD on dialysis    Managed per nephrology.     Avoid hypoglycemia and insulin stacking.   Lab Results    Component Value Date    CREATININE 3.9 (H) 06/06/2018     HD Tuesday, Thursday, Saturday         Essential hypertension      Per primary.  If BP uncontrolled may increase insulin resistance.         Cerebrovascular accident (CVA)    Managed per primary team.   Optimize bg control.             Saray Meza NP  Endocrinology  [facility]

## 2018-06-06 NOTE — PLAN OF CARE
"Per Endocrine NP's notes: "Discharge:  TBD.  Needs DE education in 1-2 weeks; would benefit from CGM. Hospital f/u with endocrine in 2-4 weeks. " so CM noticed no appts made so I called Endo Clinic     No future appointments.    appt made and msg sent re:  Education needed.  Future Appointments  Date Time Provider Department Center   7/6/2018 9:30 AM Latanya Murillo NP [facility] GALILEA Camejo       "

## 2018-06-06 NOTE — HPI
Ms. Haq is a 53 year old Female with PMHx of Diabetes, HTN, HLD, ESRD (on HD T,Th,Sat) 2 prior strokes and seizures who presented to [facility] after she was found by with left sided gaze preference. Per notes, patient's LKN was last night. The patient's  went in her room to take her to dialysis this morning. At that time, she was slow to respond and had left sided gaze preference. Patient will be admitted to [facility] for higher level of care.

## 2018-06-06 NOTE — SUBJECTIVE & OBJECTIVE
"Interval History: Alert, awake, oriented this am. Feeling well. No complaints. Concerned about "what will happen if I have a seizure when I'm alone".     Review of Systems   Constitutional: Negative for activity change, appetite change, chills, diaphoresis, fatigue, fever and unexpected weight change.   Eyes: Negative for photophobia and visual disturbance.   Respiratory: Negative for apnea, cough, choking, shortness of breath, wheezing and stridor.    Cardiovascular: Negative for chest pain, palpitations and leg swelling.   Gastrointestinal: Negative for abdominal pain, blood in stool, constipation, diarrhea, nausea and vomiting.   Endocrine: Negative for cold intolerance and heat intolerance.   Genitourinary: Negative for difficulty urinating.   Skin: Negative for rash and wound.   Allergic/Immunologic: Negative for immunocompromised state.   Neurological: Negative for dizziness, weakness and headaches.     Objective:     Vital Signs (Most Recent):  Temp: 98.4 °F (36.9 °C) (06/06/18 1149)  Pulse: 64 (06/06/18 1149)  Resp: 18 (06/06/18 1149)  BP: (!) 167/81 (06/06/18 1149)  SpO2: (!) 91 % (06/06/18 1149) Vital Signs (24h Range):  Temp:  [97.2 °F (36.2 °C)-98.6 °F (37 °C)] 98.4 °F (36.9 °C)  Pulse:  [61-69] 64  Resp:  [11-22] 18  SpO2:  [91 %-99 %] 91 %  BP: (126-190)/(58-85) 167/81     Weight: 65 kg (143 lb 4.8 oz)  Body mass index is 23.85 kg/m².    Intake/Output Summary (Last 24 hours) at 06/06/18 1517  Last data filed at 06/05/18 1800   Gross per 24 hour   Intake              600 ml   Output             3400 ml   Net            -2800 ml      Physical Exam   Constitutional: She is oriented to person, place, and time. She appears well-developed. No distress.   HENT:   Head: Normocephalic and atraumatic.   Right Ear: External ear normal.   Left Ear: External ear normal.   Eyes: Conjunctivae and EOM are normal. Right eye exhibits no discharge. Left eye exhibits no discharge.   Cardiovascular: Normal rate and regular " rhythm.  Exam reveals no gallop and no friction rub.    No murmur heard.  LUE AVG   Pulmonary/Chest: Effort normal and breath sounds normal. No respiratory distress. She has no wheezes. She has no rales.   Abdominal: Soft. She exhibits no distension. There is no tenderness.   Musculoskeletal: She exhibits edema.   Neurological: She is alert and oriented to person, place, and time.   Skin: Skin is warm and dry. She is not diaphoretic.   Psychiatric: She has a normal mood and affect. Her behavior is normal.       MELD-Na score: 22 at 6/4/2018  3:16 AM  MELD score: 20 at 6/4/2018  3:16 AM  Calculated from:  Serum Creatinine: 4.8 mg/dL (Rounded to 4) at 6/4/2018  3:16 AM  Serum Sodium: 134 mmol/L at 6/4/2018  3:16 AM  Total Bilirubin: 0.2 mg/dL (Rounded to 1) at 6/4/2018  3:16 AM  INR(ratio): 0.9 (Rounded to 1) at 6/2/2018 10:21 PM  Age: 53 years    Significant Labs:  CBC:    Recent Labs  Lab 06/05/18  0340 06/06/18  0407   WBC 4.63 4.65   HGB 9.1* 9.4*   HCT 30.4* 31.4*    145*     CMP:    Recent Labs  Lab 06/05/18  0340 06/06/18  0407    138   K 4.7 4.2    104   CO2 23 25   * 136*   BUN 68* 43*   CREATININE 5.6* 3.9*   CALCIUM 7.5* 7.8*   PROT 5.5* 5.4*   ALBUMIN 2.6* 2.5*   BILITOT 0.2 0.2   ALKPHOS 258* 246*   AST 12 11   ALT 15 13   ANIONGAP 12 9   EGFRNONAA 8.0* 12.4*

## 2018-06-06 NOTE — SUBJECTIVE & OBJECTIVE
"Interval HPI:   Overnight events: transferred to step down floor from ICU. HD Tuesday, Thursday, Saturday per nephrology. BG elevated yesterday evening but trending down on increased doses of insulin. C/o pain.   Eatin%  Nausea: No  Hypoglycemia and intervention: Yes, 53 at lunch; oral dextrose given   Fever: No  TPN and/or TF: No    BP (!) 167/74 (BP Location: Right arm, Patient Position: Lying)   Pulse 65   Temp 98.6 °F (37 °C) (Oral)   Resp 20   Ht 5' 5" (1.651 m)   Wt 65 kg (143 lb 4.8 oz)   LMP 2000 (Approximate)   SpO2 (!) 93%   Breastfeeding? No   BMI 23.85 kg/m²     Labs Reviewed and Include      Recent Labs  Lab 18  0407   *   CALCIUM 7.8*   ALBUMIN 2.5*   PROT 5.4*      K 4.2   CO2 25      BUN 43*   CREATININE 3.9*   ALKPHOS 246*   ALT 13   AST 11   BILITOT 0.2     Lab Results   Component Value Date    WBC 4.65 2018    HGB 9.4 (L) 2018    HCT 31.4 (L) 2018    MCV 93 2018     (L) 2018       Recent Labs  Lab 18  1118   TSH 3.932     Lab Results   Component Value Date    HGBA1C 9.5 (H) 2018       Nutritional status:   Body mass index is 23.85 kg/m².  Lab Results   Component Value Date    ALBUMIN 2.5 (L) 2018    ALBUMIN 2.6 (L) 2018    ALBUMIN 2.7 (L) 2018     No results found for: PREALBUMIN    Estimated Creatinine Clearance: 15 mL/min (A) (based on SCr of 3.9 mg/dL (H)).    Accu-Checks  Recent Labs      18   1703  18   2022  18   2259  18   0141  18   0815  18   1339  18   1641  18   1643  18   2136  18   0253   POCTGLUCOSE  448*  393*  298*  259*  351*  247*  95  117*  245*  154*       Hyperglycemia/Diabetes Medications:   Antihyperglycemics     Start     Stop Route Frequency Ordered    18 2100  insulin detemir U-100 pen 10 Units      -- SubQ 2 times daily 18 0941    18 1130  insulin aspart U-100 pen 7 Units      -- " SubQ 3 times daily with meals 06/05/18 0941    06/05/18 0941  insulin detemir U-100 pen 3 Units      -- SubQ Once 06/05/18 0941    06/04/18 1306  insulin aspart U-100 pen 0-5 Units      -- SubQ Before meals & nightly PRN 06/04/18 1207

## 2018-06-06 NOTE — HOSPITAL COURSE
6/2/18: [place] [facility] [place] left sided gaze preference and decreased responsiveness; admitted to [facility] for higher level of care  6/3: improved mental status, off nicardipine, insulin gtt  6/4: hyperglycemia insulin gtt restarted. ESRD on HD  6/5: stepdown to hospital medicine, HD today, Endocrine following for DM management    6/8: Medically stable and back to baseline. Refusing SNF eval, insists on returning home. Will order HH and send to diabetes education and Endocrine f/u, as recommended by endocrinology. Recs appreciated. Plan reviewed with patient.

## 2018-06-06 NOTE — NURSING
12:01pm: Blood glucose 53mg/dl, 56mg/dl, Pt complained of palpitation- glucose tabs 16g given x2 dose. MD Meza paged, pending call back  Blood glucose rechecked 69mg/dl, pt denies any distress/discomfort. offered lunch tray and will recheck Blood glucose in an 1hr after meals, all precautions maintained. Will continue to monitor pt.

## 2018-06-06 NOTE — CARE UPDATE
Will change schedule to Levemir 9 units every AM and 7 units q PM. Decrease scheduled insulin to Novolog 5 units given hypoglycemia at lunch after administration of insulin at breakfast. Please call endocrine once discharge date is finalized. F/u with endocrine scheduled per case management but pt also needs follow up with diabetes education in 1-3 days following discharge; patient would benefit from continuous blood glucose monitor given variability of bg readings both in the hospital and at home per patient history on admit. Please page endocrine NP on call or endocrine on call with any bg related concerns.

## 2018-06-06 NOTE — ASSESSMENT & PLAN NOTE
Managed per nephrology.     Avoid hypoglycemia and insulin stacking.   Lab Results   Component Value Date    CREATININE 3.9 (H) 06/06/2018     HD Tuesday, Thursday, Saturday

## 2018-06-07 ENCOUNTER — TELEPHONE (OUTPATIENT)
Dept: ENDOCRINOLOGY | Facility: HOSPITAL | Age: 53
End: 2018-06-07

## 2018-06-07 DIAGNOSIS — E10.65 TYPE 1 DIABETES MELLITUS WITH HYPERGLYCEMIA: Primary | ICD-10-CM

## 2018-06-07 LAB
ALBUMIN SERPL BCP-MCNC: 2.6 G/DL
ALP SERPL-CCNC: 226 U/L
ALT SERPL W/O P-5'-P-CCNC: 12 U/L
ANION GAP SERPL CALC-SCNC: 11 MMOL/L
AST SERPL-CCNC: 11 U/L
BASOPHILS # BLD AUTO: 0.02 K/UL
BASOPHILS NFR BLD: 0.5 %
BILIRUB SERPL-MCNC: 0.3 MG/DL
BUN SERPL-MCNC: 63 MG/DL
CALCIUM SERPL-MCNC: 7.6 MG/DL
CHLORIDE SERPL-SCNC: 102 MMOL/L
CO2 SERPL-SCNC: 21 MMOL/L
CREAT SERPL-MCNC: 4.9 MG/DL
DIFFERENTIAL METHOD: ABNORMAL
EOSINOPHIL # BLD AUTO: 0.2 K/UL
EOSINOPHIL NFR BLD: 3.7 %
ERYTHROCYTE [DISTWIDTH] IN BLOOD BY AUTOMATED COUNT: 15.2 %
EST. GFR  (AFRICAN AMERICAN): 10.9 ML/MIN/1.73 M^2
EST. GFR  (NON AFRICAN AMERICAN): 9.4 ML/MIN/1.73 M^2
GLUCOSE SERPL-MCNC: 293 MG/DL
HCT VFR BLD AUTO: 30.3 %
HGB BLD-MCNC: 8.6 G/DL
IMM GRANULOCYTES # BLD AUTO: 0.03 K/UL
IMM GRANULOCYTES NFR BLD AUTO: 0.7 %
LYMPHOCYTES # BLD AUTO: 0.9 K/UL
LYMPHOCYTES NFR BLD: 22.9 %
MCH RBC QN AUTO: 27.4 PG
MCHC RBC AUTO-ENTMCNC: 28.4 G/DL
MCV RBC AUTO: 97 FL
MONOCYTES # BLD AUTO: 0.4 K/UL
MONOCYTES NFR BLD: 10.5 %
NEUTROPHILS # BLD AUTO: 2.5 K/UL
NEUTROPHILS NFR BLD: 61.7 %
NRBC BLD-RTO: 1 /100 WBC
PLATELET # BLD AUTO: 137 K/UL
PLATELET BLD QL SMEAR: ABNORMAL
PMV BLD AUTO: 11.1 FL
POCT GLUCOSE: 152 MG/DL (ref 70–110)
POCT GLUCOSE: 160 MG/DL (ref 70–110)
POCT GLUCOSE: 205 MG/DL (ref 70–110)
POCT GLUCOSE: 261 MG/DL (ref 70–110)
POCT GLUCOSE: 302 MG/DL (ref 70–110)
POCT GLUCOSE: 325 MG/DL (ref 70–110)
POCT GLUCOSE: 349 MG/DL (ref 70–110)
POTASSIUM SERPL-SCNC: 4.9 MMOL/L
PROT SERPL-MCNC: 5.6 G/DL
RBC # BLD AUTO: 3.14 M/UL
SODIUM SERPL-SCNC: 134 MMOL/L
WBC # BLD AUTO: 4.01 K/UL

## 2018-06-07 PROCEDURE — 25000003 PHARM REV CODE 250: Performed by: NURSE PRACTITIONER

## 2018-06-07 PROCEDURE — 25000003 PHARM REV CODE 250: Performed by: EMERGENCY MEDICINE

## 2018-06-07 PROCEDURE — 11000001 HC ACUTE MED/SURG PRIVATE ROOM

## 2018-06-07 PROCEDURE — 25000003 PHARM REV CODE 250: Performed by: HOSPITALIST

## 2018-06-07 PROCEDURE — 25000003 PHARM REV CODE 250: Performed by: PHYSICIAN ASSISTANT

## 2018-06-07 PROCEDURE — 90935 HEMODIALYSIS ONE EVALUATION: CPT

## 2018-06-07 PROCEDURE — 84244 ASSAY OF RENIN: CPT

## 2018-06-07 PROCEDURE — 85025 COMPLETE CBC W/AUTO DIFF WBC: CPT

## 2018-06-07 PROCEDURE — 99232 SBSQ HOSP IP/OBS MODERATE 35: CPT | Mod: ,,, | Performed by: NURSE PRACTITIONER

## 2018-06-07 PROCEDURE — 25000003 PHARM REV CODE 250: Performed by: INTERNAL MEDICINE

## 2018-06-07 PROCEDURE — 80053 COMPREHEN METABOLIC PANEL: CPT

## 2018-06-07 PROCEDURE — 63600175 PHARM REV CODE 636 W HCPCS: Performed by: NURSE PRACTITIONER

## 2018-06-07 PROCEDURE — 90935 HEMODIALYSIS ONE EVALUATION: CPT | Mod: ,,, | Performed by: INTERNAL MEDICINE

## 2018-06-07 PROCEDURE — 36415 COLL VENOUS BLD VENIPUNCTURE: CPT

## 2018-06-07 RX ORDER — MAG HYDROX/ALUMINUM HYD/SIMETH 200-200-20
30 SUSPENSION, ORAL (FINAL DOSE FORM) ORAL EVERY 6 HOURS PRN
Status: DISCONTINUED | OUTPATIENT
Start: 2018-06-07 | End: 2018-06-08 | Stop reason: HOSPADM

## 2018-06-07 RX ORDER — TRAMADOL HYDROCHLORIDE 50 MG/1
50 TABLET ORAL ONCE
Status: COMPLETED | OUTPATIENT
Start: 2018-06-07 | End: 2018-06-07

## 2018-06-07 RX ORDER — INSULIN ASPART 100 [IU]/ML
3 INJECTION, SOLUTION INTRAVENOUS; SUBCUTANEOUS ONCE
Status: COMPLETED | OUTPATIENT
Start: 2018-06-07 | End: 2018-06-07

## 2018-06-07 RX ADMIN — LOSARTAN POTASSIUM 100 MG: 50 TABLET, FILM COATED ORAL at 09:06

## 2018-06-07 RX ADMIN — SEVELAMER CARBONATE 1600 MG: 800 TABLET, FILM COATED ORAL at 06:06

## 2018-06-07 RX ADMIN — SODIUM CHLORIDE 300 ML: 9 INJECTION, SOLUTION INTRAVENOUS at 09:06

## 2018-06-07 RX ADMIN — INSULIN ASPART 3 UNITS: 100 INJECTION, SOLUTION INTRAVENOUS; SUBCUTANEOUS at 02:06

## 2018-06-07 RX ADMIN — HEPARIN SODIUM 5000 UNITS: 5000 INJECTION, SOLUTION INTRAVENOUS; SUBCUTANEOUS at 05:06

## 2018-06-07 RX ADMIN — INSULIN DETEMIR 9 UNITS: 100 INJECTION, SOLUTION SUBCUTANEOUS at 09:06

## 2018-06-07 RX ADMIN — ACETAMINOPHEN 650 MG: 325 TABLET ORAL at 02:06

## 2018-06-07 RX ADMIN — TRAMADOL HYDROCHLORIDE 50 MG: 50 TABLET, FILM COATED ORAL at 09:06

## 2018-06-07 RX ADMIN — INSULIN ASPART 3 UNITS: 100 INJECTION, SOLUTION INTRAVENOUS; SUBCUTANEOUS at 06:06

## 2018-06-07 RX ADMIN — SEVELAMER CARBONATE 1600 MG: 800 TABLET, FILM COATED ORAL at 12:06

## 2018-06-07 RX ADMIN — INSULIN ASPART 5 UNITS: 100 INJECTION, SOLUTION INTRAVENOUS; SUBCUTANEOUS at 10:06

## 2018-06-07 RX ADMIN — CLONIDINE HYDROCHLORIDE 0.1 MG: 0.1 TABLET ORAL at 02:06

## 2018-06-07 RX ADMIN — CLONIDINE HYDROCHLORIDE 0.1 MG: 0.1 TABLET ORAL at 09:06

## 2018-06-07 RX ADMIN — HEPARIN SODIUM 5000 UNITS: 5000 INJECTION, SOLUTION INTRAVENOUS; SUBCUTANEOUS at 09:06

## 2018-06-07 RX ADMIN — TRAMADOL HYDROCHLORIDE 50 MG: 50 TABLET, FILM COATED ORAL at 02:06

## 2018-06-07 RX ADMIN — ALUMINUM HYDROXIDE, MAGNESIUM HYDROXIDE, AND SIMETHICONE 30 ML: 200; 200; 20 SUSPENSION ORAL at 02:06

## 2018-06-07 RX ADMIN — NIFEDIPINE 60 MG: 30 TABLET, FILM COATED, EXTENDED RELEASE ORAL at 09:06

## 2018-06-07 RX ADMIN — HEPARIN SODIUM 5000 UNITS: 5000 INJECTION, SOLUTION INTRAVENOUS; SUBCUTANEOUS at 01:06

## 2018-06-07 RX ADMIN — INSULIN ASPART 2 UNITS: 100 INJECTION, SOLUTION INTRAVENOUS; SUBCUTANEOUS at 10:06

## 2018-06-07 RX ADMIN — CARVEDILOL 6.25 MG: 6.25 TABLET, FILM COATED ORAL at 09:06

## 2018-06-07 RX ADMIN — INSULIN ASPART 5 UNITS: 100 INJECTION, SOLUTION INTRAVENOUS; SUBCUTANEOUS at 06:06

## 2018-06-07 RX ADMIN — LEVETIRACETAM 250 MG: 250 TABLET, FILM COATED ORAL at 09:06

## 2018-06-07 NOTE — PROGRESS NOTES
"[facility]  Endocrinology  Progress Note    Admit Date: 2018     Reason for Consult: Management of T1DM, Hyperglycemia     Diabetes diagnosis year: age 26     Home Diabetes Medications:    Patient reports on 18 that home regimen is  Toujeo 15 units daily.   humalog 5 units ac plus  sliding scale up to 25 units with meals    How often checking glucose at home? 8-10x per day  BG readings on regimen: 's  Hypoglycemia on the regimen?  Yes  Missed doses on regimen?  yes    Diabetes Complications include:     Hyperglycemia, Diabetic chronic kidney disease     , Diabetic retinopathy , Diabetic peripheral neuropathy  and Diabetic gastroparesis      Complicating diabetes co morbidities:   History of CVA, Residual deficits from CVA  and ESRD       HPI:   Patient is a 52 y.o. female with a diagnosis of dm type 1, esrd, recent cva admitted for worsening encephalopathy and left sided preference. Found to have hyperglycemia on admission with bg in mid 300s. Started on home dose insulin regimen with subsequent severe hyperglycemia yesterday at 658.  She was started on insulin gtt.  Endocrine consulted for dm type 1 management as patient being stepped down to floor.    Interval HPI:   Overnight events: remains on step down floor. Tentative discharge today. HD  Tuesday, , Saturday per nephrology. Hypoglycemia yesterday but trending up since. Tentative discharge tomorrow.   Eatin%   Nausea: No  Hypoglycemia and intervention: Yes, yesterday; treated with glucose tabs x2.   Fever: No  TPN and/or TF: No    BP (!) 197/88 (BP Location: Right arm, Patient Position: Lying)   Pulse 73   Temp 96.8 °F (36 °C) (Oral)   Resp 18   Ht 5' 5" (1.651 m)   Wt 65 kg (143 lb 4.8 oz)   LMP 2000 (Approximate)   SpO2 98%   Breastfeeding? No   BMI 23.85 kg/m²       Labs Reviewed and Include      Recent Labs  Lab 18  0647   *   CALCIUM 7.6*   ALBUMIN 2.6*   PROT 5.6*   *   K " 4.9   CO2 21*      BUN 63*   CREATININE 4.9*   ALKPHOS 226*   ALT 12   AST 11   BILITOT 0.3     Lab Results   Component Value Date    WBC 4.65 06/06/2018    HGB 9.4 (L) 06/06/2018    HCT 31.4 (L) 06/06/2018    MCV 93 06/06/2018     (L) 06/06/2018       Recent Labs  Lab 06/02/18  1118   TSH 3.932     Lab Results   Component Value Date    HGBA1C 9.5 (H) 06/02/2018       Nutritional status:   Body mass index is 23.85 kg/m².  Lab Results   Component Value Date    ALBUMIN 2.6 (L) 06/07/2018    ALBUMIN 2.5 (L) 06/06/2018    ALBUMIN 2.6 (L) 06/05/2018     No results found for: PREALBUMIN    Estimated Creatinine Clearance: 11.9 mL/min (A) (based on SCr of 4.9 mg/dL (H)).    Accu-Checks  Recent Labs      06/06/18   0908  06/06/18   1201  06/06/18   1206  06/06/18   1230  06/06/18   1258  06/06/18   1437  06/06/18   1540  06/06/18   1749  06/06/18   2050  06/07/18   0206   POCTGLUCOSE  114*  53*  56*  57*  69*  183*  178*  232*  228*  349*       Hyperglycemia/Diabetes Medications: Antihyperglycemics     Start     Stop Route Frequency Ordered    06/07/18 0900  insulin detemir U-100 pen 9 Units      -- SubQ Daily 06/06/18 1519    06/06/18 2100  insulin detemir U-100 pen 7 Units      -- SubQ Nightly 06/06/18 1519    06/06/18 1645  insulin aspart U-100 pen 5 Units      -- SubQ 3 times daily with meals 06/06/18 1244    06/04/18 1306  insulin aspart U-100 pen 0-5 Units      -- SubQ Before meals & nightly PRN 06/04/18 1207          ASSESSMENT and PLAN    * Encephalopathy    Per primary.           Type 1 diabetes mellitus    BG goal 140-180 while inpatient.      Continue Levemir 9 units daily and increase to Levemir 8 units q HS.   Continue Novolog 5 units with meals.   Continue bg monitoring ac/hs and low dose correction scale.     Patient refuses insulin with BG <200 for fear of hypoglycemia.         Discharge:  Patient verbalizes that her body does not respond the same way to food and insulin as typical patient with  DM. Patient wishes to resume home regimen which is as follows.Toujeo 15 units daily. Resume Humalog 5 units with meals and correction scale.    Patient recently moved back to [place] and is in need to endocrine provider. Patient is agreeable to DE education for initial hospital follow up and to seeing DM NP. Will schedule for DE education Monday or Tuesday of next week; would benefit from CGM and from education in management/available devices to help manage DM. Hospital f/u with endocrine; will re-schedule with either NEETA Wells NP or KATT Pickard NP because they specialize in DM and see most of the type 1 DM patients in clinic.         ESRD on dialysis    Managed per nephrology.     Avoid hypoglycemia and insulin stacking.   Lab Results   Component Value Date    CREATININE 4.9 (H) 06/07/2018     HD Tuesday, Thursday, Saturday         Essential hypertension      Per primary.  If BP uncontrolled may increase insulin resistance.         Cerebrovascular accident (CVA)    Managed per primary team.   Optimize bg control.             Saray Meza NP  Endocrinology  [facility]

## 2018-06-07 NOTE — PLAN OF CARE
Sent msg to OT Chanel (Jennifer) that pt has returned back to room.    Update: PER OT NOTES: therapy will see pt tomorrow am as they attempted twice to see pt so Cm changed chapo to 6/8 and discussed w MD.

## 2018-06-07 NOTE — PLAN OF CARE
Pt in HD  Therapy unable to see   Cm left note for pt to call     MD WILL ENTER REFERRAL FOR EDUCATION AND CM SENT MSG YESTERDAY. SEE NOTES.  Future Appointments  Date Time Provider Department Center   7/6/2018 9:30 AM Latanya Murillo NP [facility] GALILEA Camejo     Update:  1400  Possible dc to ss snf vs hh  Therapy msg sent to come back to work w pt  Pt just returned back to room from HD  Pt has ride home when needed.     06/07/18 0933   Right Care Assessment   Can the patient answer the patient profile reliably? No historian available   How often would a person be available to care for the patient? Whenever needed   Describe the patient's ability to walk at the present time. (therapy arrived early but pt not in saurav. Per RN, pt is in HD)   How does the patient rate their overall health at the present time? Fair   Number of comorbid conditions (as recorded on the chart) Four   During the past month, has the patient often been bothered by feeling down, depressed or hopeless? No   During the past month, has the patient often been bothered by little interest or pleasure in doing things? No

## 2018-06-07 NOTE — PROGRESS NOTES
Dialysis tx completed. 3.5 hours. 4 liters removed. Needles pulled from left arm AVG. Hemostasis achieved. Gauze and tape to sites. Tolerated tx well. Report given to Yolanda JACOME.

## 2018-06-07 NOTE — SUBJECTIVE & OBJECTIVE
"Interval HPI:   Overnight events: remains on step down floor. Tentative discharge today. HD  Tuesday, , Saturday per nephrology. Hypoglycemia yesterday but trending up since. Tentative discharge tomorrow.   Eatin%   Nausea: No  Hypoglycemia and intervention: Yes, yesterday; treated with glucose tabs x2.   Fever: No  TPN and/or TF: No    BP (!) 197/88 (BP Location: Right arm, Patient Position: Lying)   Pulse 73   Temp 96.8 °F (36 °C) (Oral)   Resp 18   Ht 5' 5" (1.651 m)   Wt 65 kg (143 lb 4.8 oz)   LMP 2000 (Approximate)   SpO2 98%   Breastfeeding? No   BMI 23.85 kg/m²     Labs Reviewed and Include      Recent Labs  Lab 18  0647   *   CALCIUM 7.6*   ALBUMIN 2.6*   PROT 5.6*   *   K 4.9   CO2 21*      BUN 63*   CREATININE 4.9*   ALKPHOS 226*   ALT 12   AST 11   BILITOT 0.3     Lab Results   Component Value Date    WBC 4.65 2018    HGB 9.4 (L) 2018    HCT 31.4 (L) 2018    MCV 93 2018     (L) 2018       Recent Labs  Lab 18  1118   TSH 3.932     Lab Results   Component Value Date    HGBA1C 9.5 (H) 2018       Nutritional status:   Body mass index is 23.85 kg/m².  Lab Results   Component Value Date    ALBUMIN 2.6 (L) 2018    ALBUMIN 2.5 (L) 2018    ALBUMIN 2.6 (L) 2018     No results found for: PREALBUMIN    Estimated Creatinine Clearance: 11.9 mL/min (A) (based on SCr of 4.9 mg/dL (H)).    Accu-Checks  Recent Labs      18   0908  18   1201  18   1206  18   1230  18   1258  18   1437  18   1540  18   1749  18   2050  18   0206   POCTGLUCOSE  114*  53*  56*  57*  69*  183*  178*  232*  228*  349*       Hyperglycemia/Diabetes Medications: Antihyperglycemics     Start     Stop Route Frequency Ordered    18 0900  insulin detemir U-100 pen 9 Units      -- SubQ Daily 18 1519    18 2100  insulin detemir U-100 pen 7 Units      -- " SubQ Nightly 06/06/18 1519    06/06/18 1645  insulin aspart U-100 pen 5 Units      -- SubQ 3 times daily with meals 06/06/18 1244    06/04/18 1306  insulin aspart U-100 pen 0-5 Units      -- SubQ Before meals & nightly PRN 06/04/18 1207

## 2018-06-07 NOTE — ASSESSMENT & PLAN NOTE
Managed per nephrology.     Avoid hypoglycemia and insulin stacking.   Lab Results   Component Value Date    CREATININE 4.9 (H) 06/07/2018     HD Tuesday, Thursday, Saturday

## 2018-06-07 NOTE — PT/OT/SLP PROGRESS
Occupational Therapy      Patient Name:  Eufemia Haq   MRN:  [id number]    Patient not seen today secondary to pt at lengthy dialysis until afternoon and at second attempt pt eating and requested eval next day   . Will follow-up 6/8.    Chanel Arita, OT  6/7/2018

## 2018-06-07 NOTE — PLAN OF CARE
Problem: Patient Care Overview  Goal: Plan of Care Review  Outcome: Ongoing (interventions implemented as appropriate)  Pt remains free of falls and injury. Provided with PRN analgesic with positive results. Pt refuses TEDs/SCDs. Bed low and locked, Call light within reach.

## 2018-06-07 NOTE — NURSING
Pt's blood glucose 152mg/dl, endocrinologist Saray called & notified, orders received for okay to give scheduled insulin aspart 5 units now.

## 2018-06-07 NOTE — PT/OT/SLP PROGRESS
Physical Therapy      Patient Name:  Eufemia Haq   MRN:  [id number]    Patient not seen today secondary to being off the floor for dialysis; PT unable to return in evening. Will follow-up at next available time/date.    Tej Renee, PT

## 2018-06-07 NOTE — NURSING
Pt arrived unit from dialysis via stretcher per transport, no distress/discomfort, no SOB/chest pain noted in pt. All precautions maintained

## 2018-06-07 NOTE — NURSING
SANDRA JACOME FROM HD NOTIFIED BP AND BLOOD SUGAR 302 TYO CONTINUE TO MONITOR AND RECHECK BLOOD SUGAR AGAIN BEFORE BREAKFAST TRAL PT TRANSPORTED TO HD IN [facility] BY HOSPITAL TRANSPORTER.

## 2018-06-07 NOTE — PLAN OF CARE
Problem: Diabetes, Type 1 (Adult)  Goal: Signs and Symptoms of Listed Potential Problems Will be Absent, Minimized or Managed (Diabetes, Type 1)  Signs and symptoms of listed potential problems will be absent, minimized or managed by discharge/transition of care (reference Diabetes, Type 1 (Adult) CPG).   Outcome: Ongoing (interventions implemented as appropriate)   06/06/18 1805   Diabetes, Type 1   Problems Assessed (Type 1 Diabetes) hypoglycemia   Problems Present (Type 1 Diabetes) hypoglycemia       Problem: Fall Risk (Adult)  Goal: Absence of Falls  Patient will demonstrate the desired outcomes by discharge/transition of care.   Outcome: Ongoing (interventions implemented as appropriate)   06/06/18 1814   Fall Risk (Adult)   Absence of Falls making progress toward outcome       Problem: Patient Care Overview  Goal: Plan of Care Review  Outcome: Ongoing (interventions implemented as appropriate)   06/06/18 1815   Coping/Psychosocial   Plan Of Care Reviewed With patient     PT FREE OF FALLS, NO INJURIES, SKIN INTACT, vitals signs & strict I&O MONITORING ONGOING- SEE FLOWSHEET, Blood glucose monitoring ongoing and insulin given as ordered.   16g Carbohydrate given for hypoglycemia, served lunch early, repeated blood glucose done-MD Ro aware, see changes of insulin order. Pt verbalized she is not getting enough food from kitchen- kitchen dietary informed.   Pt voided with no discomfort. Plan is to schedule for hemodialysis tomorrow.

## 2018-06-07 NOTE — PROGRESS NOTES
Patient arrived on unit via stretcher. Standing weight obtained @ 69.3kg. Dialysis tx initiated via left arm AVG with 15g needles x 2 without difficulty. Lines connected and secured. Orders and machine settings verified. Tx started. Good blood flows established. VSS.NAD.

## 2018-06-08 VITALS
BODY MASS INDEX: 23.88 KG/M2 | OXYGEN SATURATION: 94 % | DIASTOLIC BLOOD PRESSURE: 86 MMHG | HEIGHT: 65 IN | HEART RATE: 63 BPM | TEMPERATURE: 97 F | RESPIRATION RATE: 16 BRPM | SYSTOLIC BLOOD PRESSURE: 182 MMHG | WEIGHT: 143.31 LBS

## 2018-06-08 LAB
ALBUMIN SERPL BCP-MCNC: 2.6 G/DL
ALP SERPL-CCNC: 210 U/L
ALT SERPL W/O P-5'-P-CCNC: 10 U/L
ANION GAP SERPL CALC-SCNC: 10 MMOL/L
AST SERPL-CCNC: 11 U/L
BASOPHILS # BLD AUTO: 0.02 K/UL
BASOPHILS NFR BLD: 0.5 %
BILIRUB SERPL-MCNC: 0.3 MG/DL
BUN SERPL-MCNC: 45 MG/DL
CALCIUM SERPL-MCNC: 7.8 MG/DL
CHLORIDE SERPL-SCNC: 102 MMOL/L
CO2 SERPL-SCNC: 25 MMOL/L
CREAT SERPL-MCNC: 3.6 MG/DL
DIFFERENTIAL METHOD: ABNORMAL
EOSINOPHIL # BLD AUTO: 0.2 K/UL
EOSINOPHIL NFR BLD: 4 %
ERYTHROCYTE [DISTWIDTH] IN BLOOD BY AUTOMATED COUNT: 14.9 %
EST. GFR  (AFRICAN AMERICAN): 15.8 ML/MIN/1.73 M^2
EST. GFR  (NON AFRICAN AMERICAN): 13.7 ML/MIN/1.73 M^2
GLUCOSE SERPL-MCNC: 163 MG/DL
HCT VFR BLD AUTO: 30.2 %
HGB BLD-MCNC: 9 G/DL
IMM GRANULOCYTES # BLD AUTO: 0.02 K/UL
IMM GRANULOCYTES NFR BLD AUTO: 0.5 %
LYMPHOCYTES # BLD AUTO: 1 K/UL
LYMPHOCYTES NFR BLD: 26.1 %
MCH RBC QN AUTO: 27.2 PG
MCHC RBC AUTO-ENTMCNC: 29.8 G/DL
MCV RBC AUTO: 91 FL
MONOCYTES # BLD AUTO: 0.5 K/UL
MONOCYTES NFR BLD: 11.9 %
NEUTROPHILS # BLD AUTO: 2.2 K/UL
NEUTROPHILS NFR BLD: 57 %
NRBC BLD-RTO: 0 /100 WBC
PLATELET # BLD AUTO: 163 K/UL
PMV BLD AUTO: 11.3 FL
POCT GLUCOSE: 125 MG/DL (ref 70–110)
POCT GLUCOSE: 178 MG/DL (ref 70–110)
POTASSIUM SERPL-SCNC: 4.4 MMOL/L
PROT SERPL-MCNC: 5.5 G/DL
RBC # BLD AUTO: 3.31 M/UL
SODIUM SERPL-SCNC: 137 MMOL/L
WBC # BLD AUTO: 3.79 K/UL

## 2018-06-08 PROCEDURE — 97161 PT EVAL LOW COMPLEX 20 MIN: CPT

## 2018-06-08 PROCEDURE — 36415 COLL VENOUS BLD VENIPUNCTURE: CPT

## 2018-06-08 PROCEDURE — 25000003 PHARM REV CODE 250: Performed by: EMERGENCY MEDICINE

## 2018-06-08 PROCEDURE — 97165 OT EVAL LOW COMPLEX 30 MIN: CPT

## 2018-06-08 PROCEDURE — 80053 COMPREHEN METABOLIC PANEL: CPT

## 2018-06-08 PROCEDURE — 25000003 PHARM REV CODE 250: Performed by: NURSE PRACTITIONER

## 2018-06-08 PROCEDURE — 63600175 PHARM REV CODE 636 W HCPCS: Performed by: NURSE PRACTITIONER

## 2018-06-08 PROCEDURE — 99239 HOSP IP/OBS DSCHRG MGMT >30: CPT | Mod: ,,, | Performed by: HOSPITALIST

## 2018-06-08 PROCEDURE — 25000003 PHARM REV CODE 250: Performed by: HOSPITALIST

## 2018-06-08 PROCEDURE — 85025 COMPLETE CBC W/AUTO DIFF WBC: CPT

## 2018-06-08 PROCEDURE — 99231 SBSQ HOSP IP/OBS SF/LOW 25: CPT | Mod: ,,, | Performed by: NURSE PRACTITIONER

## 2018-06-08 RX ORDER — ROSUVASTATIN CALCIUM 20 MG/1
20 TABLET, COATED ORAL DAILY
Qty: 90 TABLET | Refills: 3 | Status: SHIPPED | OUTPATIENT
Start: 2018-06-08 | End: 2018-08-15 | Stop reason: SDUPTHER

## 2018-06-08 RX ORDER — INSULIN LISPRO 100 [IU]/ML
5 INJECTION, SOLUTION INTRAVENOUS; SUBCUTANEOUS
Status: ON HOLD | COMMUNITY
End: 2018-06-08

## 2018-06-08 RX ORDER — INSULIN LISPRO 100 [IU]/ML
5 INJECTION, SOLUTION INTRAVENOUS; SUBCUTANEOUS
Qty: 3 ML | Refills: 1 | Status: SHIPPED | OUTPATIENT
Start: 2018-06-08 | End: 2018-06-08 | Stop reason: HOSPADM

## 2018-06-08 RX ORDER — INSULIN GLARGINE 300 [IU]/ML
15 INJECTION, SOLUTION SUBCUTANEOUS DAILY
Qty: 1.5 ML | Refills: 1 | Status: SHIPPED | OUTPATIENT
Start: 2018-06-08 | End: 2018-06-08

## 2018-06-08 RX ORDER — CLONIDINE HYDROCHLORIDE 0.1 MG/1
0.1 TABLET ORAL 3 TIMES DAILY
Qty: 90 TABLET | Refills: 11 | Status: SHIPPED | OUTPATIENT
Start: 2018-06-08 | End: 2018-07-18

## 2018-06-08 RX ORDER — INSULIN GLARGINE 300 [IU]/ML
15 INJECTION, SOLUTION SUBCUTANEOUS DAILY
Qty: 1.5 ML | Refills: 6 | Status: SHIPPED | OUTPATIENT
Start: 2018-06-08 | End: 2018-07-18

## 2018-06-08 RX ORDER — INSULIN GLARGINE 300 [IU]/ML
15 INJECTION, SOLUTION SUBCUTANEOUS DAILY
Status: ON HOLD | COMMUNITY
End: 2018-06-08

## 2018-06-08 RX ORDER — CARVEDILOL 6.25 MG/1
6.25 TABLET ORAL 2 TIMES DAILY
Qty: 60 TABLET | Refills: 11 | Status: SHIPPED | OUTPATIENT
Start: 2018-06-08 | End: 2018-07-18

## 2018-06-08 RX ORDER — INSULIN LISPRO 100 [IU]/ML
5 INJECTION, SOLUTION INTRAVENOUS; SUBCUTANEOUS
Qty: 4.5 ML | Refills: 11 | Status: SHIPPED | OUTPATIENT
Start: 2018-06-08 | End: 2018-07-18 | Stop reason: SDUPTHER

## 2018-06-08 RX ADMIN — INSULIN DETEMIR 9 UNITS: 100 INJECTION, SOLUTION SUBCUTANEOUS at 09:06

## 2018-06-08 RX ADMIN — NIFEDIPINE 60 MG: 30 TABLET, FILM COATED, EXTENDED RELEASE ORAL at 09:06

## 2018-06-08 RX ADMIN — LOSARTAN POTASSIUM 100 MG: 50 TABLET, FILM COATED ORAL at 09:06

## 2018-06-08 RX ADMIN — HEPARIN SODIUM 5000 UNITS: 5000 INJECTION, SOLUTION INTRAVENOUS; SUBCUTANEOUS at 05:06

## 2018-06-08 RX ADMIN — SEVELAMER CARBONATE 1600 MG: 800 TABLET, FILM COATED ORAL at 09:06

## 2018-06-08 RX ADMIN — ACETAMINOPHEN 650 MG: 325 TABLET ORAL at 02:06

## 2018-06-08 RX ADMIN — CLONIDINE HYDROCHLORIDE 0.1 MG: 0.1 TABLET ORAL at 09:06

## 2018-06-08 RX ADMIN — CARVEDILOL 6.25 MG: 6.25 TABLET, FILM COATED ORAL at 09:06

## 2018-06-08 RX ADMIN — LEVETIRACETAM 250 MG: 250 TABLET, FILM COATED ORAL at 09:06

## 2018-06-08 NOTE — NURSING
D/c instruction given to pt verbalized understanding PIV removed at rt upper arm no bleeding applied 2x2 gauze secured with coban loosely awaiting sister for transportation and hospital transport for w/c.

## 2018-06-08 NOTE — ASSESSMENT & PLAN NOTE
Appreciate endocrinology's recommendations. Patient declined changes recommended in favor of her own regimen. Endo aware. Will see in clinic for close follow up and send to diabetic education.

## 2018-06-08 NOTE — PROGRESS NOTES
"[facility]  Endocrinology  Progress Note    Admit Date: 2018     Reason for Consult: Management of T1DM, Hyperglycemia     Diabetes diagnosis year: age 26     Home Diabetes Medications:    Patient reports on 18 that home regimen is  Toujeo 15 units daily.   humalog 5 units ac plus  sliding scale up to 25 units with meals    How often checking glucose at home? 8-10x per day  BG readings on regimen: 's  Hypoglycemia on the regimen?  Yes  Missed doses on regimen?  yes    Diabetes Complications include:     Hyperglycemia, Diabetic chronic kidney disease     , Diabetic retinopathy , Diabetic peripheral neuropathy  and Diabetic gastroparesis      Complicating diabetes co morbidities:   History of CVA, Residual deficits from CVA  and ESRD       HPI:   Patient is a 52 y.o. female with a diagnosis of dm type 1, esrd, recent cva admitted for worsening encephalopathy and left sided preference. Found to have hyperglycemia on admission with bg in mid 300s. Started on home dose insulin regimen with subsequent severe hyperglycemia yesterday at 658.  She was started on insulin gtt.  Endocrine consulted for dm type 1 management as patient being stepped down to floor.    Interval HPI:   Overnight events: remains on 9th floor. NAEON. Tentative discharge this AM. HD Tuesday, Thursday, Saturday per nephrology. BG at goal overnight and this AM.   Eatin%  Nausea: No  Hypoglycemia and intervention: Yes, this admission   Fever: No  TPN and/or TF: No    BP (!) 182/86   Pulse 68   Temp 96.6 °F (35.9 °C) (Oral)   Resp 16   Ht 5' 5" (1.651 m)   Wt 65 kg (143 lb 4.8 oz)   LMP 2000 (Approximate)   SpO2 (!) 94%   Breastfeeding? No   BMI 23.85 kg/m²       Labs Reviewed and Include      Recent Labs  Lab 18  0358   *   CALCIUM 7.8*   ALBUMIN 2.6*   PROT 5.5*      K 4.4   CO2 25      BUN 45*   CREATININE 3.6*   ALKPHOS 210*   ALT 10   AST 11   BILITOT 0.3     Lab Results "   Component Value Date    WBC 3.79 (L) 06/08/2018    HGB 9.0 (L) 06/08/2018    HCT 30.2 (L) 06/08/2018    MCV 91 06/08/2018     06/08/2018       Recent Labs  Lab 06/02/18  1118   TSH 3.932     Lab Results   Component Value Date    HGBA1C 9.5 (H) 06/02/2018       Nutritional status:   Body mass index is 23.85 kg/m².  Lab Results   Component Value Date    ALBUMIN 2.6 (L) 06/08/2018    ALBUMIN 2.6 (L) 06/07/2018    ALBUMIN 2.5 (L) 06/06/2018     No results found for: PREALBUMIN    Estimated Creatinine Clearance: 16.3 mL/min (A) (based on SCr of 3.6 mg/dL (H)).    Accu-Checks  Recent Labs      06/06/18   2050  06/07/18   0206  06/07/18   0728  06/07/18   0855  06/07/18   1205  06/07/18   1255  06/07/18   1823  06/07/18   2106  06/08/18   0209  06/08/18   0747   POCTGLUCOSE  228*  349*  302*  205*  325*  152*  261*  160*  178*  125*     Hyperglycemia/Diabetes Medications: Antihyperglycemics     Start     Stop Route Frequency Ordered    06/07/18 2100  insulin detemir U-100 pen 8 Units      -- SubQ Nightly 06/07/18 1638    06/07/18 0900  insulin detemir U-100 pen 9 Units      -- SubQ Daily 06/06/18 1519    06/06/18 1645  insulin aspart U-100 pen 5 Units      -- SubQ 3 times daily with meals 06/06/18 1244    06/04/18 1306  insulin aspart U-100 pen 0-5 Units      -- SubQ Before meals & nightly PRN 06/04/18 1207          ASSESSMENT and PLAN    * Encephalopathy    Per primary.           Type 1 diabetes mellitus    BG goal 140-180 while inpatient.      Continue Levemir 9 units daily and continue to Levemir 8 units q HS; FBG at goal this AM.   Continue Novolog 5 units with meals.   Continue bg monitoring ac/hs and low dose correction scale.     Patient refuses insulin with BG <200 for fear of hypoglycemia. Recommended that patient take half dose of insulin if BG < 200 but patient insists that this will cause hypoglycemia.         Discharge:  Patient verbalizes that her body does not respond the same way to food and  insulin as typical patient with DM. Patient wishes to resume home regimen which is as follows: Toujeo 15 units daily. Resume Humalog 5 units with meals and correction scale.    Patient recently moved back to [place] and is in need of an endocrine provider. Patient is agreeable to DE education for initial hospital follow up and to seeing DM NP. Order for DE education and message to staff sent to schedule for Monday or Tuesday of next week; would benefit from CGM and from education in management/available devices to help manage DM. Hospital f/u with endocrine; will re-schedule with either NEETA Wells NP or KATT Pickard NP because they specialize in DM and see most of the type 1 DM patients in clinic - message sent to endocrine staff to schedule.         ESRD on dialysis    Managed per nephrology.     Avoid hypoglycemia and insulin stacking.   Lab Results   Component Value Date    CREATININE 3.6 (H) 06/08/2018     HD Tuesday, Thursday, Saturday         Essential hypertension      Per primary.  If BP uncontrolled may increase insulin resistance.         Cerebrovascular accident (CVA)    Managed per primary team.   Optimize bg control.             Saray Meza NP  Endocrinology  [facility]

## 2018-06-08 NOTE — PLAN OF CARE
Cm rounded to room. Pt dc but appt made  Future Appointments  Date Time Provider Department Center   6/14/2018 2:00 PM Jeannie Mukherjee RD [facility] DIABT[facility] [place]   6/18/2018 11:00 AM Latanya Murillo NP [facility] ENDODIA [place]    set up per estela dennis     06/08/18 5224   Final Note   Assessment Type Final Discharge Note   Discharge Disposition Home-Health   Hospital Follow Up  Appt(s) scheduled? Yes   Right Care Referral Info   Post Acute Recommendation Home-care

## 2018-06-08 NOTE — PT/OT/SLP EVAL
Occupational Therapy   Evaluation and Discharge Note    Name: Eufemia Haq  MRN: [id number]  Admitting Diagnosis:  Encephalopathy      Recommendations:     Discharge Recommendations:    Discharge Equipment Recommendations:     Barriers to discharge:       History:     Occupational Profile:  Living Environment: Pt lives with spouse who provides some support   Previous level of function: Pt with Min A with safe self care completion   Equipment Owned:     Assistance upon Discharge: spouse available to assist     Past Medical History:   Diagnosis Date    Anemia     during pregnancys    Arthritis     neuropathy hands feet and legs//    Blood transfusion     Chronic pain     Diabetes mellitus     Diabetes mellitus type I     Diabetic retinopathy     ESRD (end stage renal disease) on dialysis     Hyperlipidemia     Hypertension     patient states that when her blood sugar increases her blood pressure increases    Neuropathy     Pneumonia     Seizures     when sugar is high, does not quite remember    Stroke     2018    Vitamin D deficiency        Past Surgical History:   Procedure Laterality Date     SECTION, CLASSIC      x 2    EYE SURGERY      HYSTERECTOMY         Subjective     Chief Complaint: blindness, not desiring therapy   Patient/Family stated goals: return to home   Communicated with: RN prior to session.  Pain/Comfort:  ·      Patients cultural, spiritual, Sikh conflicts given the current situation:      Objective:     Patient found with:      General Precautions: Standard,     Orthopedic Precautions:    Braces:       Occupational Performance:    Bed Mobility:    · Pt with overall CGA    Functional Mobility/Transfers:  · Functional Mobility: Pt able to transfer with assist due to poor vision     Activities of Daily Living:  · Pt with CGA for self care completion     Physical Exam:  Upper Extremity Range of Motion:     -       Right Upper Extremity: WFL  -       Left Upper  "Extremity: WFL  Upper Extremity Strength:    -       Right Upper Extremity: WFL  -       Left Upper Extremity: WFL    Patient left supine with all lines intact    AMPAC 6 Click:  AMPA Total Score: 18    Treatment & Education:  Evaluation complete.  Pt educated on safety, role of OT, importance of increased participation in self care for gains , expectations for participation, expectations for gains, POC, energy conservation, caregiver strain. White board updated.     Education:    Assessment:     Eufemia Haq is a 53 y.o. female with a medical diagnosis of Encephalopathy. At this time, patient is functioning at their prior level of function and does not require further acute OT services.     Clinical Decision Makin.  OT Low:  "Pt evaluation falls under low complexity for evaluation coding due to performance deficits noted in 1-3 areas as stated above and 0 co-morbities affecting current functional status. Data obtained from problem focused assessments. No modifications or assistance was required for completion of evaluation. Only brief occupational profile and history review completed."     Plan:     During this hospitalization, patient does not require further acute OT services.  Please re-consult if situation changes.    · Plan of Care Reviewed with:      This Plan of care has been discussed with the patient who was involved in its development and understands and is in agreement with the identified goals and treatment plan    GOALS:    Occupational Therapy Goals     Not on file                Time Tracking:     OT Date of Treatment: 18  OT Start Time: 830  OT Stop Time: 845  OT Total Time (min): 15 min    Billable Minutes:Evaluation 15    Chanel Arita, OT  2018    "

## 2018-06-08 NOTE — PLAN OF CARE
Problem: Physical Therapy Goal  Goal: Physical Therapy Goal  Outcome: Outcome(s) achieved Date Met: 06/08/18  Eval and DC complete  Patient has no inpatient PT needs  Discontinue Orders    Tej Renee, PT, DPT  6/8/2018  Pager [phone]

## 2018-06-08 NOTE — PLAN OF CARE
Problem: Patient Care Overview  Goal: Plan of Care Review  Outcome: Ongoing (interventions implemented as appropriate)  Pt. Remains free of falls and injury. Provided with PRN analgesic with positive results. Pt looking forward to going home today. Bed low and locked, Call light within reach.

## 2018-06-08 NOTE — SUBJECTIVE & OBJECTIVE
"Interval HPI:   Overnight events: remains on 9th floor. NAEON. Tentative discharge this AM. HD Tuesday, Thursday, Saturday per nephrology. BG at goal overnight and this AM.   Eatin%  Nausea: No  Hypoglycemia and intervention: Yes, this admission   Fever: No  TPN and/or TF: No    BP (!) 182/86   Pulse 68   Temp 96.6 °F (35.9 °C) (Oral)   Resp 16   Ht 5' 5" (1.651 m)   Wt 65 kg (143 lb 4.8 oz)   LMP 2000 (Approximate)   SpO2 (!) 94%   Breastfeeding? No   BMI 23.85 kg/m²     Labs Reviewed and Include      Recent Labs  Lab 18  0358   *   CALCIUM 7.8*   ALBUMIN 2.6*   PROT 5.5*      K 4.4   CO2 25      BUN 45*   CREATININE 3.6*   ALKPHOS 210*   ALT 10   AST 11   BILITOT 0.3     Lab Results   Component Value Date    WBC 3.79 (L) 2018    HGB 9.0 (L) 2018    HCT 30.2 (L) 2018    MCV 91 2018     2018       Recent Labs  Lab 18  1118   TSH 3.932     Lab Results   Component Value Date    HGBA1C 9.5 (H) 2018       Nutritional status:   Body mass index is 23.85 kg/m².  Lab Results   Component Value Date    ALBUMIN 2.6 (L) 2018    ALBUMIN 2.6 (L) 2018    ALBUMIN 2.5 (L) 2018     No results found for: PREALBUMIN    Estimated Creatinine Clearance: 16.3 mL/min (A) (based on SCr of 3.6 mg/dL (H)).    Accu-Checks  Recent Labs      18   2050  18   0206  18   0728  18   0855  18   1205  18   1255  18   1823  18   2106  18   0209  18   0747   POCTGLUCOSE  228*  349*  302*  205*  325*  152*  261*  160*  178*  125*     Hyperglycemia/Diabetes Medications: Antihyperglycemics     Start     Stop Route Frequency Ordered    18 2100  insulin detemir U-100 pen 8 Units      -- SubQ Nightly 18 1638    18 0900  insulin detemir U-100 pen 9 Units      -- SubQ Daily 18 1519    18 1645  insulin aspart U-100 pen 5 Units      -- SubQ 3 times daily with " meals 06/06/18 1244    06/04/18 1306  insulin aspart U-100 pen 0-5 Units      -- SubQ Before meals & nightly PRN 06/04/18 1207

## 2018-06-08 NOTE — ASSESSMENT & PLAN NOTE
-Patient s/p 1gram Keppra IV load   -Continue patient's home AED of Keppra 250 mg BID; adjusted for renal dosing

## 2018-06-08 NOTE — PLAN OF CARE
Problem: Patient Care Overview  Goal: Plan of Care Review  Outcome: Outcome(s) achieved Date Met: 06/08/18  D/c home awaiting family for transportation

## 2018-06-08 NOTE — PLAN OF CARE
12:15 PM  SW received home health orders. Faxed orders to [facility]. Will f/u as needed.    Michelle Tamez LMSW   [facility]  Ext [phone]

## 2018-06-08 NOTE — PLAN OF CARE
Problem: Diabetes, Type 1 (Adult)  Intervention: Optimize Glycemic Control   06/07/18 1822   Nutrition Interventions   Glycemic Management blood glucose monitoring         Problem: Fall Risk (Adult)  Goal: Absence of Falls  Patient will demonstrate the desired outcomes by discharge/transition of care.   Outcome: Ongoing (interventions implemented as appropriate)   06/07/18 1822   Fall Risk (Adult)   Absence of Falls making progress toward outcome       Problem: Patient Care Overview  Goal: Plan of Care Review  Outcome: Ongoing (interventions implemented as appropriate)   06/07/18 1835   Coping/Psychosocial   Plan Of Care Reviewed With patient;spouse       No distress/discomfort noted in pt, pt went to dialysis today, blood glucose monitored & insulin given as ordered   Pt free of falls/injuries, no blood stick/draws or BP to LUE, assisted pt to bedside commode- pt voided x1 see flowsheet. All precautions maintained.   Pt refused scheduled insulin aspart for blood glucose coverage of 152mg/dl at 13:13pm- Endocrinologist Saray called & notified. Diabetic education provided- understanding was verbalized.

## 2018-06-08 NOTE — ASSESSMENT & PLAN NOTE
BG goal 140-180 while inpatient.      Continue Levemir 9 units daily and continue to Levemir 8 units q HS; FBG at goal this AM.   Continue Novolog 5 units with meals.   Continue bg monitoring ac/hs and low dose correction scale.     Patient refuses insulin with BG <200 for fear of hypoglycemia. Recommended that patient take half dose of insulin if BG < 200 but patient insists that this will cause hypoglycemia.         Discharge:  Patient verbalizes that her body does not respond the same way to food and insulin as typical patient with DM. Patient wishes to resume home regimen which is as follows: Toujeo 15 units daily. Resume Humalog 5 units with meals and correction scale.    Patient recently moved back to [place] and is in need of an endocrine provider. Patient is agreeable to DE education for initial hospital follow up and to seeing DM NP. Order for DE education and message to staff sent to schedule for Monday or Tuesday of next week; would benefit from CGM and from education in management/available devices to help manage DM. Hospital f/u with endocrine; will re-schedule with either NEETA Wells NP or KATT Pickard NP because they specialize in DM and see most of the type 1 DM patients in clinic - message sent to endocrine staff to schedule.

## 2018-06-08 NOTE — ASSESSMENT & PLAN NOTE
Patient with PMHx of Seizures [place] [facility] [place] sided gaze and decreased responsiveness  -Pt with elevated BP that could be contributing to encephalopathy  -Patient s/p Keppra 1 gram IV load  -Continue keppra 250 BID  -BP control

## 2018-06-08 NOTE — PLAN OF CARE
Per Dr Fischer, she has spoken with Endocrine to advise of discharge status today.  Per DR Fischer, education is set up and CM has already made endo appt  YULIYA Martinez will set up hh once hh orders are completed as discussed in jennifer carson.     06/08/18 1019   Final Note   Assessment Type Final Discharge Note   Hospital Follow Up  Appt(s) scheduled? Yes   Discharge plans and expectations educations in teach back method with documentation complete? Yes   Right Care Referral Info   Post Acute Recommendation Home-care     Future Appointments  Date Time Provider Department Center   6/14/2018 2:00 PM Jeannie Mukherjee RD [facility] DIABETE [place]   6/18/2018 11:00 AM Latanya Murillo NP [facility] ENDODIA [place]

## 2018-06-08 NOTE — ASSESSMENT & PLAN NOTE
Managed per nephrology.     Avoid hypoglycemia and insulin stacking.   Lab Results   Component Value Date    CREATININE 3.6 (H) 06/08/2018     HD Tuesday, Thursday, Saturday

## 2018-06-08 NOTE — PLAN OF CARE
[facility]    HOME HEALTH ORDERS  FACE TO FACE ENCOUNTER    Patient Name: Eufemia Haq  YOB: 1965    PCP: No primary care provider on file.   PCP Address: No primary physician on file.  PCP Phone Number: None  PCP Fax: None    Encounter Date: 06/08/2018    Admit to Home Health    Diagnoses:  Active Hospital Problems    Diagnosis  POA    *Encephalopathy [G93.40]  Yes    Left-sided weakness [R53.1]  Yes    Cerebrovascular accident (CVA) [I63.9]  Yes    Altered mental status [R41.82]  Yes    Seizure disorder [G40.909]  Yes    ESRD on dialysis [N18.6, Z99.2]  Not Applicable    Type 1 diabetes mellitus [E10.9]  Yes    Essential hypertension [I10]  Yes      Resolved Hospital Problems    Diagnosis Date Resolved POA    Acute pulmonary edema [J81.0] 06/06/2018 Yes    Altered mental status [R41.82] 06/02/2018 Yes       Future Appointments  Date Time Provider Department Center   6/14/2018 2:00 PM Jeannie Mukherjee RD [facility] DIABETE [facility]   6/18/2018 11:00 AM Latanay Murillo NP [facility] ENDODIA [facility]     Follow-up Information     [facility] In 1 week.    Contact information:  [address]  [facility] [address] [phone]                     I have seen and examined this patient face to face today. My clinical findings that support the need for the home health skilled services and home bound status are the following:  Weakness/numbness causing balance and gait disturbance due to Weakness/Debility making it taxing to leave home.    Allergies:  Review of patient's allergies indicates:   Allergen Reactions    Ciprofloxacin (bulk) Itching    Latex Itching and Other (See Comments)     Very low Oxygen    Vancomycin Shortness Of Breath and Itching    Neurontin [gabapentin] Other (See Comments)     Seizure like activity    Niacin Itching and Other (See Comments)     Burning      Bactrim [sulfamethoxazole-trimethoprim] Rash       Diet: renal diet,  diabetic diet    Activities: activity as tolerated    Nursing:   SN to complete comprehensive assessment including routine vital signs. Instruct on disease process and s/s of complications to report to MD. Review/verify medication list sent home with the patient at time of discharge  and instruct patient/caregiver as needed. Frequency may be adjusted depending on start of care date.    Notify MD if SBP > 160 or < 90; DBP > 90 or < 50; HR > 120 or < 50; Temp > 101; Other:         CONSULTS:    Physical Therapy to evaluate and treat. Evaluate for home safety and equipment needs; Establish/upgrade home exercise program. Perform / instruct on therapeutic exercises, gait training, transfer training, and Range of Motion.  Occupational Therapy to evaluate and treat. Evaluate home environment for safety and equipment needs. Perform/Instruct on transfers, ADL training, ROM, and therapeutic exercises.   to evaluate for community resources/long-range planning.  Aide to provide assistance with personal care, ADLs, and vital signs.    MISCELLANEOUS CARE:  Diabetic Care:   SN to perform and educate Diabetic management with blood glucose monitoring:    WOUND CARE ORDERS  n/a      Medications: Review discharge medications with patient and family and provide education.      Current Discharge Medication List      START taking these medications    Details   carvedilol (COREG) 6.25 MG tablet Take 1 tablet (6.25 mg total) by mouth 2 (two) times daily.  Qty: 60 tablet, Refills: 11      cloNIDine (CATAPRES) 0.1 MG tablet Take 1 tablet (0.1 mg total) by mouth 3 (three) times daily.  Qty: 90 tablet, Refills: 11         CONTINUE these medications which have CHANGED    Details   insulin glargine, TOUJEO, (TOUJEO) 300 unit/mL (1.5 mL) InPn pen Inject 15 Units into the skin once daily.  Qty: 1.5 mL, Refills: 6      insulin lispro (HUMALOG) 100 unit/mL injection Inject 5 Units into the skin 3 (three) times daily before meals.  Qty:  4.5 mL, Refills: 11      rosuvastatin (CRESTOR) 20 MG tablet Take 1 tablet (20 mg total) by mouth once daily.  Qty: 90 tablet, Refills: 3         CONTINUE these medications which have NOT CHANGED    Details   epoetin jacques (PROCRIT) 20,000 unit/mL injection Inject 1 mL (20,000 Units total) into the skin every Tues, Thurs, Sat.  Qty: 1 mL      levetiracetam (KEPPRA) 250 MG Tab TK 1 T PO BID  Refills: 0      losartan (COZAAR) 100 MG tablet Take 1 tablet (100 mg total) by mouth once daily.  Qty: 90 tablet, Refills: 0      metoclopramide HCl (REGLAN) 10 MG tablet Take 10 mg by mouth 3 (three) times daily before meals.      nifedipine (PROCARDIA-XL) 60 MG (OSM) 24 hr tablet TK 1 T PO Q 12 H  Refills: 0      sevelamer carbonate (RENVELA) 800 mg Tab Take 2 tablets (1,600 mg total) by mouth 3 (three) times daily with meals.  Qty: 180 tablet, Refills: 0      blood sugar diagnostic Strp To use as directed with True results meter and monitor BS 6 times daily - fluctuating BS  Qty: 200 each, Refills: 12    Associated Diagnoses: Type I (juvenile type) diabetes mellitus with neurological manifestations, uncontrolled(250.63)      ondansetron (ZOFRAN-ODT) 4 MG TbDL Take 1-2 tablets by mouth every 4 hours as needed for nausea and vomiting      ONETOUCH DELICA LANCETS 33 gauge Misc TEST BLOOD SUGAR TID  Refills: 3         STOP taking these medications       insulin aspart U-100 (NOVOLOG) 100 unit/mL InPn pen Comments:   Reason for Stopping:         insulin detemir U-100 (LEVEMIR FLEXTOUCH) 100 unit/mL (3 mL) SubQ InPn pen Comments:   Reason for Stopping:         metoprolol tartrate (LOPRESSOR) 50 MG tablet Comments:   Reason for Stopping:               I certify that this patient is confined to her home and needs intermittent skilled nursing care, physical therapy and occupational therapy.

## 2018-06-08 NOTE — PT/OT/SLP EVAL
"Physical Therapy Evaluation and Discharge Note    Patient Name:  Eufemia Haq   MRN:  [id number]    Recommendations:     Discharge Recommendations:  home health PT   Discharge Equipment Recommendations:     Barriers to discharge: None    Assessment:     Eufemia Haq is a 53 y.o. female admitted with a medical diagnosis of Encephalopathy. .  At this time, patient is functioning at their prior level of function and does not require further acute PT services. Patient is generally weak and has a fear of falling, but she is adamant about going home and has no interest in going to any rehab facility despite max encouragement. Patient scheduled to be DC'd so acute PT orders will be discontinued.     Recent Surgery: * No surgery found *      Plan:     During this hospitalization, patient does not require further acute PT services.  Please re-consult if situation changes.     Plan of Care Reviewed with: patient    Subjective     Communicated with nurse prior to session.  Patient found R sidelying upon PT entry to room, agreeable to evaluation.      Chief Complaint: generalized weakness, decreased functional mobility; Patient reports that she is blind so it limits her mobility  Patient comments/goals: "I'm going home today! I don't care what they say! I ain't doing no rehab!"  Pain/Comfort:  · Pain Rating 1:  (pain communicated in bilateral shoulders but not quantified)    Patients cultural, spiritual, Jehovah's witness conflicts given the current situation: none stated    Living Environment:  Patient lives w/ spouse in a single story house, [facility]. Patient reports that someone is always home w/ her.   Prior to admission, patients level of function was requiring assistance w/ ADLs and mobilty.  Patient has the following equipment: walker, rolling, wheelchair.   Upon discharge, patient will have assistance from family.    Objective:     Patient found with:  (all lines intact)     General Precautions: Standard, fall " (patient blind?)   Orthopedic Precautions:N/A   Braces: N/A     Exams:  · Cognitive Exam:  Patient is oriented to Person, Place, Time and Situation and follows 100% of 2 step commands   · Fine Motor Coordination:    · -       Intact  · Gross Motor Coordination:  WFL  · Sensation:    · -       Intact  · RLE ROM: WFL  · RLE Strength: WFL  · LLE ROM: WFL  · LLE Strength: WFL  · BLE grossly 4/5    Functional Mobility:  · Bed Mobility:  Supine to Sit: CGA  · Sit to Supine: CGA  · Transfers:  Sit to Stand:  contact guard assistance with rolling walker  · Gait: 16' in room, CGA w/ RW...decreased step length. Distance limited by patient willingness to particiapte  · All mobility requires increased verbal/tactile cues 2/2 patient vision impairment    AM-PAC 6 CLICK MOBILITY  Total Score:18       Therapeutic Activities and Exercises:   PT Eval completed    Patient left HOB elevated with all lines intact, call button in reach and nurse notified.    GOALS:    Physical Therapy Goals     Not on file          Multidisciplinary Problems (Resolved)        Problem: Physical Therapy Goal    Goal Priority Disciplines Outcome Goal Variances Interventions   Physical Therapy Goal   (Resolved)     PT/OT, PT Outcome(s) achieved                     History:     Past Medical History:   Diagnosis Date    Anemia     during pregnancys    Arthritis     neuropathy hands feet and legs//    Blood transfusion     Chronic pain     Diabetes mellitus     Diabetes mellitus type I     Diabetic retinopathy     ESRD (end stage renal disease) on dialysis     Hyperlipidemia     Hypertension     patient states that when her blood sugar increases her blood pressure increases    Neuropathy     Pneumonia     Seizures     when sugar is high, does not quite remember    Stroke     2018    Vitamin D deficiency        Past Surgical History:   Procedure Laterality Date     SECTION, CLASSIC      x 2    EYE SURGERY      HYSTERECTOMY          Clinical Decision Making:     History  Co-morbidities and personal factors that may impact the plan of care Examination  Body Structures and Functions, activity limitations and participation restrictions that may impact the plan of care Clinical Presentation   Decision Making/ Complexity Score   Co-morbidities:   [] Time since onset of injury / illness / exacerbation  [] Status of current condition  []Patient's cognitive status and safety concerns    [] Multiple Medical Problems (see med hx)  Personal Factors:   [] Patient's age  [] Prior Level of function   [] Patient's home situation (environment and family support)  [] Patient's level of motivation  [] Expected progression of patient      HISTORY:(criteria)    [] 06580 - no personal factors/history    [] 92316 - has 1-2 personal factor/comorbidity     [] 97423 - has >3 personal factor/comorbidity     Body Regions:  [] Objective examination findings  [] Head     []  Neck  [] Trunk   [] Upper Extremity  [] Lower Extremity    Body Systems:  [] For communication ability, affect, cognition, language, and learning style: the assessment of the ability to make needs known, consciousness, orientation (person, place, and time), expected emotional /behavioral responses, and learning preferences (eg, learning barriers, education  needs)  [] For the neuromuscular system: a general assessment of gross coordinated movement (eg, balance, gait, locomotion, transfers, and transitions) and motor function  (motor control and motor learning)  [] For the musculoskeletal system: the assessment of gross symmetry, gross range of motion, gross strength, height, and weight  [] For the integumentary system: the assessment of pliability(texture), presence of scar formation, skin color, and skin integrity  [] For cardiovascular/pulmonary system: the assessment of heart rate, respiratory rate, blood pressure, and edema     Activity limitations:    [] Patient's cognitive status and saf  ety concerns          [] Status of current condition      [] Weight bearing restriction  [] Cardiopulmunary Restriction    Participation Restrictions:   [] Goals and goal agreement with the patient     [] Rehab potential (prognosis) and probable outcome      Examination of Body System: (criteria)    [] 10910 - addressing 1-2 elements    [] 36563 - addressing a total of 3 or more elements     [] 09393 -  Addressing a total of 4 or more elements         Clinical Presentation: (criteria)  Choose one     On examination of body system using standardized tests and measures patient presents with (CHOOSE ONE) elements from any of the following: body structures and functions, activity limitations, and/or participation restrictions.  Leading to a clinical presentation that is considered (CHOOSE ONE)                              Clinical Decision Making  (Eval Complexity):  Low- 08600     Time Tracking:     PT Received On: 06/08/18  PT Start Time: 0904     PT Stop Time: 0917  PT Total Time (min): 13 min     Billable Minutes: Evaluation 13 Min      Tej Renee, PT  06/08/2018

## 2018-06-08 NOTE — DISCHARGE SUMMARY
[facility] Hospital Medicine  Discharge Summary      Patient Name: Eufemia Haq  MRN: [id number]  Admission Date: 6/2/2018  Hospital Length of Stay: 6 days  Discharge Date and Time:  06/08/2018 1:06 PM  Attending Physician: Ema Fischer*   Discharging Provider: Ema Fischer MD  Primary Care Provider: No primary care provider on file.  Hospital Medicine Team: [facility] HOSP MED A Ema Fischer MD    HPI:   Ms. Haq is a 53 year old Female with PMHx of Diabetes, HTN, HLD, ESRD (on HD T,Th,Sat) 2 prior strokes and seizures who presented to [facility] after she was found by with left sided gaze preference. Per notes, patient's LKN was last night. The patient's  went in her room to take her to dialysis this morning. At that time, she was slow to respond and had left sided gaze preference. Patient will be admitted to [facility] for higher level of care.    * No surgery found *      Hospital Course:   6/2/18: Patient presented to [facility] with left sided gaze preference and decreased responsiveness; admitted to [facility] for higher level of care  6/3: improved mental status, off nicardipine, insulin gtt  6/4: hyperglycemia insulin gtt restarted. ESRD on HD  6/5: stepdown to hospital medicine, HD today, Endocrine following for DM management    6/8: Medically stable and back to baseline. Refusing SNF eval, insists on returning home. Will order HH and send to diabetes education and Endocrine f/u, as recommended by endocrinology. Recs appreciated. Plan reviewed with patient.      Consults:   Consults         Status Ordering Provider     Inpatient consult to Endocrinology  Once     Provider:  (Not yet assigned)    Completed NISHA STEVENSON     Inpatient consult to Nephrology  Once     Provider:  (Not yet assigned)    Completed NNAMDI CHEN     [facility]  Once     Provider:  (Not yet assigned)    Completed NISHA STEVENSON     [facility]   Once     Provider:  (Not yet assigned)    Completed VERO CUMMINGS     Inpatient consult to Vascular (Stroke) Neurology  Once     Provider:  (Not yet assigned)    Completed NNAMDI CHEN          * Encephalopathy    Patient with PMHx of Seizures [place] [facility] [place] left sided gaze and decreased responsiveness  -Pt with elevated BP that could be contributing to encephalopathy  -Patient s/p Keppra 1 gram IV load  -Continue keppra 250 BID  -BP control          Cerebrovascular accident (CVA)    Continue secondary prevention measures: ASA, crestor, BP control, smoking cessation   PT/OT          Seizure disorder    -Patient s/p 1gram Keppra IV load   -Continue patient's home AED of Keppra 250 mg BID; adjusted for renal dosing        ESRD on dialysis    Dialyzed yesterday; resume regular HD schedule          Type 1 diabetes mellitus    Appreciate endocrinology's recommendations. Patient declined changes recommended in favor of her own regimen. Endo aware. Will see in clinic for close follow up and send to diabetic education.           Essential hypertension    Continue Losartan, Procardia, change lopressor to Coreg             Final Active Diagnoses:    Diagnosis Date Noted POA    PRINCIPAL PROBLEM:  Encephalopathy [G93.40] 06/02/2018 Yes    Left-sided weakness [R53.1] 06/05/2018 Yes    Cerebrovascular accident (CVA) [I63.9] 06/05/2018 Yes    Altered mental status [R41.82] 06/04/2018 Yes    Seizure disorder [G40.909] 03/12/2018 Yes    ESRD on dialysis [N18.6, Z99.2] 08/26/2017 Not Applicable    Type 1 diabetes mellitus [E10.9] 02/12/2015 Yes    Essential hypertension [I10] 12/15/2014 Yes      Problems Resolved During this Admission:    Diagnosis Date Noted Date Resolved POA    Acute pulmonary edema [J81.0] 06/05/2018 06/06/2018 Yes    Altered mental status [R41.82] 10/15/2015 06/02/2018 Yes       Discharged Condition: good    Disposition: Home or Self Care    Follow Up:  Follow-up  Information     [facility] In 1 week.    Contact information:  [address]  [facility] [id number] [phone]                 Patient Instructions:   No discharge procedures on file.    Significant Diagnostic Studies: Labs:   CMP     Recent Labs  Lab 06/07/18  0647 06/08/18 0358   * 137   K 4.9 4.4    102   CO2 21* 25   * 163*   BUN 63* 45*   CREATININE 4.9* 3.6*   CALCIUM 7.6* 7.8*   PROT 5.6* 5.5*   ALBUMIN 2.6* 2.6*   BILITOT 0.3 0.3   ALKPHOS 226* 210*   AST 11 11   ALT 12 10   ANIONGAP 11 10   ESTGFRAFRICA 10.9* 15.8*   EGFRNONAA 9.4* 13.7*    and CBC     Recent Labs  Lab 06/07/18 0647 06/08/18 0358   WBC 4.01 3.79*   HGB 8.6* 9.0*   HCT 30.3* 30.2*   * 163       Pending Diagnostic Studies:     Procedure Component Value Units Date/Time    CBC auto differential [[id number]] Collected:  06/03/18 0214    Order Status:  Sent Lab Status:  In process Updated:  06/03/18 0215    Specimen:  Blood from Blood     Comprehensive metabolic panel [[id number]] Collected:  06/03/18 0214    Order Status:  Sent Lab Status:  In process Updated:  06/03/18 0215    Specimen:  Blood from Blood     Potassium - if not ordered in last 24 hours [[id number]] Collected:  06/02/18 2153    Order Status:  Sent Lab Status:  In process Updated:  06/02/18 2153    Specimen:  Blood from Blood     Renin [[id number]] Collected:  06/07/18 1138    Order Status:  Sent Lab Status:  In process Updated:  06/07/18 1147    Specimen:  Blood from Blood     Narrative:       Collection has been rescheduled by TAMERA at 6/7/2018 11:34 Reason:   Patient unavailable in dialysis  Collection has been rescheduled by [facility] at 6/7/2018 11:34 Reason:   Patient unavailable in dialysis         Medications:  Reconciled Home Medications:      Medication List      START taking these medications    carvedilol 6.25 MG tablet  Commonly known as:  COREG  Take 1 tablet (6.25 mg total) by mouth 2 (two) times daily.     cloNIDine 0.1 MG  tablet  Commonly known as:  CATAPRES  Take 1 tablet (0.1 mg total) by mouth 3 (three) times daily.        CONTINUE taking these medications    blood sugar diagnostic Strp  To use as directed with True results meter and monitor BS 6 times daily - fluctuating BS     epoetin jacques 20,000 unit/mL injection  Commonly known as:  PROCRIT  Inject 1 mL (20,000 Units total) into the skin every Tues, Thurs, Sat.     insulin glargine (TOUJEO) 300 unit/mL (1.5 mL) Inpn pen  Commonly known as:  TOUJEO  Inject 15 Units into the skin once daily.     insulin lispro 100 unit/mL injection  Commonly known as:  HUMALOG  Inject 5 Units into the skin 3 (three) times daily before meals.     levETIRAcetam 250 MG Tab  Commonly known as:  KEPPRA  TK 1 T PO BID     losartan 100 MG tablet  Commonly known as:  COZAAR  Take 1 tablet (100 mg total) by mouth once daily.     metoclopramide HCl 10 MG tablet  Commonly known as:  REGLAN  Take 10 mg by mouth 3 (three) times daily before meals.     NIFEdipine 60 MG (OSM) 24 hr tablet  Commonly known as:  PROCARDIA-XL  TK 1 T PO Q 12 H     ondansetron 4 MG Tbdl  Commonly known as:  ZOFRAN-ODT  Take 1-2 tablets by mouth every 4 hours as needed for nausea and vomiting     ONETOUCH DELICA LANCETS 33 gauge Misc  Generic drug:  lancets  TEST BLOOD SUGAR TID     rosuvastatin 20 MG tablet  Commonly known as:  CRESTOR  Take 1 tablet (20 mg total) by mouth once daily.     sevelamer carbonate 800 mg Tab  Commonly known as:  RENVELA  Take 2 tablets (1,600 mg total) by mouth 3 (three) times daily with meals.        STOP taking these medications    insulin aspart U-100 100 unit/mL Inpn pen  Commonly known as:  NovoLOG     insulin detemir U-100 100 unit/mL (3 mL) Inpn pen  Commonly known as:  LEVEMIR FLEXTOUCH     metoprolol tartrate 50 MG tablet  Commonly known as:  LOPRESSOR            Indwelling Lines/Drains at time of discharge:   Lines/Drains/Airways     Central Venous Catheter Line                 Hemodialysis  Catheter right subclavian -- days          Drain                 Hemodialysis AV Graft Left upper arm -- days                Time spent on the discharge of patient: 35 minutes  Patient was seen and examined on the date of discharge and determined to be suitable for discharge.         Ema Fischer MD  Department of Hospital Medicine  [facility]

## 2018-06-09 LAB — RENIN PLAS-CCNC: 0.9 NG/ML/H

## 2018-06-10 ENCOUNTER — HOSPITAL ENCOUNTER (EMERGENCY)
Facility: OTHER | Age: 53
Discharge: LEFT BEFORE DISCHARGE | End: 2018-06-10
Attending: EMERGENCY MEDICINE
Payer: MEDICARE

## 2018-06-10 VITALS
HEIGHT: 66 IN | SYSTOLIC BLOOD PRESSURE: 195 MMHG | DIASTOLIC BLOOD PRESSURE: 84 MMHG | RESPIRATION RATE: 15 BRPM | OXYGEN SATURATION: 97 % | WEIGHT: 145 LBS | BODY MASS INDEX: 23.3 KG/M2 | TEMPERATURE: 97 F | HEART RATE: 66 BPM

## 2018-06-10 DIAGNOSIS — R73.9 HYPERGLYCEMIA: Primary | ICD-10-CM

## 2018-06-10 DIAGNOSIS — S49.90XA ARM INJURY: ICD-10-CM

## 2018-06-10 LAB
ALBUMIN SERPL BCP-MCNC: 3.2 G/DL
ALP SERPL-CCNC: 323 U/L
ALT SERPL W/O P-5'-P-CCNC: 38 U/L
ANION GAP SERPL CALC-SCNC: 20 MMOL/L
AST SERPL-CCNC: 72 U/L
B-OH-BUTYR BLD STRIP-SCNC: 1.2 MMOL/L
BASOPHILS # BLD AUTO: 0.02 K/UL
BASOPHILS NFR BLD: 0.4 %
BILIRUB SERPL-MCNC: 0.3 MG/DL
BUN SERPL-MCNC: 68 MG/DL
CALCIUM SERPL-MCNC: 8.2 MG/DL
CHLORIDE SERPL-SCNC: 96 MMOL/L
CO2 SERPL-SCNC: 18 MMOL/L
CREAT SERPL-MCNC: 4.6 MG/DL
DIFFERENTIAL METHOD: ABNORMAL
EOSINOPHIL # BLD AUTO: 0 K/UL
EOSINOPHIL NFR BLD: 0.7 %
ERYTHROCYTE [DISTWIDTH] IN BLOOD BY AUTOMATED COUNT: 15.2 %
EST. GFR  (AFRICAN AMERICAN): 12 ML/MIN/1.73 M^2
EST. GFR  (NON AFRICAN AMERICAN): 10 ML/MIN/1.73 M^2
GLUCOSE SERPL-MCNC: 710 MG/DL
GLUCOSE SERPL-MCNC: >500 MG/DL (ref 70–110)
HCT VFR BLD AUTO: 31 %
HGB BLD-MCNC: 9.3 G/DL
LYMPHOCYTES # BLD AUTO: 1.2 K/UL
LYMPHOCYTES NFR BLD: 22.6 %
MAGNESIUM SERPL-MCNC: 2.3 MG/DL
MCH RBC QN AUTO: 27 PG
MCHC RBC AUTO-ENTMCNC: 30 G/DL
MCV RBC AUTO: 90 FL
MONOCYTES # BLD AUTO: 0.5 K/UL
MONOCYTES NFR BLD: 8.3 %
NEUTROPHILS # BLD AUTO: 3.7 K/UL
NEUTROPHILS NFR BLD: 67.8 %
PLATELET # BLD AUTO: 196 K/UL
PMV BLD AUTO: 10.2 FL
POTASSIUM SERPL-SCNC: 3.9 MMOL/L
PROT SERPL-MCNC: 6.4 G/DL
RBC # BLD AUTO: 3.45 M/UL
SODIUM SERPL-SCNC: 134 MMOL/L
WBC # BLD AUTO: 5.39 K/UL

## 2018-06-10 PROCEDURE — 63600175 PHARM REV CODE 636 W HCPCS: Performed by: EMERGENCY MEDICINE

## 2018-06-10 PROCEDURE — 96372 THER/PROPH/DIAG INJ SC/IM: CPT | Mod: 59

## 2018-06-10 PROCEDURE — 96374 THER/PROPH/DIAG INJ IV PUSH: CPT

## 2018-06-10 PROCEDURE — S0171 BUMETANIDE 0.5 MG: HCPCS | Performed by: EMERGENCY MEDICINE

## 2018-06-10 PROCEDURE — 82010 KETONE BODYS QUAN: CPT

## 2018-06-10 PROCEDURE — 96375 TX/PRO/DX INJ NEW DRUG ADDON: CPT

## 2018-06-10 PROCEDURE — 80053 COMPREHEN METABOLIC PANEL: CPT

## 2018-06-10 PROCEDURE — 85025 COMPLETE CBC W/AUTO DIFF WBC: CPT

## 2018-06-10 PROCEDURE — 99285 EMERGENCY DEPT VISIT HI MDM: CPT | Mod: 25

## 2018-06-10 PROCEDURE — 83735 ASSAY OF MAGNESIUM: CPT

## 2018-06-10 PROCEDURE — 25000003 PHARM REV CODE 250: Performed by: EMERGENCY MEDICINE

## 2018-06-10 RX ORDER — BUMETANIDE 1 MG/1
1 TABLET ORAL 2 TIMES DAILY
COMMUNITY
End: 2018-08-15 | Stop reason: SDUPTHER

## 2018-06-10 RX ORDER — MORPHINE SULFATE 2 MG/ML
2 INJECTION, SOLUTION INTRAMUSCULAR; INTRAVENOUS
Status: COMPLETED | OUTPATIENT
Start: 2018-06-10 | End: 2018-06-10

## 2018-06-10 RX ORDER — BUMETANIDE 0.25 MG/ML
1 INJECTION INTRAMUSCULAR; INTRAVENOUS
Status: COMPLETED | OUTPATIENT
Start: 2018-06-10 | End: 2018-06-10

## 2018-06-10 RX ORDER — DICYCLOMINE HYDROCHLORIDE 10 MG/ML
20 INJECTION INTRAMUSCULAR
Status: COMPLETED | OUTPATIENT
Start: 2018-06-10 | End: 2018-06-10

## 2018-06-10 RX ADMIN — MORPHINE SULFATE 2 MG: 2 INJECTION, SOLUTION INTRAMUSCULAR; INTRAVENOUS at 04:06

## 2018-06-10 RX ADMIN — DICYCLOMINE HYDROCHLORIDE 20 MG: 10 INJECTION INTRAMUSCULAR at 04:06

## 2018-06-10 RX ADMIN — BUMETANIDE 1 MG: 0.25 INJECTION INTRAMUSCULAR; INTRAVENOUS at 04:06

## 2018-06-10 NOTE — ED NOTES
"Pt refusing to stay in the hospital. Refusing further care. AMA form filled out by dr. Ricci. Form read to patient and patient signed, agreeing to leave AMA. Pt states "i'm just going to go take insulin, my sugars are always this high". Pt not receptive to discharge teaching. Family at bedside to take patient home.  "

## 2018-06-10 NOTE — ED PROVIDER NOTES
Encounter Date: 6/10/2018    SCRIBE #1 NOTE: I, Davon Young, am scribing for, and in the presence of, Dr. Ricci.       History     Chief Complaint   Patient presents with    Shoulder Pain     Pt c/o right shoulder pain x 4 days.     Facial Swelling     Pt reports facial, hand and abd swelling since this am. Pt reports CBG >500 PTA.     Hyperglycemia     Seen by provider: 3:39 PM    Patient is a 53 y.o. female with a history of HTN, HLD, DM, and ESRD on dialysis (T, Th, Sat) who presents to the ED with complaint of right shoulder/upper arm pain, which began four days ago. She reports the pain is constant and notes a bruise at the site of pain. She does not recall any trauma or injury. She reports taking ibuprofen, goody's powder, tylenol, and BC powder with minimal relief.     Patient also complains of face, neck, and bilateral leg swelling which began this morning. She reports having a full dialysis session without complications yesterday that removed 4L of fluid, which is typical for her. She states she usually does not have any swelling until just prior to her next dialysis session. She states she currently feels fluid overloaded, but notes the swelling has improved slightly since this morning. She states she produces a small amount of urine. She reports taking Bumex in the past, but is not currently. She denies nausea, vomiting, diarrhea, abdominal pain, chest pain, or shortness of breath. She notes she was recently admitted to the ICU for four days.      The history is provided by the patient and a relative.     Review of patient's allergies indicates:   Allergen Reactions    Ciprofloxacin (bulk) Itching    Latex Itching and Other (See Comments)     Very low Oxygen    Vancomycin Shortness Of Breath and Itching    Neurontin [gabapentin] Other (See Comments)     Seizure like activity    Niacin Itching and Other (See Comments)     Burning      Bactrim [sulfamethoxazole-trimethoprim] Rash     Past Medical  History:   Diagnosis Date    Anemia     during pregnancys    Arthritis     neuropathy hands feet and legs//    Blood transfusion     Chronic pain     Diabetes mellitus     Diabetes mellitus type I     Diabetic retinopathy     ESRD (end stage renal disease) on dialysis     Hyperlipidemia     Hypertension     patient states that when her blood sugar increases her blood pressure increases    Neuropathy     Pneumonia     Seizures     when sugar is high, does not quite remember    Stroke     2018    Vitamin D deficiency      Past Surgical History:   Procedure Laterality Date     SECTION, CLASSIC      x 2    EYE SURGERY      HYSTERECTOMY       Family History   Problem Relation Age of Onset    Diabetes Mother     Heart disease Mother     Blindness Mother         diabetes    Hypertension Mother     Diabetes Father     Asthma Father     Hypertension Father     Thyroid disease Sister     Breast cancer Daughter     Diabetes Sister     Stroke Maternal Uncle     Glaucoma Maternal Grandmother     Blindness Maternal Grandmother     Cataracts Maternal Grandmother     Hypertension Maternal Grandmother     Cancer Cousin     Amblyopia Neg Hx     Macular degeneration Neg Hx     Retinal detachment Neg Hx     Strabismus Neg Hx     Colon cancer Neg Hx     Ovarian cancer Neg Hx      Social History   Substance Use Topics    Smoking status: Current Some Day Smoker     Packs/day: 0.10     Years: 10.00     Types: Cigarettes    Smokeless tobacco: Never Used      Comment: 1 pack last her about 2-3 months    Alcohol use No     Review of Systems   Constitutional: Negative for fever.   HENT: Positive for facial swelling. Negative for sore throat.    Respiratory: Negative for shortness of breath.    Cardiovascular: Positive for leg swelling (bilateral). Negative for chest pain.   Gastrointestinal: Negative for abdominal pain, diarrhea, nausea and vomiting.   Genitourinary: Positive for decreased  urine volume (chronic, hx ESRD). Negative for dysuria.   Musculoskeletal: Negative for back pain.        Positive for right shoulder/upper arm pain. Positive for neck swelling.   Skin: Negative for rash.   Neurological: Negative for dizziness, weakness and headaches.   Hematological: Does not bruise/bleed easily.       Physical Exam     Initial Vitals [06/10/18 1522]   BP Pulse Resp Temp SpO2   (!) 193/73 70 18 97.2 °F (36.2 °C) 98 %      MAP       113         Physical Exam    Nursing note and vitals reviewed.  Constitutional: She appears well-developed and well-nourished. She is not diaphoretic. No distress.   HENT:   Head: Normocephalic and atraumatic.   Eyes: Conjunctivae and EOM are normal.   Neck: Normal range of motion. Neck supple.   Cardiovascular: Normal rate, regular rhythm and normal heart sounds.   2+ DP/PT pulses bilaterally. Left upper extremity AV fistula with palpable thrill.   Pulmonary/Chest: Breath sounds normal. No respiratory distress. She has no wheezes.   Abdominal: Soft. Bowel sounds are normal. She exhibits no distension. There is no tenderness. There is no rebound.   Musculoskeletal: Normal range of motion. She exhibits edema (2+ pitting edema to bilateral lower extremities).   Neurological: She is alert and oriented to person, place, and time.   Skin: Skin is warm and dry.   Ecchymosis noted to right upper arm with tenderness to touch.   Psychiatric: She has a normal mood and affect. Her behavior is normal.         ED Course   Procedures  Labs Reviewed   COMPREHENSIVE METABOLIC PANEL - Abnormal; Notable for the following:        Result Value    Sodium 134 (*)     CO2 18 (*)     Glucose 710 (*)     BUN, Bld 68 (*)     Creatinine 4.6 (*)     Calcium 8.2 (*)     Albumin 3.2 (*)     Alkaline Phosphatase 323 (*)     AST 72 (*)     Anion Gap 20 (*)     eGFR if  12 (*)     eGFR if non  10 (*)     All other components within normal limits    Narrative:       GLU  critical result(s) called and verbal readback obtained from Christofer Obrien RN, 06/10/2018 16:41   CBC W/ AUTO DIFFERENTIAL - Abnormal; Notable for the following:     RBC 3.45 (*)     Hemoglobin 9.3 (*)     Hematocrit 31.0 (*)     MCHC 30.0 (*)     RDW 15.2 (*)     All other components within normal limits   BETA - HYDROXYBUTYRATE, SERUM - Abnormal; Notable for the following:     Beta-Hydroxybutyrate 1.2 (*)     All other components within normal limits   POCT GLUCOSE MONITORING CONTINUOUS - Abnormal; Notable for the following:     POC Glucose >500 (*)     All other components within normal limits   MAGNESIUM   URINALYSIS          X-Ray Humerus 2 View Right   Final Result      No fracture identified.  Mild DJD of the shoulder.         Electronically signed by: Tj Hernandez MD   Date:    06/10/2018   Time:    16:24        X-Rays:   Independently Interpreted Readings:   Other Readings:  Right humerus: No fracture or dislocation.    Medical Decision Making:   History:   Old Medical Records: I decided to obtain old medical records.  Old Records Summarized: records from previous admission(s), other records and records from clinic visits.  Initial Assessment:   3:39PM:  Patient is a 53-year-old female who presents to the emergency department with right upper arm pain along with diffuse swelling. Patient appears well, nontoxic.  Will plan to check labs, x-ray, will continue to follow reassess.  Independently Interpreted Test(s):   I have ordered and independently interpreted X-rays - see prior notes.  Clinical Tests:   Lab Tests: Ordered and Reviewed  Radiological Study: Ordered and Reviewed    4:53 PM:  Patient doing well. She remained stable.  X-rays negative for any acute findings.  Her potassium is within normal limits. However her blood sugar is in the 700s with an elevated ketone level.  I am concerned that she is developing DKA.  I updated the patient regarding the results of the x-ray and labs, including the  need for admission and IV insulin.  Patient however declines admission.  She did request me to give a dose of insulin prior to her leaving.  However, I do not feel comfortable with this, given her ESRD status.  I did explain this to her.  Patient is competent and has full decision-making capacity.  I did extensively explain to her and her daughter at bedside regarding the risks of leaving, including diabetic coma, and other life-threatening illnesses.  They understand the risks, and they plan to manage her elevated glucose at home.  They are comfortable returning to the emergency department as needed.  Patient signed AMA paperwork.            Scribe Attestation:   Scribe #1: I performed the above scribed service and the documentation accurately describes the services I performed. I attest to the accuracy of the note.    Attending Attestation:           Physician Attestation for Scribe:  Physician Attestation Statement for Scribe #1: I, Dr. Ricci, reviewed documentation, as scribed by Davon Young in my presence, and it is both accurate and complete.                    Clinical Impression:     1. Hyperglycemia    2. Arm injury                                Melina Ricci MD  06/10/18 9077

## 2018-06-10 NOTE — DISCHARGE INSTRUCTIONS
Please return to the ER if you have chest pain, difficulty breathing, fevers, altered mental status, dizziness, weakness, or any other concerns.      Follow up with your primary care physician.

## 2018-06-11 LAB — POCT GLUCOSE: >500 MG/DL (ref 70–110)

## 2018-06-18 ENCOUNTER — TELEPHONE (OUTPATIENT)
Dept: ENDOCRINOLOGY | Facility: CLINIC | Age: 53
End: 2018-06-18

## 2018-06-18 NOTE — TELEPHONE ENCOUNTER
----- Message from Cecille Cleaning sent at 6/18/2018 10:37 AM CDT -----  Contact: [facility] / [phone]  [facility] wants to know if a doctor will follow the PT while with home health care.

## 2018-07-11 ENCOUNTER — HOSPITAL ENCOUNTER (INPATIENT)
Facility: HOSPITAL | Age: 53
LOS: 3 days | Discharge: HOME | DRG: 377 | End: 2018-07-15
Attending: EMERGENCY MEDICINE | Admitting: EMERGENCY MEDICINE
Payer: MEDICARE

## 2018-07-11 DIAGNOSIS — K52.9 ACUTE GASTROENTERITIS: ICD-10-CM

## 2018-07-11 DIAGNOSIS — K92.0 HEMATEMESIS WITH NAUSEA: ICD-10-CM

## 2018-07-11 DIAGNOSIS — E11.9 DIABETES: ICD-10-CM

## 2018-07-11 DIAGNOSIS — M25.512 BILATERAL SHOULDER PAIN: ICD-10-CM

## 2018-07-11 DIAGNOSIS — M25.511 BILATERAL SHOULDER PAIN: ICD-10-CM

## 2018-07-11 DIAGNOSIS — K25.0 ACUTE GASTRIC ULCER WITH HEMORRHAGE: Primary | ICD-10-CM

## 2018-07-11 DIAGNOSIS — R11.2 INTRACTABLE VOMITING WITH NAUSEA, UNSPECIFIED VOMITING TYPE: ICD-10-CM

## 2018-07-11 LAB — POCT GLUCOSE: 312 MG/DL (ref 70–110)

## 2018-07-11 PROCEDURE — 96376 TX/PRO/DX INJ SAME DRUG ADON: CPT

## 2018-07-11 PROCEDURE — 82962 GLUCOSE BLOOD TEST: CPT

## 2018-07-11 PROCEDURE — 96372 THER/PROPH/DIAG INJ SC/IM: CPT

## 2018-07-11 PROCEDURE — 99285 EMERGENCY DEPT VISIT HI MDM: CPT

## 2018-07-11 PROCEDURE — 93005 ELECTROCARDIOGRAM TRACING: CPT

## 2018-07-11 PROCEDURE — 36430 TRANSFUSION BLD/BLD COMPNT: CPT

## 2018-07-11 PROCEDURE — 99285 EMERGENCY DEPT VISIT HI MDM: CPT | Mod: ,,, | Performed by: EMERGENCY MEDICINE

## 2018-07-11 PROCEDURE — 96365 THER/PROPH/DIAG IV INF INIT: CPT

## 2018-07-11 PROCEDURE — 96375 TX/PRO/DX INJ NEW DRUG ADDON: CPT

## 2018-07-12 ENCOUNTER — ANESTHESIA EVENT (OUTPATIENT)
Dept: ENDOSCOPY | Facility: HOSPITAL | Age: 53
DRG: 377 | End: 2018-07-12
Payer: MEDICARE

## 2018-07-12 ENCOUNTER — ANESTHESIA (OUTPATIENT)
Dept: ENDOSCOPY | Facility: HOSPITAL | Age: 53
DRG: 377 | End: 2018-07-12
Payer: MEDICARE

## 2018-07-12 PROBLEM — K92.0 HEMATEMESIS WITH NAUSEA: Status: ACTIVE | Noted: 2018-07-12

## 2018-07-12 PROBLEM — E11.9 DIABETES: Status: ACTIVE | Noted: 2018-07-12

## 2018-07-12 PROBLEM — I10 ESSENTIAL HYPERTENSION: Status: ACTIVE | Noted: 2018-07-12

## 2018-07-12 PROBLEM — R11.2 NAUSEA AND VOMITING: Status: ACTIVE | Noted: 2018-07-12

## 2018-07-12 PROBLEM — N18.6 ESRD (END STAGE RENAL DISEASE): Status: ACTIVE | Noted: 2018-07-12

## 2018-07-12 PROBLEM — I16.0 HYPERTENSIVE URGENCY: Status: ACTIVE | Noted: 2018-07-12

## 2018-07-12 LAB
ABO + RH BLD: NORMAL
ALBUMIN SERPL BCP-MCNC: 3.1 G/DL
ALLENS TEST: ABNORMAL
ALP SERPL-CCNC: 328 U/L
ALT SERPL W/O P-5'-P-CCNC: 37 U/L
ANION GAP SERPL CALC-SCNC: 14 MMOL/L
ANION GAP SERPL CALC-SCNC: 18 MMOL/L
ANISOCYTOSIS BLD QL SMEAR: SLIGHT
AST SERPL-CCNC: 32 U/L
B-OH-BUTYR BLD STRIP-SCNC: 0.1 MMOL/L
BACTERIA #/AREA URNS AUTO: ABNORMAL /HPF
BASO STIPL BLD QL SMEAR: ABNORMAL
BASOPHILS # BLD AUTO: 0.03 K/UL
BASOPHILS # BLD AUTO: 0.05 K/UL
BASOPHILS # BLD AUTO: 0.05 K/UL
BASOPHILS NFR BLD: 0.3 %
BASOPHILS NFR BLD: 0.4 %
BASOPHILS NFR BLD: 0.4 %
BILIRUB SERPL-MCNC: 0.3 MG/DL
BILIRUB UR QL STRIP: NEGATIVE
BLD GP AB SCN CELLS X3 SERPL QL: NORMAL
BUN SERPL-MCNC: 70 MG/DL
BUN SERPL-MCNC: 80 MG/DL
CALCIUM SERPL-MCNC: 8.4 MG/DL
CALCIUM SERPL-MCNC: 8.8 MG/DL
CHLORIDE SERPL-SCNC: 88 MMOL/L
CHLORIDE SERPL-SCNC: 96 MMOL/L
CLARITY UR REFRACT.AUTO: ABNORMAL
CO2 SERPL-SCNC: 28 MMOL/L
CO2 SERPL-SCNC: 29 MMOL/L
COLOR UR AUTO: YELLOW
CREAT SERPL-MCNC: 5 MG/DL
CREAT SERPL-MCNC: 5.2 MG/DL
DELSYS: ABNORMAL
DIFFERENTIAL METHOD: ABNORMAL
EOSINOPHIL # BLD AUTO: 0 K/UL
EOSINOPHIL NFR BLD: 0 %
EOSINOPHIL NFR BLD: 0.1 %
EOSINOPHIL NFR BLD: 0.3 %
ERYTHROCYTE [DISTWIDTH] IN BLOOD BY AUTOMATED COUNT: 17.8 %
ERYTHROCYTE [DISTWIDTH] IN BLOOD BY AUTOMATED COUNT: 17.8 %
ERYTHROCYTE [DISTWIDTH] IN BLOOD BY AUTOMATED COUNT: 22.8 %
EST. GFR  (AFRICAN AMERICAN): 10.1 ML/MIN/1.73 M^2
EST. GFR  (AFRICAN AMERICAN): 10.6 ML/MIN/1.73 M^2
EST. GFR  (NON AFRICAN AMERICAN): 8.8 ML/MIN/1.73 M^2
EST. GFR  (NON AFRICAN AMERICAN): 9.2 ML/MIN/1.73 M^2
GGT SERPL-CCNC: 367 U/L
GLUCOSE SERPL-MCNC: 228 MG/DL
GLUCOSE SERPL-MCNC: 368 MG/DL
GLUCOSE UR QL STRIP: ABNORMAL
HCO3 UR-SCNC: 39.7 MMOL/L (ref 24–28)
HCT VFR BLD AUTO: 22.7 %
HCT VFR BLD AUTO: 25.6 %
HCT VFR BLD AUTO: 25.7 %
HGB BLD-MCNC: 7 G/DL
HGB BLD-MCNC: 7.4 G/DL
HGB BLD-MCNC: 8 G/DL
HGB UR QL STRIP: NEGATIVE
HYALINE CASTS UR QL AUTO: 7 /LPF
IMM GRANULOCYTES # BLD AUTO: 0.13 K/UL
IMM GRANULOCYTES # BLD AUTO: 0.14 K/UL
IMM GRANULOCYTES # BLD AUTO: 0.16 K/UL
IMM GRANULOCYTES NFR BLD AUTO: 1.2 %
IMM GRANULOCYTES NFR BLD AUTO: 1.3 %
IMM GRANULOCYTES NFR BLD AUTO: 1.4 %
INR PPP: 1
KETONES UR QL STRIP: ABNORMAL
LEUKOCYTE ESTERASE UR QL STRIP: ABNORMAL
LIPASE SERPL-CCNC: 7 U/L
LYMPHOCYTES # BLD AUTO: 0.7 K/UL
LYMPHOCYTES # BLD AUTO: 0.9 K/UL
LYMPHOCYTES # BLD AUTO: 1.2 K/UL
LYMPHOCYTES NFR BLD: 10 %
LYMPHOCYTES NFR BLD: 5.8 %
LYMPHOCYTES NFR BLD: 9.8 %
MCH RBC QN AUTO: 27.5 PG
MCH RBC QN AUTO: 28 PG
MCH RBC QN AUTO: 28.4 PG
MCHC RBC AUTO-ENTMCNC: 28.8 G/DL
MCHC RBC AUTO-ENTMCNC: 30.8 G/DL
MCHC RBC AUTO-ENTMCNC: 31.3 G/DL
MCV RBC AUTO: 91 FL
MCV RBC AUTO: 91 FL
MCV RBC AUTO: 96 FL
MICROSCOPIC COMMENT: ABNORMAL
MONOCYTES # BLD AUTO: 0.6 K/UL
MONOCYTES # BLD AUTO: 0.6 K/UL
MONOCYTES # BLD AUTO: 1.2 K/UL
MONOCYTES NFR BLD: 10.1 %
MONOCYTES NFR BLD: 4.8 %
MONOCYTES NFR BLD: 6.3 %
NEUTROPHILS # BLD AUTO: 11.1 K/UL
NEUTROPHILS # BLD AUTO: 7.4 K/UL
NEUTROPHILS # BLD AUTO: 9 K/UL
NEUTROPHILS NFR BLD: 78 %
NEUTROPHILS NFR BLD: 82.1 %
NEUTROPHILS NFR BLD: 87.7 %
NITRITE UR QL STRIP: NEGATIVE
NRBC BLD-RTO: 0 /100 WBC
NRBC BLD-RTO: 0 /100 WBC
NRBC BLD-RTO: 1 /100 WBC
OVALOCYTES BLD QL SMEAR: ABNORMAL
PCO2 BLDA: 48.2 MMHG (ref 35–45)
PH SMN: 7.52 [PH] (ref 7.35–7.45)
PH UR STRIP: 5 [PH] (ref 5–8)
PLATELET # BLD AUTO: 248 K/UL
PLATELET # BLD AUTO: 277 K/UL
PLATELET # BLD AUTO: 309 K/UL
PMV BLD AUTO: 10.7 FL
PMV BLD AUTO: 10.9 FL
PMV BLD AUTO: 11.7 FL
PO2 BLDA: 46 MMHG (ref 40–60)
POC BE: 17 MMOL/L
POC SATURATED O2: 86 % (ref 95–100)
POC TCO2: 41 MMOL/L (ref 24–29)
POCT GLUCOSE: 241 MG/DL (ref 70–110)
POCT GLUCOSE: 249 MG/DL (ref 70–110)
POCT GLUCOSE: 326 MG/DL (ref 70–110)
POCT GLUCOSE: 356 MG/DL (ref 70–110)
POCT GLUCOSE: 365 MG/DL (ref 70–110)
POCT GLUCOSE: 404 MG/DL (ref 70–110)
POCT GLUCOSE: 411 MG/DL (ref 70–110)
POCT GLUCOSE: 412 MG/DL (ref 70–110)
POCT GLUCOSE: 442 MG/DL (ref 70–110)
POIKILOCYTOSIS BLD QL SMEAR: SLIGHT
POLYCHROMASIA BLD QL SMEAR: ABNORMAL
POTASSIUM SERPL-SCNC: 4.1 MMOL/L
POTASSIUM SERPL-SCNC: 4.2 MMOL/L
PROT SERPL-MCNC: 6.4 G/DL
PROT UR QL STRIP: ABNORMAL
PROTHROMBIN TIME: 10.4 SEC
RBC # BLD AUTO: 2.5 M/UL
RBC # BLD AUTO: 2.69 M/UL
RBC # BLD AUTO: 2.82 M/UL
RBC #/AREA URNS AUTO: 2 /HPF (ref 0–4)
SAMPLE: ABNORMAL
SITE: ABNORMAL
SODIUM SERPL-SCNC: 134 MMOL/L
SODIUM SERPL-SCNC: 139 MMOL/L
SP GR UR STRIP: 1.01 (ref 1–1.03)
SQUAMOUS #/AREA URNS AUTO: 7 /HPF
TROPONIN I SERPL DL<=0.01 NG/ML-MCNC: 0.1 NG/ML
TROPONIN I SERPL DL<=0.01 NG/ML-MCNC: 0.13 NG/ML
URN SPEC COLLECT METH UR: ABNORMAL
UROBILINOGEN UR STRIP-ACNC: NEGATIVE EU/DL
WBC # BLD AUTO: 11.58 K/UL
WBC # BLD AUTO: 12.69 K/UL
WBC # BLD AUTO: 9.02 K/UL
WBC #/AREA URNS AUTO: 17 /HPF (ref 0–5)
YEAST UR QL AUTO: ABNORMAL

## 2018-07-12 PROCEDURE — 90935 HEMODIALYSIS ONE EVALUATION: CPT

## 2018-07-12 PROCEDURE — 85025 COMPLETE CBC W/AUTO DIFF WBC: CPT

## 2018-07-12 PROCEDURE — 80053 COMPREHEN METABOLIC PANEL: CPT

## 2018-07-12 PROCEDURE — 25000003 PHARM REV CODE 250: Performed by: STUDENT IN AN ORGANIZED HEALTH CARE EDUCATION/TRAINING PROGRAM

## 2018-07-12 PROCEDURE — C9113 INJ PANTOPRAZOLE SODIUM, VIA: HCPCS | Performed by: EMERGENCY MEDICINE

## 2018-07-12 PROCEDURE — 63600175 PHARM REV CODE 636 W HCPCS: Performed by: EMERGENCY MEDICINE

## 2018-07-12 PROCEDURE — 85610 PROTHROMBIN TIME: CPT

## 2018-07-12 PROCEDURE — 82977 ASSAY OF GGT: CPT

## 2018-07-12 PROCEDURE — 83036 HEMOGLOBIN GLYCOSYLATED A1C: CPT

## 2018-07-12 PROCEDURE — 93010 ELECTROCARDIOGRAM REPORT: CPT | Mod: ,,, | Performed by: INTERNAL MEDICINE

## 2018-07-12 PROCEDURE — 87040 BLOOD CULTURE FOR BACTERIA: CPT | Mod: 59

## 2018-07-12 PROCEDURE — 99233 SBSQ HOSP IP/OBS HIGH 50: CPT | Mod: GC,,, | Performed by: INTERNAL MEDICINE

## 2018-07-12 PROCEDURE — 82010 KETONE BODYS QUAN: CPT

## 2018-07-12 PROCEDURE — 63600175 PHARM REV CODE 636 W HCPCS: Performed by: STUDENT IN AN ORGANIZED HEALTH CARE EDUCATION/TRAINING PROGRAM

## 2018-07-12 PROCEDURE — 99900035 HC TECH TIME PER 15 MIN (STAT)

## 2018-07-12 PROCEDURE — 99223 1ST HOSP IP/OBS HIGH 75: CPT | Mod: ,,, | Performed by: NURSE PRACTITIONER

## 2018-07-12 PROCEDURE — 63600175 PHARM REV CODE 636 W HCPCS: Performed by: HOSPITALIST

## 2018-07-12 PROCEDURE — 12000002 HC ACUTE/MED SURGE SEMI-PRIVATE ROOM

## 2018-07-12 PROCEDURE — 87040 BLOOD CULTURE FOR BACTERIA: CPT

## 2018-07-12 PROCEDURE — 94761 N-INVAS EAR/PLS OXIMETRY MLT: CPT

## 2018-07-12 PROCEDURE — C9113 INJ PANTOPRAZOLE SODIUM, VIA: HCPCS | Performed by: HOSPITALIST

## 2018-07-12 PROCEDURE — 80048 BASIC METABOLIC PNL TOTAL CA: CPT

## 2018-07-12 PROCEDURE — 82803 BLOOD GASES ANY COMBINATION: CPT

## 2018-07-12 PROCEDURE — 83690 ASSAY OF LIPASE: CPT

## 2018-07-12 PROCEDURE — 87147 CULTURE TYPE IMMUNOLOGIC: CPT

## 2018-07-12 PROCEDURE — 25000003 PHARM REV CODE 250: Performed by: EMERGENCY MEDICINE

## 2018-07-12 PROCEDURE — 36569 INSJ PICC 5 YR+ W/O IMAGING: CPT

## 2018-07-12 PROCEDURE — 25000003 PHARM REV CODE 250: Performed by: HOSPITALIST

## 2018-07-12 PROCEDURE — 36415 COLL VENOUS BLD VENIPUNCTURE: CPT

## 2018-07-12 PROCEDURE — 87086 URINE CULTURE/COLONY COUNT: CPT

## 2018-07-12 PROCEDURE — C1751 CATH, INF, PER/CENT/MIDLINE: HCPCS

## 2018-07-12 PROCEDURE — 87088 URINE BACTERIA CULTURE: CPT

## 2018-07-12 PROCEDURE — 76937 US GUIDE VASCULAR ACCESS: CPT

## 2018-07-12 PROCEDURE — 84484 ASSAY OF TROPONIN QUANT: CPT

## 2018-07-12 PROCEDURE — 81001 URINALYSIS AUTO W/SCOPE: CPT

## 2018-07-12 PROCEDURE — 86850 RBC ANTIBODY SCREEN: CPT

## 2018-07-12 PROCEDURE — 86920 COMPATIBILITY TEST SPIN: CPT

## 2018-07-12 RX ORDER — INSULIN ASPART 100 [IU]/ML
INJECTION, SOLUTION INTRAVENOUS; SUBCUTANEOUS
COMMUNITY
End: 2018-07-18

## 2018-07-12 RX ORDER — ASPIRIN 325 MG
325 TABLET ORAL DAILY
Status: ON HOLD | COMMUNITY
End: 2018-07-15

## 2018-07-12 RX ORDER — HYDRALAZINE HYDROCHLORIDE 100 MG/1
100 TABLET, FILM COATED ORAL EVERY 8 HOURS
COMMUNITY
End: 2018-08-15 | Stop reason: SDUPTHER

## 2018-07-12 RX ORDER — CARVEDILOL 12.5 MG/1
12.5 TABLET ORAL 2 TIMES DAILY
Status: DISCONTINUED | OUTPATIENT
Start: 2018-07-12 | End: 2018-07-12

## 2018-07-12 RX ORDER — LABETALOL HYDROCHLORIDE 5 MG/ML
10 INJECTION, SOLUTION INTRAVENOUS
Status: COMPLETED | OUTPATIENT
Start: 2018-07-12 | End: 2018-07-12

## 2018-07-12 RX ORDER — LABETALOL HYDROCHLORIDE 5 MG/ML
10 INJECTION, SOLUTION INTRAVENOUS ONCE
Status: COMPLETED | OUTPATIENT
Start: 2018-07-12 | End: 2018-07-12

## 2018-07-12 RX ORDER — ONDANSETRON 2 MG/ML
8 INJECTION INTRAMUSCULAR; INTRAVENOUS ONCE
Status: DISCONTINUED | OUTPATIENT
Start: 2018-07-12 | End: 2018-07-15 | Stop reason: HOSPADM

## 2018-07-12 RX ORDER — IBUPROFEN 200 MG
16 TABLET ORAL
Status: DISCONTINUED | OUTPATIENT
Start: 2018-07-12 | End: 2018-07-15 | Stop reason: HOSPADM

## 2018-07-12 RX ORDER — HYDRALAZINE HYDROCHLORIDE 20 MG/ML
10 INJECTION INTRAMUSCULAR; INTRAVENOUS ONCE
Status: COMPLETED | OUTPATIENT
Start: 2018-07-12 | End: 2018-07-12

## 2018-07-12 RX ORDER — CALCIUM ACETATE 667 MG/1
667 CAPSULE ORAL
Status: ON HOLD | COMMUNITY
End: 2018-07-15 | Stop reason: HOSPADM

## 2018-07-12 RX ORDER — METOPROLOL TARTRATE 100 MG/1
50 TABLET ORAL 2 TIMES DAILY
Status: ON HOLD | COMMUNITY
End: 2018-07-15 | Stop reason: HOSPADM

## 2018-07-12 RX ORDER — CLONIDINE HYDROCHLORIDE 0.1 MG/1
0.2 TABLET ORAL ONCE
Status: DISCONTINUED | OUTPATIENT
Start: 2018-07-12 | End: 2018-07-12

## 2018-07-12 RX ORDER — CARVEDILOL 6.25 MG/1
6.25 TABLET ORAL 2 TIMES DAILY
Status: DISCONTINUED | OUTPATIENT
Start: 2018-07-12 | End: 2018-07-15 | Stop reason: HOSPADM

## 2018-07-12 RX ORDER — ONDANSETRON 4 MG/1
4 TABLET, ORALLY DISINTEGRATING ORAL ONCE
Status: COMPLETED | OUTPATIENT
Start: 2018-07-12 | End: 2018-07-12

## 2018-07-12 RX ORDER — CLONIDINE HYDROCHLORIDE 0.2 MG/1
0.2 TABLET ORAL 2 TIMES DAILY
COMMUNITY
End: 2018-08-15 | Stop reason: SDUPTHER

## 2018-07-12 RX ORDER — HYDROCODONE BITARTRATE AND ACETAMINOPHEN 5; 325 MG/1; MG/1
1 TABLET ORAL
Status: COMPLETED | OUTPATIENT
Start: 2018-07-12 | End: 2018-07-12

## 2018-07-12 RX ORDER — INSULIN ASPART 100 [IU]/ML
0-5 INJECTION, SOLUTION INTRAVENOUS; SUBCUTANEOUS EVERY 6 HOURS PRN
Status: DISCONTINUED | OUTPATIENT
Start: 2018-07-12 | End: 2018-07-15 | Stop reason: HOSPADM

## 2018-07-12 RX ORDER — INSULIN GLARGINE 300 [IU]/ML
15 INJECTION, SOLUTION SUBCUTANEOUS DAILY
Status: ON HOLD | COMMUNITY
End: 2018-07-15

## 2018-07-12 RX ORDER — ONDANSETRON 2 MG/ML
4 INJECTION INTRAMUSCULAR; INTRAVENOUS
Status: COMPLETED | OUTPATIENT
Start: 2018-07-12 | End: 2018-07-12

## 2018-07-12 RX ORDER — METOCLOPRAMIDE HYDROCHLORIDE 5 MG/ML
10 INJECTION INTRAMUSCULAR; INTRAVENOUS EVERY 8 HOURS
Status: DISCONTINUED | OUTPATIENT
Start: 2018-07-12 | End: 2018-07-12

## 2018-07-12 RX ORDER — HYDRALAZINE HYDROCHLORIDE 50 MG/1
100 TABLET, FILM COATED ORAL EVERY 8 HOURS PRN
Status: DISCONTINUED | OUTPATIENT
Start: 2018-07-12 | End: 2018-07-14

## 2018-07-12 RX ORDER — LOSARTAN POTASSIUM 50 MG/1
100 TABLET ORAL DAILY
Status: DISCONTINUED | OUTPATIENT
Start: 2018-07-12 | End: 2018-07-15 | Stop reason: HOSPADM

## 2018-07-12 RX ORDER — PROCHLORPERAZINE EDISYLATE 5 MG/ML
5 INJECTION INTRAMUSCULAR; INTRAVENOUS ONCE
Status: COMPLETED | OUTPATIENT
Start: 2018-07-12 | End: 2018-07-12

## 2018-07-12 RX ORDER — LEVETIRACETAM 250 MG/1
250 TABLET ORAL 2 TIMES DAILY
COMMUNITY
End: 2018-08-15 | Stop reason: SDUPTHER

## 2018-07-12 RX ORDER — SODIUM CHLORIDE 9 MG/ML
INJECTION, SOLUTION INTRAVENOUS
Status: DISCONTINUED | OUTPATIENT
Start: 2018-07-12 | End: 2018-07-15 | Stop reason: HOSPADM

## 2018-07-12 RX ORDER — CLONIDINE HYDROCHLORIDE 0.1 MG/1
0.1 TABLET ORAL 3 TIMES DAILY
Status: DISCONTINUED | OUTPATIENT
Start: 2018-07-12 | End: 2018-07-14

## 2018-07-12 RX ORDER — PANTOPRAZOLE SODIUM 40 MG/10ML
80 INJECTION, POWDER, LYOPHILIZED, FOR SOLUTION INTRAVENOUS
Status: DISCONTINUED | OUTPATIENT
Start: 2018-07-12 | End: 2018-07-12

## 2018-07-12 RX ORDER — NIFEDIPINE 90 MG/1
90 TABLET, FILM COATED, EXTENDED RELEASE ORAL DAILY
COMMUNITY
End: 2018-08-15 | Stop reason: SDUPTHER

## 2018-07-12 RX ORDER — LOSARTAN POTASSIUM 25 MG/1
12.5 TABLET ORAL DAILY
Status: ON HOLD | COMMUNITY
End: 2018-07-15

## 2018-07-12 RX ORDER — HYDRALAZINE HYDROCHLORIDE 25 MG/1
100 TABLET, FILM COATED ORAL EVERY 8 HOURS
Status: DISCONTINUED | OUTPATIENT
Start: 2018-07-12 | End: 2018-07-12

## 2018-07-12 RX ORDER — ONDANSETRON 2 MG/ML
4 INJECTION INTRAMUSCULAR; INTRAVENOUS
Status: DISCONTINUED | OUTPATIENT
Start: 2018-07-12 | End: 2018-07-12

## 2018-07-12 RX ORDER — SODIUM CHLORIDE 9 MG/ML
INJECTION, SOLUTION INTRAVENOUS ONCE
Status: DISCONTINUED | OUTPATIENT
Start: 2018-07-12 | End: 2018-07-15 | Stop reason: HOSPADM

## 2018-07-12 RX ORDER — METOCLOPRAMIDE 10 MG/1
10 TABLET ORAL
Status: ON HOLD | COMMUNITY
End: 2018-07-15

## 2018-07-12 RX ORDER — LEVETIRACETAM 250 MG/1
250 TABLET ORAL 2 TIMES DAILY
Status: DISCONTINUED | OUTPATIENT
Start: 2018-07-12 | End: 2018-07-15 | Stop reason: HOSPADM

## 2018-07-12 RX ORDER — SEVELAMER CARBONATE 800 MG/1
1600 TABLET, FILM COATED ORAL
Status: DISCONTINUED | OUTPATIENT
Start: 2018-07-13 | End: 2018-07-15 | Stop reason: HOSPADM

## 2018-07-12 RX ORDER — ACETAMINOPHEN 325 MG/1
650 TABLET ORAL
Status: DISPENSED | OUTPATIENT
Start: 2018-07-12 | End: 2018-07-12

## 2018-07-12 RX ORDER — CARVEDILOL 12.5 MG/1
12.5 TABLET ORAL 2 TIMES DAILY
COMMUNITY
End: 2018-08-15 | Stop reason: SDUPTHER

## 2018-07-12 RX ORDER — CLONIDINE HYDROCHLORIDE 0.1 MG/1
0.1 TABLET ORAL ONCE
Status: COMPLETED | OUTPATIENT
Start: 2018-07-12 | End: 2018-07-12

## 2018-07-12 RX ORDER — SODIUM CHLORIDE 0.9 % (FLUSH) 0.9 %
5 SYRINGE (ML) INJECTION
Status: DISCONTINUED | OUTPATIENT
Start: 2018-07-12 | End: 2018-07-15 | Stop reason: HOSPADM

## 2018-07-12 RX ORDER — PROCHLORPERAZINE EDISYLATE 5 MG/ML
5 INJECTION INTRAMUSCULAR; INTRAVENOUS EVERY 6 HOURS PRN
Status: DISCONTINUED | OUTPATIENT
Start: 2018-07-12 | End: 2018-07-15 | Stop reason: HOSPADM

## 2018-07-12 RX ORDER — GLUCAGON 1 MG
1 KIT INJECTION
Status: DISCONTINUED | OUTPATIENT
Start: 2018-07-12 | End: 2018-07-15 | Stop reason: HOSPADM

## 2018-07-12 RX ORDER — IBUPROFEN 200 MG
24 TABLET ORAL
Status: DISCONTINUED | OUTPATIENT
Start: 2018-07-12 | End: 2018-07-15 | Stop reason: HOSPADM

## 2018-07-12 RX ADMIN — PROCHLORPERAZINE EDISYLATE 5 MG: 5 INJECTION INTRAMUSCULAR; INTRAVENOUS at 09:07

## 2018-07-12 RX ADMIN — INSULIN HUMAN 10 UNITS: 100 INJECTION, SOLUTION PARENTERAL at 03:07

## 2018-07-12 RX ADMIN — INSULIN HUMAN 10 UNITS: 100 INJECTION, SOLUTION PARENTERAL at 05:07

## 2018-07-12 RX ADMIN — LABETALOL HYDROCHLORIDE 10 MG: 5 INJECTION, SOLUTION INTRAVENOUS at 12:07

## 2018-07-12 RX ADMIN — HYDROCODONE BITARTRATE AND ACETAMINOPHEN 1 TABLET: 5; 325 TABLET ORAL at 04:07

## 2018-07-12 RX ADMIN — DEXTROSE 80 MG: 50 INJECTION, SOLUTION INTRAVENOUS at 09:07

## 2018-07-12 RX ADMIN — CLONIDINE HYDROCHLORIDE 0.1 MG: 0.1 TABLET ORAL at 08:07

## 2018-07-12 RX ADMIN — HYDRALAZINE HYDROCHLORIDE 100 MG: 50 TABLET ORAL at 11:07

## 2018-07-12 RX ADMIN — HYDRALAZINE HYDROCHLORIDE 10 MG: 20 INJECTION INTRAMUSCULAR; INTRAVENOUS at 05:07

## 2018-07-12 RX ADMIN — LABETALOL HYDROCHLORIDE 10 MG: 5 INJECTION, SOLUTION INTRAVENOUS at 05:07

## 2018-07-12 RX ADMIN — ONDANSETRON 4 MG: 2 INJECTION INTRAMUSCULAR; INTRAVENOUS at 03:07

## 2018-07-12 RX ADMIN — ONDANSETRON 4 MG: 4 TABLET, ORALLY DISINTEGRATING ORAL at 09:07

## 2018-07-12 RX ADMIN — DEXTROSE 8 MG/HR: 50 INJECTION, SOLUTION INTRAVENOUS at 12:07

## 2018-07-12 RX ADMIN — CLONIDINE HYDROCHLORIDE 0.1 MG: 0.1 TABLET ORAL at 12:07

## 2018-07-12 RX ADMIN — INSULIN DETEMIR 7 UNITS: 100 INJECTION, SOLUTION SUBCUTANEOUS at 08:07

## 2018-07-12 RX ADMIN — LOSARTAN POTASSIUM 100 MG: 50 TABLET ORAL at 08:07

## 2018-07-12 RX ADMIN — CARVEDILOL 6.25 MG: 6.25 TABLET, FILM COATED ORAL at 08:07

## 2018-07-12 RX ADMIN — HYDRALAZINE HYDROCHLORIDE 100 MG: 25 TABLET, FILM COATED ORAL at 12:07

## 2018-07-12 RX ADMIN — INSULIN ASPART 2 UNITS: 100 INJECTION, SOLUTION INTRAVENOUS; SUBCUTANEOUS at 11:07

## 2018-07-12 RX ADMIN — LEVETIRACETAM 250 MG: 250 TABLET, FILM COATED ORAL at 08:07

## 2018-07-12 RX ADMIN — ONDANSETRON 4 MG: 2 INJECTION INTRAMUSCULAR; INTRAVENOUS at 05:07

## 2018-07-12 NOTE — ANESTHESIA PREPROCEDURE EVALUATION
07/12/2018  [facility]  Anesthesia Pre-Operative Evaluation         Patient Name: Eufemia Haq  YOB: 1965  MRN: [id number]    SUBJECTIVE:     Pre-operative evaluation for Procedure(s) (LRB):  EGD (ESOPHAGOGASTRODUODENOSCOPY) (N/A)     07/12/2018    Eufemia Haq is a 53 y.o. female w/ a significant PMHx of HTN, DM II, CVA x2, ESRD, and tobacco abuse who presents with hematemesis and is now scheduled for above procedure.       LDA:   Midline Catheter Insertion/Assessment  - Single Lumen 07/12/18 0910 Right cephalic vein (lateral side of arm) 18g x 8cm   Midline Catheter Insertion/Assessment - [employer] Placement Date/Time: 07/12/18 0910 Present Prior to Hospital Arrival?: No Hand Hygiene: Performed Barrier Precautions: Performed Skin Antisepsis: ChloraPrep Device: Bard Side: Right Location: cephalic vein (lateral side of arm) Size: 18g x 8c...           Prev airway: None documented.     Drips:    pantoprozole (PROTONIX) infusion 8 mg/hr (07/12/18 1254)     Patient Active Problem List   Diagnosis    Diabetes    Hematemesis with nausea    ESRD (end stage renal disease)    Essential hypertension       Review of patient's allergies indicates:   Allergen Reactions    Gabapentin Other (See Comments)     Seizure    Tramadol Other (See Comments)     Seizure    Vancomycin analogues Other (See Comments)     Seizure    Latex, natural rubber Rash    Niacin preparations Rash       Current Inpatient Medications:   sodium chloride 0.9%   Intravenous Once    cloNIDine  0.1 mg Oral TID    hydrALAZINE  10 mg Intravenous Once    hydrALAZINE  100 mg Oral Q8H    ondansetron  8 mg Intravenous Once       No current facility-administered medications on file prior to encounter.      No current outpatient prescriptions on file prior to encounter.       Past  Surgical History:   Procedure Laterality Date     SECTION      x2    dialysis graft Left        Social History     Social History    Marital status:      Spouse name: N/A    Number of children: N/A    Years of education: N/A     Occupational History    Not on file.     Social History Main Topics    Smoking status: Light Tobacco Smoker    Smokeless tobacco: Never Used    Alcohol use No    Drug use: Unknown    Sexual activity: Not on file     Other Topics Concern    Not on file     Social History Narrative    No narrative on file       OBJECTIVE:     Vital Signs Range (Last 24H):  Temp:  [37.3 °C (99.1 °F)-37.4 °C (99.4 °F)]   Pulse:  [76-90]   Resp:  [12-24]   BP: (163-234)/(69-96)   SpO2:  [93 %-100 %]       CBC:   Recent Labs      18   0025  18   0917   WBC  11.58  12.69   RBC  2.82*  2.69*   HGB  8.0*  7.4*   HCT  25.6*  25.7*   PLT  309  248   MCV  91  96   MCH  28.4  27.5   MCHC  31.3*  28.8*       CMP:   Recent Labs      18   0025   NA  134*   K  4.2   CL  88*   CO2  28   BUN  80*   CREATININE  5.2*   GLU  368*   CALCIUM  8.8   ALBUMIN  3.1*   PROT  6.4   ALKPHOS  328*   ALT  37   AST  32   BILITOT  0.3       INR:  Recent Labs      18   0025   INR  1.0       Diagnostic Studies    EKG:   Normal sinus rhythm  Nonspecific T wave abnormality  Abnormal ECG  No previous ECGs available  Confirmed by Dane Ozuna MD ([id number]) on 2018 3:17:07 PM    2D ECHO:  No results found for this or any previous visit.      ASSESSMENT/PLAN:         Anesthesia Evaluation    I have reviewed the Patient Summary Reports.     I have reviewed the Medications.     Review of Systems  Anesthesia Hx:  No previous Anesthesia  Neg history of prior surgery. Denies Family Hx of Anesthesia complications.   Denies Personal Hx of Anesthesia complications.   Hematology/Oncology:  Hematology Normal   Oncology Normal     EENT/Dental:EENT/Dental Normal   Cardiovascular:   Hypertension     Pulmonary:  Pulmonary Normal    Renal/:   Chronic Renal Disease, ESRD    Hepatic/GI:   hematemesis    Musculoskeletal:  Musculoskeletal Normal    Neurological:   CVA, residual symptoms    Endocrine:   Diabetes        Physical Exam  General:  Well nourished    Airway/Jaw/Neck:  Airway Findings: Mouth Opening: Normal Tongue: Normal  General Airway Assessment: Adult  Mallampati: II  Improves to I with phonation.  TM Distance: Normal, at least 6 cm     Eyes/Ears/Nose:  EYES/EARS/NOSE FINDINGS: Normal   Dental:  Dental Findings: In tact   Chest/Lungs:  Chest/Lungs Findings: Clear to auscultation     Heart/Vascular:  Heart Findings: Rate: Normal        Mental Status:  Mental Status Findings:  Cooperative, Alert and Oriented         Anesthesia Plan  Type of Anesthesia, risks & benefits discussed:  Anesthesia Type:  general, MAC  Patient's Preference:   Intra-op Monitoring Plan: standard ASA monitors  Intra-op Monitoring Plan Comments:   Post Op Pain Control Plan:   Post Op Pain Control Plan Comments:   Induction:   IV  Beta Blocker:  Patient is on a Beta-Blocker and has received one dose within the past 24 hours (No further documentation required).       Informed Consent: Patient understands risks and agrees with Anesthesia plan.  Questions answered. Anesthesia consent signed with patient.  ASA Score: 3     Day of Surgery Review of History & Physical: I have interviewed and examined the patient. I have reviewed the patient's H&P dated:    H&P update referred to the surgeon.         Ready For Surgery From Anesthesia Perspective.

## 2018-07-12 NOTE — ASSESSMENT & PLAN NOTE
Unable to take PO meds given N/V.  No HA, change in vision or signs of end organ damage.  Continue home meds in a stepwise manner    - ICU consulted given concern for HTN emergency with GI bleed.   - IV labetalol PRN as needed

## 2018-07-12 NOTE — PROGRESS NOTES
Maintenance HD completed blood returned, needles pulled and pressure applied until hemostasis achieved. Gauze and tape applied to ADEN graft. ADEN graft +bruit/thrill. 2l of fluid removed. Post HD weight 62.1kg in bed. Blood sugar checked right before HD ended BS was 241. ER nurse brought down pt personal belongings as well as protonix drip that was stopped for HD . Will send up to room w/pt. Report called to Terry RN> Pt off unit via to floor via stretcher

## 2018-07-12 NOTE — ED PROVIDER NOTES
Encounter Date: 7/11/2018       History     Chief Complaint   Patient presents with    Nausea     Pt got pulled off dialysis yesterday before finishing. Pt with fluid retention, N/V/D. Pt also with elevated BG at home.     Emesis    Diarrhea     Pt is a 53 year old female with PMH of ESRD on hemodialysis for 6 months, HTN, DM2 who presents to the ED with nausea, vomiting, and diarrhea and hyperglycemia on home glucometer. Pt states she received hemodialysis on 07/10 for two hours and then was pulled off for hypertension and proceeded to ED and was discharged home later that evening. Pt notes that she has been feeling unwell since her dialysis yesterday. Pt has vomited 3 times, all non-bloody. Patient states at home this evening her sugar was too high for her glucometer to read and took 30 units of her insulin, afterwhich her glucose was 400. Pt came to ED with daughter, where her POCT glucose was 312. Patient notes she is new to the area and in need of outpatient providers.           Review of patient's allergies indicates:   Allergen Reactions    Gabapentin Other (See Comments)     Seizure    Tramadol Other (See Comments)     Seizure    Vancomycin analogues Other (See Comments)     Seizure    Latex, natural rubber Rash    Niacin preparations Rash     No past medical history on file.  No past surgical history on file.  No family history on file.  Social History   Substance Use Topics    Smoking status: Not on file    Smokeless tobacco: Not on file    Alcohol use Not on file     Review of Systems   Constitutional: Positive for chills and fatigue.   HENT: Negative for sore throat.    Eyes: Negative for visual disturbance.   Respiratory: Negative for shortness of breath.    Cardiovascular: Negative for chest pain.   Gastrointestinal: Positive for diarrhea, nausea and vomiting. Negative for abdominal pain.   Endocrine: Negative for polyuria.   Genitourinary: Negative for dysuria.   Musculoskeletal: Positive  for arthralgias (Bilateral Shoulder Pain).   Skin: Negative for rash.   Neurological: Positive for weakness. Negative for syncope.   Hematological: Bruises/bleeds easily.       Physical Exam     Initial Vitals [07/11/18 2132]   BP Pulse Resp Temp SpO2   (!) 169/76 76 18 99.1 °F (37.3 °C) 98 %      MAP       --         Physical Exam    Constitutional: She appears well-developed and well-nourished. She is not diaphoretic. No distress.   HENT:   Head: Normocephalic and atraumatic.   Cardiovascular: Normal rate, regular rhythm and normal heart sounds.   Pulmonary/Chest: Breath sounds normal.   Abdominal: Soft. Bowel sounds are normal. She exhibits no distension and no mass. There is no tenderness.   Musculoskeletal: She exhibits no edema or tenderness.   Skin: Skin is warm and dry. No rash and no abscess noted.         ED Course   Procedures  Labs Reviewed   POCT GLUCOSE - Abnormal; Notable for the following:        Result Value    POCT Glucose 312 (*)     All other components within normal limits   CBC W/ AUTO DIFFERENTIAL   COMPREHENSIVE METABOLIC PANEL   LIPASE   URINALYSIS, REFLEX TO URINE CULTURE     EKG Readings: (Independently Interpreted)   Rate 80 bpm, sinus rhythm. QTc prolonged at 468 ms.  Non-specific ST changes. No STEMI       Imaging Results    None          Medical Decision Making:   Initial Assessment:   Patient is a 53 year old female with ESRD, DM, HTN who presents to the ED with hyperglycemia, nausea, and vomiting. Bilateral shoulder pain concerning for anginal equivalence in a diabetic patient, Will obtain CXR, EKG, and Troponin to rule out ACS. Will Check CMP/CBC/Lipase to check for infectious or metabolic etiology. Will monitor her blood sugars to prevent overcorrection or rebound,  ED Management:  1:18 AM Repeat Glucose 367. CBC shows WBC unelevated, Hgb 8. Reassessed pt, vitals stable. Pt endorses previous history of anemia.  1:36 AM CXR shows Borderline cardiomegaly and minimal edema versus  pneumonitis. Cardiac Loop Recorder visualized, pt unsure who the recorder results to.  1:58 AM troponin elevated, likely due to low clearance but no baseline for comparison. Will repeat at 3hr after first draw.          Attending Note:  Physician Attestation Statement: I have personally seen and examined this patient. As the supervising MD I agree with the above history. As the supervising MD I agree with the above PE. As the supervising MD I agree with the above treatment, course, plan, and disposition.     On reevaluation patient continues to vomit, now with large amounts of blood, some red, some more rust colored. Type and screen, protonix ordered.  Hypertensive, possibly contributing to nausea, 10 mg IV labetalol given in ED.  Continues to be hyperglycemic, which is also likely contributing to nausea.  Given her need for dialysis fluids were not given.  Initial insulin given subQ had minimal effect (down to 404) so additional 10 units IV regular insulin given.  Given above findings will admit for further evaluation, likely AM dialysis.              Clinical Impression:   The primary encounter diagnosis was Diabetes. Diagnoses of Bilateral shoulder pain and Hematemesis with nausea were also pertinent to this visit.                             Judson Prince MD  07/12/18 8598

## 2018-07-12 NOTE — SUBJECTIVE & OBJECTIVE
Past Medical History:   Diagnosis Date    Diabetes mellitus     Hypertension     Renal disorder        Past Surgical History:   Procedure Laterality Date     SECTION      x2    dialysis graft Left        Review of patient's allergies indicates:   Allergen Reactions    Gabapentin Other (See Comments)     Seizure    Tramadol Other (See Comments)     Seizure    Vancomycin analogues Other (See Comments)     Seizure    Latex, natural rubber Rash    Niacin preparations Rash     Family History     None        Social History Main Topics    Smoking status: Light Tobacco Smoker    Smokeless tobacco: Never Used    Alcohol use No    Drug use: Unknown    Sexual activity: Not on file     Review of Systems   Constitutional: Positive for appetite change, chills, fatigue and fever.   HENT: Negative for sore throat.    Respiratory: Negative for cough and shortness of breath.    Cardiovascular: Negative for chest pain.   Gastrointestinal: Positive for diarrhea, nausea and vomiting. Negative for abdominal pain.   Neurological: Positive for light-headedness. Negative for syncope.     Objective:     Vital Signs (Most Recent):  Temp: 99.4 °F (37.4 °C) (18 0745)  Pulse: 76 (18 1345)  Resp: 18 (18 0745)  BP: (!) 163/69 (18 1345)  SpO2: 100 % (18 1345) Vital Signs (24h Range):  Temp:  [99.1 °F (37.3 °C)-99.4 °F (37.4 °C)] 99.4 °F (37.4 °C)  Pulse:  [76-90] 76  Resp:  [12-24] 18  SpO2:  [93 %-100 %] 100 %  BP: (163-234)/(69-96) 163/69     Weight: 63.5 kg (140 lb) (18 2132)  Body mass index is 21.93 kg/m².      Intake/Output Summary (Last 24 hours) at 18 1441  Last data filed at 18 0700   Gross per 24 hour   Intake                0 ml   Output              200 ml   Net             -200 ml       Lines/Drains/Airways     Peripheral Intravenous Line                 Midline Catheter Insertion/Assessment  - Single Lumen 18 0910 Right cephalic vein (lateral side of arm) 18g  x 8cm less than 1 day                Physical Exam   Constitutional: She appears well-developed and well-nourished. No distress.   HENT:   Head: Normocephalic and atraumatic.   Eyes: No scleral icterus.   Cardiovascular: Normal rate and regular rhythm.    Pulmonary/Chest: Effort normal and breath sounds normal. She has no wheezes.   Abdominal: Soft. Bowel sounds are normal. She exhibits no distension. There is no tenderness. There is no rebound and no guarding.   Skin: She is not diaphoretic.   Nursing note and vitals reviewed.      Significant Labs:  CBC:   Recent Labs  Lab 07/12/18 0025 07/12/18 0917   WBC 11.58 12.69   HGB 8.0* 7.4*   HCT 25.6* 25.7*    248     BMP:   Recent Labs  Lab 07/12/18 0025   *   *   K 4.2   CL 88*   CO2 28   BUN 80*   CREATININE 5.2*   CALCIUM 8.8     CMP:   Recent Labs  Lab 07/12/18 0025   *   CALCIUM 8.8   ALBUMIN 3.1*   PROT 6.4   *   K 4.2   CO2 28   CL 88*   BUN 80*   CREATININE 5.2*   ALKPHOS 328*   ALT 37   AST 32   BILITOT 0.3     Lipase:   Recent Labs  Lab 07/12/18 0025   LIPASE 7     Liver Function Test:   Recent Labs  Lab 07/12/18 0025   ALT 37   AST 32   ALKPHOS 328*   BILITOT 0.3   PROT 6.4   ALBUMIN 3.1*       Significant Imaging:  X-Ray Chest PA And Lateral [[id number]] Resulted: 07/12/18 0123   Order Status: Completed Updated: 07/12/18 0125   Narrative:     EXAMINATION:  XR CHEST PA AND LATERAL    CLINICAL HISTORY:  Pain in right shoulder.  Pain in left shoulder.    TECHNIQUE:  Frontal and lateral views of the chest were performed.    COMPARISON:  None.    FINDINGS:  Examination is limited by underpenetration on the lateral radiograph.  Cardiac silhouette is at the upper limits of normal in size.  Cardiac wires overlie the chest.  Cardiac loop recorder device is present overlying the left perihilar region.  Minimal increased parenchymal attenuation is noted bilaterally, which could reflect minimal interstitial edema or pneumonitis.   No large focal consolidation.  No sizable pleural effusion.  No pneumothorax.   Impression:       Borderline cardiomegaly and minimal edema versus pneumonitis.      Electronically signed by: Darrius Segura MD  Date: 07/12/2018  Time: 01:23     Procedure Component Value Units Date/Time   US Abdomen Limited [[id number]] Resulted: 07/12/18 1118   Order Status: Completed Updated: 07/12/18 1120   Narrative:     EXAMINATION:  US ABDOMEN LIMITED    CLINICAL HISTORY:  elevated alk phos, concern for obstruction;.    TECHNIQUE:  Limited ultrasound of the right upper quadrant of the abdomen including pancreas, liver, gallbladder, common bile duct was performed.    COMPARISON:  None.    FINDINGS:  Liver: Mildly enlarged in size measuring 18.8 cm.  Homogeneous echotexture. No focal hepatic lesions.    Gallbladder: No calculi, wall thickening, or pericholecystic fluid.  No sonographic Cardona's sign.    Biliary system: The common duct is not dilated, measuring 4 mm.  No intrahepatic ductal dilatation.    Spleen: Normal in size with a homogeneous echotexture, measuring 10.1 x 4.5 cm.    Pancreas: The visualized portions of pancreas appear normal.    Miscellaneous: No ascites.   Impression:       Mild hepatomegaly, otherwise no abnormal sonographic findings.    Electronically signed by resident: Stefano Ricci  Date: 07/12/2018  Time: 11:16    Electronically signed by: Vargas Rand MD  Date: 07/12/2018  Time: 11:18

## 2018-07-12 NOTE — HPI
Pt is a 53 year old female with PMH of ESRD on HD x 1year, uncontrolled HTN, DM1, gastroparesis, who presents to the ED with nausea, vomiting, diarrhea and hyperglycemia per home glucometer. Patient states that her glucometer reading was above 400 yesterday evening. Pt reports that she received hemodialysis on 07/10 for unconfirmed duration and was subsequently sent to hospital for altered mental status where she was ultimately discharged home later that evening. Pt notes that she has since been feeling unwell. Patient reports multiple episodes of bright red emesis and and dark tarry stool. She is also complaining of bilateral shoulder pain. Pt came to ED with daughter, where her POCT glucose was 312. Upon reviewing the pt's chart, it appears that she has multiple ED visits for similar episodes.

## 2018-07-12 NOTE — ASSESSMENT & PLAN NOTE
- Continue to manage pt's HTN appropriately.   - Continue home meds upon discharge (Losartan, Procardia, Coreg)

## 2018-07-12 NOTE — ED NOTES
Received pt to room 22 via stretcher. Pt c/o N/V, producing thing dark brown/black emesis. No IV access d/t infiltration of last PIV per report from previous RN. Pt Ox4, GARRETT. Oriented to room and call bell.

## 2018-07-12 NOTE — HOSPITAL COURSE
Pt urgently taken to HD. EDG showed ulcer with pigmented base. They recommend 72 hours of IV PPI and then continue for 8 weeks with PO PPI and repeat an EGD at that time. Treating her HTN with clonidine, hydralazine, labetalol. Treating her hyperglycemia with regular insulin and will give a decreased long acting until no longer NPO. Treating her N/V/hematemesis with ondansetron, protonix, compazine. Pt's N/V and hematemesis resolved. Protonix 40mg IV BID conntinued until 7/15. Pt received 0900 dose on the  and then refused further care. Pt advised that the gastroenterologist and internal medicine physicians all recommend that she receive the full 72 hours of IV protonix to  the chance of the ulcer re-bleeding or further eroding. Pt advised of the risks of refusing further medical care and leaving the hospital against medical advice. Pt states she will  the PO protonix and other prescribed medications at [facility] ([place]). She states she will attend her clinic visits as scheduled once she is scheduled.

## 2018-07-12 NOTE — CONSULTS
Midline placed  in right cephalic, size [id number] Lot# [id number]_ Removal date on or before 8/10/18. Needle advanced into vessel with real time ultrasound guidance. Image recorded and saved.

## 2018-07-12 NOTE — ASSESSMENT & PLAN NOTE
GI consulted and aware. Plan for EGD tomorrow  - Clear liquid diet   - Protonix drip   - CBC Q8

## 2018-07-12 NOTE — PLAN OF CARE
Problem: Patient Care Overview  Goal: Plan of Care Review  Outcome: Ongoing (interventions implemented as appropriate)  Pt educated on length of HD and amt of fluid to be removed verbalized understanding

## 2018-07-12 NOTE — SUBJECTIVE & OBJECTIVE
Past Medical History:   Diagnosis Date    Diabetes mellitus     Hypertension     Renal disorder        Past Surgical History:   Procedure Laterality Date     SECTION      x2    dialysis graft Left        Review of patient's allergies indicates:   Allergen Reactions    Gabapentin Other (See Comments)     Seizure    Tramadol Other (See Comments)     Seizure    Vancomycin analogues Other (See Comments)     Seizure    Latex, natural rubber Rash    Niacin preparations Rash     Current Facility-Administered Medications   Medication Frequency    acetaminophen tablet 650 mg ED 1 Time    dextrose 50% injection 12.5 g PRN    dextrose 50% injection 25 g PRN    glucagon (human recombinant) injection 1 mg PRN    glucose chewable tablet 16 g PRN    glucose chewable tablet 24 g PRN    hydrALAZINE tablet 100 mg Q8H    ondansetron injection 8 mg Once    pantoprazole (PROTONIX) 40 mg in dextrose 5 % 100 mL infusion Continuous    sodium chloride 0.9% flush 5 mL PRN     Current Outpatient Prescriptions   Medication    aspirin 325 MG tablet    calcium acetate (PHOSLO) 667 mg capsule    carvedilol (COREG) 12.5 MG tablet    cloNIDine (CATAPRES) 0.2 MG tablet    hydrALAZINE (APRESOLINE) 100 MG tablet    insulin aspart U-100 (NOVOLOG) 100 unit/mL injection    insulin glargine, TOUJEO, (TOUJEO) 300 unit/mL (1.5 mL) InPn pen    levETIRAcetam (KEPPRA) 250 MG Tab    losartan (COZAAR) 25 MG tablet    metoclopramide HCl (REGLAN) 10 MG tablet    metoprolol tartrate (LOPRESSOR) 100 MG tablet    NIFEdipine (ADALAT CC) 90 MG TbSR     Family History     None        Social History Main Topics    Smoking status: Light Tobacco Smoker    Smokeless tobacco: Never Used    Alcohol use No    Drug use: Unknown    Sexual activity: Not on file     Review of Systems   Constitutional: Positive for activity change and fatigue.   HENT: Negative.    Respiratory: Positive for shortness of breath.      Objective:     Vital  Signs (Most Recent):  Temp: 99.4 °F (37.4 °C) (07/12/18 0745)  Pulse: 88 (07/12/18 0745)  Resp: 18 (07/12/18 0745)  BP: (!) 234/96 (07/12/18 1209)  SpO2: 97 % (07/12/18 0745)  O2 Device (Oxygen Therapy): room air (07/12/18 0900) Vital Signs (24h Range):  Temp:  [99.1 °F (37.3 °C)-99.4 °F (37.4 °C)] 99.4 °F (37.4 °C)  Pulse:  [76-90] 88  Resp:  [12-24] 18  SpO2:  [93 %-99 %] 97 %  BP: (169-234)/(76-96) 234/96     Weight: 63.5 kg (140 lb) (07/11/18 2132)  Body mass index is 21.93 kg/m².  Body surface area is 1.73 meters squared.    I/O last 3 completed shifts:  In: -   Out: 200 [Emesis/NG output:200]    Physical Exam   Constitutional:    Chronic ill apearing   HENT:   Head: Atraumatic.   Eyes: EOM are normal.   Right eye blind. Left eye poor vision.    Neck: Neck supple.   Cardiovascular: Normal rate and regular rhythm.        Significant Labs:  All labs within the past 24 hours have been reviewed.    Significant Imaging:  Labs: Reviewed

## 2018-07-12 NOTE — SUBJECTIVE & OBJECTIVE
"Past Medical History:   Diagnosis Date    Diabetes mellitus     Hypertension     Renal disorder        Past Surgical History:   Procedure Laterality Date     SECTION      x2    dialysis graft Left        Review of patient's allergies indicates:   Allergen Reactions    Gabapentin Other (See Comments)     Seizure    Tramadol Other (See Comments)     Seizure    Vancomycin analogues Other (See Comments)     Seizure    Latex, natural rubber Rash    Niacin preparations Rash       No current facility-administered medications on file prior to encounter.      No current outpatient prescriptions on file prior to encounter.     Family History     None        Social History Main Topics    Smoking status: Light Tobacco Smoker    Smokeless tobacco: Never Used    Alcohol use No    Drug use: Unknown    Sexual activity: Not on file     Review of Systems   Constitutional: Positive for appetite change (unable to eat since breakfast yesterday), chills and fever (subjective, temp was 99.5 at home).   HENT: Negative for nosebleeds and sinus pain.    Eyes: Negative for visual disturbance.        Pt has no vision in the right eye and has limited vision in the left eye. Recently had cataract sx in the left and retinal sx on the right. Pt denies visual changes from her baseline with the episode.   Respiratory: Negative for cough, chest tightness and shortness of breath.    Cardiovascular: Positive for leg swelling (x 2 days). Negative for chest pain.        Pt has AV fistula to LUE   Gastrointestinal: Positive for nausea. Vomiting: blood noted in emesis bag in ED.   Endocrine: Negative.    Genitourinary: Positive for decreased urine volume (pt does not make much urine due to CKD, no change from baseline). Negative for dysuria and hematuria.   Musculoskeletal: Positive for arthralgias (pain to the bilateral shoulders, "throbbing and constant" and worse at night).   Skin: Negative.    Neurological: Positive for dizziness " and light-headedness. Negative for syncope. Weakness: pt fell today due to the weakness, but landed on her buttocks and denies trauma/pain.   Psychiatric/Behavioral: Negative.      Objective:     Vital Signs (Most Recent):  Temp: 99.3 °F (37.4 °C) (07/12/18 1450)  Pulse: 83 (07/12/18 1800)  Resp: 18 (07/12/18 0745)  BP: (!) 181/75 (07/12/18 1800)  SpO2: 100 % (07/12/18 1345) Vital Signs (24h Range):  Temp:  [99.1 °F (37.3 °C)-99.4 °F (37.4 °C)] 99.3 °F (37.4 °C)  Pulse:  [76-90] 83  Resp:  [12-24] 18  SpO2:  [93 %-100 %] 100 %  BP: (163-234)/(69-96) 181/75     Weight: 63.5 kg (140 lb)  Body mass index is 21.93 kg/m².    Physical Exam   Constitutional: She is oriented to person, place, and time. She appears well-developed and well-nourished. She appears distressed.   HENT:   Head: Normocephalic and atraumatic.   Eyes: Conjunctivae and EOM are normal.   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 Neck supple.   Cardiovascular: Normal rate, regular rhythm, normal heart sounds and intact distal pulses.    Pulmonary/Chest: Effort normal and breath sounds normal.   Abdominal: Soft. Bowel sounds are normal. She exhibits no distension. There is no tenderness.   Musculoskeletal: Normal range of motion. She exhibits edema.        Right shoulder: She exhibits pain.        Left shoulder: She exhibits pain.   Neurological: She is alert and oriented to person, place, and time.   Skin: Skin is warm and dry. She is not diaphoretic.   Psychiatric: She has a normal mood and affect. Her behavior is normal. Thought content normal.         CRANIAL NERVES     CN III, IV, VI   Extraocular motions are normal.        Significant Labs:   Bilirubin:   Recent Labs  Lab 07/12/18  0025   BILITOT 0.3     CBC:   Recent Labs  Lab 07/12/18  0025 07/12/18  0917 07/12/18  1555   WBC 11.58 12.69 9.02   HGB 8.0* 7.4* 7.0*   HCT 25.6* 25.7* 22.7*    248 277     CMP:   Recent Labs  Lab 07/12/18  0025 07/12/18  155    * 139   K 4.2 4.1   CL 88* 96   CO2 28 29   * 228*   BUN 80* 70*   CREATININE 5.2* 5.0*   CALCIUM 8.8 8.4*   PROT 6.4  --    ALBUMIN 3.1*  --    BILITOT 0.3  --    ALKPHOS 328*  --    AST 32  --    ALT 37  --    ANIONGAP 18* 14   EGFRNONAA 8.8* 9.2*     POCT Glucose:   Recent Labs  Lab 07/12/18  0629 07/12/18  0925 07/12/18  1829   POCTGLUCOSE 356* 249* 241*     Urine Studies:   Recent Labs  Lab 07/12/18  0152   COLORU Yellow   APPEARANCEUA Hazy*   PHUR 5.0   SPECGRAV 1.010   PROTEINUA 3+*   GLUCUA 3+*   KETONESU Trace*   BILIRUBINUA Negative   OCCULTUA Negative   NITRITE Negative   UROBILINOGEN Negative   LEUKOCYTESUR Trace*   RBCUA 2   WBCUA 17*   BACTERIA Rare   SQUAMEPITHEL 7   HYALINECASTS 7*       Significant Imaging: I have reviewed all pertinent imaging results/findings within the past 24 hours.

## 2018-07-12 NOTE — PROGRESS NOTES
Received report from ED nurse. Pt alert and oriented assisted pt from stretcher to bed. O2@2L per N/C sats 98% no respiratory distress noted. BP WNL. Left Upper Arm Graft +bruit/thrill. Skin prepped 15G needles used for graft good flow noted,.Maintenance HD started w/o difficulty plan to remove 2L this HD session

## 2018-07-12 NOTE — ASSESSMENT & PLAN NOTE
Improved with compazine, History of gastroparesis with concern for GI bleed.  - Continue PRN compazine and Zofran as needed.

## 2018-07-12 NOTE — ASSESSMENT & PLAN NOTE
"52 yo female with significant history hypertension, DMT1, gastroparesis, recent CVA with residual behavior problems (Jan 2018 [facility] in [place]), seizure, ESRD on HD 1 year, and multiple admissions to hospitals with most recent 6/2-6/8 for left eye gaze who is admitted for nausea and vomiting x 2 days and uncontrolled hypertension. Patient declined SNF evaluation during 6/2 admission. Per outpatient dialysis nurse, patient was sent to [facility] on Tuesday 7/10 after full dialysis treatment for change in mental status "slow to response" and only alert to person and is not aware previous hematemesis. Takes aleve PRN and last dose some time this week for shoulder pain. GI plan for EGD tomorrow.  Hgb .80>7.4 (hgb 9.3 on 6/10/18). US abdomen mild hepatomegaly. CXR mild bilateral effusion-much improved compared to 6/2/18.     Nephrology consult for hemodialysis. ESRD x 1 year on iHD TTS via [facility] at [facility] under direction of Dr. Dias. Last HD Tues 7/9 and 6 kg over EDW. EDW 63.5 kg. Intradialysis gain 5-10 kg. Labs reviewed.     Plan/Recommendation:  HD for metabolic clearance and volume. UF to EDW 63.5 kg  Renal diet once able to eat. Add phoslo to labs  Hold off ANGELICA given uncontrolled blood pressure  PRBC tranfusion per primary/GI.         "

## 2018-07-12 NOTE — ED TRIAGE NOTES
Eufemia NICO Severino, a 53 y.o. female presents to the ED c/o hyperglycemia, n/v, body aches, and chest pain. Denies headache, dizziness, SOB.   Pt reports taking insulin as prescribed. Denies missing doses. Family repots pt dialysis got cut short yesterday and she was sent [place]. Unsure why dialysis had to be stopped.   Chief Complaint   Patient presents with    Nausea     Pt got pulled off dialysis yesterday before finishing. Pt with fluid retention, N/V/D. Pt also with elevated BG at home.     Emesis    Diarrhea     Review of patient's allergies indicates:   Allergen Reactions    Gabapentin Other (See Comments)     Seizure    Tramadol Other (See Comments)     Seizure    Vancomycin analogues Other (See Comments)     Seizure    Latex, natural rubber Rash    Niacin preparations Rash     Past Medical History:   Diagnosis Date    Diabetes mellitus     Hypertension     Renal disorder

## 2018-07-12 NOTE — HPI
53 year old female with ESRD on hemodialysis, HTN, DM2, anemia c/o over 30 episodes of vomiting blood since yesterday morning. 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 Per ED notes, there's some red blood and some rust-colored blood in her vomitus. She states the hematemesis began after multiple episodes of initial non-bloody vomiting. Pt also reports diarrhea yesterday that has since resolved. Pt's Hb last night was 8.0 and 7.4 this morning; unknown baseline Hb. She says she was told she has anemia about a year ago, but was unsure why. She denies any prior episodes of hematemesis. She does report prior scopes in [place], but does not know the results of these scopes. Additional history is limited due to pt's somnolence. Last dialysis was supposed to be two days ago, which she missed. Pt reports taking Aleve daily for 7 days for shoulder pain.   She is hypertensive into the 200s and hyperglycemic into the 300-400s in the ED.

## 2018-07-12 NOTE — CONSULTS
"[facility]  Nephrology  Consult Note    Patient Name: Eufemia Haq  MRN: [id number]  Admission Date: 2018  Hospital Length of Stay: 0 days  Attending Provider: Theron Mayer MD   Primary Care Physician: Primary Doctor No  Principal Problem:<principal problem not specified>    Inpatient consult to Nephrology  Consult performed by: BAKARI JOHN  Consult ordered by: ARIAS MONTOYA  Reason for consult: esrd        Subjective:     HPI: 52 yo female with significant history hypertension, DMT1, gastroparesis, recent CVA with residual behavior problems (2018 [facility] in [place]), seizure, ESRD on HD 1 year, and multiple admissions to hospitals with most recent - for left eye gaze who is admitted for nausea and vomiting x 2 days and uncontrolled hypertension. Per outpatient dialysis nurse, patient was sent to [facility] on Tuesday 7/10 after full dialysis treatment for change in mental status "slow to response" and only alert to person and is not aware previous hematemesis. Takes aleve PRN and last dose some time this week for shoulder pain. GI-protonix IV gtt and plan for EGD tomorrow. Hgb .80>7.4 (hgb 9.3 on 6/10/18). US abdomen mild hepatomegaly. CXR mild bilateral effusion-much improved compared to 18.     Nephrology consult for hemodialysis. ESRD x 1 year on iHD TTS via ADEN AVG at [facility] under direction of Dr. Dias. Last HD  and 6 kg over EDW. EDW 63.5 kg. Intradialysis gain 5-10 kg. Per outpatient dialysis RN, patient known for non adherence to medications/diet/fluid intake and sometimes blood sugar reading "high."     Past Medical History:   Diagnosis Date    Diabetes mellitus     Hypertension     Renal disorder        Past Surgical History:   Procedure Laterality Date     SECTION      x2    dialysis graft Left        Review of patient's allergies indicates:   Allergen Reactions    Gabapentin Other (See Comments)     " Seizure    Tramadol Other (See Comments)     Seizure    Vancomycin analogues Other (See Comments)     Seizure    Latex, natural rubber Rash    Niacin preparations Rash     Current Facility-Administered Medications   Medication Frequency    acetaminophen tablet 650 mg ED 1 Time    dextrose 50% injection 12.5 g PRN    dextrose 50% injection 25 g PRN    glucagon (human recombinant) injection 1 mg PRN    glucose chewable tablet 16 g PRN    glucose chewable tablet 24 g PRN    hydrALAZINE tablet 100 mg Q8H    ondansetron injection 8 mg Once    pantoprazole (PROTONIX) 40 mg in dextrose 5 % 100 mL infusion Continuous    sodium chloride 0.9% flush 5 mL PRN     Current Outpatient Prescriptions   Medication    aspirin 325 MG tablet    calcium acetate (PHOSLO) 667 mg capsule    carvedilol (COREG) 12.5 MG tablet    cloNIDine (CATAPRES) 0.2 MG tablet    hydrALAZINE (APRESOLINE) 100 MG tablet    insulin aspart U-100 (NOVOLOG) 100 unit/mL injection    insulin glargine, TOUJEO, (TOUJEO) 300 unit/mL (1.5 mL) InPn pen    levETIRAcetam (KEPPRA) 250 MG Tab    losartan (COZAAR) 25 MG tablet    metoclopramide HCl (REGLAN) 10 MG tablet    metoprolol tartrate (LOPRESSOR) 100 MG tablet    NIFEdipine (ADALAT CC) 90 MG TbSR     Family History     None        Social History Main Topics    Smoking status: Light Tobacco Smoker    Smokeless tobacco: Never Used    Alcohol use No    Drug use: Unknown    Sexual activity: Not on file     Review of Systems   Constitutional: Positive for activity change and fatigue.   HENT: Negative.    Respiratory: Positive for shortness of breath.      Objective:     Vital Signs (Most Recent):  Temp: 99.4 °F (37.4 °C) (07/12/18 0745)  Pulse: 88 (07/12/18 0745)  Resp: 18 (07/12/18 0745)  BP: (!) 234/96 (07/12/18 1209)  SpO2: 97 % (07/12/18 0745)  O2 Device (Oxygen Therapy): room air (07/12/18 0900) Vital Signs (24h Range):  Temp:  [99.1 °F (37.3 °C)-99.4 °F (37.4 °C)] 99.4 °F (37.4  "°C)  Pulse:  [76-90] 88  Resp:  [12-24] 18  SpO2:  [93 %-99 %] 97 %  BP: (169-234)/(76-96) 234/96     Weight: 63.5 kg (140 lb) (07/11/18 2132)  Body mass index is 21.93 kg/m².  Body surface area is 1.73 meters squared.    I/O last 3 completed shifts:  In: -   Out: 200 [Emesis/NG output:200]    Physical Exam   Constitutional:    Chronic ill apearing   HENT:   Head: Atraumatic.   Eyes: EOM are normal.   Right eye blind. Left eye poor vision.    Neck: Neck supple.   Cardiovascular: Normal rate and regular rhythm.  Extremities/Skin: BLE edema; ADEN AVG ; pale  Neuro: Oriented x 3 but poor historian    Significant Labs:  All labs within the past 24 hours have been reviewed.    Significant Imaging:  Labs: Reviewed    Assessment/Plan:     ESRD (end stage renal disease)    54 yo female with significant history hypertension, DMT1, gastroparesis, recent CVA with residual behavior problems (Jan 2018 [facility] in [place]), seizure, ESRD on HD 1 year, and multiple admissions to hospitals with most recent 6/2-6/8 for left eye gaze who is admitted for nausea and vomiting x 2 days and uncontrolled hypertension. Patient declined SNF evaluation during 6/2 admission. Per outpatient dialysis nurse, patient was sent to [facility] on Tuesday 7/10 after full dialysis treatment for change in mental status "slow to response" and only alert to person and is not aware previous hematemesis. Takes aleve PRN and last dose some time this week for shoulder pain. GI plan for EGD tomorrow.  Hgb .80>7.4 (hgb 9.3 on 6/10/18). US abdomen mild hepatomegaly. CXR mild bilateral effusion-much improved compared to 6/2/18.     Nephrology consult for hemodialysis. ESRD x 1 year on iHD TTS via ADEN AVG at [facility] under direction of Dr. Dias. Last HD Tues 7/9 and 6 kg over EDW. EDW 63.5 kg. Intradialysis gain 5-10 kg. Labs reviewed.     Plan/Recommendation:  HD for metabolic clearance and volume. UF to EDW 63.5 kg  Renal diet once able to eat. Add " phoslo to labs  Hold off ANGELICA given uncontrolled blood pressure  PRBC tranfusion per primary/GI.                   Thank you for your consult. I will follow-up with patient. Please contact us if you have any additional questions.    Viktoria Nguyen NP  Nephrology  [facility]

## 2018-07-12 NOTE — ED PROVIDER NOTES
Encounter Date: 2018       History     Chief Complaint   Patient presents with    Nausea     Pt got pulled off dialysis yesterday before finishing. Pt with fluid retention, N/V/D. Pt also with elevated BG at home.     Emesis    Diarrhea     HPI  Review of patient's allergies indicates:   Allergen Reactions    Gabapentin Other (See Comments)     Seizure    Tramadol Other (See Comments)     Seizure    Vancomycin analogues Other (See Comments)     Seizure    Latex, natural rubber Rash    Niacin preparations Rash     Past Medical History:   Diagnosis Date    Diabetes mellitus     Hypertension     Renal disorder      Past Surgical History:   Procedure Laterality Date     SECTION      x2    dialysis graft Left      History reviewed. No pertinent family history.  Social History   Substance Use Topics    Smoking status: Light Tobacco Smoker    Smokeless tobacco: Never Used    Alcohol use No     Review of Systems    Physical Exam     Initial Vitals [18 2132]   BP Pulse Resp Temp SpO2   (!) 169/76 76 18 99.1 °F (37.3 °C) 98 %      MAP       --         Physical Exam    ED Course   Procedures  Labs Reviewed   CBC W/ AUTO DIFFERENTIAL - Abnormal; Notable for the following:        Result Value    RBC 2.82 (*)     Hemoglobin 8.0 (*)     Hematocrit 25.6 (*)     MCHC 31.3 (*)     RDW 17.8 (*)     Immature Granulocytes 1.2 (*)     Gran # (ANC) 9.0 (*)     Immature Grans (Abs) 0.14 (*)     Mono # 1.2 (*)     Gran% 78.0 (*)     Lymph% 10.0 (*)     All other components within normal limits   COMPREHENSIVE METABOLIC PANEL - Abnormal; Notable for the following:     Sodium 134 (*)     Chloride 88 (*)     Glucose 368 (*)     BUN, Bld 80 (*)     Creatinine 5.2 (*)     Albumin 3.1 (*)     Alkaline Phosphatase 328 (*)     Anion Gap 18 (*)     eGFR if  10.1 (*)     eGFR if non  8.8 (*)     All other components within normal limits   URINALYSIS, REFLEX TO URINE CULTURE -  Abnormal; Notable for the following:     Appearance, UA Hazy (*)     Protein, UA 3+ (*)     Glucose, UA 3+ (*)     Ketones, UA Trace (*)     Leukocytes, UA Trace (*)     All other components within normal limits    Narrative:     Preferred Collection Type->Urine, Clean Catch one urine cup   TROPONIN I - Abnormal; Notable for the following:     Troponin I 0.129 (*)     All other components within normal limits   TROPONIN I - Abnormal; Notable for the following:     Troponin I 0.097 (*)     All other components within normal limits   URINALYSIS MICROSCOPIC - Abnormal; Notable for the following:     WBC, UA 17 (*)     Yeast, UA Occasional (*)     Hyaline Casts, UA 7 (*)     All other components within normal limits    Narrative:     Preferred Collection Type->Urine, Clean Catch one urine cup   CBC W/ AUTO DIFFERENTIAL - Abnormal; Notable for the following:     RBC 2.69 (*)     Hemoglobin 7.4 (*)     Hematocrit 25.7 (*)     MCHC 28.8 (*)     RDW 22.8 (*)     Immature Granulocytes 1.3 (*)     Gran # (ANC) 11.1 (*)     Immature Grans (Abs) 0.16 (*)     Lymph # 0.7 (*)     Gran% 87.7 (*)     Lymph% 5.8 (*)     All other components within normal limits   GAMMA GT - Abnormal; Notable for the following:      (*)     All other components within normal limits    Narrative:      GGT add on per DR. Sheila MD  12:40  07/12/2018    POCT GLUCOSE - Abnormal; Notable for the following:     POCT Glucose 312 (*)     All other components within normal limits   POCT GLUCOSE - Abnormal; Notable for the following:     POCT Glucose 365 (*)     All other components within normal limits   POCT GLUCOSE - Abnormal; Notable for the following:     POCT Glucose 411 (*)     All other components within normal limits   POCT GLUCOSE - Abnormal; Notable for the following:     POCT Glucose 442 (*)     All other components within normal limits   POCT GLUCOSE - Abnormal; Notable for the following:     POCT Glucose 404 (*)     All other components  within normal limits   POCT GLUCOSE - Abnormal; Notable for the following:     POCT Glucose 412 (*)     All other components within normal limits   POCT GLUCOSE - Abnormal; Notable for the following:     POCT Glucose 356 (*)     All other components within normal limits   POCT GLUCOSE - Abnormal; Notable for the following:     POCT Glucose 249 (*)     All other components within normal limits   ISTAT PROCEDURE - Abnormal; Notable for the following:     POC PH 7.523 (*)     POC PCO2 48.2 (*)     POC HCO3 39.7 (*)     POC SATURATED O2 86 (*)     POC TCO2 41 (*)     All other components within normal limits   CULTURE, URINE   CULTURE, BLOOD   CULTURE, BLOOD   CULTURE, BLOOD   LIPASE   BETA - HYDROXYBUTYRATE, SERUM   GAMMA GT   PROTIME-INR   PROTIME-INR    Narrative:      GGT add on per DR. Sheila MD  12:40  07/12/2018  add on pt as per raymon yepez PA-C  07/12/2018  14:43     CBC W/ AUTO DIFFERENTIAL   BASIC METABOLIC PANEL   CBC W/ AUTO DIFFERENTIAL   TYPE AND SCREEN PREOP   POCT GLUCOSE MONITORING CONTINUOUS   POCT GLUCOSE MONITORING CONTINUOUS   POCT GLUCOSE MONITORING CONTINUOUS   POCT GLUCOSE MONITORING CONTINUOUS   POCT GLUCOSE MONITORING CONTINUOUS   POCT GLUCOSE MONITORING CONTINUOUS   PREPARE RBC SOFT        ECG Results          EKG 12-lead (Final result)  Result time 07/12/18 15:17:12    Final result by Interface, Lab In [facility] (07/12/18 15:17:12)                 Narrative:    Test Reason : E11.9,  Blood Pressure : 187/078 mmHG  Vent. Rate : 080 BPM     Atrial Rate : 080 BPM     P-R Int : 130 ms          QRS Dur : 106 ms      QT Int : 406 ms       P-R-T Axes : 080 036 049 degrees     QTc Int : 468 ms    Normal sinus rhythm  Nonspecific T wave abnormality  Abnormal ECG  No previous ECGs available  Confirmed by Dane Ozuna MD ([id number]) on 7/12/2018 3:17:07 PM    Referred By: [email or web address]   SELF           Confirmed By:Dane Ozuna MD                            Imaging Results          US Abdomen Limited  (Final result)  Result time 07/12/18 11:18:20    Final result by Vargas Rand MD (07/12/18 11:18:20)                 Impression:      Mild hepatomegaly, otherwise no abnormal sonographic findings.    Electronically signed by resident: Stefano Ricci  Date:    07/12/2018  Time:    11:16    Electronically signed by: Vargas Rand MD  Date:    07/12/2018  Time:    11:18             Narrative:    EXAMINATION:  US ABDOMEN LIMITED    CLINICAL HISTORY:  elevated alk phos, concern for obstruction;.    TECHNIQUE:  Limited ultrasound of the right upper quadrant of the abdomen including pancreas, liver, gallbladder, common bile duct was performed.    COMPARISON:  None.    FINDINGS:  Liver: Mildly enlarged in size measuring 18.8 cm.  Homogeneous echotexture. No focal hepatic lesions.    Gallbladder: No calculi, wall thickening, or pericholecystic fluid.  No sonographic Cardona's sign.    Biliary system: The common duct is not dilated, measuring 4 mm.  No intrahepatic ductal dilatation.    Spleen: Normal in size with a homogeneous echotexture, measuring 10.1 x 4.5 cm.    Pancreas: The visualized portions of pancreas appear normal.    Miscellaneous: No ascites.                               X-Ray Chest PA And Lateral (Final result)  Result time 07/12/18 01:23:07    Final result by Darrius Segura MD (07/12/18 01:23:07)                 Impression:      Borderline cardiomegaly and minimal edema versus pneumonitis.      Electronically signed by: Darrius Segura MD  Date:    07/12/2018  Time:    01:23             Narrative:    EXAMINATION:  XR CHEST PA AND LATERAL    CLINICAL HISTORY:  Pain in right shoulder.  Pain in left shoulder.    TECHNIQUE:  Frontal and lateral views of the chest were performed.    COMPARISON:  None.    FINDINGS:  Examination is limited by underpenetration on the lateral radiograph.  Cardiac silhouette is at the upper limits of normal in size.  Cardiac wires overlie the chest.  Cardiac loop recorder  device is present overlying the left perihilar region.  Minimal increased parenchymal attenuation is noted bilaterally, which could reflect minimal interstitial edema or pneumonitis.  No large focal consolidation.  No sizable pleural effusion.  No pneumothorax.                                                      Clinical Impression:   The primary encounter diagnosis was Diabetes. Diagnoses of Bilateral shoulder pain and Hematemesis with nausea were also pertinent to this visit.                             Vince Chaudhary MD  Resident  07/16/18 0420

## 2018-07-12 NOTE — ED NOTES
LOC: The patient is awake, alert, and oriented to place, time, situation. Affect is appropriate.  Speech is appropriate and clear.     APPEARANCE: Patient resting comfortably in no acute distress.  Patient is clean and well groomed.    SKIN: The skin is warm and dry; color consistent with ethnicity.  Patient has normal skin turgor and moist mucus membranes.  Skin intact; no breakdown or bruising noted.     MUSCULOSKELETAL: Patient moving upper and lower extremities without difficulty.  Denies weakness.     RESPIRATORY: Airway is open and patent. Respirations spontaneous, even, easy, and non-labored.  Patient has a normal effort and rate.  No accessory muscle use noted. Denies cough.     NEUROLOGIC: Eyes open spontaneously.  Behavior appropriate to situation.  Follows commands; facial expression symmetrical.  Purposeful motor response noted; normal sensation in all extremities.

## 2018-07-12 NOTE — H&P
[facility] Hospital Medicine  History & Physical    Patient Name: Eufemia Haq  MRN: [id number]  Admission Date: 2018  Attending Physician: Theron Mayer MD   Primary Care Provider: Primary Doctor [facility] Medicine Team: [facility]OSP MED 3 Bri Boateng MD     Patient information was obtained from patient and ER records.     Subjective:     Principal Problem:<principal problem not specified>    Chief Complaint:   Chief Complaint   Patient presents with    Nausea     Pt got pulled off dialysis yesterday before finishing. Pt with fluid retention, N/V/D. Pt also with elevated BG at home.     Emesis    Diarrhea        HPI: Pt is a 53 year old female with PMH of ESRD on hemodialysis for 6 months, HTN, DM2 who presents to the ED with nausea, vomiting, and diarrhea and hyperglycemia on home glucometer. She has also been having bilateral shoulder pain for about 1 month for which she has been taking at least 2 aleve per day. Pt states she received hemodialysis on 07/10 for two hours and then was pulled off for hypertension and proceeded to ED and was discharged home later that evening. Pt notes that she has been feeling unwell since her dialysis yesterday. Pt has vomited 3 times, all non-bloody. Patient states at home this evening her sugar was too high for her glucometer to read and took 30 units of her insulin, afterwhich her glucose was 400. Pt came to ED with daughter, where her POCT glucose was 312. Patient notes she is new to the area and in need of outpatient providers. Pt having hematemesis in the ED prior to admission.    Past Medical History:   Diagnosis Date    Diabetes mellitus     Hypertension     Renal disorder        Past Surgical History:   Procedure Laterality Date     SECTION      x2    dialysis graft Left        Review of patient's allergies indicates:   Allergen Reactions    Gabapentin Other (See Comments)     Seizure    Tramadol Other (See Comments)  "    Seizure    Vancomycin analogues Other (See Comments)     Seizure    Latex, natural rubber Rash    Niacin preparations Rash       No current facility-administered medications on file prior to encounter.      No current outpatient prescriptions on file prior to encounter.     Family History     None        Social History Main Topics    Smoking status: Light Tobacco Smoker    Smokeless tobacco: Never Used    Alcohol use No    Drug use: Unknown    Sexual activity: Not on file     Review of Systems   Constitutional: Positive for appetite change (unable to eat since breakfast yesterday), chills and fever (subjective, temp was 99.5 at home).   HENT: Negative for nosebleeds and sinus pain.    Eyes: Negative for visual disturbance.        Pt has no vision in the right eye and has limited vision in the left eye. Recently had cataract sx in the left and retinal sx on the right. Pt denies visual changes from her baseline with the episode.   Respiratory: Negative for cough, chest tightness and shortness of breath.    Cardiovascular: Positive for leg swelling (x 2 days). Negative for chest pain.        Pt has AV fistula to LUE   Gastrointestinal: Positive for nausea. Vomiting: blood noted in emesis bag in ED.   Endocrine: Negative.    Genitourinary: Positive for decreased urine volume (pt does not make much urine due to CKD, no change from baseline). Negative for dysuria and hematuria.   Musculoskeletal: Positive for arthralgias (pain to the bilateral shoulders, "throbbing and constant" and worse at night).   Skin: Negative.    Neurological: Positive for dizziness and light-headedness. Negative for syncope. Weakness: pt fell today due to the weakness, but landed on her buttocks and denies trauma/pain.   Psychiatric/Behavioral: Negative.      Objective:     Vital Signs (Most Recent):  Temp: 99.3 °F (37.4 °C) (07/12/18 1450)  Pulse: 83 (07/12/18 1800)  Resp: 18 (07/12/18 0745)  BP: (!) 181/75 (07/12/18 1800)  SpO2: 100 " % (07/12/18 1345) Vital Signs (24h Range):  Temp:  [99.1 °F (37.3 °C)-99.4 °F (37.4 °C)] 99.3 °F (37.4 °C)  Pulse:  [76-90] 83  Resp:  [12-24] 18  SpO2:  [93 %-100 %] 100 %  BP: (163-234)/(69-96) 181/75     Weight: 63.5 kg (140 lb)  Body mass index is 21.93 kg/m².    Physical Exam   Constitutional: She is oriented to person, place, and time. She appears well-developed and well-nourished. She appears distressed.   HENT:   Head: Normocephalic and atraumatic.   Eyes: Conjunctivae and EOM are normal.   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 Neck supple.   Cardiovascular: Normal rate, regular rhythm, normal heart sounds and intact distal pulses.    Pulmonary/Chest: Effort normal and breath sounds normal.   Abdominal: Soft. Bowel sounds are normal. She exhibits no distension. There is no tenderness.   Musculoskeletal: Normal range of motion. She exhibits edema.        Right shoulder: She exhibits pain.        Left shoulder: She exhibits pain.   Neurological: She is alert and oriented to person, place, and time.   Skin: Skin is warm and dry. She is not diaphoretic.   Psychiatric: She has a normal mood and affect. Her behavior is normal. Thought content normal.         CRANIAL NERVES     CN III, IV, VI   Extraocular motions are normal.        Significant Labs:   Bilirubin:   Recent Labs  Lab 07/12/18  0025   BILITOT 0.3     CBC:   Recent Labs  Lab 07/12/18  0025 07/12/18  0917 07/12/18  1555   WBC 11.58 12.69 9.02   HGB 8.0* 7.4* 7.0*   HCT 25.6* 25.7* 22.7*    248 277     CMP:   Recent Labs  Lab 07/12/18  0025 07/12/18  1555   * 139   K 4.2 4.1   CL 88* 96   CO2 28 29   * 228*   BUN 80* 70*   CREATININE 5.2* 5.0*   CALCIUM 8.8 8.4*   PROT 6.4  --    ALBUMIN 3.1*  --    BILITOT 0.3  --    ALKPHOS 328*  --    AST 32  --    ALT 37  --    ANIONGAP 18* 14   EGFRNONAA 8.8* 9.2*     POCT Glucose:   Recent Labs  Lab 07/12/18  0629 07/12/18  0925 07/12/18  1824    POCTGLUCOSE 356* 249* 241*     Urine Studies:   Recent Labs  Lab 07/12/18  0152   COLORU Yellow   APPEARANCEUA Hazy*   PHUR 5.0   SPECGRAV 1.010   PROTEINUA 3+*   GLUCUA 3+*   KETONESU Trace*   BILIRUBINUA Negative   OCCULTUA Negative   NITRITE Negative   UROBILINOGEN Negative   LEUKOCYTESUR Trace*   RBCUA 2   WBCUA 17*   BACTERIA Rare   SQUAMEPITHEL 7   HYALINECASTS 7*       Significant Imaging: I have reviewed all pertinent imaging results/findings within the past 24 hours.    Assessment/Plan:     Nausea and vomiting    Improved with compazine, History of gastroparesis with concern for GI bleed.  - Continue PRN compazine and Zofran as needed.           Hypertensive urgency    Unable to take PO meds given N/V.  No HA, change in vision or signs of end organ damage.  Continue home meds in a stepwise manner    - ICU consulted given concern for HTN emergency with GI bleed.   - IV labetalol PRN as needed           Essential hypertension    Home Regimen includes,           ESRD (end stage renal disease)    - Nephrology aware, HD today given HTN           Hematemesis with nausea    GI consulted and aware. Plan for EGD tomorrow  - Clear liquid diet   - Protonix drip   - CBC Q8              VTE Risk Mitigation         Ordered     Place sequential compression device  Until discontinued      07/12/18 0851     Reason for No Pharmacological VTE Prophylaxis  Once      07/12/18 0851     IP VTE HIGH RISK PATIENT  Once      07/12/18 0851         Dispo: pt lives with daughter. Requires minimal assistance to perform most of her ADLs.    Bri Boateng MD  Department of Hospital Medicine   [facility]

## 2018-07-12 NOTE — HPI
Pt is a 53 year old female with PMH of ESRD on hemodialysis for 6 months, HTN, DM2 who presents to the ED with nausea, vomiting, and diarrhea and hyperglycemia on home glucometer. She has also been having bilateral shoulder pain for about 1 month for which she has been taking at least 2 aleve per day. Pt states she received hemodialysis on 07/10 for two hours and then was pulled off for hypertension and proceeded to ED and was discharged home later that evening. Pt notes that she has been feeling unwell since her dialysis 7/10. Pt has vomited 3 times, all non-bloody. Patient states at home this evening her sugar was too high for her glucometer to read and took 30 units of her insulin, afterwhich her glucose was 400. Pt came to ED with daughter, where her POCT glucose was 312. Patient notes she is new to the area and in need of outpatient providers. Pt having hematemesis in the ED prior to admission.

## 2018-07-12 NOTE — ASSESSMENT & PLAN NOTE
- Nephrology following patient  - HD today for metabolic clearance and volume UF to EDW 63.5 kg  - resume regular HD schedule following admission

## 2018-07-12 NOTE — ASSESSMENT & PLAN NOTE
- Suspect GI bleed in pt with bright red bloody emesis and dark, tarry stool x 2 days  - Monitor Hgb and Hct for any acute changes (Currently, appears not far from baseline)  - PRBC tranfusion per primary/GI

## 2018-07-12 NOTE — CONSULTS
[facility]  Critical Care Medicine  Consult Note    Patient Name: Eufemia Haq  MRN: [id number]  Admission Date: 2018  Hospital Length of Stay: 0 days  Code Status: Full Code  Attending Physician: Theron Mayer MD   Primary Care Provider: Primary Doctor No   Principal Problem: <principal problem not specified>    Consults  Subjective:     HPI:  Pt is a 53 year old female with PMH of ESRD on HD x 1year, uncontrolled HTN, DM1, gastroparesis, who presents to the ED with nausea, vomiting, diarrhea and hyperglycemia per home glucometer. Patient states that her glucometer reading was above 400 yesterday evening. Pt reports that she received hemodialysis on 07/10 for unconfirmed duration and was subsequently sent to hospital for altered mental status where she was ultimately discharged home later that evening. Pt notes that she has since been feeling unwell. Patient reports multiple episodes of bright red emesis and and dark tarry stool. She is also complaining of bilateral shoulder pain. Pt came to ED with daughter, where her POCT glucose was 312. Upon reviewing the pt's chart, it appears that she has multiple ED visits for similar episodes of uncontrolled DM and HTN.     Hospital/ICU Course:  No notes on file    Past Medical History:   Diagnosis Date    Diabetes mellitus     Hypertension     Renal disorder        Past Surgical History:   Procedure Laterality Date     SECTION      x2    dialysis graft Left        Review of patient's allergies indicates:   Allergen Reactions    Gabapentin Other (See Comments)     Seizure    Tramadol Other (See Comments)     Seizure    Vancomycin analogues Other (See Comments)     Seizure    Latex, natural rubber Rash    Niacin preparations Rash       Family History     None        Social History Main Topics    Smoking status: Light Tobacco Smoker    Smokeless tobacco: Never Used    Alcohol use No    Drug use: Unknown    Sexual  activity: Not on file      Review of Systems   Constitutional: Positive for fatigue. Negative for chills, diaphoresis and fever.   HENT: Negative for nosebleeds and sore throat.    Eyes: Negative for discharge and visual disturbance.   Respiratory: Positive for cough and shortness of breath. Negative for chest tightness and wheezing.    Cardiovascular: Negative for chest pain and leg swelling.   Gastrointestinal: Positive for diarrhea, nausea and vomiting. Negative for abdominal pain and blood in stool.   Endocrine: Negative for cold intolerance and heat intolerance.   Genitourinary: Negative for hematuria and urgency.   Musculoskeletal: Negative for myalgias and neck stiffness.        Bilateral shoulder pain   Skin: Negative for color change and wound.   Neurological: Negative for dizziness and facial asymmetry.   Hematological: Negative for adenopathy. Does not bruise/bleed easily.   Psychiatric/Behavioral: Negative for decreased concentration and suicidal ideas.     Objective:     Vital Signs (Most Recent):  Temp: 99.4 °F (37.4 °C) (07/12/18 0745)  Pulse: 76 (07/12/18 1345)  Resp: 18 (07/12/18 0745)  BP: (!) 163/69 (07/12/18 1345)  SpO2: 100 % (07/12/18 1345) Vital Signs (24h Range):  Temp:  [99.1 °F (37.3 °C)-99.4 °F (37.4 °C)] 99.4 °F (37.4 °C)  Pulse:  [76-90] 76  Resp:  [12-24] 18  SpO2:  [93 %-100 %] 100 %  BP: (163-234)/(69-96) 163/69   Weight: 63.5 kg (140 lb)  Body mass index is 21.93 kg/m².      Intake/Output Summary (Last 24 hours) at 07/12/18 1434  Last data filed at 07/12/18 0700   Gross per 24 hour   Intake                0 ml   Output              200 ml   Net             -200 ml       Physical Exam   Constitutional: She is oriented to person, place, and time. No distress.   Somnolent but answering questions appropriately   HENT:   Head: Normocephalic and atraumatic.   Eyes: Conjunctivae are normal. Pupils are equal, round, and reactive to light.   Neck: Normal range of motion. No thyromegaly  present.   Cardiovascular: Normal rate, regular rhythm and normal heart sounds.  Exam reveals no gallop and no friction rub.    No murmur heard.  Pulmonary/Chest: Effort normal and breath sounds normal.   Abdominal: Soft. There is no tenderness.   Musculoskeletal: Normal range of motion. She exhibits edema. She exhibits no deformity.   LE edema bilaterally   Neurological: She is alert and oriented to person, place, and time. No cranial nerve deficit.   Skin: Skin is warm and dry. Capillary refill takes less than 2 seconds. She is not diaphoretic.   Vitals reviewed.      Vents:     Lines/Drains/Airways     Peripheral Intravenous Line                 Midline Catheter Insertion/Assessment  - Single Lumen 07/12/18 0910 Right cephalic vein (lateral side of arm) 18g x 8cm less than 1 day              Significant Labs:    CBC/Anemia Profile:    Recent Labs  Lab 07/12/18  0025 07/12/18  0917   WBC 11.58 12.69   HGB 8.0* 7.4*   HCT 25.6* 25.7*    248   MCV 91 96   RDW 17.8* 22.8*        Chemistries:    Recent Labs  Lab 07/12/18  0025   *   K 4.2   CL 88*   CO2 28   BUN 80*   CREATININE 5.2*   CALCIUM 8.8   ALBUMIN 3.1*   PROT 6.4   BILITOT 0.3   ALKPHOS 328*   ALT 37   AST 32       Recent Lab Results       07/12/18  1245 07/12/18  1234 07/12/18  0925 07/12/18  0917 07/12/18  0629      Immature Granulocytes    1.3(H)      Immature Grans (Abs)    0.16  Comment:  Mild elevation in immature granulocytes is non specific and   can be seen in a variety of conditions including stress response,   acute inflammation, trauma and pregnancy. Correlation with other   laboratory and clinical findings is essential.  (H)      Unit Blood Type Code    6200[P]          6200[P]      Unit Expiration    [id number][P]          [id number][P]      Unit Blood Type    A POS[P]          A POS[P]      Albumin          Alkaline Phosphatase          Allens Test  N/A        ALT          Anion Gap          Aniso    Slight      Appearance, UA           AST          Bacteria, UA          Baso #    0.05      Basophilic Stippling    Occasional      Basophil%    0.4      Beta-Hydroxybutyrate 0.1         Bilirubin (UA)          Total Bilirubin          Site  Other        BUN, Bld          Calcium          Chloride          CO2          CODING SYSTEM    IBMM064[P]          [id number][P]      Color, UA          Creatinine          DelSys  Nasal Can        Differential Method    Automated      DISPENSE STATUS    CROSSMATCHED[P]          CROSSMATCHED[P]      eGFR if           eGFR if non           Eos #    0.0      Eosinophil%    0.0      GGT          Glucose          Glucose, UA          Gran # (ANC)    11.1(H)      Gran%    87.7(H)      Group & Rh    A POS      Hematocrit    25.7(L)      Hemoglobin    7.4(L)      Hyaline Casts, UA          INDIRECT SYDNEY    NEG      Ketones, UA          Leukocytes, UA          Lipase          Lymph #    0.7(L)      Lymph%    5.8(L)      MCH    27.5      MCHC    28.8(L)      MCV    96      Microscopic Comment          Mono #    0.6      Mono%    4.8      MPV    11.7      Nitrite, UA          nRBC    0      Occult Blood UA          Ovalocytes    Occasional      pH, UA          Platelets    248      POC BE  17        POC HCO3  39.7(H)        POC PCO2  48.2(H)        POC PH  7.523(H)        POC PO2  46        POC SATURATED O2  86(L)        POC TCO2  41(H)        POCT Glucose   249(H)  356(H)     Poik    Slight      Poly    Occasional      Potassium          PRODUCT CODE    F9292F34[P]          [id number][P]      Total Protein          Protein, UA          RBC    2.69(L)      RBC, UA          RDW    22.8(H)      Sample  VENOUS        Sodium          Specific Gravity, UA          Specimen UA          Squam Epithel, UA          Troponin I          UNIT NUMBER    [id number][P]          [id number][P]      Urobilinogen, UA          WBC, UA          WBC    12.69      Yeast, UA                       07/12/18  0532 07/12/18  0523 07/12/18  0445 07/12/18  0306 07/12/18  0209      Immature Granulocytes          Immature Grans (Abs)          Unit Blood Type Code          Unit Expiration          Unit Blood Type          Albumin          Alkaline Phosphatase          Allens Test          ALT          Anion Gap          Aniso          Appearance, UA          AST          Bacteria, UA          Baso #          Basophilic Stippling          Basophil%          Beta-Hydroxybutyrate          Bilirubin (UA)          Total Bilirubin          Site          BUN, Bld          Calcium          Chloride          CO2          CODING SYSTEM          Color, UA          Creatinine          DelSys          Differential Method          DISPENSE STATUS          eGFR if           eGFR if non           Eos #          Eosinophil%          GGT          Glucose          Glucose, UA          Gran # (ANC)          Gran%          Group & Rh          Hematocrit          Hemoglobin          Hyaline Casts, UA          INDIRECT SYDNEY          Ketones, UA          Leukocytes, UA          Lipase          Lymph #          Lymph%          MCH          MCHC          MCV          Microscopic Comment          Mono #          Mono%          MPV          Nitrite, UA          nRBC          Occult Blood UA          Ovalocytes          pH, UA          Platelets          POC BE          POC HCO3          POC PCO2          POC PH          POC PO2          POC SATURATED O2          POC TCO2          POCT Glucose 412(H)  404(H) 442(H) 411(H)     Poik          Poly          Potassium          PRODUCT CODE          Total Protein          Protein, UA          RBC          RBC, UA          RDW          Sample          Sodium          Specific Gravity, UA          Specimen UA          Squam Epithel, UA          Troponin I  0.097  Comment:  The reference interval for Troponin I represents the 99th percentile   cutoff   for our facility  and is consistent with 3rd generation assay   performance.  (H)        UNIT NUMBER          Urobilinogen, UA          WBC, UA          WBC          Yeast, UA                      07/12/18  0152 07/12/18  0054 07/12/18  0025 07/11/18  2133      Immature Granulocytes   1.2(H)      Immature Grans (Abs)   0.14  Comment:  Mild elevation in immature granulocytes is non specific and   can be seen in a variety of conditions including stress response,   acute inflammation, trauma and pregnancy. Correlation with other   laboratory and clinical findings is essential.  (H)      Unit Blood Type Code         Unit Expiration         Unit Blood Type         Albumin   3.1(L)      Alkaline Phosphatase   328(H)      Allens Test         ALT   37      Anion Gap   18(H)      Aniso         Appearance, UA Hazy(A)        AST   32      Bacteria, UA Rare        Baso #   0.05      Basophilic Stippling         Basophil%   0.4      Beta-Hydroxybutyrate         Bilirubin (UA) Negative        Total Bilirubin   0.3  Comment:  For infants and newborns, interpretation of results should be based  on gestational age, weight and in agreement with clinical  observations.  Premature Infant recommended reference ranges:  Up to 24 hours.............<8.0 mg/dL  Up to 48 hours............<12.0 mg/dL  3-5 days..................<15.0 mg/dL  6-29 days.................<15.0 mg/dL        Site         BUN, Bld   80(H)      Calcium   8.8      Chloride   88(L)      CO2   28      CODING SYSTEM         Color, UA Yellow        Creatinine   5.2(H)      DelLXSNs         Differential Method   Automated      DISPENSE STATUS         eGFR if    10.1(A)      eGFR if non    8.8  Comment:  Calculation used to obtain the estimated glomerular filtration  rate (eGFR) is the CKD-EPI equation.   (A)      Eos #   0.0      Eosinophil%   0.3      GGT   367(H)      Glucose   368(H)      Glucose, UA 3+(A)        Gran # (ANC)   9.0(H)      Gran%   78.0(H)       Group & Rh         Hematocrit   25.6(L)      Hemoglobin   8.0(L)      Hyaline Casts, UA 7(A)        INDIRECT SYDNEY         Ketones, UA Trace(A)        Leukocytes, UA Trace(A)        Lipase   7      Lymph #   1.2      Lymph%   10.0(L)      MCH   28.4      MCHC   31.3(L)      MCV   91      Microscopic Comment SEE COMMENT  Comment:  Other formed elements not mentioned in the report are not   present in the microscopic examination.           Mono #   1.2(H)      Mono%   10.1      MPV   10.9      Nitrite, UA Negative        nRBC   0      Occult Blood UA Negative        Ovalocytes         pH, UA 5.0        Platelets   309      POC BE         POC HCO3         POC PCO2         POC PH         POC PO2         POC SATURATED O2         POC TCO2         POCT Glucose  365(H)  312(H)     Poik         Poly         Potassium   4.2      PRODUCT CODE         Total Protein   6.4      Protein, UA 3+  Comment:  Recommend a 24 hour urine protein or a urine   protein/creatinine ratio if globulin induced proteinuria is  clinically suspected.  (A)        RBC   2.82(L)      RBC, UA 2        RDW   17.8(H)      Sample         Sodium   134(L)      Specific [place], UA 1.010        Specimen UA Urine, Clean Catch        Squam Epithel, UA 7        Troponin I   0.129  Comment:  The reference interval for Troponin I represents the 99th percentile   cutoff   for our facility and is consistent with 3rd generation assay   performance.  (H)      UNIT NUMBER         Urobilinogen, UA Negative        WBC, UA 17(H)        WBC   11.58      Yeast, UA Occasional(A)            All pertinent labs within the past 24 hours have been reviewed.    Significant Imaging: I have reviewed all pertinent imaging results/findings within the past 24 hours.    Assessment/Plan:     Cardiac/Vascular   Essential hypertension    - Continue to manage pt's HTN appropriately.   - Continue home meds upon discharge (Losartan, Procardia, Coreg)         Renal/   ESRD (end stage renal  disease)    - Nephrology following patient  - HD today for metabolic clearance and volume UF to EDW 63.5 kg  - resume regular HD schedule following admission        Endocrine   Diabetes    - Diabetic Education/Counseling         GI   Hematemesis with nausea    - Suspect GI bleed in pt with bright red bloody emesis and dark, tarry stool x 2 days  - Monitor Hgb and Hct for any acute changes (Currently, appears not far from baseline)  - PRBC tranfusion per primary/GI                Thank you for your consult. I will sign off at this time. Please contact us if you have any additional questions or notice any acute changes that require further evaluation or management from our team.       Tej Andrade MD  Internal Medicine Resident, PGY-1

## 2018-07-12 NOTE — SUBJECTIVE & OBJECTIVE
Past Medical History:   Diagnosis Date    Diabetes mellitus     Hypertension     Renal disorder        Past Surgical History:   Procedure Laterality Date     SECTION      x2    dialysis graft Left        Review of patient's allergies indicates:   Allergen Reactions    Gabapentin Other (See Comments)     Seizure    Tramadol Other (See Comments)     Seizure    Vancomycin analogues Other (See Comments)     Seizure    Latex, natural rubber Rash    Niacin preparations Rash       Family History     None        Social History Main Topics    Smoking status: Light Tobacco Smoker    Smokeless tobacco: Never Used    Alcohol use No    Drug use: Unknown    Sexual activity: Not on file      Review of Systems   Constitutional: Positive for fatigue. Negative for chills, diaphoresis and fever.   HENT: Negative for nosebleeds and sore throat.    Eyes: Negative for discharge and visual disturbance.   Respiratory: Positive for cough and shortness of breath. Negative for chest tightness and wheezing.    Cardiovascular: Negative for chest pain and leg swelling.   Gastrointestinal: Positive for diarrhea, nausea and vomiting. Negative for abdominal pain and blood in stool.   Endocrine: Negative for cold intolerance and heat intolerance.   Genitourinary: Negative for hematuria and urgency.   Musculoskeletal: Negative for myalgias and neck stiffness.        Bilateral shoulder pain   Skin: Negative for color change and wound.   Neurological: Negative for dizziness and facial asymmetry.   Hematological: Negative for adenopathy. Does not bruise/bleed easily.   Psychiatric/Behavioral: Negative for decreased concentration and suicidal ideas.     Objective:     Vital Signs (Most Recent):  Temp: 99.4 °F (37.4 °C) (18 0745)  Pulse: 76 (18 1345)  Resp: 18 (18 0745)  BP: (!) 163/69 (18 1345)  SpO2: 100 % (18 1345) Vital Signs (24h Range):  Temp:  [99.1 °F (37.3 °C)-99.4 °F (37.4 °C)] 99.4 °F (37.4  °C)  Pulse:  [76-90] 76  Resp:  [12-24] 18  SpO2:  [93 %-100 %] 100 %  BP: (163-234)/(69-96) 163/69   Weight: 63.5 kg (140 lb)  Body mass index is 21.93 kg/m².      Intake/Output Summary (Last 24 hours) at 07/12/18 1434  Last data filed at 07/12/18 0700   Gross per 24 hour   Intake                0 ml   Output              200 ml   Net             -200 ml       Physical Exam   Constitutional: She is oriented to person, place, and time. No distress.   Somnolent but answering questions appropriately   HENT:   Head: Normocephalic and atraumatic.   Eyes: Conjunctivae are normal. Pupils are equal, round, and reactive to light.   Neck: Normal range of motion. No thyromegaly present.   Cardiovascular: Normal rate, regular rhythm and normal heart sounds.  Exam reveals no gallop and no friction rub.    No murmur heard.  Pulmonary/Chest: Effort normal and breath sounds normal.   Abdominal: Soft. There is no tenderness.   Musculoskeletal: Normal range of motion. She exhibits edema. She exhibits no deformity.   LE edema bilaterally   Neurological: She is alert and oriented to person, place, and time. No cranial nerve deficit.   Skin: Skin is warm and dry. Capillary refill takes less than 2 seconds. She is not diaphoretic.   Vitals reviewed.      Vents:     Lines/Drains/Airways     Peripheral Intravenous Line                 Midline Catheter Insertion/Assessment  - Single Lumen 07/12/18 0910 Right cephalic vein (lateral side of arm) 18g x 8cm less than 1 day              Significant Labs:    CBC/Anemia Profile:    Recent Labs  Lab 07/12/18  0025 07/12/18  0917   WBC 11.58 12.69   HGB 8.0* 7.4*   HCT 25.6* 25.7*    248   MCV 91 96   RDW 17.8* 22.8*        Chemistries:    Recent Labs  Lab 07/12/18  0025   *   K 4.2   CL 88*   CO2 28   BUN 80*   CREATININE 5.2*   CALCIUM 8.8   ALBUMIN 3.1*   PROT 6.4   BILITOT 0.3   ALKPHOS 328*   ALT 37   AST 32       Recent Lab Results       07/12/18  1245 07/12/18  1234  07/12/18  0925 07/12/18  0917 07/12/18  0629      Immature Granulocytes    1.3(H)      Immature Grans (Abs)    0.16  Comment:  Mild elevation in immature granulocytes is non specific and   can be seen in a variety of conditions including stress response,   acute inflammation, trauma and pregnancy. Correlation with other   laboratory and clinical findings is essential.  (H)      Unit Blood Type Code    6200[P]          6200[P]      Unit Expiration    [id number][P]          [id number][P]      Unit Blood Type    A POS[P]          A POS[P]      Albumin          Alkaline Phosphatase          Allens Test  N/A        ALT          Anion Gap          Aniso    Slight      Appearance, UA          AST          Bacteria, UA          Baso #    0.05      Basophilic Stippling    Occasional      Basophil%    0.4      Beta-Hydroxybutyrate 0.1         Bilirubin (UA)          Total Bilirubin          Site  Other        BUN, Bld          Calcium          Chloride          CO2          CODING SYSTEM    [id number][P]          [id number][P]      Color, UA          Creatinine          DelSys  Nasal Can        Differential Method    Automated      DISPENSE STATUS    CROSSMATCHED[P]          CROSSMATCHED[P]      eGFR if           eGFR if non           Eos #    0.0      Eosinophil%    0.0      GGT          Glucose          Glucose, UA          Gran # (ANC)    11.1(H)      Gran%    87.7(H)      Group & Rh    A POS      Hematocrit    25.7(L)      Hemoglobin    7.4(L)      Hyaline Casts, UA          INDIRECT SYDNEY    NEG      Ketones, UA          Leukocytes, UA          Lipase          Lymph #    0.7(L)      Lymph%    5.8(L)      MCH    27.5      MCHC    28.8(L)      MCV    96      Microscopic Comment          Mono #    0.6      Mono%    4.8      MPV    11.7      Nitrite, UA          nRBC    0      Occult Blood UA          Ovalocytes    Occasional      pH, UA          Platelets    248      POC BE  17        POC HCO3   39.7(H)        POC PCO2  48.2(H)        POC PH  7.523(H)        POC PO2  46        POC SATURATED O2  86(L)        POC TCO2  41(H)        POCT Glucose   249(H)  356(H)     Poik    Slight      Poly    Occasional      Potassium          PRODUCT CODE    [id number][P]          [id number][P]      Total Protein          Protein, UA          RBC    2.69(L)      RBC, UA          RDW    22.8(H)      Sample  VENOUS        Sodium          Specific Gravity, UA          Specimen UA          Squam Epithel, UA          Troponin I          UNIT NUMBER    [id number][P]          [id number][P]      Urobilinogen, UA          WBC, UA          WBC    12.69      Yeast, UA                      07/12/18  0532 07/12/18  0523 07/12/18  0445 07/12/18  0306 07/12/18  0209      Immature Granulocytes          Immature Grans (Abs)          Unit Blood Type Code          Unit Expiration          Unit Blood Type          Albumin          Alkaline Phosphatase          Allens Test          ALT          Anion Gap          Aniso          Appearance, UA          AST          Bacteria, UA          Baso #          Basophilic Stippling          Basophil%          Beta-Hydroxybutyrate          Bilirubin (UA)          Total Bilirubin          Site          BUN, Bld          Calcium          Chloride          CO2          CODING SYSTEM          Color, UA          Creatinine          DelSys          Differential Method          DISPENSE STATUS          eGFR if           eGFR if non           Eos #          Eosinophil%          GGT          Glucose          Glucose, UA          Gran # (ANC)          Gran%          Group & Rh          Hematocrit          Hemoglobin          Hyaline Casts, UA          INDIRECT SYDNEY          Ketones, UA          Leukocytes, UA          Lipase          Lymph #          Lymph%          MCH          MCHC          MCV          Microscopic Comment          Mono #          Mono%          MPV           Nitrite, UA          nRBC          Occult Blood UA          Ovalocytes          pH, UA          Platelets          POC BE          POC HCO3          POC PCO2          POC PH          POC PO2          POC SATURATED O2          POC TCO2          POCT Glucose 412(H)  404(H) 442(H) 411(H)     Poik          Poly          Potassium          PRODUCT CODE          Total Protein          Protein, UA          RBC          RBC, UA          RDW          Sample          Sodium          Specific Gravity, UA          Specimen UA          Squam Epithel, UA          Troponin I  0.097  Comment:  The reference interval for Troponin I represents the 99th percentile   cutoff   for our facility and is consistent with 3rd generation assay   performance.  (H)        UNIT NUMBER          Urobilinogen, UA          WBC, UA          WBC          Yeast, UA                      07/12/18  0152 07/12/18  0054 07/12/18  0025 07/11/18  2133      Immature Granulocytes   1.2(H)      Immature Grans (Abs)   0.14  Comment:  Mild elevation in immature granulocytes is non specific and   can be seen in a variety of conditions including stress response,   acute inflammation, trauma and pregnancy. Correlation with other   laboratory and clinical findings is essential.  (H)      Unit Blood Type Code         Unit Expiration         Unit Blood Type         Albumin   3.1(L)      Alkaline Phosphatase   328(H)      Allens Test         ALT   37      Anion Gap   18(H)      Aniso         Appearance, UA Hazy(A)        AST   32      Bacteria, UA Rare        Baso #   0.05      Basophilic Stippling         Basophil%   0.4      Beta-Hydroxybutyrate         Bilirubin (UA) Negative        Total Bilirubin   0.3  Comment:  For infants and newborns, interpretation of results should be based  on gestational age, weight and in agreement with clinical  observations.  Premature Infant recommended reference ranges:  Up to 24 hours.............<8.0 mg/dL  Up to 48  hours............<12.0 mg/dL  3-5 days..................<15.0 mg/dL  6-29 days.................<15.0 mg/dL        Site         BUN, Bld   80(H)      Calcium   8.8      Chloride   88(L)      CO2   28      CODING SYSTEM         Color, UA Yellow        Creatinine   5.2(H)      DelSys         Differential Method   Automated      DISPENSE STATUS         eGFR if    10.1(A)      eGFR if non    8.8  Comment:  Calculation used to obtain the estimated glomerular filtration  rate (eGFR) is the CKD-EPI equation.   (A)      Eos #   0.0      Eosinophil%   0.3      GGT   367(H)      Glucose   368(H)      Glucose, UA 3+(A)        Gran # (ANC)   9.0(H)      Gran%   78.0(H)      Group & Rh         Hematocrit   25.6(L)      Hemoglobin   8.0(L)      Hyaline Casts, UA 7(A)        INDIRECT SYDNEY         Ketones, UA Trace(A)        Leukocytes, UA Trace(A)        Lipase   7      Lymph #   1.2      Lymph%   10.0(L)      MCH   28.4      MCHC   31.3(L)      MCV   91      Microscopic Comment SEE COMMENT  Comment:  Other formed elements not mentioned in the report are not   present in the microscopic examination.           Mono #   1.2(H)      Mono%   10.1      MPV   10.9      Nitrite, UA Negative        nRBC   0      Occult Blood UA Negative        Ovalocytes         pH, UA 5.0        Platelets   309      POC BE         POC HCO3         POC PCO2         POC PH         POC PO2         POC SATURATED O2         POC TCO2         POCT Glucose  365(H)  312(H)     Poik         Poly         Potassium   4.2      PRODUCT CODE         Total Protein   6.4      Protein, UA 3+  Comment:  Recommend a 24 hour urine protein or a urine   protein/creatinine ratio if globulin induced proteinuria is  clinically suspected.  (A)        RBC   2.82(L)      RBC, UA 2        RDW   17.8(H)      Sample         Sodium   134(L)      Specific [place], UA 1.010        Specimen UA Urine, Clean Catch        Squam Epithel, UA 7        Troponin I    0.129  Comment:  The reference interval for Troponin I represents the 99th percentile   cutoff   for our facility and is consistent with 3rd generation assay   performance.  (H)      UNIT NUMBER         Urobilinogen, UA Negative        WBC, UA 17(H)        WBC   11.58      Yeast, UA Occasional(A)            All pertinent labs within the past 24 hours have been reviewed.    Significant Imaging: I have reviewed all pertinent imaging results/findings within the past 24 hours.

## 2018-07-12 NOTE — HPI
"52 yo female with significant history hypertension, DMT1, gastroparesis, recent CVA with residual behavior problems (Jan 2018 [facility] in [place]), seizure, ESRD on HD 1 year, and multiple admissions to hospitals with most recent 6/2-6/8 for left eye gaze who is admitted for nausea and vomiting x 2 days and uncontrolled hypertension. Per outpatient dialysis nurse, patient was sent to [facility] on Tuesday 7/10 after full dialysis treatment for change in mental status "slow to response" and only alert to person and is not aware previous hematemesis. Takes aleve PRN and last dose some time this week for shoulder pain. GI-protonix IV gtt and plan for EGD tomorrow. Hgb .80>7.4 (hgb 9.3 on 6/10/18). US abdomen mild hepatomegaly. CXR mild bilateral effusion-much improved compared to 6/2/18.     Nephrology consult for hemodialysis. ESRD x 1 year on [facility] of Dr. Dias. Last HD Tues 7/9 and 6 kg over EDW. EDW 63.5 kg. Intradialysis gain 5-10 kg. Per outpatient dialysis RN, patient known for non adherence to medications/diet/fluid intake and sometimes blood sugar reading "high."   "

## 2018-07-12 NOTE — CONSULTS
CCS Attending    Please see Consult staffed by Dr. Jake Garcia MD  [phone]  Critical Care Medicine  [facility]  4:18 PM 7/12/2018

## 2018-07-13 DIAGNOSIS — K25.9 GASTRIC ULCER, UNSPECIFIED CHRONICITY, UNSPECIFIED WHETHER GASTRIC ULCER HEMORRHAGE OR PERFORATION PRESENT: Primary | ICD-10-CM

## 2018-07-13 LAB
ALBUMIN SERPL BCP-MCNC: 2.6 G/DL
ANION GAP SERPL CALC-SCNC: 10 MMOL/L
BACTERIA UR CULT: NORMAL
BASOPHILS # BLD AUTO: 0.02 K/UL
BASOPHILS # BLD AUTO: 0.03 K/UL
BASOPHILS # BLD AUTO: 0.04 K/UL
BASOPHILS # BLD AUTO: 0.04 K/UL
BASOPHILS NFR BLD: 0.2 %
BASOPHILS NFR BLD: 0.4 %
BASOPHILS NFR BLD: 0.5 %
BASOPHILS NFR BLD: 0.5 %
BUN SERPL-MCNC: 41 MG/DL
CALCIUM SERPL-MCNC: 8.3 MG/DL
CHLORIDE SERPL-SCNC: 100 MMOL/L
CO2 SERPL-SCNC: 25 MMOL/L
CREAT SERPL-MCNC: 3.9 MG/DL
DIFFERENTIAL METHOD: ABNORMAL
EOSINOPHIL # BLD AUTO: 0 K/UL
EOSINOPHIL # BLD AUTO: 0.1 K/UL
EOSINOPHIL NFR BLD: 0.2 %
EOSINOPHIL NFR BLD: 1.1 %
EOSINOPHIL NFR BLD: 1.1 %
EOSINOPHIL NFR BLD: 1.2 %
ERYTHROCYTE [DISTWIDTH] IN BLOOD BY AUTOMATED COUNT: 17.9 %
ERYTHROCYTE [DISTWIDTH] IN BLOOD BY AUTOMATED COUNT: 18.5 %
ERYTHROCYTE [DISTWIDTH] IN BLOOD BY AUTOMATED COUNT: 18.6 %
ERYTHROCYTE [DISTWIDTH] IN BLOOD BY AUTOMATED COUNT: 18.6 %
EST. GFR  (AFRICAN AMERICAN): 14.3 ML/MIN/1.73 M^2
EST. GFR  (NON AFRICAN AMERICAN): 12.4 ML/MIN/1.73 M^2
ESTIMATED AVG GLUCOSE: 214 MG/DL
GLUCOSE SERPL-MCNC: 218 MG/DL
GLUCOSE SERPL-MCNC: 486 MG/DL
HBA1C MFR BLD HPLC: 9.1 %
HCT VFR BLD AUTO: 22.3 %
HCT VFR BLD AUTO: 27.3 %
HCT VFR BLD AUTO: 27.4 %
HCT VFR BLD AUTO: 27.5 %
HGB BLD-MCNC: 6.8 G/DL
HGB BLD-MCNC: 8 G/DL
HGB BLD-MCNC: 8.2 G/DL
HGB BLD-MCNC: 8.3 G/DL
IMM GRANULOCYTES # BLD AUTO: 0.04 K/UL
IMM GRANULOCYTES # BLD AUTO: 0.05 K/UL
IMM GRANULOCYTES # BLD AUTO: 0.07 K/UL
IMM GRANULOCYTES # BLD AUTO: 0.12 K/UL
IMM GRANULOCYTES NFR BLD AUTO: 0.6 %
IMM GRANULOCYTES NFR BLD AUTO: 0.7 %
IMM GRANULOCYTES NFR BLD AUTO: 0.7 %
IMM GRANULOCYTES NFR BLD AUTO: 1 %
LYMPHOCYTES # BLD AUTO: 0.8 K/UL
LYMPHOCYTES # BLD AUTO: 0.9 K/UL
LYMPHOCYTES # BLD AUTO: 0.9 K/UL
LYMPHOCYTES # BLD AUTO: 1.3 K/UL
LYMPHOCYTES NFR BLD: 12.5 %
LYMPHOCYTES NFR BLD: 12.5 %
LYMPHOCYTES NFR BLD: 12.9 %
LYMPHOCYTES NFR BLD: 7.3 %
MCH RBC QN AUTO: 27.2 PG
MCH RBC QN AUTO: 27.9 PG
MCH RBC QN AUTO: 27.9 PG
MCH RBC QN AUTO: 28.2 PG
MCHC RBC AUTO-ENTMCNC: 29.1 G/DL
MCHC RBC AUTO-ENTMCNC: 29.9 G/DL
MCHC RBC AUTO-ENTMCNC: 30.4 G/DL
MCHC RBC AUTO-ENTMCNC: 30.5 G/DL
MCV RBC AUTO: 92 FL
MCV RBC AUTO: 93 FL
MCV RBC AUTO: 93 FL
MCV RBC AUTO: 94 FL
MONOCYTES # BLD AUTO: 0.6 K/UL
MONOCYTES # BLD AUTO: 0.7 K/UL
MONOCYTES # BLD AUTO: 0.9 K/UL
MONOCYTES # BLD AUTO: 1.1 K/UL
MONOCYTES NFR BLD: 9 %
MONOCYTES NFR BLD: 9.1 %
MONOCYTES NFR BLD: 9.4 %
MONOCYTES NFR BLD: 9.9 %
NEUTROPHILS # BLD AUTO: 10 K/UL
NEUTROPHILS # BLD AUTO: 4.7 K/UL
NEUTROPHILS # BLD AUTO: 5.5 K/UL
NEUTROPHILS # BLD AUTO: 7.6 K/UL
NEUTROPHILS NFR BLD: 74.9 %
NEUTROPHILS NFR BLD: 75.9 %
NEUTROPHILS NFR BLD: 76.1 %
NEUTROPHILS NFR BLD: 82.3 %
NRBC BLD-RTO: 0 /100 WBC
PHOSPHATE SERPL-MCNC: 5.4 MG/DL
PLATELET # BLD AUTO: 184 K/UL
PLATELET # BLD AUTO: 192 K/UL
PLATELET # BLD AUTO: 204 K/UL
PLATELET # BLD AUTO: 227 K/UL
PMV BLD AUTO: 10 FL
PMV BLD AUTO: 10.1 FL
PMV BLD AUTO: 10.2 FL
PMV BLD AUTO: 10.4 FL
POCT GLUCOSE: 158 MG/DL (ref 70–110)
POCT GLUCOSE: 226 MG/DL (ref 70–110)
POCT GLUCOSE: 232 MG/DL (ref 70–110)
POCT GLUCOSE: 430 MG/DL (ref 70–110)
POCT GLUCOSE: 483 MG/DL (ref 70–110)
POTASSIUM SERPL-SCNC: 4.7 MMOL/L
RBC # BLD AUTO: 2.41 M/UL
RBC # BLD AUTO: 2.94 M/UL
RBC # BLD AUTO: 2.94 M/UL
RBC # BLD AUTO: 2.98 M/UL
SODIUM SERPL-SCNC: 135 MMOL/L
WBC # BLD AUTO: 10.03 K/UL
WBC # BLD AUTO: 12.18 K/UL
WBC # BLD AUTO: 6.18 K/UL
WBC # BLD AUTO: 7.3 K/UL

## 2018-07-13 PROCEDURE — D9220A PRA ANESTHESIA: Mod: CRNA,,, | Performed by: NURSE ANESTHETIST, CERTIFIED REGISTERED

## 2018-07-13 PROCEDURE — 37000008 HC ANESTHESIA 1ST 15 MINUTES: Performed by: INTERNAL MEDICINE

## 2018-07-13 PROCEDURE — 80069 RENAL FUNCTION PANEL: CPT

## 2018-07-13 PROCEDURE — D9220A PRA ANESTHESIA: Mod: ANES,,, | Performed by: ANESTHESIOLOGY

## 2018-07-13 PROCEDURE — P9021 RED BLOOD CELLS UNIT: HCPCS

## 2018-07-13 PROCEDURE — C9113 INJ PANTOPRAZOLE SODIUM, VIA: HCPCS | Performed by: STUDENT IN AN ORGANIZED HEALTH CARE EDUCATION/TRAINING PROGRAM

## 2018-07-13 PROCEDURE — 63600175 PHARM REV CODE 636 W HCPCS: Performed by: STUDENT IN AN ORGANIZED HEALTH CARE EDUCATION/TRAINING PROGRAM

## 2018-07-13 PROCEDURE — 43235 EGD DIAGNOSTIC BRUSH WASH: CPT | Performed by: INTERNAL MEDICINE

## 2018-07-13 PROCEDURE — 86677 HELICOBACTER PYLORI ANTIBODY: CPT

## 2018-07-13 PROCEDURE — G8987 SELF CARE CURRENT STATUS: HCPCS | Mod: CH

## 2018-07-13 PROCEDURE — 0DJ08ZZ INSPECTION OF UPPER INTESTINAL TRACT, VIA NATURAL OR ARTIFICIAL OPENING ENDOSCOPIC: ICD-10-PCS | Performed by: INTERNAL MEDICINE

## 2018-07-13 PROCEDURE — 25000003 PHARM REV CODE 250: Performed by: STUDENT IN AN ORGANIZED HEALTH CARE EDUCATION/TRAINING PROGRAM

## 2018-07-13 PROCEDURE — 12000002 HC ACUTE/MED SURGE SEMI-PRIVATE ROOM

## 2018-07-13 PROCEDURE — 97165 OT EVAL LOW COMPLEX 30 MIN: CPT

## 2018-07-13 PROCEDURE — 85025 COMPLETE CBC W/AUTO DIFF WBC: CPT | Mod: 91

## 2018-07-13 PROCEDURE — 97161 PT EVAL LOW COMPLEX 20 MIN: CPT

## 2018-07-13 PROCEDURE — 25000003 PHARM REV CODE 250: Performed by: NURSE ANESTHETIST, CERTIFIED REGISTERED

## 2018-07-13 PROCEDURE — 37000009 HC ANESTHESIA EA ADD 15 MINS: Performed by: INTERNAL MEDICINE

## 2018-07-13 PROCEDURE — 36415 COLL VENOUS BLD VENIPUNCTURE: CPT

## 2018-07-13 PROCEDURE — 25000003 PHARM REV CODE 250: Performed by: NURSE PRACTITIONER

## 2018-07-13 PROCEDURE — G8989 SELF CARE D/C STATUS: HCPCS | Mod: CH

## 2018-07-13 PROCEDURE — 63600175 PHARM REV CODE 636 W HCPCS: Performed by: NURSE ANESTHETIST, CERTIFIED REGISTERED

## 2018-07-13 PROCEDURE — 82947 ASSAY GLUCOSE BLOOD QUANT: CPT

## 2018-07-13 PROCEDURE — 43235 EGD DIAGNOSTIC BRUSH WASH: CPT | Mod: ,,, | Performed by: INTERNAL MEDICINE

## 2018-07-13 PROCEDURE — 99223 1ST HOSP IP/OBS HIGH 75: CPT | Mod: AI,GC,, | Performed by: HOSPITALIST

## 2018-07-13 PROCEDURE — G8988 SELF CARE GOAL STATUS: HCPCS | Mod: CH

## 2018-07-13 RX ORDER — ACETAMINOPHEN 325 MG/1
650 TABLET ORAL EVERY 6 HOURS PRN
Status: DISCONTINUED | OUTPATIENT
Start: 2018-07-13 | End: 2018-07-15 | Stop reason: HOSPADM

## 2018-07-13 RX ORDER — PANTOPRAZOLE SODIUM 40 MG/10ML
40 INJECTION, POWDER, LYOPHILIZED, FOR SOLUTION INTRAVENOUS EVERY 12 HOURS
Status: DISCONTINUED | OUTPATIENT
Start: 2018-07-13 | End: 2018-07-13 | Stop reason: DRUGHIGH

## 2018-07-13 RX ORDER — SODIUM CHLORIDE 9 MG/ML
INJECTION, SOLUTION INTRAVENOUS ONCE
Status: COMPLETED | OUTPATIENT
Start: 2018-07-13 | End: 2018-07-14

## 2018-07-13 RX ORDER — PANTOPRAZOLE SODIUM 40 MG/10ML
40 INJECTION, POWDER, LYOPHILIZED, FOR SOLUTION INTRAVENOUS 2 TIMES DAILY
Status: DISCONTINUED | OUTPATIENT
Start: 2018-07-13 | End: 2018-07-13

## 2018-07-13 RX ORDER — SODIUM CHLORIDE 0.9 % (FLUSH) 0.9 %
3 SYRINGE (ML) INJECTION
Status: DISCONTINUED | OUTPATIENT
Start: 2018-07-13 | End: 2018-07-15 | Stop reason: HOSPADM

## 2018-07-13 RX ORDER — PROPOFOL 10 MG/ML
VIAL (ML) INTRAVENOUS CONTINUOUS PRN
Status: DISCONTINUED | OUTPATIENT
Start: 2018-07-13 | End: 2018-07-13

## 2018-07-13 RX ORDER — HYDROCODONE BITARTRATE AND ACETAMINOPHEN 500; 5 MG/1; MG/1
TABLET ORAL
Status: DISCONTINUED | OUTPATIENT
Start: 2018-07-13 | End: 2018-07-15 | Stop reason: HOSPADM

## 2018-07-13 RX ORDER — FENTANYL CITRATE 50 UG/ML
25 INJECTION, SOLUTION INTRAMUSCULAR; INTRAVENOUS EVERY 5 MIN PRN
Status: DISCONTINUED | OUTPATIENT
Start: 2018-07-13 | End: 2018-07-15 | Stop reason: HOSPADM

## 2018-07-13 RX ORDER — MIDAZOLAM HYDROCHLORIDE 1 MG/ML
INJECTION, SOLUTION INTRAMUSCULAR; INTRAVENOUS
Status: DISCONTINUED | OUTPATIENT
Start: 2018-07-13 | End: 2018-07-13

## 2018-07-13 RX ORDER — LIDOCAINE HCL/PF 100 MG/5ML
SYRINGE (ML) INTRAVENOUS
Status: DISCONTINUED | OUTPATIENT
Start: 2018-07-13 | End: 2018-07-13

## 2018-07-13 RX ORDER — PROPOFOL 10 MG/ML
VIAL (ML) INTRAVENOUS
Status: DISCONTINUED | OUTPATIENT
Start: 2018-07-13 | End: 2018-07-13

## 2018-07-13 RX ORDER — ONDANSETRON 2 MG/ML
4 INJECTION INTRAMUSCULAR; INTRAVENOUS DAILY PRN
Status: DISCONTINUED | OUTPATIENT
Start: 2018-07-13 | End: 2018-07-15 | Stop reason: HOSPADM

## 2018-07-13 RX ORDER — SODIUM CHLORIDE 9 MG/ML
INJECTION, SOLUTION INTRAVENOUS
Status: DISCONTINUED | OUTPATIENT
Start: 2018-07-13 | End: 2018-07-15 | Stop reason: HOSPADM

## 2018-07-13 RX ADMIN — CLONIDINE HYDROCHLORIDE 0.1 MG: 0.1 TABLET ORAL at 02:07

## 2018-07-13 RX ADMIN — INSULIN ASPART 2 UNITS: 100 INJECTION, SOLUTION INTRAVENOUS; SUBCUTANEOUS at 08:07

## 2018-07-13 RX ADMIN — SODIUM CHLORIDE: 0.9 INJECTION, SOLUTION INTRAVENOUS at 01:07

## 2018-07-13 RX ADMIN — HYDRALAZINE HYDROCHLORIDE 100 MG: 50 TABLET ORAL at 11:07

## 2018-07-13 RX ADMIN — PROPOFOL 150 MCG/KG/MIN: 10 INJECTION, EMULSION INTRAVENOUS at 01:07

## 2018-07-13 RX ADMIN — DEXTROSE 40 MG: 50 INJECTION, SOLUTION INTRAVENOUS at 05:07

## 2018-07-13 RX ADMIN — LIDOCAINE HYDROCHLORIDE 20 MG: 20 INJECTION, SOLUTION INTRAVENOUS at 01:07

## 2018-07-13 RX ADMIN — CLONIDINE HYDROCHLORIDE 0.1 MG: 0.1 TABLET ORAL at 08:07

## 2018-07-13 RX ADMIN — CLONIDINE HYDROCHLORIDE 0.1 MG: 0.1 TABLET ORAL at 09:07

## 2018-07-13 RX ADMIN — TOPICAL ANESTHETIC 1 EACH: 200 SPRAY DENTAL; PERIODONTAL at 01:07

## 2018-07-13 RX ADMIN — LEVETIRACETAM 250 MG: 250 TABLET, FILM COATED ORAL at 08:07

## 2018-07-13 RX ADMIN — CARVEDILOL 6.25 MG: 6.25 TABLET, FILM COATED ORAL at 08:07

## 2018-07-13 RX ADMIN — INSULIN ASPART 5 UNITS: 100 INJECTION, SOLUTION INTRAVENOUS; SUBCUTANEOUS at 10:07

## 2018-07-13 RX ADMIN — PROPOFOL 20 MG: 10 INJECTION, EMULSION INTRAVENOUS at 01:07

## 2018-07-13 RX ADMIN — MIDAZOLAM HYDROCHLORIDE 2 MG: 1 INJECTION, SOLUTION INTRAMUSCULAR; INTRAVENOUS at 01:07

## 2018-07-13 RX ADMIN — SEVELAMER CARBONATE 1600 MG: 800 TABLET, FILM COATED ORAL at 05:07

## 2018-07-13 RX ADMIN — LOSARTAN POTASSIUM 100 MG: 50 TABLET ORAL at 08:07

## 2018-07-13 RX ADMIN — ACETAMINOPHEN 650 MG: 325 TABLET, FILM COATED ORAL at 10:07

## 2018-07-13 RX ADMIN — LEVETIRACETAM 250 MG: 250 TABLET, FILM COATED ORAL at 09:07

## 2018-07-13 RX ADMIN — CARVEDILOL 6.25 MG: 6.25 TABLET, FILM COATED ORAL at 09:07

## 2018-07-13 RX ADMIN — INSULIN DETEMIR 7 UNITS: 100 INJECTION, SOLUTION SUBCUTANEOUS at 09:07

## 2018-07-13 NOTE — PLAN OF CARE
Problem: Patient Care Overview  Goal: Plan of Care Review  Outcome: Ongoing (interventions implemented as appropriate)  Patient admitted overnight. Initially hypertensive in the 200's but is now currently 140's. BG elevated on admission >300, currently 200's. Hgb decreased to 6.8. Transfused 1uPRBC w/o issue. No episodes of nausea or diarrhea overnight. No obvious sign of blood loss. Patient to have EGD when hemodynamically stable for possible bleed.

## 2018-07-13 NOTE — ASSESSMENT & PLAN NOTE
EGD shows ulcer with pigmented base.  - Clear liquid diet, advance as tolerated  - Protonix 40mg BID IV x 72 hours  - CBC Q8    - will go home with PPI BID PO x 8 weeks and then will have a repeat EGD

## 2018-07-13 NOTE — PLAN OF CARE
Problem: Patient Care Overview  Goal: Plan of Care Review  Outcome: Outcome(s) achieved Date Met: 07/13/18  OT eval completed.  No goals set due to modified independent with ADL's assessed & pt declined further OT.  Discharge OT on acute.  GIOVANI Jones 7/13/2018

## 2018-07-13 NOTE — PT/OT/SLP EVAL
"Physical Therapy Evaluation and Discharge Note    Patient Name:  Eufemia Haq   MRN:  [id number]    Recommendations:     Discharge Recommendations:  home   Discharge Equipment Recommendations: none   Barriers to discharge: None    Assessment:     Eufemia Haq is a 53 y.o. female admitted with a medical diagnosis of <principal problem not specified>. Pt tolerated PT evaluation fairly. Pt was agitated and did not see the need for PT/OT intervention. Due to this, unable to assess pt's gait. Pt is supervision/mod (I) with all mobility and transfers.  At this time, patient is functioning at their prior level of function and does not require further acute PT services. After D/C, PT recommendation for pt to return home with no PT needs at this time.    Recent Surgery: Procedure(s) (LRB):  EGD (ESOPHAGOGASTRODUODENOSCOPY) (N/A) Day of Surgery    Plan:     During this hospitalization, patient does not require further acute PT services.  Please re-consult if situation changes.     Plan of Care Reviewed with: patient    Subjective     Communicated with RN prior to session.  Patient found HoB elevated upon PT entry to room, agreeable to evaluation.      Chief Complaint: pain in bilateral shoulder from idiopathic onset  Patient comments/goals: "I do not see the need for ya'll being here. I'm done participating. This is ridiculous."  Pain/Comfort:  · Pain Rating 1: 8/10  · Location - Side 1: Bilateral  · Location 1: shoulder  · Pain Addressed 1: Pre-medicate for activity, Reposition, Distraction, Cessation of Activity    Patients cultural, spiritual, Restorationist conflicts given the current situation: none stated    Living Environment:  Pt lives with daughter in [facility] with 0 CROW. Pt states that she was (I) with all ADLs PTA, but other previous medical notes stated that pt needed mod A with some ADLs. Pt is currently on disability. Pt uses no Ad to walk. Patient has the following equipment: none.  DME owned (not currently " used): none.  Upon discharge, patient will have assistance from daughter.    Objective:     Patient found with: oxygen (all lines intact)     General Precautions: Standard, fall   Orthopedic Precautions:N/A   Braces: N/A     Exams:  · Cognitive Exam:  Patient is oriented to Person, Place, Time and Situation and follows 100% of two-step commands   · Sensation:    · -       Intact B LE  · RLE ROM: WFL  · RLE Strength: WFL  · LLE ROM: WFL  · LLE Strength: WFL    Functional Mobility:  · Bed Mobility:     · Rolling Right: supervision  · Supine to Sit: supervision  · Sit to Supine: supervision    · Transfers:     · Sit to Stand:  supervision with no AD      Gait: Pt refused gait, but demonstrates no concerns.     AM-PAC 6 CLICK MOBILITY  Total Score:24         Patient left HOB elevated with all lines intact and call button in reach.    GOALS:    Physical Therapy Goals     Not on file          Multidisciplinary Problems (Resolved)        Problem: Physical Therapy Goal    Goal Priority Disciplines Outcome Goal Variances Interventions   Physical Therapy Goal   (Resolved)     PT/OT, PT Outcome(s) achieved     Description:  No PT goals established.                    History:     Past Medical History:   Diagnosis Date    Diabetes mellitus     Hypertension     Renal disorder        Past Surgical History:   Procedure Laterality Date     SECTION      x2    dialysis graft Left        Clinical Decision Making:     Personal factors/comorbidities: HTN. The listed co-morbidities and personal factors impact pt's current level and progress with functional mobility and independence.   Body systems elements affected: upper extremities, musculoskeletal system  Impairments: see assessment below  Clinical Presentation: stable  Functional Outcome Tools: AMPAC, MMT, ROM  Evaluation Complexity Level: low      Time Tracking:     PT Received On: 18  PT Start Time: 859     PT Stop Time: 913  PT Total Time (min): 14 min      Billable Minutes: Evaluation 14      LISSETH Jamil  07/13/2018

## 2018-07-13 NOTE — PLAN OF CARE
Problem: Physical Therapy Goal  Goal: Physical Therapy Goal  No PT goals established.  Outcome: Outcome(s) achieved Date Met: 07/13/18  PT evaluation completed. Pt is supervision/mod (I) with all mobility and transfers. Pt does not demonstrate a need for skilled acute PT services. Eval/DC note to follow.     Nargis Louis, SPT  07/13/2018

## 2018-07-13 NOTE — CONSULTS
[facility]  Gastroenterology  Consult Note    Patient Name: Eufemia Haq  MRN: [id number]  Admission Date: 2018  Hospital Length of Stay: 0 days  Code Status: Full Code   Attending Provider: Theron Mayer MD   Consulting Provider: Lucio Hayden MD  Primary Care Physician: Primary Doctor No  Principal Problem:<principal problem not specified>    Inpatient consult to Gastroenterology  Consult performed by: ANNETTE MCCLURE  Consult ordered by: PAOLA RUIZ        Subjective:     HPI:  53 year old female with ESRD on hemodialysis, HTN, DM2, anemia c/o over 30 episodes of vomiting blood since yesterday morning. 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 Per ED notes, there's some red blood and some rust-colored blood in her vomitus. She states the hematemesis began after multiple episodes of initial non-bloody vomiting. Pt also reports diarrhea yesterday that has since resolved. Pt's Hb last night was 8.0 and 7.4 this morning; unknown baseline Hb. She says she was told she has anemia about a year ago, but was unsure why. She denies any prior episodes of hematemesis. She does report prior scopes in [place], but does not know the results of these scopes. Additional history is limited due to pt's somnolence. Last dialysis was supposed to be two days ago, which she missed. Pt reports taking Aleve daily for 7 days for shoulder pain.   She is hypertensive into the 200s and hyperglycemic into the 300-400s in the ED.       Past Medical History:   Diagnosis Date    Diabetes mellitus     Hypertension     Renal disorder        Past Surgical History:   Procedure Laterality Date     SECTION      x2    dialysis graft Left        Review of patient's allergies indicates:   Allergen Reactions    Gabapentin Other (See Comments)     Seizure    Tramadol Other (See Comments)     Seizure     Vancomycin analogues Other (See Comments)     Seizure    Latex, natural rubber Rash    Niacin preparations Rash     Family History     None        Social History Main Topics    Smoking status: Light Tobacco Smoker    Smokeless tobacco: Never Used    Alcohol use No    Drug use: Unknown    Sexual activity: Not on file     Review of Systems   Constitutional: Positive for appetite change, chills, fatigue and fever.   HENT: Negative for sore throat.    Respiratory: Negative for cough and shortness of breath.    Cardiovascular: Negative for chest pain.   Gastrointestinal: Positive for diarrhea, nausea and vomiting. Negative for abdominal pain.   Neurological: Positive for light-headedness. Negative for syncope.     Objective:     Vital Signs (Most Recent):  Temp: 99.4 °F (37.4 °C) (07/12/18 0745)  Pulse: 76 (07/12/18 1345)  Resp: 18 (07/12/18 0745)  BP: (!) 163/69 (07/12/18 1345)  SpO2: 100 % (07/12/18 1345) Vital Signs (24h Range):  Temp:  [99.1 °F (37.3 °C)-99.4 °F (37.4 °C)] 99.4 °F (37.4 °C)  Pulse:  [76-90] 76  Resp:  [12-24] 18  SpO2:  [93 %-100 %] 100 %  BP: (163-234)/(69-96) 163/69     Weight: 63.5 kg (140 lb) (07/11/18 2132)  Body mass index is 21.93 kg/m².      Intake/Output Summary (Last 24 hours) at 07/12/18 1441  Last data filed at 07/12/18 0700   Gross per 24 hour   Intake                0 ml   Output              200 ml   Net             -200 ml       Lines/Drains/Airways     Peripheral Intravenous Line                 Midline Catheter Insertion/Assessment  - Single Lumen 07/12/18 0910 Right cephalic vein (lateral side of arm) 18g x 8cm less than 1 day                Physical Exam   Constitutional: She appears well-developed and well-nourished. No distress.   HENT:   Head: Normocephalic and atraumatic.   Eyes: No scleral icterus.   Cardiovascular: Normal rate and regular rhythm.    Pulmonary/Chest: Effort normal and breath sounds normal. She has no wheezes.   Abdominal: Soft. Bowel sounds are normal.  She exhibits no distension. There is no tenderness. There is no rebound and no guarding.   Skin: She is not diaphoretic.   Nursing note and vitals reviewed.      Significant Labs:  CBC:   Recent Labs  Lab 07/12/18 0025 07/12/18  0917   WBC 11.58 12.69   HGB 8.0* 7.4*   HCT 25.6* 25.7*    248     BMP:   Recent Labs  Lab 07/12/18 0025   *   *   K 4.2   CL 88*   CO2 28   BUN 80*   CREATININE 5.2*   CALCIUM 8.8     CMP:   Recent Labs  Lab 07/12/18 0025   *   CALCIUM 8.8   ALBUMIN 3.1*   PROT 6.4   *   K 4.2   CO2 28   CL 88*   BUN 80*   CREATININE 5.2*   ALKPHOS 328*   ALT 37   AST 32   BILITOT 0.3     Lipase:   Recent Labs  Lab 07/12/18 0025   LIPASE 7     Liver Function Test:   Recent Labs  Lab 07/12/18 0025   ALT 37   AST 32   ALKPHOS 328*   BILITOT 0.3   PROT 6.4   ALBUMIN 3.1*       Significant Imaging:  X-Ray Chest PA And Lateral [[id number]] Resulted: 07/12/18 0123   Order Status: Completed Updated: 07/12/18 0125   Narrative:     EXAMINATION:  XR CHEST PA AND LATERAL    CLINICAL HISTORY:  Pain in right shoulder.  Pain in left shoulder.    TECHNIQUE:  Frontal and lateral views of the chest were performed.    COMPARISON:  None.    FINDINGS:  Examination is limited by underpenetration on the lateral radiograph.  Cardiac silhouette is at the upper limits of normal in size.  Cardiac wires overlie the chest.  Cardiac loop recorder device is present overlying the left perihilar region.  Minimal increased parenchymal attenuation is noted bilaterally, which could reflect minimal interstitial edema or pneumonitis.  No large focal consolidation.  No sizable pleural effusion.  No pneumothorax.   Impression:       Borderline cardiomegaly and minimal edema versus pneumonitis.      Electronically signed by: Darrius Segura MD  Date: 07/12/2018  Time: 01:23     Procedure Component Value Units Date/Time   US Abdomen Limited [[id number]] Resulted: 07/12/18 1118   Order Status: Completed Updated:  07/12/18 1120   Narrative:     EXAMINATION:  US ABDOMEN LIMITED    CLINICAL HISTORY:  elevated alk phos, concern for obstruction;.    TECHNIQUE:  Limited ultrasound of the right upper quadrant of the abdomen including pancreas, liver, gallbladder, common bile duct was performed.    COMPARISON:  None.    FINDINGS:  Liver: Mildly enlarged in size measuring 18.8 cm.  Homogeneous echotexture. No focal hepatic lesions.    Gallbladder: No calculi, wall thickening, or pericholecystic fluid.  No sonographic Cardona's sign.    Biliary system: The common duct is not dilated, measuring 4 mm.  No intrahepatic ductal dilatation.    Spleen: Normal in size with a homogeneous echotexture, measuring 10.1 x 4.5 cm.    Pancreas: The visualized portions of pancreas appear normal.    Miscellaneous: No ascites.   Impression:       Mild hepatomegaly, otherwise no abnormal sonographic findings.    Electronically signed by resident: Stefano Ricci  Date: 07/12/2018  Time: 11:16    Electronically signed by: Vargas Rand MD  Date: 07/12/2018  Time: 11:18         Assessment/Plan:     Hematemesis with nausea    53 year old female with ESRD on hemodialysis, HTN, DM2, anemia c/o over 30 episodes of vomiting blood since yesterday morning. 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 Per ED notes, there's some red blood and some rust-colored blood in her vomitus. She states the hematemesis began after multiple episodes of initial non-bloody vomiting.    Plan:  - Suspect Heidi-Lucas tear since bleeding started after many episodes of non-bloody vomiting initially. Possible EGD tomorrow if hemodynamically stable.            Thank you for your consult. I will follow-up with patient. Please contact us if you have any additional questions.    Tj Wetzel MD PGY-1  Gastroenterology  [facility]

## 2018-07-13 NOTE — PT/OT/SLP EVAL
Occupational Therapy   Evaluation and Discharge Note    Name: Eufemia Haq  MRN: [id number]  Admitting Diagnosis:  Nausea & vomiting Day of Surgery    Recommendations:     Discharge Recommendations: home  Discharge Equipment Recommendations:  none  Barriers to discharge:  None    History:     Occupational Profile:  Living Environment & PLOF: Pt resides with daughter in 1 story house with no steps.  Pt has a bathtub for bathing.  Pt was independent with ADL's & ambulation per pt.  Pt is disabled & enjoys cooking.  Pt reported that she cooks food & cleans her house.  Equipment Owned:   (bathtub chair)      Past Medical History:   Diagnosis Date    Diabetes mellitus     Hypertension     Renal disorder        Past Surgical History:   Procedure Laterality Date     SECTION      x2    dialysis graft Left        Subjective   Pt reported that she would not do anymore.  Chief Complaint: not wanting to do therapy evaluation  Patient/Family stated goals: none stated  Communicated with: RN prior to session.  Pain/Comfort:  · Pain Rating 1: 0/10  · Pain Rating Post-Intervention 1: 0/10    Patients cultural, spiritual, Islam conflicts given the current situation: none stated    Objective:     Patient found with:  (all lines intact)    General Precautions: Standard, fall, NPO     Occupational Performance:    Bed Mobility:    · Patient completed Supine to Sit with modified independence  · Patient completed Sit to Supine with modified independence    Functional Mobility/Transfers:  · Patient completed Sit <> Stand Transfer with modified independence  with  no assistive device   · Functional Mobility: pt declined performing    Activities of Daily Living:  · Upper Body Dressing: modified independence seated EOB  · Lower Body Dressing: modified independence donning socks sesated EOB    Cognitive/Visual Perceptual:  Cognitive/Psychosocial Skills:     -       Oriented to: Person, Place, Time and Situation   -        "Follows Commands/attention:Follows multistep  commands  -       Communication: clear/fluent  -       Memory: No Deficits noted  -       Safety awareness/insight to disability: intact   -       Mood/Affect/Coping skills/emotional control: Appropriate to situation    Physical Exam:  Postural examination/scapula alignment:    -       Rounded shoulders  -       Forward head  Sensation:    -       Intact  Dominant hand:    -       right  Upper Extremity Range of Motion:     -       Right Upper Extremity: WFL  -       Left Upper Extremity: WFL  Upper Extremity Strength:    -       Right Upper Extremity: WFL  -       Left Upper Extremity: WFL   Strength:    -       Right Upper Extremity: WFL  -       Left Upper Extremity: WFL    Patient left supine with all lines intact, call button in reach, bed alarm on, RN notified and white board updated.    [facility] 6 Click:  [facility] Total Score: 24    Treatment & Education:  Provided education regarding role of OT, POC, & discharge recommendations with pt verbalizing understanding.  Pt denied need for further therapy & refused to participate further requiring encouragement & increased education into reasons for therapy evaluation. Pt had no further questions & when asked whether there were any concerns pt reported none.      Education:    Assessment:     Eufemia Haq is a 53 y.o. female with a medical diagnosis of nausea & vomiting. At this time, patient is functioning at their prior level of function and does not require further acute OT services.     Clinical Decision Makin.  OT Low:  "Pt evaluation falls under low complexity for evaluation coding due to performance deficits noted in 1-3 areas as stated above and 0 co-morbities affecting current functional status. Data obtained from problem focused assessments. No modifications or assistance was required for completion of evaluation. Only brief occupational profile and history review completed."     Plan:     During " this hospitalization, patient does not require further acute OT services.  Please re-consult if situation changes.    · Plan of Care Reviewed with: patient    This Plan of care has been discussed with the patient who was involved in its development and understands and is in agreement with the identified goals and treatment plan    GOALS:    Occupational Therapy Goals     Not on file                Time Tracking:     OT Date of Treatment: 07/13/18  OT Start Time: 0900  OT Stop Time: 0914  OT Total Time (min): 14 min    Billable Minutes:Evaluation 14    GIOVANI Jones  7/13/2018

## 2018-07-13 NOTE — ASSESSMENT & PLAN NOTE
Continue home meds in a stepwise manner    - ICU consulted given concern for HTN emergency with GI bleed.   - IV labetalol PRN as needed

## 2018-07-13 NOTE — ASSESSMENT & PLAN NOTE
53 year old female with ESRD on hemodialysis, HTN, DM2, anemia c/o over 30 episodes of vomiting blood since yesterday morning. 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 Per ED notes, there's some red blood and some rust-colored blood in her vomitus. She states the hematemesis began after multiple episodes of initial non-bloody vomiting.    Plan:  - Suspect Heidi-Lucas tear since bleeding started after many episodes of non-bloody vomiting initially. Possible EGD tomorrow if hemodynamically stable.

## 2018-07-13 NOTE — INTERVAL H&P NOTE
Pre-Procedure H and P Addendum    Patient seen and examined.  History and exam unchanged from prior history and physical.      Procedure: EGD  Indication: Hematemesis  ASA Class: per anesthesiology  Airway: normal  Neck Mobility: full range of motion  Mallampatti score: per anesthesia  History of anesthesia problems: no  Family history of anesthesia problems: no  Anesthesia Plan: MAC    Anesthesia/Surgery risks, benefits and alternative options discussed and understood by patient/family.          Active Hospital Problems    Diagnosis  POA    Diabetes [E11.9]  Yes    Hematemesis with nausea [K92.0]  Unknown    ESRD (end stage renal disease) [N18.6]  Unknown    Essential hypertension [I10]  Unknown    Hypertensive urgency [I16.0]  Unknown    Nausea and vomiting [R11.2]  Unknown    Acute gastroenteritis [K52.9]  Unknown      Resolved Hospital Problems    Diagnosis Date Resolved POA   No resolved problems to display.

## 2018-07-13 NOTE — SUBJECTIVE & OBJECTIVE
"Interval history: Pt no longer N/V. NPO this morning for EGD. EDG reveals ulcer with pigmented base per GI. Pt feels well and has an appetite.    Review of Systems   Constitutional: Negative for appetite change (pt NPO but hungry), chills and fever.   HENT: Negative for nosebleeds and sinus pain.    Eyes: Negative for visual disturbance.        Pt has no vision in the right eye and has limited vision in the left eye. Recently had cataract sx in the left and retinal sx on the right. Pt denies visual changes from her baseline with the episode.   Respiratory: Negative for cough, chest tightness and shortness of breath.    Cardiovascular: Positive for leg swelling (x 2 days). Negative for chest pain.        Pt has AV fistula to LUE   Gastrointestinal: Negative for nausea. Vomiting: blood noted in emesis bag in ED.   Endocrine: Negative.    Genitourinary: Positive for decreased urine volume (pt does not make much urine due to CKD, no change from baseline). Negative for dysuria and hematuria.   Musculoskeletal: Positive for arthralgias (pain to the bilateral shoulders, "throbbing and constant" and worse at night).   Skin: Negative.    Neurological: Negative for dizziness, syncope and light-headedness. Weakness: pt fell today due to the weakness, but landed on her buttocks and denies trauma/pain.   Psychiatric/Behavioral: Negative.      Objective:     Vital Signs (Most Recent):  Temp: 99 °F (37.2 °C) (07/13/18 1448)  Pulse: 74 (07/13/18 1500)  Resp: 12 (07/13/18 1448)  BP: (!) 149/68 (07/13/18 1448)  SpO2: (!) 83 % (07/13/18 1448) Vital Signs (24h Range):  Temp:  [97.7 °F (36.5 °C)-99.5 °F (37.5 °C)] 99 °F (37.2 °C)  Pulse:  [70-89] 74  Resp:  [12-18] 12  SpO2:  [83 %-100 %] 83 %  BP: (104-200)/(45-95) 149/68     Weight: 66.1 kg (145 lb 11.6 oz)  Body mass index is 22.82 kg/m².    Physical Exam   Constitutional: She is oriented to person, place, and time. She appears well-developed and well-nourished. She appears " distressed.   HENT:   Head: Normocephalic and atraumatic.   Eyes: Conjunctivae and EOM are normal.   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 Neck supple.   Cardiovascular: Normal rate, regular rhythm, normal heart sounds and intact distal pulses.    Pulmonary/Chest: Effort normal and breath sounds normal.   Abdominal: Soft. Bowel sounds are normal. She exhibits no distension. There is no tenderness.   Musculoskeletal: Normal range of motion. She exhibits edema.        Right shoulder: She exhibits pain.        Left shoulder: She exhibits pain.   Neurological: She is alert and oriented to person, place, and time.   Skin: Skin is warm and dry. She is not diaphoretic.   Psychiatric: She has a normal mood and affect. Her behavior is normal. Thought content normal.         CRANIAL NERVES     CN III, IV, VI   Extraocular motions are normal.        Significant Labs:   Bilirubin:     Recent Labs  Lab 07/12/18  0025   BILITOT 0.3     CBC:     Recent Labs  Lab 07/12/18  1555 07/12/18  2322 07/13/18  0942   WBC 9.02 12.18 10.03   HGB 7.0* 6.8* 8.3*   HCT 22.7* 22.3* 27.3*    227 204     CMP:     Recent Labs  Lab 07/12/18  0025 07/12/18  1555 07/13/18  0435   * 139 135*   K 4.2 4.1 4.7   CL 88* 96 100   CO2 28 29 25   * 228* 218*   BUN 80* 70* 41*   CREATININE 5.2* 5.0* 3.9*   CALCIUM 8.8 8.4* 8.3*   PROT 6.4  --   --    ALBUMIN 3.1*  --  2.6*   BILITOT 0.3  --   --    ALKPHOS 328*  --   --    AST 32  --   --    ALT 37  --   --    ANIONGAP 18* 14 10   EGFRNONAA 8.8* 9.2* 12.4*     POCT Glucose:     Recent Labs  Lab 07/13/18  0735 07/13/18  1123 07/13/18  1451   POCTGLUCOSE 232* 158* 226*     Urine Studies:     Recent Labs  Lab 07/12/18  0152   COLORU Yellow   APPEARANCEUA Hazy*   PHUR 5.0   SPECGRAV 1.010   PROTEINUA 3+*   GLUCUA 3+*   KETONESU Trace*   BILIRUBINUA Negative   OCCULTUA Negative   NITRITE Negative   UROBILINOGEN Negative   LEUKOCYTESUR Trace*   RBCUA 2    WBCUA 17*   BACTERIA Rare   SQUAMEPITHEL 7   HYALINECASTS 7*       Significant Imaging: I have reviewed all pertinent imaging results/findings within the past 24 hours.

## 2018-07-13 NOTE — ASSESSMENT & PLAN NOTE
Resolved.  -clear liquid diet to be advanced as tolerated  - Continue PRN compazine and Zofran as needed.

## 2018-07-13 NOTE — H&P (VIEW-ONLY)
[facility]  Gastroenterology  Consult Note    Patient Name: Eufemia Haq  MRN: [id number]  Admission Date: 2018  Hospital Length of Stay: 0 days  Code Status: Full Code   Attending Provider: Theron Mayer MD   Consulting Provider: Lucio Hayden MD  Primary Care Physician: Primary Doctor No  Principal Problem:<principal problem not specified>    Inpatient consult to Gastroenterology  Consult performed by: ANNETTE MCCLURE  Consult ordered by: PAOLA RUIZ        Subjective:     HPI:  53 year old female with ESRD on hemodialysis, HTN, DM2, anemia c/o over 30 episodes of vomiting blood since yesterday morning. 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 Per ED notes, there's some red blood and some rust-colored blood in her vomitus. She states the hematemesis began after multiple episodes of initial non-bloody vomiting. Pt also reports diarrhea yesterday that has since resolved. Pt's Hb last night was 8.0 and 7.4 this morning; unknown baseline Hb. She says she was told she has anemia about a year ago, but was unsure why. She denies any prior episodes of hematemesis. She does report prior scopes in [place], but does not know the results of these scopes. Additional history is limited due to pt's somnolence. Last dialysis was supposed to be two days ago, which she missed. Pt reports taking Aleve daily for 7 days for shoulder pain.   She is hypertensive into the 200s and hyperglycemic into the 300-400s in the ED.       Past Medical History:   Diagnosis Date    Diabetes mellitus     Hypertension     Renal disorder        Past Surgical History:   Procedure Laterality Date     SECTION      x2    dialysis graft Left        Review of patient's allergies indicates:   Allergen Reactions    Gabapentin Other (See Comments)     Seizure    Tramadol Other (See Comments)     Seizure     Vancomycin analogues Other (See Comments)     Seizure    Latex, natural rubber Rash    Niacin preparations Rash     Family History     None        Social History Main Topics    Smoking status: Light Tobacco Smoker    Smokeless tobacco: Never Used    Alcohol use No    Drug use: Unknown    Sexual activity: Not on file     Review of Systems   Constitutional: Positive for appetite change, chills, fatigue and fever.   HENT: Negative for sore throat.    Respiratory: Negative for cough and shortness of breath.    Cardiovascular: Negative for chest pain.   Gastrointestinal: Positive for diarrhea, nausea and vomiting. Negative for abdominal pain.   Neurological: Positive for light-headedness. Negative for syncope.     Objective:     Vital Signs (Most Recent):  Temp: 99.4 °F (37.4 °C) (07/12/18 0745)  Pulse: 76 (07/12/18 1345)  Resp: 18 (07/12/18 0745)  BP: (!) 163/69 (07/12/18 1345)  SpO2: 100 % (07/12/18 1345) Vital Signs (24h Range):  Temp:  [99.1 °F (37.3 °C)-99.4 °F (37.4 °C)] 99.4 °F (37.4 °C)  Pulse:  [76-90] 76  Resp:  [12-24] 18  SpO2:  [93 %-100 %] 100 %  BP: (163-234)/(69-96) 163/69     Weight: 63.5 kg (140 lb) (07/11/18 2132)  Body mass index is 21.93 kg/m².      Intake/Output Summary (Last 24 hours) at 07/12/18 1441  Last data filed at 07/12/18 0700   Gross per 24 hour   Intake                0 ml   Output              200 ml   Net             -200 ml       Lines/Drains/Airways     Peripheral Intravenous Line                 Midline Catheter Insertion/Assessment  - Single Lumen 07/12/18 0910 Right cephalic vein (lateral side of arm) 18g x 8cm less than 1 day                Physical Exam   Constitutional: She appears well-developed and well-nourished. No distress.   HENT:   Head: Normocephalic and atraumatic.   Eyes: No scleral icterus.   Cardiovascular: Normal rate and regular rhythm.    Pulmonary/Chest: Effort normal and breath sounds normal. She has no wheezes.   Abdominal: Soft. Bowel sounds are normal.  She exhibits no distension. There is no tenderness. There is no rebound and no guarding.   Skin: She is not diaphoretic.   Nursing note and vitals reviewed.      Significant Labs:  CBC:   Recent Labs  Lab 07/12/18 0025 07/12/18  0917   WBC 11.58 12.69   HGB 8.0* 7.4*   HCT 25.6* 25.7*    248     BMP:   Recent Labs  Lab 07/12/18 0025   *   *   K 4.2   CL 88*   CO2 28   BUN 80*   CREATININE 5.2*   CALCIUM 8.8     CMP:   Recent Labs  Lab 07/12/18 0025   *   CALCIUM 8.8   ALBUMIN 3.1*   PROT 6.4   *   K 4.2   CO2 28   CL 88*   BUN 80*   CREATININE 5.2*   ALKPHOS 328*   ALT 37   AST 32   BILITOT 0.3     Lipase:   Recent Labs  Lab 07/12/18 0025   LIPASE 7     Liver Function Test:   Recent Labs  Lab 07/12/18 0025   ALT 37   AST 32   ALKPHOS 328*   BILITOT 0.3   PROT 6.4   ALBUMIN 3.1*       Significant Imaging:  X-Ray Chest PA And Lateral [[id number]] Resulted: 07/12/18 0123   Order Status: Completed Updated: 07/12/18 0125   Narrative:     EXAMINATION:  XR CHEST PA AND LATERAL    CLINICAL HISTORY:  Pain in right shoulder.  Pain in left shoulder.    TECHNIQUE:  Frontal and lateral views of the chest were performed.    COMPARISON:  None.    FINDINGS:  Examination is limited by underpenetration on the lateral radiograph.  Cardiac silhouette is at the upper limits of normal in size.  Cardiac wires overlie the chest.  Cardiac loop recorder device is present overlying the left perihilar region.  Minimal increased parenchymal attenuation is noted bilaterally, which could reflect minimal interstitial edema or pneumonitis.  No large focal consolidation.  No sizable pleural effusion.  No pneumothorax.   Impression:       Borderline cardiomegaly and minimal edema versus pneumonitis.      Electronically signed by: Darrius Segura MD  Date: 07/12/2018  Time: 01:23     Procedure Component Value Units Date/Time   US Abdomen Limited [[id number]] Resulted: 07/12/18 1118   Order Status: Completed Updated:  07/12/18 1120   Narrative:     EXAMINATION:  US ABDOMEN LIMITED    CLINICAL HISTORY:  elevated alk phos, concern for obstruction;.    TECHNIQUE:  Limited ultrasound of the right upper quadrant of the abdomen including pancreas, liver, gallbladder, common bile duct was performed.    COMPARISON:  None.    FINDINGS:  Liver: Mildly enlarged in size measuring 18.8 cm.  Homogeneous echotexture. No focal hepatic lesions.    Gallbladder: No calculi, wall thickening, or pericholecystic fluid.  No sonographic Cardona's sign.    Biliary system: The common duct is not dilated, measuring 4 mm.  No intrahepatic ductal dilatation.    Spleen: Normal in size with a homogeneous echotexture, measuring 10.1 x 4.5 cm.    Pancreas: The visualized portions of pancreas appear normal.    Miscellaneous: No ascites.   Impression:       Mild hepatomegaly, otherwise no abnormal sonographic findings.    Electronically signed by resident: Stefano Ricci  Date: 07/12/2018  Time: 11:16    Electronically signed by: Vargas Rand MD  Date: 07/12/2018  Time: 11:18         Assessment/Plan:     Hematemesis with nausea    53 year old female with ESRD on hemodialysis, HTN, DM2, anemia c/o over 30 episodes of vomiting blood since yesterday morning. 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 Per ED notes, there's some red blood and some rust-colored blood in her vomitus. She states the hematemesis began after multiple episodes of initial non-bloody vomiting.    Plan:  - Suspect Heidi-Lucas tear since bleeding started after many episodes of non-bloody vomiting initially. Possible EGD tomorrow if hemodynamically stable.            Thank you for your consult. I will follow-up with patient. Please contact us if you have any additional questions.    Tj Wetzel MD PGY-1  Gastroenterology  [facility]

## 2018-07-13 NOTE — PROGRESS NOTES
[facility] Hospital Medicine  Progress Note    Patient Name: Eufemia Haq  MRN: [id number]  Patient Class: IP- Inpatient   Admission Date: 7/11/2018  Length of Stay: 1 days  Attending Physician: Theron Mayer MD  Primary Care Provider: Primary Doctor [facility] Medicine Team: [facility] 3 Bri Boateng MD    Subjective:     Principal Problem:<principal problem not specified>    HPI:  Pt is a 53 year old female with PMH of ESRD on hemodialysis for 6 months, HTN, DM2 who presents to the ED with nausea, vomiting, and diarrhea and hyperglycemia on home glucometer. She has also been having bilateral shoulder pain for about 1 month for which she has been taking at least 2 aleve per day. Pt states she received hemodialysis on 07/10 for two hours and then was pulled off for hypertension and proceeded to ED and was discharged home later that evening. Pt notes that she has been feeling unwell since her dialysis yesterday. Pt has vomited 3 times, all non-bloody. Patient states at home this evening her sugar was too high for her glucometer to read and took 30 units of her insulin, afterwhich her glucose was 400. Pt came to ED with daughter, where her POCT glucose was 312. Patient notes she is new to the area and in need of outpatient providers. Pt having hematemesis in the ED prior to admission.    Hospital Course:  Pt urgently taken to HD and will see GI for a EGD in the morning. Treating her HTN with clonidine, hydralazine, labetalol. Treating her hyperglycemia with regular insulin and will give a decreased long acting until no longer NPO. Treating her N/V/hematemesis with ondansetron, protonix, compazine.     Interval history: Pt no longer N/V. NPO this morning for EGD. EDG reveals ulcer with pigmented base per GI. Pt feels well and has an appetite.    Review of Systems   Constitutional: Negative for appetite change (pt NPO but hungry), chills and fever.   HENT: Negative for nosebleeds  "and sinus pain.    Eyes: Negative for visual disturbance.        Pt has no vision in the right eye and has limited vision in the left eye. Recently had cataract sx in the left and retinal sx on the right. Pt denies visual changes from her baseline with the episode.   Respiratory: Negative for cough, chest tightness and shortness of breath.    Cardiovascular: Positive for leg swelling (x 2 days). Negative for chest pain.        Pt has AV fistula to LUE   Gastrointestinal: Negative for nausea. Vomiting: blood noted in emesis bag in ED.   Endocrine: Negative.    Genitourinary: Positive for decreased urine volume (pt does not make much urine due to CKD, no change from baseline). Negative for dysuria and hematuria.   Musculoskeletal: Positive for arthralgias (pain to the bilateral shoulders, "throbbing and constant" and worse at night).   Skin: Negative.    Neurological: Negative for dizziness, syncope and light-headedness. Weakness: pt fell today due to the weakness, but landed on her buttocks and denies trauma/pain.   Psychiatric/Behavioral: Negative.      Objective:     Vital Signs (Most Recent):  Temp: 99 °F (37.2 °C) (07/13/18 1448)  Pulse: 74 (07/13/18 1500)  Resp: 12 (07/13/18 1448)  BP: (!) 149/68 (07/13/18 1448)  SpO2: (!) 83 % (07/13/18 1448) Vital Signs (24h Range):  Temp:  [97.7 °F (36.5 °C)-99.5 °F (37.5 °C)] 99 °F (37.2 °C)  Pulse:  [70-89] 74  Resp:  [12-18] 12  SpO2:  [83 %-100 %] 83 %  BP: (104-200)/(45-95) 149/68     Weight: 66.1 kg (145 lb 11.6 oz)  Body mass index is 22.82 kg/m².    Physical Exam   Constitutional: She is oriented to person, place, and time. She appears well-developed and well-nourished. She appears distressed.   HENT:   Head: Normocephalic and atraumatic.   Eyes: Conjunctivae and EOM are normal.   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 Neck supple.   Cardiovascular: Normal rate, regular rhythm, normal heart sounds and intact distal pulses.  "   Pulmonary/Chest: Effort normal and breath sounds normal.   Abdominal: Soft. Bowel sounds are normal. She exhibits no distension. There is no tenderness.   Musculoskeletal: Normal range of motion. She exhibits edema.        Right shoulder: She exhibits pain.        Left shoulder: She exhibits pain.   Neurological: She is alert and oriented to person, place, and time.   Skin: Skin is warm and dry. She is not diaphoretic.   Psychiatric: She has a normal mood and affect. Her behavior is normal. Thought content normal.         CRANIAL NERVES     CN III, IV, VI   Extraocular motions are normal.        Significant Labs:   Bilirubin:     Recent Labs  Lab 07/12/18  0025   BILITOT 0.3     CBC:     Recent Labs  Lab 07/12/18  1555 07/12/18  2322 07/13/18  0942   WBC 9.02 12.18 10.03   HGB 7.0* 6.8* 8.3*   HCT 22.7* 22.3* 27.3*    227 204     CMP:     Recent Labs  Lab 07/12/18  0025 07/12/18  1555 07/13/18  0435   * 139 135*   K 4.2 4.1 4.7   CL 88* 96 100   CO2 28 29 25   * 228* 218*   BUN 80* 70* 41*   CREATININE 5.2* 5.0* 3.9*   CALCIUM 8.8 8.4* 8.3*   PROT 6.4  --   --    ALBUMIN 3.1*  --  2.6*   BILITOT 0.3  --   --    ALKPHOS 328*  --   --    AST 32  --   --    ALT 37  --   --    ANIONGAP 18* 14 10   EGFRNONAA 8.8* 9.2* 12.4*     POCT Glucose:     Recent Labs  Lab 07/13/18  0735 07/13/18  1123 07/13/18  1451   POCTGLUCOSE 232* 158* 226*     Urine Studies:     Recent Labs  Lab 07/12/18  0152   COLORU Yellow   APPEARANCEUA Hazy*   PHUR 5.0   SPECGRAV 1.010   PROTEINUA 3+*   GLUCUA 3+*   KETONESU Trace*   BILIRUBINUA Negative   OCCULTUA Negative   NITRITE Negative   UROBILINOGEN Negative   LEUKOCYTESUR Trace*   RBCUA 2   WBCUA 17*   BACTERIA Rare   SQUAMEPITHEL 7   HYALINECASTS 7*       Significant Imaging: I have reviewed all pertinent imaging results/findings within the past 24 hours.    Assessment/Plan:      Acute gastroenteritis    Protonix 40 BID IV x 72 hours per GI recs.  -peptic ulcer with  pigmented base.  -PO PPI for 8 weeks after discharge   -repeat EGD in 8 weeks.          Nausea and vomiting    Resolved.  -clear liquid diet to be advanced as tolerated  - Continue PRN compazine and Zofran as needed.           Hypertensive urgency    Continue home meds in a stepwise manner    - ICU consulted given concern for HTN emergency with GI bleed.   - IV labetalol PRN as needed           Essential hypertension    Home meds per pharmacy include coreg 12.5 BID, clonidine 0.1mg TID, hydralazine and nifedapine.   -pt is poor historian and unable to specify which of those she is currently taking.        ESRD (end stage renal disease)    - Nephrology aware, HD Tu/Th/Sat          Hematemesis with nausea    EGD shows ulcer with pigmented base.  - Clear liquid diet, advance as tolerated  - Protonix 40mg BID IV x 72 hours  - CBC Q8    - will go home with PPI BID PO x 8 weeks and then will have a repeat EGD          Diabetes    detimir 7units at night            VTE Risk Mitigation         Ordered     Place sequential compression device  Until discontinued      07/12/18 0851     Reason for No Pharmacological VTE Prophylaxis  Once      07/12/18 0851     IP VTE HIGH RISK PATIENT  Once      07/12/18 0851              Bri Boateng MD  Department of Hospital Medicine   [facility]

## 2018-07-13 NOTE — ASSESSMENT & PLAN NOTE
Protonix 40 BID IV x 72 hours per GI recs.  -peptic ulcer with pigmented base.  -PO PPI for 8 weeks after discharge   -repeat EGD in 8 weeks.

## 2018-07-13 NOTE — [employer] PATIENT INSTRUCTIONS
Discharge Summary/Instructions after an Endoscopic Procedure  Patient Name: Eufemia Haq  Patient MRN: [id number]  Patient YOB: 1965 Friday, July 13, 2018  Lucio Hayden MD  RESTRICTIONS:  During your procedure today, you received medications for sedation.  These   medications may affect your judgment, balance and coordination.  Therefore,   for 24 hours, you have the following restrictions:   - DO NOT drive a car, operate machinery, make legal/financial decisions,   sign important papers or drink alcohol.    ACTIVITY:  Today: no heavy lifting, straining or running due to procedural   sedation/anesthesia.  The following day: return to full activity including work.  DIET:  Eat and drink normally unless instructed otherwise.     TREATMENT FOR COMMON SIDE EFFECTS:  - Mild abdominal pain, nausea, belching, bloating or excessive gas:  rest,   eat lightly and use a heating pad.  - Sore Throat: treat with throat lozenges and/or gargle with warm salt   water.  - Because air was used during the procedure, expelling large amounts of air   from your rectum or belching is normal.  - If a bowel prep was taken, you may not have a bowel movement for 1-3 days.    This is normal.  SYMPTOMS TO WATCH FOR AND REPORT TO YOUR PHYSICIAN:  1. Abdominal pain or bloating, other than gas cramps.  2. Chest pain.  3. Back pain.  4. Signs of infection such as: chills or fever occurring within 24 hours   after the procedure.  5. Rectal bleeding, which would show as bright red, maroon, or black stools.   (A tablespoon of blood from the rectum is not serious, especially if   hemorrhoids are present.)  6. Vomiting.  7. Weakness or dizziness.  GO DIRECTLY TO THE NEAREST EMERGENCY ROOM IF YOU HAVE ANY OF THE FOLLOWING:      Difficulty breathing              Chills and/or fever over 101 F   Persistent vomiting and/or vomiting blood   Severe abdominal pain   Severe chest pain   Black, tarry stools   Bleeding- more than one  tablespoon   Any other symptom or condition that you feel may need urgent attention  Your doctor recommends these additional instructions:  If any biopsies were taken, your doctors clinic will contact you in 1 to 2   weeks with any results.  - [facility] patient to [facility] for [facility] care.   - Advance diet as tolerated.   - Continue present medications.   - Use a proton pump inhibitor IV BID total of 72 hours.  Can transition to   PO at discharge.  - Perform an H. pylori serology.   - Repeat upper endoscopy in 8 weeks to check healing.   - No aspirin, ibuprofen, naproxen, or other non-steroidal anti-inflammatory   drugs.   For questions, problems or results please call your physician - Lucio Hayden MD at Work:  [phone].  [facility], EMERGENCY ROOM PHONE NUMBER: [phone]  IF A COMPLICATION OR EMERGENCY SITUATION ARISES AND YOU ARE UNABLE TO REACH   YOUR PHYSICIAN - GO DIRECTLY TO THE EMERGENCY ROOM.  Lucio Hayden MD  7/13/2018 2:07:00 PM  This report has been verified and signed electronically.  [employer]

## 2018-07-13 NOTE — TRANSFER OF CARE
"Anesthesia Transfer of Care Note    Patient: Eufemia Haq    Procedure(s) Performed: Procedure(s) (LRB):  EGD (ESOPHAGOGASTRODUODENOSCOPY) (N/A)    Patient location: [facility]    Anesthesia Type: general    Transport from OR: Transported from OR on 6-10 L/min O2 by face mask with adequate spontaneous ventilation    Post pain: adequate analgesia    Post assessment: no apparent anesthetic complications and tolerated procedure well    Post vital signs: stable    Level of consciousness: responds to stimulation and sedated    Nausea/Vomiting: no nausea/vomiting    Complications: none    Transfer of care protocol was followed      Last vitals:   Visit Vitals  BP (!) 104/45 (BP Location: Right arm, Patient Position: Lying)   Pulse 72   Temp 36.6 °C (97.9 °F) (Temporal)   Resp 18   Ht 5' 7" (1.702 m)   Wt 66.1 kg (145 lb 11.6 oz)   LMP  (LMP Unknown)   SpO2 100%   Breastfeeding? No   BMI 22.82 kg/m²     "

## 2018-07-13 NOTE — ASSESSMENT & PLAN NOTE
Home meds per pharmacy include coreg 12.5 BID, clonidine 0.1mg TID, hydralazine and nifedapine.   -pt is poor historian and unable to specify which of those she is currently taking.

## 2018-07-14 LAB
ALBUMIN SERPL BCP-MCNC: 2.6 G/DL
ANION GAP SERPL CALC-SCNC: 10 MMOL/L
BASOPHILS # BLD AUTO: 0.03 K/UL
BASOPHILS NFR BLD: 0.5 %
BUN SERPL-MCNC: 51 MG/DL
CALCIUM SERPL-MCNC: 7.8 MG/DL
CHLORIDE SERPL-SCNC: 101 MMOL/L
CO2 SERPL-SCNC: 23 MMOL/L
CREAT SERPL-MCNC: 5.2 MG/DL
DIFFERENTIAL METHOD: ABNORMAL
EOSINOPHIL # BLD AUTO: 0.2 K/UL
EOSINOPHIL NFR BLD: 3.1 %
ERYTHROCYTE [DISTWIDTH] IN BLOOD BY AUTOMATED COUNT: 18.5 %
EST. GFR  (AFRICAN AMERICAN): 10.1 ML/MIN/1.73 M^2
EST. GFR  (NON AFRICAN AMERICAN): 8.8 ML/MIN/1.73 M^2
GLUCOSE SERPL-MCNC: 333 MG/DL
GLUCOSE SERPL-MCNC: 67 MG/DL
HCT VFR BLD AUTO: 25.9 %
HGB BLD-MCNC: 7.8 G/DL
IMM GRANULOCYTES # BLD AUTO: 0.04 K/UL
IMM GRANULOCYTES NFR BLD AUTO: 0.6 %
LYMPHOCYTES # BLD AUTO: 0.6 K/UL
LYMPHOCYTES NFR BLD: 9.1 %
MCH RBC QN AUTO: 28 PG
MCHC RBC AUTO-ENTMCNC: 30.1 G/DL
MCV RBC AUTO: 93 FL
MONOCYTES # BLD AUTO: 0.6 K/UL
MONOCYTES NFR BLD: 9.2 %
NEUTROPHILS # BLD AUTO: 5 K/UL
NEUTROPHILS NFR BLD: 77.5 %
NRBC BLD-RTO: 0 /100 WBC
PHOSPHATE SERPL-MCNC: 6 MG/DL
PLATELET # BLD AUTO: 187 K/UL
PMV BLD AUTO: 10.8 FL
POCT GLUCOSE: 216 MG/DL (ref 70–110)
POCT GLUCOSE: 316 MG/DL (ref 70–110)
POCT GLUCOSE: 322 MG/DL (ref 70–110)
POCT GLUCOSE: 340 MG/DL (ref 70–110)
POCT GLUCOSE: 50 MG/DL (ref 70–110)
POCT GLUCOSE: 86 MG/DL (ref 70–110)
POCT GLUCOSE: 90 MG/DL (ref 70–110)
POTASSIUM SERPL-SCNC: 4.6 MMOL/L
RBC # BLD AUTO: 2.79 M/UL
SODIUM SERPL-SCNC: 134 MMOL/L
WBC # BLD AUTO: 6.49 K/UL

## 2018-07-14 PROCEDURE — 63600175 PHARM REV CODE 636 W HCPCS: Performed by: STUDENT IN AN ORGANIZED HEALTH CARE EDUCATION/TRAINING PROGRAM

## 2018-07-14 PROCEDURE — 80069 RENAL FUNCTION PANEL: CPT

## 2018-07-14 PROCEDURE — 90935 HEMODIALYSIS ONE EVALUATION: CPT | Mod: ,,, | Performed by: INTERNAL MEDICINE

## 2018-07-14 PROCEDURE — 99232 SBSQ HOSP IP/OBS MODERATE 35: CPT | Mod: GC,,, | Performed by: HOSPITALIST

## 2018-07-14 PROCEDURE — 87340 HEPATITIS B SURFACE AG IA: CPT

## 2018-07-14 PROCEDURE — 25000003 PHARM REV CODE 250: Performed by: STUDENT IN AN ORGANIZED HEALTH CARE EDUCATION/TRAINING PROGRAM

## 2018-07-14 PROCEDURE — 90935 HEMODIALYSIS ONE EVALUATION: CPT

## 2018-07-14 PROCEDURE — 36415 COLL VENOUS BLD VENIPUNCTURE: CPT

## 2018-07-14 PROCEDURE — C9113 INJ PANTOPRAZOLE SODIUM, VIA: HCPCS | Performed by: STUDENT IN AN ORGANIZED HEALTH CARE EDUCATION/TRAINING PROGRAM

## 2018-07-14 PROCEDURE — 85025 COMPLETE CBC W/AUTO DIFF WBC: CPT

## 2018-07-14 PROCEDURE — 96372 THER/PROPH/DIAG INJ SC/IM: CPT

## 2018-07-14 PROCEDURE — 86706 HEP B SURFACE ANTIBODY: CPT

## 2018-07-14 PROCEDURE — 25000003 PHARM REV CODE 250: Performed by: NURSE PRACTITIONER

## 2018-07-14 PROCEDURE — 82947 ASSAY GLUCOSE BLOOD QUANT: CPT

## 2018-07-14 PROCEDURE — 25000003 PHARM REV CODE 250: Performed by: HOSPITALIST

## 2018-07-14 PROCEDURE — 12000002 HC ACUTE/MED SURGE SEMI-PRIVATE ROOM

## 2018-07-14 RX ORDER — CLONIDINE HYDROCHLORIDE 0.1 MG/1
0.1 TABLET ORAL 3 TIMES DAILY
Status: DISCONTINUED | OUTPATIENT
Start: 2018-07-14 | End: 2018-07-15 | Stop reason: HOSPADM

## 2018-07-14 RX ORDER — INSULIN ASPART 100 [IU]/ML
3 INJECTION, SOLUTION INTRAVENOUS; SUBCUTANEOUS
Status: DISCONTINUED | OUTPATIENT
Start: 2018-07-14 | End: 2018-07-15 | Stop reason: HOSPADM

## 2018-07-14 RX ORDER — HYDRALAZINE HYDROCHLORIDE 50 MG/1
100 TABLET, FILM COATED ORAL EVERY 8 HOURS PRN
Status: DISCONTINUED | OUTPATIENT
Start: 2018-07-14 | End: 2018-07-14

## 2018-07-14 RX ORDER — HYDRALAZINE HYDROCHLORIDE 50 MG/1
50 TABLET, FILM COATED ORAL EVERY 8 HOURS PRN
Status: DISCONTINUED | OUTPATIENT
Start: 2018-07-14 | End: 2018-07-15 | Stop reason: HOSPADM

## 2018-07-14 RX ORDER — CLONIDINE HYDROCHLORIDE 0.1 MG/1
0.2 TABLET ORAL 3 TIMES DAILY
Status: DISCONTINUED | OUTPATIENT
Start: 2018-07-14 | End: 2018-07-14

## 2018-07-14 RX ADMIN — CARVEDILOL 6.25 MG: 6.25 TABLET, FILM COATED ORAL at 08:07

## 2018-07-14 RX ADMIN — SEVELAMER CARBONATE 1600 MG: 800 TABLET, FILM COATED ORAL at 08:07

## 2018-07-14 RX ADMIN — CLONIDINE HYDROCHLORIDE 0.1 MG: 0.1 TABLET ORAL at 05:07

## 2018-07-14 RX ADMIN — INSULIN ASPART 4 UNITS: 100 INJECTION, SOLUTION INTRAVENOUS; SUBCUTANEOUS at 06:07

## 2018-07-14 RX ADMIN — CLONIDINE HYDROCHLORIDE 0.1 MG: 0.1 TABLET ORAL at 08:07

## 2018-07-14 RX ADMIN — INSULIN ASPART 4 UNITS: 100 INJECTION, SOLUTION INTRAVENOUS; SUBCUTANEOUS at 12:07

## 2018-07-14 RX ADMIN — SEVELAMER CARBONATE 1600 MG: 800 TABLET, FILM COATED ORAL at 12:07

## 2018-07-14 RX ADMIN — CARVEDILOL 6.25 MG: 6.25 TABLET, FILM COATED ORAL at 09:07

## 2018-07-14 RX ADMIN — SODIUM CHLORIDE: 0.9 INJECTION, SOLUTION INTRAVENOUS at 01:07

## 2018-07-14 RX ADMIN — CLONIDINE HYDROCHLORIDE 0.1 MG: 0.1 TABLET ORAL at 09:07

## 2018-07-14 RX ADMIN — DEXTROSE 40 MG: 50 INJECTION, SOLUTION INTRAVENOUS at 08:07

## 2018-07-14 RX ADMIN — INSULIN ASPART 3 UNITS: 100 INJECTION, SOLUTION INTRAVENOUS; SUBCUTANEOUS at 08:07

## 2018-07-14 RX ADMIN — LEVETIRACETAM 250 MG: 250 TABLET, FILM COATED ORAL at 08:07

## 2018-07-14 RX ADMIN — LEVETIRACETAM 250 MG: 250 TABLET, FILM COATED ORAL at 09:07

## 2018-07-14 RX ADMIN — LOSARTAN POTASSIUM 100 MG: 50 TABLET ORAL at 08:07

## 2018-07-14 RX ADMIN — SEVELAMER CARBONATE 1600 MG: 800 TABLET, FILM COATED ORAL at 06:07

## 2018-07-14 RX ADMIN — HYDRALAZINE HYDROCHLORIDE 100 MG: 50 TABLET ORAL at 09:07

## 2018-07-14 RX ADMIN — INSULIN ASPART 3 UNITS: 100 INJECTION, SOLUTION INTRAVENOUS; SUBCUTANEOUS at 12:07

## 2018-07-14 RX ADMIN — DEXTROSE 40 MG: 50 INJECTION, SOLUTION INTRAVENOUS at 09:07

## 2018-07-14 NOTE — PROGRESS NOTES
[facility] Hospital Medicine  Progress Note    Patient Name: Eufemia Haq  MRN: [id number]  Patient Class: IP- Inpatient   Admission Date: 7/11/2018  Length of Stay: 2 days  Attending Physician: Theron Mayer MD  Primary Care Provider: Primary Doctor [facility] Medicine Team: [facility] HOSP MED 3 Bri Boateng MD    Subjective:     Principal Problem:Hematemesis with nausea    HPI:  Pt is a 53 year old female with PMH of ESRD on hemodialysis for 6 months, HTN, DM2 who presents to the ED with nausea, vomiting (with blood), diarrhea and hyperglycemia on home glucometer. She has also been having bilateral shoulder pain for about 1 month for which she has been taking at least 2 aleve per day.     Hospital Course:  Pt urgently taken to HD. EDG showed ulcer with pigmented base. They recommend 72 hours of IV PPI and then continue for 8 weeks with PO PPI and repeat an EGD at that time. Treating her HTN with clonidine, hydralazine, labetalol. Treating her hyperglycemia with regular insulin and will give a decreased long acting until no longer NPO. Treating her N/V/hematemesis with ondansetron, protonix, compazine. Pt's N/V and hematemesis resolved.     Interval History: Pt complaining that she would like full diet immediately. No N/V/abd pain with meals, so diet order advanced. Pt would like to leave tomorrow to attend a family wedding. GI recommend the complete 72 hours of PPI due to high risk of her ulcer to bleed. Will discuss with pt the risks of leaving.    Review of Systems   Constitutional: Negative for appetite change and fever.   Respiratory: Negative for chest tightness and shortness of breath.    Gastrointestinal: Negative for abdominal pain.   Neurological: Negative for dizziness, weakness, light-headedness and headaches.     Objective:     Vital Signs (Most Recent):  Temp: 99 °F (37.2 °C) (07/14/18 1333)  Pulse: 69 (07/14/18 1715)  Resp: 12 (07/14/18 1415)  BP: (!) 161/77 (07/14/18  1715)  SpO2: 96 % (07/14/18 1153) Vital Signs (24h Range):  Temp:  [98.5 °F (36.9 °C)-99 °F (37.2 °C)] 99 °F (37.2 °C)  Pulse:  [62-77] 69  Resp:  [12-18] 12  SpO2:  [91 %-96 %] 96 %  BP: ()/(47-93) 161/77     Weight: 66.1 kg (145 lb 11.6 oz)  Body mass index is 22.82 kg/m².    Intake/Output Summary (Last 24 hours) at 07/14/18 1800  Last data filed at 07/14/18 0830   Gross per 24 hour   Intake              360 ml   Output                0 ml   Net              360 ml      Physical Exam   Constitutional: She is oriented to person, place, and time. She appears well-developed and well-nourished.   Cardiovascular: Normal rate and regular rhythm.    Pulmonary/Chest: Effort normal and breath sounds normal.   Abdominal: Soft. There is no tenderness.   Musculoskeletal: She exhibits edema (non-pitting).   Neurological: She is alert and oriented to person, place, and time.   Skin: Skin is warm and dry.   Psychiatric: She has a normal mood and affect.   Nursing note and vitals reviewed.      Significant Labs:   BMP:   Recent Labs  Lab 07/14/18  0420   *   *   K 4.6      CO2 23   BUN 51*   CREATININE 5.2*   CALCIUM 7.8*     CBC:   Recent Labs  Lab 07/13/18  1636 07/13/18  2235 07/14/18  1410   WBC 7.30 6.18 6.49   HGB 8.0* 8.2* 7.8*   HCT 27.5* 27.4* 25.9*    184 187     POCT Glucose:   Recent Labs  Lab 07/14/18  1155 07/14/18  1717 07/14/18  1720   POCTGLUCOSE 322* 86 90     All pertinent labs within the past 24 hours have been reviewed.    Significant Imaging: I have reviewed all pertinent imaging results/findings within the past 24 hours.    Assessment/Plan:      * Hematemesis with nausea    Resolved. No N/V and no episodes of emesis x 24 hours.  - EGD shows ulcer with pigmented base.  - diet advanced to full today  - Protonix 40mg BID IV x 72 hours recommended by GI  - CBC Q8    - will go home with PPI BID PO x 8 weeks and then will have a repeat EGD          Nausea and vomiting     Resolved.  -diet advanced to full  - Continue PRN compazine and Zofran as needed.           Hypertensive urgency    Resolved.  - Continue home meds in a stepwise manner    -see essential HTN          Essential hypertension    Home meds per pharmacy include coreg 12.5 BID, clonidine 0.1mg TID, hydralazine and nifedapine.   -pt is poor historian and unable to specify which of those she is currently taking.  -currently have resumed home clonidine 0.1mg TID, Nifedepine XL 90 qd, cardedilol 6.25 mg BID, losartan 100mg qd, with 50 mg hydralazine PRN.        ESRD (end stage renal disease)    - Nephrology aware, HD Tu/Th/Sat  -Pt received HD today          Diabetes    detimir 15 units at night  aspart 3 TIDWM            VTE Risk Mitigation         Ordered     Place sequential compression device  Until discontinued      07/12/18 0851     Reason for No Pharmacological VTE Prophylaxis  Once      07/12/18 0851     IP VTE HIGH RISK PATIENT  Once      07/12/18 0851              Bri Boateng MD  Department of Hospital Medicine   [facility]

## 2018-07-14 NOTE — PROGRESS NOTES
"[facility]  Nephrology  Progress Note    Patient Name: Eufemia Haq  MRN: [id number]  Admission Date: 7/11/2018  Hospital Length of Stay: 1 days  Attending Provider: Theron Mayer MD   Primary Care Physician: Primary Doctor No  Principal Problem:<principal problem not specified>    Subjective:     HPI: 52 yo female with significant history hypertension, DMT1, gastroparesis, recent CVA with residual behavior problems (Jan 2018 [facility] in [place]), seizure, ESRD on HD 1 year, and multiple admissions to hospitals with most recent 6/2-6/8 for left eye gaze who is admitted for nausea and vomiting x 2 days and uncontrolled hypertension. Per outpatient dialysis nurse, patient was sent to [facility] on Tuesday 7/10 after full dialysis treatment for change in mental status "slow to response" and only alert to person and is not aware previous hematemesis. Takes aleve PRN and last dose some time this week for shoulder pain. GI-protonix IV gtt and plan for EGD tomorrow. Hgb .80>7.4 (hgb 9.3 on 6/10/18). US abdomen mild hepatomegaly. CXR mild bilateral effusion-much improved compared to 6/2/18.     Nephrology consult for hemodialysis. ESRD x 1 year on iHD TTS via [facility]G at [facility] under direction of Dr. Dias. Last HD Tues 7/9 and 6 kg over EDW. EDW 63.5 kg. Intradialysis gain 5-10 kg. Per outpatient dialysis RN, patient known for non adherence to medications/diet/fluid intake and sometimes blood sugar reading "high."     Interval History: Patient doing well this am. Lying flat and resting comfortable on 1 L NC. No further nausea/vomiting. Hgb 6.8>8.0 after 1 unit PRBC. Tolerated HD 3 hr net UF 2 L yesterday.    Todays EGD showed esophagitis, gastritis, and non bleeding gastric ulcers.     Review of patient's allergies indicates:   Allergen Reactions    Gabapentin Other (See Comments)     Seizure    Tramadol Other (See Comments)     Seizure    Vancomycin analogues Other (See " Comments)     Seizure    Latex, natural rubber Rash    Niacin preparations Rash     Current Facility-Administered Medications   Medication Frequency    0.9%  NaCl infusion (for blood administration) Q24H PRN    0.9%  NaCl infusion PRN    0.9%  NaCl infusion Once    acetaminophen tablet 650 mg Q6H PRN    carvedilol tablet 6.25 mg BID    cloNIDine tablet 0.1 mg TID    dextrose 50% injection 12.5 g PRN    dextrose 50% injection 25 g PRN    fentaNYL injection 25 mcg Q5 Min PRN    glucagon (human recombinant) injection 1 mg PRN    glucose chewable tablet 16 g PRN    glucose chewable tablet 24 g PRN    hydrALAZINE tablet 100 mg Q8H PRN    insulin aspart U-100 pen 0-5 Units Q6H PRN    insulin detemir U-100 pen 7 Units QHS    levETIRAcetam tablet 250 mg BID    losartan tablet 100 mg Daily    ondansetron injection 4 mg Daily PRN    ondansetron injection 8 mg Once    pantoprazole (PROTONIX) 40 mg in dextrose 5 % 100 mL IVPB Q12H    prochlorperazine injection Soln 5 mg Q6H PRN    promethazine (PHENERGAN) 6.25 mg in dextrose 5 % 50 mL IVPB Q10 Min PRN    sevelamer carbonate tablet 1,600 mg TID WM    sodium chloride 0.9% flush 3 mL PRN    sodium chloride 0.9% flush 5 mL PRN       Objective:     Vital Signs (Most Recent):  Temp: 99 °F (37.2 °C) (07/13/18 1448)  Pulse: 75 (07/13/18 1900)  Resp: 12 (07/13/18 1448)  BP: (!) 149/68 (07/13/18 1448)  SpO2: (!) 83 % (07/13/18 1448)  O2 Device (Oxygen Therapy): room air (07/13/18 1448) Vital Signs (24h Range):  Temp:  [97.7 °F (36.5 °C)-99.5 °F (37.5 °C)] 99 °F (37.2 °C)  Pulse:  [70-85] 75  Resp:  [12-18] 12  SpO2:  [83 %-100 %] 83 %  BP: (104-198)/(45-77) 149/68     Weight: 66.1 kg (145 lb 11.6 oz) (07/12/18 2000)  Body mass index is 22.82 kg/m².  Body surface area is 1.77 meters squared.    I/O last 3 completed shifts:  In: 518.3 [P.O.:100; I.V.:100; Blood:318.3]  Out: 2600 [Other:2600]    Physical Exam   Constitutional: She is oriented to person, place, and  "time.    Chronic ill apearing   HENT:   Head: Atraumatic.   Eyes: EOM are normal.   Right eye blind. Left eye poor vision.    Neck: Neck supple.   Cardiovascular: Normal rate and regular rhythm.    Musculoskeletal: She exhibits no edema.   Neurological: She is alert and oriented to person, place, and time.   Mental status improved this am.    Skin: Skin is warm and dry.   ADEN AVG good bruit.   Psychiatric: She has a normal mood and affect.       Significant Labs:  All labs within the past 24 hours have been reviewed.     Significant Imaging:  Labs: Reviewed    Assessment/Plan:     ESRD (end stage renal disease)    52 yo female with significant history hypertension, DMT1, gastroparesis, recent CVA with residual behavior problems (Jan 2018 [facility] in [place]), seizure, ESRD on HD 1 year, and multiple admissions to hospitals with most recent 6/2-6/8 for left eye gaze who is admitted for nausea and vomiting x 2 days and uncontrolled hypertension. Patient declined SNF evaluation during 6/2 admission. Per outpatient dialysis nurse, patient was sent to [facility] on Tuesday 7/10 after full dialysis treatment for change in mental status "slow to response" and only alert to person and is not aware previous hematemesis. Takes aleve PRN and last dose some time this week for shoulder pain. GI plan for EGD tomorrow.  Hgb .80>7.4 (hgb 9.3 on 6/10/18). US abdomen mild hepatomegaly. CXR mild bilateral effusion-much improved compared to 6/2/18.     Nephrology consult for hemodialysis. ESRD x 1 year [facility] direction of Dr. Dias. Last HD Tues 7/9 and 6 kg over EDW. EDW 63.5 kg. Intradialysis gain 5-10 kg. Labs reviewed.  -7/12 HD  3 hr 2 L net UF. Post wt bed 61.2 kg (below EDW).   -See interval hx.     Plan/Recommendation:  For HD tomorrow. UF to EDW 61.5 as tolerated.   Renal diet. Home Renvela  Start ANGELICA   Discussed with patient stop taking Aleve and all NSAIDS.   Consider HH " medical management to assist Daughter/ with medications/DM.                   Thank you for your consult. I will follow-up with patient. Please contact us if you have any additional questions.    Viktoria Nguyen NP  Nephrology  [facility]

## 2018-07-14 NOTE — SUBJECTIVE & OBJECTIVE
Interval History: Pt complaining that she would like full diet immediately. No N/V/abd pain with meals, so diet order advanced. Pt would like to leave tomorrow to attend a family wedding. GI recommend the complete 72 hours of PPI due to high risk of her ulcer to bleed. Will discuss with pt the risks of leaving.    Review of Systems   Constitutional: Negative for appetite change and fever.   Respiratory: Negative for chest tightness and shortness of breath.    Gastrointestinal: Negative for abdominal pain.   Neurological: Negative for dizziness, weakness, light-headedness and headaches.     Objective:     Vital Signs (Most Recent):  Temp: 99 °F (37.2 °C) (07/14/18 1333)  Pulse: 69 (07/14/18 1715)  Resp: 12 (07/14/18 1415)  BP: (!) 161/77 (07/14/18 1715)  SpO2: 96 % (07/14/18 1153) Vital Signs (24h Range):  Temp:  [98.5 °F (36.9 °C)-99 °F (37.2 °C)] 99 °F (37.2 °C)  Pulse:  [62-77] 69  Resp:  [12-18] 12  SpO2:  [91 %-96 %] 96 %  BP: ()/(47-93) 161/77     Weight: 66.1 kg (145 lb 11.6 oz)  Body mass index is 22.82 kg/m².    Intake/Output Summary (Last 24 hours) at 07/14/18 1800  Last data filed at 07/14/18 0830   Gross per 24 hour   Intake              360 ml   Output                0 ml   Net              360 ml      Physical Exam   Constitutional: She is oriented to person, place, and time. She appears well-developed and well-nourished.   Cardiovascular: Normal rate and regular rhythm.    Pulmonary/Chest: Effort normal and breath sounds normal.   Abdominal: Soft. There is no tenderness.   Musculoskeletal: She exhibits edema (non-pitting).   Neurological: She is alert and oriented to person, place, and time.   Skin: Skin is warm and dry.   Psychiatric: She has a normal mood and affect.   Nursing note and vitals reviewed.      Significant Labs:   BMP:   Recent Labs  Lab 07/14/18  0420   *   *   K 4.6      CO2 23   BUN 51*   CREATININE 5.2*   CALCIUM 7.8*     CBC:   Recent Labs  Lab  07/13/18  1636 07/13/18  2235 07/14/18  1410   WBC 7.30 6.18 6.49   HGB 8.0* 8.2* 7.8*   HCT 27.5* 27.4* 25.9*    184 187     POCT Glucose:   Recent Labs  Lab 07/14/18  1155 07/14/18  1717 07/14/18  1720   POCTGLUCOSE 322* 86 90     All pertinent labs within the past 24 hours have been reviewed.    Significant Imaging: I have reviewed all pertinent imaging results/findings within the past 24 hours.

## 2018-07-14 NOTE — NURSING
2200: Pt AAOx4,  BG was 430, rechecked . Pt is asymptomatic and on clear liquid diet. STAT serum glucose ordered per standing order protocol in pt's chart . Notified primary team, spoke with Dr. STEPHANIE Brown, advised to give pt 5 units of Novalog and recheck BS in 3 hours, if still elevated call back. Will continue to monitor.

## 2018-07-14 NOTE — ASSESSMENT & PLAN NOTE
"54 yo female with significant history hypertension, DMT1, gastroparesis, recent CVA with residual behavior problems (Jan 2018 [facility] in [place]), seizure, ESRD on HD 1 year, and multiple admissions to hospitals with most recent 6/2-6/8 for left eye gaze who is admitted for nausea and vomiting x 2 days and uncontrolled hypertension. Patient declined SNF evaluation during 6/2 admission. Per outpatient dialysis nurse, patient was sent to [facility] on Tuesday 7/10 after full dialysis treatment for change in mental status "slow to response" and only alert to person and is not aware previous hematemesis. Takes aleve PRN and last dose some time this week for shoulder pain. GI plan for EGD tomorrow.  Hgb .80>7.4 (hgb 9.3 on 6/10/18). US abdomen mild hepatomegaly. CXR mild bilateral effusion-much improved compared to 6/2/18.     Nephrology consult for hemodialysis. ESRD x 1 year on iHD TTS via [facility] at [facility] under direction of Dr. Dias. Last HD Tues 7/9 and 6 kg over EDW. EDW 63.5 kg. Intradialysis gain 5-10 kg. Labs reviewed.  -7/12 HD  3 hr 2 L net UF. Post wt bed 61.2 kg (below EDW).   -See interval hx.     Plan/Recommendation:  For HD tomorrow. UF to EDW 61.5 as tolerated.   Renal diet. Home phoslo  Start ANGELICA   Discussed with patient stop taking Aleve and all NSAIDS.   Consider  medical management to assist Daughter/ with medications/DM.         "

## 2018-07-14 NOTE — SUBJECTIVE & OBJECTIVE
Interval History: Patient doing well this am. Lying flat and resting comfortable on 1 L NC. No further nausea/vomiting. Hgb 6.8>8.0 after 1 unit PRBC. Tolerated HD 3 hr net UF 2 L yesterday.    Todays EGD showed esophagitis, gastritis, and non bleeding gastric ulcers.     Review of patient's allergies indicates:   Allergen Reactions    Gabapentin Other (See Comments)     Seizure    Tramadol Other (See Comments)     Seizure    Vancomycin analogues Other (See Comments)     Seizure    Latex, natural rubber Rash    Niacin preparations Rash     Current Facility-Administered Medications   Medication Frequency    0.9%  NaCl infusion (for blood administration) Q24H PRN    0.9%  NaCl infusion PRN    0.9%  NaCl infusion Once    acetaminophen tablet 650 mg Q6H PRN    carvedilol tablet 6.25 mg BID    cloNIDine tablet 0.1 mg TID    dextrose 50% injection 12.5 g PRN    dextrose 50% injection 25 g PRN    fentaNYL injection 25 mcg Q5 Min PRN    glucagon (human recombinant) injection 1 mg PRN    glucose chewable tablet 16 g PRN    glucose chewable tablet 24 g PRN    hydrALAZINE tablet 100 mg Q8H PRN    insulin aspart U-100 pen 0-5 Units Q6H PRN    insulin detemir U-100 pen 7 Units QHS    levETIRAcetam tablet 250 mg BID    losartan tablet 100 mg Daily    ondansetron injection 4 mg Daily PRN    ondansetron injection 8 mg Once    pantoprazole (PROTONIX) 40 mg in dextrose 5 % 100 mL IVPB Q12H    prochlorperazine injection Soln 5 mg Q6H PRN    promethazine (PHENERGAN) 6.25 mg in dextrose 5 % 50 mL IVPB Q10 Min PRN    sevelamer carbonate tablet 1,600 mg TID WM    sodium chloride 0.9% flush 3 mL PRN    sodium chloride 0.9% flush 5 mL PRN       Objective:     Vital Signs (Most Recent):  Temp: 99 °F (37.2 °C) (07/13/18 1448)  Pulse: 75 (07/13/18 1900)  Resp: 12 (07/13/18 1448)  BP: (!) 149/68 (07/13/18 1448)  SpO2: (!) 83 % (07/13/18 1448)  O2 Device (Oxygen Therapy): room air (07/13/18 1448) Vital Signs (24h  Range):  Temp:  [97.7 °F (36.5 °C)-99.5 °F (37.5 °C)] 99 °F (37.2 °C)  Pulse:  [70-85] 75  Resp:  [12-18] 12  SpO2:  [83 %-100 %] 83 %  BP: (104-198)/(45-77) 149/68     Weight: 66.1 kg (145 lb 11.6 oz) (07/12/18 2000)  Body mass index is 22.82 kg/m².  Body surface area is 1.77 meters squared.    I/O last 3 completed shifts:  In: 518.3 [P.O.:100; I.V.:100; Blood:318.3]  Out: 2600 [Other:2600]    Physical Exam   Constitutional: She is oriented to person, place, and time.    Chronic ill apearing   HENT:   Head: Atraumatic.   Eyes: EOM are normal.   Right eye blind. Left eye poor vision.    Neck: Neck supple.   Cardiovascular: Normal rate and regular rhythm.    Musculoskeletal: She exhibits no edema.   Neurological: She is alert and oriented to person, place, and time.   Mental status improved this am.    Skin: Skin is warm and dry.   ADEN AVG good bruit.   Psychiatric: She has a normal mood and affect.       Significant Labs:  All labs within the past 24 hours have been reviewed.     Significant Imaging:  Labs: Reviewed

## 2018-07-14 NOTE — NURSING
"Pt AAOx4. Hyperglycemic overnight, highest  verified by lab draw. Pt was asymptomatic. Denied pain. Ambulated with assistant to the bathroom. BP elevated during midnight vitals 194/79, hydralazine given per order protocol, rechecked hour later, /64. Tolerated clear liquid diet, advanced diet to full liquid per standing diet orders. Pt upset about diet wants to eat food, said that she is "hungry and will pitch a fit tomorrow if she doesn't get food." Educated pt on the reason for the liquid diet. Safety maintained. Will continue to monitor.   "

## 2018-07-14 NOTE — PLAN OF CARE
Problem: Patient Care Overview  Goal: Plan of Care Review  Outcome: Ongoing (interventions implemented as appropriate)  Patient alert and oriented. Denies nausea or vomiting today. No blood stools. Vitals have ranged from hypertension to hypotension today. Completed dialysis. Episode of hypoglycemia right before dinner but patient is not symptomatic. Fall precautions in place.

## 2018-07-14 NOTE — ASSESSMENT & PLAN NOTE
Home meds per pharmacy include coreg 12.5 BID, clonidine 0.1mg TID, hydralazine and nifedapine.   -pt is poor historian and unable to specify which of those she is currently taking.  -currently have resumed home clonidine 0.1mg TID, Nifedepine XL 90 qd, cardedilol 6.25 mg BID, losartan 100mg qd, with 50 mg hydralazine PRN.

## 2018-07-14 NOTE — PLAN OF CARE
Problem: Patient Care Overview  Goal: Plan of Care Review  Outcome: Ongoing (interventions implemented as appropriate)  Patient tolerated 3.5 hour treatment well. 3960 removed. Patient given scheduled bp med at end of treatment for SBP in 180's. Epo held per Dr. Vargas for elevated BP. Needles removed; pressure held X 8 minutes. Hemostasis achieved. Pressure dressings applied. + thrill and bruit noted. Patient without complaint

## 2018-07-14 NOTE — ASSESSMENT & PLAN NOTE
Resolved. No N/V and no episodes of emesis x 24 hours.  - EGD shows ulcer with pigmented base.  - diet advanced to full today  - Protonix 40mg BID IV x 72 hours recommended by GI  - CBC Q8    - will go home with PPI BID PO x 8 weeks and then will have a repeat EGD

## 2018-07-14 NOTE — NURSING
Pt AAOx4.   3 hours after 5 units of Novalog was given. Pt is asymptomatic.  Notified primary team spoke with Dr. STEPHANIE Brown, advised to recheck at 6am no coverage given at this time. Will continue to monitor.

## 2018-07-14 NOTE — PROGRESS NOTES
Patient arrived via stretcher; weighed in bed. 1352 Needles inserted X 2 without difficulty and secured. Maintenance HD initiated to left upper arm graft; lines secured. Blood drawn for scheduled lab. Patient without complaint.

## 2018-07-15 VITALS
DIASTOLIC BLOOD PRESSURE: 90 MMHG | RESPIRATION RATE: 14 BRPM | WEIGHT: 145.75 LBS | TEMPERATURE: 99 F | HEIGHT: 67 IN | OXYGEN SATURATION: 100 % | HEART RATE: 79 BPM | SYSTOLIC BLOOD PRESSURE: 187 MMHG | BODY MASS INDEX: 22.88 KG/M2

## 2018-07-15 PROBLEM — K25.0 ACUTE GASTRIC ULCER WITH HEMORRHAGE: Status: ACTIVE | Noted: 2018-07-15

## 2018-07-15 LAB
ALBUMIN SERPL BCP-MCNC: 2.5 G/DL
ANION GAP SERPL CALC-SCNC: 9 MMOL/L
BASOPHILS # BLD AUTO: 0.02 K/UL
BASOPHILS NFR BLD: 0.3 %
BUN SERPL-MCNC: 26 MG/DL
CALCIUM SERPL-MCNC: 7.4 MG/DL
CHLORIDE SERPL-SCNC: 103 MMOL/L
CO2 SERPL-SCNC: 22 MMOL/L
CREAT SERPL-MCNC: 3.5 MG/DL
DIFFERENTIAL METHOD: ABNORMAL
EOSINOPHIL # BLD AUTO: 0.3 K/UL
EOSINOPHIL NFR BLD: 4 %
ERYTHROCYTE [DISTWIDTH] IN BLOOD BY AUTOMATED COUNT: 18.4 %
EST. GFR  (AFRICAN AMERICAN): 16.3 ML/MIN/1.73 M^2
EST. GFR  (NON AFRICAN AMERICAN): 14.2 ML/MIN/1.73 M^2
FERRITIN SERPL-MCNC: 450 NG/ML
GLUCOSE SERPL-MCNC: 377 MG/DL
HCT VFR BLD AUTO: 26.9 %
HGB BLD-MCNC: 8.2 G/DL
IMM GRANULOCYTES # BLD AUTO: 0.04 K/UL
IMM GRANULOCYTES NFR BLD AUTO: 0.6 %
IRON SERPL-MCNC: 31 UG/DL
LYMPHOCYTES # BLD AUTO: 0.7 K/UL
LYMPHOCYTES NFR BLD: 10.9 %
MCH RBC QN AUTO: 28.5 PG
MCHC RBC AUTO-ENTMCNC: 30.5 G/DL
MCV RBC AUTO: 93 FL
MONOCYTES # BLD AUTO: 0.7 K/UL
MONOCYTES NFR BLD: 10.5 %
NEUTROPHILS # BLD AUTO: 5 K/UL
NEUTROPHILS NFR BLD: 73.7 %
NRBC BLD-RTO: 0 /100 WBC
PHOSPHATE SERPL-MCNC: 4 MG/DL
PLATELET # BLD AUTO: 169 K/UL
PMV BLD AUTO: 10.4 FL
POCT GLUCOSE: 160 MG/DL (ref 70–110)
POCT GLUCOSE: 287 MG/DL (ref 70–110)
POCT GLUCOSE: 364 MG/DL (ref 70–110)
POCT GLUCOSE: 399 MG/DL (ref 70–110)
POTASSIUM SERPL-SCNC: 4.6 MMOL/L
RBC # BLD AUTO: 2.88 M/UL
SATURATED IRON: 14 %
SODIUM SERPL-SCNC: 134 MMOL/L
TOTAL IRON BINDING CAPACITY: 222 UG/DL
TRANSFERRIN SERPL-MCNC: 150 MG/DL
WBC # BLD AUTO: 6.79 K/UL

## 2018-07-15 PROCEDURE — 25000003 PHARM REV CODE 250: Performed by: HOSPITALIST

## 2018-07-15 PROCEDURE — 99238 HOSP IP/OBS DSCHRG MGMT 30/<: CPT | Mod: ,,, | Performed by: HOSPITALIST

## 2018-07-15 PROCEDURE — 82728 ASSAY OF FERRITIN: CPT

## 2018-07-15 PROCEDURE — 25000003 PHARM REV CODE 250: Performed by: STUDENT IN AN ORGANIZED HEALTH CARE EDUCATION/TRAINING PROGRAM

## 2018-07-15 PROCEDURE — C9113 INJ PANTOPRAZOLE SODIUM, VIA: HCPCS | Performed by: STUDENT IN AN ORGANIZED HEALTH CARE EDUCATION/TRAINING PROGRAM

## 2018-07-15 PROCEDURE — 80069 RENAL FUNCTION PANEL: CPT

## 2018-07-15 PROCEDURE — 83540 ASSAY OF IRON: CPT

## 2018-07-15 PROCEDURE — 63600175 PHARM REV CODE 636 W HCPCS: Performed by: STUDENT IN AN ORGANIZED HEALTH CARE EDUCATION/TRAINING PROGRAM

## 2018-07-15 PROCEDURE — 36415 COLL VENOUS BLD VENIPUNCTURE: CPT

## 2018-07-15 RX ORDER — ASPIRIN 325 MG
325 TABLET ORAL DAILY
Refills: 0 | COMMUNITY
Start: 2018-07-15

## 2018-07-15 RX ORDER — PANTOPRAZOLE SODIUM 40 MG/1
40 TABLET, DELAYED RELEASE ORAL 2 TIMES DAILY
Qty: 60 TABLET | Refills: 1 | Status: SHIPPED | OUTPATIENT
Start: 2018-07-15 | End: 2018-08-15 | Stop reason: SDUPTHER

## 2018-07-15 RX ORDER — LOSARTAN POTASSIUM 100 MG/1
100 TABLET ORAL DAILY
Qty: 90 TABLET | Refills: 3 | Status: CANCELLED | OUTPATIENT
Start: 2018-07-16 | End: 2019-07-16

## 2018-07-15 RX ORDER — METOCLOPRAMIDE 10 MG/1
10 TABLET ORAL
Qty: 90 TABLET | Refills: 0 | Status: SHIPPED | OUTPATIENT
Start: 2018-07-15 | End: 2018-07-18 | Stop reason: SDUPTHER

## 2018-07-15 RX ORDER — LOSARTAN POTASSIUM 25 MG/1
100 TABLET ORAL DAILY
Start: 2018-07-15 | End: 2018-08-15 | Stop reason: SDUPTHER

## 2018-07-15 RX ORDER — SEVELAMER CARBONATE 800 MG/1
1600 TABLET, FILM COATED ORAL
Qty: 180 TABLET | Refills: 11 | Status: SHIPPED | OUTPATIENT
Start: 2018-07-15 | End: 2018-08-15 | Stop reason: SDUPTHER

## 2018-07-15 RX ORDER — INSULIN GLARGINE 300 [IU]/ML
20 INJECTION, SOLUTION SUBCUTANEOUS DAILY
Start: 2018-07-15 | End: 2018-07-18 | Stop reason: SDUPTHER

## 2018-07-15 RX ADMIN — HYDRALAZINE HYDROCHLORIDE 50 MG: 50 TABLET ORAL at 05:07

## 2018-07-15 RX ADMIN — INSULIN ASPART 3 UNITS: 100 INJECTION, SOLUTION INTRAVENOUS; SUBCUTANEOUS at 05:07

## 2018-07-15 RX ADMIN — DEXTROSE 40 MG: 50 INJECTION, SOLUTION INTRAVENOUS at 08:07

## 2018-07-15 RX ADMIN — LOSARTAN POTASSIUM 100 MG: 50 TABLET ORAL at 08:07

## 2018-07-15 RX ADMIN — SEVELAMER CARBONATE 1600 MG: 800 TABLET, FILM COATED ORAL at 08:07

## 2018-07-15 RX ADMIN — INSULIN ASPART 3 UNITS: 100 INJECTION, SOLUTION INTRAVENOUS; SUBCUTANEOUS at 08:07

## 2018-07-15 RX ADMIN — CARVEDILOL 6.25 MG: 6.25 TABLET, FILM COATED ORAL at 08:07

## 2018-07-15 RX ADMIN — NIFEDIPINE 90 MG: 30 TABLET, FILM COATED, EXTENDED RELEASE ORAL at 08:07

## 2018-07-15 RX ADMIN — LEVETIRACETAM 250 MG: 250 TABLET, FILM COATED ORAL at 08:07

## 2018-07-15 RX ADMIN — CLONIDINE HYDROCHLORIDE 0.1 MG: 0.1 TABLET ORAL at 08:07

## 2018-07-15 NOTE — ASSESSMENT & PLAN NOTE
Pt remains hyperglycemic. She will need close f/u with priority care clinic to get her glucose under control.  -Novolog 100 TIDWM  -Toujeo 20 units qd

## 2018-07-15 NOTE — PROGRESS NOTES
[facility]    Weekend coverage     Eufemia Haq is a 53 y.o. female seen on hemodialysis for removal of uremic toxins and volume.    Labs have been reviewed and the dialysate bath has been adjusted.    There are no symptoms of hypotension, chest pain, dyspnea, nausea or vomiting. Pt was hypertensive during treatment.    Exam  Alert and awake, answering questions appropriately  Respiration unlabored  Lungs no crackles  Vitals, dialysis flow sheet, labs noted    Labs:        Recent Labs  Lab 07/12/18  1555 07/13/18  0435 07/14/18  0420    135* 134*   K 4.1 4.7 4.6   CL 96 100 101   CO2 29 25 23   BUN 70* 41* 51*   CREATININE 5.0* 3.9* 5.2*   CALCIUM 8.4* 8.3* 7.8*   PHOS  --  5.4* 6.0*         Recent Labs  Lab 07/13/18  1636 07/13/18  2235 07/14/18  1410   WBC 7.30 6.18 6.49   HGB 8.0* 8.2* 7.8*   HCT 27.5* 27.4* 25.9*    184 187       Assessment/Plan:    ESRD  HD today for removal of uremic toxins and volume.  Pt was seen during hemodialysis today, dialysis flowsheet, labs, vitals were reviewed and d/w RN  Epogen dose held due to severe hypertension.    Iron profile with next lab to see if she would benefit with IV iron with HD    Hyperphosphatemia  Continue phos binder with renal diet    Uncontrolled hypertension  Antihypertensive regimen titration per primary team

## 2018-07-15 NOTE — SUBJECTIVE & OBJECTIVE
Interval History: Pt says she feels totally back to her normal baseline this morning. She denies any complaints, denies N/V/D, denies abdominal pain. She is tolerating PO with no complaints. She states she wants to leave immediately. She received her 0900 dose of IV protonix this morning. She was advised that leaving prior to the recommended time and not receiving the IV protonix for as long as recommended will increase her chance of the ulcer re-bleeding, and may ultimately cause her to die. Pt is adamant about leaving and says she has a wedding today that she will not miss. Pt has been scheduled for follow up care in the priority care clinic for Wednesday. Pt made aware of this appointment prior to discharge. Pt requesting that there discharge medications, along with her PO protonix, [facility] (on [address]), and she says that she will pick them up today in order to take her nightly dose tonight. Risks of leavning the hospital against medical advice and without receiving the recommended medications was repeated multiple times by both myself and Dr. Sosa. We will try to speak with the pt's daughter, who is giving the pt a ride home, prior to the pt leaving to explain the risks again of leaving against medical advice and the importance of following up in the priority care clinic. The importance of taking her medications as prescribed and NOT taking her aspirin or any other NSAIDs (i.e. Aleve, motrin, advil, goody's) was explained to the patient and she was able to repeat back these instructions.    Review of Systems   Constitutional: Negative for appetite change and fever.   Respiratory: Negative for chest tightness and shortness of breath.    Gastrointestinal: Negative for abdominal pain.   Neurological: Negative for dizziness, weakness, light-headedness and headaches.     Objective:     Vital Signs (Most Recent):  Temp: 98.6 °F (37 °C) (07/15/18 0757)  Pulse: 79 (07/15/18  0757)  Resp: 14 (07/15/18 0757)  BP: (!) 187/90 (07/15/18 0900)  SpO2: 100 % (07/15/18 0757) Vital Signs (24h Range):  Temp:  [97.7 °F (36.5 °C)-99.8 °F (37.7 °C)] 98.6 °F (37 °C)  Pulse:  [62-82] 79  Resp:  [12-18] 14  SpO2:  [95 %-100 %] 100 %  BP: (103-232)/(49-94) 187/90     Weight: 66.1 kg (145 lb 11.6 oz)  Body mass index is 22.82 kg/m².    Intake/Output Summary (Last 24 hours) at 07/15/18 1041  Last data filed at 07/15/18 0900   Gross per 24 hour   Intake             1110 ml   Output             4661 ml   Net            -3551 ml      Physical Exam   Constitutional: She is oriented to person, place, and time. She appears well-developed and well-nourished.   Cardiovascular: Normal rate and regular rhythm.    Pulmonary/Chest: Effort normal and breath sounds normal.   Abdominal: Soft. There is no tenderness.   Musculoskeletal: She exhibits edema (non-pitting).   Neurological: She is alert and oriented to person, place, and time.   Skin: Skin is warm and dry.   Psychiatric: She has a normal mood and affect.   Nursing note and vitals reviewed.      Significant Labs:   BMP:     Recent Labs  Lab 07/15/18  0520   *   *   K 4.6      CO2 22*   BUN 26*   CREATININE 3.5*   CALCIUM 7.4*     CBC:     Recent Labs  Lab 07/13/18  2235 07/14/18  1410 07/14/18  2336   WBC 6.18 6.49 6.79   HGB 8.2* 7.8* 8.2*   HCT 27.4* 25.9* 26.9*    187 169     POCT Glucose:     Recent Labs  Lab 07/15/18  0233 07/15/18  0547 07/15/18  1008   POCTGLUCOSE 287* 399* 364*     All pertinent labs within the past 24 hours have been reviewed.    Significant Imaging: I have reviewed all pertinent imaging results/findings within the past 24 hours.

## 2018-07-15 NOTE — PLAN OF CARE
Problem: Patient Care Overview  Goal: Plan of Care Review  Outcome: Ongoing (interventions implemented as appropriate)  Patient alert and oriented, denies nausea, vomiting, or any pain. Hypertensive and hyperglycemic, IM- 3 aware. Waiting for daughter to pick her up to completed discharge information and to talk to medical team. Midline removed, patient refusing to lay flat per policy. Patient denies any other needs at this time.

## 2018-07-15 NOTE — PROGRESS NOTES
[facility] Hospital Medicine  Progress Note    Patient Name: Eufemia Haq  MRN: [id number]  Patient Class: IP- Inpatient   Admission Date: 7/11/2018  Length of Stay: 3 days  Attending Physician: Theron Mayer MD  Primary Care Provider: Primary Doctor [facility] Medicine Team: [facility] HOSP MED 3 Bri Boateng MD    Subjective:     Principal Problem:Acute gastric ulcer with hemorrhage    HPI:  Pt is a 53 year old female with PMH of ESRD on hemodialysis for 6 months, HTN, DM2 who presents to the ED with nausea, vomiting, and diarrhea and hyperglycemia on home glucometer. Pt having hematemesis in the ED prior to admission.    Hospital Course:  Pt urgently taken to HD. EDG showed ulcer with pigmented base. They recommend 72 hours of IV PPI and then continue for 8 weeks with PO PPI and repeat an EGD at that time. Treating her HTN with clonidine, hydralazine, labetalol. Treating her hyperglycemia with regular insulin and will give a decreased long acting until no longer NPO. Treating her N/V/hematemesis with ondansetron, protonix, compazine. Pt's N/V and hematemesis resolved. Protonix 40mg IV BID conntinued until 7/15. Pt received 0900 dose on the 15th and then refused further care.     Interval History: Pt says she feels totally back to her normal baseline this morning. She denies any complaints, denies N/V/D, denies abdominal pain. She is tolerating PO with no complaints. She states she wants to leave immediately. She received her 0900 dose of IV protonix this morning. She was advised that leaving prior to the recommended time and not receiving the IV protonix for as long as recommended will increase her chance of the ulcer re-bleeding, and may ultimately cause her to die. Pt is adamant about leaving and says she has a wedding today that she will not miss. Pt has been scheduled for follow up care in the priority care clinic for Wednesday. Pt made aware of this appointment prior to  discharge. Pt requesting that there discharge medications, along with her PO protonix, be sent to [facility] ([address]), and she says that she will pick them up today in order to take her nightly dose tonight. Risks of leavning the hospital against medical advice and without receiving the recommended medications was repeated multiple times by both myself and Dr. Sosa. We will try to speak with the pt's daughter, who is giving the pt a ride home, prior to the pt leaving to explain the risks again of leaving against medical advice and the importance of following up in the priority care clinic. The importance of taking her medications as prescribed and NOT taking her aspirin or any other NSAIDs (i.e. Aleve, motrin, advil, goody's) was explained to the patient and she was able to repeat back these instructions.    Review of Systems   Constitutional: Negative for appetite change and fever.   Respiratory: Negative for chest tightness and shortness of breath.    Gastrointestinal: Negative for abdominal pain.   Neurological: Negative for dizziness, weakness, light-headedness and headaches.     Objective:     Vital Signs (Most Recent):  Temp: 98.6 °F (37 °C) (07/15/18 0757)  Pulse: 79 (07/15/18 0757)  Resp: 14 (07/15/18 0757)  BP: (!) 187/90 (07/15/18 0900)  SpO2: 100 % (07/15/18 0757) Vital Signs (24h Range):  Temp:  [97.7 °F (36.5 °C)-99.8 °F (37.7 °C)] 98.6 °F (37 °C)  Pulse:  [62-82] 79  Resp:  [12-18] 14  SpO2:  [95 %-100 %] 100 %  BP: (103-232)/(49-94) 187/90     Weight: 66.1 kg (145 lb 11.6 oz)  Body mass index is 22.82 kg/m².    Intake/Output Summary (Last 24 hours) at 07/15/18 1041  Last data filed at 07/15/18 0900   Gross per 24 hour   Intake             1110 ml   Output             4661 ml   Net            -3551 ml      Physical Exam   Constitutional: She is oriented to person, place, and time. She appears well-developed and well-nourished.   Cardiovascular: Normal rate and regular rhythm.     Pulmonary/Chest: Effort normal and breath sounds normal.   Abdominal: Soft. There is no tenderness.   Musculoskeletal: She exhibits edema (non-pitting).   Neurological: She is alert and oriented to person, place, and time.   Skin: Skin is warm and dry.   Psychiatric: She has a normal mood and affect.   Nursing note and vitals reviewed.      Significant Labs:   BMP:     Recent Labs  Lab 07/15/18  0520   *   *   K 4.6      CO2 22*   BUN 26*   CREATININE 3.5*   CALCIUM 7.4*     CBC:     Recent Labs  Lab 07/13/18  2235 07/14/18  1410 07/14/18  2336   WBC 6.18 6.49 6.79   HGB 8.2* 7.8* 8.2*   HCT 27.4* 25.9* 26.9*    187 169     POCT Glucose:     Recent Labs  Lab 07/15/18  0233 07/15/18  0547 07/15/18  1008   POCTGLUCOSE 287* 399* 364*     All pertinent labs within the past 24 hours have been reviewed.    Significant Imaging: I have reviewed all pertinent imaging results/findings within the past 24 hours.    Assessment/Plan:      * Acute gastric ulcer with hemorrhage    Multiple ulcers with pigmented bases and some actively bleeding seen on EGD.  -Pt received IV protonix for 36 hours out of the 72 that was recommended by GI.   -PO protonix 40 mg BID sent to pt's pharmacy, and the importance of taking this medication for 8 weeks was emphasized.   -f/u with the priority care clinic scheduled for Wednesday.  -GI f/u referral. Pt will need repeat EGD in 8 weeks.        Nausea and vomiting    Resolved.            Hypertensive urgency    Resolved.  - Continue home meds in a stepwise manner    -see essential HTN          Essential hypertension    Home meds per pharmacy include coreg 12.5 BID, clonidine 0.1mg TID, hydralazine and nifedapine.   -pt is poor historian and unable to specify which of those she is currently taking.  -currently have resumed home clonidine 0.1mg TID, Nifedepine XL 90 qd, carvedilol 6.25 mg BID, losartan 100mg qd, with 50 mg hydralazine PRN.        ESRD (end stage renal  disease)    - Nephrology aware, HD Tu/Th/Sat  -Pt received HD yesterday as scheduled.          Hematemesis with nausea    Resolved. No N/V and no episodes of emesis x 48 hours.  - EGD shows ulcers with pigmented base with some actively bleeding.  - diet advanced to full today  - Protonix 40mg BID IV x 72 hours recommended by GI (36 hours completed)  - CBC Q8  (pt refused today)  - will go home with PPI BID PO x 8 weeks and then will have a repeat EGD with close f/u by priority care clinic and GI.          Diabetes    Pt remains hyperglycemic. She will need close f/u with priority care clinic to get her glucose under control.  -Novolog 100 TIDWM  -Toujeo 20 units qd            VTE Risk Mitigation         Ordered     Place sequential compression device  Until discontinued      07/12/18 0851     Reason for No Pharmacological VTE Prophylaxis  Once      07/12/18 0851     IP VTE HIGH RISK PATIENT  Once      07/12/18 0851        Dispo: Pt adamant about leaving today      Bri Boateng MD  Department of Hospital Medicine   [facility]

## 2018-07-15 NOTE — ASSESSMENT & PLAN NOTE
Home meds per pharmacy include coreg 12.5 BID, clonidine 0.1mg TID, hydralazine and nifedapine.   -pt is poor historian and unable to specify which of those she is currently taking.  -currently have resumed home clonidine 0.1mg TID, Nifedepine XL 90 qd, carvedilol 6.25 mg BID, losartan 100mg qd, with 50 mg hydralazine PRN.

## 2018-07-15 NOTE — PLAN OF CARE
Problem: Patient Care Overview  Goal: Plan of Care Review  Outcome: Ongoing (interventions implemented as appropriate)    Pt aaox4, slightly hypertensive at beginning of shift, other VSS, No insulin coverage needed for MN, pt has no complaints, no acute changes. Free from injury/falls. WCTM

## 2018-07-15 NOTE — ASSESSMENT & PLAN NOTE
Resolved. No N/V and no episodes of emesis x 48 hours.  - EGD shows ulcers with pigmented base with some actively bleeding.  - diet advanced to full today  - Protonix 40mg BID IV x 72 hours recommended by GI (36 hours completed)  - CBC Q8  (pt refused today)  - will go home with PPI BID PO x 8 weeks and then will have a repeat EGD with close f/u by priority care clinic and GI.

## 2018-07-15 NOTE — NURSING
Completed discharge education with just the patient since daughter is still in route to hospital. Patient does not agree with doctor recommendations for new insulin dosage and Renvela with meals. She states she will not comply with those recommendations and continue with what she was taking at home. Patient was reminded to let staff know when her daughter arrives. IM-3 updated on patient's decisions and daughter's status.

## 2018-07-16 LAB
BLD PROD TYP BPU: NORMAL
BLD PROD TYP BPU: NORMAL
BLOOD UNIT EXPIRATION DATE: NORMAL
BLOOD UNIT EXPIRATION DATE: NORMAL
BLOOD UNIT TYPE CODE: 6200
BLOOD UNIT TYPE CODE: 6200
BLOOD UNIT TYPE: NORMAL
BLOOD UNIT TYPE: NORMAL
CODING SYSTEM: NORMAL
CODING SYSTEM: NORMAL
DISPENSE STATUS: NORMAL
DISPENSE STATUS: NORMAL
H PYLORI IGG SERPL QL IA: NEGATIVE
HBV SURFACE AB SER-ACNC: POSITIVE M[IU]/ML
HBV SURFACE AG SERPL QL IA: NEGATIVE
TRANS ERYTHROCYTES VOL PATIENT: NORMAL ML
TRANS ERYTHROCYTES VOL PATIENT: NORMAL ML

## 2018-07-16 NOTE — ANESTHESIA POSTPROCEDURE EVALUATION
"Anesthesia Post Evaluation    Patient: Eufemia Haq    Procedure(s) Performed: Procedure(s) (LRB):  EGD (ESOPHAGOGASTRODUODENOSCOPY) (N/A)    Final Anesthesia Type: general  Patient location during evaluation: PACU  Patient participation: Yes- Able to Participate  Level of consciousness: awake and alert  Post-procedure vital signs: reviewed and stable  Pain management: adequate  Airway patency: patent  PONV status at discharge: No PONV  Anesthetic complications: no      Cardiovascular status: hemodynamically stable and blood pressure returned to baseline  Respiratory status: unassisted and spontaneous ventilation  Hydration status: euvolemic  Follow-up not needed.        Visit Vitals  BP (!) 187/90 (BP Location: Right arm, Patient Position: Sitting)   Pulse 79   Temp 37 °C (98.6 °F)   Resp 14   Ht 5' 7" (1.702 m)   Wt 66.1 kg (145 lb 11.6 oz)   LMP  (LMP Unknown)   SpO2 100%   Breastfeeding? No   BMI 22.82 kg/m²       Pain/Malou Score: Pain Assessment Performed: Yes (7/15/2018  9:00 AM)  Presence of Pain: denies (7/15/2018  9:00 AM)      "

## 2018-07-17 LAB
BACTERIA BLD CULT: NORMAL
BACTERIA BLD CULT: NORMAL

## 2018-07-18 ENCOUNTER — LAB VISIT (OUTPATIENT)
Dept: LAB | Facility: HOSPITAL | Age: 53
End: 2018-07-18
Payer: MEDICARE

## 2018-07-18 ENCOUNTER — OFFICE VISIT (OUTPATIENT)
Dept: PRIMARY CARE CLINIC | Facility: CLINIC | Age: 53
End: 2018-07-18
Payer: MEDICARE

## 2018-07-18 ENCOUNTER — TELEPHONE (OUTPATIENT)
Dept: ENDOCRINOLOGY | Facility: CLINIC | Age: 53
End: 2018-07-18

## 2018-07-18 VITALS
BODY MASS INDEX: 23.08 KG/M2 | HEART RATE: 70 BPM | OXYGEN SATURATION: 96 % | HEIGHT: 67 IN | SYSTOLIC BLOOD PRESSURE: 116 MMHG | DIASTOLIC BLOOD PRESSURE: 58 MMHG | WEIGHT: 147.06 LBS

## 2018-07-18 DIAGNOSIS — R76.8 HEPATITIS B ANTIBODY POSITIVE: ICD-10-CM

## 2018-07-18 DIAGNOSIS — K21.9 GASTROESOPHAGEAL REFLUX DISEASE WITHOUT ESOPHAGITIS: ICD-10-CM

## 2018-07-18 DIAGNOSIS — Z99.2 ESRD ON DIALYSIS: ICD-10-CM

## 2018-07-18 DIAGNOSIS — K31.84 DM GASTROPARESIS: ICD-10-CM

## 2018-07-18 DIAGNOSIS — N18.6 ESRD ON DIALYSIS: ICD-10-CM

## 2018-07-18 DIAGNOSIS — E11.43 DM GASTROPARESIS: ICD-10-CM

## 2018-07-18 DIAGNOSIS — G40.909 SEIZURE DISORDER: ICD-10-CM

## 2018-07-18 DIAGNOSIS — I63.9 CEREBROVASCULAR ACCIDENT (CVA), UNSPECIFIED MECHANISM: ICD-10-CM

## 2018-07-18 DIAGNOSIS — E08.65 DIABETES MELLITUS DUE TO UNDERLYING CONDITION WITH HYPERGLYCEMIA: ICD-10-CM

## 2018-07-18 DIAGNOSIS — K25.0 ACUTE GASTRIC ULCER WITH HEMORRHAGE: ICD-10-CM

## 2018-07-18 DIAGNOSIS — I10 ESSENTIAL HYPERTENSION: ICD-10-CM

## 2018-07-18 DIAGNOSIS — K25.0 ACUTE GASTRIC ULCER WITH HEMORRHAGE: Primary | ICD-10-CM

## 2018-07-18 PROBLEM — E11.9 DIABETES: Status: RESOLVED | Noted: 2018-07-12 | Resolved: 2018-07-18

## 2018-07-18 PROBLEM — E10.10 DIABETIC KETOACIDOSIS ASSOCIATED WITH TYPE 1 DIABETES MELLITUS: Status: RESOLVED | Noted: 2018-03-29 | Resolved: 2018-07-18

## 2018-07-18 PROBLEM — H43.10 VITREOUS HEMORRHAGE: Status: RESOLVED | Noted: 2017-09-02 | Resolved: 2018-07-18

## 2018-07-18 PROBLEM — G93.41 ACUTE METABOLIC ENCEPHALOPATHY: Status: RESOLVED | Noted: 2018-03-12 | Resolved: 2018-07-18

## 2018-07-18 PROBLEM — R53.1 LEFT-SIDED WEAKNESS: Status: RESOLVED | Noted: 2018-06-05 | Resolved: 2018-07-18

## 2018-07-18 PROBLEM — H54.3 BLIND IN BOTH EYES: Status: RESOLVED | Noted: 2018-03-31 | Resolved: 2018-07-18

## 2018-07-18 PROBLEM — R41.82 ALTERED MENTAL STATUS: Status: RESOLVED | Noted: 2018-06-04 | Resolved: 2018-07-18

## 2018-07-18 PROBLEM — N18.9 ANEMIA OF CHRONIC KIDNEY FAILURE: Status: RESOLVED | Noted: 2017-08-27 | Resolved: 2018-07-18

## 2018-07-18 PROBLEM — R11.2 NAUSEA AND VOMITING: Status: RESOLVED | Noted: 2018-07-12 | Resolved: 2018-07-18

## 2018-07-18 PROBLEM — D63.1 ANEMIA OF CHRONIC KIDNEY FAILURE: Status: RESOLVED | Noted: 2017-08-27 | Resolved: 2018-07-18

## 2018-07-18 PROBLEM — E10.10 DKA, TYPE 1: Status: RESOLVED | Noted: 2017-08-30 | Resolved: 2018-07-18

## 2018-07-18 PROBLEM — E10.65 TYPE 1 DIABETES MELLITUS WITH HYPERGLYCEMIA: Status: RESOLVED | Noted: 2017-08-30 | Resolved: 2018-07-18

## 2018-07-18 PROBLEM — G93.40 ENCEPHALOPATHY: Status: RESOLVED | Noted: 2018-06-02 | Resolved: 2018-07-18

## 2018-07-18 LAB
ALBUMIN SERPL BCP-MCNC: 3.3 G/DL
ALP SERPL-CCNC: 225 U/L
ALT SERPL W/O P-5'-P-CCNC: 16 U/L
AST SERPL-CCNC: 17 U/L
BASOPHILS # BLD AUTO: 0.06 K/UL
BASOPHILS NFR BLD: 0.9 %
BILIRUB DIRECT SERPL-MCNC: 0.2 MG/DL
BILIRUB SERPL-MCNC: 0.4 MG/DL
DIFFERENTIAL METHOD: ABNORMAL
EOSINOPHIL # BLD AUTO: 0.1 K/UL
EOSINOPHIL NFR BLD: 1.8 %
ERYTHROCYTE [DISTWIDTH] IN BLOOD BY AUTOMATED COUNT: 17.7 %
GLUCOSE SERPL-MCNC: 277 MG/DL (ref 70–110)
HCT VFR BLD AUTO: 29.6 %
HGB BLD-MCNC: 8.9 G/DL
LYMPHOCYTES # BLD AUTO: 0.6 K/UL
LYMPHOCYTES NFR BLD: 9.4 %
MCH RBC QN AUTO: 28.1 PG
MCHC RBC AUTO-ENTMCNC: 30.1 G/DL
MCV RBC AUTO: 93 FL
MONOCYTES # BLD AUTO: 0.7 K/UL
MONOCYTES NFR BLD: 9.7 %
NEUTROPHILS # BLD AUTO: 5.2 K/UL
NEUTROPHILS NFR BLD: 77.6 %
NRBC BLD-RTO: 0 /100 WBC
PLATELET # BLD AUTO: 237 K/UL
PMV BLD AUTO: 10.8 FL
PROT SERPL-MCNC: 6.8 G/DL
RBC # BLD AUTO: 3.17 M/UL
WBC # BLD AUTO: 6.67 K/UL

## 2018-07-18 PROCEDURE — 80076 HEPATIC FUNCTION PANEL: CPT

## 2018-07-18 PROCEDURE — 99215 OFFICE O/P EST HI 40 MIN: CPT | Mod: PBBFAC | Performed by: NURSE PRACTITIONER

## 2018-07-18 PROCEDURE — 85025 COMPLETE CBC W/AUTO DIFF WBC: CPT

## 2018-07-18 PROCEDURE — 82948 REAGENT STRIP/BLOOD GLUCOSE: CPT | Mod: PBBFAC | Performed by: NURSE PRACTITIONER

## 2018-07-18 PROCEDURE — 99214 OFFICE O/P EST MOD 30 MIN: CPT | Mod: S$PBB,,, | Performed by: NURSE PRACTITIONER

## 2018-07-18 PROCEDURE — 99999 PR PBB SHADOW E&M-EST. PATIENT-LVL V: CPT | Mod: PBBFAC,,, | Performed by: NURSE PRACTITIONER

## 2018-07-18 PROCEDURE — 36415 COLL VENOUS BLD VENIPUNCTURE: CPT

## 2018-07-18 RX ORDER — METOCLOPRAMIDE 10 MG/1
10 TABLET ORAL
Qty: 90 TABLET | Refills: 0 | Status: SHIPPED | OUTPATIENT
Start: 2018-07-18 | End: 2018-08-15 | Stop reason: SDUPTHER

## 2018-07-18 RX ORDER — INSULIN GLARGINE 300 [IU]/ML
26 INJECTION, SOLUTION SUBCUTANEOUS DAILY
Qty: 1.5 ML | Refills: 6 | Status: SHIPPED | OUTPATIENT
Start: 2018-07-18 | End: 2018-08-15 | Stop reason: SDUPTHER

## 2018-07-18 RX ORDER — ONDANSETRON 4 MG/1
4 TABLET, ORALLY DISINTEGRATING ORAL EVERY 8 HOURS PRN
Qty: 20 TABLET | Refills: 0 | Status: SHIPPED | OUTPATIENT
Start: 2018-07-18 | End: 2018-08-15 | Stop reason: SDUPTHER

## 2018-07-18 RX ORDER — INSULIN LISPRO 100 [IU]/ML
8 INJECTION, SOLUTION INTRAVENOUS; SUBCUTANEOUS
Qty: 7.2 ML | Refills: 11 | Status: SHIPPED | OUTPATIENT
Start: 2018-07-18 | End: 2018-08-15 | Stop reason: SDUPTHER

## 2018-07-18 RX ORDER — INSULIN ASPART 100 [IU]/ML
4 INJECTION, SOLUTION INTRAVENOUS; SUBCUTANEOUS
Status: COMPLETED | OUTPATIENT
Start: 2018-07-18 | End: 2018-07-18

## 2018-07-18 RX ADMIN — INSULIN ASPART 4 UNITS: 100 INJECTION, SOLUTION INTRAVENOUS; SUBCUTANEOUS at 03:07

## 2018-07-18 NOTE — TELEPHONE ENCOUNTER
----- Message from Charleen Mendiola LPN sent at 7/18/2018  3:22 PM CDT -----  Pt  Is a hosp f/u need an endocrine appt no appts was avail to schedule at this time

## 2018-07-18 NOTE — PROGRESS NOTES
[facility]  Follow-up Visit Progress Note     PRESENTING HISTORY     PCP: Primary Doctor No  Chief Complaint/Reason for Visit:  Follow up from recent visit.      No chief complaint on file.      History of Present Illness & ROS: Ms. Eufemia Haq is a 53 y.o. female.  Discharge Summaries  Cosign Needed   Bri Boateng MD   [facility] Medicine      []Hide copied text  [facility] Medicine  Discharge Summary        Patient Name: Eufemia Haq  MRN: [id number]  Admission Date: 7/11/2018  Hospital Length of Stay: 3 days  Discharge Date and Time: 7/15/2018  1:26 PM  Attending Physician: Alysia att. providers found   Discharging Provider: Bri Boateng MD  Primary Care Provider: Primary Doctor Alysia  [facility] Medicine Team: [facility] MED 3 Bri Boateng MD     HPI:   Pt is a 53 year old female with PMH of ESRD on hemodialysis for 6 months, HTN, DM2 who presents to the ED with nausea, vomiting, and diarrhea and hyperglycemia on home glucometer. She has also been having bilateral shoulder pain for about 1 month for which she has been taking at least 2 aleve per day. Pt states she received hemodialysis on 07/10 for two hours and then was pulled off for hypertension and proceeded to ED and was discharged home later that evening. Pt notes that she has been feeling unwell since her dialysis 7/10. Pt has vomited 3 times, all non-bloody. Patient states at home this evening her sugar was too high for her glucometer to read and took 30 units of her insulin, afterwhich her glucose was 400. Pt came to ED with daughter, where her POCT glucose was 312. Patient notes she is new to the area and in need of outpatient providers. Pt having hematemesis in the ED prior to admission.     Procedure(s) (LRB):  EGD (ESOPHAGOGASTRODUODENOSCOPY) (N/A)       Hospital Course:   Pt urgently taken to HD. EDG showed ulcer with pigmented base. They recommend 72 hours of IV PPI and then continue for 8 weeks with PO  PPI and repeat an EGD at that time. Treating her HTN with clonidine, hydralazine, labetalol. Treating her hyperglycemia with regular insulin and will give a decreased long acting until no longer NPO. Treating her N/V/hematemesis with ondansetron, protonix, compazine. Pt's N/V and hematemesis resolved. Protonix 40mg IV BID conntinued until 7/15. Pt received 0900 dose on the  and then refused further care. Pt advised that the gastroenterologist and internal medicine physicians all recommend that she receive the full 72 hours of IV protonix to  the chance of the ulcer re-bleeding or further eroding. Pt advised of the risks of refusing further medical care and leaving the hospital against medical advice. Pt states she will  the PO protonix and other prescribed medications at [facility] ([address] She states she will attend her clinic visits as scheduled once she is scheduled.       Consults:          Consults          Status Ordering Provider       Inpatient consult to Critical Care Medicine  Once     Provider:  (Not yet assigned)    Completed ABHISHEK STEARNS       Inpatient consult to Gastroenterology  Once     Provider:  (Not yet assigned)    Completed PAOLA RUIZ       Inpatient consult to Nephrology  Once     Provider:  (Not yet assigned)    Completed PAOLA RUIZ       Inpatient consult to Nephrology  Once     Provider:  (Not yet assigned)    Completed ARIAS MONTOYA       Inpatient consult to PICC team (\[facility]")  Once     Provider:  (Not yet assigned)    Completed LUIS DUNHAM       Inpatient consult to Registered Dietitian/Nutritionist  Once     Provider:  (Not yet assigned)    Completed CONSTANCE JUAREZ             No new Assessment & Plan notes have been filed under this hospital service since the last note was generated.  Service: Hospital Medicine            Final Active Diagnoses:     Diagnosis Date Noted POA    PRINCIPAL PROBLEM:  Acute  gastric ulcer with hemorrhage [K25.0] 07/15/2018 Yes    Diabetes [E11.9] 07/12/2018 Yes    Hematemesis with nausea [K92.0] 07/12/2018 Yes    ESRD (end stage renal disease) [N18.6] 07/12/2018 Yes    Essential hypertension [I10] 07/12/2018 Yes    Hypertensive urgency [I16.0] 07/12/2018 Yes    Nausea and vomiting [R11.2] 07/12/2018 Yes       Problems Resolved During this Admission:     Diagnosis Date Noted Date Resolved POA         Discharged Condition: fair     Disposition: Home or Self Care     Follow Up:      Follow-up Information      [facility] In 8 weeks.    Specialty:  Gastroenterology  Contact information:  [address]  [facility] [id number] [phone]  Additional information:  [facility] - [address]              JAGDEEP Olsen On 7/18/2018.    Specialty:  Internal Medicine  Why:  1:30 to follow up from hospital and get set up with a primary care doctor.    Contact information:  [address]  [facility] [id number]  [phone]                      Patient Instructions:          Ambulatory Referral to Gastroenterology   Referral Priority: Routine Referral Type: Consultation   Referral Reason: Specialty Services Required     Requested Specialty: Gastroenterology     Number of Visits Requested: 1        Notify your health care provider if you experience any of the following:  temperature >100.4      Notify your health care provider if you experience any of the following:  persistent nausea and vomiting or diarrhea      No dressing needed      Activity as tolerated          Significant Diagnostic Studies: Labs:   CMP No results for input(s): NA, K, CL, CO2, GLU, BUN, CREATININE, CALCIUM, PROT, ALBUMIN, BILITOT, ALKPHOS, AST, ALT, ANIONGAP, ESTGFRAFRICA, EGFRNONAA in the last 48 hours., CBC No results for input(s): WBC, HGB, HCT, PLT in the last 48 hours. and A1C:   Recent Labs  Lab 03/12/18  0339 06/02/18  1118 07/12/18  2322   HGBA1C 9.1* 9.5* 9.1*       Endoscopy: Gastroscopy: Impression: LA Grade D reflux esophagitis.                        - Gastritis.                        - Non-bleeding gastric ulcer with a flat pigmented                         spot (Jett Class IIc).                        - Non-bleeding gastric ulcers with a clean ulcer                         base (Jett Class III).                        - Normal examined duodenum.                        - No specimens collected.             Pending Diagnostic Studies:      Procedure Component Value Units Date/Time     CBC auto differential [[id number]] Collected:  07/14/18 1824     Order Status:  Sent Lab Status:  In process Updated:  07/14/18 1824     Specimen:  Blood from Blood       Narrative:        Collection has been rescheduled by [email or web address] at 7/14/2018 07:58 Reason: rn   Olivia notified that pt refused          Medications:  Reconciled Home Medications:            Medication List        START taking these medications    pantoprazole 40 MG tablet  Commonly known as:  PROTONIX  Take 1 tablet (40 mg total) by mouth 2 (two) times daily.      sevelamer carbonate 800 mg Tab  Commonly known as:  RENVELA  Take 2 tablets (1,600 mg total) by mouth 3 (three) times daily with meals.          CHANGE how you take these medications    aspirin 325 MG tablet  Take 1 tablet (325 mg total) by mouth once daily. Hold for 2 weeks following discharge  What changed:  additional instructions      insulin glargine (TOUJEO) 300 unit/mL (1.5 mL) Inpn pen  Commonly known as:  TOUJEO  Inject 20 Units into the skin once daily.  What changed:  how much to take      losartan 25 MG tablet  Commonly known as:  COZAAR  Take 4 tablets (100 mg total) by mouth once daily.  What changed:  how much to take          CONTINUE taking these medications    carvedilol 12.5 MG tablet  Commonly known as:  COREG  Take 12.5 mg by mouth 2 (two) times daily.      cloNIDine 0.2 MG tablet  Commonly known as:  CATAPRES  Take 0.2 mg by mouth 2  (two) times daily.      hydrALAZINE 100 MG tablet  Commonly known as:  APRESOLINE  Take 100 mg by mouth every 8 (eight) hours.      insulin aspart U-100 100 unit/mL injection  Commonly known as:  NOVOLOG  Inject into the skin 3 (three) times daily before meals.      levETIRAcetam 250 MG Tab  Commonly known as:  KEPPRA  Take 250 mg by mouth 2 (two) times daily.      metoclopramide HCl 10 MG tablet  Commonly known as:  REGLAN  Take 1 tablet (10 mg total) by mouth 3 (three) times daily before meals.      NIFEdipine 90 MG Tbsr  Commonly known as:  ADALAT CC  Take 90 mg by mouth once daily.          STOP taking these medications    calcium acetate 667 mg capsule  Commonly known as:  PHOSLO      metoprolol tartrate 100 MG tablet  Commonly known as:  LOPRESSOR                Indwelling Lines/Drains at time of discharge:       Lines/Drains/Airways            Central Venous Catheter Line                          Hemodialysis Catheter right subclavian -- days                   Drain                          Hemodialysis AV Graft Left upper arm -- days           Hemodialysis AV Graft Left upper arm -- days                     Time spent on the discharge of patient: 45 minutes  Patient was seen and examined on the date of discharge and determined to be suitable for discharge.           Bri Boateng MD  Department of Hospital Medicine  [facility]      Electronically signed by Bri Boateng MD at 7/17/2018  7:05 PM        ED to Hosp-Admission (Discharged) on 7/11/2018            Detailed Report           Note shared with patient       Today:   Eufemia presents to  today for hospital follow up, accompanied by her daughter.   Multiple questions with 2 acute issues that she was not admitted for.  ` complaining of pain to back and shoulder (chronic)  ` needs refills on several medications and uncertain to take  ` Blood sugars are running in the high 300s and occasional 400s  ` told have Hep B this morning  "on the phone by "someone" at [facility]  ` needs a PCP; moved from [place] 2 weeks ago to [facility] and no one following  ` started with slurred speech on the day of her discharge (per the daughter).    No fevers, sob, chest pain, headaches, dizziness or abd pain. Denies difficulty swallowing.      Review of Systems:  Eyes: denies visual changes at this time denies floaters   ENT: no nasal congestion or sore throat  Respiratory: no cough or shorness of breath  Cardiovascular: no chest pain or palpitations  Gastrointestinal: no nausea or vomiting, no abdominal pain or change in bowel habits  Genitourinary: no hematuria or dysuria; denies frequency  Hematologic/Lymphatic: no easy bruising or lymphadenopathy  Musculoskeletal: no arthralgias or myalgias  Neurological: no seizures or tremors  Endocrine: no heat or cold intolerance      PAST HISTORY:     Past Medical History:   Diagnosis Date    Anemia     during pregnancys    Arthritis     neuropathy hands feet and legs//    Blood transfusion     Chronic pain     Diabetes mellitus     Diabetes mellitus     Diabetes mellitus type I     Diabetic retinopathy     ESRD (end stage renal disease) on dialysis     Hyperlipidemia     Hypertension     patient states that when her blood sugar increases her blood pressure increases    Hypertension     Neuropathy     Pneumonia     Renal disorder     Seizures     when sugar is high, does not quite remember    Stroke     2018    Vitamin D deficiency        Past Surgical History:   Procedure Laterality Date     SECTION      x2     SECTION, CLASSIC      x 2    dialysis graft Left     ESOPHAGOGASTRODUODENOSCOPY N/A 2018    Procedure: EGD (ESOPHAGOGASTRODUODENOSCOPY);  Surgeon: Lucio Hayden MD;  Location: [address];  Service: Endoscopy;  Laterality: N/A;    EYE SURGERY      HYSTERECTOMY         Family History   Problem Relation Age of Onset    Diabetes Mother     Heart disease Mother  "    Blindness Mother         diabetes    Hypertension Mother     Diabetes Father     Asthma Father     Hypertension Father     Thyroid disease Sister     Breast cancer Daughter     Diabetes Sister     Stroke Maternal Uncle     Glaucoma Maternal Grandmother     Blindness Maternal Grandmother     Cataracts Maternal Grandmother     Hypertension Maternal Grandmother     Cancer Cousin     Amblyopia Neg Hx     Macular degeneration Neg Hx     Retinal detachment Neg Hx     Strabismus Neg Hx     Colon cancer Neg Hx     Ovarian cancer Neg Hx        Social History     Social History    Marital status:      Spouse name: N/A    Number of children: N/A    Years of education: N/A     Social History Main Topics    Smoking status: Light Tobacco Smoker    Smokeless tobacco: Never Used    Alcohol use No    Drug use: No    Sexual activity: Yes     Partners: Male     Birth control/ protection: None     Other Topics Concern    Not on file     Social History Narrative    ** Merged History Encounter **            MEDICATIONS & ALLERGIES:     Current Outpatient Prescriptions on File Prior to Visit   Medication Sig Dispense Refill    aspirin 325 MG tablet Take 1 tablet (325 mg total) by mouth once daily. Hold for 2 weeks following discharge  0    blood sugar diagnostic Strp To use as directed with True results meter and monitor BS 6 times daily - fluctuating  each 12    bumetanide (BUMEX) 1 MG tablet Take 1 mg by mouth 2 (two) times daily.      carvedilol (COREG) 12.5 MG tablet Take 12.5 mg by mouth 2 (two) times daily.      carvedilol (COREG) 6.25 MG tablet Take 1 tablet (6.25 mg total) by mouth 2 (two) times daily. 60 tablet 11    cloNIDine (CATAPRES) 0.1 MG tablet Take 1 tablet (0.1 mg total) by mouth 3 (three) times daily. 90 tablet 11    cloNIDine (CATAPRES) 0.2 MG tablet Take 0.2 mg by mouth 2 (two) times daily.      epoetin jacques (PROCRIT) 20,000 unit/mL injection Inject 1 mL (20,000  Units total) into the skin every Tues, Thurs, Sat. 1 mL     hydrALAZINE (APRESOLINE) 100 MG tablet Take 100 mg by mouth every 8 (eight) hours.      insulin aspart U-100 (NOVOLOG) 100 unit/mL injection Inject into the skin 3 (three) times daily before meals.      insulin glargine, TOUJEO, (TOUJEO) 300 unit/mL (1.5 mL) InPn pen Inject 15 Units into the skin once daily. 1.5 mL 6    insulin glargine, TOUJEO, (TOUJEO) 300 unit/mL (1.5 mL) InPn pen Inject 20 Units into the skin once daily.      insulin lispro (HUMALOG) 100 unit/mL injection Inject 5 Units into the skin 3 (three) times daily before meals. 4.5 mL 11    levetiracetam (KEPPRA) 250 MG Tab TK 1 T PO BID  0    levETIRAcetam (KEPPRA) 250 MG Tab Take 250 mg by mouth 2 (two) times daily.      losartan (COZAAR) 100 MG tablet Take 1 tablet (100 mg total) by mouth once daily. 90 tablet 0    losartan (COZAAR) 25 MG tablet Take 4 tablets (100 mg total) by mouth once daily.      metoclopramide HCl (REGLAN) 10 MG tablet Take 10 mg by mouth 3 (three) times daily before meals.      metoclopramide HCl (REGLAN) 10 MG tablet Take 1 tablet (10 mg total) by mouth 3 (three) times daily before meals. 90 tablet 0    NIFEdipine (ADALAT CC) 90 MG TbSR Take 90 mg by mouth once daily.      nifedipine (PROCARDIA-XL) 60 MG (OSM) 24 hr tablet TK 1 T PO Q 12 H  0    ondansetron (ZOFRAN-ODT) 4 MG TbDL Take 1-2 tablets by mouth every 4 hours as needed for nausea and vomiting      ONETOUCH DELICA LANCETS 33 gauge Misc TEST BLOOD SUGAR TID  3    pantoprazole (PROTONIX) 40 MG tablet Take 1 tablet (40 mg total) by mouth 2 (two) times daily. 60 tablet 1    rosuvastatin (CRESTOR) 20 MG tablet Take 1 tablet (20 mg total) by mouth once daily. 90 tablet 3    sevelamer carbonate (RENVELA) 800 mg Tab Take 2 tablets (1,600 mg total) by mouth 3 (three) times daily with meals. 180 tablet 0    sevelamer carbonate (RENVELA) 800 mg Tab Take 2 tablets (1,600 mg total) by mouth 3 (three)  times daily with meals. 180 tablet 11     No current facility-administered medications on file prior to visit.         Review of patient's allergies indicates:   Allergen Reactions    Ciprofloxacin (bulk) Itching    Gabapentin Other (See Comments)     Seizure    Latex Itching and Other (See Comments)     Very low Oxygen    Tramadol Other (See Comments)     Seizure    Vancomycin Shortness Of Breath and Itching    Vancomycin analogues Other (See Comments)     Seizure    Neurontin [gabapentin] Other (See Comments)     Seizure like activity    Niacin Itching and Other (See Comments)     Burning      Bactrim [sulfamethoxazole-trimethoprim] Rash    Latex, natural rubber Rash    Niacin preparations Rash       Medications Reconciliation:   I have reconciled the patient's home medications and discharge medications with the patient/family. I have updated all changes.  Refer to After-Visit Medication List.    OBJECTIVE:     Vital Signs:  There were no vitals filed for this visit.  Wt Readings from Last 1 Encounters:   07/12/18 2000 66.1 kg (145 lb 11.6 oz)   07/11/18 2132 63.5 kg (140 lb)     There is no height or weight on file to calculate BMI.     Wt Readings from Last 3 Encounters:   07/18/18 66.7 kg (147 lb 0.8 oz)   07/12/18 66.1 kg (145 lb 11.6 oz)   06/10/18 65.8 kg (145 lb)     Temp Readings from Last 3 Encounters:   07/15/18 98.6 °F (37 °C)   06/10/18 97.2 °F (36.2 °C) (Oral)   06/08/18 96.6 °F (35.9 °C) (Oral)     BP Readings from Last 3 Encounters:   07/18/18 (!) 116/58   07/15/18 (!) 187/90   06/10/18 (!) 195/84     Pulse Readings from Last 3 Encounters:   07/18/18 70   07/15/18 79   06/10/18 66         Physical Exam:  General: Well developed, well nourished. No distress.  HEENT: Head is normocephalic, atraumatic; ears are normal.   Eyes: Clear conjunctiva.  Neck: Supple, symmetrical neck; trachea midline.  Lungs: Clear to auscultation bilaterally and normal respiratory effort.  Cardiovascular: Heart  with regular rate and rhythm. No murmurs, gallops or rubs  Extremities: No LE edema. Pulses 2+ and symmetric.   LUE: AV Fistula (+ bruit and thrill)  Abdomen: Abdomen is soft, non-tender non-distended with normal bowel sounds.  Skin: Skin color, texture, turgor normal. No rashes.  Lymph Nodes: No cervical or supraclavicular adenopathy.  Neurologic: chronic Left Hemiparesis  No new focal numbness or weakness.   Psychiatric: Not depressed.        Laboratory  Lab Results   Component Value Date    WBC 6.79 07/14/2018    HGB 8.2 (L) 07/14/2018    HCT 26.9 (L) 07/14/2018     07/14/2018    CHOL 197 06/02/2018    TRIG 65 06/02/2018     (H) 06/02/2018    ALT 37 07/12/2018    AST 32 07/12/2018     (L) 07/15/2018    K 4.6 07/15/2018     07/15/2018    CREATININE 3.5 (H) 07/15/2018    BUN 26 (H) 07/15/2018    CO2 22 (L) 07/15/2018    TSH 3.932 06/02/2018    INR 1.0 07/12/2018    HGBA1C 9.1 (H) 07/12/2018         Diagnostic Results:  EXAMINATION:  XR CHEST 1 VIEW    CLINICAL HISTORY:  Concern for PNA, fever, chills;    TECHNIQUE:  One view of the chest.    COMPARISON:  07/12/2018.    FINDINGS:  Cardiac wires overlie the chest, and significantly obscure the right lung base.  Evaluation is also limited by portable technique and low lung volumes.  Cardiac silhouette is mildly enlarged.  Cardiac loop recording device is noted overlying the left hilar region.  Mild worsening bibasilar airspace disease.  Small pleural effusion is suspected on the right.  No pneumothorax.   Impression       Suspect mild interval worsening of bibasilar edema versus pneumonia or aspiration.  Consider follow-up with PA and lateral views as indicated.      Electronically signed by: Darrius Segura MD  Date: 07/12/2018  Time: 23:09       EXAMINATION:  US ABDOMEN LIMITED    CLINICAL HISTORY:  elevated alk phos, concern for obstruction;.    TECHNIQUE:  Limited ultrasound of the right upper quadrant of the abdomen including pancreas,  liver, gallbladder, common bile duct was performed.    COMPARISON:  None.    FINDINGS:  Liver: Mildly enlarged in size measuring 18.8 cm.  Homogeneous echotexture. No focal hepatic lesions.    Gallbladder: No calculi, wall thickening, or pericholecystic fluid.  No sonographic Cardona's sign.    Biliary system: The common duct is not dilated, measuring 4 mm.  No intrahepatic ductal dilatation.    Spleen: Normal in size with a homogeneous echotexture, measuring 10.1 x 4.5 cm.    Pancreas: The visualized portions of pancreas appear normal.    Miscellaneous: No ascites.   Impression       Mild hepatomegaly, otherwise no abnormal sonographic findings.    Electronically signed by resident: Stefano Ricci  Date: 07/12/2018  Time: 11:16    Electronically signed by: Vargas Rand MD  Date: 07/12/2018  Time: 11:18    Encounter     View Encounter              EGD  Findings:       LA Grade D (one or more mucosal breaks involving at least 75% of        esophageal circumference) esophagitis with no bleeding was found in        the lower and middle third of the esophagus.       The cardia and gastric fundus were normal on retroflexion.       Diffuse moderate inflammation characterized by congestion (edema)        and erythema was found in the gastric body and in the gastric antrum.       One non-bleeding cratered gastric ulcer with a flat pigmented spot        (Jett Class IIc) was found at the incisura. The lesion was 15 mm        in largest dimension.       Two non-bleeding superficial gastric ulcers with a clean ulcer base        (Jett Class III) were found at the pylorus. The largest lesion        was 3 mm in largest dimension.       The examined duodenum was normal.  Impression:           - LA Grade D reflux esophagitis.                        - Gastritis.                        - Non-bleeding gastric ulcer with a flat pigmented                         spot (Jett Class IIc).                        - Non-bleeding gastric  ulcers with a clean ulcer                         base (Jett Class III).                        - Normal examined duodenum.                        - No specimens collected.  Recommendation:       - Return patient to [facility] for ongoing care.                        - Advance diet as tolerated.                        - Continue present medications.                        - Use a proton pump inhibitor IV BID total of 72                         hours. Can transition to PO at discharge.                        - Perform an H. pylori serology.                        - Repeat upper endoscopy in 8 weeks to check                         healing.                        - No aspirin, ibuprofen, naproxen, or other                         non-steroidal anti-inflammatory drugs.  Attending Participation:       I personally performed the entire procedure.  Lucio Hayden MD  7/13/2018 2:07:00 PM  This report has been verified and signed electronically.  Number of Addenda: 0  Note Initiated On: 7/13/2018 1:25 PM  Estimated Blood Loss: Estimated blood loss: none.       This report has been verified and signed electronically.      Specimen Collected: 07/13/18 13:25 Last Resulted: 07/13/18 15:01            ASSESSMENT & PLAN:     HIGH RISK CONDITION(S):        Recent admission for GIB 2/2 Gastric Ulcers:   *s/p EGD (7/13):   Results:   One non-bleeding cratered gastric ulcer with a flat pigmented spot        (Jett Class IIc) was found at the incisura. The lesion was 15 mm        in largest dimension.       Two non-bleeding superficial gastric ulcers with a clean ulcer base        (Jett Class III) were found at the pylorus. The largest lesion        was 3 mm in largest dimension.  *7/13P: H Pylori: negative   *7/14: Hgb: 8.2 (at discharge)  - commenced to PPI therapy, BID dosing x 8 weeks and will need to follow up with GI for repeat Scope then to surveillance healing progress  - check repeat H and H today  "    *Complaining of Slurred Speech, in the setting of history of CVAs and currently off the antiplatelet therapy for 2 weeks (till 7/27), will check CT of Head for interval development   (check CT; off the ASA x 2 weeks, till 7/27 2/2 recent GIB)      ESRD, HD Dependent:   (Jordy Barrow, Sat)  *[facility]        Hypertension:   *Goal: < 130/90  Today: 116/58 (controlled)   - continue Coreg 12.5/12.5  - continue Catapres 0.2/0.2  - continue Hydralazine 100/100/100  - continue Procardia 90  - continue Losartan 100  *Sent home with med list and instructed to call me direct with all the medications she is taking (concern that what we have in the system and she has at home are different)      Diabetes Mellitus (II):   *Most recent A1c: 9.1 % (7/12) reflective of long standing uncontrol   Home Range: (Lowest:150s, Highest: 400s)  Random of 263 mg/dl in clinic today: 4 units of coverage provided.   - continue Tujeo 26 (will increase to 26 units from 20)  - continue Novolog (will increase from 5 to 8 / 8 / 8) with continued use of SSI  *Careful attention given her being her ESRD patient, Type I. High risk for hypoglycemia events.   - apt to establish with Endocrine (         ? Hep B Ab Positive resulted, patient reports being very upset (crying) with a call received this morning at 7 am to the home; unsure of who brock, who called her and why drawn":  *Did note elevated LFTs (not hospital related from this admission back in 06/2018)   (? If call received from the HD center, but unclear as the HM team has Primary attendings name on the order, Dr. TERESA Mayer; called and spoke with the attending team, Dr. NEETA Rangel and Dr. NEETA Boateng, neither had any recollection of the testing being done and purpose thereof; pt having no symptoms, although has notable elevated LFTs, which seems chronic (?); she is requesting to speak with an "Infectious Disease" specialist; seems reasonable.  *Of note, has received series of Hep B " "Vaccines (? Related); she is insistent on speaking with ID expert in regards.     *Of note, the Hep B Antigen:  Negative    Apt: 8/8 (? Any further testings are indicated, Ig, etc..)        History of CVA (2/2018), presents to clinic today with reporting that "on day of discharge, started with slurred speech":   *Discharged off the  (x 2 weeks, till 7/27)  - continue Crestor (noted elevated LFTs from 6/2018; repeating today)  - check CT of Head (7/20)  Lab Results   Component Value Date    CHOL 197 06/02/2018    CHOL 296 (H) 05/12/2015    CHOL 302 (H) 02/12/2015     Lab Results   Component Value Date     (H) 06/02/2018    HDL 87 (H) 05/12/2015    HDL 84 (H) 02/12/2015     Lab Results   Component Value Date    LDLCALC 83.0 06/02/2018    LDLCALC 151.8 05/12/2015    LDLCALC 184.8 (H) 02/12/2015     Lab Results   Component Value Date    TRIG 65 06/02/2018    TRIG 286 (H) 05/12/2015    TRIG 166 (H) 02/12/2015     Lab Results   Component Value Date    CHOLHDL 51.3 (H) 06/02/2018    CHOLHDL 29.4 05/12/2015    CHOLHDL 27.8 02/12/2015         Chronic Pain Syndrome:   - apt with Pain Mgt: 8/30   *Noted that she was on Pain Contract in the past and seen by Pain Mgt in [place]      *SHE HAS LOTS GOING ON WITH RECENT MOVE FROM [place], TO [facility] AND NO FOLLOW UPS SCHEDULED AT TIME OF DISCHARGE.   WILL SEE BACK IN PC IN 2 WEEKS.     Instructions for the patient:  1) Call me with results of Blood sugars in 1 week; sooner if needed.     2) Keep a log of all Blood Sugars and bring to next clinic apt.    3) Will contact you with results of labs drawn in clinic today and when results have been received on the CT of Head.     4) Check Medications you have at home and call me with the names, dosage and frequency on tomorrow.     5) Call with questions.     6) Continue with Sliding Scale Insulin:   Insulin Aspart (NOVOLOG) 100 unit/ml Pen  Sig: Inject insulin into skin before meals and at bedtime as needed " (Hyperglycemia).  0-60: OJ or glucose tablet   = No Insulin  201-250 = 2 units of insulin  251-300 = 4 units of insulin  301-350 = 6 units of insulin  351-400 = 8 units of insulin   > 400 = 10 units of insulin and call the Clinic         Priority Clinic Visit (Post Discharge Follow-up) Today:   - Our clinic physicians and nurses plan to follow the patient up for any medical issues in the Priority Clinic for 30 days post discharge.    Future Appointments  Date Time Provider Department Center   7/20/2018 10:45 AM [facility] OIC-CT2 500 LB LIMIT [facility] IC Imaging Ctr   8/1/2018 1:00 PM JAGDEEP Duenas [facility] IMPRICL [address] [facility]   8/8/2018 10:00 AM ROLANDA Gramajo Jr. [facility] ID [address]   8/15/2018 10:00 AM Tacho Guevara MD [facility] IM [address] PCW   8/30/2018 8:15 AM Jeremias Roberts III, MD [facility] PA[facility]   8/31/2018 9:20 AM JAGDEEP Carlson [facility] GASTRO [facility]          Medication List          Accurate as of 7/18/18  3:14 PM. If you have any questions, ask your nurse or doctor.               CHANGE how you take these medications    carvedilol 12.5 MG tablet  Commonly known as:  COREG  What changed:  Another medication with the same name was removed. Continue taking this medication, and follow the directions you see here.  Changed by:  JAGDEEP Olsen     cloNIDine 0.2 MG tablet  Commonly known as:  CATAPRES  What changed:  Another medication with the same name was removed. Continue taking this medication, and follow the directions you see here.  Changed by:  JAGDEEP Olsen     insulin glargine (TOUJEO) 300 unit/mL (1.5 mL) Inpn pen  Commonly known as:  TOUJEO  Inject 26 Units into the skin once daily.  What changed:  · how much to take  · Another medication with the same name was removed. Continue taking this medication, and follow the directions you see here.  Changed by:  JAGDEEP Olsen     insulin lispro 100 unit/mL  injection  Commonly known as:  HUMALOG  Inject 8 Units into the skin 3 (three) times daily before meals.  What changed:  how much to take  Changed by:  JAGDEEP Olsen     levETIRAcetam 250 MG Tab  Commonly known as:  KEPPRA  What changed:  Another medication with the same name was removed. Continue taking this medication, and follow the directions you see here.  Changed by:  JAGDEEP Olsen     losartan 25 MG tablet  Commonly known as:  COZAAR  Take 4 tablets (100 mg total) by mouth once daily.  What changed:  Another medication with the same name was removed. Continue taking this medication, and follow the directions you see here.  Changed by:  JAGDEEP Olsen     metoclopramide HCl 10 MG tablet  Commonly known as:  REGLAN  Take 1 tablet (10 mg total) by mouth 3 (three) times daily before meals.  What changed:  Another medication with the same name was removed. Continue taking this medication, and follow the directions you see here.  Changed by:  JAGDEEP Olsen     NIFEdipine 90 MG Tbsr  Commonly known as:  ADALAT CC  What changed:  Another medication with the same name was removed. Continue taking this medication, and follow the directions you see here.  Changed by:  JAGDEEP Olsen     ondansetron 4 MG Tbdl  Commonly known as:  ZOFRAN-ODT  Take 1 tablet (4 mg total) by mouth every 8 (eight) hours as needed.  What changed:  · how much to take  · how to take this  · when to take this  · reasons to take this  · additional instructions  Changed by:  JAGDEEP Olsen     sevelamer carbonate 800 mg Tab  Commonly known as:  RENVELA  Take 2 tablets (1,600 mg total) by mouth 3 (three) times daily with meals.  What changed:  Another medication with the same name was removed. Continue taking this medication, and follow the directions you see here.  Changed by:  JAGDEEP Olsen        CONTINUE taking these medications    aspirin 325 MG  tablet  Take 1 tablet (325 mg total) by mouth once daily. Hold for 2 weeks following discharge     blood sugar diagnostic Strp  To use as directed with True results meter and monitor BS 6 times daily - fluctuating BS     bumetanide 1 MG tablet  Commonly known as:  BUMEX     epoetin jacques 20,000 unit/mL injection  Commonly known as:  PROCRIT  Inject 1 mL (20,000 Units total) into the skin every Tues, Thurs, Sat.     hydrALAZINE 100 MG tablet  Commonly known as:  APRESOLINE     ONETOUCH DELICA LANCETS 33 gauge Misc  Generic drug:  lancets     pantoprazole 40 MG tablet  Commonly known as:  PROTONIX  Take 1 tablet (40 mg total) by mouth 2 (two) times daily.     rosuvastatin 20 MG tablet  Commonly known as:  CRESTOR  Take 1 tablet (20 mg total) by mouth once daily.        STOP taking these medications    insulin aspart U-100 100 unit/mL injection  Commonly known as:  NOVOLOG  Stopped by:  JAGDEEP Olsen           Where to Get Your Medications      These medications were sent to [facility] [address] [place] [address] AT [address] [address]    Phone:  [phone]   · insulin glargine (TOUJEO) 300 unit/mL (1.5 mL) Inpn pen  · insulin lispro 100 unit/mL injection  · metoclopramide HCl 10 MG tablet  · ondansetron 4 MG Tbdl       Signing Physician:  JAGDEEP Olsen

## 2018-07-18 NOTE — TELEPHONE ENCOUNTER
Edgard Mullen!   You saw this patient in the hospital with Dr. Reyes. She now needs a follow up with us. I could not find anything on your schedule all the way through October.   Do you want to work this patient in somewhere on your schedule? If so, please let me know date and time.

## 2018-07-18 NOTE — DISCHARGE SUMMARY
[facility] Hospital Medicine  Discharge Summary      Patient Name: Eufemia Haq  MRN: [id number]  Admission Date: 7/11/2018  Hospital Length of Stay: 3 days  Discharge Date and Time: 7/15/2018  1:26 PM  Attending Physician: Alysia att. providers found   Discharging Provider: Bri Boateng MD  Primary Care Provider: Primary Doctor [facility] Medicine Team: [facility] [place] HOSP MED 3 Bri Boateng MD    HPI:   Pt is a 53 year old female with PMH of ESRD on hemodialysis for 6 months, HTN, DM2 who presents to the ED with nausea, vomiting, and diarrhea and hyperglycemia on home glucometer. She has also been having bilateral shoulder pain for about 1 month for which she has been taking at least 2 aleve per day. Pt states she received hemodialysis on 07/10 for two hours and then was pulled off for hypertension and proceeded to ED and was discharged home later that evening. Pt notes that she has been feeling unwell since her dialysis 7/10. Pt has vomited 3 times, all non-bloody. Patient states at home this evening her sugar was too high for her glucometer to read and took 30 units of her insulin, afterwhich her glucose was 400. Pt came to ED with daughter, where her POCT glucose was 312. Patient notes she is new to the area and in need of outpatient providers. Pt having hematemesis in the ED prior to admission.    Procedure(s) (LRB):  EGD (ESOPHAGOGASTRODUODENOSCOPY) (N/A)      Hospital Course:   Pt urgently taken to HD. EDG showed ulcer with pigmented base. They recommend 72 hours of IV PPI and then continue for 8 weeks with PO PPI and repeat an EGD at that time. Treating her HTN with clonidine, hydralazine, labetalol. Treating her hyperglycemia with regular insulin and will give a decreased long acting until no longer NPO. Treating her N/V/hematemesis with ondansetron, protonix, compazine. Pt's N/V and hematemesis resolved. Protonix 40mg IV BID conntinued until 7/15. Pt received 0900 dose on the 15th and then  refused further care. Pt advised that the gastroenterologist and internal medicine physicians all recommend that she receive the full 72 hours of IV protonix to  the chance of the ulcer re-bleeding or further eroding. Pt advised of the risks of refusing further medical care and leaving the hospital against medical advice. Pt states she will  the PO protonix and other prescribed medications at [facility] She states she will attend her clinic visits as scheduled once she is scheduled.      Consults:   Consults         Status Ordering Provider     Inpatient consult to Critical Care Medicine  Once     Provider:  (Not yet assigned)    Completed ABHISHEK STEARNS     Inpatient consult to Gastroenterology  Once     Provider:  (Not yet assigned)    Completed PAOLA RUIZ     Inpatient consult to Nephrology  Once     Provider:  (Not yet assigned)    Completed PAOLA RUIZ     Inpatient consult to Nephrology  Once     Provider:  (Not yet assigned)    Completed ARIAS MONTOYA     Inpatient consult to PICC team ([facility])  Once     Provider:  (Not yet assigned)    Completed LUIS DUNHAM     Inpatient consult to Registered Dietitian/Nutritionist  Once     Provider:  (Not yet assigned)    Completed CONSTANCE JUAREZ          No new Assessment & Plan notes have been filed under this hospital service since the last note was generated.  Service: Hospital Medicine    Final Active Diagnoses:    Diagnosis Date Noted POA    PRINCIPAL PROBLEM:  Acute gastric ulcer with hemorrhage [K25.0] 07/15/2018 Yes    Diabetes [E11.9] 2018 Yes    Hematemesis with nausea [K92.0] 2018 Yes    ESRD (end stage renal disease) [N18.6] 2018 Yes    Essential hypertension [I10] 2018 Yes    Hypertensive urgency [I16.0] 2018 Yes    Nausea and vomiting [R11.2] 2018 Yes      Problems Resolved During this Admission:    Diagnosis Date Noted Date Resolved  POA       Discharged Condition: fair    Disposition: Home or Self Care    Follow Up:  Follow-up Information     [facility] - Gastroenterology In 8 weeks.    Specialty:  Gastroenterology  Contact information:  Donna Camejo  [facility] [address] [phone]  Additional information:  [facility] Floor           JAGDEEP Olsen On 7/18/2018.    Specialty:  Internal Medicine  Why:  1:30 to follow up from hospital and get set up with a primary care doctor.    Contact information:  Donna Camejo  [facility] [address]  [phone]                 Patient Instructions:     Ambulatory Referral to Gastroenterology   Referral Priority: Routine Referral Type: Consultation   Referral Reason: Specialty Services Required    Requested Specialty: Gastroenterology    Number of Visits Requested: 1      Notify your health care provider if you experience any of the following:  temperature >100.4     Notify your health care provider if you experience any of the following:  persistent nausea and vomiting or diarrhea     No dressing needed     Activity as tolerated         Significant Diagnostic Studies: Labs:   CMP No results for input(s): NA, K, CL, CO2, GLU, BUN, CREATININE, CALCIUM, PROT, ALBUMIN, BILITOT, ALKPHOS, AST, ALT, ANIONGAP, ESTGFRAFRICA, EGFRNONAA in the last 48 hours., CBC No results for input(s): WBC, HGB, HCT, PLT in the last 48 hours. and A1C:   Recent Labs  Lab 03/12/18  0339 06/02/18  1118 07/12/18  2322   HGBA1C 9.1* 9.5* 9.1*     Endoscopy: Gastroscopy: Impression: LA Grade D reflux esophagitis.                        - Gastritis.                        - Non-bleeding gastric ulcer with a flat pigmented                         spot (Jett Class IIc).                        - Non-bleeding gastric ulcers with a clean ulcer                         base (Jett Class III).                        - Normal examined duodenum.                        - No specimens collected.    Pending  Diagnostic Studies:     Procedure Component Value Units Date/Time    CBC auto differential [[id number]] Collected:  07/14/18 1824    Order Status:  Sent Lab Status:  In process Updated:  07/14/18 1824    Specimen:  Blood from Blood     Narrative:       Collection has been rescheduled by [email or web address] at 7/14/2018 07:58 Reason: miguel Baker notified that pt refused         Medications:  Reconciled Home Medications:      Medication List      START taking these medications    pantoprazole 40 MG tablet  Commonly known as:  PROTONIX  Take 1 tablet (40 mg total) by mouth 2 (two) times daily.     sevelamer carbonate 800 mg Tab  Commonly known as:  RENVELA  Take 2 tablets (1,600 mg total) by mouth 3 (three) times daily with meals.        CHANGE how you take these medications    aspirin 325 MG tablet  Take 1 tablet (325 mg total) by mouth once daily. Hold for 2 weeks following discharge  What changed:  additional instructions     insulin glargine (TOUJEO) 300 unit/mL (1.5 mL) Inpn pen  Commonly known as:  TOUJEO  Inject 20 Units into the skin once daily.  What changed:  how much to take     losartan 25 MG tablet  Commonly known as:  COZAAR  Take 4 tablets (100 mg total) by mouth once daily.  What changed:  how much to take        CONTINUE taking these medications    carvedilol 12.5 MG tablet  Commonly known as:  COREG  Take 12.5 mg by mouth 2 (two) times daily.     cloNIDine 0.2 MG tablet  Commonly known as:  CATAPRES  Take 0.2 mg by mouth 2 (two) times daily.     hydrALAZINE 100 MG tablet  Commonly known as:  APRESOLINE  Take 100 mg by mouth every 8 (eight) hours.     insulin aspart U-100 100 unit/mL injection  Commonly known as:  NOVOLOG  Inject into the skin 3 (three) times daily before meals.     levETIRAcetam 250 MG Tab  Commonly known as:  KEPPRA  Take 250 mg by mouth 2 (two) times daily.     metoclopramide HCl 10 MG tablet  Commonly known as:  REGLAN  Take 1 tablet (10 mg total) by mouth 3 (three) times daily before meals.      NIFEdipine 90 MG Tbsr  Commonly known as:  ADALAT CC  Take 90 mg by mouth once daily.        STOP taking these medications    calcium acetate 667 mg capsule  Commonly known as:  PHOSLO     metoprolol tartrate 100 MG tablet  Commonly known as:  LOPRESSOR            Indwelling Lines/Drains at time of discharge:   Lines/Drains/Airways     Central Venous Catheter Line                 Hemodialysis Catheter right subclavian -- days          Drain                 Hemodialysis AV Graft Left upper arm -- days         Hemodialysis AV Graft Left upper arm -- days                Time spent on the discharge of patient: 45 minutes  Patient was seen and examined on the date of discharge and determined to be suitable for discharge.         Bri Boateng MD  Department of Hospital Medicine  [facility]

## 2018-07-18 NOTE — PATIENT INSTRUCTIONS
1) Call me with results of Blood sugars in 1 week; sooner if needed.     2) Keep a log of all Blood Sugars and bring to next clinic apt.    3) Will contact you with results of labs drawn in clinic today and when results have been received on the CT of Head.     4) Check Medications you have at home and call me with the names, dosage and frequency on tomorrow.     5) Call with questions.     6) Continue with Sliding Scale Insulin:   Insulin Aspart (NOVOLOG) 100 unit/ml Pen  Sig: Inject insulin into skin before meals and at bedtime as needed (Hyperglycemia).  0-60: OJ or glucose tablet   = No Insulin  201-250 = 2 units of insulin  251-300 = 4 units of insulin  301-350 = 6 units of insulin  351-400 = 8 units of insulin   > 400 = 10 units of insulin and call the Clinic     Priority Clinic Visit (Post Discharge Follow-up) Today:   - Our clinic physicians and nurses plan to follow the patient up for any medical issues in the Priority Clinic for 30 days post discharge.    Future Appointments  Date Time Provider Department Center   7/20/2018 10:45 AM [facility] OIC-CT2 500 LB LIMIT [facility] IC Imaging Ctr   8/1/2018 1:00 PM JAGDEEP Duenas [facility] IMPRICL [facility]   8/8/2018 10:00 AM ROLANDA Gramajo Jr. [facility] ID [address]   8/15/2018 10:00 AM Tacho Guevara MD [facility] IM [facility]   8/30/2018 8:15 AM Jeremias Roberts III, MD [facility]   8/31/2018 9:20 AM JAGDEEP Carlson [facility] GASTRO [facility] [facility]

## 2018-07-19 ENCOUNTER — TELEPHONE (OUTPATIENT)
Dept: INTERNAL MEDICINE | Facility: CLINIC | Age: 53
End: 2018-07-19

## 2018-07-19 NOTE — TELEPHONE ENCOUNTER
Lab Results   Component Value Date    WBC 6.67 07/18/2018    HGB 8.9 (L) (<<< 8.2 at discharge) 07/18/2018    HCT 29.6 (L) 07/18/2018    MCV 93 07/18/2018     (stable) 07/18/2018   (Improved and stable)      Lab Results   Component Value Date    ALT 16 07/18/2018    AST 17 07/18/2018     (H) 07/12/2018    ALKPHOS 225 (H) 07/18/2018    BILITOT 0.4 07/18/2018   (Improved and stable)      Plan:   Results have been shared with the patient and her daughter.   No changes in current plan of treatment.   (Continue PPI twice daily till 7/27 with repeat scope in 8 weeks)    MARC

## 2018-07-19 NOTE — TELEPHONE ENCOUNTER
Attempted to call client to see if she would like to be seen sooner by Dr. Hughes. Patient did not answer. I left a message requesting a call back.

## 2018-07-24 ENCOUNTER — HOSPITAL ENCOUNTER (INPATIENT)
Facility: HOSPITAL | Age: 53
LOS: 1 days | Discharge: HOME HEALTH | DRG: 917 | End: 2018-07-26
Attending: EMERGENCY MEDICINE | Admitting: HOSPITALIST
Payer: MEDICARE

## 2018-07-24 DIAGNOSIS — Z79.4 UNCONTROLLED TYPE 2 DIABETES MELLITUS WITH HYPERGLYCEMIA, WITH LONG-TERM CURRENT USE OF INSULIN: ICD-10-CM

## 2018-07-24 DIAGNOSIS — E11.43 DM GASTROPARESIS: ICD-10-CM

## 2018-07-24 DIAGNOSIS — Z87.19 HISTORY OF GI BLEED: ICD-10-CM

## 2018-07-24 DIAGNOSIS — T50.901D ACCIDENTAL DRUG OVERDOSE, SUBSEQUENT ENCOUNTER: Primary | ICD-10-CM

## 2018-07-24 DIAGNOSIS — R41.82 ALTERED MENTAL STATUS: ICD-10-CM

## 2018-07-24 DIAGNOSIS — G89.29 CHRONIC PAIN IN LEFT SHOULDER: ICD-10-CM

## 2018-07-24 DIAGNOSIS — G89.29 CHRONIC LEFT SHOULDER PAIN: ICD-10-CM

## 2018-07-24 DIAGNOSIS — K31.84 DM GASTROPARESIS: ICD-10-CM

## 2018-07-24 DIAGNOSIS — M25.512 CHRONIC PAIN IN LEFT SHOULDER: ICD-10-CM

## 2018-07-24 DIAGNOSIS — Z99.2 ESRD ON DIALYSIS: ICD-10-CM

## 2018-07-24 DIAGNOSIS — I10 ESSENTIAL HYPERTENSION: ICD-10-CM

## 2018-07-24 DIAGNOSIS — T50.901A: ICD-10-CM

## 2018-07-24 DIAGNOSIS — N18.6 ESRD ON DIALYSIS: ICD-10-CM

## 2018-07-24 DIAGNOSIS — M25.512 CHRONIC LEFT SHOULDER PAIN: ICD-10-CM

## 2018-07-24 DIAGNOSIS — Z86.73 HISTORY OF CVA (CEREBROVASCULAR ACCIDENT): ICD-10-CM

## 2018-07-24 DIAGNOSIS — E11.65 UNCONTROLLED TYPE 2 DIABETES MELLITUS WITH HYPERGLYCEMIA, WITH LONG-TERM CURRENT USE OF INSULIN: ICD-10-CM

## 2018-07-24 LAB
ALBUMIN SERPL BCP-MCNC: 3.1 G/DL
ALP SERPL-CCNC: 197 U/L
ALT SERPL W/O P-5'-P-CCNC: 8 U/L
AMMONIA PLAS-SCNC: 26 UMOL/L
AMORPH CRY UR QL COMP ASSIST: ABNORMAL
AMPHET+METHAMPHET UR QL: NEGATIVE
ANION GAP SERPL CALC-SCNC: 11 MMOL/L
APAP SERPL-MCNC: <3 UG/ML
AST SERPL-CCNC: 10 U/L
BACTERIA #/AREA URNS AUTO: ABNORMAL /HPF
BARBITURATES UR QL SCN>200 NG/ML: NEGATIVE
BASOPHILS # BLD AUTO: 0.06 K/UL
BASOPHILS NFR BLD: 0.6 %
BENZODIAZ UR QL SCN>200 NG/ML: NEGATIVE
BILIRUB SERPL-MCNC: 0.4 MG/DL
BILIRUB UR QL STRIP: NEGATIVE
BUN SERPL-MCNC: 79 MG/DL
BZE UR QL SCN: NEGATIVE
CALCIUM SERPL-MCNC: 8.3 MG/DL
CANNABINOIDS UR QL SCN: NEGATIVE
CHLORIDE SERPL-SCNC: 103 MMOL/L
CLARITY UR REFRACT.AUTO: ABNORMAL
CO2 SERPL-SCNC: 25 MMOL/L
COLOR UR AUTO: YELLOW
CREAT SERPL-MCNC: 7.6 MG/DL
CREAT UR-MCNC: 112 MG/DL
DIFFERENTIAL METHOD: ABNORMAL
EOSINOPHIL # BLD AUTO: 0.2 K/UL
EOSINOPHIL NFR BLD: 1.9 %
ERYTHROCYTE [DISTWIDTH] IN BLOOD BY AUTOMATED COUNT: 16.9 %
EST. GFR  (AFRICAN AMERICAN): 6.4 ML/MIN/1.73 M^2
EST. GFR  (NON AFRICAN AMERICAN): 5.6 ML/MIN/1.73 M^2
ETHANOL SERPL-MCNC: <10 MG/DL
GLUCOSE SERPL-MCNC: 475 MG/DL
GLUCOSE UR QL STRIP: ABNORMAL
HCT VFR BLD AUTO: 31.2 %
HGB BLD-MCNC: 9.1 G/DL
HGB UR QL STRIP: NEGATIVE
HYALINE CASTS UR QL AUTO: 3 /LPF
IMM GRANULOCYTES # BLD AUTO: 0.09 K/UL
IMM GRANULOCYTES NFR BLD AUTO: 1 %
KETONES UR QL STRIP: NEGATIVE
LACTATE SERPL-SCNC: 1.9 MMOL/L
LEUKOCYTE ESTERASE UR QL STRIP: NEGATIVE
LYMPHOCYTES # BLD AUTO: 0.8 K/UL
LYMPHOCYTES NFR BLD: 8.4 %
MCH RBC QN AUTO: 28.3 PG
MCHC RBC AUTO-ENTMCNC: 29.2 G/DL
MCV RBC AUTO: 97 FL
METHADONE UR QL SCN>300 NG/ML: NEGATIVE
MICROSCOPIC COMMENT: ABNORMAL
MONOCYTES # BLD AUTO: 0.6 K/UL
MONOCYTES NFR BLD: 6.9 %
NEUTROPHILS # BLD AUTO: 7.5 K/UL
NEUTROPHILS NFR BLD: 81.2 %
NITRITE UR QL STRIP: NEGATIVE
NRBC BLD-RTO: 0 /100 WBC
OPIATES UR QL SCN: NEGATIVE
PCP UR QL SCN>25 NG/ML: NEGATIVE
PH UR STRIP: 5 [PH] (ref 5–8)
PLATELET # BLD AUTO: 255 K/UL
PMV BLD AUTO: 10.4 FL
POCT GLUCOSE: 354 MG/DL (ref 70–110)
POCT GLUCOSE: 450 MG/DL (ref 70–110)
POCT GLUCOSE: 482 MG/DL (ref 70–110)
POCT GLUCOSE: 491 MG/DL (ref 70–110)
POTASSIUM SERPL-SCNC: 5.3 MMOL/L
PROT SERPL-MCNC: 6.5 G/DL
PROT UR QL STRIP: ABNORMAL
RBC # BLD AUTO: 3.22 M/UL
RBC #/AREA URNS AUTO: 0 /HPF (ref 0–4)
SALICYLATES SERPL-MCNC: <5 MG/DL
SODIUM SERPL-SCNC: 139 MMOL/L
SP GR UR STRIP: 1.01 (ref 1–1.03)
SQUAMOUS #/AREA URNS AUTO: 2 /HPF
TOXICOLOGY INFORMATION: NORMAL
TSH SERPL DL<=0.005 MIU/L-ACNC: 2 UIU/ML
URN SPEC COLLECT METH UR: ABNORMAL
UROBILINOGEN UR STRIP-ACNC: NEGATIVE EU/DL
WBC # BLD AUTO: 9.27 K/UL
WBC #/AREA URNS AUTO: 0 /HPF (ref 0–5)
YEAST UR QL AUTO: ABNORMAL

## 2018-07-24 PROCEDURE — 25000003 PHARM REV CODE 250: Performed by: INTERNAL MEDICINE

## 2018-07-24 PROCEDURE — 99285 EMERGENCY DEPT VISIT HI MDM: CPT | Mod: ,,, | Performed by: EMERGENCY MEDICINE

## 2018-07-24 PROCEDURE — 82962 GLUCOSE BLOOD TEST: CPT

## 2018-07-24 PROCEDURE — 99285 EMERGENCY DEPT VISIT HI MDM: CPT | Mod: 25

## 2018-07-24 PROCEDURE — G0378 HOSPITAL OBSERVATION PER HR: HCPCS

## 2018-07-24 PROCEDURE — 96361 HYDRATE IV INFUSION ADD-ON: CPT

## 2018-07-24 PROCEDURE — 96372 THER/PROPH/DIAG INJ SC/IM: CPT | Mod: 59

## 2018-07-24 PROCEDURE — 25000003 PHARM REV CODE 250: Performed by: NURSE PRACTITIONER

## 2018-07-24 PROCEDURE — 63600175 PHARM REV CODE 636 W HCPCS: Performed by: INTERNAL MEDICINE

## 2018-07-24 PROCEDURE — 81001 URINALYSIS AUTO W/SCOPE: CPT

## 2018-07-24 PROCEDURE — 82140 ASSAY OF AMMONIA: CPT

## 2018-07-24 PROCEDURE — 80329 ANALGESICS NON-OPIOID 1 OR 2: CPT

## 2018-07-24 PROCEDURE — 80307 DRUG TEST PRSMV CHEM ANLYZR: CPT

## 2018-07-24 PROCEDURE — 80320 DRUG SCREEN QUANTALCOHOLS: CPT

## 2018-07-24 PROCEDURE — 83605 ASSAY OF LACTIC ACID: CPT

## 2018-07-24 PROCEDURE — 80053 COMPREHEN METABOLIC PANEL: CPT

## 2018-07-24 PROCEDURE — 93010 ELECTROCARDIOGRAM REPORT: CPT | Mod: ,,, | Performed by: INTERNAL MEDICINE

## 2018-07-24 PROCEDURE — 99223 1ST HOSP IP/OBS HIGH 75: CPT | Mod: AI,,, | Performed by: INTERNAL MEDICINE

## 2018-07-24 PROCEDURE — 63600175 PHARM REV CODE 636 W HCPCS: Performed by: EMERGENCY MEDICINE

## 2018-07-24 PROCEDURE — 96375 TX/PRO/DX INJ NEW DRUG ADDON: CPT

## 2018-07-24 PROCEDURE — 25000003 PHARM REV CODE 250: Performed by: EMERGENCY MEDICINE

## 2018-07-24 PROCEDURE — 85025 COMPLETE CBC W/AUTO DIFF WBC: CPT

## 2018-07-24 PROCEDURE — 84443 ASSAY THYROID STIM HORMONE: CPT

## 2018-07-24 PROCEDURE — 93005 ELECTROCARDIOGRAM TRACING: CPT

## 2018-07-24 PROCEDURE — 96374 THER/PROPH/DIAG INJ IV PUSH: CPT

## 2018-07-24 RX ORDER — ACETAMINOPHEN 325 MG/1
650 TABLET ORAL EVERY 4 HOURS PRN
Status: DISCONTINUED | OUTPATIENT
Start: 2018-07-24 | End: 2018-07-25

## 2018-07-24 RX ORDER — INSULIN ASPART 100 [IU]/ML
6 INJECTION, SOLUTION INTRAVENOUS; SUBCUTANEOUS
Status: DISCONTINUED | OUTPATIENT
Start: 2018-07-25 | End: 2018-07-26 | Stop reason: HOSPADM

## 2018-07-24 RX ORDER — HYDRALAZINE HYDROCHLORIDE 50 MG/1
100 TABLET, FILM COATED ORAL EVERY 8 HOURS
Status: DISCONTINUED | OUTPATIENT
Start: 2018-07-24 | End: 2018-07-26 | Stop reason: HOSPADM

## 2018-07-24 RX ORDER — LOSARTAN POTASSIUM 50 MG/1
100 TABLET ORAL DAILY
Status: DISCONTINUED | OUTPATIENT
Start: 2018-07-25 | End: 2018-07-26 | Stop reason: HOSPADM

## 2018-07-24 RX ORDER — IBUPROFEN 200 MG
16 TABLET ORAL
Status: DISCONTINUED | OUTPATIENT
Start: 2018-07-24 | End: 2018-07-26 | Stop reason: HOSPADM

## 2018-07-24 RX ORDER — LABETALOL HYDROCHLORIDE 5 MG/ML
10 INJECTION, SOLUTION INTRAVENOUS EVERY 6 HOURS PRN
Status: DISCONTINUED | OUTPATIENT
Start: 2018-07-24 | End: 2018-07-26 | Stop reason: HOSPADM

## 2018-07-24 RX ORDER — METOCLOPRAMIDE 5 MG/1
5 TABLET ORAL
Status: DISCONTINUED | OUTPATIENT
Start: 2018-07-25 | End: 2018-07-26 | Stop reason: HOSPADM

## 2018-07-24 RX ORDER — LIDOCAINE HYDROCHLORIDE 20 MG/ML
5 INJECTION, SOLUTION EPIDURAL; INFILTRATION; INTRACAUDAL; PERINEURAL
Status: COMPLETED | OUTPATIENT
Start: 2018-07-24 | End: 2018-07-24

## 2018-07-24 RX ORDER — INSULIN ASPART 100 [IU]/ML
0-5 INJECTION, SOLUTION INTRAVENOUS; SUBCUTANEOUS
Status: DISCONTINUED | OUTPATIENT
Start: 2018-07-24 | End: 2018-07-26 | Stop reason: HOSPADM

## 2018-07-24 RX ORDER — BUMETANIDE 1 MG/1
1 TABLET ORAL 2 TIMES DAILY
Status: DISCONTINUED | OUTPATIENT
Start: 2018-07-24 | End: 2018-07-26 | Stop reason: HOSPADM

## 2018-07-24 RX ORDER — LABETALOL HYDROCHLORIDE 5 MG/ML
10 INJECTION, SOLUTION INTRAVENOUS ONCE
Status: COMPLETED | OUTPATIENT
Start: 2018-07-24 | End: 2018-07-24

## 2018-07-24 RX ORDER — PANTOPRAZOLE SODIUM 40 MG/1
40 TABLET, DELAYED RELEASE ORAL 2 TIMES DAILY
Status: DISCONTINUED | OUTPATIENT
Start: 2018-07-24 | End: 2018-07-26 | Stop reason: HOSPADM

## 2018-07-24 RX ORDER — CLONIDINE HYDROCHLORIDE 0.2 MG/1
0.2 TABLET ORAL 2 TIMES DAILY
Status: DISCONTINUED | OUTPATIENT
Start: 2018-07-24 | End: 2018-07-25

## 2018-07-24 RX ORDER — SODIUM CHLORIDE 0.9 % (FLUSH) 0.9 %
5 SYRINGE (ML) INJECTION
Status: DISCONTINUED | OUTPATIENT
Start: 2018-07-24 | End: 2018-07-26 | Stop reason: HOSPADM

## 2018-07-24 RX ORDER — LABETALOL HYDROCHLORIDE 5 MG/ML
20 INJECTION, SOLUTION INTRAVENOUS ONCE
Status: COMPLETED | OUTPATIENT
Start: 2018-07-24 | End: 2018-07-24

## 2018-07-24 RX ORDER — CARVEDILOL 12.5 MG/1
12.5 TABLET ORAL 2 TIMES DAILY
Status: DISCONTINUED | OUTPATIENT
Start: 2018-07-24 | End: 2018-07-26 | Stop reason: HOSPADM

## 2018-07-24 RX ORDER — GLUCAGON 1 MG
1 KIT INJECTION
Status: DISCONTINUED | OUTPATIENT
Start: 2018-07-24 | End: 2018-07-26 | Stop reason: HOSPADM

## 2018-07-24 RX ORDER — LEVETIRACETAM 250 MG/1
250 TABLET ORAL 2 TIMES DAILY
Status: DISCONTINUED | OUTPATIENT
Start: 2018-07-24 | End: 2018-07-26 | Stop reason: HOSPADM

## 2018-07-24 RX ORDER — ONDANSETRON 4 MG/1
4 TABLET, ORALLY DISINTEGRATING ORAL EVERY 8 HOURS PRN
Status: DISCONTINUED | OUTPATIENT
Start: 2018-07-24 | End: 2018-07-24

## 2018-07-24 RX ORDER — ROSUVASTATIN CALCIUM 20 MG/1
20 TABLET, COATED ORAL DAILY
Status: DISCONTINUED | OUTPATIENT
Start: 2018-07-25 | End: 2018-07-26 | Stop reason: HOSPADM

## 2018-07-24 RX ORDER — SEVELAMER CARBONATE 800 MG/1
1600 TABLET, FILM COATED ORAL
Status: DISCONTINUED | OUTPATIENT
Start: 2018-07-25 | End: 2018-07-26 | Stop reason: HOSPADM

## 2018-07-24 RX ORDER — IBUPROFEN 200 MG
24 TABLET ORAL
Status: DISCONTINUED | OUTPATIENT
Start: 2018-07-24 | End: 2018-07-26 | Stop reason: HOSPADM

## 2018-07-24 RX ADMIN — CARVEDILOL 12.5 MG: 12.5 TABLET, FILM COATED ORAL at 08:07

## 2018-07-24 RX ADMIN — PANTOPRAZOLE SODIUM 40 MG: 40 TABLET, DELAYED RELEASE ORAL at 08:07

## 2018-07-24 RX ADMIN — BUMETANIDE 1 MG: 1 TABLET ORAL at 08:07

## 2018-07-24 RX ADMIN — HYDRALAZINE HYDROCHLORIDE 100 MG: 25 TABLET, FILM COATED ORAL at 09:07

## 2018-07-24 RX ADMIN — LABETALOL HYDROCHLORIDE 20 MG: 5 INJECTION, SOLUTION INTRAVENOUS at 11:07

## 2018-07-24 RX ADMIN — INSULIN ASPART 3 UNITS: 100 INJECTION, SOLUTION INTRAVENOUS; SUBCUTANEOUS at 08:07

## 2018-07-24 RX ADMIN — LABETALOL HYDROCHLORIDE 10 MG: 5 INJECTION, SOLUTION INTRAVENOUS at 06:07

## 2018-07-24 RX ADMIN — INSULIN HUMAN 10 UNITS: 100 INJECTION, SOLUTION PARENTERAL at 02:07

## 2018-07-24 RX ADMIN — SODIUM CHLORIDE 500 ML: 0.9 INJECTION, SOLUTION INTRAVENOUS at 02:07

## 2018-07-24 RX ADMIN — LIDOCAINE HYDROCHLORIDE 100 MG: 20 INJECTION, SOLUTION EPIDURAL; INFILTRATION; INTRACAUDAL; PERINEURAL at 02:07

## 2018-07-24 RX ADMIN — INSULIN DETEMIR 20 UNITS: 100 INJECTION, SOLUTION SUBCUTANEOUS at 06:07

## 2018-07-24 RX ADMIN — CLONIDINE HYDROCHLORIDE 0.2 MG: 0.2 TABLET ORAL at 08:07

## 2018-07-24 NOTE — ED PROVIDER NOTES
Encounter Date: 7/24/2018       History     Chief Complaint   Patient presents with    Altered Mental Status     Pt presented to the ED via pov. Pt's daugther reports pt is confused. Pt has a hx of 2 CVA's in the past month. Pt's daughter reports pt stares off in blank stares. Pt's daughter states she gave her a muscle relaxer last night and her mother has not been acting right since.      Eufemia Haq is a 53 year old female with ESRD on hemodialysis for 6 months, HTN, and DM2 who presents with altered mental status that began yesterday. Her daughter last saw her normal Monday morning. The patient did not wake up altered. The daughter reports she has been spacing out, repeating words, and clenching her fists. Patient took baclofen last night for shoulder pain but patient did not know the quantity. Patient is on hemodialysis Tuesday, Thursday, and Saturday with last dialysis on Saturday.           Review of patient's allergies indicates:   Allergen Reactions    Ciprofloxacin (bulk) Itching    Gabapentin Other (See Comments)     Seizure    Latex Itching and Other (See Comments)     Very low Oxygen    Tramadol Other (See Comments)     Seizure    Vancomycin Shortness Of Breath and Itching    Vancomycin analogues Other (See Comments)     Seizure    Neurontin [gabapentin] Other (See Comments)     Seizure like activity    Niacin Itching and Other (See Comments)     Burning      Bactrim [sulfamethoxazole-trimethoprim] Rash    Latex, natural rubber Rash    Niacin preparations Rash     Past Medical History:   Diagnosis Date    Acute renal failure superimposed on stage 3 chronic kidney disease 5/11/2016    Anemia     during pregnancys    Anemia 1/26/2014    Anemia in chronic renal disease     Anemia of chronic kidney failure 8/27/2017    Arthritis     neuropathy hands feet and legs//    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    Blood transfusion     Chronic pain     Closed head injury     Diabetes mellitus      Diabetes mellitus     Diabetes mellitus type I     Diabetic macular edema, both eyes 3/31/2014    Diabetic retinopathy     ESRD (end stage renal disease) on dialysis     H/O insertion of insulin pump 3/9/2015    Hyperlipidemia     Hypertension     patient states that when her blood sugar increases her blood pressure increases    Hypertension     Hypertensive retinopathy of both eyes 3/31/2014    Insulin pump fitting or adjustment 2014    Left-sided weakness 2018    Medication side effects - Mobic 3/9/15 3/11/2015    Midline low back pain without sciatica     Neuropathy     Pneumonia     Proliferative diabetic retinopathy, both eyes 3/31/2014    Renal disorder     Seizures     when sugar is high, does not quite remember    Stroke     2018    Syncope and collapse, onset 1992 2015    Vitamin D deficiency     Vitreous hemorrhage 2017     Past Surgical History:   Procedure Laterality Date     SECTION      x2     SECTION, CLASSIC      x 2    dialysis graft Left     ESOPHAGOGASTRODUODENOSCOPY N/A 2018    Procedure: EGD (ESOPHAGOGASTRODUODENOSCOPY);  Surgeon: Lucio Hayden MD;  Location: [facility] ([address] [place] [address]);  Service: Endoscopy;  Laterality: N/A;    EYE SURGERY      HYSTERECTOMY       Family History   Problem Relation Age of Onset    Diabetes Mother     Heart disease Mother     Blindness Mother         diabetes    Hypertension Mother     Diabetes Father     Asthma Father     Hypertension Father     Thyroid disease Sister     Breast cancer Daughter     Diabetes Sister     Stroke Maternal Uncle     Glaucoma Maternal Grandmother     Blindness Maternal Grandmother     Cataracts Maternal Grandmother     Hypertension Maternal Grandmother     Cancer Cousin     Amblyopia Neg Hx     Macular degeneration Neg Hx     Retinal detachment Neg Hx     Strabismus Neg Hx     Colon cancer Neg Hx     Ovarian cancer Neg Hx      Social History    Substance Use Topics    Smoking status: Former Smoker    Smokeless tobacco: Never Used    Alcohol use No     Review of Systems   Unable to perform ROS: Mental status change       Physical Exam     Initial Vitals [07/24/18 1238]   BP Pulse Resp Temp SpO2   (!) 162/74 74 16 98.3 °F (36.8 °C) (!) 94 %      MAP       --         Physical Exam    Nursing note and vitals reviewed.  Constitutional: She appears well-developed and well-nourished. She appears lethargic.   HENT:   Head: Normocephalic and atraumatic.   Eyes: Conjunctivae and EOM are normal. Pupils are equal, round, and reactive to light.   Neck: Normal range of motion.   Cardiovascular: Normal rate, regular rhythm, normal heart sounds and intact distal pulses. Exam reveals no gallop and no friction rub.    No murmur heard.  Pulmonary/Chest: Breath sounds normal. No respiratory distress. She has no wheezes. She has no rhonchi. She has no rales.   Abdominal: Soft. She exhibits no distension. There is no tenderness.   Musculoskeletal: Normal range of motion.   Neurological: She has normal strength. She appears lethargic. She is disoriented. No cranial nerve deficit or sensory deficit.   Oriented to self but not time or location.    Skin: Skin is warm and dry.   Psychiatric: She has a normal mood and affect. Her behavior is normal. Judgment and thought content normal.         ED Course   Procedures  Labs Reviewed   CBC W/ AUTO DIFFERENTIAL   COMPREHENSIVE METABOLIC PANEL   URINALYSIS, REFLEX TO URINE CULTURE   ALCOHOL,MEDICAL (ETHANOL)   DRUG SCREEN PANEL, URINE EMERGENCY   ACETAMINOPHEN LEVEL   SALICYLATE LEVEL   LACTIC ACID, PLASMA   TSH   AMMONIA   POCT GLUCOSE MONITORING CONTINUOUS          Imaging Results    None          Medical Decision Making:   Initial Assessment:   Eufemia Haq is a 53 year old female with ESRD on hemodialysis for 6 months, HTN, and DM2 who presents with altered mental status that began yesterday. Will get CBC, CMP, lactic acid,  EKG, CXR, CT head, UA, TSH, ammonia, drug screen, and acetaminophen level and will rule out stroke, electrolyte abnormality, infection, drug intoxication, DKA, and HHS.           Differential Diagnosis:   1:36 PM  Patient was seen normal Monday morning so she is out of the window for tPA.  1:42 PM  Poison Control consulted with regards to possible overmedication of baclofen. No specific treatments recommended other than supportive care.   2:45 PM  CT head no acute infarcts or hemorrhage.   3:22 PM  CXR small right effusion suggest congestive heart failure.              Attending Attestation:   Physician Attestation Statement for Resident:  As the supervising MD   Physician Attestation Statement: I have personally seen and examined this patient.   I agree with the above history. -:   As the supervising MD I agree with the above PE.    As the supervising MD I agree with the above treatment, course, plan, and disposition.  I have reviewed and agree with the residents interpretation of the following: lab data, CT scans and x-rays.                       Clinical Impression:   The encounter diagnosis was Altered mental status.      Disposition:   Disposition: Admitted  Condition: Serious                        Judson Decker MD  07/24/18 9700

## 2018-07-24 NOTE — ED NOTES
Patient sitting up on stretcher, awakens to repeated verbal stimuli and answers some questions appropriately but repeats answers.  Oriented to self, place, year.  Denies any pain.  Respirations even and unlabored and in NAD. Side rails up x 2 with call bell in reach.  Will continue to monitor

## 2018-07-24 NOTE — H&P
"[facility] Medicine  History and Physical Exam    Team: [facility] HOSP MED O Valentino Roche MD  Admit Date: 7/24/2018  CHELSIE  7/25/2018  Principal Problem:  <principal problem not specified>   Patient information was obtained from past medical records and ER records.   Primary care Physician: Primary Doctor No  Code status: Full Code    HPI:   53 year old female with ESRD on hemodialysis for 6 months, HTN, and DM2 who presented with altered mental status that began yesterday. Daughter is no longer at bedside so history is primarily by ED. Per ED note , "her daughter last saw her normal Monday morning. The patient did not wake up altered. The daughter reports she has been spacing out, repeating words, and clenching her fists. Patient took baclofen last night for shoulder pain but patient did not know the quantity. Patient is on hemodialysis Tuesday, Thursday, and Saturday with last dialysis on Saturday." Patient reports that she currently has no complaints, is aware that she is here because she wasn't acting right, is unsure the amount of baclofen that she took yesterday.      Past Medical History: Patient has a past medical history of Acute renal failure superimposed on stage 3 chronic kidney disease (5/11/2016); Anemia; Anemia (1/26/2014); Anemia in chronic renal disease; Anemia of chronic kidney failure (8/27/2017); Arthritis; Blind in both eyes (3/31/2018); Blood transfusion; Chronic pain; Closed head injury; Diabetes mellitus; Diabetes mellitus; Diabetes mellitus type I; Diabetic macular edema, both eyes (3/31/2014); Diabetic retinopathy; ESRD (end stage renal disease) on dialysis; H/O insertion of insulin pump (3/9/2015); Hyperlipidemia; Hypertension; Hypertension; Hypertensive retinopathy of both eyes (3/31/2014); Insulin pump fitting or adjustment (11/7/2014); Left-sided weakness (6/5/2018); Medication side effects - Mobic 3/9/15 (3/11/2015); Midline low back pain without sciatica; Neuropathy; Pneumonia; " Proliferative diabetic retinopathy, both eyes (3/31/2014); Renal disorder; Seizures; Stroke; Syncope and collapse, onset  (2015); Vitamin D deficiency; and Vitreous hemorrhage (2017).    Past Surgical History: Patient has a past surgical history that includes Hysterectomy;  section, classic; Eye surgery;  section; dialysis graft (Left); and Esophagogastroduodenoscopy (N/A, 2018).    Social History: Patient reports that she has quit smoking. She has never used smokeless tobacco. She reports that she does not drink alcohol or use drugs.    Family History: family history includes Asthma in her father; Blindness in her maternal grandmother and mother; Breast cancer in her daughter; Cancer in her cousin; Cataracts in her maternal grandmother; Diabetes in her father, mother, and sister; Glaucoma in her maternal grandmother; Heart disease in her mother; Hypertension in her father, maternal grandmother, and mother; Stroke in her maternal uncle; Thyroid disease in her sister.    Medications:   Prior to Admission medications    Medication Sig Start Date End Date Taking? Authorizing Provider   aspirin 325 MG tablet Take 1 tablet (325 mg total) by mouth once daily. Hold for 2 weeks following discharge 7/15/18  Yes Bri Boateng MD   blood sugar diagnostic Strp To use as directed with True results meter and monitor BS 6 times daily - fluctuating BS 5/7/15  Yes Michelle Stein MD   bumetanide (BUMEX) 1 MG tablet Take 1 mg by mouth 2 (two) times daily.   Yes Historical Provider, MD   carvedilol (COREG) 12.5 MG tablet Take 12.5 mg by mouth 2 (two) times daily.   Yes Historical Provider, MD   cloNIDine (CATAPRES) 0.2 MG tablet Take 0.2 mg by mouth 2 (two) times daily.   Yes Historical Provider, MD   epoetin jacques (PROCRIT) 20,000 unit/mL injection Inject 1 mL (20,000 Units total) into the skin every Tues, Thurs, Sat. 17  Yes Rosa Maria Parsons PA-C   hydrALAZINE (APRESOLINE) 100 MG  tablet Take 100 mg by mouth every 8 (eight) hours.   Yes Historical Provider, MD   insulin glargine, TOUJEO, (TOUJEO) 300 unit/mL (1.5 mL) InPn pen Inject 26 Units into the skin once daily. 7/18/18  Yes JAGDEEP Duenas   insulin lispro (HUMALOG) 100 unit/mL injection Inject 8 Units into the skin 3 (three) times daily before meals. 7/18/18 7/18/19 Yes JAGDEEP Duenas   levETIRAcetam (KEPPRA) 250 MG Tab Take 250 mg by mouth 2 (two) times daily.   Yes Historical Provider, MD   losartan (COZAAR) 25 MG tablet Take 4 tablets (100 mg total) by mouth once daily. 7/15/18  Yes Bri Boateng MD   metoclopramide HCl (REGLAN) 10 MG tablet Take 1 tablet (10 mg total) by mouth 3 (three) times daily before meals. 7/18/18  Yes JAGDEEP Duenas   NIFEdipine (ADALAT CC) 90 MG TbSR Take 90 mg by mouth once daily.   Yes Historical Provider, MD   ondansetron (ZOFRAN-ODT) 4 MG TbDL Take 1 tablet (4 mg total) by mouth every 8 (eight) hours as needed. 7/18/18  Yes JAGDEEP Duenas   ONETOUCH DELICA LANCETS 33 gauge Misc TEST BLOOD SUGAR TID 6/27/17  Yes Historical Provider, MD   pantoprazole (PROTONIX) 40 MG tablet Take 1 tablet (40 mg total) by mouth 2 (two) times daily. 7/15/18 9/13/18 Yes Bri Boateng MD   rosuvastatin (CRESTOR) 20 MG tablet Take 1 tablet (20 mg total) by mouth once daily. 6/8/18 6/8/19 Yes Ema Fischer MD   sevelamer carbonate (RENVELA) 800 mg Tab Take 2 tablets (1,600 mg total) by mouth 3 (three) times daily with meals. 7/15/18 7/15/19 Yes Bri Boateng MD       Allergies: Patient is allergic to ciprofloxacin (bulk); gabapentin; latex; tramadol; vancomycin; vancomycin analogues; neurontin [gabapentin]; niacin; bactrim [sulfamethoxazole-trimethoprim]; latex, natural rubber; and niacin preparations.    ROS    Pain Scale: 0 /10   Constitutional: no fever or chills  Respiratory: no cough or shortness of breath  Cardiovascular: no chest pain or  palpitations  Gastrointestinal: no nausea or vomiting, no abdominal pain or change in bowel habits  Genitourinary: no hematuria or dysuria  Integument/Breast: no rash or pruritis  Hematologic/Lymphatic: no easy bruising or lymphadenopathy  Musculoskeletal: no arthralgias or myalgias  Neurological: no seizures or tremors  Behavioral/Psych: no depression or anxiety    PEx  Temp:  [98.3 °F (36.8 °C)]   Pulse:  [74-83]   Resp:  [13-16]   BP: (162-197)/(74-86)   SpO2:  [94 %-100 %]   Body mass index is 21.93 kg/m².     General appearance: no distress, somewhat slow to respond to questions but appropriate  Mental status: Alert and oriented x 3  HEENT:  conjunctivae/corneas clear, PERRL  Neck: supple, thyroid not enlarged  Pulm:   normal respiratory effort, CTA B, no c/w/r  Card: RRR, S1, S2 normal, no murmur, click, rub or gallop  Abd: soft, NT, ND, BS present; no masses, no organomegaly  Ext: no c/c/, + edema to shins bilaterally  Pulses: 2+, symmetric  Skin: color, texture, turgor normal. No rashes or lesions  Neuro: CN II-XII grossly intact, no focal numbness or weakness, normal strength and tone         Intake/Output Summary (Last 24 hours) at 07/24/18 1759  Last data filed at 07/24/18 1609   Gross per 24 hour   Intake              500 ml   Output                0 ml   Net              500 ml       Recent Labs  Lab 07/18/18  1543 07/24/18  1311   WBC 6.67 9.27   HGB 8.9* 9.1*   HCT 29.6* 31.2*    255       Recent Labs  Lab 07/24/18  1311      K 5.3*      CO2 25   BUN 79*   CREATININE 7.6*   *   CALCIUM 8.3*       Recent Labs  Lab 07/18/18  1543 07/24/18  1311   ALKPHOS 225* 197*   ALT 16 8*   AST 17 10   ALBUMIN 3.3* 3.1*   PROT 6.8 6.5   BILITOT 0.4 0.4        Recent Labs  Lab 07/24/18  1324 07/24/18  1536   POCTGLUCOSE 482* 491*     No results for input(s): CPK, CPKMB, MB, TROPONINI in the last 72 hours.    Recent Labs      07/24/18   1311   LACTATE  1.9        Active Hospital Problems     Diagnosis  POA    Altered mental status [R41.82]  Yes      Resolved Hospital Problems    Diagnosis Date Resolved POA   No resolved problems to display.       Overview  53 year old female with ESRD T/Th/Sa, HTN, DM2, CVAs, who presented with altered mental status likely 2/2 baclofen, mental status appears to be improving per exam    Assessment and Plan for Problems addressed today:    Metabolic encephalopathy  -Occurred after baclofen injection, pt ESRD and baclofen renally cleared  -No evidence of infection, electrolytes relatively WNL, noted hyperglycemia however bicarb is WNL, less likely DKA as mental status improving  -Continue to monitor  -Nephrology consulted for dialysis in AM    Hyperglycemia  Uncontrolled type 2 DM on insulin  -Pt missed AM glargine dose  -Glucose 400s, s/p 10u in ED insulin IV  -20u detemir x1  -20u detemir daily  -Aspart 6u AC  -Accuchecks Q4H  -NPO until glucose <300  -Cautious volume repletion given ESRD, diastolic dysfunction, CXR with evidence of mild volume overload    HTN, uncontrolled  -BP 170s/100s in ED  -Labetolol 10mg X1  -Continue home antihypertensives  -PRN labetolol SBP >190 DBP >110    H/o CVA  -CT head negative  -Stable    H/o gastroparesis  -stable  -Continue reglan TID AC  DVT PPx: SCD  Dispo: Pending continued improvement in mental status, dialysis in AM    Valentino Roche MD  Department of Hospital Medicine  Pager: [phone]  Spectra: [phone]

## 2018-07-24 NOTE — ED TRIAGE NOTES
Daughter bring pt. To ER with concerns of Altered Mental Staus, states this is how she acted after her last stoke episodes. These smyptoms started yesterday. Denies fever, chills, vomiting

## 2018-07-25 PROBLEM — E11.65 TYPE 2 DIABETES MELLITUS WITH HYPERGLYCEMIA, WITH LONG-TERM CURRENT USE OF INSULIN: Status: ACTIVE | Noted: 2018-07-25

## 2018-07-25 PROBLEM — R41.82 ALTERED MENTAL STATUS: Status: ACTIVE | Noted: 2018-07-25

## 2018-07-25 PROBLEM — Z86.73 HISTORY OF CVA (CEREBROVASCULAR ACCIDENT): Status: ACTIVE | Noted: 2018-06-05

## 2018-07-25 PROBLEM — Z79.4 TYPE 2 DIABETES MELLITUS WITH HYPERGLYCEMIA, WITH LONG-TERM CURRENT USE OF INSULIN: Status: ACTIVE | Noted: 2018-07-25

## 2018-07-25 PROBLEM — T50.901A ACCIDENTAL MEDICATION OVERDOSE: Status: ACTIVE | Noted: 2018-07-25

## 2018-07-25 PROBLEM — Z87.19 HISTORY OF GI BLEED: Status: ACTIVE | Noted: 2018-07-25

## 2018-07-25 LAB
ANION GAP SERPL CALC-SCNC: 16 MMOL/L
BASOPHILS # BLD AUTO: 0.04 K/UL
BASOPHILS # BLD AUTO: 0.05 K/UL
BASOPHILS NFR BLD: 0.5 %
BASOPHILS NFR BLD: 0.5 %
BUN SERPL-MCNC: 87 MG/DL
CALCIUM SERPL-MCNC: 8.3 MG/DL
CHLORIDE SERPL-SCNC: 106 MMOL/L
CO2 SERPL-SCNC: 21 MMOL/L
CREAT SERPL-MCNC: 7.7 MG/DL
DIFFERENTIAL METHOD: ABNORMAL
DIFFERENTIAL METHOD: ABNORMAL
EOSINOPHIL # BLD AUTO: 0.3 K/UL
EOSINOPHIL # BLD AUTO: 0.3 K/UL
EOSINOPHIL NFR BLD: 2.7 %
EOSINOPHIL NFR BLD: 3.6 %
ERYTHROCYTE [DISTWIDTH] IN BLOOD BY AUTOMATED COUNT: 16.9 %
ERYTHROCYTE [DISTWIDTH] IN BLOOD BY AUTOMATED COUNT: 17 %
EST. GFR  (AFRICAN AMERICAN): 6.3 ML/MIN/1.73 M^2
EST. GFR  (NON AFRICAN AMERICAN): 5.5 ML/MIN/1.73 M^2
GLUCOSE SERPL-MCNC: 101 MG/DL
HCT VFR BLD AUTO: 26.7 %
HCT VFR BLD AUTO: 28.8 %
HGB BLD-MCNC: 7.9 G/DL
HGB BLD-MCNC: 8.7 G/DL
IMM GRANULOCYTES # BLD AUTO: 0.07 K/UL
IMM GRANULOCYTES # BLD AUTO: 0.09 K/UL
IMM GRANULOCYTES NFR BLD AUTO: 0.8 %
IMM GRANULOCYTES NFR BLD AUTO: 0.9 %
LYMPHOCYTES # BLD AUTO: 0.7 K/UL
LYMPHOCYTES # BLD AUTO: 1.1 K/UL
LYMPHOCYTES NFR BLD: 12.8 %
LYMPHOCYTES NFR BLD: 7.2 %
MAGNESIUM SERPL-MCNC: 3.1 MG/DL
MCH RBC QN AUTO: 28.3 PG
MCH RBC QN AUTO: 28.5 PG
MCHC RBC AUTO-ENTMCNC: 29.6 G/DL
MCHC RBC AUTO-ENTMCNC: 30.2 G/DL
MCV RBC AUTO: 94 FL
MCV RBC AUTO: 96 FL
MONOCYTES # BLD AUTO: 0.8 K/UL
MONOCYTES # BLD AUTO: 0.8 K/UL
MONOCYTES NFR BLD: 8.3 %
MONOCYTES NFR BLD: 9.5 %
NEUTROPHILS # BLD AUTO: 6 K/UL
NEUTROPHILS # BLD AUTO: 8.2 K/UL
NEUTROPHILS NFR BLD: 72.8 %
NEUTROPHILS NFR BLD: 80.4 %
NRBC BLD-RTO: 0 /100 WBC
NRBC BLD-RTO: 0 /100 WBC
PHOSPHATE SERPL-MCNC: 5.1 MG/DL
PLATELET # BLD AUTO: 181 K/UL
PLATELET # BLD AUTO: 215 K/UL
PMV BLD AUTO: 10.3 FL
PMV BLD AUTO: 10.4 FL
POCT GLUCOSE: 135 MG/DL (ref 70–110)
POCT GLUCOSE: 139 MG/DL (ref 70–110)
POCT GLUCOSE: 144 MG/DL (ref 70–110)
POCT GLUCOSE: 179 MG/DL (ref 70–110)
POCT GLUCOSE: 204 MG/DL (ref 70–110)
POCT GLUCOSE: 205 MG/DL (ref 70–110)
POCT GLUCOSE: 73 MG/DL (ref 70–110)
POCT GLUCOSE: 89 MG/DL (ref 70–110)
POTASSIUM SERPL-SCNC: 5.2 MMOL/L
RBC # BLD AUTO: 2.79 M/UL
RBC # BLD AUTO: 3.05 M/UL
SODIUM SERPL-SCNC: 143 MMOL/L
WBC # BLD AUTO: 10.15 K/UL
WBC # BLD AUTO: 8.29 K/UL

## 2018-07-25 PROCEDURE — 80048 BASIC METABOLIC PNL TOTAL CA: CPT

## 2018-07-25 PROCEDURE — 25000003 PHARM REV CODE 250: Performed by: INTERNAL MEDICINE

## 2018-07-25 PROCEDURE — 25000003 PHARM REV CODE 250: Performed by: HOSPITALIST

## 2018-07-25 PROCEDURE — 25000003 PHARM REV CODE 250: Performed by: NURSE PRACTITIONER

## 2018-07-25 PROCEDURE — 86707 HEPATITIS BE ANTIBODY: CPT

## 2018-07-25 PROCEDURE — 83735 ASSAY OF MAGNESIUM: CPT

## 2018-07-25 PROCEDURE — 36415 COLL VENOUS BLD VENIPUNCTURE: CPT

## 2018-07-25 PROCEDURE — 99233 SBSQ HOSP IP/OBS HIGH 50: CPT | Mod: ,,, | Performed by: HOSPITALIST

## 2018-07-25 PROCEDURE — 84100 ASSAY OF PHOSPHORUS: CPT

## 2018-07-25 PROCEDURE — 86705 HEP B CORE ANTIBODY IGM: CPT

## 2018-07-25 PROCEDURE — 85025 COMPLETE CBC W/AUTO DIFF WBC: CPT | Mod: 91

## 2018-07-25 PROCEDURE — 86706 HEP B SURFACE ANTIBODY: CPT

## 2018-07-25 PROCEDURE — 87040 BLOOD CULTURE FOR BACTERIA: CPT | Mod: 59

## 2018-07-25 PROCEDURE — 87340 HEPATITIS B SURFACE AG IA: CPT

## 2018-07-25 PROCEDURE — 99223 1ST HOSP IP/OBS HIGH 75: CPT | Mod: ,,, | Performed by: NURSE PRACTITIONER

## 2018-07-25 PROCEDURE — 87350 HEPATITIS BE AG IA: CPT

## 2018-07-25 PROCEDURE — 11000001 HC ACUTE MED/SURG PRIVATE ROOM

## 2018-07-25 PROCEDURE — 63600175 PHARM REV CODE 636 W HCPCS: Performed by: INTERNAL MEDICINE

## 2018-07-25 PROCEDURE — 86704 HEP B CORE ANTIBODY TOTAL: CPT

## 2018-07-25 PROCEDURE — 90935 HEMODIALYSIS ONE EVALUATION: CPT

## 2018-07-25 RX ORDER — RAMELTEON 8 MG/1
8 TABLET ORAL NIGHTLY PRN
Status: DISCONTINUED | OUTPATIENT
Start: 2018-07-25 | End: 2018-07-26 | Stop reason: HOSPADM

## 2018-07-25 RX ORDER — SODIUM CHLORIDE 9 MG/ML
INJECTION, SOLUTION INTRAVENOUS ONCE
Status: COMPLETED | OUTPATIENT
Start: 2018-07-25 | End: 2018-07-25

## 2018-07-25 RX ORDER — ONDANSETRON 4 MG/1
4 TABLET, ORALLY DISINTEGRATING ORAL EVERY 6 HOURS PRN
Status: DISCONTINUED | OUTPATIENT
Start: 2018-07-25 | End: 2018-07-26 | Stop reason: HOSPADM

## 2018-07-25 RX ORDER — HYDROCODONE BITARTRATE AND ACETAMINOPHEN 5; 325 MG/1; MG/1
1 TABLET ORAL 2 TIMES DAILY PRN
Qty: 14 TABLET | Refills: 0 | Status: SHIPPED | OUTPATIENT
Start: 2018-07-25 | End: 2018-07-26

## 2018-07-25 RX ORDER — SODIUM CHLORIDE 9 MG/ML
INJECTION, SOLUTION INTRAVENOUS
Status: DISCONTINUED | OUTPATIENT
Start: 2018-07-25 | End: 2018-07-26 | Stop reason: HOSPADM

## 2018-07-25 RX ORDER — HYDROCODONE BITARTRATE AND ACETAMINOPHEN 5; 325 MG/1; MG/1
1 TABLET ORAL 2 TIMES DAILY PRN
Status: DISCONTINUED | OUTPATIENT
Start: 2018-07-25 | End: 2018-07-26 | Stop reason: HOSPADM

## 2018-07-25 RX ORDER — ACETAMINOPHEN 500 MG
1000 TABLET ORAL EVERY 12 HOURS PRN
Status: DISCONTINUED | OUTPATIENT
Start: 2018-07-25 | End: 2018-07-25

## 2018-07-25 RX ORDER — CLONIDINE HYDROCHLORIDE 0.2 MG/1
0.2 TABLET ORAL 3 TIMES DAILY
Status: DISCONTINUED | OUTPATIENT
Start: 2018-07-25 | End: 2018-07-26 | Stop reason: HOSPADM

## 2018-07-25 RX ADMIN — BUMETANIDE 1 MG: 1 TABLET ORAL at 09:07

## 2018-07-25 RX ADMIN — CLONIDINE HYDROCHLORIDE 0.2 MG: 0.2 TABLET ORAL at 09:07

## 2018-07-25 RX ADMIN — METOCLOPRAMIDE HYDROCHLORIDE 5 MG: 5 TABLET ORAL at 01:07

## 2018-07-25 RX ADMIN — LEVETIRACETAM 250 MG: 250 TABLET ORAL at 12:07

## 2018-07-25 RX ADMIN — METOCLOPRAMIDE HYDROCHLORIDE 5 MG: 5 TABLET ORAL at 06:07

## 2018-07-25 RX ADMIN — HYDRALAZINE HYDROCHLORIDE 100 MG: 25 TABLET, FILM COATED ORAL at 01:07

## 2018-07-25 RX ADMIN — CARVEDILOL 12.5 MG: 12.5 TABLET, FILM COATED ORAL at 09:07

## 2018-07-25 RX ADMIN — INSULIN DETEMIR 20 UNITS: 100 INJECTION, SOLUTION SUBCUTANEOUS at 09:07

## 2018-07-25 RX ADMIN — HYDRALAZINE HYDROCHLORIDE 100 MG: 25 TABLET, FILM COATED ORAL at 10:07

## 2018-07-25 RX ADMIN — PANTOPRAZOLE SODIUM 40 MG: 40 TABLET, DELAYED RELEASE ORAL at 10:07

## 2018-07-25 RX ADMIN — RAMELTEON 8 MG: 8 TABLET, FILM COATED ORAL at 10:07

## 2018-07-25 RX ADMIN — SEVELAMER CARBONATE 1600 MG: 800 TABLET, FILM COATED ORAL at 06:07

## 2018-07-25 RX ADMIN — HYDRALAZINE HYDROCHLORIDE 100 MG: 25 TABLET, FILM COATED ORAL at 05:07

## 2018-07-25 RX ADMIN — PANTOPRAZOLE SODIUM 40 MG: 40 TABLET, DELAYED RELEASE ORAL at 09:07

## 2018-07-25 RX ADMIN — METOCLOPRAMIDE HYDROCHLORIDE 5 MG: 5 TABLET ORAL at 10:07

## 2018-07-25 RX ADMIN — ROSUVASTATIN CALCIUM 20 MG: 20 TABLET, FILM COATED ORAL at 10:07

## 2018-07-25 RX ADMIN — INSULIN ASPART 1 UNITS: 100 INJECTION, SOLUTION INTRAVENOUS; SUBCUTANEOUS at 09:07

## 2018-07-25 RX ADMIN — CLONIDINE HYDROCHLORIDE 0.2 MG: 0.2 TABLET ORAL at 03:07

## 2018-07-25 RX ADMIN — INSULIN ASPART 6 UNITS: 100 INJECTION, SOLUTION INTRAVENOUS; SUBCUTANEOUS at 10:07

## 2018-07-25 RX ADMIN — INSULIN ASPART 6 UNITS: 100 INJECTION, SOLUTION INTRAVENOUS; SUBCUTANEOUS at 01:07

## 2018-07-25 RX ADMIN — LEVETIRACETAM 250 MG: 250 TABLET ORAL at 09:07

## 2018-07-25 RX ADMIN — INSULIN ASPART 6 UNITS: 100 INJECTION, SOLUTION INTRAVENOUS; SUBCUTANEOUS at 06:07

## 2018-07-25 RX ADMIN — HYDROCODONE BITARTRATE AND ACETAMINOPHEN 1 TABLET: 5; 325 TABLET ORAL at 10:07

## 2018-07-25 RX ADMIN — LEVETIRACETAM 250 MG: 250 TABLET ORAL at 10:07

## 2018-07-25 RX ADMIN — SEVELAMER CARBONATE 1600 MG: 800 TABLET, FILM COATED ORAL at 01:07

## 2018-07-25 RX ADMIN — SODIUM CHLORIDE 300 ML: 0.9 INJECTION, SOLUTION INTRAVENOUS at 10:07

## 2018-07-25 RX ADMIN — SEVELAMER CARBONATE 1600 MG: 800 TABLET, FILM COATED ORAL at 10:07

## 2018-07-25 NOTE — ASSESSMENT & PLAN NOTE
"54 yo female with significant history hypertension, DMT1, gastroparesis, recent CVA with residual behavior problems (Jan 2018 [facility] in [place]), seizure, ESRD on HD 1 year, and multiple admissions to hospitals with most recent 6/2-6/8 for left eye gaze/7/11-7/15 for hematemesis found on EGD 7/13 esophagitis/gastritis/non bleeding gastric ulcers and uncontrolled hypertension who is admitted to observation for confusion. Patient currently oriented to self and time but not place (she thought she was on bus). No family at bedside. Per record, "her daughter last saw her normal Monday morning. The patient did not wake up altered. The daughter reports she has been spacing out, repeating words, and clenching her fists. Patient took baclofen last night for shoulder pain but patient did not know the quantity. Patient is on hemodialysis Tuesday, Thursday, and Saturday with last dialysis on Saturday." Patient reports that she currently has no complaints, is aware that she is here because she wasn't acting right, is unsure the amount of baclofen that she took yesterday." CT head remote infarct otherwise no acute intracranial abnormality. CXR mild right pleural effusion.     ESRD x 1 year on iHD TTS via [facility]G at [facility] under direction of Dr. Dias.EDW 63.5 kg. Known intradialysis gain 5-10 kg. Labs reviewed.     Plan:  Avoid use of Baclofen if possible given primarily renal eliminated. Recommend Daughter  bring in all pill bottles for review as previous admission both coreg and metoprolol listed on home meds.   HD today 4 hrs duration 3-4 L net UF as tolerated. Dialysate adjusted to current labs.2 K bath   Hgb 7.9>9.1 with history of gastric ulcer and NSAID use. On PPI BID. Trend hgb. Need to verify with Daughter if still taking Aleve.   Obtain outpt flow sheet and resume ANGELICA/iron accordingly  Renal diet/phos binder when able to take oral.  "

## 2018-07-25 NOTE — HPI
"52 yo female with significant history hypertension, DMT1, gastroparesis, recent CVA with residual behavior problems (Jan 2018 [facility] in [place]), seizure, ESRD on HD 1 year, and multiple admissions to hospitals with most recent 6/2-6/8 for left eye gaze/7/11-7/15 for hematemesis found on EGD 7/13 esophagitis/gastritis/non bleeding gastric ulcers and uncontrolled hypertension who is admitted to observation for confusion. Patient currently oriented to self and time but not place (she thought she was on bus). No family at bedside. Per record, "her daughter last saw her normal Monday morning. The patient did not wake up altered. The daughter reports she has been spacing out, repeating words, and clenching her fists. Patient took baclofen last night for shoulder pain but patient did not know the quantity. Patient is on hemodialysis Tuesday, Thursday, and Saturday with last dialysis on Saturday." Patient reports that she currently has no complaints, is aware that she is here because she wasn't acting right, is unsure the amount of baclofen that she took yesterday." CT head remote infarct otherwise no acute intracranial abnormality. CXR mild right pleural effusion.     Nephrology consult for hemodialysis. ESR[facility] x 1 year [facility] of Dr. Dias.EDW 63.5 kg. Known intradialysis gain 5-10 kg.   "

## 2018-07-25 NOTE — ED NOTES
Spoke with representative from team F in [facility] to PT's blood pressure. Got verbal confirmation to give 2100 PO blood pressure meds.

## 2018-07-25 NOTE — NURSING
On arrival to floor /97. Pt resting in bed but just transferred from stretcher. Rechecked BP 30mins later while pt is sleeping and BP remains elevated at 223/97. Notified Joo Ratliff NP. He reviewed meds given in ED and pts history. Pt did miss dialysis treatment today. Joo placed orders for labetalol dose now

## 2018-07-25 NOTE — SUBJECTIVE & OBJECTIVE
Past Medical History:   Diagnosis Date    Acute renal failure superimposed on stage 3 chronic kidney disease 2016    Anemia     during pregnancys    Anemia 2014    Anemia in chronic renal disease     Anemia of chronic kidney failure 2017    Arthritis     neuropathy hands feet and legs//    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    Blood transfusion     Chronic pain     Closed head injury     Diabetes mellitus     Diabetes mellitus     Diabetes mellitus type I     Diabetic macular edema, both eyes 3/31/2014    Diabetic retinopathy     ESRD (end stage renal disease) on dialysis     H/O insertion of insulin pump 3/9/2015    Hyperlipidemia     Hypertension     patient states that when her blood sugar increases her blood pressure increases    Hypertension     Hypertensive retinopathy of both eyes 3/31/2014    Insulin pump fitting or adjustment 2014    Left-sided weakness 2018    Medication side effects - Mobic 3/9/15 3/11/2015    Midline low back pain without sciatica     Neuropathy     Pneumonia     Proliferative diabetic retinopathy, both eyes 3/31/2014    Renal disorder     Seizures     when sugar is high, does not quite remember    Stroke     2018    Syncope and collapse, onset 1992 2015    Vitamin D deficiency     Vitreous hemorrhage 2017       Past Surgical History:   Procedure Laterality Date     SECTION      x2     SECTION, CLASSIC      x 2    dialysis graft Left     ESOPHAGOGASTRODUODENOSCOPY N/A 2018    Procedure: EGD (ESOPHAGOGASTRODUODENOSCOPY);  Surgeon: Lucio Hayden MD;  Location: [address]);  Service: Endoscopy;  Laterality: N/A;    EYE SURGERY      HYSTERECTOMY         Review of patient's allergies indicates:   Allergen Reactions    Ciprofloxacin (bulk) Itching    Gabapentin Other (See Comments)     Seizure    Latex Itching and Other (See Comments)     Very low Oxygen    Tramadol Other (See Comments)      Seizure    Vancomycin Shortness Of Breath and Itching    Vancomycin analogues Other (See Comments)     Seizure    Neurontin [gabapentin] Other (See Comments)     Seizure like activity    Niacin Itching and Other (See Comments)     Burning      Bactrim [sulfamethoxazole-trimethoprim] Rash    Latex, natural rubber Rash    Niacin preparations Rash     Current Facility-Administered Medications   Medication Frequency    0.9%  NaCl infusion PRN    0.9%  NaCl infusion Once    acetaminophen tablet 650 mg Q4H PRN    bumetanide tablet 1 mg BID    carvedilol tablet 12.5 mg BID    cloNIDine tablet 0.2 mg BID    dextrose 50% injection 12.5 g PRN    dextrose 50% injection 25 g PRN    glucagon (human recombinant) injection 1 mg PRN    glucose chewable tablet 16 g PRN    glucose chewable tablet 24 g PRN    hydrALAZINE tablet 100 mg Q8H    insulin aspart U-100 pen 0-5 Units QID (AC + HS) PRN    insulin aspart U-100 pen 6 Units TIDWM    insulin detemir U-100 pen 20 Units Daily    labetalol injection 10 mg Q6H PRN    levETIRAcetam tablet 250 mg BID    losartan tablet 100 mg Daily    metoclopramide HCl tablet 5 mg TID AC    NIFEdipine 24 hr tablet 90 mg Daily    pantoprazole EC tablet 40 mg BID    rosuvastatin tablet 20 mg Daily    sevelamer carbonate tablet 1,600 mg TID WM    sodium chloride 0.9% flush 5 mL PRN     Family History     Problem Relation (Age of Onset)    Asthma Father    Blindness Mother, Maternal Grandmother    Breast cancer Daughter    Cancer Cousin    Cataracts Maternal Grandmother    Diabetes Mother, Father, Sister    Glaucoma Maternal Grandmother    Heart disease Mother    Hypertension Mother, Father, Maternal Grandmother    Stroke Maternal Uncle    Thyroid disease Sister        Social History Main Topics    Smoking status: Former Smoker    Smokeless tobacco: Never Used    Alcohol use No    Drug use: No    Sexual activity: Yes     Partners: Male     Birth control/ protection:  None     Review of Systems   Unable to perform ROS: Mental status change     Objective:     Vital Signs (Most Recent):  Temp: 98.3 °F (36.8 °C) (07/25/18 0724)  Pulse: 77 (07/25/18 0724)  Resp: 14 (07/25/18 0724)  BP: (!) 150/72 (07/25/18 0724)  SpO2: 95 % (07/25/18 0724)  O2 Device (Oxygen Therapy): room air (07/25/18 0400) Vital Signs (24h Range):  Temp:  [97.8 °F (36.6 °C)-98.3 °F (36.8 °C)] 98.3 °F (36.8 °C)  Pulse:  [66-84] 77  Resp:  [12-16] 14  SpO2:  [94 %-100 %] 95 %  BP: (150-223)/() 150/72     Weight: 71 kg (156 lb 8.4 oz) (07/24/18 2358)  Body mass index is 24.52 kg/m².  Body surface area is 1.83 meters squared.    I/O last 3 completed shifts:  In: 500 [IV Piggyback:500]  Out: -     Physical Exam   Constitutional: She appears well-developed and well-nourished. No distress.   HENT:   Head: Atraumatic.   Eyes: EOM are normal.   Neck: Normal range of motion. Neck supple.   Cardiovascular: Normal rate and regular rhythm.    Pulmonary/Chest: Effort normal.   Diminished throughout   Abdominal: Soft. Bowel sounds are normal. She exhibits no distension.   Musculoskeletal: Normal range of motion. She exhibits edema. She exhibits no deformity.   Neurological: She is alert.   Oriented to self and time but not place and situation   Skin: Skin is warm and dry.   ADEN AVG   Psychiatric: She has a normal mood and affect.   Calm but confuse.        Significant Labs:  All labs within the past 24 hours have been reviewed.    Significant Imaging:  Labs: Reviewed

## 2018-07-25 NOTE — CONSULTS
"[facility]  Nephrology  Consult Note    Patient Name: Eufemia Haq  MRN: [id number]  Admission Date: 7/24/2018  Hospital Length of Stay: 0 days  Attending Provider: Elio Godinez IV, MD   Primary Care Physician: Primary Doctor No  Principal Problem:<principal problem not specified>    Inpatient consult to Nephrology  Consult performed by: BAKARI JOHN  Consult ordered by: CONCEPCIÓN HARP  Reason for consult: esrd        Subjective:     HPI: 54 yo female with significant history hypertension, DMT1, gastroparesis, recent CVA with residual behavior problems (Jan 2018 [facility] in [place]), seizure, ESRD on HD 1 year, and multiple admissions to hospitals with most recent 6/2-6/8 for left eye gaze/7/11-7/15 for hematemesis found on EGD 7/13 esophagitis/gastritis/non bleeding gastric ulcers and uncontrolled hypertension who is admitted to observation for confusion. Patient currently oriented to self and time but not place (she thought she was on bus). No family at bedside. Per record, "her daughter last saw her normal Monday morning. The patient did not wake up altered. The daughter reports she has been spacing out, repeating words, and clenching her fists. Patient took baclofen last night for shoulder pain but patient did not know the quantity. Patient is on hemodialysis Tuesday, Thursday, and Saturday with last dialysis on Saturday." Patient reports that she currently has no complaints, is aware that she is here because she wasn't acting right, is unsure the amount of baclofen that she took yesterday." CT head remote infarct otherwise no acute intracranial abnormality. CXR mild right pleural effusion.     Nephrology consult for hemodialysis. ESRD x 1 year on iHD TTS via [facility] at [facility] under direction of Dr. Dias.EDW 63.5 kg. Known intradialysis gain 5-10 kg.     Past Medical History:   Diagnosis Date    Acute renal failure superimposed on stage 3 chronic kidney disease " 2016    Anemia     during pregnancys    Anemia 2014    Anemia in chronic renal disease     Anemia of chronic kidney failure 2017    Arthritis     neuropathy hands feet and legs//    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    Blood transfusion     Chronic pain     Closed head injury     Diabetes mellitus     Diabetes mellitus     Diabetes mellitus type I     Diabetic macular edema, both eyes 3/31/2014    Diabetic retinopathy     ESRD (end stage renal disease) on dialysis     H/O insertion of insulin pump 3/9/2015    Hyperlipidemia     Hypertension     patient states that when her blood sugar increases her blood pressure increases    Hypertension     Hypertensive retinopathy of both eyes 3/31/2014    Insulin pump fitting or adjustment 2014    Left-sided weakness 2018    Medication side effects - Mobic 3/9/15 3/11/2015    Midline low back pain without sciatica     Neuropathy     Pneumonia     Proliferative diabetic retinopathy, both eyes 3/31/2014    Renal disorder     Seizures     when sugar is high, does not quite remember    Stroke     2018    Syncope and collapse, onset 1992 2015    Vitamin D deficiency     Vitreous hemorrhage 2017       Past Surgical History:   Procedure Laterality Date     SECTION      x2     SECTION, CLASSIC      x 2    dialysis graft Left     ESOPHAGOGASTRODUODENOSCOPY N/A 2018    Procedure: EGD (ESOPHAGOGASTRODUODENOSCOPY);  Surgeon: Lucio Hadyen MD;  Location: [facility] ([address] [place] [address]);  Service: Endoscopy;  Laterality: N/A;    EYE SURGERY      HYSTERECTOMY         Review of patient's allergies indicates:   Allergen Reactions    Ciprofloxacin (bulk) Itching    Gabapentin Other (See Comments)     Seizure    Latex Itching and Other (See Comments)     Very low Oxygen    Tramadol Other (See Comments)     Seizure    Vancomycin Shortness Of Breath and Itching    Vancomycin analogues Other (See Comments)      Seizure    Neurontin [gabapentin] Other (See Comments)     Seizure like activity    Niacin Itching and Other (See Comments)     Burning      Bactrim [sulfamethoxazole-trimethoprim] Rash    Latex, natural rubber Rash    Niacin preparations Rash     Current Facility-Administered Medications   Medication Frequency    0.9%  NaCl infusion PRN    0.9%  NaCl infusion Once    acetaminophen tablet 650 mg Q4H PRN    bumetanide tablet 1 mg BID    carvedilol tablet 12.5 mg BID    cloNIDine tablet 0.2 mg BID    dextrose 50% injection 12.5 g PRN    dextrose 50% injection 25 g PRN    glucagon (human recombinant) injection 1 mg PRN    glucose chewable tablet 16 g PRN    glucose chewable tablet 24 g PRN    hydrALAZINE tablet 100 mg Q8H    insulin aspart U-100 pen 0-5 Units QID (AC + HS) PRN    insulin aspart U-100 pen 6 Units TIDWM    insulin detemir U-100 pen 20 Units Daily    labetalol injection 10 mg Q6H PRN    levETIRAcetam tablet 250 mg BID    losartan tablet 100 mg Daily    metoclopramide HCl tablet 5 mg TID AC    NIFEdipine 24 hr tablet 90 mg Daily    pantoprazole EC tablet 40 mg BID    rosuvastatin tablet 20 mg Daily    sevelamer carbonate tablet 1,600 mg TID WM    sodium chloride 0.9% flush 5 mL PRN     Family History     Problem Relation (Age of Onset)    Asthma Father    Blindness Mother, Maternal Grandmother    Breast cancer Daughter    Cancer Cousin    Cataracts Maternal Grandmother    Diabetes Mother, Father, Sister    Glaucoma Maternal Grandmother    Heart disease Mother    Hypertension Mother, Father, Maternal Grandmother    Stroke Maternal Uncle    Thyroid disease Sister        Social History Main Topics    Smoking status: Former Smoker    Smokeless tobacco: Never Used    Alcohol use No    Drug use: No    Sexual activity: Yes     Partners: Male     Birth control/ protection: None     Review of Systems   Unable to perform ROS: Mental status change     Objective:     Vital Signs (Most  Recent):  Temp: 98.3 °F (36.8 °C) (07/25/18 0724)  Pulse: 77 (07/25/18 0724)  Resp: 14 (07/25/18 0724)  BP: (!) 150/72 (07/25/18 0724)  SpO2: 95 % (07/25/18 0724)  O2 Device (Oxygen Therapy): room air (07/25/18 0400) Vital Signs (24h Range):  Temp:  [97.8 °F (36.6 °C)-98.3 °F (36.8 °C)] 98.3 °F (36.8 °C)  Pulse:  [66-84] 77  Resp:  [12-16] 14  SpO2:  [94 %-100 %] 95 %  BP: (150-223)/() 150/72     Weight: 71 kg (156 lb 8.4 oz) (07/24/18 2358)  Body mass index is 24.52 kg/m².  Body surface area is 1.83 meters squared.    I/O last 3 completed shifts:  In: 500 [IV Piggyback:500]  Out: -     Physical Exam   Constitutional: She appears well-developed and well-nourished. No distress.   HENT:   Head: Atraumatic.   Eyes: EOM are normal.   Neck: Normal range of motion. Neck supple.   Cardiovascular: Normal rate and regular rhythm.    Pulmonary/Chest: Effort normal.   Diminished throughout   Abdominal: Soft. Bowel sounds are normal. She exhibits no distension.   Musculoskeletal: Normal range of motion. She exhibits edema. She exhibits no deformity.   Neurological: She is alert.   Oriented to self and time but not place and situation   Skin: Skin is warm and dry.   ADEN AVG   Psychiatric: She has a normal mood and affect.   Calm but confuse.        Significant Labs:  All labs within the past 24 hours have been reviewed.    Significant Imaging:  Labs: Reviewed    Assessment/Plan:     ESRD on dialysis    52 yo female with significant history hypertension, DMT1, gastroparesis, recent CVA with residual behavior problems (Jan 2018 [facility] in [place]), seizure, ESRD on HD 1 year, and multiple admissions to hospitals with most recent 6/2-6/8 for left eye gaze/7/11-7/15 for hematemesis found on EGD 7/13 esophagitis/gastritis/non bleeding gastric ulcers and uncontrolled hypertension who is admitted to observation for confusion. Patient currently oriented to self and time but not place (she thought she was on bus). No family at  "bedside. Per record, "her daughter last saw her normal Monday morning. The patient did not wake up altered. The daughter reports she has been spacing out, repeating words, and clenching her fists. Patient took baclofen last night for shoulder pain but patient did not know the quantity. Patient is on hemodialysis Tuesday, Thursday, and Saturday with last dialysis on Saturday." Patient reports that she currently has no complaints, is aware that she is here because she wasn't acting right, is unsure the amount of baclofen that she took yesterday." CT head remote infarct otherwise no acute intracranial abnormality. CXR mild right pleural effusion.     ESRD x 1 year on iHD TTS via [facility] at [facility] under direction of Dr. Dias.EDW 63.5 kg. Known intradialysis gain 5-10 kg. Labs reviewed.     Plan:  -Avoid use of Baclofen if possible given primarily renal eliminated.  -Recommend Daughter bring in all pill bottles for review as previous  admission both coreg and metoprolol listed on home meds.   -HD today 4 hrs duration 3-4 L net UF as tolerated. Dialysate adjusted to  current labs.2 K bath   -Hgb 7.9>9.1 with history of gastric ulcer and NSAID use. On PPI BID. Trend hgb. Need to verify with Daughter if still taking Aleve.   -Obtain outpt flow sheet and resume ANGELICA/iron accordingly  -Renal diet/phos binder when able to take oral.            Thank you for your consult. I will follow-up with patient. Please contact us if you have any additional questions.    Viktoria Nguyen NP  Nephrology  [facility]  "

## 2018-07-25 NOTE — PLAN OF CARE
"CM to bedside - pt & dgtr present; both provided assessment info. Pt w/ shower chair & glucometer in place, lives w/ dgtr. Pt is active w/ [facility] and is expected to resume at d/c.     CM provided patient anticipated CHELSIE which will be update by nursing staff. Patient provided a Blue "My Health Packet" for d/c planning and health tool. Patient verbalized understanding.     07/25/18 1551   Discharge Assessment   Assessment Type Discharge Planning Assessment   Confirmed/corrected address and phone number on facesheet? Yes   Assessment information obtained from? Patient;Caregiver;Medical Record   Expected Length of Stay (days) 2   Communicated expected length of stay with patient/caregiver yes   Prior to hospitilization cognitive status: Alert/Oriented   Prior to hospitalization functional status: Needs Assistance   Current cognitive status: Alert/Oriented   Current Functional Status: Needs Assistance   Facility Arrived From: N/A   Lives With child(chas), adult   Able to Return to Prior Arrangements yes   Is patient able to care for self after discharge? No   Who are your caregiver(s) and their phone number(s)? [facility] [phone]   Patient's perception of discharge disposition home health   Readmission Within The Last 30 Days unable to assess   Patient currently being followed by outpatient case management? No   Patient currently receives any other outside agency services? Yes   Name and contact number of agency or person providing outside services [facility]   Is it the patient/care giver preference to resume care with the current outside agency? Yes   Equipment Currently Used at Home glucometer;shower chair   Do you have any problems affording any of your prescribed medications? No   Is the patient taking medications as prescribed? yes   Does the patient have transportation home? Yes   Transportation Available family or friend will provide   Dialysis Name and Scheduled days [facility] @ " 11am approx 3.5hrs   Does the patient receive services at the [facility]? No   Discharge Plan A Home with family;Home Health   Discharge Plan B Home with family   Patient/Family In Agreement With Plan yes

## 2018-07-25 NOTE — PLAN OF CARE
[facility]    HOME HEALTH ORDERS  FACE TO FACE ENCOUNTER    Patient Name: Eufemia Haq  YOB: 1965    PCP: Primary Doctor No   PCP Address: None  PCP Phone Number: None  PCP Fax: None    Encounter Date: 07/25/2018    Admit to Home Health    Diagnoses:  Active Hospital Problems    Diagnosis  POA    *Accidental medication overdose [T50.901A]  Yes     Priority: 1 - High    Metabolic encephalopathy [G93.41]  Yes     Priority: 2     Diabetes mellitus with renal manifestations, uncontrolled [E11.29, E11.65]  Yes     Priority: 3     DM gastroparesis [E11.43, K31.84]  Yes     Priority: 4     Essential hypertension [I10]  Yes     Priority: 5     ESRD on dialysis [N18.6, Z99.2]  Not Applicable     Priority: 7     History of CVA (cerebrovascular accident) [Z86.73]  Not Applicable     Priority: 9     History of GI bleed [Z87.19]  Not Applicable     Priority: 21     Uncontrolled type 2 diabetes mellitus with hyperglycemia, with long-term current use of insulin [E11.65, Z79.4]  Not Applicable    Altered mental status [R41.82]  Yes      Resolved Hospital Problems    Diagnosis Date Resolved POA   No resolved problems to display.       Future Appointments  Date Time Provider Department Center   8/1/2018 1:00 PM JAGDEEP Duenas [facility] IM[facility]L [facility]   8/8/2018 10:00 AM ROLANDA Gramajo Jr. [facility] ID [facility]   8/15/2018 10:00 AM Tacho Guevara MD [facility] IM [facility] PCW   8/30/2018 8:15 AM Jeremias Roberts III, MD [facility] PAINMT [facility]   8/31/2018 9:20 AM JAGDEEP Carlson [facility] GASTRO [facility]   10/12/2018 8:00 AM NAILA Cox FNP [facility] ENDODINICO [facility]     Follow-up Information     [facility].    Specialty:  Home Health Services  Why:  Home Health  Contact information:  [address] [address]  [phone]             [facility] On 8/1/2018.    Why:  Wednesday 8/1 @ 1pm  Contact  information:  [address]  [facility] [address]  [phone]             [facility] On 8/8/2018.    Specialty:  Infectious Diseases  Why:  Wednesday 8/8 @ 10am with ROLANDA Ochoa  Contact information:  [address]  [place] [id number] [phone]  Additional information:  1st Floor - [facility]                   I have seen and examined this patient face to face today. My clinical findings that support the need for the home health skilled services and home bound status are the following:  Weakness/numbness causing balance and gait disturbance due to Weakness/Debility making it taxing to leave home.    Allergies:  Review of patient's allergies indicates:   Allergen Reactions    Ciprofloxacin (bulk) Itching    Gabapentin Other (See Comments)     Seizure    Latex Itching and Other (See Comments)     Very low Oxygen    Tramadol Other (See Comments)     Seizure    Vancomycin Shortness Of Breath and Itching    Vancomycin analogues Other (See Comments)     Seizure    Neurontin [gabapentin] Other (See Comments)     Seizure like activity    Niacin Itching and Other (See Comments)     Burning      Bactrim [sulfamethoxazole-trimethoprim] Rash    Latex, natural rubber Rash    Niacin preparations Rash     Diet: renal diet, diabetic diet     Activities: activity as tolerated    Nursing:   SN to complete comprehensive assessment including routine vital signs. Instruct on disease process and s/s of complications to report to MD. Review/verify medication list sent home with the patient at time of discharge  and instruct patient/caregiver as needed. Frequency may be adjusted depending on start of care date.    Notify MD if SBP > 160 or < 90; DBP > 90 or < 50; HR > 120 or < 50; Temp > 101; Other:         CONSULTS:    Physical Therapy to evaluate and treat. Evaluate for home safety and equipment needs; Establish/upgrade home exercise program. Perform / instruct on  therapeutic exercises, gait training, transfer training, and Range of Motion.  Occupational Therapy to evaluate and treat. Evaluate home environment for safety and equipment needs. Perform/Instruct on transfers, ADL training, ROM, and therapeutic exercises.   to evaluate for community resources/long-range planning.  Aide to provide assistance with personal care, ADLs, and vital signs.    MISCELLANEOUS CARE:  Diabetic Care:   SN to perform and educate Diabetic management with blood glucose monitoring:       WOUND CARE ORDERS  n/a      Medications: Review discharge medications with patient and family and provide education.      Current Discharge Medication List      START taking these medications    Details   HYDROcodone-acetaminophen (NORCO) 5-325 mg per tablet Take 1 tablet by mouth 2 (two) times daily as needed.  Qty: 14 tablet, Refills: 0         CONTINUE these medications which have NOT CHANGED    Details   aspirin 325 MG tablet Take 1 tablet (325 mg total) by mouth once daily. Hold for 2 weeks following discharge  Refills: 0      blood sugar diagnostic Strp To use as directed with True results meter and monitor BS 6 times daily - fluctuating BS  Qty: 200 each, Refills: 12    Associated Diagnoses: Type I (juvenile type) diabetes mellitus with neurological manifestations, uncontrolled(250.63)      bumetanide (BUMEX) 1 MG tablet Take 1 mg by mouth 2 (two) times daily.      carvedilol (COREG) 12.5 MG tablet Take 12.5 mg by mouth 2 (two) times daily.      cloNIDine (CATAPRES) 0.2 MG tablet Take 0.2 mg by mouth 2 (two) times daily.      epoetin jacques (PROCRIT) 20,000 unit/mL injection Inject 1 mL (20,000 Units total) into the skin every Tues, Thurs, Sat.  Qty: 1 mL      hydrALAZINE (APRESOLINE) 100 MG tablet Take 100 mg by mouth every 8 (eight) hours.      insulin glargine, TOUJEO, (TOUJEO) 300 unit/mL (1.5 mL) InPn pen Inject 26 Units into the skin once daily.  Qty: 1.5 mL, Refills: 6      insulin lispro  (HUMALOG) 100 unit/mL injection Inject 8 Units into the skin 3 (three) times daily before meals.  Qty: 7.2 mL, Refills: 11      levETIRAcetam (KEPPRA) 250 MG Tab Take 250 mg by mouth 2 (two) times daily.      losartan (COZAAR) 25 MG tablet Take 4 tablets (100 mg total) by mouth once daily.      metoclopramide HCl (REGLAN) 10 MG tablet Take 1 tablet (10 mg total) by mouth 3 (three) times daily before meals.  Qty: 90 tablet, Refills: 0      NIFEdipine (ADALAT CC) 90 MG TbSR Take 90 mg by mouth once daily.      ondansetron (ZOFRAN-ODT) 4 MG TbDL Take 1 tablet (4 mg total) by mouth every 8 (eight) hours as needed.  Qty: 20 tablet, Refills: 0      ONETOUCH DELICA LANCETS 33 gauge Misc TEST BLOOD SUGAR TID  Refills: 3      pantoprazole (PROTONIX) 40 MG tablet Take 1 tablet (40 mg total) by mouth 2 (two) times daily.  Qty: 60 tablet, Refills: 1      rosuvastatin (CRESTOR) 20 MG tablet Take 1 tablet (20 mg total) by mouth once daily.  Qty: 90 tablet, Refills: 3      sevelamer carbonate (RENVELA) 800 mg Tab Take 2 tablets (1,600 mg total) by mouth 3 (three) times daily with meals.  Qty: 180 tablet, Refills: 11             I certify that this patient is confined to her home and needs intermittent skilled nursing care, physical therapy and occupational therapy.

## 2018-07-25 NOTE — PROGRESS NOTES
Patient arrived on unit via stretcher. Standing weight obtained @ 70.2kg. Dialysis initiated via left arm AVG with 15g needles x 2 without difficulty. Lines connected and secured. Tx started. Good blood flows established. VSS.

## 2018-07-25 NOTE — PLAN OF CARE
Problem: Patient Care Overview  Goal: Plan of Care Review  Outcome: Ongoing (interventions implemented as appropriate)  Pt remained free from falls or injuries this shift. Pt independent in repositioning. No skin breakdown noticed. Pt rested well through the night. Pt mostly oriented but cannot state year. At times has delayed response. Used call bell appropriately to use restroom. Able to ambulate but needs assistance due to gait instability secondary to recent strokes. Pt also visually impaired, reports she can see shadows.

## 2018-07-25 NOTE — PROGRESS NOTES
Dialysis tx completed. 3.5 hours. 3.5 liters removed. Needles pulled from left arm AVG. Hemostasis achieved. Gauze and tape to sites. Tolerated tx well. Report given to Mayank JACOME.

## 2018-07-25 NOTE — NURSING
Notified WAR room that telemetry box needed. [facility] out of boxes and will place pt on list. Will place pt on bedside DASH monitor to push labetalol. Charge nurse aware. Waiting for pharmacy to send labetalol

## 2018-07-26 VITALS
DIASTOLIC BLOOD PRESSURE: 65 MMHG | HEIGHT: 67 IN | WEIGHT: 156.5 LBS | RESPIRATION RATE: 18 BRPM | SYSTOLIC BLOOD PRESSURE: 142 MMHG | OXYGEN SATURATION: 97 % | BODY MASS INDEX: 24.56 KG/M2 | TEMPERATURE: 97 F | HEART RATE: 69 BPM

## 2018-07-26 PROBLEM — G89.29 CHRONIC LEFT SHOULDER PAIN: Status: ACTIVE | Noted: 2018-07-26

## 2018-07-26 PROBLEM — M25.512 CHRONIC LEFT SHOULDER PAIN: Status: ACTIVE | Noted: 2018-07-26

## 2018-07-26 LAB
ANION GAP SERPL CALC-SCNC: 9 MMOL/L
BASOPHILS # BLD AUTO: 0.04 K/UL
BASOPHILS NFR BLD: 0.6 %
BUN SERPL-MCNC: 55 MG/DL
CALCIUM SERPL-MCNC: 7.8 MG/DL
CHLORIDE SERPL-SCNC: 99 MMOL/L
CO2 SERPL-SCNC: 28 MMOL/L
CREAT SERPL-MCNC: 5.1 MG/DL
DIFFERENTIAL METHOD: ABNORMAL
EOSINOPHIL # BLD AUTO: 0.2 K/UL
EOSINOPHIL NFR BLD: 3.3 %
ERYTHROCYTE [DISTWIDTH] IN BLOOD BY AUTOMATED COUNT: 17.1 %
EST. GFR  (AFRICAN AMERICAN): 10.4 ML/MIN/1.73 M^2
EST. GFR  (NON AFRICAN AMERICAN): 9 ML/MIN/1.73 M^2
GLUCOSE SERPL-MCNC: 333 MG/DL
HBV CORE AB SERPL QL IA: NEGATIVE
HBV CORE IGM SERPL QL IA: NEGATIVE
HBV SURFACE AB SER-ACNC: POSITIVE M[IU]/ML
HBV SURFACE AG SERPL QL IA: NEGATIVE
HCT VFR BLD AUTO: 25.4 %
HGB BLD-MCNC: 7.5 G/DL
IMM GRANULOCYTES # BLD AUTO: 0.03 K/UL
IMM GRANULOCYTES NFR BLD AUTO: 0.4 %
LYMPHOCYTES # BLD AUTO: 1.2 K/UL
LYMPHOCYTES NFR BLD: 16.3 %
MAGNESIUM SERPL-MCNC: 2.4 MG/DL
MCH RBC QN AUTO: 28.2 PG
MCHC RBC AUTO-ENTMCNC: 29.5 G/DL
MCV RBC AUTO: 96 FL
MONOCYTES # BLD AUTO: 0.8 K/UL
MONOCYTES NFR BLD: 10.8 %
NEUTROPHILS # BLD AUTO: 4.8 K/UL
NEUTROPHILS NFR BLD: 68.6 %
NRBC BLD-RTO: 0 /100 WBC
PHOSPHATE SERPL-MCNC: 4.7 MG/DL
PLATELET # BLD AUTO: 160 K/UL
PMV BLD AUTO: 10.3 FL
POCT GLUCOSE: 171 MG/DL (ref 70–110)
POCT GLUCOSE: 184 MG/DL (ref 70–110)
POCT GLUCOSE: 199 MG/DL (ref 70–110)
POCT GLUCOSE: 305 MG/DL (ref 70–110)
POCT GLUCOSE: 334 MG/DL (ref 70–110)
POTASSIUM SERPL-SCNC: 4.9 MMOL/L
RBC # BLD AUTO: 2.66 M/UL
SODIUM SERPL-SCNC: 136 MMOL/L
WBC # BLD AUTO: 7.05 K/UL

## 2018-07-26 PROCEDURE — 90935 HEMODIALYSIS ONE EVALUATION: CPT

## 2018-07-26 PROCEDURE — 84100 ASSAY OF PHOSPHORUS: CPT

## 2018-07-26 PROCEDURE — 90935 HEMODIALYSIS ONE EVALUATION: CPT | Mod: ,,, | Performed by: INTERNAL MEDICINE

## 2018-07-26 PROCEDURE — 99239 HOSP IP/OBS DSCHRG MGMT >30: CPT | Mod: ,,, | Performed by: HOSPITALIST

## 2018-07-26 PROCEDURE — 36415 COLL VENOUS BLD VENIPUNCTURE: CPT

## 2018-07-26 PROCEDURE — 80048 BASIC METABOLIC PNL TOTAL CA: CPT

## 2018-07-26 PROCEDURE — 25000003 PHARM REV CODE 250: Performed by: NURSE PRACTITIONER

## 2018-07-26 PROCEDURE — 25000003 PHARM REV CODE 250: Performed by: INTERNAL MEDICINE

## 2018-07-26 PROCEDURE — 85025 COMPLETE CBC W/AUTO DIFF WBC: CPT

## 2018-07-26 PROCEDURE — 25000003 PHARM REV CODE 250: Performed by: HOSPITALIST

## 2018-07-26 PROCEDURE — 83735 ASSAY OF MAGNESIUM: CPT

## 2018-07-26 RX ORDER — SODIUM CHLORIDE 9 MG/ML
INJECTION, SOLUTION INTRAVENOUS ONCE
Status: COMPLETED | OUTPATIENT
Start: 2018-07-26 | End: 2018-07-26

## 2018-07-26 RX ORDER — SODIUM CHLORIDE 9 MG/ML
INJECTION, SOLUTION INTRAVENOUS
Status: DISCONTINUED | OUTPATIENT
Start: 2018-07-26 | End: 2018-07-26 | Stop reason: HOSPADM

## 2018-07-26 RX ORDER — DIPHENHYDRAMINE HCL 25 MG
25 CAPSULE ORAL EVERY 6 HOURS PRN
Status: DISCONTINUED | OUTPATIENT
Start: 2018-07-26 | End: 2018-07-26 | Stop reason: HOSPADM

## 2018-07-26 RX ORDER — HYDROCODONE BITARTRATE AND ACETAMINOPHEN 5; 325 MG/1; MG/1
1 TABLET ORAL 2 TIMES DAILY PRN
Qty: 14 TABLET | Refills: 0 | Status: SHIPPED | OUTPATIENT
Start: 2018-07-26 | End: 2018-08-02

## 2018-07-26 RX ADMIN — METOCLOPRAMIDE HYDROCHLORIDE 5 MG: 5 TABLET ORAL at 05:07

## 2018-07-26 RX ADMIN — INSULIN DETEMIR 20 UNITS: 100 INJECTION, SOLUTION SUBCUTANEOUS at 08:07

## 2018-07-26 RX ADMIN — CARVEDILOL 12.5 MG: 12.5 TABLET, FILM COATED ORAL at 08:07

## 2018-07-26 RX ADMIN — CLONIDINE HYDROCHLORIDE 0.2 MG: 0.2 TABLET ORAL at 08:07

## 2018-07-26 RX ADMIN — INSULIN ASPART 6 UNITS: 100 INJECTION, SOLUTION INTRAVENOUS; SUBCUTANEOUS at 08:07

## 2018-07-26 RX ADMIN — HYDRALAZINE HYDROCHLORIDE 100 MG: 25 TABLET, FILM COATED ORAL at 05:07

## 2018-07-26 RX ADMIN — PANTOPRAZOLE SODIUM 40 MG: 40 TABLET, DELAYED RELEASE ORAL at 08:07

## 2018-07-26 RX ADMIN — LEVETIRACETAM 250 MG: 250 TABLET ORAL at 08:07

## 2018-07-26 RX ADMIN — SODIUM CHLORIDE: 9 INJECTION, SOLUTION INTRAVENOUS at 01:07

## 2018-07-26 RX ADMIN — BUMETANIDE 1 MG: 1 TABLET ORAL at 08:07

## 2018-07-26 RX ADMIN — ROSUVASTATIN CALCIUM 20 MG: 20 TABLET, FILM COATED ORAL at 08:07

## 2018-07-26 RX ADMIN — DIPHENHYDRAMINE HYDROCHLORIDE 25 MG: 25 CAPSULE ORAL at 05:07

## 2018-07-26 RX ADMIN — NIFEDIPINE 90 MG: 30 TABLET, FILM COATED, EXTENDED RELEASE ORAL at 08:07

## 2018-07-26 RX ADMIN — SEVELAMER CARBONATE 1600 MG: 800 TABLET, FILM COATED ORAL at 08:07

## 2018-07-26 RX ADMIN — LOSARTAN POTASSIUM 100 MG: 50 TABLET ORAL at 08:07

## 2018-07-26 NOTE — PLAN OF CARE
[facility] following for DC needs. [facility] in communication with CM.    Patient would like to resume HH services with [facility].     [facility] sent HH orders to [facility] via [facility].     Yesenia Pena LMSW  [facility]  [id number]

## 2018-07-26 NOTE — DISCHARGE SUMMARY
"[facility] Hospital Medicine  Discharge Summary      Patient Name: Eufemia Haq  MRN: [id number]  Admission Date: 7/24/2018  Hospital Length of Stay: 1 days  Discharge Date and Time: No discharge date for patient encounter.  Attending Physician: Elio Godinez IV, MD   Discharging Provider: Elio Godinez IV, MD  Primary Care Provider: Primary Doctor [facility] Medicine Team: [facility] O Elio Godinez IV, MD    HPI:   53 year old female with ESRD on hemodialysis for 6 months, HTN, and DM2 who presented with altered mental status that began yesterday. Daughter is no longer at bedside so history is primarily by ED. Per ED note , "her daughter last saw her normal Monday morning. The patient did not wake up altered. The daughter reports she has been spacing out, repeating words, and clenching her fists. Patient took baclofen last night for shoulder pain but patient did not know the quantity. Patient is on hemodialysis Tuesday, Thursday, and Saturday with last dialysis on Saturday." Patient reports that she currently has no complaints, is aware that she is here because she wasn't acting right, is unsure the amount of baclofen that she took yesterday.    * No surgery found *      Hospital Course:   Admitted to hospital medicine. Mental status rapidly improved and underwent dialysis. She had an infectious workup which was unremarkable. Suspect baclofen dosing for shoulder pain and ESRD status lead to an accidental overdose. She was discharged home in stable condition with home health resumption and [facility]    To do at [facility]:  1) Restart ASA if no evidence of bleeding  2) Make sure gets to ID appt for Hep B (possibly this is all due to vaccination status but I ordered core and envelope serologies)  3) Refer to PCP to establish care for likely chronic opioids (see left shoulder pain below)      Consults:   Consults         Status Ordering Provider     Inpatient consult to " Nephrology  Once     Provider:  (Not yet assigned)    Completed MAXIMINO EDUARDO        Accidental medication overdose  -Improved  -Occurred after baclofen injection in [place] per patient, pt ESRD and baclofen renally cleared   -No evidence of infection, electrolytes relatively WNL, hyperglycemia on admit improved  -Continue to monitor  -Nephrology consulted for dialysis, appreciate help  -Infectious w/u negative so far, UA negative, CT head without acute etiology  -D/c home today, strict instructions to avoid baclofen     Uncontrolled type 2 DM with hyperglycemia with long term insulin use  Diabetes mellitus with renal manifestations, uncontrolled  -Improving, A1c >9  -Levemir 20u and aspart 6u AC while inpatient  -Accuchecks AC/HS     HTN, uncontrolled  -Stable  -BP 170s/100s in ED  -Labetolol 10mg X1  -Continue home antihypertensives  -PRN labetolol SBP >190 DBP >110     ESRD on HD  -dialysis per nephrology, appreciate help  -HD today before d/c then resume Tues, Thurs, Sat schedule     H/o CVA  -CT head negative  -Stable, monitor  -Holding ASA 2/2 GIB     H/o gastroparesis  -stable, monitor  -Continue reglan TID AC     H/o GIB  -Gastric ulcer on EGD, H. Pylori on bx negative  -PPI BID  -Stable, Hgb stable, transfuse for Hgb < 7  -ASA held till 7/27     Chronic left shoulder pain  -Chronic stable  -She got baclofen for this pain which had this complication and side effect  -We discussed all options and unfortunately she has several contraindications and reactions to medications including NSAIDs (ESRD, GIB), gabapentin (allergy), tramadol (seizure). She reports percocet worked previously but she is concerned for addiction which I am as well. We discussed tylenol which she states does not work well. Her primary concern is pain at night and states she is ok most days throughout day. I believe unfortunately her safest option may be opioids with percocet or similar drug. We discussed risks of overdose including  somnolence and respiratory failure and I prescribed a weeks worth. She will need to establish care to continue treatment in outpatient setting with possible pain contract if needed.     Final Active Diagnoses:    Diagnosis Date Noted POA    PRINCIPAL PROBLEM:  Accidental medication overdose [T50.901A] 07/25/2018 Yes    Metabolic encephalopathy [G93.41] 05/11/2016 Yes    Diabetes mellitus with renal manifestations, uncontrolled [E11.29, E11.65] 04/03/2015 Yes    DM gastroparesis [E11.43, K31.84] 05/14/2015 Yes    Essential hypertension [I10] 12/15/2014 Yes    ESRD on dialysis [N18.6, Z99.2] 08/26/2017 Not Applicable    History of CVA (cerebrovascular accident) [Z86.73] 06/05/2018 Not Applicable    History of GI bleed [Z87.19] 07/25/2018 Not Applicable    Chronic left shoulder pain [M25.512, G89.29] 07/26/2018 Yes    Uncontrolled type 2 diabetes mellitus with hyperglycemia, with long-term current use of insulin [E11.65, Z79.4] 07/25/2018 Not Applicable    Altered mental status [R41.82] 07/25/2018 Yes      Problems Resolved During this Admission:    Diagnosis Date Noted Date Resolved POA       Discharged Condition: good    Disposition: Home-Health Care [facility]    Follow Up:  Follow-up Information     [facility].    Specialty:  Home Health Services  Why:  Home Health  Contact information:  [address] [address]  [phone]             [facility] On 8/1/2018.    Why:  Wednesday 8/1 @ 1pm  Contact information:  [address]  [facility] [address]  [phone]             [facility] On 8/8/2018.    Specialty:  Infectious Diseases  Why:  Wednesday 8/8 @ 10am with ROLANDA Ochoa  Contact information:  [address]  [facility] [address] [phone]  Additional information:  1st Floor - [facility]               Patient Instructions:     Diet renal     Notify your health care provider if you experience any of the  following:  increased confusion or weakness     Notify your health care provider if you experience any of the following:  persistent dizziness, light-headedness, or visual disturbances     Notify your health care provider if you experience any of the following:  worsening rash     Notify your health care provider if you experience any of the following:  severe persistent headache     Notify your health care provider if you experience any of the following:  difficulty breathing or increased cough     Notify your health care provider if you experience any of the following:  redness, tenderness, or signs of infection (pain, swelling, redness, odor or green/yellow discharge around incision site)     Notify your health care provider if you experience any of the following:  severe uncontrolled pain     Notify your health care provider if you experience any of the following:  persistent nausea and vomiting or diarrhea     Notify your health care provider if you experience any of the following:  temperature >100.4     Activity as tolerated         Significant Diagnostic Studies: Labs:   CMP   Recent Labs  Lab 07/24/18  1311 07/25/18  0343 07/26/18  0336    143 136   K 5.3* 5.2* 4.9    106 99   CO2 25 21* 28   * 101 333*   BUN 79* 87* 55*   CREATININE 7.6* 7.7* 5.1*   CALCIUM 8.3* 8.3* 7.8*   PROT 6.5  --   --    ALBUMIN 3.1*  --   --    BILITOT 0.4  --   --    ALKPHOS 197*  --   --    AST 10  --   --    ALT 8*  --   --    ANIONGAP 11 16 9   ESTGFRAFRICA 6.4* 6.3* 10.4*   EGFRNONAA 5.6* 5.5* 9.0*    and CBC   Recent Labs  Lab 07/25/18  0343 07/25/18  1305 07/26/18  0336   WBC 8.29 10.15 7.05   HGB 7.9* 8.7* 7.5*   HCT 26.7* 28.8* 25.4*    181 160       Pending Diagnostic Studies:     Procedure Component Value Units Date/Time    Hepatitis B core antibody, IgM [[id number]] Collected:  07/25/18 1305    Order Status:  Sent Lab Status:  In process Updated:  07/25/18 6540    Specimen:  Blood from Blood      Narrative:       Collection has been rescheduled by RDM at 7/25/2018 08:37 Reason:   Patient unavailable, will go back   Collection has been rescheduled by RDM at 7/25/2018 10:51 Reason:   Patient unavailable    Hepatitis B core antibody, total [[id number]] Collected:  07/25/18 1305    Order Status:  Sent Lab Status:  In process Updated:  07/25/18 1533    Specimen:  Blood from Blood     Narrative:       Collection has been rescheduled by RDM at 7/25/2018 08:37 Reason:   Patient unavailable, will go back   Collection has been rescheduled by RDM at 7/25/2018 10:51 Reason:   Patient unavailable    Hepatitis B e antibody [[id number]] Collected:  07/25/18 1305    Order Status:  Sent Lab Status:  In process Updated:  07/25/18 1355    Specimen:  Blood from Blood     Narrative:       Collection has been rescheduled by RDM at 7/25/2018 08:37 Reason:   Patient unavailable, will go back   Collection has been rescheduled by RDM at 7/25/2018 10:51 Reason:   Patient unavailable    Hepatitis B e antigen [[id number]] Collected:  07/25/18 1305    Order Status:  Sent Lab Status:  In process Updated:  07/25/18 1355    Specimen:  Blood from Blood     Narrative:       Collection has been rescheduled by RDM at 7/25/2018 08:37 Reason:   Patient unavailable, will go back   Collection has been rescheduled by RDM at 7/25/2018 10:51 Reason:   Patient unavailable    Hepatitis B surface antibody [[id number]] Collected:  07/25/18 1305    Order Status:  Sent Lab Status:  In process Updated:  07/25/18 1533    Specimen:  Blood from Blood     Narrative:       Collection has been rescheduled by RDM at 7/25/2018 08:37 Reason:   Patient unavailable, will go back   Collection has been rescheduled by RDM at 7/25/2018 10:51 Reason:   Patient unavailable    Hepatitis B surface antigen [[id number]] Collected:  07/25/18 1305    Order Status:  Sent Lab Status:  In process Updated:  07/25/18 1533    Specimen:  Blood from Blood     Narrative:       Collection has  been rescheduled by RDM at 7/25/2018 08:37 Reason:   Patient unavailable, will go back   Collection has been rescheduled by RDM at 7/25/2018 10:51 Reason:   Patient unavailable         Medications:  Reconciled Home Medications:      Medication List      START taking these medications    HYDROcodone-acetaminophen 5-325 mg per tablet  Commonly known as:  NORCO  Take 1 tablet by mouth 2 (two) times daily as needed.        CONTINUE taking these medications    aspirin 325 MG tablet  Take 1 tablet (325 mg total) by mouth once daily. Hold for 2 weeks following discharge     blood sugar diagnostic Strp  To use as directed with True results meter and monitor BS 6 times daily - fluctuating BS     bumetanide 1 MG tablet  Commonly known as:  BUMEX  Take 1 mg by mouth 2 (two) times daily.     carvedilol 12.5 MG tablet  Commonly known as:  COREG  Take 12.5 mg by mouth 2 (two) times daily.     cloNIDine 0.2 MG tablet  Commonly known as:  CATAPRES  Take 0.2 mg by mouth 2 (two) times daily.     epoetin jacques 20,000 unit/mL injection  Commonly known as:  PROCRIT  Inject 1 mL (20,000 Units total) into the skin every Tues, Thurs, Sat.     hydrALAZINE 100 MG tablet  Commonly known as:  APRESOLINE  Take 100 mg by mouth every 8 (eight) hours.     insulin glargine (TOUJEO) 300 unit/mL (1.5 mL) Inpn pen  Commonly known as:  TOUJEO  Inject 26 Units into the skin once daily.     insulin lispro 100 unit/mL injection  Commonly known as:  HUMALOG  Inject 8 Units into the skin 3 (three) times daily before meals.     levETIRAcetam 250 MG Tab  Commonly known as:  KEPPRA  Take 250 mg by mouth 2 (two) times daily.     losartan 25 MG tablet  Commonly known as:  COZAAR  Take 4 tablets (100 mg total) by mouth once daily.     metoclopramide HCl 10 MG tablet  Commonly known as:  REGLAN  Take 1 tablet (10 mg total) by mouth 3 (three) times daily before meals.     NIFEdipine 90 MG Tbsr  Commonly known as:  ADALAT CC  Take 90 mg by mouth once daily.      ondansetron 4 MG Tbdl  Commonly known as:  ZOFRAN-ODT  Take 1 tablet (4 mg total) by mouth every 8 (eight) hours as needed.     ONETOUCH DELICA LANCETS 33 gauge Misc  Generic drug:  lancets  TEST BLOOD SUGAR TID     pantoprazole 40 MG tablet  Commonly known as:  PROTONIX  Take 1 tablet (40 mg total) by mouth 2 (two) times daily.     rosuvastatin 20 MG tablet  Commonly known as:  CRESTOR  Take 1 tablet (20 mg total) by mouth once daily.     sevelamer carbonate 800 mg Tab  Commonly known as:  RENVELA  Take 2 tablets (1,600 mg total) by mouth 3 (three) times daily with meals.            Indwelling Lines/Drains at time of discharge:   Lines/Drains/Airways     Central Venous Catheter Line                 Hemodialysis Catheter right subclavian -- days          Drain                 Hemodialysis AV Graft Left upper arm -- days         Hemodialysis AV Graft Left upper arm -- days                Time spent on the discharge of patient: 35 minutes  Patient was seen and examined on the date of discharge and determined to be suitable for discharge.      Elio Godinez IV, MD  Department of Hospital Medicine  [facility]

## 2018-07-26 NOTE — PLAN OF CARE
Problem: Patient Care Overview  Goal: Plan of Care Review  Outcome: Ongoing (interventions implemented as appropriate)  Pt AAOx4, calm and cooperative to care. BG management  in progress with Q4hr BG monitoring and Novolog and Levemir insulin pen. No episodes of hypoglycemia on this shift. LAVF positive bruit and thrill. Fall precaution maintained. Hourly rounds done. No fall occurrence as of this time of the shift. Call light within easy reach. All needs attended. No c/o voiced.

## 2018-07-26 NOTE — PROGRESS NOTES
AVS d/c instructions given, voices understanding. PIV to left neck d/allen with cath intact, gauze dressing applied. Waiting on family to arrive at bedside for . Will follow up.

## 2018-07-26 NOTE — PROGRESS NOTES
HD treatment complete. Duration of treatment 3 hours and 2 L removed. Treatment was tolerated well and no complications with access to left upper arm. Needles removed and hemostasis achieved. Dressing intact and no drainage noted. Thrill and bruit present.

## 2018-07-26 NOTE — PLAN OF CARE
Pt d/c to home w/ h/h - [facility] h/h.     07/26/18 1023   Final Note   Assessment Type Final Discharge Note   Discharge Disposition Home-Health   What phone number can be called within the next 1-3 days to see how you are doing after discharge? [phone]   Hospital Follow Up  Appt(s) scheduled? Yes   Right Care Referral Info   Post Acute Recommendation Home-care   Referral Type h/h   Facility Name [facility] h/h

## 2018-07-26 NOTE — PROGRESS NOTES
[facility] NOTE    Eufemia Haq is a 53 y.o. female currently on hemodialysis for removal of uremic toxins and volume.    Labs have been reviewed and the dialysate bath has been adjusted.    There are no symptoms of hypotension, chest pain, dyspnea, nausea or vomiting.    Labs:        Recent Labs  Lab 07/24/18  1311 07/25/18  0343 07/26/18  0336    143 136   K 5.3* 5.2* 4.9    106 99   CO2 25 21* 28   BUN 79* 87* 55*   CREATININE 7.6* 7.7* 5.1*   CALCIUM 8.3* 8.3* 7.8*   PHOS  --  5.1* 4.7*         Recent Labs  Lab 07/25/18  0343 07/25/18  1305 07/26/18  0336   WBC 8.29 10.15 7.05   HGB 7.9* 8.7* 7.5*   HCT 26.7* 28.8* 25.4*    181 160       Assessment/Plan:    HD today for removal of uremic toxins and volume.  UF 2L

## 2018-07-26 NOTE — PROGRESS NOTES
"Hospital Medicine  Progress Notes    Team: [facility] HOSP MED O Elio Godinez IV, MD  Admit Date: 7/24/2018  CHELSIE  7/26/2018  Principal Problem:  Accidental medication overdose   Patient information was obtained from past medical records and ER records.   Primary care Physician: Primary Doctor No  Code status: Full Code    HPI:   53 year old female with ESRD on hemodialysis for 6 months, HTN, and DM2 who presented with altered mental status that began yesterday. Daughter is no longer at bedside so history is primarily by ED. Per ED note , "her daughter last saw her normal Monday morning. The patient did not wake up altered. The daughter reports she has been spacing out, repeating words, and clenching her fists. Patient took baclofen last night for shoulder pain but patient did not know the quantity. Patient is on hemodialysis Tuesday, Thursday, and Saturday with last dialysis on Saturday." Patient reports that she currently has no complaints, is aware that she is here because she wasn't acting right, is unsure the amount of baclofen that she took yesterday.    Interval History: No acute events. Had HD yesterday. Per daughter and patient mental status back to baseline. Opioid started for pain relief of chronic left shoulder pain which she reported helped substantially. She denies any fevers, chills, sweats, CP, shortness of breath, nausea, vomiting.     PEx  Temp:  [96.5 °F (35.8 °C)-98.6 °F (37 °C)]   Pulse:  [67-87]   Resp:  [14-20]   BP: (106-204)/(52-88)   SpO2:  [92 %-97 %]   Body mass index is 24.52 kg/m².     General appearance: no distress, well developed  Mental status: Alert and oriented x 3  HEENT:  conjunctivae/corneas clear, PERRL  Neck: supple, no tenderness  Pulm:   normal respiratory effort, CTA B, no c/w/r  Card: RRR, S1, S2 normal, no murmur, click, rub or gallop  Abd: soft, NT, ND, BS present; no masses, no organomegaly  Ext: no c/c/, + edema to shins bilaterally  Skin: color, texture, turgor normal. "   Neuro: CN II-XII grossly intact, no focal numbness or weakness        Intake/Output Summary (Last 24 hours) at 07/26/18 1033  Last data filed at 07/25/18 1200   Gross per 24 hour   Intake              600 ml   Output             4300 ml   Net            -3700 ml       Recent Labs  Lab 07/25/18  0343 07/25/18  1305 07/26/18  0336   WBC 8.29 10.15 7.05   HGB 7.9* 8.7* 7.5*   HCT 26.7* 28.8* 25.4*    181 160       Recent Labs  Lab 07/24/18  1311 07/25/18  0343 07/26/18  0336    143 136   K 5.3* 5.2* 4.9    106 99   CO2 25 21* 28   BUN 79* 87* 55*   CREATININE 7.6* 7.7* 5.1*   * 101 333*   CALCIUM 8.3* 8.3* 7.8*   MG  --  3.1* 2.4   PHOS  --  5.1* 4.7*       Recent Labs  Lab 07/24/18  1311   ALKPHOS 197*   ALT 8*   AST 10   ALBUMIN 3.1*   PROT 6.5   BILITOT 0.4        Recent Labs  Lab 07/25/18  1326 07/25/18  1728 07/25/18  2153 07/26/18  0029 07/26/18  0425 07/26/18  0842   POCTGLUCOSE 89 139* 204* 334* 305* 199*     No results for input(s): CPK, CPKMB, MB, TROPONINI in the last 72 hours.    Recent Labs      07/24/18   1311   LACTATE  1.9        Active Hospital Problems    Diagnosis  POA    *Accidental medication overdose [T50.901A]  Yes     Priority: 1 - High    Metabolic encephalopathy [G93.41]  Yes     Priority: 2     Diabetes mellitus with renal manifestations, uncontrolled [E11.29, E11.65]  Yes     Priority: 3     DM gastroparesis [E11.43, K31.84]  Yes     Priority: 4     Essential hypertension [I10]  Yes     Priority: 5     ESRD on dialysis [N18.6, Z99.2]  Not Applicable     Priority: 7     History of CVA (cerebrovascular accident) [Z86.73]  Not Applicable     Priority: 9     History of GI bleed [Z87.19]  Not Applicable     Priority: 21     Uncontrolled type 2 diabetes mellitus with hyperglycemia, with long-term current use of insulin [E11.65, Z79.4]  Not Applicable    Altered mental status [R41.82]  Yes      Resolved Hospital Problems    Diagnosis Date Resolved POA   No  resolved problems to display.       Overview  53 year old female with ESRD T/Th/Sa, HTN, DM2, CVAs, who presented with altered mental status likely 2/2 baclofen    Assessment and Plan for Problems addressed today:    Accidental medication overdose  -Improved  -Occurred after baclofen injection in [place] per patient, pt ESRD and baclofen renally cleared   -No evidence of infection, electrolytes relatively WNL, hyperglycemia on admit improved  -Continue to monitor  -Nephrology consulted for dialysis, appreciate help  -Infectious w/u negative so far, UA negative, CT head without acute etiology  -D/c home today, strict instructions to avoid baclofen    Uncontrolled type 2 DM with hyperglycemia with long term insulin use  Diabetes mellitus with renal manifestations, uncontrolled  -Improving, A1c >9  -Levemir 20u and aspart 6u AC while inpatient  -Accuchecks AC/HS    HTN, uncontrolled  -Stable  -BP 170s/100s in ED  -Labetolol 10mg X1  -Continue home antihypertensives  -PRN labetolol SBP >190 DBP >110    ESRD on HD  -dialysis per nephrology, appreciate help  -HD today before d/c then resume Tues, Thurs, Sat schedule    H/o CVA  -CT head negative  -Stable, monitor  -Holding ASA 2/2 GIB    H/o gastroparesis  -stable, monitor  -Continue reglan TID AC    H/o GIB  -Gastric ulcer on EGD, H. Pylori on bx negative  -PPI BID  -Stable, Hgb stable, transfuse for Hgb < 7  -ASA held till 7/27    Chronic left shoulder pain  -Chronic stable  -She got baclofen for this pain which had this complication and side effect  -We discussed all options and unfortunately she has several contraindications and reactions to medications including NSAIDs (ESRD, GIB), gabapentin (allergy), tramadol (seizure). She reports percocet worked previously but she is concerned for addiction which I am as well. We discussed tylenol which she states does not work well. Her primary concern is pain at night and states she is ok most days throughout day. I believe  unfortunately her safest option may be opioids with percocet or similar drug. We discussed risks of overdose including somnolence and respiratory failure and I prescribed a weeks worth. She will need to establish care to continue treatment in outpatient setting with possible pain contract if needed.     DVT PPx: SCD given recent GIB  Dispo: D/c home today    Elio Godinez MD  Department of Hospital Medicine  [facility]  [phone]

## 2018-07-26 NOTE — HPI
"53 year old female with ESRD on hemodialysis for 6 months, HTN, and DM2 who presented with altered mental status that began yesterday. Daughter is no longer at bedside so history is primarily by ED. Per ED note , "her daughter last saw her normal Monday morning. The patient did not wake up altered. The daughter reports she has been spacing out, repeating words, and clenching her fists. Patient took baclofen last night for shoulder pain but patient did not know the quantity. Patient is on hemodialysis Tuesday, Thursday, and Saturday with last dialysis on Saturday." Patient reports that she currently has no complaints, is aware that she is here because she wasn't acting right, is unsure the amount of baclofen that she took yesterday.  "

## 2018-07-27 ENCOUNTER — PATIENT OUTREACH (OUTPATIENT)
Dept: ADMINISTRATIVE | Facility: CLINIC | Age: 53
End: 2018-07-27

## 2018-07-27 LAB
HBV E AB SER QL: NORMAL
HBV E AG SERPL QL IA: NORMAL

## 2018-07-27 NOTE — PROGRESS NOTES
Started TCC call. Patient states that daughter was not available to answer questions about medicine and she could not tell me about meds .Patient decided it was too many question and did not want to complete the call.      C3 nurse attempted to contact patient for a TCC post hospital discharge follow-up call. The patient declined call at this time.

## 2018-07-30 LAB
BACTERIA BLD CULT: NORMAL
BACTERIA BLD CULT: NORMAL

## 2018-08-03 ENCOUNTER — HOSPITAL ENCOUNTER (EMERGENCY)
Facility: HOSPITAL | Age: 53
Discharge: HOME | End: 2018-08-03
Attending: EMERGENCY MEDICINE
Payer: MEDICARE

## 2018-08-03 VITALS
DIASTOLIC BLOOD PRESSURE: 63 MMHG | HEART RATE: 66 BPM | OXYGEN SATURATION: 94 % | BODY MASS INDEX: 21.97 KG/M2 | TEMPERATURE: 99 F | HEIGHT: 67 IN | WEIGHT: 140 LBS | RESPIRATION RATE: 13 BRPM | SYSTOLIC BLOOD PRESSURE: 136 MMHG

## 2018-08-03 DIAGNOSIS — E16.2 HYPOGLYCEMIA: ICD-10-CM

## 2018-08-03 DIAGNOSIS — N30.01 ACUTE CYSTITIS WITH HEMATURIA: Primary | ICD-10-CM

## 2018-08-03 LAB
ALBUMIN SERPL BCP-MCNC: 2.8 G/DL
ALP SERPL-CCNC: 319 U/L
ALT SERPL W/O P-5'-P-CCNC: 26 U/L
ANION GAP SERPL CALC-SCNC: 11 MMOL/L
AST SERPL-CCNC: 28 U/L
BACTERIA #/AREA URNS AUTO: ABNORMAL /HPF
BASOPHILS # BLD AUTO: 0.05 K/UL
BASOPHILS NFR BLD: 0.7 %
BILIRUB SERPL-MCNC: 0.3 MG/DL
BILIRUB UR QL STRIP: NEGATIVE
BUN SERPL-MCNC: 43 MG/DL
CALCIUM SERPL-MCNC: 8 MG/DL
CHLORIDE SERPL-SCNC: 94 MMOL/L
CLARITY UR REFRACT.AUTO: ABNORMAL
CO2 SERPL-SCNC: 31 MMOL/L
COLOR UR AUTO: ABNORMAL
CREAT SERPL-MCNC: 4.1 MG/DL
DIFFERENTIAL METHOD: ABNORMAL
EOSINOPHIL # BLD AUTO: 0.2 K/UL
EOSINOPHIL NFR BLD: 3.3 %
ERYTHROCYTE [DISTWIDTH] IN BLOOD BY AUTOMATED COUNT: 16.1 %
EST. GFR  (AFRICAN AMERICAN): 13.5 ML/MIN/1.73 M^2
EST. GFR  (NON AFRICAN AMERICAN): 11.7 ML/MIN/1.73 M^2
GLUCOSE SERPL-MCNC: 120 MG/DL
GLUCOSE UR QL STRIP: ABNORMAL
HCT VFR BLD AUTO: 23.7 %
HGB BLD-MCNC: 7.2 G/DL
HGB UR QL STRIP: ABNORMAL
HYALINE CASTS UR QL AUTO: 0 /LPF
IMM GRANULOCYTES # BLD AUTO: 0.04 K/UL
IMM GRANULOCYTES NFR BLD AUTO: 0.6 %
KETONES UR QL STRIP: NEGATIVE
LEUKOCYTE ESTERASE UR QL STRIP: ABNORMAL
LYMPHOCYTES # BLD AUTO: 0.9 K/UL
LYMPHOCYTES NFR BLD: 13 %
MAGNESIUM SERPL-MCNC: 2.1 MG/DL
MCH RBC QN AUTO: 28.2 PG
MCHC RBC AUTO-ENTMCNC: 30.4 G/DL
MCV RBC AUTO: 93 FL
MICROSCOPIC COMMENT: ABNORMAL
MONOCYTES # BLD AUTO: 1 K/UL
MONOCYTES NFR BLD: 13.8 %
NEUTROPHILS # BLD AUTO: 4.7 K/UL
NEUTROPHILS NFR BLD: 68.6 %
NITRITE UR QL STRIP: NEGATIVE
NRBC BLD-RTO: 0 /100 WBC
PH UR STRIP: 5 [PH] (ref 5–8)
PLATELET # BLD AUTO: 156 K/UL
PMV BLD AUTO: 10.6 FL
POCT GLUCOSE: 83 MG/DL (ref 70–110)
POTASSIUM SERPL-SCNC: 4.1 MMOL/L
PROT SERPL-MCNC: 5.8 G/DL
PROT UR QL STRIP: ABNORMAL
RBC # BLD AUTO: 2.55 M/UL
RBC #/AREA URNS AUTO: >100 /HPF (ref 0–4)
SODIUM SERPL-SCNC: 136 MMOL/L
SP GR UR STRIP: 1.03 (ref 1–1.03)
SQUAMOUS #/AREA URNS AUTO: 15 /HPF
TROPONIN I SERPL DL<=0.01 NG/ML-MCNC: 0.02 NG/ML
URN SPEC COLLECT METH UR: ABNORMAL
UROBILINOGEN UR STRIP-ACNC: NEGATIVE EU/DL
WBC # BLD AUTO: 6.9 K/UL
WBC #/AREA URNS AUTO: 68 /HPF (ref 0–5)
YEAST UR QL AUTO: ABNORMAL

## 2018-08-03 PROCEDURE — 82962 GLUCOSE BLOOD TEST: CPT

## 2018-08-03 PROCEDURE — 87077 CULTURE AEROBIC IDENTIFY: CPT

## 2018-08-03 PROCEDURE — 99284 EMERGENCY DEPT VISIT MOD MDM: CPT | Mod: 25

## 2018-08-03 PROCEDURE — 84484 ASSAY OF TROPONIN QUANT: CPT

## 2018-08-03 PROCEDURE — 87088 URINE BACTERIA CULTURE: CPT

## 2018-08-03 PROCEDURE — 87186 SC STD MICRODIL/AGAR DIL: CPT

## 2018-08-03 PROCEDURE — 83735 ASSAY OF MAGNESIUM: CPT

## 2018-08-03 PROCEDURE — 93010 ELECTROCARDIOGRAM REPORT: CPT | Mod: ,,, | Performed by: INTERNAL MEDICINE

## 2018-08-03 PROCEDURE — 96374 THER/PROPH/DIAG INJ IV PUSH: CPT

## 2018-08-03 PROCEDURE — 81001 URINALYSIS AUTO W/SCOPE: CPT

## 2018-08-03 PROCEDURE — 80053 COMPREHEN METABOLIC PANEL: CPT

## 2018-08-03 PROCEDURE — 63600175 PHARM REV CODE 636 W HCPCS: Performed by: EMERGENCY MEDICINE

## 2018-08-03 PROCEDURE — 87086 URINE CULTURE/COLONY COUNT: CPT

## 2018-08-03 PROCEDURE — 85025 COMPLETE CBC W/AUTO DIFF WBC: CPT

## 2018-08-03 PROCEDURE — 93005 ELECTROCARDIOGRAM TRACING: CPT

## 2018-08-03 PROCEDURE — 99284 EMERGENCY DEPT VISIT MOD MDM: CPT | Mod: ,,, | Performed by: EMERGENCY MEDICINE

## 2018-08-03 RX ORDER — CEFTRIAXONE 1 G/1
1 INJECTION, POWDER, FOR SOLUTION INTRAMUSCULAR; INTRAVENOUS
Status: COMPLETED | OUTPATIENT
Start: 2018-08-03 | End: 2018-08-03

## 2018-08-03 RX ORDER — CEPHALEXIN 500 MG/1
500 CAPSULE ORAL EVERY 12 HOURS
Qty: 14 CAPSULE | Refills: 0 | Status: SHIPPED | OUTPATIENT
Start: 2018-08-03 | End: 2018-08-10

## 2018-08-03 RX ADMIN — CEFTRIAXONE SODIUM 1 G: 1 INJECTION, POWDER, FOR SOLUTION INTRAMUSCULAR; INTRAVENOUS at 07:08

## 2018-08-03 NOTE — ED NOTES
Pt. Resting  on stretcher, nad noted,VSS, resp even and unlabored, family at bedside, bed low and locked, side rails up to 2.

## 2018-08-03 NOTE — ED TRIAGE NOTES
Eufemia Haq, a 53 y.o. female presents to the ED with reported low blood sugar of 65 that was accompanied by dizziness and leg cramping. She is also complaining of vaginal burning and itching with malodorous urine that has been present for 2 days.       Chief Complaint   Patient presents with    Hypoglycemia     pt reports cbg this morning 65- pt repots eating a sandwhich and drinking orange juice - cbg in triage 83 - pt receives dialysis tues , thursday , sat - pt reports uti but makes no urine      Review of patient's allergies indicates:   Allergen Reactions    Ciprofloxacin (bulk) Itching    Gabapentin Other (See Comments)     Seizure    Latex Itching and Other (See Comments)     Very low Oxygen    Tramadol Other (See Comments)     Seizure    Vancomycin Shortness Of Breath and Itching    Vancomycin analogues Other (See Comments)     Seizure    Neurontin [gabapentin] Other (See Comments)     Seizure like activity    Niacin Itching and Other (See Comments)     Burning      Bactrim [sulfamethoxazole-trimethoprim] Rash    Latex, natural rubber Rash    Niacin preparations Rash     Past Medical History:   Diagnosis Date    Acute renal failure superimposed on stage 3 chronic kidney disease 5/11/2016    Anemia     during pregnancys    Anemia 1/26/2014    Anemia in chronic renal disease     Anemia of chronic kidney failure 8/27/2017    Arthritis     neuropathy hands feet and legs//    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    Blood transfusion     Chronic pain     Closed head injury     Diabetes mellitus     Diabetes mellitus     Diabetes mellitus type I     Diabetic macular edema, both eyes 3/31/2014    Diabetic retinopathy     ESRD (end stage renal disease) on dialysis     H/O insertion of insulin pump 3/9/2015    Hyperlipidemia     Hypertension     patient states that when her blood sugar increases her blood pressure increases    Hypertension     Hypertensive retinopathy of both eyes  3/31/2014    Insulin pump fitting or adjustment 11/7/2014    Left-sided weakness 6/5/2018    Medication side effects - Mobic 3/9/15 3/11/2015    Midline low back pain without sciatica     Neuropathy     Pneumonia     Proliferative diabetic retinopathy, both eyes 3/31/2014    Renal disorder     Seizures     when sugar is high, does not quite remember    Stroke     Feb 2018    Syncope and collapse, onset 1992 5/11/2015    Vitamin D deficiency     Vitreous hemorrhage 9/2/2017

## 2018-08-03 NOTE — ED PROVIDER NOTES
Encounter Date: 8/3/2018    SCRIBE #1 NOTE: I, Carmela Dickey, am scribing for, and in the presence of,  Dr. Bales. I have scribed the entire note.       History     Chief Complaint   Patient presents with    Hypoglycemia     pt reports cbg this morning 65- pt repots eating a sandwhich and drinking orange juice - cbg in triage 83 - pt receives dialysis tues , thursday , sat - pt reports uti but makes no urine      Time seen by provider: 7:00 AM    This is a 53 y.o. female with medical conditions including stroke, ESRD, DM, HTN, seizure disorder, and depression, who presents with complaint of drowsiness, hyperglycemia and dysuria. Patient noticed first low blood sugar reading of 65 around 4:00AM. Patient states levels improved post PO intake of juice to 84 PTA. Patient states drowsiness was unchanged with increase in blood sugar level. Patient reports blood sugar is usually greater than 500. Patient states she is complaint with insulin, taking doses 3 times a day. Patient missed her morning dosage due to low blood sugar reading. Patient reports she was drowsy when taking first blood sugar reading. Patient also complains of right eye cloudy vision, which is unchanged from eye surgery performed months ago, and dysuria with increased burning and itching vaginal sensation for the past few days with malodorous smell. Patient denies recent dietary changes, n/v/d, cp, SOB, fever, chills, abd pain, noted rashes, or numbness/ tingling extremities. Patient states she has not had any issues with dialysis, last being yesterday.      The history is provided by the patient.     Review of patient's allergies indicates:   Allergen Reactions    Ciprofloxacin (bulk) Itching    Gabapentin Other (See Comments)     Seizure    Latex Itching and Other (See Comments)     Very low Oxygen    Tramadol Other (See Comments)     Seizure    Vancomycin Shortness Of Breath and Itching    Vancomycin analogues Other (See Comments)      Seizure    Baclofen     Neurontin [gabapentin] Other (See Comments)     Seizure like activity    Niacin Itching and Other (See Comments)     Burning      Latex, natural rubber Rash    Niacin preparations Rash     Past Medical History:   Diagnosis Date    Acute renal failure superimposed on stage 3 chronic kidney disease 2016    Anemia     during pregnancys    Anemia 2014    Anemia in chronic renal disease     Anemia of chronic kidney failure 2017    Arthritis     neuropathy hands feet and legs//    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    Blood transfusion     Chronic pain     Closed head injury     Diabetes mellitus     Diabetes mellitus     Diabetes mellitus type I     Diabetic macular edema, both eyes 3/31/2014    Diabetic retinopathy     ESRD (end stage renal disease) on dialysis     H/O insertion of insulin pump 3/9/2015    Hyperlipidemia     Hypertension     patient states that when her blood sugar increases her blood pressure increases    Hypertension     Hypertensive retinopathy of both eyes 3/31/2014    Insulin pump fitting or adjustment 2014    Left-sided weakness 2018    Medication side effects - Mobic 3/9/15 3/11/2015    Midline low back pain without sciatica     Neuropathy     Pneumonia     Proliferative diabetic retinopathy, both eyes 3/31/2014    Renal disorder     Seizures     when sugar is high, does not quite remember    Stroke     2018    Syncope and collapse, onset 1992 2015    Vitamin D deficiency     Vitreous hemorrhage 2017     Past Surgical History:   Procedure Laterality Date     SECTION      x2     SECTION, CLASSIC      x 2    dialysis graft Left     ESOPHAGOGASTRODUODENOSCOPY N/A 2018    Procedure: EGD (ESOPHAGOGASTRODUODENOSCOPY);  Surgeon: Lucio Hayden MD;  Location: [address]);  Service: Endoscopy;  Laterality: N/A;    EYE SURGERY      HYSTERECTOMY       Family History   Problem  Relation Age of Onset    Diabetes Mother     Heart disease Mother     Blindness Mother         diabetes    Hypertension Mother     Diabetes Father     Asthma Father     Hypertension Father     Thyroid disease Sister     Breast cancer Daughter     Diabetes Sister     Stroke Maternal Uncle     Glaucoma Maternal Grandmother     Blindness Maternal Grandmother     Cataracts Maternal Grandmother     Hypertension Maternal Grandmother     Cancer Cousin     Amblyopia Neg Hx     Macular degeneration Neg Hx     Retinal detachment Neg Hx     Strabismus Neg Hx     Colon cancer Neg Hx     Ovarian cancer Neg Hx      Social History   Substance Use Topics    Smoking status: Former Smoker    Smokeless tobacco: Never Used    Alcohol use No     Review of Systems   Constitutional: Positive for fatigue. Negative for chills, diaphoresis and fever.   HENT: Negative for facial swelling, rhinorrhea, sinus pressure, sore throat, trouble swallowing and voice change.    Eyes: Positive for visual disturbance (cloudiness to right eye).   Respiratory: Negative for cough, choking, chest tightness and shortness of breath.    Cardiovascular: Negative for chest pain.   Gastrointestinal: Negative for abdominal pain, diarrhea, nausea and vomiting.   Genitourinary: Positive for dysuria. Negative for flank pain and hematuria.        Vaginal itching/burning  Malodorous smell   Musculoskeletal: Negative for back pain and gait problem.   Skin: Negative for rash.   Neurological: Negative for seizures, light-headedness, numbness and headaches.   Psychiatric/Behavioral: Negative for behavioral problems and confusion.       Physical Exam     Initial Vitals [08/03/18 0606]   BP Pulse Resp Temp SpO2   (!) 112/56 71 18 99.3 °F (37.4 °C) (!) 94 %      MAP       --         Physical Exam    Nursing note and vitals reviewed.  Constitutional:   Resting comfortably on room air   HENT:   Head: Normocephalic and atraumatic.   Eyes: Conjunctivae and  EOM are normal.   Neck: Normal range of motion. Neck supple. No JVD present.   Cardiovascular:   Murmur heard.  3/6 systolic ejection murmur   Pulmonary/Chest: Breath sounds normal. No respiratory distress. She has no wheezes. She has no rhonchi. She has no rales. She exhibits no tenderness.   Abdominal: Soft. Bowel sounds are normal. She exhibits no mass. There is no rebound and no guarding.   Musculoskeletal: Normal range of motion. She exhibits edema. She exhibits no tenderness.   LUE AV graft with positive thrill  Dependent 2+ pitting edema to posterior calves  No CVA tenderness   Lymphadenopathy:     She has no cervical adenopathy.   Neurological: She is alert and oriented to person, place, and time. She has normal strength. No sensory deficit.   Skin: Skin is warm.   Psychiatric: She has a normal mood and affect.         ED Course   Procedures  Labs Reviewed   URINALYSIS, REFLEX TO URINE CULTURE - Abnormal; Notable for the following:        Result Value    Color, UA Brown (*)     Appearance, UA Cloudy (*)     Protein, UA 3+ (*)     Glucose, UA 3+ (*)     Occult Blood UA 3+ (*)     Leukocytes, UA Trace (*)     All other components within normal limits    Narrative:     Preferred Collection Type->Urine, Clean Catch   URINALYSIS MICROSCOPIC - Abnormal; Notable for the following:     RBC, UA >100 (*)     WBC, UA 68 (*)     Bacteria, UA Many (*)     All other components within normal limits    Narrative:     Preferred Collection Type->Urine, Clean Catch   COMPREHENSIVE METABOLIC PANEL - Abnormal; Notable for the following:     Chloride 94 (*)     CO2 31 (*)     Glucose 120 (*)     BUN, Bld 43 (*)     Creatinine 4.1 (*)     Calcium 8.0 (*)     Total Protein 5.8 (*)     Albumin 2.8 (*)     Alkaline Phosphatase 319 (*)     eGFR if  13.5 (*)     eGFR if non  11.7 (*)     All other components within normal limits   CBC W/ AUTO DIFFERENTIAL - Abnormal; Notable for the following:     RBC  2.55 (*)     Hemoglobin 7.2 (*)     Hematocrit 23.7 (*)     MCHC 30.4 (*)     RDW 16.1 (*)     Immature Granulocytes 0.6 (*)     Lymph # 0.9 (*)     Lymph% 13.0 (*)     All other components within normal limits   CULTURE, URINE   MAGNESIUM   TROPONIN I   POCT GLUCOSE     EKG Readings: (Independently Interpreted)   Rhythm: Normal Sinus Rhythm. Heart Rate: 68. Axis: Normal.   Normal ST segments. Normal T waves. LVH.       Imaging Results    None          Medical Decision Making:   History:   Old Medical Records: I decided to obtain old medical records.  Initial Assessment:   53 y.o.female with medical history including ESRD-HD TTS, IDDM, HTN, seizure disorder, depression presents to the ED with hypoglycemia dysuria. My differential diagnosis includes, but is not limited to: UTI, medication side effects, medication misuse, infectious issues, dehydration, hypoglycemia.    Patient is well appearing. On exam, patient glucose improved s/p drinking juice. Will investigate with serum and urine labs.  Independently Interpreted Test(s):   I have ordered and independently interpreted EKG Reading(s) - see prior notes  Clinical Tests:   Lab Tests: Ordered and Reviewed  Medical Tests: Ordered and Reviewed  ED Management:  8:19AM  Patient glucose obtained in ED in point of care recorded as 83. Patient's UA consistent with UTI but is contaminated with 15 squamous cells. When patient instructed about appropriate use on urine collection, patient reports she is unable to urinate again. Will treat imperically for UTI. Patient's most recent culture reveals group B strep. Will treat with ceftriaxone. I reviewed previous multi drug resistant UTI with culture recently sensitive. Will treat with ceftriaxone.    10:00AM  Patent glucose CMP is 120. I see no concern for emergent electrolyte abnormality. Patient hemoglobin is 7.2 consistent with CKD. Patient troponin within normal limits. Patient UTI treated with ceftriaxone. Throughout patient  observation in the ED, glucose has improved. I see no emergent need for continued monitoring of hypoglycemia at this time. Patient discharged on course of keflex for UTI and provided with extensive return precautions. I see no emergent need for hemodialysis at this time.              Scribe Attestation:   Scribe #1: I performed the above scribed service and the documentation accurately describes the services I performed. I attest to the accuracy of the note.    Attending Attestation:           Physician Attestation for Scribe:      Comments: I, Dr. Frank Bales, personally performed the services described in this documentation. All medical record entries made by the scribe were at my direction and in my presence.  I have reviewed the chart and agree that the record reflects my personal performance and is accurate and complete. Frank Bales MD.  1:00 PM 08/03/2018                 Clinical Impression:   The primary encounter diagnosis was Acute cystitis with hematuria. A diagnosis of Hypoglycemia was also pertinent to this visit.      Disposition:   Disposition: Discharged  Condition: Stable                        Frank Bales MD  08/03/18 1301

## 2018-08-06 LAB — BACTERIA UR CULT: NORMAL

## 2018-08-08 ENCOUNTER — OFFICE VISIT (OUTPATIENT)
Dept: INFECTIOUS DISEASES | Facility: CLINIC | Age: 53
End: 2018-08-08
Payer: MEDICARE

## 2018-08-08 VITALS
HEART RATE: 64 BPM | BODY MASS INDEX: 24.29 KG/M2 | SYSTOLIC BLOOD PRESSURE: 151 MMHG | DIASTOLIC BLOOD PRESSURE: 64 MMHG | WEIGHT: 154.75 LBS | TEMPERATURE: 99 F | HEIGHT: 67 IN

## 2018-08-08 DIAGNOSIS — R31.9 HEMATURIA, UNSPECIFIED TYPE: Primary | ICD-10-CM

## 2018-08-08 DIAGNOSIS — R76.8 HEPATITIS B ANTIBODY POSITIVE: ICD-10-CM

## 2018-08-08 PROCEDURE — 99213 OFFICE O/P EST LOW 20 MIN: CPT | Mod: PBBFAC | Performed by: PHYSICIAN ASSISTANT

## 2018-08-08 PROCEDURE — 99213 OFFICE O/P EST LOW 20 MIN: CPT | Mod: S$PBB,ICN,, | Performed by: PHYSICIAN ASSISTANT

## 2018-08-08 PROCEDURE — 99999 PR PBB SHADOW E&M-EST. PATIENT-LVL III: CPT | Mod: PBBFAC,,, | Performed by: PHYSICIAN ASSISTANT

## 2018-08-08 RX ORDER — HUMAN INSULIN 100 [USP'U]/ML
INJECTION, SUSPENSION SUBCUTANEOUS
Refills: 2 | COMMUNITY
Start: 2018-05-09 | End: 2018-08-15

## 2018-08-08 RX ORDER — DIPHENOXYLATE HYDROCHLORIDE AND ATROPINE SULFATE 2.5; .025 MG/1; MG/1
TABLET ORAL
Refills: 0 | COMMUNITY
Start: 2018-05-29 | End: 2018-08-15 | Stop reason: SDUPTHER

## 2018-08-08 NOTE — LETTER
August 9, 2018      Berenice Duran, FN  [address]  [facility] [address]           [facility]ious [facility]  [address]  [facility] [address]  Phone: [phone]  Fax: [phone]          Patient: Eufemia Haq   MR Number: [id number]   YOB: 1965   Date of Visit: 8/8/2018       Dear Berenice Duran:    Thank you for referring Eufemia Haq to me for evaluation. Attached you will find relevant portions of my assessment and plan of care.    If you have questions, please do not hesitate to call me. I look forward to following Eufemia Haq along with you.    Sincerely,    Theron Christianson Jr., PA    Enclosure  CC:  No Recipients    If you would like to receive this communication electronically, please contact [email or web address] or [phone] to request more information on [employer] access.    For providers and/or their staff who would like to refer a patient to [facility], please contact us through our one-stop-shop provider referral line, [facility], at [phone].    If you feel you have received this communication in error or would no longer like to receive these types of communications, please e-mail [email or web address]

## 2018-08-09 NOTE — PROGRESS NOTES
Subjective:      Patient ID: Eufemia Haq is a 53 y.o. female.    Chief Complaint:Hospital Follow Up      History of Present Illness    Ms. Haq is referred today by internal medicine for evaluation of positive hepatitis B testing.  SHe had had serologies drawn recently and says she was contacted by phone and told her hepatitis B testing was positive.  This disturbed her greatly and she is seeking clarification.  AS well she reports being seen in the ED recently and told she has a UTI and was given abx which she is on. She makes very little urine as she is on HD and had to strain to produce very little urine to come out.  She denies dysuria or burning though says she was told she has a lot of blood in her urine.      Review of Systems   Constitution: Positive for weakness and malaise/fatigue. Negative for chills, decreased appetite, fever, night sweats, weight gain and weight loss.   HENT: Negative for congestion, ear pain, hearing loss, hoarse voice, sore throat and tinnitus.    Eyes: Positive for blurred vision and visual disturbance. Negative for redness.   Cardiovascular: Positive for leg swelling. Negative for chest pain and palpitations.   Respiratory: Negative for cough, hemoptysis, shortness of breath, sputum production and wheezing.    Endocrine: Negative for cold intolerance and heat intolerance.   Hematologic/Lymphatic: Negative for adenopathy. Does not bruise/bleed easily.   Skin: Negative for dry skin, itching, rash and suspicious lesions.   Musculoskeletal: Negative for back pain, joint pain, myalgias and neck pain.   Gastrointestinal: Negative for abdominal pain, constipation, diarrhea, heartburn, nausea and vomiting.   Genitourinary: Negative for dysuria, flank pain, frequency, hematuria, hesitancy and urgency.   Neurological: Negative for dizziness, headaches, numbness and paresthesias.   Psychiatric/Behavioral: Negative for depression and memory loss. The patient does not have insomnia  and is not nervous/anxious.    Allergic/Immunologic: Negative for environmental allergies, HIV exposure, hives and persistent infections.     Objective:   Physical Exam   Constitutional: She appears well-developed and well-nourished. No distress.       HENT:   Head: Normocephalic and atraumatic.   Eyes: EOM are normal. Pupils are equal, round, and reactive to light.       Cardiovascular: Normal rate and regular rhythm.  Exam reveals no gallop and no friction rub.    Murmur heard.  Pulmonary/Chest: Effort normal and breath sounds normal. No respiratory distress. She has no wheezes. She has no rales.   Abdominal: Soft. Bowel sounds are normal. She exhibits no distension and no mass. There is no tenderness. There is no rebound and no guarding.   Musculoskeletal: She exhibits no edema.   Skin: Skin is warm. She is not diaphoretic.   Psychiatric: She has a normal mood and affect. Her behavior is normal.     Lab:  Results for JANA LIEBERMAN (MRN [id number]) as of 8/9/2018 07:10   Ref. Range 7/14/2018 14:10 7/25/2018 13:05   HBe Ag Latest Ref Range: Negative   NEG   Hep B C IgM Unknown  Negative   Hep B Core Total Ab Unknown  Negative   Hep B E Ab Latest Ref Range: Negative   NEG   Hep B S Ab Unknown Positive (A) Positive (A)   Hepatitis B Surface Ag Unknown Negative Negative   Results for JANA LIEBERMAN (MRN [id number]) as of 8/9/2018 07:10   Ref. Range 8/3/2018 06:30   Specimen UA Unknown Urine, Clean Catch   Color, UA Latest Ref Range: Yellow, Straw, Krysten  Brown (A)   pH, UA Latest Ref Range: 5.0 - 8.0  5.0   Specific Gravity, UA Latest Ref Range: 1.005 - 1.030  1.030   Appearance, UA Latest Ref Range: Clear  Cloudy (A)   Protein, UA Latest Ref Range: Negative  3+ (A)   Glucose, UA Latest Ref Range: Negative  3+ (A)   Ketones, UA Latest Ref Range: Negative  Negative   Occult Blood UA Latest Ref Range: Negative  3+ (A)   Nitrite, UA Latest Ref Range: Negative  Negative   Urobilinogen, UA Latest Ref Range: <2.0  EU/dL Negative   Bilirubin (UA) Latest Ref Range: Negative  Negative   Leukocytes, UA Latest Ref Range: Negative  Trace (A)   RBC, UA Latest Ref Range: 0 - 4 /hpf >100 (H)   WBC, UA Latest Ref Range: 0 - 5 /hpf 68 (H)   Bacteria, UA Latest Ref Range: None-Occ /hpf Many (A)   Yeast, UA Latest Ref Range: None  None   Squam Epithel, UA Latest Units: /hpf 15   Hyaline Casts, UA Latest Ref Range: 0-1/lpf /lpf 0   Microscopic Comment Unknown SEE COMMENT     Assessment:       1. Hematuria, unspecified type    2. Hepatitis B antibody positive        Patient has no symptoms of UTI but does have hematuria of unkown cause.  Hepatitis B testing is negative.  Serologies show she has been vaccinated.  Plan:   1. Will refer to urology for evaluation of hematuria  2. Detailed explanation about hepatitis B status provided to patient who now has a clear understanding she does no have hep b and has vaccine acquired immunity.  3. FU PRN

## 2018-08-10 ENCOUNTER — OFFICE VISIT (OUTPATIENT)
Dept: UROLOGY | Facility: CLINIC | Age: 53
End: 2018-08-10
Payer: MEDICARE

## 2018-08-10 VITALS — SYSTOLIC BLOOD PRESSURE: 143 MMHG | DIASTOLIC BLOOD PRESSURE: 59 MMHG | HEART RATE: 68 BPM

## 2018-08-10 DIAGNOSIS — N39.0 RECURRENT UTI: ICD-10-CM

## 2018-08-10 DIAGNOSIS — R31.0 GROSS HEMATURIA: Primary | ICD-10-CM

## 2018-08-10 LAB
BACTERIA #/AREA URNS AUTO: ABNORMAL /HPF
HYALINE CASTS UR QL AUTO: 2 /LPF
MICROSCOPIC COMMENT: ABNORMAL
NON-SQ EPI CELLS #/AREA URNS AUTO: 1 /HPF
RBC #/AREA URNS AUTO: >100 /HPF (ref 0–4)
SQUAMOUS #/AREA URNS AUTO: 7 /HPF
WBC #/AREA URNS AUTO: 39 /HPF (ref 0–5)

## 2018-08-10 PROCEDURE — 99214 OFFICE O/P EST MOD 30 MIN: CPT | Mod: PBBFAC,25,NTX | Performed by: NURSE PRACTITIONER

## 2018-08-10 PROCEDURE — 99214 OFFICE O/P EST MOD 30 MIN: CPT | Mod: S$PBB,ICN,NTX, | Performed by: NURSE PRACTITIONER

## 2018-08-10 PROCEDURE — 99999 PR PBB SHADOW E&M-EST. PATIENT-LVL IV: CPT | Mod: PBBFAC,TXP,, | Performed by: NURSE PRACTITIONER

## 2018-08-10 PROCEDURE — 81002 URINALYSIS NONAUTO W/O SCOPE: CPT | Mod: PBBFAC,NTX | Performed by: NURSE PRACTITIONER

## 2018-08-10 PROCEDURE — 87186 SC STD MICRODIL/AGAR DIL: CPT | Mod: NTX

## 2018-08-10 PROCEDURE — 87077 CULTURE AEROBIC IDENTIFY: CPT | Mod: NTX

## 2018-08-10 PROCEDURE — 87088 URINE BACTERIA CULTURE: CPT | Mod: NTX

## 2018-08-10 PROCEDURE — 81001 URINALYSIS AUTO W/SCOPE: CPT | Mod: TXP

## 2018-08-10 PROCEDURE — 88112 CYTOPATH CELL ENHANCE TECH: CPT | Mod: NTX | Performed by: PATHOLOGY

## 2018-08-10 PROCEDURE — 87086 URINE CULTURE/COLONY COUNT: CPT | Mod: TXP

## 2018-08-10 RX ORDER — LIDOCAINE HYDROCHLORIDE 20 MG/ML
JELLY TOPICAL ONCE
Status: CANCELLED | OUTPATIENT
Start: 2018-08-10 | End: 2018-08-10

## 2018-08-10 RX ORDER — DOXYCYCLINE HYCLATE 100 MG
100 TABLET ORAL ONCE
Status: CANCELLED | OUTPATIENT
Start: 2018-08-10 | End: 2018-08-10

## 2018-08-10 NOTE — PATIENT INSTRUCTIONS
Cystoscopy    Cystoscopy is a procedure that lets your doctor look directly inside your urethra and bladder. It can be used to:  · Help diagnose a problem with your urethra, bladder, or kidneys.  · Take a sample (biopsy) of bladder or urethral tissue.  · Treat certain problems (such as removing kidney stones).  · Place a stent to bypass an obstruction.  · Take special X-rays of the kidneys.  Based on the findings, your doctor may recommend other tests or treatments.  What is a cystoscope?  A cystoscope is a telescope-like instrument that contains lenses and fiberoptics (small glass wires that make bright light). The cystoscope may be straight and rigid, or flexible to bend around curves in the urethra. The doctor may look directly into the cystoscope, or project the image onto a monitor.  Getting ready  · Ask your doctor if you should stop taking any medicines before the procedure.  · Ask whether you should avoid eating or drinking anything after midnight before the procedure.  · Follow any other instructions your doctor gives you.  Tell your doctor before the exam if you:  · Take any medicines, such as aspirin or blood thinners  · Have allergies to any medicines  · Are pregnant   The procedure  Cystoscopy is done in the doctors office, surgery center, or hospital. The doctor and a nurse are present during the procedure. It takes only a few minutes, longer if a biopsy, X-ray, or treatment needs to be done.  During the procedure:  · You lie on an exam table on your back, knees bent and legs apart. You are covered with a drape.  · Your urethra and the area around it are washed. Anesthetic jelly may be applied to numb the urethra. Other pain medicine is usually not needed. In some cases, you may be offered a mild sedative to help you relax. If a more extensive procedure is to be done, such as a biopsy or kidney stone removal, general anesthesia may be needed.  · The cystoscope is inserted. A sterile fluid is put  into the bladder to expand it. You may feel pressure from this fluid.  · When the procedure is done, the cystoscope is removed.  After the procedure  If you had a sedative, general anesthesia, or spinal anesthesia, you must have someone drive you home. Once youre home:  · Drink plenty of fluids.  · You may have burning or light bleeding when you urinate--this is normal.  · Medicines may be prescribed to ease any discomfort or prevent infection. Take these as directed.  · Call your doctor if you have heavy bleeding or blood clots, burning that lasts more than a day, a fever over 100°F  (38° C), or trouble urinating.  Date Last Reviewed: 1/1/2017  © 9307-6255 [employer]. [address] [place] [address], [place] [address]. All rights reserved. This information is not intended as a substitute for professional medical care. Always follow your healthcare professional's instructions.

## 2018-08-10 NOTE — LETTER
August 10, 2018      Theron Christianson Jr., PA  [address] [facility] [id number]           [facility] - Urology 4th Floor  [address]  [place] [address]  Phone: [phone]          Patient: Eufemia Haq   MR Number: [id number]   YOB: 1965   Date of Visit: 8/10/2018       Dear Theron Christianson Jr.:    Thank you for referring Eufemia Haq to me for evaluation. Attached you will find relevant portions of my assessment and plan of care.    If you have questions, please do not hesitate to call me. I look forward to following Eufemia Haq along with you.    Sincerely,    Katy Cotto, RIRI    Enclosure  CC:  No Recipients    If you would like to receive this communication electronically, please contact [email or web address] or [phone] to request more information on [employer] access.    For providers and/or their staff who would like to refer a patient to [facility], please contact us through our one-stop-shop provider referral line, [facility], at [phone].    If you feel you have received this communication in error or would no longer like to receive these types of communications, please e-mail [email or web address]

## 2018-08-10 NOTE — PROGRESS NOTES
Subjective:       Patient ID: Eufemia Haq is a 53 y.o. female.    Chief Complaint: Hematuria (gross)      HPI: Eufemia Haq is a 53 y.o. Black or  female who presents today for evaluation and management of gross hematuria. [facility] but a new patient to me. Her last clinic visit was with Dr. Horton 3/27/15.    Hx of stroke, ESRD, DM, HTN, seizure disorder, and depression    The patient reports gross hematuria that started 8/3/18. She was diagnosed with UTI and is currently being treated with Keflex. Since starting antibiotic, hematuria resolved. She has history of recurrent UTI's and reports she had a negative work up in 2012. She is on hemodialysis for ESRD for the past 6 months. She urinates twice a day at most.   She reports dysuria and decreased urine output to once a day associated with UTI. She reports this was the first UTI she experienced gross hematuria. All symptoms have resolved since starting keflex. Denies f/c/n/v.    Urine culture  8/3/18 ESCHERICHIA COLI >100,000 cfu/ml   3/31/18 Multiple organisms isolated. None in predominance.   3/11/18 KLEBSIELLA PNEUMONIAE ESBL >100,000 cfu/ml    Previous/Current Smoker: quit smoking 2 years ago, smoked for 30 years  Radiation therapy to pelvis: No  Chemotherapy: No  Personal/ family history of bladder/ kidney cancer: No  Exposure to harmful chemicals: No  History of kidney stones: No    Review of patient's allergies indicates:   Allergen Reactions    Ciprofloxacin (bulk) Itching    Gabapentin Other (See Comments)     Seizure    Latex Itching and Other (See Comments)     Very low Oxygen    Tramadol Other (See Comments)     Seizure    Vancomycin Shortness Of Breath and Itching    Vancomycin analogues Other (See Comments)     Seizure    Baclofen     Neurontin [gabapentin] Other (See Comments)     Seizure like activity    Niacin Itching and Other (See Comments)     Burning      Latex,  natural rubber Rash    Niacin preparations Rash       Current Outpatient Prescriptions   Medication Sig Dispense Refill    cephALEXin (KEFLEX) 500 MG capsule Take 1 capsule (500 mg total) by mouth every 12 (twelve) hours. for 7 days 14 capsule 0    aspirin 325 MG tablet Take 1 tablet (325 mg total) by mouth once daily. Hold for 2 weeks following discharge  0    blood sugar diagnostic Strp To use as directed with True results meter and monitor BS 6 times daily - fluctuating  each 12    bumetanide (BUMEX) 1 MG tablet Take 1 mg by mouth 2 (two) times daily.      carvedilol (COREG) 12.5 MG tablet Take 12.5 mg by mouth 2 (two) times daily.      cloNIDine (CATAPRES) 0.2 MG tablet Take 0.2 mg by mouth 2 (two) times daily.      diphenoxylate-atropine 2.5-0.025 mg (LOMOTIL) 2.5-0.025 mg per tablet TK 1 T PO BID PRF DIARRHEA  0    epoetin jacques (PROCRIT) 20,000 unit/mL injection Inject 1 mL (20,000 Units total) into the skin every Tues, Thurs, Sat. 1 mL     hydrALAZINE (APRESOLINE) 100 MG tablet Take 100 mg by mouth every 8 (eight) hours.      insulin glargine, TOUJEO, (TOUJEO) 300 unit/mL (1.5 mL) InPn pen Inject 26 Units into the skin once daily. 1.5 mL 6    insulin lispro (HUMALOG) 100 unit/mL injection Inject 8 Units into the skin 3 (three) times daily before meals. 7.2 mL 11    levETIRAcetam (KEPPRA) 250 MG Tab Take 250 mg by mouth 2 (two) times daily.      losartan (COZAAR) 25 MG tablet Take 4 tablets (100 mg total) by mouth once daily.      metoclopramide HCl (REGLAN) 10 MG tablet Take 1 tablet (10 mg total) by mouth 3 (three) times daily before meals. 90 tablet 0    NIFEdipine (ADALAT CC) 90 MG TbSR Take 90 mg by mouth once daily.      NOVOLIN 70/30 U-100 INSULIN 100 unit/mL (70-30) injection INJECT 15 UNITS SQ BID AC  2    ondansetron (ZOFRAN-ODT) 4 MG TbDL Take 1 tablet (4 mg total) by mouth every 8 (eight) hours as needed. 20 tablet 0    ONETOUCH DELICA LANCETS 33 gauge Misc TEST BLOOD SUGAR  TID  3    pantoprazole (PROTONIX) 40 MG tablet Take 1 tablet (40 mg total) by mouth 2 (two) times daily. 60 tablet 1    rosuvastatin (CRESTOR) 20 MG tablet Take 1 tablet (20 mg total) by mouth once daily. 90 tablet 3    sevelamer carbonate (RENVELA) 800 mg Tab Take 2 tablets (1,600 mg total) by mouth 3 (three) times daily with meals. 180 tablet 11     No current facility-administered medications for this visit.        Past Medical History:   Diagnosis Date    Acute renal failure superimposed on stage 3 chronic kidney disease 2016    Anemia     during pregnancys    Anemia 2014    Anemia in chronic renal disease     Anemia of chronic kidney failure 2017    Arthritis     neuropathy hands feet and legs//    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    Blood transfusion     Chronic pain     Closed head injury     Diabetes mellitus     Diabetes mellitus     Diabetes mellitus type I     Diabetic macular edema, both eyes 3/31/2014    Diabetic retinopathy     ESRD (end stage renal disease) on dialysis     H/O insertion of insulin pump 3/9/2015    Hyperlipidemia     Hypertension     patient states that when her blood sugar increases her blood pressure increases    Hypertension     Hypertensive retinopathy of both eyes 3/31/2014    Insulin pump fitting or adjustment 2014    Left-sided weakness 2018    Medication side effects - Mobic 3/9/15 3/11/2015    Midline low back pain without sciatica     Neuropathy     Pneumonia     Proliferative diabetic retinopathy, both eyes 3/31/2014    Renal disorder     Seizures     when sugar is high, does not quite remember    Stroke     2018    Syncope and collapse, onset 1992 2015    Vitamin D deficiency     Vitreous hemorrhage 2017       Past Surgical History:   Procedure Laterality Date     SECTION      x2     SECTION, CLASSIC      x 2    dialysis graft Left     ESOPHAGOGASTRODUODENOSCOPY N/A 2018     Procedure: EGD (ESOPHAGOGASTRODUODENOSCOPY);  Surgeon: Lucio Hayden MD;  Location: [facility] ([address] [place] [address]);  Service: Endoscopy;  Laterality: N/A;    EYE SURGERY      HYSTERECTOMY         Family History   Problem Relation Age of Onset    Diabetes Mother     Heart disease Mother     Blindness Mother         diabetes    Hypertension Mother     Diabetes Father     Asthma Father     Hypertension Father     Thyroid disease Sister     Breast cancer Daughter     Diabetes Sister     Stroke Maternal Uncle     Glaucoma Maternal Grandmother     Blindness Maternal Grandmother     Cataracts Maternal Grandmother     Hypertension Maternal Grandmother     Cancer Cousin     Amblyopia Neg Hx     Macular degeneration Neg Hx     Retinal detachment Neg Hx     Strabismus Neg Hx     Colon cancer Neg Hx     Ovarian cancer Neg Hx          Review of Systems     Review of Systems   Constitutional: Negative for chills, diaphoresis and fever.   HENT: Negative for congestion and trouble swallowing.    Eyes: Negative for discharge.   Respiratory: Negative for shortness of breath.    Cardiovascular: Negative for chest pain and palpitations.   Gastrointestinal: Negative for nausea and vomiting.   Genitourinary: Positive for decreased urine volume and hematuria (resolved). Negative for dysuria, flank pain and urgency.   Musculoskeletal: Positive for gait problem (s/p stroke).   Skin: Negative for rash.   Allergic/Immunologic: Negative for immunocompromised state.   Neurological: Negative for seizures, syncope and headaches.   Hematological: Negative for adenopathy.   Psychiatric/Behavioral: Negative for confusion.           Objective:     Vitals:    08/10/18 1044   BP: (!) 143/59   Pulse: 68     Physical Exam     Physical Exam   Nursing note and vitals reviewed.  Constitutional: She is oriented to person, place, and time. She appears well-developed and well-nourished. No distress.   HENT:   Head: Normocephalic.   Eyes:  Conjunctivae are normal.   Neck: Normal range of motion.   Cardiovascular: Normal rate and regular rhythm.    Pulmonary/Chest: Effort normal. No respiratory distress.   Abdominal: Soft. She exhibits no distension. There is no CVA tenderness.   Musculoskeletal:   Decreased strength to BLE   Neurological: She is alert and oriented to person, place, and time.   Skin: Skin is warm and dry.     Psychiatric: She has a normal mood and affect. Her behavior is normal. Judgment and thought content normal.          Lab Results   Component Value Date    CREATININE 4.1 (H) 08/03/2018     Lab Results   Component Value Date    EGFRNONAA 11.7 (A) 08/03/2018     Lab Results   Component Value Date    ESTGFRAFRICA 13.5 (A) 08/03/2018     POCT UA: sp grav 1.015, pH 5, 500 protein, 1000 glucose, 50 blood    Assessment:       1. Gross hematuria    2. Recurrent UTI        Plan:     Eufemia was seen today for hematuria.    Diagnoses and all orders for this visit:    Gross hematuria  -     POCT urinalysis, dipstick or tablet reag  -     CT Urogram Abd Pelvis W WO; Future  -     lidocaine HCl 2% urojet; Place into the urethra once.  -     Cystoscopy; Future  -     doxycycline tablet 100 mg; Take 1 tablet (100 mg total) by mouth once.  -     Cytology, urine  -     Urine culture  -     Urinalysis Microscopic    Recurrent UTI  -     POCT urinalysis, dipstick or tablet reag  -     lidocaine HCl 2% urojet; Place into the urethra once.  -     Cystoscopy; Future  -     doxycycline tablet 100 mg; Take 1 tablet (100 mg total) by mouth once.  -     Cytology, urine  -     Urine culture  -     Urinalysis Microscopic      -Discussed plan of care with patient  -I explained to the patient that the causes of hematuria, whether it be gross hematuria or microhematuria, are many. Fortunately, for patients with microhematuria, the likelihood of finding an underlying  malignancy as the cause of the hematuria is very low at 1-2%. In patients with gross  hematuria, the chances of an underlying  malignancy are higher but still low at 15-20%.  Nevertheless, I explained to the patient that the evaluation in both cases consists of upper tract imaging followed by flexible cystoscopy. I described the rationale and procedure for both and answered all questions.  Hematuria work up discussed in detail. We will proceed with Urinalysis, Urine cytology, CT urogram, and Cystoscopy.  -Urine specimen sent for microscopic urinalysis, urine culture and urine cytology  -Since patient is on dialysis, will proceed with CT urogram. Ct urogram ordered and scheduled  -Cysto with Dr. Bautisat ordered and scheduled  -Will need catheterized urine specimen for UC one week prior to cysto. Appt made  -Discussed glucose on UA. Her blood sugar was >500 this morning and she treated with insulin. Instructed for patient to monitor glucose closely and f/u with PCP or go to ED if does not improve. She verbalized understanding.  -UTI precautions:  Wipe front to back and avoid constipation.  Avoid caffeine.  Expel urine from vagina post void  No dryer sheets or harsh detergents with the undergarments  No bubble baths  Void before and after intercourse  Avoid hot tub use  Void soon after urge arises  Avoid tight fitting clothes and panty hose  -RTC pending results    I spent 35 minutes with the patient of which more than half was spent in coordinating the patient's care as well as in direct consultation with the patient in regards to our treatment and plan.

## 2018-08-13 ENCOUNTER — TELEPHONE (OUTPATIENT)
Dept: UROLOGY | Facility: CLINIC | Age: 53
End: 2018-08-13

## 2018-08-13 LAB — BACTERIA UR CULT: NORMAL

## 2018-08-13 NOTE — TELEPHONE ENCOUNTER
Called patient regarding UC results.  No answer  Left voicemail to return call to clinic    Will refer to ID for treatment based on urine culture results.

## 2018-08-14 ENCOUNTER — PATIENT MESSAGE (OUTPATIENT)
Dept: UROLOGY | Facility: CLINIC | Age: 53
End: 2018-08-14

## 2018-08-14 ENCOUNTER — TELEPHONE (OUTPATIENT)
Dept: UROLOGY | Facility: CLINIC | Age: 53
End: 2018-08-14

## 2018-08-14 NOTE — TELEPHONE ENCOUNTER
Left voicemail for pt to return call to clinic. Will need to f/u with ID for + urine culture.  Will send message to pt through portal.

## 2018-08-15 ENCOUNTER — OFFICE VISIT (OUTPATIENT)
Dept: INTERNAL MEDICINE | Facility: CLINIC | Age: 53
End: 2018-08-15
Payer: MEDICARE

## 2018-08-15 ENCOUNTER — TELEPHONE (OUTPATIENT)
Dept: INFECTIOUS DISEASES | Facility: CLINIC | Age: 53
End: 2018-08-15

## 2018-08-15 ENCOUNTER — PATIENT MESSAGE (OUTPATIENT)
Dept: UROLOGY | Facility: CLINIC | Age: 53
End: 2018-08-15

## 2018-08-15 ENCOUNTER — TELEPHONE (OUTPATIENT)
Dept: UROLOGY | Facility: CLINIC | Age: 53
End: 2018-08-15

## 2018-08-15 VITALS
HEIGHT: 67 IN | TEMPERATURE: 99 F | HEART RATE: 63 BPM | WEIGHT: 157.63 LBS | BODY MASS INDEX: 24.74 KG/M2 | SYSTOLIC BLOOD PRESSURE: 162 MMHG | DIASTOLIC BLOOD PRESSURE: 60 MMHG | OXYGEN SATURATION: 77 %

## 2018-08-15 DIAGNOSIS — N18.6 ESRD (END STAGE RENAL DISEASE) ON DIALYSIS: ICD-10-CM

## 2018-08-15 DIAGNOSIS — G40.909 SEIZURE DISORDER: ICD-10-CM

## 2018-08-15 DIAGNOSIS — Z99.2 ESRD (END STAGE RENAL DISEASE) ON DIALYSIS: ICD-10-CM

## 2018-08-15 DIAGNOSIS — Z86.73 HISTORY OF CVA (CEREBROVASCULAR ACCIDENT): ICD-10-CM

## 2018-08-15 DIAGNOSIS — Z87.19 HISTORY OF GI BLEED: ICD-10-CM

## 2018-08-15 PROCEDURE — 99215 OFFICE O/P EST HI 40 MIN: CPT | Mod: S$PBB,,, | Performed by: INTERNAL MEDICINE

## 2018-08-15 PROCEDURE — 99214 OFFICE O/P EST MOD 30 MIN: CPT | Mod: PBBFAC | Performed by: INTERNAL MEDICINE

## 2018-08-15 PROCEDURE — 99999 PR PBB SHADOW E&M-EST. PATIENT-LVL IV: CPT | Mod: PBBFAC,,, | Performed by: INTERNAL MEDICINE

## 2018-08-15 RX ORDER — ONDANSETRON 4 MG/1
4 TABLET, ORALLY DISINTEGRATING ORAL EVERY 8 HOURS PRN
Qty: 20 TABLET | Refills: 0 | Status: SHIPPED | OUTPATIENT
Start: 2018-08-15

## 2018-08-15 RX ORDER — NIFEDIPINE 90 MG/1
90 TABLET, FILM COATED, EXTENDED RELEASE ORAL DAILY
Qty: 30 TABLET | Refills: 6 | Status: SHIPPED | OUTPATIENT
Start: 2018-08-15

## 2018-08-15 RX ORDER — SEVELAMER CARBONATE 800 MG/1
1600 TABLET, FILM COATED ORAL
Qty: 180 TABLET | Refills: 11 | Status: SHIPPED | OUTPATIENT
Start: 2018-08-15 | End: 2019-08-15

## 2018-08-15 RX ORDER — HYDRALAZINE HYDROCHLORIDE 100 MG/1
100 TABLET, FILM COATED ORAL EVERY 8 HOURS
Qty: 90 TABLET | Refills: 6 | Status: SHIPPED | OUTPATIENT
Start: 2018-08-15

## 2018-08-15 RX ORDER — METOCLOPRAMIDE 10 MG/1
10 TABLET ORAL
Qty: 90 TABLET | Refills: 0 | Status: SHIPPED | OUTPATIENT
Start: 2018-08-15

## 2018-08-15 RX ORDER — PANTOPRAZOLE SODIUM 40 MG/1
40 TABLET, DELAYED RELEASE ORAL 2 TIMES DAILY
Qty: 60 TABLET | Refills: 6 | Status: SHIPPED | OUTPATIENT
Start: 2018-08-15 | End: 2018-10-14

## 2018-08-15 RX ORDER — INSULIN GLARGINE 300 [IU]/ML
26 INJECTION, SOLUTION SUBCUTANEOUS DAILY
Qty: 1.5 ML | Refills: 6 | Status: SHIPPED | OUTPATIENT
Start: 2018-08-15

## 2018-08-15 RX ORDER — ROSUVASTATIN CALCIUM 20 MG/1
20 TABLET, COATED ORAL DAILY
Qty: 90 TABLET | Refills: 3 | Status: SHIPPED | OUTPATIENT
Start: 2018-08-15 | End: 2019-08-15

## 2018-08-15 RX ORDER — LOSARTAN POTASSIUM 25 MG/1
100 TABLET ORAL DAILY
Qty: 120 TABLET | Refills: 6 | Status: SHIPPED | OUTPATIENT
Start: 2018-08-15

## 2018-08-15 RX ORDER — CLONIDINE HYDROCHLORIDE 0.2 MG/1
0.2 TABLET ORAL 2 TIMES DAILY
Qty: 60 TABLET | Refills: 6 | Status: SHIPPED | OUTPATIENT
Start: 2018-08-15

## 2018-08-15 RX ORDER — INSULIN LISPRO 100 [IU]/ML
8 INJECTION, SOLUTION INTRAVENOUS; SUBCUTANEOUS
Qty: 7.2 ML | Refills: 11 | Status: SHIPPED | OUTPATIENT
Start: 2018-08-15 | End: 2019-08-15

## 2018-08-15 RX ORDER — LEVETIRACETAM 250 MG/1
250 TABLET ORAL 2 TIMES DAILY
Qty: 60 TABLET | Refills: 6 | Status: SHIPPED | OUTPATIENT
Start: 2018-08-15

## 2018-08-15 RX ORDER — CARVEDILOL 12.5 MG/1
12.5 TABLET ORAL 2 TIMES DAILY
Qty: 60 TABLET | Refills: 6 | Status: SHIPPED | OUTPATIENT
Start: 2018-08-15

## 2018-08-15 RX ORDER — BUMETANIDE 1 MG/1
1 TABLET ORAL 2 TIMES DAILY
Qty: 60 TABLET | Refills: 6 | Status: SHIPPED | OUTPATIENT
Start: 2018-08-15

## 2018-08-15 RX ORDER — DIPHENOXYLATE HYDROCHLORIDE AND ATROPINE SULFATE 2.5; .025 MG/1; MG/1
TABLET ORAL
Qty: 60 TABLET | Refills: 3 | Status: SHIPPED | OUTPATIENT
Start: 2018-08-15

## 2018-08-15 NOTE — TELEPHONE ENCOUNTER
I left patient multiple messages regarding her calling the office back to schedule an appointment with ID for a UTI culture has resulted.

## 2018-08-15 NOTE — TELEPHONE ENCOUNTER
Called the number listed for her father and was told it was the wrong number. Will continue to try to contact patient regarding +UC and needing appt with ID for treatment.

## 2018-08-15 NOTE — TELEPHONE ENCOUNTER
Called patient regarding making appt with ID for UTI. No answer. Will send another message on patient portal.

## 2018-08-16 ENCOUNTER — TELEPHONE (OUTPATIENT)
Dept: INTERNAL MEDICINE | Facility: CLINIC | Age: 53
End: 2018-08-16

## 2018-08-16 NOTE — PROGRESS NOTES
CHIEF COMPLAINT:  Here to establish care.    HISTORY OF PRESENT ILLNESS:  The patient is a 53-year-old female who comes in   today to establish care.  The patient does have end-stage renal disease.  She   has been on dialysis now for the past six months.  She sees Dr. Dias at [facility]   on [place].  She goes on Tuesdays, Thursdays and Saturdays.    The patient does have insulin-requiring diabetes.  The patient was recently   seen in the Emergency Department for an episode of hypoglycemia.  She states she   checks her blood sugars five times a day.  In the morning when she gets up, it   is usually around 500.  She will start to take insulin to try and bring it down.    [employer] has that the patient was taken Toujeo 26 units once a day as well as   Humalog 8 units three times a day.  The patient states that she takes only 15   units of Toujeo and she will take anywhere from 10 to 30 units of Humalog   several times throughout the day to bring her sugars down.  Her last A1c was in   July was 9.1.  The patient does have a history of a GI bleed in July 2018.  The   patient did undergo an EGD.  She had two nonbleeding gastric ulcers.  The   patient was recently seen as well for gross hematuria.  According to chart   notes, the patient is scheduled or to be scheduled for a CT urogram as well as a   cystoscope.  The patient was also found to be hepatitis B antibody positive.    It was unclear whether this was related to an immunization or not.  She was seen   in Infectious Diseases for this.    REVIEW OF SYSTEMS:  The patient reports she is putting weight back on.  She does   have problems with the right eye.  She does have decreased hearing.  No trouble   with swallowing, no chest pain, no shortness of breath.  She does report some   nausea.  She does have diarrhea about once a week in which she will have 3-4   watery stools.  She does have left-sided weakness secondary to CVA in 2017.  The   patient also  has a history of seizures, but none currently.  The patient   reports having a colonoscopy one year ago in [place] as well as a mammogram one   year ago in [place].  Last eye exam was in September 2017.  She states she had   to have laser surgery done to her eye.    PHYSICAL EXAMINATION:  GENERAL APPEARANCE:  No acute distress.  HEENT:  Conjunctivae clear.  She does have bilateral lens replacements.  The   right pupil is irregular.  TMs are clear.  Nasal septum is midline without   discharge.  Oropharynx shows she has no upper teeth.  She has a lower denture.  NECK:  Trachea was midline.  PULMONARY:  Good inspiratory, expiratory breath sounds are heard.  Lungs are   clear to auscultation.  CARDIOVASCULAR:  S1, S2.  EXTREMITIES:  1+ edema.  ABDOMEN:  Nontender, nondistended.  She had normal bowel sounds.  NEUROLOGIC:  , upper extremity and lower extremity motor strength showed   that she was weak on the left side approximately 4/5 on the left, 5/5 on the   right.    ASSESSMENT:  1.  Insulin-requiring diabetes.  The patient does have a referral in to see   Endocrinology.  2.  End-stage renal disease, currently on dialysis.  3.  History of GI bleed.  4.  History of CVA.  5.  Seizure disorder.    PLAN:  Medications were refilled for the patient.  We will need to do a further   chart review.  The patient is to follow up pending chart review.      MARY KATE/RAYNE  dd: 08/16/2018 07:17:57 (CDT)  td: 08/16/2018 13:53:20 (CDT)  Doc ID   #[id number]  Job ID #[id number]    CC:

## 2018-08-16 NOTE — TELEPHONE ENCOUNTER
Spoke with Nurse at [facility], pt is there getting treatment. Blood oxygen saturation today is 97, no fever or complaints.

## 2018-08-26 ENCOUNTER — HOSPITAL ENCOUNTER (OUTPATIENT)
Facility: HOSPITAL | Age: 53
Discharge: HOME | End: 2018-08-28
Attending: EMERGENCY MEDICINE | Admitting: HOSPITALIST
Payer: MEDICARE

## 2018-08-26 DIAGNOSIS — N18.6 ANEMIA IN CHRONIC KIDNEY DISEASE, ON CHRONIC DIALYSIS: Chronic | ICD-10-CM

## 2018-08-26 DIAGNOSIS — I50.33 ACUTE ON CHRONIC DIASTOLIC HEART FAILURE: ICD-10-CM

## 2018-08-26 DIAGNOSIS — N18.6 ESRD ON DIALYSIS: Chronic | ICD-10-CM

## 2018-08-26 DIAGNOSIS — E87.70 HYPERVOLEMIA: ICD-10-CM

## 2018-08-26 DIAGNOSIS — D63.1 ANEMIA IN CHRONIC KIDNEY DISEASE, ON CHRONIC DIALYSIS: Chronic | ICD-10-CM

## 2018-08-26 DIAGNOSIS — R06.02 SHORTNESS OF BREATH: ICD-10-CM

## 2018-08-26 DIAGNOSIS — Z99.2 ANEMIA IN CHRONIC KIDNEY DISEASE, ON CHRONIC DIALYSIS: Chronic | ICD-10-CM

## 2018-08-26 DIAGNOSIS — E87.70 HYPERVOLEMIA, UNSPECIFIED HYPERVOLEMIA TYPE: Primary | ICD-10-CM

## 2018-08-26 DIAGNOSIS — Z99.2 ESRD ON DIALYSIS: Chronic | ICD-10-CM

## 2018-08-26 PROCEDURE — 96374 THER/PROPH/DIAG INJ IV PUSH: CPT | Mod: NTX

## 2018-08-26 PROCEDURE — 99285 EMERGENCY DEPT VISIT HI MDM: CPT | Mod: NTX,,, | Performed by: PHYSICIAN ASSISTANT

## 2018-08-26 PROCEDURE — 93005 ELECTROCARDIOGRAM TRACING: CPT | Mod: NTX

## 2018-08-26 PROCEDURE — 99284 EMERGENCY DEPT VISIT MOD MDM: CPT | Mod: 25,NTX

## 2018-08-26 PROCEDURE — 93010 ELECTROCARDIOGRAM REPORT: CPT | Mod: NTX,,, | Performed by: INTERNAL MEDICINE

## 2018-08-26 RX ORDER — FUROSEMIDE 10 MG/ML
60 INJECTION INTRAMUSCULAR; INTRAVENOUS
Status: COMPLETED | OUTPATIENT
Start: 2018-08-26 | End: 2018-08-27

## 2018-08-27 PROBLEM — E87.70 HYPERVOLEMIA: Status: ACTIVE | Noted: 2018-08-27

## 2018-08-27 PROBLEM — I50.33 ACUTE ON CHRONIC DIASTOLIC HEART FAILURE: Status: ACTIVE | Noted: 2018-08-27

## 2018-08-27 PROBLEM — N18.6 ESRD ON DIALYSIS: Chronic | Status: ACTIVE | Noted: 2017-08-26

## 2018-08-27 PROBLEM — Z99.2 ESRD ON DIALYSIS: Chronic | Status: ACTIVE | Noted: 2017-08-26

## 2018-08-27 LAB
ALBUMIN SERPL BCP-MCNC: 2.9 G/DL
ANION GAP SERPL CALC-SCNC: 10 MMOL/L
APTT BLDCRRT: 22.2 SEC
BASOPHILS # BLD AUTO: 0.04 K/UL
BASOPHILS NFR BLD: 0.7 %
BNP SERPL-MCNC: 1328 PG/ML
BUN SERPL-MCNC: 49 MG/DL
BUN SERPL-MCNC: 61 MG/DL (ref 6–30)
CALCIUM SERPL-MCNC: 8.3 MG/DL
CHLORIDE SERPL-SCNC: 100 MMOL/L (ref 95–110)
CHLORIDE SERPL-SCNC: 101 MMOL/L
CO2 SERPL-SCNC: 28 MMOL/L
CREAT SERPL-MCNC: 4.4 MG/DL (ref 0.5–1.4)
CREAT SERPL-MCNC: 4.6 MG/DL
DIFFERENTIAL METHOD: ABNORMAL
EOSINOPHIL # BLD AUTO: 0.2 K/UL
EOSINOPHIL NFR BLD: 4.3 %
ERYTHROCYTE [DISTWIDTH] IN BLOOD BY AUTOMATED COUNT: 18.6 %
EST. GFR  (AFRICAN AMERICAN): 11.7 ML/MIN/1.73 M^2
EST. GFR  (NON AFRICAN AMERICAN): 10.2 ML/MIN/1.73 M^2
ESTIMATED AVG GLUCOSE: 209 MG/DL
GLUCOSE SERPL-MCNC: 219 MG/DL (ref 70–110)
GLUCOSE SERPL-MCNC: 223 MG/DL
HBA1C MFR BLD HPLC: 8.9 %
HCT VFR BLD AUTO: 34.1 %
HCT VFR BLD CALC: 35 %PCV (ref 36–54)
HGB BLD-MCNC: 10.6 G/DL
IMM GRANULOCYTES # BLD AUTO: 0.02 K/UL
IMM GRANULOCYTES NFR BLD AUTO: 0.4 %
INR PPP: 0.9
LYMPHOCYTES # BLD AUTO: 0.8 K/UL
LYMPHOCYTES NFR BLD: 14 %
MAGNESIUM SERPL-MCNC: 2.2 MG/DL
MCH RBC QN AUTO: 29.2 PG
MCHC RBC AUTO-ENTMCNC: 31.1 G/DL
MCV RBC AUTO: 94 FL
MONOCYTES # BLD AUTO: 0.9 K/UL
MONOCYTES NFR BLD: 17.6 %
NEUTROPHILS # BLD AUTO: 3.4 K/UL
NEUTROPHILS NFR BLD: 63 %
NRBC BLD-RTO: 0 /100 WBC
PHOSPHATE SERPL-MCNC: 4.3 MG/DL
PLATELET # BLD AUTO: 226 K/UL
PMV BLD AUTO: 11.9 FL
POC IONIZED CALCIUM: 0.99 MMOL/L (ref 1.06–1.42)
POC TCO2 (MEASURED): 32 MMOL/L (ref 23–29)
POCT GLUCOSE: 122 MG/DL (ref 70–110)
POCT GLUCOSE: 248 MG/DL (ref 70–110)
POCT GLUCOSE: 259 MG/DL (ref 70–110)
POCT GLUCOSE: 80 MG/DL (ref 70–110)
POTASSIUM BLD-SCNC: 5.3 MMOL/L (ref 3.5–5.1)
POTASSIUM SERPL-SCNC: 4 MMOL/L
PROTHROMBIN TIME: 10 SEC
RBC # BLD AUTO: 3.63 M/UL
SAMPLE: ABNORMAL
SODIUM BLD-SCNC: 137 MMOL/L (ref 136–145)
SODIUM SERPL-SCNC: 139 MMOL/L
TROPONIN I SERPL DL<=0.01 NG/ML-MCNC: 0.03 NG/ML
TROPONIN I SERPL DL<=0.01 NG/ML-MCNC: 0.03 NG/ML
WBC # BLD AUTO: 5.35 K/UL

## 2018-08-27 PROCEDURE — 36415 COLL VENOUS BLD VENIPUNCTURE: CPT | Mod: NTX

## 2018-08-27 PROCEDURE — 63600175 PHARM REV CODE 636 W HCPCS: Mod: NTX | Performed by: PHYSICIAN ASSISTANT

## 2018-08-27 PROCEDURE — G0378 HOSPITAL OBSERVATION PER HR: HCPCS | Mod: NTX

## 2018-08-27 PROCEDURE — 85730 THROMBOPLASTIN TIME PARTIAL: CPT | Mod: NTX

## 2018-08-27 PROCEDURE — 25000003 PHARM REV CODE 250: Mod: NTX | Performed by: NURSE PRACTITIONER

## 2018-08-27 PROCEDURE — 63600175 PHARM REV CODE 636 W HCPCS: Mod: NTX | Performed by: NURSE PRACTITIONER

## 2018-08-27 PROCEDURE — 83036 HEMOGLOBIN GLYCOSYLATED A1C: CPT | Mod: NTX

## 2018-08-27 PROCEDURE — 84484 ASSAY OF TROPONIN QUANT: CPT | Mod: NTX

## 2018-08-27 PROCEDURE — 90935 HEMODIALYSIS ONE EVALUATION: CPT | Mod: GC,NTX,, | Performed by: INTERNAL MEDICINE

## 2018-08-27 PROCEDURE — 83880 ASSAY OF NATRIURETIC PEPTIDE: CPT | Mod: NTX

## 2018-08-27 PROCEDURE — 84484 ASSAY OF TROPONIN QUANT: CPT | Mod: 91,NTX

## 2018-08-27 PROCEDURE — 85610 PROTHROMBIN TIME: CPT | Mod: NTX

## 2018-08-27 PROCEDURE — 99220 PR INITIAL OBSERVATION CARE,LEVL III: CPT | Mod: NTX,,, | Performed by: NURSE PRACTITIONER

## 2018-08-27 PROCEDURE — 90935 HEMODIALYSIS ONE EVALUATION: CPT | Mod: NTX

## 2018-08-27 PROCEDURE — 85025 COMPLETE CBC W/AUTO DIFF WBC: CPT | Mod: NTX

## 2018-08-27 PROCEDURE — 80069 RENAL FUNCTION PANEL: CPT | Mod: NTX

## 2018-08-27 PROCEDURE — 83735 ASSAY OF MAGNESIUM: CPT | Mod: NTX

## 2018-08-27 PROCEDURE — G0257 UNSCHED DIALYSIS ESRD PT HOS: HCPCS | Mod: NTX

## 2018-08-27 RX ORDER — HYDRALAZINE HYDROCHLORIDE 25 MG/1
100 TABLET, FILM COATED ORAL EVERY 8 HOURS
Status: DISCONTINUED | OUTPATIENT
Start: 2018-08-27 | End: 2018-08-28 | Stop reason: HOSPADM

## 2018-08-27 RX ORDER — IBUPROFEN 200 MG
24 TABLET ORAL
Status: DISCONTINUED | OUTPATIENT
Start: 2018-08-27 | End: 2018-08-28 | Stop reason: HOSPADM

## 2018-08-27 RX ORDER — IBUPROFEN 200 MG
16 TABLET ORAL
Status: DISCONTINUED | OUTPATIENT
Start: 2018-08-27 | End: 2018-08-28 | Stop reason: HOSPADM

## 2018-08-27 RX ORDER — CLONIDINE HYDROCHLORIDE 0.1 MG/1
0.2 TABLET ORAL 2 TIMES DAILY
Status: DISCONTINUED | OUTPATIENT
Start: 2018-08-27 | End: 2018-08-28 | Stop reason: HOSPADM

## 2018-08-27 RX ORDER — ONDANSETRON 8 MG/1
8 TABLET, ORALLY DISINTEGRATING ORAL EVERY 8 HOURS PRN
Status: DISCONTINUED | OUTPATIENT
Start: 2018-08-27 | End: 2018-08-28 | Stop reason: HOSPADM

## 2018-08-27 RX ORDER — SEVELAMER CARBONATE 800 MG/1
1600 TABLET, FILM COATED ORAL
Status: DISCONTINUED | OUTPATIENT
Start: 2018-08-27 | End: 2018-08-28 | Stop reason: HOSPADM

## 2018-08-27 RX ORDER — ROSUVASTATIN CALCIUM 20 MG/1
20 TABLET, COATED ORAL DAILY
Status: DISCONTINUED | OUTPATIENT
Start: 2018-08-27 | End: 2018-08-28 | Stop reason: HOSPADM

## 2018-08-27 RX ORDER — LEVETIRACETAM 250 MG/1
250 TABLET ORAL 2 TIMES DAILY
Status: DISCONTINUED | OUTPATIENT
Start: 2018-08-27 | End: 2018-08-28 | Stop reason: HOSPADM

## 2018-08-27 RX ORDER — ONDANSETRON 2 MG/ML
4 INJECTION INTRAMUSCULAR; INTRAVENOUS EVERY 8 HOURS PRN
Status: DISCONTINUED | OUTPATIENT
Start: 2018-08-27 | End: 2018-08-28 | Stop reason: HOSPADM

## 2018-08-27 RX ORDER — SODIUM CHLORIDE 9 MG/ML
INJECTION, SOLUTION INTRAVENOUS ONCE
Status: DISCONTINUED | OUTPATIENT
Start: 2018-08-27 | End: 2018-08-28 | Stop reason: HOSPADM

## 2018-08-27 RX ORDER — BUMETANIDE 1 MG/1
1 TABLET ORAL 2 TIMES DAILY
Status: DISCONTINUED | OUTPATIENT
Start: 2018-08-27 | End: 2018-08-28 | Stop reason: HOSPADM

## 2018-08-27 RX ORDER — PANTOPRAZOLE SODIUM 40 MG/1
40 TABLET, DELAYED RELEASE ORAL 2 TIMES DAILY
Status: DISCONTINUED | OUTPATIENT
Start: 2018-08-27 | End: 2018-08-28 | Stop reason: HOSPADM

## 2018-08-27 RX ORDER — SODIUM CHLORIDE 0.9 % (FLUSH) 0.9 %
5 SYRINGE (ML) INJECTION
Status: DISCONTINUED | OUTPATIENT
Start: 2018-08-27 | End: 2018-08-28 | Stop reason: HOSPADM

## 2018-08-27 RX ORDER — AMOXICILLIN 250 MG
1 CAPSULE ORAL 2 TIMES DAILY PRN
Status: DISCONTINUED | OUTPATIENT
Start: 2018-08-27 | End: 2018-08-28 | Stop reason: HOSPADM

## 2018-08-27 RX ORDER — LOSARTAN POTASSIUM 50 MG/1
100 TABLET ORAL DAILY
Status: DISCONTINUED | OUTPATIENT
Start: 2018-08-27 | End: 2018-08-28 | Stop reason: HOSPADM

## 2018-08-27 RX ORDER — INSULIN ASPART 100 [IU]/ML
0-5 INJECTION, SOLUTION INTRAVENOUS; SUBCUTANEOUS
Status: DISCONTINUED | OUTPATIENT
Start: 2018-08-27 | End: 2018-08-28 | Stop reason: HOSPADM

## 2018-08-27 RX ORDER — ASPIRIN 325 MG
325 TABLET ORAL DAILY
Status: DISCONTINUED | OUTPATIENT
Start: 2018-08-27 | End: 2018-08-28 | Stop reason: HOSPADM

## 2018-08-27 RX ORDER — NIFEDIPINE 30 MG/1
90 TABLET, EXTENDED RELEASE ORAL DAILY
Status: DISCONTINUED | OUTPATIENT
Start: 2018-08-27 | End: 2018-08-28 | Stop reason: HOSPADM

## 2018-08-27 RX ORDER — SODIUM CHLORIDE 9 MG/ML
INJECTION, SOLUTION INTRAVENOUS
Status: DISCONTINUED | OUTPATIENT
Start: 2018-08-27 | End: 2018-08-28 | Stop reason: HOSPADM

## 2018-08-27 RX ORDER — GLUCAGON 1 MG
1 KIT INJECTION
Status: DISCONTINUED | OUTPATIENT
Start: 2018-08-27 | End: 2018-08-28 | Stop reason: HOSPADM

## 2018-08-27 RX ORDER — METOCLOPRAMIDE 10 MG/1
10 TABLET ORAL
Status: DISCONTINUED | OUTPATIENT
Start: 2018-08-27 | End: 2018-08-28 | Stop reason: HOSPADM

## 2018-08-27 RX ORDER — INSULIN ASPART 100 [IU]/ML
4 INJECTION, SOLUTION INTRAVENOUS; SUBCUTANEOUS
Status: DISCONTINUED | OUTPATIENT
Start: 2018-08-27 | End: 2018-08-28 | Stop reason: HOSPADM

## 2018-08-27 RX ORDER — CARVEDILOL 12.5 MG/1
12.5 TABLET ORAL 2 TIMES DAILY
Status: DISCONTINUED | OUTPATIENT
Start: 2018-08-27 | End: 2018-08-28 | Stop reason: HOSPADM

## 2018-08-27 RX ORDER — IPRATROPIUM BROMIDE AND ALBUTEROL SULFATE 2.5; .5 MG/3ML; MG/3ML
3 SOLUTION RESPIRATORY (INHALATION) EVERY 4 HOURS PRN
Status: DISCONTINUED | OUTPATIENT
Start: 2018-08-27 | End: 2018-08-28 | Stop reason: HOSPADM

## 2018-08-27 RX ORDER — ACETAMINOPHEN 325 MG/1
650 TABLET ORAL EVERY 6 HOURS PRN
Status: DISCONTINUED | OUTPATIENT
Start: 2018-08-27 | End: 2018-08-28 | Stop reason: HOSPADM

## 2018-08-27 RX ADMIN — NIFEDIPINE 90 MG: 30 TABLET, FILM COATED, EXTENDED RELEASE ORAL at 06:08

## 2018-08-27 RX ADMIN — ASPIRIN 325 MG ORAL TABLET 325 MG: 325 PILL ORAL at 09:08

## 2018-08-27 RX ADMIN — METOCLOPRAMIDE 10 MG: 10 TABLET ORAL at 07:08

## 2018-08-27 RX ADMIN — CARVEDILOL 12.5 MG: 12.5 TABLET, FILM COATED ORAL at 09:08

## 2018-08-27 RX ADMIN — SEVELAMER CARBONATE 1600 MG: 800 TABLET, FILM COATED ORAL at 07:08

## 2018-08-27 RX ADMIN — HYDRALAZINE HYDROCHLORIDE 100 MG: 25 TABLET ORAL at 10:08

## 2018-08-27 RX ADMIN — ROSUVASTATIN CALCIUM 20 MG: 20 TABLET, FILM COATED ORAL at 09:08

## 2018-08-27 RX ADMIN — SEVELAMER CARBONATE 1600 MG: 800 TABLET, FILM COATED ORAL at 12:08

## 2018-08-27 RX ADMIN — LEVETIRACETAM 250 MG: 250 TABLET ORAL at 09:08

## 2018-08-27 RX ADMIN — INSULIN ASPART 4 UNITS: 100 INJECTION, SOLUTION INTRAVENOUS; SUBCUTANEOUS at 01:08

## 2018-08-27 RX ADMIN — CLONIDINE HYDROCHLORIDE 0.2 MG: 0.1 TABLET ORAL at 09:08

## 2018-08-27 RX ADMIN — INSULIN ASPART 2 UNITS: 100 INJECTION, SOLUTION INTRAVENOUS; SUBCUTANEOUS at 01:08

## 2018-08-27 RX ADMIN — METOCLOPRAMIDE 10 MG: 10 TABLET ORAL at 12:08

## 2018-08-27 RX ADMIN — LEVETIRACETAM 250 MG: 250 TABLET ORAL at 10:08

## 2018-08-27 RX ADMIN — PANTOPRAZOLE SODIUM 40 MG: 40 TABLET, DELAYED RELEASE ORAL at 09:08

## 2018-08-27 RX ADMIN — INSULIN DETEMIR 26 UNITS: 100 INJECTION, SOLUTION SUBCUTANEOUS at 09:08

## 2018-08-27 RX ADMIN — LOSARTAN POTASSIUM 100 MG: 50 TABLET ORAL at 06:08

## 2018-08-27 RX ADMIN — HYDRALAZINE HYDROCHLORIDE 100 MG: 25 TABLET ORAL at 07:08

## 2018-08-27 RX ADMIN — SEVELAMER CARBONATE 1600 MG: 800 TABLET, FILM COATED ORAL at 05:08

## 2018-08-27 RX ADMIN — METOCLOPRAMIDE 10 MG: 10 TABLET ORAL at 05:08

## 2018-08-27 RX ADMIN — BUMETANIDE 1 MG: 1 TABLET ORAL at 05:08

## 2018-08-27 RX ADMIN — FUROSEMIDE 60 MG: 10 INJECTION, SOLUTION INTRAMUSCULAR; INTRAVENOUS at 12:08

## 2018-08-27 RX ADMIN — BUMETANIDE 1 MG: 1 TABLET ORAL at 09:08

## 2018-08-27 NOTE — SUBJECTIVE & OBJECTIVE
Past Medical History:   Diagnosis Date    Acute renal failure superimposed on stage 3 chronic kidney disease 2016    Anemia     during pregnancys    Anemia 2014    Anemia in chronic renal disease     Anemia of chronic kidney failure 2017    Arthritis     neuropathy hands feet and legs//    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    Blood transfusion     Chronic pain     Closed head injury     Diabetes mellitus     Diabetes mellitus     Diabetes mellitus type I     Diabetic macular edema, both eyes 3/31/2014    Diabetic retinopathy     ESRD (end stage renal disease) on dialysis     H/O insertion of insulin pump 3/9/2015    Hyperlipidemia     Hypertension     patient states that when her blood sugar increases her blood pressure increases    Hypertension     Hypertensive retinopathy of both eyes 3/31/2014    Insulin pump fitting or adjustment 2014    Left-sided weakness 2018    Medication side effects - Mobic 3/9/15 3/11/2015    Midline low back pain without sciatica     Neuropathy     Pneumonia     Proliferative diabetic retinopathy, both eyes 3/31/2014    Renal disorder     Seizures     when sugar is high, does not quite remember    Stroke     2018    Syncope and collapse, onset 1992 2015    Vitamin D deficiency     Vitreous hemorrhage 2017       Past Surgical History:   Procedure Laterality Date     SECTION      x2     SECTION, CLASSIC      x 2    dialysis graft Left     EYE SURGERY      HYSTERECTOMY         Review of patient's allergies indicates:   Allergen Reactions    Ciprofloxacin (bulk) Itching    Gabapentin Other (See Comments)     Seizure    Latex Itching and Other (See Comments)     Very low Oxygen    Tramadol Other (See Comments)     Seizure    Vancomycin Shortness Of Breath and Itching    Vancomycin analogues Other (See Comments)     Seizure    Baclofen     Neurontin [gabapentin] Other (See Comments)     Seizure like  activity    Niacin Itching and Other (See Comments)     Burning      Latex, natural rubber Rash    Niacin preparations Rash     Current Facility-Administered Medications   Medication Frequency    acetaminophen tablet 650 mg Q6H PRN    albuterol-ipratropium 2.5 mg-0.5 mg/3 mL nebulizer solution 3 mL Q4H PRN    aspirin tablet 325 mg Daily    bumetanide tablet 1 mg BID    carvedilol tablet 12.5 mg BID    cloNIDine tablet 0.2 mg BID    dextrose 50% injection 12.5 g PRN    dextrose 50% injection 25 g PRN    [START ON 8/28/2018] epoetin jacques injection 20,000 Units Every Tues, Thurs, Sat    glucagon (human recombinant) injection 1 mg PRN    glucose chewable tablet 16 g PRN    glucose chewable tablet 24 g PRN    hydrALAZINE tablet 100 mg Q8H    insulin aspart U-100 pen 0-5 Units QID (AC + HS) PRN    insulin aspart U-100 pen 4 Units TIDWM    [START ON 8/28/2018] insulin detemir U-100 pen 19 Units Daily    levETIRAcetam tablet 250 mg BID    losartan tablet 100 mg Daily    metoclopramide HCl tablet 10 mg TID AC    NIFEdipine 24 hr tablet 90 mg Daily    ondansetron disintegrating tablet 8 mg Q8H PRN    ondansetron injection 4 mg Q8H PRN    pantoprazole EC tablet 40 mg BID    rosuvastatin tablet 20 mg Daily    senna-docusate 8.6-50 mg per tablet 1 tablet BID PRN    sevelamer carbonate tablet 1,600 mg TID WM    sodium chloride 0.9% flush 5 mL PRN     Family History     Problem Relation (Age of Onset)    Asthma Father    Blindness Mother, Maternal Grandmother    Breast cancer Daughter    Cancer Cousin    Cataracts Maternal Grandmother    Diabetes Mother, Father, Sister    Glaucoma Maternal Grandmother    Heart disease Mother    Hypertension Mother, Father, Maternal Grandmother    Stroke Maternal Uncle    Thyroid disease Sister        Tobacco Use    Smoking status: Former Smoker    Smokeless tobacco: Never Used   Substance and Sexual Activity    Alcohol use: No    Drug use: No    Sexual activity:  Yes     Partners: Male     Birth control/protection: None     Review of Systems   Constitutional: Negative for chills and fever.   HENT: Negative for congestion and sore throat.    Eyes: Negative for discharge and visual disturbance.   Respiratory: Positive for shortness of breath. Negative for cough and chest tightness.    Cardiovascular: Positive for leg swelling. Negative for chest pain and palpitations.   Gastrointestinal: Positive for abdominal distention. Negative for abdominal pain, diarrhea, nausea and vomiting.   Endocrine: Positive for cold intolerance. Negative for heat intolerance.   Genitourinary: Positive for decreased urine volume.        ESRD patient on HD   Musculoskeletal: Negative for arthralgias and myalgias.   Skin: Negative for color change and rash.   Neurological: Negative for seizures and syncope.          Objective:     Vital Signs (Most Recent):  Temp: 98.2 °F (36.8 °C) (08/27/18 1101)  Pulse: 85 (08/27/18 1101)  Resp: 18 (08/27/18 1101)  BP: (!) 169/76 (08/27/18 1101)  SpO2: 96 % (08/27/18 1101)  O2 Device (Oxygen Therapy): room air (08/27/18 0749) Vital Signs (24h Range):  Temp:  [97.8 °F (36.6 °C)-98.7 °F (37.1 °C)] 98.2 °F (36.8 °C)  Pulse:  [78-86] 85  Resp:  [13-18] 18  SpO2:  [93 %-100 %] 96 %  BP: (140-169)/(68-98) 169/76     Weight: 76.6 kg (168 lb 14 oz) (08/27/18 0308)  Body mass index is 26.45 kg/m².  Body surface area is 1.9 meters squared.    I/O last 3 completed shifts:  In: -   Out: 100 [Urine:100]    Physical Exam   Constitutional: She is oriented to person, place, and time. She appears well-developed and well-nourished. No distress.   HENT:   Head: Normocephalic and atraumatic.   Eyes: EOM are normal. Pupils are equal, round, and reactive to light.   Neck: Normal range of motion. Neck supple.   Cardiovascular: Normal rate, regular rhythm, normal heart sounds and intact distal pulses.   No murmur heard.  Pulmonary/Chest: Effort normal. No respiratory distress. She has no  wheezes. She has rales (bibasilar).   Abdominal: Soft. Bowel sounds are normal. She exhibits distension. There is no tenderness.   Musculoskeletal: Normal range of motion. She exhibits edema (Bilateral lower extremity below knee pitting +2 ).   LUE HD access site with + thrill / bruit    Neurological: She is alert and oriented to person, place, and time.   Skin: Skin is warm and dry. She is not diaphoretic.   Psychiatric: She has a normal mood and affect. Her behavior is normal.   Nursing note and vitals reviewed.        Significant Labs:  CBC:   Recent Labs   Lab  08/27/18   0002  08/27/18   0008   WBC  5.35   --    RBC  3.63*   --    HGB  10.6*   --    HCT  34.1*  35*   PLT  226   --    MCV  94   --    MCH  29.2   --    MCHC  31.1*   --      CMP:   Recent Labs   Lab  08/27/18   0852   GLU  223*   CALCIUM  8.3*   ALBUMIN  2.9*   NA  139   K  4.0   CO2  28   CL  101   BUN  49*   CREATININE  4.6*     All labs within the past 24 hours have been reviewed.    Significant Imaging:  X-Ray: Reviewed  Personally reviewed CXR.

## 2018-08-27 NOTE — ED TRIAGE NOTES
"Eufemia Haq, a 53 y.o. female presents to the ED w/ complaint of generalized swelling. Patient states she had full dialysis treatment yesterday and over the past few days has been making less urine; pt reports that she usually makes lots of urine but recently has just been dribbling when going to the restroom. Pt reports abd pain stating that she feels like its "bloated with fluid" pt states that she just feels swollen all over.    Triage note:  Chief Complaint   Patient presents with    Facial Swelling     Pt arrives via EMS for generalized swelling including face. Dailysis pt and recieved dialysis yesterday. No airway comprimise at this moment.     Review of patient's allergies indicates:   Allergen Reactions    Ciprofloxacin (bulk) Itching    Gabapentin Other (See Comments)     Seizure    Latex Itching and Other (See Comments)     Very low Oxygen    Tramadol Other (See Comments)     Seizure    Vancomycin Shortness Of Breath and Itching    Vancomycin analogues Other (See Comments)     Seizure    Baclofen     Neurontin [gabapentin] Other (See Comments)     Seizure like activity    Niacin Itching and Other (See Comments)     Burning      Latex, natural rubber Rash    Niacin preparations Rash     Past Medical History:   Diagnosis Date    Acute renal failure superimposed on stage 3 chronic kidney disease 5/11/2016    Anemia     during pregnancys    Anemia 1/26/2014    Anemia in chronic renal disease     Anemia of chronic kidney failure 8/27/2017    Arthritis     neuropathy hands feet and legs//    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    Blood transfusion     Chronic pain     Closed head injury     Diabetes mellitus     Diabetes mellitus     Diabetes mellitus type I     Diabetic macular edema, both eyes 3/31/2014    Diabetic retinopathy     ESRD (end stage renal disease) on dialysis     H/O insertion of insulin pump 3/9/2015    Hyperlipidemia     Hypertension     patient states that when " her blood sugar increases her blood pressure increases    Hypertension     Hypertensive retinopathy of both eyes 3/31/2014    Insulin pump fitting or adjustment 11/7/2014    Left-sided weakness 6/5/2018    Medication side effects - Mobic 3/9/15 3/11/2015    Midline low back pain without sciatica     Neuropathy     Pneumonia     Proliferative diabetic retinopathy, both eyes 3/31/2014    Renal disorder     Seizures     when sugar is high, does not quite remember    Stroke     Feb 2018    Syncope and collapse, onset 1992 5/11/2015    Vitamin D deficiency     Vitreous hemorrhage 9/2/2017

## 2018-08-27 NOTE — ASSESSMENT & PLAN NOTE
- sbp ranging between 140-159  - continue home antihypertensive regimen   - monitor and adjust therapy as indicated

## 2018-08-27 NOTE — ASSESSMENT & PLAN NOTE
-  on arrival   - A1c 8.9  - Cardiac/renal/diabetic diet  - Home regimen: 26U daily, 8U TID WM; weight based: Detemir 19U daily, Aspart 4U TID WM, low dose SSI  - Monitor BGs and adjust insulin PRN

## 2018-08-27 NOTE — ASSESSMENT & PLAN NOTE
- Hgb 10.6 on arrival - previously 7.2 (08/03/18)  - continue Epo as directed   - trend CBC daily

## 2018-08-27 NOTE — SUBJECTIVE & OBJECTIVE
Past Medical History:   Diagnosis Date    Acute renal failure superimposed on stage 3 chronic kidney disease 2016    Anemia     during pregnancys    Anemia 2014    Anemia in chronic renal disease     Anemia of chronic kidney failure 2017    Arthritis     neuropathy hands feet and legs//    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    Blood transfusion     Chronic pain     Closed head injury     Diabetes mellitus     Diabetes mellitus     Diabetes mellitus type I     Diabetic macular edema, both eyes 3/31/2014    Diabetic retinopathy     ESRD (end stage renal disease) on dialysis     H/O insertion of insulin pump 3/9/2015    Hyperlipidemia     Hypertension     patient states that when her blood sugar increases her blood pressure increases    Hypertension     Hypertensive retinopathy of both eyes 3/31/2014    Insulin pump fitting or adjustment 2014    Left-sided weakness 2018    Medication side effects - Mobic 3/9/15 3/11/2015    Midline low back pain without sciatica     Neuropathy     Pneumonia     Proliferative diabetic retinopathy, both eyes 3/31/2014    Renal disorder     Seizures     when sugar is high, does not quite remember    Stroke     2018    Syncope and collapse, onset 1992 2015    Vitamin D deficiency     Vitreous hemorrhage 2017       Past Surgical History:   Procedure Laterality Date     SECTION      x2     SECTION, CLASSIC      x 2    dialysis graft Left     EYE SURGERY      HYSTERECTOMY         Review of patient's allergies indicates:   Allergen Reactions    Ciprofloxacin (bulk) Itching    Gabapentin Other (See Comments)     Seizure    Latex Itching and Other (See Comments)     Very low Oxygen    Tramadol Other (See Comments)     Seizure    Vancomycin Shortness Of Breath and Itching    Vancomycin analogues Other (See Comments)     Seizure    Baclofen     Neurontin [gabapentin] Other (See Comments)     Seizure like  activity    Niacin Itching and Other (See Comments)     Burning      Latex, natural rubber Rash    Niacin preparations Rash       No current facility-administered medications on file prior to encounter.      Current Outpatient Medications on File Prior to Encounter   Medication Sig    aspirin 325 MG tablet Take 1 tablet (325 mg total) by mouth once daily. Hold for 2 weeks following discharge    blood sugar diagnostic Strp To use as directed with True results meter and monitor BS 6 times daily - fluctuating BS    bumetanide (BUMEX) 1 MG tablet Take 1 tablet (1 mg total) by mouth 2 (two) times daily.    carvedilol (COREG) 12.5 MG tablet Take 1 tablet (12.5 mg total) by mouth 2 (two) times daily.    cloNIDine (CATAPRES) 0.2 MG tablet Take 1 tablet (0.2 mg total) by mouth 2 (two) times daily.    diphenoxylate-atropine 2.5-0.025 mg (LOMOTIL) 2.5-0.025 mg per tablet TK 1 T PO BID PRF DIARRHEA    epoetin jacques (PROCRIT) 20,000 unit/mL injection Inject 1 mL (20,000 Units total) into the skin every Tues, Thurs, Sat.    hydrALAZINE (APRESOLINE) 100 MG tablet Take 1 tablet (100 mg total) by mouth every 8 (eight) hours.    insulin glargine, TOUJEO, (TOUJEO) 300 unit/mL (1.5 mL) InPn pen Inject 26 Units into the skin once daily.    insulin lispro (HUMALOG) 100 unit/mL injection Inject 8 Units into the skin 3 (three) times daily before meals.    levETIRAcetam (KEPPRA) 250 MG Tab Take 1 tablet (250 mg total) by mouth 2 (two) times daily.    losartan (COZAAR) 25 MG tablet Take 4 tablets (100 mg total) by mouth once daily.    metoclopramide HCl (REGLAN) 10 MG tablet Take 1 tablet (10 mg total) by mouth 3 (three) times daily before meals.    NIFEdipine (ADALAT CC) 90 MG TbSR Take 1 tablet (90 mg total) by mouth once daily.    ondansetron (ZOFRAN-ODT) 4 MG TbDL Take 1 tablet (4 mg total) by mouth every 8 (eight) hours as needed.    ONETOUCH DELICA LANCETS 33 gauge Misc TEST BLOOD SUGAR TID    pantoprazole (PROTONIX)  40 MG tablet Take 1 tablet (40 mg total) by mouth 2 (two) times daily.    rosuvastatin (CRESTOR) 20 MG tablet Take 1 tablet (20 mg total) by mouth once daily.    sevelamer carbonate (RENVELA) 800 mg Tab Take 2 tablets (1,600 mg total) by mouth 3 (three) times daily with meals.     Family History     Problem Relation (Age of Onset)    Asthma Father    Blindness Mother, Maternal Grandmother    Breast cancer Daughter    Cancer Cousin    Cataracts Maternal Grandmother    Diabetes Mother, Father, Sister    Glaucoma Maternal Grandmother    Heart disease Mother    Hypertension Mother, Father, Maternal Grandmother    Stroke Maternal Uncle    Thyroid disease Sister        Tobacco Use    Smoking status: Former Smoker    Smokeless tobacco: Never Used   Substance and Sexual Activity    Alcohol use: No    Drug use: No    Sexual activity: Yes     Partners: Male     Birth control/protection: None     Review of Systems   Constitutional: Negative for chills and fever.   HENT: Negative for congestion and sore throat.    Eyes: Negative for discharge and visual disturbance.   Respiratory: Negative for cough, chest tightness and shortness of breath.    Cardiovascular: Positive for leg swelling. Negative for chest pain and palpitations.   Gastrointestinal: Positive for abdominal distention. Negative for abdominal pain, diarrhea, nausea and vomiting.   Endocrine: Positive for cold intolerance. Negative for heat intolerance.   Genitourinary: Positive for decreased urine volume.        HD patient    Musculoskeletal: Negative for arthralgias and myalgias.   Skin: Negative for color change and rash.   Neurological: Negative for seizures and syncope.     Objective:     Vital Signs (Most Recent):  Pulse: 81 (08/27/18 0308)  Resp: 18 (08/27/18 0308)  BP: (!) 147/68 (08/27/18 0308)  SpO2: (!) 94 % (08/27/18 0308) Vital Signs (24h Range):  Pulse:  [78-81] 81  Resp:  [13-18] 18  SpO2:  [93 %-100 %] 94 %  BP: (140-159)/(68-98) 147/68         There is no height or weight on file to calculate BMI.    Physical Exam   Constitutional: She is oriented to person, place, and time. She appears well-developed and well-nourished. No distress.   HENT:   Head: Normocephalic and atraumatic.   Eyes: EOM are normal. Pupils are equal, round, and reactive to light.   Neck: Normal range of motion. Neck supple.   Cardiovascular: Normal rate, regular rhythm, normal heart sounds and intact distal pulses.   No murmur heard.  Pulmonary/Chest: Effort normal. No respiratory distress. She has no wheezes. She has rales.   Abdominal: Soft. Bowel sounds are normal. She exhibits distension. There is no tenderness.   Musculoskeletal: Normal range of motion. She exhibits edema.   LUE HD access site with + thrill / bruit    Neurological: She is alert and oriented to person, place, and time.   Skin: Skin is warm and dry. She is not diaphoretic.   Psychiatric: She has a normal mood and affect. Her behavior is normal.   Nursing note and vitals reviewed.        CRANIAL NERVES     CN III, IV, VI   Pupils are equal, round, and reactive to light.  Extraocular motions are normal.        Significant Labs:   CBC:   Recent Labs   Lab  08/27/18   0002  08/27/18   0008   WBC  5.35   --    HGB  10.6*   --    HCT  34.1*  35*   PLT  226   --      CMP: pending     Cardiac Markers:   Recent Labs   Lab  08/27/18   0002   BNP  1,328*     Coagulation:   Recent Labs   Lab  08/27/18   0002   INR  0.9   APTT  22.2     Troponin:   Recent Labs   Lab  08/27/18   0002   TROPONINI  0.029*       Significant Imaging: I have reviewed all pertinent imaging results/findings within the past 24 hours.   Medications, laboratory results, imaging, EKG, and medical records were reviewed

## 2018-08-27 NOTE — ED NOTES
PA notified that blood had hemolyzed in lab. PA states OK not to resend [facility] MAG for now. ISTAT reviewed with PA. Will continue to monitor.

## 2018-08-27 NOTE — HPI
52 y/o female, who presents to the ED with c/o SOB and generalized swelling.  She has a PMH of ESRD (TTS HD), HTN, DM, and CVA. She reports that SOB started last night and that her daughter noted the increased edema and called EMS.  She endorses adherence to her HD schedule and states that 3.5L were removed with her Saturday session.   She denies any new medications, recent illness, fever, chills, CP, N/V/D, or  symptoms.  Initial workup reveals no leukocytosis, afebrile, K 5.3, Crt 4.4, BUN 61, BNP 1,328, troponin 0.029, EKG without acute morphologic changes from prior studies, CXR stable in appearance.  While in the ED she was placed on supplemental oxygen and given 60 mg of IV furosemide. She is in NAD at this time and has been weaned to room air with saturations of 94%.

## 2018-08-27 NOTE — PLAN OF CARE
08/27/18 0920   Discharge Assessment   Assessment Type Discharge Planning Assessment   Confirmed/corrected address and phone number on facesheet? Yes   Assessment information obtained from? Patient   Expected Length of Stay (days) 3   Communicated expected length of stay with patient/caregiver yes   Prior to hospitilization cognitive status: Alert/Oriented   Prior to hospitalization functional status: Assistive Equipment   Current cognitive status: Alert/Oriented   Current Functional Status: Needs Assistance   Lives With child(chas), adult  (daughter, Jayme Haq ([phone]))   Able to Return to Prior Arrangements yes   Is patient able to care for self after discharge? No   Patient's perception of discharge disposition home health  ([facility] ([phone]))   Patient currently being followed by outpatient case management? No   Patient currently receives any other outside agency services? No   Equipment Currently Used at Home glucometer;shower chair   Do you have any problems affording any of your prescribed medications? No   Is the patient taking medications as prescribed? yes   Does the patient have transportation home? Yes   Transportation Available family or friend will provide   Dialysis Name and Scheduled days [facility] (TTS 1100) LUE AVG   Does the patient receive services at the [facility]? No   Discharge Plan A Home Health  ([facility])   Discharge Plan B Skilled Nursing Facility   Patient/Family In Agreement With Plan yes     Dx: hypervolemia  Consult: neph  Pharm: [facility] f/u appt: Dr. Alvarez (ID) 8/28/18 at 1100, Dr. Davies (urol) 8/29/18 at 0900, & Dr. Roberts (pain) 8/30/18 at 0815    CM informed [facility] ([phone]) w/[facility] of patient's hospitalization. [facility] stated that the patient was only receiving NSG & that they do not need new orders if the patient remains in OBS status. Patient denied the need for assistance with transportation at time of discharge. Will  continue to follow.

## 2018-08-27 NOTE — HPI
53 year old woman with of Diabetes mellitus type 1, HTN, HLD, ESRD (on HD T,Th,Sat), Hx of CVAs with residual behavior problemas, who presented to [facility] complaining of SOB and generalized swelling that started 8/26/2018.  Refers has been compliant with HD schedule, last HD Saturday 8/25/2018, where 3.5 L removed. Denies any chest pain, chills, fever, sick contacts, palpitations, nausea/vomiting/diarrhea, or any other symptoms.     Consulted to Nephrology Service for ESRD management.      Nephrology: [facility], [facility] on [address]   Duration 4 hrs   UF 3.5-4 L  Access is ADEN AVG  EDW= 65 kg   Has some residual renal function

## 2018-08-27 NOTE — PROGRESS NOTES
BP remained elevated page placed to Dr Duran no return pt. Losartan and nifedipine given. CBG was 80 pt given a sandwich and milk since dinner trays did not arrive. No insulin coverage with meal given pt ate @ 25%.

## 2018-08-27 NOTE — PLAN OF CARE
Problem: Patient Care Overview  Goal: Plan of Care Review  Outcome: Ongoing (interventions implemented as appropriate)  Pt with nonskid footwear on with bed in lowest position and locked with bed rails up x2. Pt instructed to call prior to getting OOB. Pt with call light within reach and verbalized understanding. Pt maintained on fluid restrictions. IV lasix given in ED, Pt voided 100cc at this time. Pt with c/o SOB on exertion. Tele monitoring maintained. Pt remained afebrile, VSS. Will continue to monitor pt.

## 2018-08-27 NOTE — ASSESSMENT & PLAN NOTE
- Hgb 10.6 on arrival; above baseline- previously 7.2 (08/03/18)  - Epo per nephrology  - Trend daily

## 2018-08-27 NOTE — ED NOTES
PA spoke with [facility] for admission and Dr Blackmon requesting to have CMP and MAG redrawn. Will collect.

## 2018-08-27 NOTE — PROGRESS NOTES
Notified pt would be receiving dialysis and to wait to give BP meds. Will continue to monitor. Dialysis nurse said she would call for meds if pt's BP was elevated.

## 2018-08-27 NOTE — ASSESSMENT & PLAN NOTE
TTS HD schedule via LUE AV access  - Presents with increasing edema and worsening SOB  - Crt 4.1 on arrival, K 5.3  - Reports 3.5L removed 08/25/17  - Nephrology consulted; plan for HD 8/27  - RFP  daily  - Strict I/O's, daily Wt's

## 2018-08-27 NOTE — ED NOTES
Telemetry Verification   Patient placed on Telemetry Box  Verified with War Room  Box # [id number]   Monitor Tech Devon   Rate 78   Rhythm NS

## 2018-08-27 NOTE — SUBJECTIVE & OBJECTIVE
"Interval History: No acute events overnight. This AM, pt sitting upright in bed eating breakfast. Pt continues to endorse feeling "swollen everywhere"- face, abdominal, BLE. Denies CP/SOB. Discussed plan of care.    Review of Systems   Constitutional: Negative for chills, diaphoresis, fatigue and fever.   Respiratory: Negative for cough, chest tightness and shortness of breath.    Cardiovascular: Positive for leg swelling. Negative for chest pain and palpitations.   Gastrointestinal: Positive for abdominal distention. Negative for abdominal pain, diarrhea, nausea and vomiting.   Endocrine: Positive for cold intolerance. Negative for heat intolerance.   Genitourinary: Positive for decreased urine volume (ESRD).   Musculoskeletal: Negative for arthralgias and myalgias.   Neurological: Negative for dizziness, seizures, syncope and headaches.     Objective:     Vital Signs (Most Recent):  Temp: 98.2 °F (36.8 °C) (08/27/18 1101)  Pulse: 85 (08/27/18 1101)  Resp: 18 (08/27/18 1101)  BP: (!) 169/76 (08/27/18 1101)  SpO2: 96 % (08/27/18 1101) Vital Signs (24h Range):  Temp:  [97.8 °F (36.6 °C)-98.7 °F (37.1 °C)] 98.2 °F (36.8 °C)  Pulse:  [78-86] 85  Resp:  [13-18] 18  SpO2:  [93 %-100 %] 96 %  BP: (140-169)/(68-98) 169/76     Weight: 76.6 kg (168 lb 14 oz)  Body mass index is 26.45 kg/m².    Intake/Output Summary (Last 24 hours) at 8/27/2018 1129  Last data filed at 8/27/2018 0500  Gross per 24 hour   Intake --   Output 100 ml   Net -100 ml      Physical Exam   Constitutional: She is oriented to person, place, and time. She appears well-developed and well-nourished. No distress.   Cardiovascular: Normal rate, regular rhythm, normal heart sounds and intact distal pulses.   No murmur heard.  Pulmonary/Chest: Effort normal. No respiratory distress. She has no wheezes. She has rales.   Abdominal: Soft. Bowel sounds are normal. She exhibits distension. There is no tenderness.   Musculoskeletal: Normal range of motion. She " exhibits edema.   LUE HD access site with + thrill / bruit    Neurological: She is alert and oriented to person, place, and time.   Skin: Skin is warm and dry. She is not diaphoretic.   Psychiatric: She has a normal mood and affect. Her behavior is normal.   Nursing note and vitals reviewed.      Significant Labs: All pertinent labs within the past 24 hours have been reviewed.    Significant Imaging: I have reviewed all pertinent imaging results/findings within the past 24 hours.

## 2018-08-27 NOTE — ASSESSMENT & PLAN NOTE
- TTS HD schedule via LUE AV access  - presents with increasing edema and worsening SOB  - Crt 4.1 on arrival, K 5.3  - reports 3.5L removed 08/25/17  - nephrology consult pending   - trend chemistry daily  - strict I/O's  - daily Wt's

## 2018-08-27 NOTE — ED PROVIDER NOTES
Encounter Date: 8/26/2018       History     Chief Complaint   Patient presents with    Facial Swelling     Pt arrives via EMS for generalized swelling including face. Dailysis pt and recieved dialysis yesterday. No airway comprimise at this moment.     Patient is a 53-year-old female with history of end-stage renal disease on dialysis TThSat , hypertension, type 2 diabetes on insulin, CVA is presenting to the ER for evaluation of a swelling. Patient states that she completed dialysis without difficulty that lasted for about 4 hr yesterday.  Last night she developed shortness of breath and noticed swelling to bilateral lower extremity her face and abdomen.  Patient stated that last night she did have grits and sausage after the dialysis.  She denies any cough congestion URI symptoms. No fever chills. No chest pain or palpitations at this time  Denies prior cardiac history including stent placement.  Does not use blood thinners.  Patient denies abdominal pain, nausea or vomiting. No prior history of PE or DVT      The history is provided by the patient and a relative.     Review of patient's allergies indicates:   Allergen Reactions    Ciprofloxacin (bulk) Itching    Gabapentin Other (See Comments)     Seizure    Latex Itching and Other (See Comments)     Very low Oxygen    Tramadol Other (See Comments)     Seizure    Vancomycin Shortness Of Breath and Itching    Vancomycin analogues Other (See Comments)     Seizure    Baclofen     Neurontin [gabapentin] Other (See Comments)     Seizure like activity    Niacin Itching and Other (See Comments)     Burning      Latex, natural rubber Rash    Niacin preparations Rash     Past Medical History:   Diagnosis Date    Acute renal failure superimposed on stage 3 chronic kidney disease 5/11/2016    Anemia     during pregnancys    Anemia 1/26/2014    Anemia in chronic renal disease     Anemia of chronic kidney failure 8/27/2017    Arthritis     neuropathy hands  feet and legs//    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    Blood transfusion     Chronic pain     Closed head injury     Diabetes mellitus     Diabetes mellitus     Diabetes mellitus type I     Diabetic macular edema, both eyes 3/31/2014    Diabetic retinopathy     ESRD (end stage renal disease) on dialysis     H/O insertion of insulin pump 3/9/2015    Hyperlipidemia     Hypertension     patient states that when her blood sugar increases her blood pressure increases    Hypertension     Hypertensive retinopathy of both eyes 3/31/2014    Insulin pump fitting or adjustment 2014    Left-sided weakness 2018    Medication side effects - Mobic 3/9/15 3/11/2015    Midline low back pain without sciatica     Neuropathy     Pneumonia     Proliferative diabetic retinopathy, both eyes 3/31/2014    Renal disorder     Seizures     when sugar is high, does not quite remember    Stroke     2018    Syncope and collapse, onset 1992 2015    Vitamin D deficiency     Vitreous hemorrhage 2017     Past Surgical History:   Procedure Laterality Date     SECTION      x2     SECTION, CLASSIC      x 2    dialysis graft Left     EYE SURGERY      HYSTERECTOMY       Family History   Problem Relation Age of Onset    Diabetes Mother     Heart disease Mother     Blindness Mother         diabetes    Hypertension Mother     Diabetes Father     Asthma Father     Hypertension Father     Thyroid disease Sister     Breast cancer Daughter     Diabetes Sister     Stroke Maternal Uncle     Glaucoma Maternal Grandmother     Blindness Maternal Grandmother     Cataracts Maternal Grandmother     Hypertension Maternal Grandmother     Cancer Cousin     Amblyopia Neg Hx     Macular degeneration Neg Hx     Retinal detachment Neg Hx     Strabismus Neg Hx     Colon cancer Neg Hx     Ovarian cancer Neg Hx      Social History     Tobacco Use    Smoking status: Former Smoker     Smokeless tobacco: Never Used   Substance Use Topics    Alcohol use: No    Drug use: No     Review of Systems   Constitutional: Negative for chills and fever.   HENT: Negative for congestion.    Respiratory: Positive for shortness of breath. Negative for cough and chest tightness.    Cardiovascular: Positive for leg swelling. Negative for chest pain and palpitations.   Gastrointestinal: Negative for abdominal pain, diarrhea, nausea and vomiting.   Genitourinary: Positive for decreased urine volume. Negative for dysuria, flank pain and hematuria.   Musculoskeletal: Negative for myalgias.   Skin: Negative for rash and wound.   Allergic/Immunologic: Negative for immunocompromised state.   Neurological: Negative for dizziness, weakness, numbness and headaches.   Hematological: Does not bruise/bleed easily.   Psychiatric/Behavioral: Negative for confusion.       Physical Exam     Initial Vitals [08/26/18 2305]   BP Pulse Resp Temp SpO2   (!) 140/98 80 16 -- 97 %      MAP       --         Physical Exam    Constitutional: She appears well-developed and well-nourished. She is not diaphoretic. No distress.   HENT:   Head: Normocephalic and atraumatic.   Mouth/Throat: Uvula is midline, oropharynx is clear and moist and mucous membranes are normal.   Eyes: Conjunctivae and EOM are normal. Pupils are equal, round, and reactive to light.   Neck: Neck supple.   Cardiovascular: Normal rate, regular rhythm, normal heart sounds and intact distal pulses.   2+ pitting edema to bilateral lower extremities radiates to upper calves   Pulmonary/Chest: No respiratory distress. She has rales in the right lower field and the left lower field.   Abdominal: Soft. She exhibits no distension and no mass. There is no tenderness. There is no rebound and no guarding.   Musculoskeletal:        Arms:  Neurological: She is oriented to person, place, and time.   Skin: Skin is warm and dry.         ED Course   Procedures  Labs Reviewed   B-TYPE  "NATRIURETIC PEPTIDE - Abnormal; Notable for the following components:       Result Value    BNP 1,328 (*)     All other components within normal limits   CBC W/ AUTO DIFFERENTIAL - Abnormal; Notable for the following components:    RBC 3.63 (*)     Hemoglobin 10.6 (*)     Hematocrit 34.1 (*)     MCHC 31.1 (*)     RDW 18.6 (*)     Lymph # 0.8 (*)     Lymph% 14.0 (*)     Mono% 17.6 (*)     All other components within normal limits   TROPONIN I - Abnormal; Notable for the following components:    Troponin I 0.029 (*)     All other components within normal limits   ISTAT PROCEDURE - Abnormal; Notable for the following components:    POC Glucose 219 (*)     POC BUN 61 (*)     POC Creatinine 4.4 (*)     POC Potassium 5.3 (*)     POC TCO2 (MEASURED) 32 (*)     POC Ionized Calcium 0.99 (*)     POC Hematocrit 35 (*)     All other components within normal limits   APTT   PROTIME-INR   COMPREHENSIVE METABOLIC PANEL   MAGNESIUM   ISTAT CHEM8          Imaging Results          X-Ray Chest PA And Lateral (Final result)  Result time 08/27/18 00:50:44    Final result by Darrius Segura MD (08/27/18 00:50:44)                 Impression:      Persistent mild interstitial edema and small right pleural effusion.  No detrimental change when compared with 07/24/2018.      Electronically signed by: Darrius Segura MD  Date:    08/27/2018  Time:    00:50             Narrative:    EXAMINATION:  XR CHEST PA AND LATERAL    CLINICAL HISTORY:  Provided history is "  Shortness of breath".    TECHNIQUE:  Frontal and lateral views of the chest were performed.    COMPARISON:  07/24/2018.    FINDINGS:  Cardiac wires overlie the chest.  Cardiac loop recorder device is present.  Cardiac silhouette is borderline prominent but stable.  Elevation of the right hemidiaphragm.  Coarsened interstitial lung markings and mild bibasilar airspace disease.  Possible small right pleural effusion.  No pneumothorax.  No detrimental change when compared with the " prior exam.                                       APC / Resident Notes:   Patient was seen in the ER promptly upon arrival.  She is afebrile.  Placed on supplemental O2 upon arrival.  Lung auscultation did reveal rales to bilateral lower lung field.  Pitting edema noted to bilateral lower extremity radiates up to the calves.  Patient given IV Lasix in ED.    Laboratory studies show normal white count of 5.3.  Hemoglobin is stable 10.6.  I-STAT Chem 8 reveals normal electrolytes.  BNP elevated to a 1328.  Troponin fairly to baseline.  X-ray of chest reveals persistent mild interstitial edema and right pleural effusion.    Patient has not urinated after the IV Lasix was given.    Felt that patient may benefit from dialysis today. I discussed with Dr. Blackmon, [facility] who states that he will evaluate patient in ED.  Will admit to hospital medicine with dialysis on admission.   The care of this patient was overseen by attending physician who agrees with treatment, plan, and disposition.           Attending Attestation:     Physician Attestation Statement for NP/PA:   I reviewed the chart but I did not personally examine the patient. The face to face encounter was performed by the NP/PA.    Other NP/PA Attestation Additions:    History of Present Illness: No signs of acute airway compromise  ADMITTED, ATTENDING BEDSIDE TO ASSESS                      Clinical Impression:   The primary encounter diagnosis was Hypervolemia, unspecified hypervolemia type. A diagnosis of Shortness of breath was also pertinent to this visit.      Disposition:   Disposition: Placed in Observation  Condition: Stable                        Ivonne Yanez PA-C  08/27/18 0153       Luiz Foster DO  08/28/18 0902

## 2018-08-27 NOTE — ASSESSMENT & PLAN NOTE
Acute on chronic HFpEF  - Presents with SOB and worsening edema x 1 day  - Afebrile without leukocytosis  - CXR stable  - EKG with no acute changes, troponin at baseline  - Grossly edematous on exam  - Received 60 mg furosemide IV while in ED with no reported output (ESRD)  - Nephrology consult pending (TTS HD); plan for HD 8/27   - Concern with poor dietary compliance   - 1.5L fluid restriction   - Strict I/O's, daily Wt's

## 2018-08-27 NOTE — PROGRESS NOTES
Spoke with IMF Dr Duran concerning antihypertensives x 4 that was to be given in am. MD stated okay to split meds up, recheck BP and given meds for 1200.

## 2018-08-27 NOTE — HOSPITAL COURSE
Pt admitted to observation for hypervolemia in setting of ESRD on HD (TTS). Afebrile without leukocytosis. CXR stable. EKG with no acute changes; troponin at baseline. Grossly edematous on exam; concern with poor dietary compliance. Nephrology consulted; HD 8/27 4L removed and need to reestablish new dry weight.  Day of discharge nephrology stated no plans for HD in hospital again (pt euvolemic) and pt can go to HD 8/28 or wait until 8/30 if she has scheduled clinic appointments today.  Offered to arrange assistance to ID appointment 8/28 or offered assistance to reschedule.  Pt declined assistance and stated she would call her daughter for transportation to outpatient HD.

## 2018-08-27 NOTE — CONSULTS
[facility]  Nephrology  Consult Note    Patient Name: Eufemia Haq  MRN: [id number]  Admission Date: 8/26/2018  Hospital Length of Stay: 0 days  Attending Provider: Hilda Duran MD   Primary Care Physician: Tacho Guevara MD  Principal Problem:Hypervolemia    Consults  Subjective:     HPI: 53 year old woman with of Diabetes mellitus type 1, HTN, HLD, ESRD (on HD T,Th,Sat), Hx of CVAs with residual behavior problemas, who presented to [facility] complaining of SOB and generalized swelling that started 8/26/2018.  Refers has been compliant with HD schedule, last HD Saturday 8/25/2018, where 3.5 L removed. Denies any chest pain, chills, fever, sick contacts, palpitations, nausea/vomiting/diarrhea, or any other symptoms.     Consulted to Nephrology Service for ESRD management.      Nephrology: iHD TTS, [facility] on [address]   Duration 4 hrs   UF 3.5-4 L  Access is ADEN AVG  EDW= 65 kg   Has some residual renal function          Past Medical History:   Diagnosis Date    Acute renal failure superimposed on stage 3 chronic kidney disease 5/11/2016    Anemia     during pregnancys    Anemia 1/26/2014    Anemia in chronic renal disease     Anemia of chronic kidney failure 8/27/2017    Arthritis     neuropathy hands feet and legs//    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    Blood transfusion     Chronic pain     Closed head injury     Diabetes mellitus     Diabetes mellitus     Diabetes mellitus type I     Diabetic macular edema, both eyes 3/31/2014    Diabetic retinopathy     ESRD (end stage renal disease) on dialysis     H/O insertion of insulin pump 3/9/2015    Hyperlipidemia     Hypertension     patient states that when her blood sugar increases her blood pressure increases    Hypertension     Hypertensive retinopathy of both eyes 3/31/2014    Insulin pump fitting or adjustment 11/7/2014    Left-sided weakness 6/5/2018    Medication side effects - Mobic 3/9/15 3/11/2015    Midline low  back pain without sciatica     Neuropathy     Pneumonia     Proliferative diabetic retinopathy, both eyes 3/31/2014    Renal disorder     Seizures     when sugar is high, does not quite remember    Stroke     2018    Syncope and collapse, onset 2015    Vitamin D deficiency     Vitreous hemorrhage 2017       Past Surgical History:   Procedure Laterality Date     SECTION      x2     SECTION, CLASSIC      x 2    dialysis graft Left     EYE SURGERY      HYSTERECTOMY         Review of patient's allergies indicates:   Allergen Reactions    Ciprofloxacin (bulk) Itching    Gabapentin Other (See Comments)     Seizure    Latex Itching and Other (See Comments)     Very low Oxygen    Tramadol Other (See Comments)     Seizure    Vancomycin Shortness Of Breath and Itching    Vancomycin analogues Other (See Comments)     Seizure    Baclofen     Neurontin [gabapentin] Other (See Comments)     Seizure like activity    Niacin Itching and Other (See Comments)     Burning      Latex, natural rubber Rash    Niacin preparations Rash     Current Facility-Administered Medications   Medication Frequency    acetaminophen tablet 650 mg Q6H PRN    albuterol-ipratropium 2.5 mg-0.5 mg/3 mL nebulizer solution 3 mL Q4H PRN    aspirin tablet 325 mg Daily    bumetanide tablet 1 mg BID    carvedilol tablet 12.5 mg BID    cloNIDine tablet 0.2 mg BID    dextrose 50% injection 12.5 g PRN    dextrose 50% injection 25 g PRN    [START ON 2018] epoetin jacques injection 20,000 Units Every Tues, Thurs, Sat    glucagon (human recombinant) injection 1 mg PRN    glucose chewable tablet 16 g PRN    glucose chewable tablet 24 g PRN    hydrALAZINE tablet 100 mg Q8H    insulin aspart U-100 pen 0-5 Units QID (AC + HS) PRN    insulin aspart U-100 pen 4 Units TIDWM    [START ON 2018] insulin detemir U-100 pen 19 Units Daily    levETIRAcetam tablet 250 mg BID    losartan tablet 100 mg  Daily    metoclopramide HCl tablet 10 mg TID AC    NIFEdipine 24 hr tablet 90 mg Daily    ondansetron disintegrating tablet 8 mg Q8H PRN    ondansetron injection 4 mg Q8H PRN    pantoprazole EC tablet 40 mg BID    rosuvastatin tablet 20 mg Daily    senna-docusate 8.6-50 mg per tablet 1 tablet BID PRN    sevelamer carbonate tablet 1,600 mg TID WM    sodium chloride 0.9% flush 5 mL PRN     Family History     Problem Relation (Age of Onset)    Asthma Father    Blindness Mother, Maternal Grandmother    Breast cancer Daughter    Cancer Cousin    Cataracts Maternal Grandmother    Diabetes Mother, Father, Sister    Glaucoma Maternal Grandmother    Heart disease Mother    Hypertension Mother, Father, Maternal Grandmother    Stroke Maternal Uncle    Thyroid disease Sister        Tobacco Use    Smoking status: Former Smoker    Smokeless tobacco: Never Used   Substance and Sexual Activity    Alcohol use: No    Drug use: No    Sexual activity: Yes     Partners: Male     Birth control/protection: None     Review of Systems   Constitutional: Negative for chills and fever.   HENT: Negative for congestion and sore throat.    Eyes: Negative for discharge and visual disturbance.   Respiratory: Positive for shortness of breath. Negative for cough and chest tightness.    Cardiovascular: Positive for leg swelling. Negative for chest pain and palpitations.   Gastrointestinal: Positive for abdominal distention. Negative for abdominal pain, diarrhea, nausea and vomiting.   Endocrine: Positive for cold intolerance. Negative for heat intolerance.   Genitourinary: Positive for decreased urine volume.        ESRD patient on HD   Musculoskeletal: Negative for arthralgias and myalgias.   Skin: Negative for color change and rash.   Neurological: Negative for seizures and syncope.          Objective:     Vital Signs (Most Recent):  Temp: 98.2 °F (36.8 °C) (08/27/18 1101)  Pulse: 85 (08/27/18 1101)  Resp: 18 (08/27/18 1101)  BP: (!)  169/76 (08/27/18 1101)  SpO2: 96 % (08/27/18 1101)  O2 Device (Oxygen Therapy): room air (08/27/18 0749) Vital Signs (24h Range):  Temp:  [97.8 °F (36.6 °C)-98.7 °F (37.1 °C)] 98.2 °F (36.8 °C)  Pulse:  [78-86] 85  Resp:  [13-18] 18  SpO2:  [93 %-100 %] 96 %  BP: (140-169)/(68-98) 169/76     Weight: 76.6 kg (168 lb 14 oz) (08/27/18 0308)  Body mass index is 26.45 kg/m².  Body surface area is 1.9 meters squared.    I/O last 3 completed shifts:  In: -   Out: 100 [Urine:100]    Physical Exam   Constitutional: She is oriented to person, place, and time. She appears well-developed and well-nourished. No distress.   HENT:   Head: Normocephalic and atraumatic.   Eyes: EOM are normal. Pupils are equal, round, and reactive to light.   Neck: Normal range of motion. Neck supple.   Cardiovascular: Normal rate, regular rhythm, normal heart sounds and intact distal pulses.   No murmur heard.  Pulmonary/Chest: Effort normal. No respiratory distress. She has no wheezes. She has rales (bibasilar).   Abdominal: Soft. Bowel sounds are normal. She exhibits distension. There is no tenderness.   Musculoskeletal: Normal range of motion. She exhibits edema (Bilateral lower extremity below knee pitting +2 ).   LUE HD access site with + thrill / bruit    Neurological: She is alert and oriented to person, place, and time.   Skin: Skin is warm and dry. She is not diaphoretic.   Psychiatric: She has a normal mood and affect. Her behavior is normal.   Nursing note and vitals reviewed.        Significant Labs:  CBC:   Recent Labs   Lab  08/27/18   0002  08/27/18   0008   WBC  5.35   --    RBC  3.63*   --    HGB  10.6*   --    HCT  34.1*  35*   PLT  226   --    MCV  94   --    MCH  29.2   --    MCHC  31.1*   --      CMP:   Recent Labs   Lab  08/27/18   0852   GLU  223*   CALCIUM  8.3*   ALBUMIN  2.9*   NA  139   K  4.0   CO2  28   CL  101   BUN  49*   CREATININE  4.6*     All labs within the past 24 hours have been reviewed.    Significant  Imaging:  X-Ray: Reviewed  Personally reviewed CXR.    Assessment/Plan:     ESRD on dialysis    ESRD on IHD TTS   Out patient HD Center - [facility]  Duration of outpatient dialysis session - 4 hrs  EDW: 65 kg   Residual Renal Function (Urine Output) - some      End-Stage Renal Disease on HD  - Will provide dialysis for metabolic clearance and volume management  - Seen and examined today during hemodialysis; tolerating treatment well without issues. Denied headaches, chest pain, abdominal pain, or muscle cramps   - BFR   - Target ultrafiltration 3-4L  - Dialysate adjusted to current labs   Access: LUE AVF.    Anemia of Chronic Kidney Disease   - Hb stable 10.6  - Maintain Hb > 7 gm/dL    Mineral Bone Disease in CKD   - Renal Function Panel Daily for electrolytes monitoring    HTN   - elevated BP, will continue to monitor. Goal for BP is <130 mmHg SBP and BDP <80 mmHg    DM2  - Monitor glucose levels to target 140-180 intrahospital.  Deferred to primary team.    Nutrition   - Renal Diet                Anemia in chronic kidney disease, on chronic dialysis    Refer to ESRD        Essential hypertension    - Refer to ESRD            Thank you for your consult. I will follow-up with patient. Please contact us if you have any additional questions.    Douglas Lassiter MD  Nephrology  [facility]

## 2018-08-27 NOTE — H&P
[facility] Hospital Medicine  History & Physical    Patient Name: Eufemia Haq  MRN: [id number]  Admission Date: 8/26/2018  Attending Physician: Hilda Duran MD   Primary Care Provider: [facility] Medicine Team: [facility] HOSP MED F Joo Ratliff NP     Patient information was obtained from patient and ER records.     Subjective:     Principal Problem:Hypervolemia    Chief Complaint:   Chief Complaint   Patient presents with    Facial Swelling     Pt arrives via EMS for generalized swelling including face. Dailysis pt and recieved dialysis yesterday. No airway comprimise at this moment.        HPI: 52 y/o female, who presents to the ED with c/o SOB and generalized swelling.  She has a PMH of ESRD (TTS HD), HTN, DM, and CVA. She reports that SOB started last night and that her daughter noted the increased edema and called EMS.  She endorses adherence to her HD schedule and states that 3.5L were removed with her Saturday session.   She denies any new medications, recent illness, fever, chills, CP, N/V/D, or  symptoms.  Initial workup reveals no leukocytosis, afebrile, K 5.3, Crt 4.4, BUN 61, BNP 1,328, troponin 0.029, EKG without acute morphologic changes from prior studies, CXR stable in appearance.  While in the ED she was placed on supplemental oxygen and given 60 mg of IV furosemide. She is in NAD at this time and has been weaned to room air with saturations of 94%.     Past Medical History:   Diagnosis Date    Acute renal failure superimposed on stage 3 chronic kidney disease 5/11/2016    Anemia     during pregnancys    Anemia 1/26/2014    Anemia in chronic renal disease     Anemia of chronic kidney failure 8/27/2017    Arthritis     neuropathy hands feet and legs//    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    Blood transfusion     Chronic pain     Closed head injury     Diabetes mellitus     Diabetes mellitus     Diabetes mellitus type I     Diabetic macular edema,  both eyes 3/31/2014    Diabetic retinopathy     ESRD (end stage renal disease) on dialysis     H/O insertion of insulin pump 3/9/2015    Hyperlipidemia     Hypertension     patient states that when her blood sugar increases her blood pressure increases    Hypertension     Hypertensive retinopathy of both eyes 3/31/2014    Insulin pump fitting or adjustment 2014    Left-sided weakness 2018    Medication side effects - Mobic 3/9/15 3/11/2015    Midline low back pain without sciatica     Neuropathy     Pneumonia     Proliferative diabetic retinopathy, both eyes 3/31/2014    Renal disorder     Seizures     when sugar is high, does not quite remember    Stroke     2018    Syncope and collapse, onset 1992 2015    Vitamin D deficiency     Vitreous hemorrhage 2017       Past Surgical History:   Procedure Laterality Date     SECTION      x2     SECTION, CLASSIC      x 2    dialysis graft Left     EYE SURGERY      HYSTERECTOMY         Review of patient's allergies indicates:   Allergen Reactions    Ciprofloxacin (bulk) Itching    Gabapentin Other (See Comments)     Seizure    Latex Itching and Other (See Comments)     Very low Oxygen    Tramadol Other (See Comments)     Seizure    Vancomycin Shortness Of Breath and Itching    Vancomycin analogues Other (See Comments)     Seizure    Baclofen     Neurontin [gabapentin] Other (See Comments)     Seizure like activity    Niacin Itching and Other (See Comments)     Burning      Latex, natural rubber Rash    Niacin preparations Rash       No current facility-administered medications on file prior to encounter.      Current Outpatient Medications on File Prior to Encounter   Medication Sig    aspirin 325 MG tablet Take 1 tablet (325 mg total) by mouth once daily. Hold for 2 weeks following discharge    blood sugar diagnostic Strp To use as directed with True results meter and monitor BS 6 times daily -  fluctuating BS    bumetanide (BUMEX) 1 MG tablet Take 1 tablet (1 mg total) by mouth 2 (two) times daily.    carvedilol (COREG) 12.5 MG tablet Take 1 tablet (12.5 mg total) by mouth 2 (two) times daily.    cloNIDine (CATAPRES) 0.2 MG tablet Take 1 tablet (0.2 mg total) by mouth 2 (two) times daily.    diphenoxylate-atropine 2.5-0.025 mg (LOMOTIL) 2.5-0.025 mg per tablet TK 1 T PO BID PRF DIARRHEA    epoetin jacques (PROCRIT) 20,000 unit/mL injection Inject 1 mL (20,000 Units total) into the skin every Tues, Thurs, Sat.    hydrALAZINE (APRESOLINE) 100 MG tablet Take 1 tablet (100 mg total) by mouth every 8 (eight) hours.    insulin glargine, TOUJEO, (TOUJEO) 300 unit/mL (1.5 mL) InPn pen Inject 26 Units into the skin once daily.    insulin lispro (HUMALOG) 100 unit/mL injection Inject 8 Units into the skin 3 (three) times daily before meals.    levETIRAcetam (KEPPRA) 250 MG Tab Take 1 tablet (250 mg total) by mouth 2 (two) times daily.    losartan (COZAAR) 25 MG tablet Take 4 tablets (100 mg total) by mouth once daily.    metoclopramide HCl (REGLAN) 10 MG tablet Take 1 tablet (10 mg total) by mouth 3 (three) times daily before meals.    NIFEdipine (ADALAT CC) 90 MG TbSR Take 1 tablet (90 mg total) by mouth once daily.    ondansetron (ZOFRAN-ODT) 4 MG TbDL Take 1 tablet (4 mg total) by mouth every 8 (eight) hours as needed.    ONETOUCH DELICA LANCETS 33 gauge Misc TEST BLOOD SUGAR TID    pantoprazole (PROTONIX) 40 MG tablet Take 1 tablet (40 mg total) by mouth 2 (two) times daily.    rosuvastatin (CRESTOR) 20 MG tablet Take 1 tablet (20 mg total) by mouth once daily.    sevelamer carbonate (RENVELA) 800 mg Tab Take 2 tablets (1,600 mg total) by mouth 3 (three) times daily with meals.     Family History     Problem Relation (Age of Onset)    Asthma Father    Blindness Mother, Maternal Grandmother    Breast cancer Daughter    Cancer Cousin    Cataracts Maternal Grandmother    Diabetes Mother, Father,  Sister    Glaucoma Maternal Grandmother    Heart disease Mother    Hypertension Mother, Father, Maternal Grandmother    Stroke Maternal Uncle    Thyroid disease Sister        Tobacco Use    Smoking status: Former Smoker    Smokeless tobacco: Never Used   Substance and Sexual Activity    Alcohol use: No    Drug use: No    Sexual activity: Yes     Partners: Male     Birth control/protection: None     Review of Systems   Constitutional: Negative for chills and fever.   HENT: Negative for congestion and sore throat.    Eyes: Negative for discharge and visual disturbance.   Respiratory: Negative for cough, chest tightness and shortness of breath.    Cardiovascular: Positive for leg swelling. Negative for chest pain and palpitations.   Gastrointestinal: Positive for abdominal distention. Negative for abdominal pain, diarrhea, nausea and vomiting.   Endocrine: Positive for cold intolerance. Negative for heat intolerance.   Genitourinary: Positive for decreased urine volume.        HD patient    Musculoskeletal: Negative for arthralgias and myalgias.   Skin: Negative for color change and rash.   Neurological: Negative for seizures and syncope.     Objective:     Vital Signs (Most Recent):  Pulse: 81 (08/27/18 0308)  Resp: 18 (08/27/18 0308)  BP: (!) 147/68 (08/27/18 0308)  SpO2: (!) 94 % (08/27/18 0308) Vital Signs (24h Range):  Pulse:  [78-81] 81  Resp:  [13-18] 18  SpO2:  [93 %-100 %] 94 %  BP: (140-159)/(68-98) 147/68        There is no height or weight on file to calculate BMI.    Physical Exam   Constitutional: She is oriented to person, place, and time. She appears well-developed and well-nourished. No distress.   HENT:   Head: Normocephalic and atraumatic.   Eyes: EOM are normal. Pupils are equal, round, and reactive to light.   Neck: Normal range of motion. Neck supple.   Cardiovascular: Normal rate, regular rhythm, normal heart sounds and intact distal pulses.   No murmur heard.  Pulmonary/Chest: Effort  normal. No respiratory distress. She has no wheezes. She has rales.   Abdominal: Soft. Bowel sounds are normal. She exhibits distension. There is no tenderness.   Musculoskeletal: Normal range of motion. She exhibits edema.   LUE HD access site with + thrill / bruit    Neurological: She is alert and oriented to person, place, and time.   Skin: Skin is warm and dry. She is not diaphoretic.   Psychiatric: She has a normal mood and affect. Her behavior is normal.   Nursing note and vitals reviewed.        CRANIAL NERVES     CN III, IV, VI   Pupils are equal, round, and reactive to light.  Extraocular motions are normal.        Significant Labs:   CBC:   Recent Labs   Lab  08/27/18   0002  08/27/18   0008   WBC  5.35   --    HGB  10.6*   --    HCT  34.1*  35*   PLT  226   --      CMP: pending     Cardiac Markers:   Recent Labs   Lab  08/27/18 0002   BNP  1,328*     Coagulation:   Recent Labs   Lab  08/27/18 0002   INR  0.9   APTT  22.2     Troponin:   Recent Labs   Lab  08/27/18 0002   TROPONINI  0.029*       Significant Imaging: I have reviewed all pertinent imaging results/findings within the past 24 hours.   Medications, laboratory results, imaging, EKG, and medical records were reviewed     Assessment/Plan:     * Hypervolemia    - presents with SOB and worsening edema x 1 day  - afebrile, no leukocytosis, CXR stable, EKG with no acute changes  - grossly edematous   - received 60 mg furosemide IV while in ED with no reported output (ESRD)  - nephrology consult pending (TTS HD)   - concern with poor dietary compliance   - 1.5L fluid restriction   - strict I/O's  - daily Wt's  - supplemental oxygen as needed to maintain saturation > 92%         ESRD on dialysis    - TTS HD schedule via LUE AV access  - presents with increasing edema and worsening SOB  - Crt 4.1 on arrival, K 5.3  - reports 3.5L removed 08/25/17  - nephrology consult pending   - trend chemistry daily  - strict I/O's  - daily Wt's        Essential  hypertension    - sbp ranging between 140-159  - continue home antihypertensive regimen   - monitor and adjust therapy as indicated         Uncontrolled type 2 diabetes mellitus with hyperglycemia, with long-term current use of insulin    - glucose 219 on arrival   - diabetic diet  - HgA1C pending   - ISS        Seizure disorder    - continue home levetiracetam dosing   - seizure precautions         DM gastroparesis    - continue home medication regimen   - PRN antiemetics         Anemia    - Hgb 10.6 on arrival - previously 7.2 (08/03/18)  - continue Epo as directed   - trend CBC daily         Malnutrition    - hx of malnutrition   - CMP and prealbumin pending   - consider nutrition consult if indicated         Hypercholesteremia    - continue rosuvastatin           VTE Risk Mitigation (From admission, onward)        Ordered     IP VTE LOW RISK PATIENT  Once      08/27/18 0251     Place CARLOS hose  Until discontinued      08/27/18 0251     Place sequential compression device  Until discontinued      08/27/18 0251             Joo Ratliff NP  Department of Hospital Medicine   [facility]

## 2018-08-27 NOTE — PROGRESS NOTES
Report received from NAA Ortega. Pt arrived from floor AAOx4.    Maintenance dialysis initiated via ADEN graft without difficulty.

## 2018-08-27 NOTE — ASSESSMENT & PLAN NOTE
- presents with SOB and worsening edema x 1 day  - afebrile, no leukocytosis, CXR stable, EKG with no acute changes  - grossly edematous   - received 60 mg furosemide IV while in ED with no reported output (ESRD)  - nephrology consult pending (TTS HD)   - concern with poor dietary compliance   - 1.5L fluid restriction   - strict I/O's  - daily Wt's  - supplemental oxygen as needed to maintain saturation > 92%

## 2018-08-27 NOTE — PROGRESS NOTES
3hr HD treatment completed 4L of fluid removed pt tolerated well. Both needles of a ADEN graft removed and pressure held until hemostasis achieved and dressing applied. Report given to NAA Ortega.

## 2018-08-27 NOTE — PROGRESS NOTES
"[facility] Hospital Medicine  Progress Note    Patient Name: Eufemia Haq  MRN: [id number]  Patient Class: OP- Observation   Admission Date: 8/26/2018  Length of Stay: 0 days  Attending Physician: Hilda Duran MD  Primary Care Provider: Tacho Guevara MD    [facility] Medicine Team: [facility] [place] Bri Cortes PA-C    Subjective:     Principal Problem:Hypervolemia    HPI:  52 y/o female, who presents to the ED with c/o SOB and generalized swelling.  She has a PMH of ESRD (TTS HD), HTN, DM, and CVA. She reports that SOB started last night and that her daughter noted the increased edema and called EMS.  She endorses adherence to her HD schedule and states that 3.5L were removed with her Saturday session.   She denies any new medications, recent illness, fever, chills, CP, N/V/D, or  symptoms.  Initial workup reveals no leukocytosis, afebrile, K 5.3, Crt 4.4, BUN 61, BNP 1,328, troponin 0.029, EKG without acute morphologic changes from prior studies, CXR stable in appearance.  While in the ED she was placed on supplemental oxygen and given 60 mg of IV furosemide. She is in NAD at this time and has been weaned to room air with saturations of 94%.     Hospital Course:  Pt admitted to observation for hypervolemia in setting of ESRD on HD (TTS). Afebrile without leukocytosis. CXR stable. EKG with no acute changes; troponin at baseline. Grossly edematous on exam; concern with poor dietary compliance. Nephrology consulted; plan for HD 8/28 and need to reestablish new dry weight.    Interval History: No acute events overnight. This AM, pt sitting upright in bed eating breakfast. Pt continues to endorse feeling "swollen everywhere"- face, abdominal, BLE. Denies CP/SOB. Discussed plan of care.    Review of Systems   Constitutional: Negative for chills, diaphoresis, fatigue and fever.   Respiratory: Negative for cough, chest tightness and shortness of breath.    Cardiovascular: Positive for leg " swelling. Negative for chest pain and palpitations.   Gastrointestinal: Positive for abdominal distention. Negative for abdominal pain, diarrhea, nausea and vomiting.   Endocrine: Positive for cold intolerance. Negative for heat intolerance.   Genitourinary: Positive for decreased urine volume (ESRD).   Musculoskeletal: Negative for arthralgias and myalgias.   Neurological: Negative for dizziness, seizures, syncope and headaches.     Objective:     Vital Signs (Most Recent):  Temp: 98.2 °F (36.8 °C) (08/27/18 1101)  Pulse: 85 (08/27/18 1101)  Resp: 18 (08/27/18 1101)  BP: (!) 169/76 (08/27/18 1101)  SpO2: 96 % (08/27/18 1101) Vital Signs (24h Range):  Temp:  [97.8 °F (36.6 °C)-98.7 °F (37.1 °C)] 98.2 °F (36.8 °C)  Pulse:  [78-86] 85  Resp:  [13-18] 18  SpO2:  [93 %-100 %] 96 %  BP: (140-169)/(68-98) 169/76     Weight: 76.6 kg (168 lb 14 oz)  Body mass index is 26.45 kg/m².    Intake/Output Summary (Last 24 hours) at 8/27/2018 1129  Last data filed at 8/27/2018 0500  Gross per 24 hour   Intake --   Output 100 ml   Net -100 ml      Physical Exam   Constitutional: She is oriented to person, place, and time. She appears well-developed and well-nourished. No distress.   Cardiovascular: Normal rate, regular rhythm, normal heart sounds and intact distal pulses.   No murmur heard.  Pulmonary/Chest: Effort normal. No respiratory distress. She has no wheezes. She has rales.   Abdominal: Soft. Bowel sounds are normal. She exhibits distension. There is no tenderness.   Musculoskeletal: Normal range of motion. She exhibits edema.   LUE HD access site with + thrill / bruit    Neurological: She is alert and oriented to person, place, and time.   Skin: Skin is warm and dry. She is not diaphoretic.   Psychiatric: She has a normal mood and affect. Her behavior is normal.   Nursing note and vitals reviewed.      Significant Labs: All pertinent labs within the past 24 hours have been reviewed.    Significant Imaging: I have reviewed  all pertinent imaging results/findings within the past 24 hours.    Assessment/Plan:      * Hypervolemia    Acute on chronic HFpEF  - Presents with SOB and worsening edema x 1 day  - Afebrile without leukocytosis  - CXR stable  - EKG with no acute changes, troponin at baseline  - Grossly edematous on exam  - Received 60 mg furosemide IV while in ED with no reported output (ESRD)  - Nephrology consult pending (TTS HD); plan for HD 8/27   - Concern with poor dietary compliance   - 1.5L fluid restriction   - Strict I/O's, daily Wt's        ESRD on dialysis    TTS HD schedule via LUE AV access  - Presents with increasing edema and worsening SOB  - Crt 4.1 on arrival, K 5.3  - Reports 3.5L removed 08/25/17  - Nephrology consulted; plan for HD 8/27  - RFP  daily  - Strict I/O's, daily Wt's        Essential hypertension    - BP stable  - Continue home antihypertensive regimen   - Continue to monitor         Uncontrolled type 2 diabetes mellitus with hyperglycemia, with long-term current use of insulin    -  on arrival   - A1c 8.9  - Cardiac/renal/diabetic diet  - Home regimen: 26U daily, 8U TID WM; weight based: Detemir 19U daily, Aspart 4U TID WM, low dose SSI  - Monitor BGs and adjust insulin PRN        Seizure disorder    - Continue home levetiracetam dosing   - Seizure precautions         DM gastroparesis    - Continue home medication regimen   - PRN antiemetics         Anemia in chronic kidney disease, on chronic dialysis    - Hgb 10.6 on arrival; above baseline- previously 7.2 (08/03/18)  - Epo per nephrology  - Trend daily         Malnutrition    - Hx of malnutrition   - Monitor meal consumption        Hypercholesteremia    - Continue rosuvastatin           VTE Risk Mitigation (From admission, onward)        Ordered     IP VTE LOW RISK PATIENT  Once      08/27/18 0251     Place CARLOS hose  Until discontinued      08/27/18 0251     Place sequential compression device  Until discontinued      08/27/18 0251               Bri Cortes PA-C  Department of Hospital Medicine   [facility]  Discussed with staff: Dr. Duran

## 2018-08-28 VITALS
DIASTOLIC BLOOD PRESSURE: 70 MMHG | SYSTOLIC BLOOD PRESSURE: 146 MMHG | HEART RATE: 67 BPM | RESPIRATION RATE: 17 BRPM | OXYGEN SATURATION: 95 % | WEIGHT: 168.88 LBS | TEMPERATURE: 97 F | HEIGHT: 67 IN | BODY MASS INDEX: 26.51 KG/M2

## 2018-08-28 PROBLEM — E87.70 HYPERVOLEMIA: Status: RESOLVED | Noted: 2018-08-27 | Resolved: 2018-08-28

## 2018-08-28 LAB
ALBUMIN SERPL BCP-MCNC: 2.7 G/DL
ANION GAP SERPL CALC-SCNC: 10 MMOL/L
BASOPHILS # BLD AUTO: 0.05 K/UL
BASOPHILS NFR BLD: 1.1 %
BUN SERPL-MCNC: 33 MG/DL
CALCIUM SERPL-MCNC: 8.2 MG/DL
CHLORIDE SERPL-SCNC: 104 MMOL/L
CO2 SERPL-SCNC: 22 MMOL/L
CREAT SERPL-MCNC: 3.6 MG/DL
DIFFERENTIAL METHOD: ABNORMAL
EOSINOPHIL # BLD AUTO: 0.2 K/UL
EOSINOPHIL NFR BLD: 4.9 %
ERYTHROCYTE [DISTWIDTH] IN BLOOD BY AUTOMATED COUNT: 17.2 %
EST. GFR  (AFRICAN AMERICAN): 15.8 ML/MIN/1.73 M^2
EST. GFR  (NON AFRICAN AMERICAN): 13.7 ML/MIN/1.73 M^2
GLUCOSE SERPL-MCNC: 268 MG/DL
HAV IGM SERPL QL IA: NEGATIVE
HBV CORE IGM SERPL QL IA: NEGATIVE
HBV SURFACE AB SER-ACNC: POSITIVE M[IU]/ML
HBV SURFACE AG SERPL QL IA: NEGATIVE
HBV SURFACE AG SERPL QL IA: NEGATIVE
HCT VFR BLD AUTO: 31.6 %
HCV AB SERPL QL IA: NEGATIVE
HGB BLD-MCNC: 9.3 G/DL
IMM GRANULOCYTES # BLD AUTO: 0.02 K/UL
IMM GRANULOCYTES NFR BLD AUTO: 0.4 %
LYMPHOCYTES # BLD AUTO: 0.8 K/UL
LYMPHOCYTES NFR BLD: 17.7 %
MAGNESIUM SERPL-MCNC: 2.2 MG/DL
MCH RBC QN AUTO: 28.2 PG
MCHC RBC AUTO-ENTMCNC: 29.4 G/DL
MCV RBC AUTO: 96 FL
MONOCYTES # BLD AUTO: 0.6 K/UL
MONOCYTES NFR BLD: 13.9 %
NEUTROPHILS # BLD AUTO: 2.8 K/UL
NEUTROPHILS NFR BLD: 62 %
NRBC BLD-RTO: 0 /100 WBC
PHOSPHATE SERPL-MCNC: 3.8 MG/DL
PLATELET # BLD AUTO: 164 K/UL
PMV BLD AUTO: 10.9 FL
POCT GLUCOSE: 357 MG/DL (ref 70–110)
POTASSIUM SERPL-SCNC: 4.7 MMOL/L
RBC # BLD AUTO: 3.3 M/UL
SODIUM SERPL-SCNC: 136 MMOL/L
WBC # BLD AUTO: 4.47 K/UL

## 2018-08-28 PROCEDURE — 83735 ASSAY OF MAGNESIUM: CPT | Mod: NTX

## 2018-08-28 PROCEDURE — G0378 HOSPITAL OBSERVATION PER HR: HCPCS | Mod: NTX

## 2018-08-28 PROCEDURE — 99217 PR OBSERVATION CARE DISCHARGE: CPT | Mod: NTX,,, | Performed by: PHYSICIAN ASSISTANT

## 2018-08-28 PROCEDURE — 25000003 PHARM REV CODE 250: Mod: NTX | Performed by: NURSE PRACTITIONER

## 2018-08-28 PROCEDURE — 80069 RENAL FUNCTION PANEL: CPT | Mod: NTX

## 2018-08-28 PROCEDURE — 86706 HEP B SURFACE ANTIBODY: CPT | Mod: NTX

## 2018-08-28 PROCEDURE — 36415 COLL VENOUS BLD VENIPUNCTURE: CPT | Mod: NTX

## 2018-08-28 PROCEDURE — 80074 ACUTE HEPATITIS PANEL: CPT | Mod: NTX

## 2018-08-28 PROCEDURE — 85025 COMPLETE CBC W/AUTO DIFF WBC: CPT | Mod: NTX

## 2018-08-28 RX ADMIN — CLONIDINE HYDROCHLORIDE 0.2 MG: 0.1 TABLET ORAL at 09:08

## 2018-08-28 RX ADMIN — SEVELAMER CARBONATE 1600 MG: 800 TABLET, FILM COATED ORAL at 09:08

## 2018-08-28 RX ADMIN — CARVEDILOL 12.5 MG: 12.5 TABLET, FILM COATED ORAL at 09:08

## 2018-08-28 RX ADMIN — METOCLOPRAMIDE 10 MG: 10 TABLET ORAL at 06:08

## 2018-08-28 RX ADMIN — NIFEDIPINE 90 MG: 30 TABLET, FILM COATED, EXTENDED RELEASE ORAL at 09:08

## 2018-08-28 RX ADMIN — LEVETIRACETAM 250 MG: 250 TABLET ORAL at 09:08

## 2018-08-28 RX ADMIN — LOSARTAN POTASSIUM 100 MG: 50 TABLET ORAL at 09:08

## 2018-08-28 RX ADMIN — BUMETANIDE 1 MG: 1 TABLET ORAL at 09:08

## 2018-08-28 RX ADMIN — INSULIN ASPART 5 UNITS: 100 INJECTION, SOLUTION INTRAVENOUS; SUBCUTANEOUS at 09:08

## 2018-08-28 RX ADMIN — PANTOPRAZOLE SODIUM 40 MG: 40 TABLET, DELAYED RELEASE ORAL at 09:08

## 2018-08-28 RX ADMIN — HYDRALAZINE HYDROCHLORIDE 100 MG: 25 TABLET ORAL at 06:08

## 2018-08-28 RX ADMIN — ROSUVASTATIN CALCIUM 20 MG: 20 TABLET, FILM COATED ORAL at 09:08

## 2018-08-28 RX ADMIN — INSULIN ASPART 4 UNITS: 100 INJECTION, SOLUTION INTRAVENOUS; SUBCUTANEOUS at 09:08

## 2018-08-28 NOTE — ASSESSMENT & PLAN NOTE
TTS HD schedule via LUE AV access  - Presents with increasing edema and worsening SOB  - Crt 4.1 on arrival, K 5.3  - Reports 3.5L removed 08/25/17  - Nephrology consulted; HD 8/27 4L removed  - RFP daily  - Discussed discharge with nephrology, nephrology agreeable to discharge 8/28 without repeat HD.  Pt can go to outpatient HD at 11:00

## 2018-08-28 NOTE — PLAN OF CARE
Problem: Patient Care Overview  Goal: Plan of Care Review  Outcome: Ongoing (interventions implemented as appropriate)  Pt with nonskid footwear on with bed in lowest position and locked with bed rails up x2. Pt instructed to call prior to getting OOB. Pt with call light within reach and verbalized understanding. Pt maintained on fluid restrictions. BP meds held on day shift yesterday. Pt was hypertensive this evening with highest BP at 210/90, Pt given all scheduled BP meds and BP is currently 136/65. Pt still with c/o SOB on exertion and feeling swollen. Tele monitoring maintained with Pt in NSR. Pt remained afebrile and free from falls or injuries this shift. Will continue to monitor pt.

## 2018-08-28 NOTE — PLAN OF CARE
Problem: Patient Care Overview  Goal: Plan of Care Review  Outcome: Ongoing (interventions implemented as appropriate)  Side rails up x2; call bell in place; bed in lowest, locked position; skid proof socks on; no evidence of skin breakdown; care plan explained to patient; pt remains free of injury. Pt tolerated po, ambulates short distance, no urine output , HD completed with 4 L removed. Pt denies pain, n/v or diarrhea. Telemetry monitoring maintained, CBG monitored insulin coverage given. BP elevated, bp meds given. Will continue to monitor. IMF team paged.

## 2018-08-28 NOTE — ASSESSMENT & PLAN NOTE
-  on arrival   - A1c 8.9  - Cardiac/renal/diabetic diet  - Home regimen: 26U daily, 8U TID WM; weight based: Detemir 19U daily, Aspart 4U TID WM, low dose SSI

## 2018-08-28 NOTE — PLAN OF CARE
SW sent the H&P, fs. And dc summary through [facility] to [facility].  THey will see the pt tomorrow.    Shaye Boone, EBONI [phone]

## 2018-08-28 NOTE — DISCHARGE SUMMARY
[facility] Hospital Medicine  Discharge Summary      Patient Name: Eufemia Haq  MRN: [id number]  Admission Date: 8/26/2018  Hospital Length of Stay: 0 days  Discharge Date and Time:  08/28/2018 8:41 AM  Attending Physician: Saray Arrieta MD   Discharging Provider: Rosa Maria Parsons PA-C  Primary Care Provider: Tacho Guevara MD  [facility] Medicine Team: [facility] [place] [place] [facility] MED F Rosa Maria Parsons PA-C    HPI:   54 y/o female, who presents to the ED with c/o SOB and generalized swelling.  She has a PMH of ESRD (TTS HD), HTN, DM, and CVA. She reports that SOB started last night and that her daughter noted the increased edema and called EMS.  She endorses adherence to her HD schedule and states that 3.5L were removed with her Saturday session.   She denies any new medications, recent illness, fever, chills, CP, N/V/D, or  symptoms.  Initial workup reveals no leukocytosis, afebrile, K 5.3, Crt 4.4, BUN 61, BNP 1,328, troponin 0.029, EKG without acute morphologic changes from prior studies, CXR stable in appearance.  While in the ED she was placed on supplemental oxygen and given 60 mg of IV furosemide. She is in NAD at this time and has been weaned to room air with saturations of 94%.     * No surgery found *      Hospital Course:   Pt admitted to observation for hypervolemia in setting of ESRD on HD (TTS). Afebrile without leukocytosis. CXR stable. EKG with no acute changes; troponin at baseline. Grossly edematous on exam; concern with poor dietary compliance. Nephrology consulted; HD 8/27 4L removed and need to reestablish new dry weight.  Day of discharge nephrology stated no plans for HD in hospital again (pt euvolemic) and pt can go to HD 8/28 or wait until 8/30 if she has scheduled clinic appointments today.  Offered to arrange assistance to ID appointment 8/28 or offered assistance to reschedule.  Pt declined assistance and stated she would call her daughter for transportation to outpatient HD.      Consults:   Consults (From admission, onward)        Status Ordering Provider     Inpatient consult to Nephrology  Once     Provider:  (Not yet assigned)    Completed LUIS DUNHAM          * Hypervolemia-resolved as of 8/28/2018    Acute on chronic HFpEF  - Presents with SOB and worsening edema x 1 day  - Afebrile without leukocytosis  - CXR stable  - EKG with no acute changes, troponin at baseline  - Grossly edematous on exam  - Received 60 mg furosemide IV while in ED with no reported output (ESRD)  - Nephrology consult pending (TTS HD); plan for HD 8/27 with 4L removed  - Concern with poor dietary compliance   - 1.5L fluid restriction         ESRD on dialysis    TTS HD schedule via LUE AV access  - Presents with increasing edema and worsening SOB  - Crt 4.1 on arrival, K 5.3  - Reports 3.5L removed 08/25/17  - Nephrology consulted; HD 8/27 4L removed  - RFP daily  - Discussed discharge with nephrology, nephrology agreeable to discharge 8/28 without repeat HD.  Pt can go to outpatient HD at 11:00        DM gastroparesis    - Continue home medication regimen   - PRN antiemetics         Essential hypertension    - BP stable  - Continue home antihypertensive regimen   - Continue to monitor         Uncontrolled type 2 diabetes mellitus with hyperglycemia, with long-term current use of insulin    -  on arrival   - A1c 8.9  - Cardiac/renal/diabetic diet  - Home regimen: 26U daily, 8U TID WM; weight based: Detemir 19U daily, Aspart 4U TID WM, low dose SSI          Hypercholesteremia    - Continue rosuvastatin         Seizure disorder    - Continue home levetiracetam dosing   - Seizure precautions         Malnutrition    - Hx of malnutrition   - Monitor meal consumption        Acute on chronic diastolic heart failure    See above          Anemia in chronic kidney disease, on chronic dialysis    - Hgb 10.6 on arrival; above baseline- previously 7.2 (08/03/18); 9/3 at discharge  - Epo per nephrology              Final Active Diagnoses:    Diagnosis Date Noted POA    ESRD on dialysis [N18.6, Z99.2] 08/26/2017 Not Applicable     Chronic    DM gastroparesis [E11.43, K31.84] 05/14/2015 Yes    Essential hypertension [I10] 12/15/2014 Yes     Chronic    Uncontrolled type 2 diabetes mellitus with hyperglycemia, with long-term current use of insulin [E11.65, Z79.4] 07/25/2018 Not Applicable    Hypercholesteremia [E78.00] 06/21/2012 Yes    Seizure disorder [G40.909] 03/12/2018 Yes    Malnutrition [E46] 03/12/2018 Yes    Acute on chronic diastolic heart failure [I50.33] 08/27/2018 Yes    Anemia in chronic kidney disease, on chronic dialysis [N18.6, D63.1, Z99.2] 01/26/2014 Not Applicable     Chronic      Problems Resolved During this Admission:    Diagnosis Date Noted Date Resolved POA    PRINCIPAL PROBLEM:  Hypervolemia [E87.70] 08/27/2018 08/28/2018 Yes    Shortness of breath [R06.02]  08/28/2018 Unknown       Discharged Condition: good    Disposition: Home or Self Care    Follow Up:  Follow-up Information     ROLANDA Chavez On 8/28/2018.    Specialties:  Infectious Diseases, Emergency Medicine  Why:  at 11:00 AM  Contact information:  [address]  [facility] [address]  [phone]             Yolanda Davies NP On 8/29/2018.    Specialty:  Urology  Why:  at 9:00 AM  Contact information:  [address]  [facility] [address]  [phone]             Jeremias Roberts III, MD On 8/30/2018.    Specialties:  Pain Medicine, Anesthesiology  Why:  at 8:15 AM  Contact information:  [address]  [facility] [address]  [phone]                 Patient Instructions:      Notify your health care provider if you experience any of the following:  difficulty breathing or increased cough     Notify your health care provider if you experience any of the following:  persistent dizziness, light-headedness, or visual disturbances     Notify your health care provider if you experience any of the  following:  increased confusion or weakness       Significant Diagnostic Studies: Labs: All labs within the past 24 hours have been reviewed    Pending Diagnostic Studies:     Procedure Component Value Units Date/Time    Hepatitis B surface antibody [[id number]] Collected:  08/28/18 0448    Order Status:  Sent Lab Status:  In process Updated:  08/28/18 0541    Specimen:  Blood     Hepatitis B surface antigen [[id number]] Collected:  08/28/18 0448    Order Status:  Sent Lab Status:  In process Updated:  08/28/18 0541    Specimen:  Blood     Hepatitis panel, acute [[id number]] Collected:  08/28/18 0448    Order Status:  Sent Lab Status:  In process Updated:  08/28/18 0541    Specimen:  Blood          Medications:  Reconciled Home Medications:      Medication List      CONTINUE taking these medications    aspirin 325 MG tablet  Take 1 tablet (325 mg total) by mouth once daily. Hold for 2 weeks following discharge     blood sugar diagnostic Strp  To use as directed with True results meter and monitor BS 6 times daily - fluctuating BS     bumetanide 1 MG tablet  Commonly known as:  BUMEX  Take 1 tablet (1 mg total) by mouth 2 (two) times daily.     carvedilol 12.5 MG tablet  Commonly known as:  COREG  Take 1 tablet (12.5 mg total) by mouth 2 (two) times daily.     cloNIDine 0.2 MG tablet  Commonly known as:  CATAPRES  Take 1 tablet (0.2 mg total) by mouth 2 (two) times daily.     diphenoxylate-atropine 2.5-0.025 mg 2.5-0.025 mg per tablet  Commonly known as:  LOMOTIL  TK 1 T PO BID PRF DIARRHEA     epoetin jacques 20,000 unit/mL injection  Commonly known as:  PROCRIT  Inject 1 mL (20,000 Units total) into the skin every Tues, Thurs, Sat.     hydrALAZINE 100 MG tablet  Commonly known as:  APRESOLINE  Take 1 tablet (100 mg total) by mouth every 8 (eight) hours.     insulin glargine (TOUJEO) 300 unit/mL (1.5 mL) Inpn pen  Commonly known as:  TOUJEO  Inject 26 Units into the skin once daily.     insulin lispro 100 unit/mL  injection  Commonly known as:  HUMALOG  Inject 8 Units into the skin 3 (three) times daily before meals.     levETIRAcetam 250 MG Tab  Commonly known as:  KEPPRA  Take 1 tablet (250 mg total) by mouth 2 (two) times daily.     losartan 25 MG tablet  Commonly known as:  COZAAR  Take 4 tablets (100 mg total) by mouth once daily.     metoclopramide HCl 10 MG tablet  Commonly known as:  REGLAN  Take 1 tablet (10 mg total) by mouth 3 (three) times daily before meals.     NIFEdipine 90 MG Tbsr  Commonly known as:  ADALAT CC  Take 1 tablet (90 mg total) by mouth once daily.     ondansetron 4 MG Tbdl  Commonly known as:  ZOFRAN-ODT  Take 1 tablet (4 mg total) by mouth every 8 (eight) hours as needed.     ONETOUCH DELICA LANCETS 33 gauge Misc  Generic drug:  lancets  TEST BLOOD SUGAR TID     pantoprazole 40 MG tablet  Commonly known as:  PROTONIX  Take 1 tablet (40 mg total) by mouth 2 (two) times daily.     rosuvastatin 20 MG tablet  Commonly known as:  CRESTOR  Take 1 tablet (20 mg total) by mouth once daily.     sevelamer carbonate 800 mg Tab  Commonly known as:  RENVELA  Take 2 tablets (1,600 mg total) by mouth 3 (three) times daily with meals.            Indwelling Lines/Drains at time of discharge:   Lines/Drains/Airways     Central Venous Catheter Line                 Hemodialysis Catheter right subclavian -- days          Drain                 Hemodialysis AV Graft Left upper arm -- days         Hemodialysis AV Graft Left upper arm -- days                Time spent on the discharge of patient: >30 minutes  Patient was seen and examined on the date of discharge and determined to be suitable for discharge.         Rosa Maria Parsons PA-C  Department of Hospital Medicine  [facility]

## 2018-08-28 NOTE — PLAN OF CARE
08/28/18 1314   Final Note   Assessment Type Final Discharge Note     Patient discharged home to resume home health care with [facility]. Discharge summary faxed to [facility] ([phone]) w/[facility].

## 2018-08-28 NOTE — NURSING
Pt. D/c to home AA&Ox4, nad noted, vitals stable, and denies pain.  All d/c instructions given and explained to pt. Including follow up appt.'s, glycemic management, and medication orders.  Pt. Verbalized understanding.  IV line removed with catheter intact.

## 2018-08-28 NOTE — PLAN OF CARE
Problem: Patient Care Overview  Goal: Plan of Care Review  Poc reviewed with pt. Including fall precautions, glycemic management, and infection prevention.  Pt. Verbalized  Understanding.  Pt. Denies pain and remains free from falls/injuries this shift. Pt. D/c to home with all d/c instructions given and explained to pt.  All pt. questions encouraged and answered.  Will cont. To monitor pt.

## 2018-08-28 NOTE — ASSESSMENT & PLAN NOTE
Acute on chronic HFpEF  - Presents with SOB and worsening edema x 1 day  - Afebrile without leukocytosis  - CXR stable  - EKG with no acute changes, troponin at baseline  - Grossly edematous on exam  - Received 60 mg furosemide IV while in ED with no reported output (ESRD)  - Nephrology consult pending (TTS HD); plan for HD 8/27 with 4L removed  - Concern with poor dietary compliance   - 1.5L fluid restriction

## 2018-08-28 NOTE — ASSESSMENT & PLAN NOTE
- Hgb 10.6 on arrival; above baseline- previously 7.2 (08/03/18); 9/3 at discharge  - Epo per nephrology

## 2018-09-10 ENCOUNTER — HOSPITAL ENCOUNTER (OUTPATIENT)
Dept: UROLOGY | Facility: HOSPITAL | Age: 53
Discharge: HOME | End: 2018-09-10
Payer: MEDICARE

## 2018-09-19 ENCOUNTER — TELEPHONE (OUTPATIENT)
Dept: UROLOGY | Facility: CLINIC | Age: 53
End: 2018-09-19

## 2018-09-19 NOTE — TELEPHONE ENCOUNTER
Returning call to clinic. Pt stated that cysto will need to be rescheduled due to transportation issues. Pt requests appts be scheduled with daughter, Kristi. Message left to call clinic on Kristi's cell at [phone].

## 2018-09-19 NOTE — TELEPHONE ENCOUNTER
----- Message from Tera Arellano LPN sent at 9/18/2018  5:44 PM CDT -----  Contact: Pt's daughter Kristi [phone]      ----- Message -----  From: Carlie York  Sent: 9/18/2018   8:53 AM  To: Yadiel Burns Staff    Pt's daughter is requesting a call back to move the cystoscopy on 9.24.18 at 10:00am to an afternoon appointment due to her work schedule.    Kristi may be reached at [phone].    Thank you.  [place]

## 2018-09-20 ENCOUNTER — TELEPHONE (OUTPATIENT)
Dept: TRANSPLANT | Facility: CLINIC | Age: 53
End: 2018-09-20

## 2019-09-13 DIAGNOSIS — Z12.39 BREAST CANCER SCREENING: ICD-10-CM

## 2019-10-25 DIAGNOSIS — N18.9 CHRONIC KIDNEY DISEASE, UNSPECIFIED CKD STAGE: ICD-10-CM

## 2019-11-19 NOTE — ASSESSMENT & PLAN NOTE
- Hx of ESRD currently on HD TTS with decreased urine production   - Afebrile, no leukocytosis, UA with 4 squam. UC gram neg vernon.  - Will continue CTX (day 5)  3/16: UC Klebsiella ESBL. D/C CTX.   3/20: Completed course of gentamicin   English

## 2020-02-24 RX ORDER — DIPHENOXYLATE HYDROCHLORIDE AND ATROPINE SULFATE 2.5; .025 MG/1; MG/1
TABLET ORAL
Qty: 60 TABLET | OUTPATIENT
Start: 2020-02-24

## 2020-10-05 ENCOUNTER — PATIENT MESSAGE (OUTPATIENT)
Dept: ADMINISTRATIVE | Facility: HOSPITAL | Age: 55
End: 2020-10-05

## 2021-01-04 ENCOUNTER — PATIENT MESSAGE (OUTPATIENT)
Dept: ADMINISTRATIVE | Facility: HOSPITAL | Age: 56
End: 2021-01-04

## 2021-04-05 ENCOUNTER — PATIENT MESSAGE (OUTPATIENT)
Dept: ADMINISTRATIVE | Facility: HOSPITAL | Age: 56
End: 2021-04-05

## 2021-07-02 ENCOUNTER — PATIENT OUTREACH (OUTPATIENT)
Dept: ADMINISTRATIVE | Facility: HOSPITAL | Age: 56
End: 2021-07-02

## 2021-07-06 ENCOUNTER — PATIENT MESSAGE (OUTPATIENT)
Dept: ADMINISTRATIVE | Facility: HOSPITAL | Age: 56
End: 2021-07-06

## 2021-09-17 NOTE — ASSESSMENT & PLAN NOTE
Recent CVA 2 weeks ago; ?new baseline  - Presents with somnolence x 1 day on admission  - Afebrile, no leukocytosis, AST/ALT/ammonia WNL, lactic 2.2  - CT head with no acute intracranial abnormality noted   - Possibly uremic - missed her last two HD treatments (plan for HD 3/12)  - Blood cultures NGTD  - UC Klebsiella ESBL treating with gentamycin  - PT/OT consulted: SNF (accepted) and need to establish HD chair  - High risk of fall / injury - utilize all safety measures and fall precautions   - Aspiration precautions  Stable  - SW/CM assisting with discharge planning   3/18-3/19: Noted improvement after second gentamycin dose for UTI  3/20: Appears at baseline mentation   September 17, 2021       Mary Palmer MD  [address] [facility] [address]  Via In Basket      Patient: Shelby Trotter   YOB: 1952   Date of Visit: 9/17/2021       Dear Dr. Palmer:    Thank you for referring Shelby Trotter to me for evaluation. Below are my notes for this visit with her.    If you have questions, please do not hesitate to call me. I look forward to following your patient along with you.      Sincerely,        Chel Noonan MD        CC: No Recipients  Chel Noonan MD  9/17/2021  4:22 PM  Signed                                   Cardiology Office Visit      Chief complaint  Chief Complaint   Patient presents with   • Office Visit   • Follow-up       HPI:   The patient is 68 year old female presenting for follow-up appointment. Her  is present with her today.     Patient is doing well today. She remains active at home with walking on the treadmill at a speed of 2.5 for 45 minutes with a maximum HR of 103 bpm; she also goes to the gym twice a week. Patient denies any chest pain, shortness of breath, or palpitations. Her  does not believe she snores at night.     The patient denies complaints of chest pain, shortness of breath, orthopnea or PND, palpitations, worsening lower extremity edema, TIA or strokelike symptoms, claudication, presyncope or syncope.     Reviewed results of calcium score CT with her in detail.    Review of Systems  General: No fever or chills, no loss of appetite.    No cough or hemoptysis  No GI or  disturbances  No skin disorders or blood dyscrasias.  Remainder of systems reviewed and negative.        Past Medical History:   Diagnosis Date   • Eczema    • Foot pain    • History of urinary tract infection    • Left ankle pain    • Polyarthritis    • Right ankle pain    • Synovitis of finger      Past Surgical History:   Procedure Laterality Date   • Colonoscopy  08/24/2018    due in 5 years.   • Remv cataract extracap insert lens Bilateral           Family History   Problem Relation Age of Onset   • Cancer, Breast Mother    • Congestive Heart Failure Mother    • Coronary Artery Disease Mother    • Diabetes Mother    • Congestive Heart Failure Father    • Coronary Artery Disease Father    • Cancer, Lung Father    • Cancer Sister         soft tissue sarcoma of chest clsoe to breast so breast removed for clean margin   • Cardiomyopathy Sister    • Hyperlipidemia Other      Social History     Socioeconomic History   • Marital status: /Civil Union     Spouse name: Not on file   • Number of children: Not on file   • Years of education: Not on file   • Highest education level: Not on file   Occupational History   • Not on file   Tobacco Use   • Smoking status: Former Smoker   • Smokeless tobacco: Never Used   • Tobacco comment: quit 26 yrs ago, smoked 1 pack per week for 21 years   Substance and Sexual Activity   • Alcohol use: No   • Drug use: No   • Sexual activity: Yes   Other Topics Concern   • Not on file   Social History Narrative   • Not on file     Social Determinants of Health     Financial Resource Strain:    • Social Determinants: Financial Resource Strain:    Food Insecurity:    • Social Determinants: Food Insecurity:    Transportation Needs:    • Lack of Transportation (Medical):    • Lack of Transportation (Non-Medical):    Physical Activity:    • Days of Exercise per Week:    • Minutes of Exercise per Session:    Stress:    • Social Determinants: Stress:    Social Connections:    • Social Determinants: Social Connections:    Intimate Partner Violence:    • Social Determinants: Intimate Partner Violence Past Fear:    • Social Determinants: Intimate Partner Violence Current Fear:      Current Medications    CALCIUM CARBONATE-VITAMIN D (CALTRATE+D) 600-400 MG-UNIT PER TABLET    Take 1 tablet by mouth daily.    CHOLECALCIFEROL (VITAMIN D3) 50 MCG (2,000 UNITS) CAPSULE    Take 2,000 Units by mouth daily.    MAGNESIUM PO    Take 400 mg  by mouth at bedtime.     MISC NATURAL PRODUCTS (TURMERIC CURCUMIN) CAP    500 mg 2 times daily. TAKE 1 CAPSULE IN THE MORNING AND 1 CAPSULE AT NIGHT     ALLERGIES:  No Known Allergies        PHYSICAL EXAM   Visit Vitals  /80 (BP Location: RUE - Right upper extremity, Patient Position: Sitting, Cuff Size: Regular)   Pulse 83   Wt 71.9 kg (158 lb 6.4 oz)   BMI 28.97 kg/m²     Physical Exam:    HEENT: PERRL, EOMI. Normocephalic.  Neck:  There is no jugular venous distention.  No carotid bruits were noted.  Supple neck.   Oral mucosa : Pink and moist.    Endocrine: There is no goiter.  CVS: Nonpalpable PMI.  Regular rate and rhythm.  Normal first and second heart tones.  No murmurs, rubs or gallops.  Lung fields: Clear to auscultation bilaterally.  Abdomen: Soft. Nontender, nondistended.   Lower extremity: No cyanosis, clubbing or edema.   Peripheral vascular: Both lower extremities are warm and well perfused.   Pedal pulses are palpable.   Venous system: Calves are benign.  Homans sign is negative.  Neuro: Awake and alert.  Nonfocal examination.  Psych: Appropriate mood and affect  Integumentary: Warm and Dry      Procedures:  No results found for this or any previous visit.    CT calcium score from 9/2/2021  IMPRESSION:  1.  Coronary calcium score 241.  2.  This score is in the 75th-90th percentile for age and gender-matched  subjects.  3.  Recommendations: Aggressive risk factor modification and consultation  with physician  4.  Additional cardiac findings: There is evidence of aortic  annular/valvular calcification and possibly also mitral annular calcium.   Please correlate with echocardiography.  5. This study is strictly limited to the assessment of the coronary  arteries for calcification.  If there is concern for any other pathology, a  dedicated further imaging study is advised.    Holter monitor from 6/17/2021   INTERPRETATION:   The baseline electrocardiogram, showed sinus mechanism.  Heart rate varied  from a low of 53   to a high of 144,and averaged 81    during the monitoring period. The longest pause was 1.6   seconds long.  No significant symptoms were recorded in the diary.   FINDINGS: There were 836 PVCs but no runs.  There were 29 isolated premature atrial contractions.  IMPRESSION: Intermittently frequent isolated PVCs noted   . Clinical correlation is recommended.    ECG from 6/11/2021   Normal sinus rhythm   Possible left atrial enlargement   Septal infarct   Abnormal ECG     TTE from 4/13/2021  STUDY CONCLUSIONS  SUMMARY:     1. Left ventricle: The cavity size is normal. Wall thickness is normal.     The ejection fraction was measured by single plane method of disks. The     ejection fraction is 59%.  2. Mitral valve: Mild prolapse.  3. Left atrium: The atrium is mildly dilated.  4. Right ventricle: Systolic pressure is at the upper limits of normal.     The estimated peak pressure is 33mm Hg.    Stress test from 12/20/2017  STUDY CONCLUSIONS  SUMMARY:     1. Stress echo: Left ventricular ejection fraction was normal at rest and     with stress. There is no evidence for stress-induced ischemia.  2. Procedure narrative: Treadmill exercise testing was performed using the     Man protocol. The patient exercised for 9 min, to protocol stage 3,     to a maximal work rate of 10.1mets. Exercise was terminated due to     dyspnea and moderate fatigue. Post-stress images obtained within 90     seconds of peak stress. The patient was positioned for image     acquisition and recovery monitoring.  3. Mitral valve: Moderate regurgitation.  4. Baseline ECG: Normal.  5. Stress: Maximal heart rate during stress was 148bpm (95% of maximal     predicted heart rate). The maximal predicted heart rate was 155bpm.The     target heart rate was achieved. The heart rate response to stress is     normal. There is a normal resting blood pressure with an appropriate     response to stress. Functional capacity is normal.  6.  Stress ECG conclusions: There are no stress arrhythmias or conduction     abnormalities. Christensen scoring: exercise time of 9min; maximum ST     deviation of 0mm; no angina; resulting score is 9. This score predicts     a low risk of cardiac events.    Cardiac event monitor 9/19/2017  Patient underwent a 48 hour Holter monitor.  Rhythm is sinus rhythm with a normal MT interval and QRS duration.  The minimum heart rate was 53 bpm.  The maximum heart rate was a sinus tachycardia and at a rate of 122 bpm occurring at three: 11:00 PM on day two of the study.  The longest R to R intervals 1.6 seconds and there were no R to R intervals greater than 2.0 seconds.  34,475 premature ventricular ectopic beats were seen.  These frequently occurred in a bigeminal and trigeminal pattern.  197 ventricular couplets.  42 premature supraventricular ectopic beats and one two supraventricular couplets were noted.  Patient did keep a lot of activity however no symptoms were reported.     IMPRESSION:     Baseline evaluation shows a sinus rhythm with a average heart rate of 79 bpm.  Frequent premature ventricular ectopic beats which occurred in a bigeminal and trigeminal pattern and frequent ventricular couplets were seen.    FINAL         Labs:  Sodium (mmol/L)   Date Value   04/27/2021 140   07/28/2020 140     Potassium (mmol/L)   Date Value   04/27/2021 4.7   07/28/2020 5.1     Chloride (mmol/L)   Date Value   04/27/2021 107   07/28/2020 107     Carbon Dioxide (mmol/L)   Date Value   04/27/2021 29   07/28/2020 28     BUN (mg/dL)   Date Value   04/27/2021 14   07/28/2020 16     Creatinine (mg/dL)   Date Value   04/27/2021 0.83   07/28/2020 0.81     Glucose (mg/dL)   Date Value   04/27/2021 92   07/28/2020 92       Hemoglobin A1C (%)   Date Value   11/14/2018 5.7 (H)       CHOLESTEROL (mg/dL)   Date Value   07/28/2020 226 (H)     Cholesterol (mg/dL)   Date Value   04/27/2021 238 (H)     HDL (mg/dL)   Date Value   04/27/2021 77   07/28/2020  75     TRIGLYCERIDE (mg/dL)   Date Value   07/28/2020 123     Triglycerides (mg/dL)   Date Value   04/27/2021 105     CALCULATED LDL (mg/dL)   Date Value   07/28/2020 126     LDL (mg/dL)   Date Value   04/27/2021 140 (H)       TSH (mcUnits/mL)   Date Value   07/16/2021 4.491       No results found for: INR    WBC (K/mcL)   Date Value   04/27/2021 5.4     RBC (mil/mcL)   Date Value   04/27/2021 4.46     HCT (%)   Date Value   04/27/2021 42.8     HGB (g/dL)   Date Value   04/27/2021 13.7     PLT (K/mcL)   Date Value   04/27/2021 304         IMPRESSION/PLAN:  1. Hyperlipidemia, unspecified hyperlipidemia type    2. Calcification of coronary artery        Orders Placed This Encounter   • Lipid Panel With Reflex     Order Specific Question:   Should this test be performed fasting or random?     Answer:   Fasting   • Hepatic Function Panel   • atorvastatin (LIPITOR) 20 MG tablet     Sig: Take 1 tablet by mouth daily.     Dispense:  30 tablet     Refill:  11   • ECHOCARDIOGRAM STRESS TEST W/ W/O IMAGING AGENT     Order Specific Question:   Enter the preferred method to stress the patient?     Answer:   Exercise Graded Treadmill (Man Protocol)     Asymptomatic PVCs in the past   -Previous Holter monitor in 2017 showed 34,000 PVCs in 48 hours.    -24-hour Holter monitor from 6/17/2021 showed 836 PVCs but no runs. There were 29 isolated PACs.   -No ectopy noted on exam today.    Mitral Valve Prolapse   -TTE from 4/13/2021 revealed mild prolapse of mitral valve with EF of 59% and mild mitral regurgitation  -Dr. Hayes's notes in the past suggested that she has had mild to moderate mitral regurgitation  -She remains asymptomatic from this.  -Natural history of mitral valve prolapse and mitral regurgitation reviewed with her and watchfulness for symptoms has been discussed.    Hyperlipidemia   Calcification of coronary artery  -Lipid panel from 4/27/2021: Cholesterol 238, , HDL 77, and     -LDL goal <70 given  coronary artery calcification  -CT calcium score from 9/2/2021 showed a score of 241.   -Due to recent CT score and elevated LDL, will start patient on atorvastatin 20mg daily. Advised patient that if she experiences any myalgias take COQ10.   -Advised patient to discontinue fish oil   -Dietary and lifestyle modifications discussed   -Lipid panel and LFT ordered today to be completed in 6 weeks  -Echo stress test ordered today for further evaluation   -Informational booklets were provided today     Rheumatoid arthritis  -Followed by Dr. Hays    Family history of cardiomyopathy  -In sister.  Etiology of cardiomyopathy not clear.  Perhaps related to chemotherapy?    Personally reviewed holter monitor and CT calcium score results and discussed with patient in detail and all questions answered in layman's terms to the patient's understanding.     Chel Noonan MD, FACC    Scribe Attestation: Entered by [place] [place], acting as scribe for Dr. Chel Noonan MD, FACC.     Provider Attestation: The documentation recorded by the scribe accurately reflects the service I personally performed and the decisions made by me, Chel Noonan MD, FACC.

## 2021-09-30 NOTE — ASSESSMENT & PLAN NOTE
Resolved.  - Continue home meds in a stepwise manner    -see essential HTN     Detail Level: Detailed Quality 130: Documentation Of Current Medications In The Medical Record: Documentation of a medical reason(s) for not documenting, updating, or reviewing the patientâs current medications list (e.g., patient is in an urgent or emergent medical situation) Additional Notes: Patient does not have any meds

## 2022-01-05 NOTE — SUBJECTIVE & OBJECTIVE
Interval History: overnight BG>600, pt stared on insulin drip.  Pt states episode of diarrhea in HD yesterday however no episodes this morning.    Review of Systems   Constitutional: Positive for fatigue. Negative for chills, diaphoresis and fever.   Eyes: Positive for visual disturbance (blind). Negative for discharge.   Respiratory: Negative for cough, chest tightness and shortness of breath.    Cardiovascular: Negative for chest pain, palpitations and leg swelling.   Gastrointestinal: Positive for diarrhea (x1). Negative for abdominal distention, abdominal pain, nausea and vomiting.   Genitourinary: Positive for decreased urine volume (ESRD). Negative for dysuria.   Neurological: Positive for seizures and weakness. Negative for syncope.     Objective:     Vital Signs (Most Recent):  Temp: 98.6 °F (37 °C) (03/14/18 0800)  Pulse: 76 (03/14/18 1114)  Resp: 16 (03/14/18 0800)  BP: 129/62 (03/14/18 0800)  SpO2: 98 % (03/14/18 0800) Vital Signs (24h Range):  Temp:  [96.6 °F (35.9 °C)-99.6 °F (37.6 °C)] 98.6 °F (37 °C)  Pulse:  [] 76  Resp:  [16-20] 16  SpO2:  [93 %-100 %] 98 %  BP: (119-198)/(58-84) 129/62     Weight: 59 kg (130 lb 1.1 oz)  Body mass index is 20.37 kg/m².    Intake/Output Summary (Last 24 hours) at 03/14/18 1124  Last data filed at 03/13/18 1640   Gross per 24 hour   Intake              600 ml   Output             1600 ml   Net            -1000 ml      Physical Exam   Constitutional: She appears well-developed. No distress.   Eyes:   Blind R eye, minimal vision in L eye   Cardiovascular: Normal rate, regular rhythm, normal heart sounds and intact distal pulses.    No murmur heard.  Pulmonary/Chest: Effort normal and breath sounds normal. No respiratory distress. She has no wheezes. She has no rales.   Abdominal: Soft. Bowel sounds are normal. She exhibits no distension. There is no tenderness.   Musculoskeletal: Normal range of motion. She exhibits no edema.   Neurological: She is alert. She  displays atrophy.   Oriented to self, place, and president; follows commands, speech is slow; generalized weakness appreciated R>L   Skin: Skin is warm and dry. She is not diaphoretic.   Psychiatric: Her speech is not delayed and not slurred. She is slowed and withdrawn.   Nursing note and vitals reviewed.      Significant Labs: All pertinent labs within the past 24 hours have been reviewed.    Significant Imaging: I have reviewed all pertinent imaging results/findings within the past 24 hours.   No pertinent family history in first degree relatives Yes...

## 2023-06-28 NOTE — HPI
Ms. Haq is a 52 year old woman who presented after missing HD.  She is supposed to be on HD on a TTS schedule, but missed Thursday after being discharged from a rehab facility.  She has been on HD for 6 months, had a fistula placed and was started on HD while living in [place] but recently moved here.  She was hospitalized in [place] with a stroke in February and then moved in with her sister in [place].  She missed HD for the next few sessions and was hospitalized again at [facility] with [place]argy and hyperglycemia, discharged to [facility] rehab facility and left there 2 days ago.  She had been scheduled to follow up for HD on the [facility], but she is now living with her father in [facility] and prefers to go there.  Medical history includes the stroke in February.  She has poorly controlled type I diabetes and is blind.  She has a seizure disorder for which she is on Keppra.     Workup in ED showed patient was markedly hyperglycemic with .  Her anion gap was 22 but beta hydroxybutyrate only 0.2.  WBC 14K.  BUN/creatinine 101/9.4 and potassium 5.4.  She was admitted to ICU on an insulin drip.  
64

## 2024-12-27 NOTE — PLAN OF CARE
Patient off unit receiving her HD treatment when CM rounded. Will continue to follow.   cousin, brother, and sister

## 2025-02-06 NOTE — ASSESSMENT & PLAN NOTE
ESRD on IHD TTS   Out patient HD Center - [facility]  Duration of outpatient dialysis session - 4 hrs  EDW: 65 kg   Residual Renal Function (Urine Output) - some      End-Stage Renal Disease on HD  - Will provide dialysis for metabolic clearance and volume management  - Seen and examined today during hemodialysis; tolerating treatment well without issues. Denied headaches, chest pain, abdominal pain, or muscle cramps   - BFR   - Target ultrafiltration 3-4L  - Dialysate adjusted to current labs   Access: LUE AVF.    Anemia of Chronic Kidney Disease   - Hb stable 10.6  - Maintain Hb > 7 gm/dL    Mineral Bone Disease in CKD   - Renal Function Panel Daily for electrolytes monitoring    HTN   - elevated BP, will continue to monitor. Goal for BP is <130 mmHg SBP and BDP <80 mmHg    DM2  - Monitor glucose levels to target 140-180 intrahospital.  Deferred to primary team.    Nutrition   - Renal Diet           Last appointment: 12/10/24  Next appointment: Advised to follow-up 6/10/25  Previous refill encounter(s): 4/17/24 #90 with 3 refills    Requested Prescriptions     Pending Prescriptions Disp Refills    amLODIPine (NORVASC) 5 MG tablet [Pharmacy Med Name: AMLODIPINE BESYLATE 5 MG TAB] 90 tablet 3     Sig: TAKE 1 TABLET BY MOUTH EVERY DAY         For Pharmacy Admin Tracking Only    Program: Medication Refill  CPA in place:    Recommendation Provided To:   Intervention Detail: New Rx: 1, reason: Patient Preference  Intervention Accepted By:   Gap Closed?:    Time Spent (min): 5

## 2025-03-27 NOTE — ASSESSMENT & PLAN NOTE
Per primary.  If BP uncontrolled may increase insulin resistance.    [facility] - Skilled Nursing  Adult Nutrition  Progress Note    SUMMARY   Recommendations  Change diet to pureed due to swallowing difficulties at request of family, add boost glucose at request of family, RD following  Goals: Meet % of EEN/EPN by next RD follow -up  Nutrition Goal Status: new  Communication of RD Recs: other (comment) (POC)    Nutrition Discharge Planning    Nutrition Discharge Planning: Diet texture per SLP    Assessment and Plan    Nutrition Problem  Chewing/swallowing difficulty     Related to (etiology):   dysphagia     Signs and Symptoms (as evidenced by):   SLP eval, need for altered texture diet      Interventions/Recommendations (treatment strategy):  Altered textured diet -puree level 4  Thin liquids  Collaboration of nutrition professional with other providers  Commercial beverage medical food supplement- boost glucose vanilla BID       Nutrition Diagnosis Status:   Active    Malnutrition Assessment 3/27/25                Upper Arm Region (Subcutaneous Fat Loss): mild depletion   [place] Region (Muscle Loss): well nourished                 Reason for Assessment    Reason For Assessment: RD follow-up  Diagnosis: other (see comments) (cervical spine stenosis)  General Information Comments: RD followed up w pt. Pt and pt's daughter stated she is having a hard time swallowing her food. Pt's daughter states her mother feels like she has a burning in her chest. Pt's daughter requested mother to be on a pureed diet. Pt's daughter reported pt began having a difficult time eating on Tuesday night (3/25/25) and appetitie decreased. RD noticed pt did not eat lunch on lunch tray on 3/27/25 for follow up.  RD recommended Boost Glucose Vanilla BID. RD following renal labs.NP suspects thrush, tx ordered today.    Nutrition/Diet History    Food Preferences: Hot tea, milk, victor at breafast. NO coffee, OJ, tomato, lemonade, chicken  Spiritual, Cultural Beliefs, Quaker Practices, Values that  "Affect Care: yes  Food Allergies: NKFA  Factors Affecting Nutritional Intake: difficulty/impaired swallowing    Anthropometrics    Height: 5' 3" (160 cm)  Height (inches): 63 in  Height Method: Stated  Weight: 59.1 kg (130 lb 4.7 oz)  Weight (lb): 130.29 lb  Weight Method: Bed Scale  Ideal Body Weight (IBW), Female: 115 lb  % Ideal Body Weight, Female (lb): 112.91 %  BMI (Calculated): 23.1  BMI Grade: 18.5-24.9 - normal       Lab/Procedures/Meds    Pertinent Labs Reviewed: reviewed (HGB 8.0, HCT 24.8, BUN 32, GFR 54)  Pertinent Labs Comments: hgb 8, Hct 24.8, BUN 32, GFR 54  Pertinent Medications Reviewed: reviewed  Pertinent Medications Comments: amlodine, statin, hydralazine    Estimated/Assessed Needs    Weight Used For Calorie Calculations: 60.3 kg (132 lb 15 oz)  Energy Calorie Requirements (kcal): 1290 kcal/day  Energy Need Method: [place] Jeor (x 1.3)  Protein Requirements: 60-72 g (1-1.2 g/kg)  Weight Used For Protein Calculations: 60.3 kg (132 lb 15 oz)  Fluid Requirements (mL): 1 mL/kcal or per MD  Estimated Fluid Requirement Method: RDA Method  RDA Method (mL): 1290         Nutrition Prescription Ordered    Current Diet Order: Soft and bite sized (IDDSI level 6)    Evaluation of Received Nutrient/Fluid Intake    I/O: +960  Energy Calories Required: meeting needs  Protein Required: meeting needs  Fluid Required: other (see comments) (As per MD)  Comments: LBM 3/27/25  Tolerance: tolerating  % Intake of Estimated Energy Needs: 25 - 50 %  % Meal Intake: 25 - 50 %    Nutrition Risk    Level of Risk/Frequency of Follow-up: low (1 week)     Monitor and Evaluation    Monitor and Evaluation: Energy intake, Food and beverage intake, Protein intake, Diet order, Weight     Nutrition Follow-Up    RD Follow-up?: Yes      "
